# Patient Record
Sex: MALE | Race: WHITE | NOT HISPANIC OR LATINO | Employment: OTHER | URBAN - NONMETROPOLITAN AREA
[De-identification: names, ages, dates, MRNs, and addresses within clinical notes are randomized per-mention and may not be internally consistent; named-entity substitution may affect disease eponyms.]

---

## 2017-02-04 RX ORDER — LOSARTAN POTASSIUM 50 MG/1
50 TABLET ORAL DAILY
Qty: 30 TABLET | Refills: 0 | Status: SHIPPED | OUTPATIENT
Start: 2017-02-04 | End: 2017-03-06

## 2017-02-24 DIAGNOSIS — E55.9 VITAMIN D DEFICIENCY: ICD-10-CM

## 2017-02-24 DIAGNOSIS — I10 ESSENTIAL HYPERTENSION: Primary | ICD-10-CM

## 2017-02-24 DIAGNOSIS — Z85.038 H/O COLON CANCER, STAGE I: ICD-10-CM

## 2017-02-24 DIAGNOSIS — E11.8 TYPE 2 DIABETES MELLITUS WITH COMPLICATION, WITH LONG-TERM CURRENT USE OF INSULIN (HCC): ICD-10-CM

## 2017-02-24 DIAGNOSIS — Z85.47 H/O TESTICULAR CANCER: ICD-10-CM

## 2017-02-24 DIAGNOSIS — L02.91 ABSCESS: ICD-10-CM

## 2017-02-24 DIAGNOSIS — Z23 ENCOUNTER FOR IMMUNIZATION: ICD-10-CM

## 2017-02-24 DIAGNOSIS — Z79.4 TYPE 2 DIABETES MELLITUS WITH COMPLICATION, WITH LONG-TERM CURRENT USE OF INSULIN (HCC): ICD-10-CM

## 2017-02-24 DIAGNOSIS — E78.5 DYSLIPIDEMIA: ICD-10-CM

## 2017-02-24 DIAGNOSIS — M51.36 DDD (DEGENERATIVE DISC DISEASE), LUMBAR: ICD-10-CM

## 2017-02-24 DIAGNOSIS — K21.9 GASTROESOPHAGEAL REFLUX DISEASE WITHOUT ESOPHAGITIS: ICD-10-CM

## 2017-02-25 ENCOUNTER — LAB (OUTPATIENT)
Dept: LAB | Facility: HOSPITAL | Age: 72
End: 2017-02-25

## 2017-02-25 DIAGNOSIS — M51.36 DDD (DEGENERATIVE DISC DISEASE), LUMBAR: ICD-10-CM

## 2017-02-25 DIAGNOSIS — L02.91 ABSCESS: ICD-10-CM

## 2017-02-25 DIAGNOSIS — Z79.4 TYPE 2 DIABETES MELLITUS WITH COMPLICATION, WITH LONG-TERM CURRENT USE OF INSULIN (HCC): ICD-10-CM

## 2017-02-25 DIAGNOSIS — E11.8 TYPE 2 DIABETES MELLITUS WITH COMPLICATION, WITH LONG-TERM CURRENT USE OF INSULIN (HCC): ICD-10-CM

## 2017-02-25 DIAGNOSIS — Z23 ENCOUNTER FOR IMMUNIZATION: ICD-10-CM

## 2017-02-25 DIAGNOSIS — Z85.038 H/O COLON CANCER, STAGE I: ICD-10-CM

## 2017-02-25 DIAGNOSIS — K21.9 GASTROESOPHAGEAL REFLUX DISEASE WITHOUT ESOPHAGITIS: ICD-10-CM

## 2017-02-25 DIAGNOSIS — E78.5 DYSLIPIDEMIA: ICD-10-CM

## 2017-02-25 DIAGNOSIS — Z85.47 H/O TESTICULAR CANCER: ICD-10-CM

## 2017-02-25 DIAGNOSIS — I10 ESSENTIAL HYPERTENSION: ICD-10-CM

## 2017-02-25 DIAGNOSIS — E55.9 VITAMIN D DEFICIENCY: ICD-10-CM

## 2017-03-06 ENCOUNTER — LAB (OUTPATIENT)
Dept: LAB | Facility: HOSPITAL | Age: 72
End: 2017-03-06

## 2017-03-06 DIAGNOSIS — Z79.4 TYPE 2 DIABETES MELLITUS WITH COMPLICATION, WITH LONG-TERM CURRENT USE OF INSULIN (HCC): ICD-10-CM

## 2017-03-06 DIAGNOSIS — M51.36 DDD (DEGENERATIVE DISC DISEASE), LUMBAR: ICD-10-CM

## 2017-03-06 DIAGNOSIS — E78.5 DYSLIPIDEMIA: ICD-10-CM

## 2017-03-06 DIAGNOSIS — Z23 ENCOUNTER FOR IMMUNIZATION: ICD-10-CM

## 2017-03-06 DIAGNOSIS — Z85.47 H/O TESTICULAR CANCER: ICD-10-CM

## 2017-03-06 DIAGNOSIS — I10 ESSENTIAL HYPERTENSION: ICD-10-CM

## 2017-03-06 DIAGNOSIS — Z85.038 H/O COLON CANCER, STAGE I: ICD-10-CM

## 2017-03-06 DIAGNOSIS — L02.91 ABSCESS: ICD-10-CM

## 2017-03-06 DIAGNOSIS — K21.9 GASTROESOPHAGEAL REFLUX DISEASE WITHOUT ESOPHAGITIS: ICD-10-CM

## 2017-03-06 DIAGNOSIS — E55.9 VITAMIN D DEFICIENCY: ICD-10-CM

## 2017-03-06 DIAGNOSIS — E11.8 TYPE 2 DIABETES MELLITUS WITH COMPLICATION, WITH LONG-TERM CURRENT USE OF INSULIN (HCC): ICD-10-CM

## 2017-03-06 DIAGNOSIS — I10 ESSENTIAL HYPERTENSION: Primary | ICD-10-CM

## 2017-03-06 LAB
25(OH)D3 SERPL-MCNC: 29 NG/ML
ALBUMIN SERPL-MCNC: 3.4 G/DL (ref 3.4–4.8)
ALBUMIN/GLOB SERPL: 0.9 G/DL (ref 1.5–2.5)
ALP SERPL-CCNC: 82 U/L (ref 40–129)
ALT SERPL W P-5'-P-CCNC: 99 U/L (ref 10–44)
ANION GAP SERPL CALCULATED.3IONS-SCNC: 3.8 MMOL/L (ref 3.6–11.2)
AST SERPL-CCNC: 135 U/L (ref 10–34)
BASOPHILS # BLD AUTO: 0.06 10*3/MM3 (ref 0–0.3)
BASOPHILS NFR BLD AUTO: 0.9 % (ref 0–2)
BILIRUB SERPL-MCNC: 0.6 MG/DL (ref 0.2–1.8)
BUN BLD-MCNC: 21 MG/DL (ref 7–21)
BUN/CREAT SERPL: 14.7 (ref 7–25)
CALCIUM SPEC-SCNC: 9.6 MG/DL (ref 7.7–10)
CHLORIDE SERPL-SCNC: 109 MMOL/L (ref 99–112)
CHOLEST SERPL-MCNC: 140 MG/DL (ref 0–200)
CO2 SERPL-SCNC: 28.2 MMOL/L (ref 24.3–31.9)
CREAT BLD-MCNC: 1.43 MG/DL (ref 0.43–1.29)
DEPRECATED RDW RBC AUTO: 42.4 FL (ref 37–54)
EOSINOPHIL # BLD AUTO: 0.73 10*3/MM3 (ref 0–0.7)
EOSINOPHIL NFR BLD AUTO: 11.2 % (ref 0–7)
ERYTHROCYTE [DISTWIDTH] IN BLOOD BY AUTOMATED COUNT: 13.7 % (ref 11.5–14.5)
FERRITIN SERPL-MCNC: 57 NG/ML (ref 21.9–321.7)
GFR SERPL CREATININE-BSD FRML MDRD: 49 ML/MIN/1.73
GLOBULIN UR ELPH-MCNC: 3.9 GM/DL
GLUCOSE BLD-MCNC: 123 MG/DL (ref 70–110)
HBA1C MFR BLD: 8.2 % (ref 4.5–5.7)
HCT VFR BLD AUTO: 38 % (ref 42–52)
HDLC SERPL-MCNC: 52 MG/DL (ref 60–100)
HGB BLD-MCNC: 12.6 G/DL (ref 14–18)
IMM GRANULOCYTES # BLD: 0.01 10*3/MM3 (ref 0–0.03)
IMM GRANULOCYTES NFR BLD: 0.2 % (ref 0–0.5)
IRON 24H UR-MRATE: 135 MCG/DL (ref 53–167)
IRON SATN MFR SERPL: 39 % (ref 20–50)
LDLC SERPL CALC-MCNC: 61 MG/DL (ref 0–100)
LDLC/HDLC SERPL: 1.17 {RATIO}
LYMPHOCYTES # BLD AUTO: 1.49 10*3/MM3 (ref 1–3)
LYMPHOCYTES NFR BLD AUTO: 22.8 % (ref 16–46)
MCH RBC QN AUTO: 28.1 PG (ref 27–33)
MCHC RBC AUTO-ENTMCNC: 33.2 G/DL (ref 33–37)
MCV RBC AUTO: 84.6 FL (ref 80–94)
MONOCYTES # BLD AUTO: 0.61 10*3/MM3 (ref 0.1–0.9)
MONOCYTES NFR BLD AUTO: 9.3 % (ref 0–12)
NEUTROPHILS # BLD AUTO: 3.63 10*3/MM3 (ref 1.4–6.5)
NEUTROPHILS NFR BLD AUTO: 55.6 % (ref 40–75)
OSMOLALITY SERPL CALC.SUM OF ELEC: 285.6 MOSM/KG (ref 273–305)
PLATELET # BLD AUTO: 199 10*3/MM3 (ref 130–400)
PMV BLD AUTO: 11 FL (ref 6–10)
POTASSIUM BLD-SCNC: 4.1 MMOL/L (ref 3.5–5.3)
PROT SERPL-MCNC: 7.3 G/DL (ref 6–8)
RBC # BLD AUTO: 4.49 10*6/MM3 (ref 4.7–6.1)
SODIUM BLD-SCNC: 141 MMOL/L (ref 135–153)
TIBC SERPL-MCNC: 346 MCG/DL (ref 241–421)
TRIGL SERPL-MCNC: 136 MG/DL (ref 0–150)
TSH SERPL DL<=0.05 MIU/L-ACNC: 3.75 MIU/ML (ref 0.55–4.78)
VIT B12 BLD-MCNC: 503 PG/ML (ref 211–911)
VLDLC SERPL-MCNC: 27.2 MG/DL
WBC NRBC COR # BLD: 6.53 10*3/MM3 (ref 4.5–12.5)

## 2017-03-06 PROCEDURE — 82728 ASSAY OF FERRITIN: CPT | Performed by: GENERAL PRACTICE

## 2017-03-06 PROCEDURE — 83550 IRON BINDING TEST: CPT | Performed by: GENERAL PRACTICE

## 2017-03-06 PROCEDURE — 83036 HEMOGLOBIN GLYCOSYLATED A1C: CPT | Performed by: GENERAL PRACTICE

## 2017-03-06 PROCEDURE — 80061 LIPID PANEL: CPT | Performed by: GENERAL PRACTICE

## 2017-03-06 PROCEDURE — 82306 VITAMIN D 25 HYDROXY: CPT | Performed by: GENERAL PRACTICE

## 2017-03-06 PROCEDURE — 82607 VITAMIN B-12: CPT | Performed by: GENERAL PRACTICE

## 2017-03-06 PROCEDURE — 80053 COMPREHEN METABOLIC PANEL: CPT | Performed by: GENERAL PRACTICE

## 2017-03-06 PROCEDURE — 84443 ASSAY THYROID STIM HORMONE: CPT | Performed by: GENERAL PRACTICE

## 2017-03-06 PROCEDURE — 85025 COMPLETE CBC W/AUTO DIFF WBC: CPT | Performed by: GENERAL PRACTICE

## 2017-03-06 PROCEDURE — 83540 ASSAY OF IRON: CPT | Performed by: GENERAL PRACTICE

## 2017-03-10 ENCOUNTER — OFFICE VISIT (OUTPATIENT)
Dept: FAMILY MEDICINE CLINIC | Facility: CLINIC | Age: 72
End: 2017-03-10

## 2017-03-10 DIAGNOSIS — Z79.4 TYPE 2 DIABETES MELLITUS WITH COMPLICATION, WITH LONG-TERM CURRENT USE OF INSULIN (HCC): ICD-10-CM

## 2017-03-10 DIAGNOSIS — Z85.47 H/O TESTICULAR CANCER: ICD-10-CM

## 2017-03-10 DIAGNOSIS — I10 ESSENTIAL HYPERTENSION: Primary | ICD-10-CM

## 2017-03-10 DIAGNOSIS — Z00.00 HEALTHCARE MAINTENANCE: ICD-10-CM

## 2017-03-10 DIAGNOSIS — M51.36 DDD (DEGENERATIVE DISC DISEASE), LUMBAR: ICD-10-CM

## 2017-03-10 DIAGNOSIS — Z85.038 H/O COLON CANCER, STAGE I: ICD-10-CM

## 2017-03-10 DIAGNOSIS — E55.9 VITAMIN D DEFICIENCY: ICD-10-CM

## 2017-03-10 DIAGNOSIS — E11.8 TYPE 2 DIABETES MELLITUS WITH COMPLICATION, WITH LONG-TERM CURRENT USE OF INSULIN (HCC): ICD-10-CM

## 2017-03-10 DIAGNOSIS — K21.9 GASTROESOPHAGEAL REFLUX DISEASE WITHOUT ESOPHAGITIS: ICD-10-CM

## 2017-03-10 DIAGNOSIS — M81.0 AGE-RELATED OSTEOPOROSIS WITHOUT CURRENT PATHOLOGICAL FRACTURE: ICD-10-CM

## 2017-03-10 DIAGNOSIS — E78.5 DYSLIPIDEMIA: ICD-10-CM

## 2017-03-10 PROCEDURE — 99214 OFFICE O/P EST MOD 30 MIN: CPT | Performed by: GENERAL PRACTICE

## 2017-03-10 RX ORDER — MAGNESIUM GLUCONATE 27 MG(500)
500 TABLET ORAL DAILY
COMMUNITY
End: 2021-03-18

## 2017-03-10 RX ORDER — ASPIRIN 81 MG/1
81 TABLET ORAL DAILY
COMMUNITY
End: 2019-08-14 | Stop reason: SDUPTHER

## 2017-03-10 RX ORDER — CHOLECALCIFEROL (VITAMIN D3) 125 MCG
100 CAPSULE ORAL DAILY
COMMUNITY
End: 2022-07-27

## 2017-03-10 RX ORDER — ROSUVASTATIN CALCIUM 5 MG/1
5 TABLET, COATED ORAL DAILY
COMMUNITY
End: 2017-03-10 | Stop reason: SDUPTHER

## 2017-03-10 RX ORDER — ROSUVASTATIN CALCIUM 5 MG/1
5 TABLET, COATED ORAL DAILY
Qty: 90 TABLET | Refills: 3 | Status: SHIPPED | OUTPATIENT
Start: 2017-03-10 | End: 2017-11-22 | Stop reason: SDUPTHER

## 2017-03-10 RX ORDER — LOSARTAN POTASSIUM 50 MG/1
50 TABLET ORAL DAILY
COMMUNITY
End: 2017-03-10 | Stop reason: SDUPTHER

## 2017-03-10 RX ORDER — LOSARTAN POTASSIUM 50 MG/1
50 TABLET ORAL DAILY
Qty: 90 TABLET | Refills: 3 | Status: SHIPPED | OUTPATIENT
Start: 2017-03-10 | End: 2018-01-30 | Stop reason: SDUPTHER

## 2017-03-10 NOTE — PROGRESS NOTES
Subjective   Nicholas Yin is a 72 y.o. male.     History of Present Illness     Back Pain  The patient has had chronic back pain. Symptoms have been present for a number of  years and have been worse as of late. The pain is located in the bilateral lumbar area and does not radiate. The pain is described as sharp and stiffness and occurs intermittently. He rates his pain as mild. Symptoms are exacerbated by lifting, pushing/pulling, twisting/turning and standing. Symptoms are improved by heat, acetaminophen and PT treatment.He has no other symptoms associated with the back pain. He denies any weakness in the right leg, weakness in the left leg, tingling in the right leg, tingling in the left leg, change in bladder function and change in bowel function. Plain films of the L spine performed on 7/25/14 revealed multilevel DDD    Diabetes  Current symptoms include hypoglycemia - occasional - no particular pattern. Patient denies paresthesia of the feet, visual disturbances, polydipsia, polyuria and foot ulcerations. Evaluation to date has been: fasting blood sugar and hemoglobin A1C. Home sugars: BGs are running  consistent with Hgb A1C. Current treatments: GLP agonist - bydureon and basal insulin - toujeo. Last dilated eye exam within one year. Most recent hemoglobin A1c   Lab Results   Component Value Date    HGBA1C 8.20 (H) 03/06/2017    HGBA1C 7.9 (H) 09/28/2015    HGBA1C 9.5 (H) 06/06/2015    HGBA1C 7.8 (H) 07/25/2014      Dyslipidemia  Compliance with treatment has been poor. The patient exercises occasionally. He is currently being prescribed the following medication for his dyslipidemia - rosuvastatin. Patient denies side effects associated with his medications. Most recent lipids include  Lab Results   Component Value Date    TRIG 136 03/06/2017    TRIG 112 09/28/2015    TRIG 188 (H) 06/06/2015    HDL 52 (L) 03/06/2017    HDL 51 (L) 09/28/2015    HDL 44 (L) 06/06/2015    LDLCALC 61 03/06/2017    LDL 52  09/28/2015    LDL 38 06/06/2015     Hypertension  Home blood pressure readings: not doing. Associated signs and symptoms: dyspnea with exertion. Patient denies: chest pain, palpitations, orthopnea, paroxysmal nocturnal dyspnea and peripheral edema. Current antihypertensive medications includes losartan. Taking this in divided doses as he has experienced daytime dizziness otherwise. Medication compliance: taking as prescribed. Most recent creatinine   Lab Results   Component Value Date    CREATININE 1.43 (H) 03/06/2017    CREATININE 1.41 (H) 09/28/2015   CT of the abdomen performed on 1/11/16 revealed heavy atherosclerotic plaquing within the aorta and its branches    Labs  Most recent ALT 99 and .    The following portions of the patient's history were reviewed and updated as appropriate: allergies, current medications, past medical history, past social history, past surgical history and problem list.    Review of Systems   Constitutional: Positive for fatigue (chronic - mild - stable). Negative for appetite change, chills, fever and unexpected weight change.   HENT: Negative for congestion, ear pain, rhinorrhea, sneezing, sore throat and voice change.    Eyes: Negative for visual disturbance.   Respiratory: Positive for shortness of breath (chronic - with above average exertion - stable). Negative for cough and wheezing.    Cardiovascular: Negative for chest pain, palpitations and leg swelling.   Gastrointestinal: Negative for abdominal pain, blood in stool, constipation, diarrhea, nausea and vomiting.   Endocrine: Negative for polydipsia and polyuria.   Genitourinary: Negative for difficulty urinating, dysuria, frequency, hematuria and urgency.   Musculoskeletal: Positive for back pain (chronic - worse as of late). Negative for arthralgias, joint swelling, myalgias and neck pain.   Skin: Negative for color change.   Neurological: Negative for tremors, weakness, numbness and headaches.    Psychiatric/Behavioral: Negative for dysphoric mood, sleep disturbance and suicidal ideas. The patient is not nervous/anxious.      Objective   Physical Exam   Constitutional: He is oriented to person, place, and time. He appears well-developed and well-nourished. He is cooperative. He does not have a sickly appearance. No distress.   Walks with a slightly exaggerated thoracic kyphosis. No apparent distress. No pallor, jaundice, diaphoresis, or cyanosis.     HENT:   Head: Atraumatic.   Right Ear: Tympanic membrane, external ear and ear canal normal.   Left Ear: Tympanic membrane, external ear and ear canal normal.   Mouth/Throat: Oropharynx is clear and moist.   Eyes: EOM are normal. Pupils are equal, round, and reactive to light. No scleral icterus.   Neck: No JVD present. Carotid bruit is not present. No tracheal deviation present. No thyromegaly present.   Cardiovascular: Normal rate, regular rhythm, S1 normal, S2 normal, normal heart sounds and intact distal pulses.  Exam reveals no gallop.    No murmur heard.  Pulmonary/Chest: Breath sounds normal. He has no wheezes. He has no rales.   Abdominal: Soft. Normal aorta and bowel sounds are normal. He exhibits no abdominal bruit and no mass. There is no hepatosplenomegaly. There is no tenderness. No hernia.   Musculoskeletal: He exhibits no tenderness or deformity.   Lymphadenopathy:        Head (right side): No submandibular adenopathy present.        Head (left side): No submandibular adenopathy present.     He has no cervical adenopathy.   Neurological: He is alert and oriented to person, place, and time. He has normal strength and normal reflexes. He displays normal reflexes. No cranial nerve deficit. He exhibits normal muscle tone. Coordination normal.   Skin: Skin is warm and dry. No rash noted. He is not diaphoretic. No pallor. Nails show no clubbing.   Psychiatric: He has a normal mood and affect. His behavior is normal.     Assessment/Plan   Problems  Addressed this Visit        Cardiovascular and Mediastinum    Essential hypertension   Hypertension: elevated today. Evidence of target organ damage: atherosclerosis.  Encouraged to continue to work on diet and exercise plan.   Losartan will be changed to once daily dosing in the evening and monitored    Relevant Medications    losartan (COZAAR) 50 MG tablet    Other Relevant Orders    Comprehensive Metabolic Panel       Digestive    Gastroesophageal reflux disease without esophagitis    Relevant Medications    Dexlansoprazole (DEXILANT PO)    Vitamin D deficiency       Endocrine    Type 2 diabetes mellitus with complication, with long-term current use of insulin  Diabetes mellitus Type II, under fair control.   Encouraged to continue to pursue ADA diet  Encouraged aerobic exercise.  Restarted metformin; see  medication orders.  Initiated a trial of DPP inhibitor; see medication orders.  Scheduled for updated labs in 3 months    Relevant Medications    Exenatide ER 2 MG pen-injector    Insulin Glargine (TOUJEO SOLOSTAR SC)    Other Relevant Orders    Hemoglobin A1c       Musculoskeletal and Integument    DDD (degenerative disc disease), lumbar  Reminded regarding symptomatic treatment.   Will continue PT.       Genitourinary    H/O testicular cancer       Other    H/O colon cancer, stage I    Dyslipidemia  As above. Continue current medication.    Relevant Medications    rosuvastatin (CRESTOR) 5 MG tablet    Other Relevant Orders    Lipid Panel    Healthcare maintenance    Relevant Orders    DEXA Bone Density Axial

## 2017-03-11 VITALS
BODY MASS INDEX: 34.96 KG/M2 | HEART RATE: 89 BPM | WEIGHT: 236 LBS | TEMPERATURE: 97.9 F | OXYGEN SATURATION: 98 % | SYSTOLIC BLOOD PRESSURE: 170 MMHG | DIASTOLIC BLOOD PRESSURE: 75 MMHG | RESPIRATION RATE: 12 BRPM | HEIGHT: 69 IN

## 2017-06-27 ENCOUNTER — LAB (OUTPATIENT)
Dept: LAB | Facility: HOSPITAL | Age: 72
End: 2017-06-27

## 2017-06-27 DIAGNOSIS — E78.5 DYSLIPIDEMIA: ICD-10-CM

## 2017-06-27 DIAGNOSIS — I10 ESSENTIAL HYPERTENSION: ICD-10-CM

## 2017-06-27 DIAGNOSIS — Z79.4 TYPE 2 DIABETES MELLITUS WITH COMPLICATION, WITH LONG-TERM CURRENT USE OF INSULIN (HCC): ICD-10-CM

## 2017-06-27 DIAGNOSIS — E11.8 TYPE 2 DIABETES MELLITUS WITH COMPLICATION, WITH LONG-TERM CURRENT USE OF INSULIN (HCC): ICD-10-CM

## 2017-06-27 LAB
ALBUMIN SERPL-MCNC: 3.4 G/DL (ref 3.4–4.8)
ALBUMIN/GLOB SERPL: 0.9 G/DL (ref 1.5–2.5)
ALP SERPL-CCNC: 89 U/L (ref 40–129)
ALT SERPL W P-5'-P-CCNC: 64 U/L (ref 10–44)
ANION GAP SERPL CALCULATED.3IONS-SCNC: 5.4 MMOL/L (ref 3.6–11.2)
AST SERPL-CCNC: 80 U/L (ref 10–34)
BILIRUB SERPL-MCNC: 0.7 MG/DL (ref 0.2–1.8)
BUN BLD-MCNC: 20 MG/DL (ref 7–21)
BUN/CREAT SERPL: 13.8 (ref 7–25)
CALCIUM SPEC-SCNC: 9.2 MG/DL (ref 7.7–10)
CHLORIDE SERPL-SCNC: 109 MMOL/L (ref 99–112)
CHOLEST SERPL-MCNC: 126 MG/DL (ref 0–200)
CO2 SERPL-SCNC: 24.6 MMOL/L (ref 24.3–31.9)
CREAT BLD-MCNC: 1.45 MG/DL (ref 0.43–1.29)
GFR SERPL CREATININE-BSD FRML MDRD: 48 ML/MIN/1.73
GLOBULIN UR ELPH-MCNC: 3.7 GM/DL
GLUCOSE BLD-MCNC: 115 MG/DL (ref 70–110)
HBA1C MFR BLD: 8.9 % (ref 4.5–5.7)
HDLC SERPL-MCNC: 42 MG/DL (ref 60–100)
LDLC SERPL CALC-MCNC: 59 MG/DL (ref 0–100)
LDLC/HDLC SERPL: 1.4 {RATIO}
OSMOLALITY SERPL CALC.SUM OF ELEC: 281.1 MOSM/KG (ref 273–305)
POTASSIUM BLD-SCNC: 3.9 MMOL/L (ref 3.5–5.3)
PROT SERPL-MCNC: 7.1 G/DL (ref 6–8)
SODIUM BLD-SCNC: 139 MMOL/L (ref 135–153)
TRIGL SERPL-MCNC: 127 MG/DL (ref 0–150)
VLDLC SERPL-MCNC: 25.4 MG/DL

## 2017-06-27 PROCEDURE — 80061 LIPID PANEL: CPT | Performed by: GENERAL PRACTICE

## 2017-06-27 PROCEDURE — 83036 HEMOGLOBIN GLYCOSYLATED A1C: CPT | Performed by: GENERAL PRACTICE

## 2017-06-27 PROCEDURE — 80053 COMPREHEN METABOLIC PANEL: CPT | Performed by: GENERAL PRACTICE

## 2017-07-20 ENCOUNTER — HOSPITAL ENCOUNTER (OUTPATIENT)
Dept: BONE DENSITY | Facility: HOSPITAL | Age: 72
Discharge: HOME | End: 2017-07-20
Admitting: GENERAL PRACTICE

## 2017-07-20 DIAGNOSIS — M81.0 AGE-RELATED OSTEOPOROSIS WITHOUT CURRENT PATHOLOGICAL FRACTURE: ICD-10-CM

## 2017-07-20 DIAGNOSIS — Z00.00 HEALTHCARE MAINTENANCE: ICD-10-CM

## 2017-07-20 PROCEDURE — 77080 DXA BONE DENSITY AXIAL: CPT

## 2017-07-20 PROCEDURE — 77080 DXA BONE DENSITY AXIAL: CPT | Performed by: RADIOLOGY

## 2017-09-12 DIAGNOSIS — M51.36 DDD (DEGENERATIVE DISC DISEASE), LUMBAR: Primary | ICD-10-CM

## 2017-10-04 DIAGNOSIS — M25.512 CHRONIC LEFT SHOULDER PAIN: ICD-10-CM

## 2017-10-04 DIAGNOSIS — Z85.038 H/O COLON CANCER, STAGE I: ICD-10-CM

## 2017-10-04 DIAGNOSIS — I10 ESSENTIAL HYPERTENSION: Primary | ICD-10-CM

## 2017-10-04 DIAGNOSIS — E78.5 DYSLIPIDEMIA: ICD-10-CM

## 2017-10-04 DIAGNOSIS — E11.8 TYPE 2 DIABETES MELLITUS WITH COMPLICATION, WITH LONG-TERM CURRENT USE OF INSULIN (HCC): ICD-10-CM

## 2017-10-04 DIAGNOSIS — G89.29 CHRONIC LEFT SHOULDER PAIN: ICD-10-CM

## 2017-10-04 DIAGNOSIS — Z79.4 TYPE 2 DIABETES MELLITUS WITH COMPLICATION, WITH LONG-TERM CURRENT USE OF INSULIN (HCC): ICD-10-CM

## 2017-10-04 DIAGNOSIS — Z00.00 HEALTHCARE MAINTENANCE: ICD-10-CM

## 2017-10-05 ENCOUNTER — HOSPITAL ENCOUNTER (OUTPATIENT)
Dept: GENERAL RADIOLOGY | Facility: HOSPITAL | Age: 72
Discharge: HOME | End: 2017-10-05
Admitting: GENERAL PRACTICE

## 2017-10-05 ENCOUNTER — LAB (OUTPATIENT)
Dept: LAB | Facility: HOSPITAL | Age: 72
End: 2017-10-05

## 2017-10-05 DIAGNOSIS — E78.5 DYSLIPIDEMIA: ICD-10-CM

## 2017-10-05 DIAGNOSIS — Z79.4 TYPE 2 DIABETES MELLITUS WITH COMPLICATION, WITH LONG-TERM CURRENT USE OF INSULIN (HCC): ICD-10-CM

## 2017-10-05 DIAGNOSIS — Z00.00 HEALTHCARE MAINTENANCE: ICD-10-CM

## 2017-10-05 DIAGNOSIS — Z85.038 H/O COLON CANCER, STAGE I: ICD-10-CM

## 2017-10-05 DIAGNOSIS — E11.8 TYPE 2 DIABETES MELLITUS WITH COMPLICATION, WITH LONG-TERM CURRENT USE OF INSULIN (HCC): ICD-10-CM

## 2017-10-05 DIAGNOSIS — M25.512 CHRONIC LEFT SHOULDER PAIN: ICD-10-CM

## 2017-10-05 DIAGNOSIS — G89.29 CHRONIC LEFT SHOULDER PAIN: ICD-10-CM

## 2017-10-05 DIAGNOSIS — I10 ESSENTIAL HYPERTENSION: ICD-10-CM

## 2017-10-05 LAB
ALBUMIN SERPL-MCNC: 3.7 G/DL (ref 3.4–4.8)
ALBUMIN/GLOB SERPL: 1 G/DL (ref 1.5–2.5)
ALP SERPL-CCNC: 91 U/L (ref 40–129)
ALT SERPL W P-5'-P-CCNC: 67 U/L (ref 10–44)
ANION GAP SERPL CALCULATED.3IONS-SCNC: 4.6 MMOL/L (ref 3.6–11.2)
AST SERPL-CCNC: 75 U/L (ref 10–34)
BASOPHILS # BLD AUTO: 0.06 10*3/MM3 (ref 0–0.3)
BASOPHILS NFR BLD AUTO: 0.8 % (ref 0–2)
BILIRUB SERPL-MCNC: 0.5 MG/DL (ref 0.2–1.8)
BUN BLD-MCNC: 26 MG/DL (ref 7–21)
BUN/CREAT SERPL: 16 (ref 7–25)
CALCIUM SPEC-SCNC: 9.5 MG/DL (ref 7.7–10)
CEA SERPL-MCNC: 4.2 NG/ML (ref 0–5)
CHLORIDE SERPL-SCNC: 107 MMOL/L (ref 99–112)
CHOLEST SERPL-MCNC: 135 MG/DL (ref 0–200)
CO2 SERPL-SCNC: 27.4 MMOL/L (ref 24.3–31.9)
CREAT BLD-MCNC: 1.63 MG/DL (ref 0.43–1.29)
DEPRECATED RDW RBC AUTO: 44.5 FL (ref 37–54)
EOSINOPHIL # BLD AUTO: 0.92 10*3/MM3 (ref 0–0.7)
EOSINOPHIL NFR BLD AUTO: 12.2 % (ref 0–7)
ERYTHROCYTE [DISTWIDTH] IN BLOOD BY AUTOMATED COUNT: 14.3 % (ref 11.5–14.5)
GFR SERPL CREATININE-BSD FRML MDRD: 42 ML/MIN/1.73
GLOBULIN UR ELPH-MCNC: 3.7 GM/DL
GLUCOSE BLD-MCNC: 205 MG/DL (ref 70–110)
HBA1C MFR BLD: 8.3 % (ref 4.5–5.7)
HCT VFR BLD AUTO: 38.5 % (ref 42–52)
HCV AB SER DONR QL: REACTIVE
HDLC SERPL-MCNC: 46 MG/DL (ref 60–100)
HGB BLD-MCNC: 12.8 G/DL (ref 14–18)
IMM GRANULOCYTES # BLD: 0.02 10*3/MM3 (ref 0–0.03)
IMM GRANULOCYTES NFR BLD: 0.3 % (ref 0–0.5)
LDLC SERPL CALC-MCNC: 55 MG/DL (ref 0–100)
LDLC/HDLC SERPL: 1.19 {RATIO}
LYMPHOCYTES # BLD AUTO: 1.64 10*3/MM3 (ref 1–3)
LYMPHOCYTES NFR BLD AUTO: 21.8 % (ref 16–46)
MCH RBC QN AUTO: 28.6 PG (ref 27–33)
MCHC RBC AUTO-ENTMCNC: 33.2 G/DL (ref 33–37)
MCV RBC AUTO: 85.9 FL (ref 80–94)
MONOCYTES # BLD AUTO: 0.69 10*3/MM3 (ref 0.1–0.9)
MONOCYTES NFR BLD AUTO: 9.2 % (ref 0–12)
NEUTROPHILS # BLD AUTO: 4.21 10*3/MM3 (ref 1.4–6.5)
NEUTROPHILS NFR BLD AUTO: 55.7 % (ref 40–75)
OSMOLALITY SERPL CALC.SUM OF ELEC: 288.2 MOSM/KG (ref 273–305)
PLATELET # BLD AUTO: 202 10*3/MM3 (ref 130–400)
PMV BLD AUTO: 11.6 FL (ref 6–10)
POTASSIUM BLD-SCNC: 4.4 MMOL/L (ref 3.5–5.3)
PROT SERPL-MCNC: 7.4 G/DL (ref 6–8)
RBC # BLD AUTO: 4.48 10*6/MM3 (ref 4.7–6.1)
SODIUM BLD-SCNC: 139 MMOL/L (ref 135–153)
TRIGL SERPL-MCNC: 171 MG/DL (ref 0–150)
VLDLC SERPL-MCNC: 34.2 MG/DL
WBC NRBC COR # BLD: 7.54 10*3/MM3 (ref 4.5–12.5)

## 2017-10-05 PROCEDURE — 86706 HEP B SURFACE ANTIBODY: CPT | Performed by: GENERAL PRACTICE

## 2017-10-05 PROCEDURE — 86803 HEPATITIS C AB TEST: CPT | Performed by: GENERAL PRACTICE

## 2017-10-05 PROCEDURE — 36415 COLL VENOUS BLD VENIPUNCTURE: CPT

## 2017-10-05 PROCEDURE — 82378 CARCINOEMBRYONIC ANTIGEN: CPT | Performed by: GENERAL PRACTICE

## 2017-10-05 PROCEDURE — 80061 LIPID PANEL: CPT | Performed by: GENERAL PRACTICE

## 2017-10-05 PROCEDURE — 85025 COMPLETE CBC W/AUTO DIFF WBC: CPT | Performed by: GENERAL PRACTICE

## 2017-10-05 PROCEDURE — 83036 HEMOGLOBIN GLYCOSYLATED A1C: CPT | Performed by: GENERAL PRACTICE

## 2017-10-05 PROCEDURE — 80053 COMPREHEN METABOLIC PANEL: CPT | Performed by: GENERAL PRACTICE

## 2017-10-05 PROCEDURE — 73030 X-RAY EXAM OF SHOULDER: CPT | Performed by: RADIOLOGY

## 2017-10-05 PROCEDURE — 73030 X-RAY EXAM OF SHOULDER: CPT

## 2017-10-06 ENCOUNTER — OFFICE VISIT (OUTPATIENT)
Dept: FAMILY MEDICINE CLINIC | Facility: CLINIC | Age: 72
End: 2017-10-06

## 2017-10-06 DIAGNOSIS — Z79.4 TYPE 2 DIABETES MELLITUS WITH COMPLICATION, WITH LONG-TERM CURRENT USE OF INSULIN (HCC): ICD-10-CM

## 2017-10-06 DIAGNOSIS — R35.1 BENIGN PROSTATIC HYPERPLASIA WITH NOCTURIA: ICD-10-CM

## 2017-10-06 DIAGNOSIS — I10 ESSENTIAL HYPERTENSION: Primary | ICD-10-CM

## 2017-10-06 DIAGNOSIS — M25.512 ACUTE PAIN OF LEFT SHOULDER: ICD-10-CM

## 2017-10-06 DIAGNOSIS — E55.9 VITAMIN D DEFICIENCY: ICD-10-CM

## 2017-10-06 DIAGNOSIS — Z85.038 H/O COLON CANCER, STAGE I: ICD-10-CM

## 2017-10-06 DIAGNOSIS — E78.5 DYSLIPIDEMIA: ICD-10-CM

## 2017-10-06 DIAGNOSIS — K21.9 GASTROESOPHAGEAL REFLUX DISEASE WITHOUT ESOPHAGITIS: ICD-10-CM

## 2017-10-06 DIAGNOSIS — M51.36 DDD (DEGENERATIVE DISC DISEASE), LUMBAR: ICD-10-CM

## 2017-10-06 DIAGNOSIS — Z00.00 HEALTHCARE MAINTENANCE: ICD-10-CM

## 2017-10-06 DIAGNOSIS — L82.1 SEBORRHEIC KERATOSES: ICD-10-CM

## 2017-10-06 DIAGNOSIS — R76.8 HEPATITIS C ANTIBODY POSITIVE IN BLOOD: ICD-10-CM

## 2017-10-06 DIAGNOSIS — N52.01 ERECTILE DYSFUNCTION DUE TO ARTERIAL INSUFFICIENCY: ICD-10-CM

## 2017-10-06 DIAGNOSIS — N40.1 BENIGN PROSTATIC HYPERPLASIA WITH NOCTURIA: ICD-10-CM

## 2017-10-06 DIAGNOSIS — Z85.47 H/O TESTICULAR CANCER: ICD-10-CM

## 2017-10-06 DIAGNOSIS — E11.8 TYPE 2 DIABETES MELLITUS WITH COMPLICATION, WITH LONG-TERM CURRENT USE OF INSULIN (HCC): ICD-10-CM

## 2017-10-06 LAB
HBV SURFACE AB SER RIA-ACNC: NORMAL
HIV1+2 AB SER QL: NORMAL

## 2017-10-06 PROCEDURE — 99214 OFFICE O/P EST MOD 30 MIN: CPT | Performed by: GENERAL PRACTICE

## 2017-10-06 PROCEDURE — 17110 DESTRUCTION B9 LES UP TO 14: CPT | Performed by: GENERAL PRACTICE

## 2017-10-06 PROCEDURE — 87902 NFCT AGT GNTYP ALYS HEP C: CPT | Performed by: GENERAL PRACTICE

## 2017-10-06 PROCEDURE — 36415 COLL VENOUS BLD VENIPUNCTURE: CPT | Performed by: GENERAL PRACTICE

## 2017-10-06 PROCEDURE — 86708 HEPATITIS A ANTIBODY: CPT | Performed by: GENERAL PRACTICE

## 2017-10-06 PROCEDURE — G0432 EIA HIV-1/HIV-2 SCREEN: HCPCS | Performed by: GENERAL PRACTICE

## 2017-10-06 PROCEDURE — 87522 HEPATITIS C REVRS TRNSCRPJ: CPT | Performed by: GENERAL PRACTICE

## 2017-10-06 RX ORDER — TADALAFIL 5 MG/1
5 TABLET ORAL DAILY PRN
Qty: 30 TABLET | Refills: 5 | Status: SHIPPED | OUTPATIENT
Start: 2017-10-06 | End: 2018-04-12

## 2017-10-06 NOTE — PROGRESS NOTES
Procedures  Cryotherapy Procedure Note    Pre-operative Diagnosis: Seborrheic keratoses    Post-operative Diagnosis: Same    Locations: Mid anterior neck and left medial upper calf    Indications: Ablation    Anesthesia: None     Procedure Details   Patient informed of risks (permanent scarring, infection, light or dark discoloration, bleeding, infection, weakness, numbness and recurrence of the lesion) and benefits of the procedure and verbal informed consent obtained.    The areas are treated with liquid nitrogen therapy, frozen until ice ball extended 2 mm beyond lesion, allowed to thaw, and treated again. The patient tolerated procedure well.  The patient was instructed on post-op care, warned that there may be blister formation, redness and pain. Recommend OTC analgesia as needed for pain.    Condition:  Stable    Complications:  None.    Plan:  1. Instructed to keep the area dry and covered for 24-48h and clean thereafter.  2. Warning signs of infection were reviewed.    3. Recommended that the patient use OTC acetaminophen as needed for pain.   4. Return in 1 month if lesions haven't resolved.

## 2017-10-06 NOTE — PROGRESS NOTES
Subjective   Nicholas Yin is a 72 y.o. male.     History of Present Illness     Left Shoulder Pain  Returns with a several week history of left shoulder pain. There's no history of any strain or trauma and he denies any change in his activities. He frequently lifts camera equipment and bags. The pain is described as a sharp discomfort over the lateral aspect worse with movement particularly sudden ones. This does not radiate and has been unassociated with any other symptoms. He denies any stiffness, swelling, weakness, numbness or tingling and has had no fever, chills, or night sweats. He is RHD and gives no history of any previous problems with that joint. He applied Australian Dream cream last night and his pain has nearly completely resolved today. Plain films performed yesterday were reported as showing osteoarthritic changes of the joint space, some calcification of the rotator cuff tendon, as well as an old clavicular fracture.     Back Pain  The patient has had chronic back pain. Symptoms have been present for a number of  years and have been relatively stable since last here. The pain is located in the bilateral lumbar area and does not radiate. The pain is described as sharp and stiffness and occurs intermittently. He rates his pain as mild. Symptoms are exacerbated by lifting, pushing/pulling, twisting/turning and standing. Symptoms are improved by heat, acetaminophen and previously by PT treatment.He has no other symptoms associated with the back pain. He denies any weakness in the right leg, weakness in the left leg, tingling in the right leg, tingling in the left leg, change in bladder function and change in bowel function. Plain films of the L spine performed on 7/25/14 revealed multilevel DDD    Diabetes  Current symptoms include none. Patient denies paresthesia of the feet, visual disturbances, polydipsia, polyuria, hypoglycemia and foot ulcerations. Evaluation to date has been: hemoglobin A1C.  Home sugars: BGs are running  consistent with Hgb A1C. Current treatments: metformin, DPP inhibitor - januvia (both as janumet), SGLT2 inhibitor - bydureon and basal insulin - toujeo. Last dilated eye exam within one year. Most recent hemoglobin A1c   Lab Results   Component Value Date    HGBA1C 8.30 (H) 10/05/2017    HGBA1C 8.90 (H) 06/27/2017    HGBA1C 8.20 (H) 03/06/2017      Dyslipidemia  Compliance with treatment has been fair. The patient exercises occasionally. He is currently being prescribed the following medication for his dyslipidemia - rosuvastatin. Patient denies side effects associated with his medications. Most recent lipids include  Lab Results   Component Value Date    TRIG 171 (H) 10/05/2017    TRIG 127 06/27/2017    HDL 46 (L) 10/05/2017    HDL 42 (L) 06/27/2017    LDLCALC 55 10/05/2017    LDLCALC 59 06/27/2017    LDL 52 09/28/2015    LDL 38 06/06/2015     Hypertension  Home blood pressure readings: have been high since last here. Associated signs and symptoms: dyspnea. Patient denies: chest pain, palpitations, orthopnea, paroxysmal nocturnal dyspnea and peripheral edema. Current antihypertensive medications includes losartan. Medication compliance: taking as prescribed. Most recent creatinine   Lab Results   Component Value Date    CREATININE 1.63 (H) 10/05/2017     Skin Lesions  He would like several lesions checked - one over the anterior neck and the other over the left medial proximal calf. These have been present for several years and have increased gradually in size. Apart from getting caught when he shaves and dresses he denies any associated symptoms.     Labs  Most recent ALT 67 and AST 75. Hepatitis C Ab positive. He received blood transfusions in the mid-late 80's    The following portions of the patient's history were reviewed and updated as appropriate: allergies, current medications, past family history, past medical history, past social history, past surgical history and problem  list.    Review of Systems   Constitutional: Positive for fatigue (chronic - mild - stable). Negative for appetite change, chills, fever and unexpected weight change.   HENT: Negative for congestion, ear pain, rhinorrhea, sneezing, sore throat and voice change.    Eyes: Negative for visual disturbance.   Respiratory: Positive for shortness of breath (chronic - with above average exertion - stable). Negative for cough and wheezing.    Cardiovascular: Negative for chest pain, palpitations and leg swelling.   Gastrointestinal: Negative for abdominal pain, blood in stool, constipation, diarrhea, nausea and vomiting.   Endocrine: Negative for polydipsia and polyuria.   Genitourinary: Negative for difficulty urinating, dysuria, frequency, hematuria and urgency.        Erectile dysfunction - chronic progressive. Responsive to viagra with no apparent side effects. Nocturia x 2-3 with mildly decreased stream and intermittent sense of incomplete voiding   Musculoskeletal: Positive for arthralgias (left shoulder) and back pain (chronic - stable). Negative for joint swelling, myalgias and neck pain.   Skin: Negative for color change.        lesions   Neurological: Negative for tremors, weakness, numbness and headaches.   Psychiatric/Behavioral: Negative for dysphoric mood, sleep disturbance and suicidal ideas. The patient is not nervous/anxious.      Objective   Physical Exam   Constitutional: He is oriented to person, place, and time. He appears well-developed and well-nourished. He is cooperative. He does not have a sickly appearance. No distress.   Walks with a slightly exaggerated thoracic kyphosis. No apparent distress. No pallor, jaundice, diaphoresis, or cyanosis.     HENT:   Head: Atraumatic.   Right Ear: Tympanic membrane, external ear and ear canal normal.   Left Ear: Tympanic membrane, external ear and ear canal normal.   Mouth/Throat: Oropharynx is clear and moist.   Eyes: EOM are normal. Pupils are equal, round,  and reactive to light. No scleral icterus.   Neck: No JVD present. Carotid bruit is not present. No tracheal deviation present. No thyromegaly present.   Cardiovascular: Normal rate, regular rhythm, S1 normal, S2 normal, normal heart sounds and intact distal pulses.  Exam reveals no gallop.    No murmur heard.  Pulmonary/Chest: Breath sounds normal. He has no wheezes. He has no rales.   Abdominal: Soft. Normal aorta and bowel sounds are normal. He exhibits no abdominal bruit and no mass. There is no hepatosplenomegaly. There is no tenderness. No hernia.   Musculoskeletal: He exhibits no deformity.        Left shoulder: He exhibits tenderness (mild tenderness about the lateral supraspinatus muscle) and crepitus. He exhibits normal range of motion and no bony tenderness.   Negative straight leg raise   Lymphadenopathy:        Head (right side): No submandibular adenopathy present.        Head (left side): No submandibular adenopathy present.     He has no cervical adenopathy.   Neurological: He is alert and oriented to person, place, and time. He has normal strength and normal reflexes. He displays normal reflexes. No cranial nerve deficit. He exhibits normal muscle tone. Coordination normal.   Skin: Skin is warm and dry. No rash noted. He is not diaphoretic. No pallor. Nails show no clubbing.   7-8 mm well demarcated uniformly pigmented papule with verreceus surface mid anterior neck. Similar 12 mm lesion left medial proximal calf. Many other similar but generally flatter lesions about torso, head and extremities   Psychiatric: He has a normal mood and affect. His behavior is normal.     Assessment/Plan   Problems Addressed this Visit        Cardiovascular and Mediastinum    Essential hypertension  Hypertension: elevated today. Evidence of target organ damage: atherosclreosis.  Encouraged to continue to work on diet and exercise plan.   Patient will titrate losartan to 75 qd and the 100 qd as tolerated        Digestive   Gastroesophageal reflux disease without esophagitis   H/O colon cancer, stage I   Vitamin D deficiency      Endocrine   Type 2 diabetes mellitus with complication, with long-term current use of insulin  Diabetes mellitus Type II, under fair control.   Encouraged to continue to pursue ADA diet  Encouraged aerobic exercise.      Nervous and Auditory   Left shoulder pain  Likely a combination of OA and calcific rotator cuff tendonitis  Continue symptomatic treatment  Encouraged to report if any worse or if any new symptoms or concerns.      Musculoskeletal and Integument   DDD (degenerative disc disease), lumbar   Seborrheic keratoses      Genitourinary   H/O testicular cancer   Benign prostatic hyperplasia with nocturia  Reviewed treatment options  Agreed on a trial of daily cialis - advised of the potential side effects including the danger of combining with nitrates   Relevant Medications   tadalafil (CIALIS) 5 MG tablet      Other   Dyslipidemia   Healthcare maintenance  Patient will receive a flu shot through his pharmacy   Hepatitis C antibody positive in blood  Likely infected nearly 30 years ago  Will do a RNA level and genotype as well as hep A/B and an HIV  Patient has been immunized against hep B but may require a booster and if hep A negative will immunize   Relevant Orders   Hepatitis C Genotype   Hepatitis C RNA, Quantitative, PCR (graph)   HIV-1 & HIV-2 Antibodies (Completed)   Hepatitis B Surface Antibody (Completed)   Hepatitis A Antibody, Total   HIV-1 / O / 2 Ag / Antibody 4th Generation (Completed)   Vasculogenic erectile dysfunction  As above.   Relevant Medications   tadalafil (CIALIS) 5 MG tablet

## 2017-10-07 VITALS
DIASTOLIC BLOOD PRESSURE: 85 MMHG | TEMPERATURE: 97.7 F | WEIGHT: 235 LBS | SYSTOLIC BLOOD PRESSURE: 155 MMHG | BODY MASS INDEX: 34.8 KG/M2 | RESPIRATION RATE: 12 BRPM | OXYGEN SATURATION: 97 % | HEART RATE: 92 BPM | HEIGHT: 69 IN

## 2017-10-07 PROBLEM — N52.9 VASCULOGENIC ERECTILE DYSFUNCTION: Status: ACTIVE | Noted: 2017-10-07

## 2017-10-07 PROBLEM — L82.1 SEBORRHEIC KERATOSES: Status: ACTIVE | Noted: 2017-10-07

## 2017-10-07 PROBLEM — R35.1 BENIGN PROSTATIC HYPERPLASIA WITH NOCTURIA: Status: ACTIVE | Noted: 2017-10-07

## 2017-10-07 PROBLEM — N40.1 BENIGN PROSTATIC HYPERPLASIA WITH NOCTURIA: Status: ACTIVE | Noted: 2017-10-07

## 2017-10-08 LAB — HAV AB SER QL IA: POSITIVE

## 2017-10-10 LAB
HCV RNA SERPL NAA+PROBE-ACNC: NORMAL IU/ML
HCV RNA SERPL NAA+PROBE-ACNC: NORMAL IU/ML
HCV RNA SERPL NAA+PROBE-LOG IU: 7.18 LOG10 IU/ML
TEST INFORMATION: NORMAL

## 2017-10-11 LAB
HCV GENTYP SERPL NAA+PROBE: NORMAL
Lab: NORMAL

## 2017-10-13 ENCOUNTER — TELEPHONE (OUTPATIENT)
Dept: FAMILY MEDICINE CLINIC | Facility: CLINIC | Age: 72
End: 2017-10-13

## 2017-10-13 NOTE — TELEPHONE ENCOUNTER
Sent request today for Cialis      ----- Message from Waldemar Trejo MD sent at 10/7/2017 11:45 AM EDT -----  Need to try to PA cialis - dx - BPH and erectile dysfunction

## 2017-10-27 ENCOUNTER — OFFICE VISIT (OUTPATIENT)
Dept: RETAIL CLINIC | Facility: CLINIC | Age: 72
End: 2017-10-27

## 2017-10-27 DIAGNOSIS — Z23 ENCOUNTER FOR IMMUNIZATION: Primary | ICD-10-CM

## 2017-10-27 NOTE — PROGRESS NOTES
Nicholas (Jairo) presents to clinic for seasonal influenza vaccination.  Denies allergies to eggs, latex, mercury or other flu vaccine components.  Denies previous vaccine reaction, current illness or fever.      Assessment/Plan   Encounter for immunization  Consent discussed and signed. VIS given. Vaccination administered by Nicci Cueva CMA. . Patient tolerated well. See scanned documents.

## 2017-11-22 DIAGNOSIS — E78.5 DYSLIPIDEMIA: ICD-10-CM

## 2017-11-22 RX ORDER — ROSUVASTATIN CALCIUM 5 MG/1
5 TABLET, COATED ORAL DAILY
Qty: 90 TABLET | Refills: 3 | Status: SHIPPED | OUTPATIENT
Start: 2017-11-22 | End: 2018-12-01 | Stop reason: SDUPTHER

## 2017-12-05 ENCOUNTER — OFFICE VISIT (OUTPATIENT)
Dept: FAMILY MEDICINE CLINIC | Facility: CLINIC | Age: 72
End: 2017-12-05

## 2017-12-05 DIAGNOSIS — E55.9 VITAMIN D DEFICIENCY: ICD-10-CM

## 2017-12-05 DIAGNOSIS — Z85.038 H/O COLON CANCER, STAGE I: ICD-10-CM

## 2017-12-05 DIAGNOSIS — Z00.00 HEALTHCARE MAINTENANCE: ICD-10-CM

## 2017-12-05 DIAGNOSIS — G45.3 AMAUROSIS FUGAX OF RIGHT EYE: ICD-10-CM

## 2017-12-05 DIAGNOSIS — R76.8 HEPATITIS C ANTIBODY POSITIVE IN BLOOD: ICD-10-CM

## 2017-12-05 DIAGNOSIS — E11.8 TYPE 2 DIABETES MELLITUS WITH COMPLICATION, WITH LONG-TERM CURRENT USE OF INSULIN (HCC): ICD-10-CM

## 2017-12-05 DIAGNOSIS — R35.1 BENIGN PROSTATIC HYPERPLASIA WITH NOCTURIA: ICD-10-CM

## 2017-12-05 DIAGNOSIS — E78.5 DYSLIPIDEMIA: ICD-10-CM

## 2017-12-05 DIAGNOSIS — Z79.4 TYPE 2 DIABETES MELLITUS WITH COMPLICATION, WITH LONG-TERM CURRENT USE OF INSULIN (HCC): ICD-10-CM

## 2017-12-05 DIAGNOSIS — K21.9 GASTROESOPHAGEAL REFLUX DISEASE WITHOUT ESOPHAGITIS: ICD-10-CM

## 2017-12-05 DIAGNOSIS — I10 ESSENTIAL HYPERTENSION: Primary | ICD-10-CM

## 2017-12-05 DIAGNOSIS — N40.1 BENIGN PROSTATIC HYPERPLASIA WITH NOCTURIA: ICD-10-CM

## 2017-12-05 DIAGNOSIS — Z85.47 H/O TESTICULAR CANCER: ICD-10-CM

## 2017-12-05 PROCEDURE — 99215 OFFICE O/P EST HI 40 MIN: CPT | Performed by: GENERAL PRACTICE

## 2017-12-05 PROCEDURE — 93000 ELECTROCARDIOGRAM COMPLETE: CPT | Performed by: GENERAL PRACTICE

## 2017-12-05 RX ORDER — CLOPIDOGREL BISULFATE 75 MG/1
75 TABLET ORAL DAILY
Qty: 30 TABLET | Refills: 5 | Status: SHIPPED | OUTPATIENT
Start: 2017-12-05 | End: 2018-05-22 | Stop reason: SDUPTHER

## 2017-12-05 NOTE — PROGRESS NOTES
Subjective   Nicholas Yin is a 72 y.o. male.     History of Present Illness     Transient Visual Loss  5 days ago he experienced a sudden progressive decrease in the vision in his right eye while driving. He states that the vision in the eye darkened gradually over several minutes to the point where the only things he could make out were very bright lights and the sun. This lasted for about 30 minutes then resolved over several minutes. He has had no further episodes and denies any other visual disturbances. He has had no new headaches and denies any changes in his strength, sensation, speech or ability to understand what is said to him. He denies any chest pain or palpitations and has had no lightheadedness or diaphoresis. He denies any new joint or muscle pain and has had no rash, fever or chills. He was hospitalized at [facility] over 10 years ago following a similar episodes that was ultimately felt to be vascular in origin. He underwent an opthalmology assessment with Dr Munguia yesterday who felt his most recent episode was also vascular. Duplex doppler U/S of the carotid arteries performed on 7/25/14 was unremarkable. CT of the abdomen performed on 1/11/16 was reported as showing heavy atherosclerotic plaquing of the aorta. He has numerous cardiovascular risk factors as well as chronic renal insufficiency    Diabetes  Current symptoms include none. Patient denies paresthesia of the feet, polydipsia, polyuria, hypoglycemia and foot ulcerations. Evaluation to date has been: hemoglobin A1C. Home sugars: BGs are running  consistent with Hgb A1C. Current treatments: metformin, DPP inhibitor - januvia (both as janumet), SGLT2 inhibitor - bydureon and basal insulin - toujeo. Last dilated eye exam yesterday. Most recent hemoglobin A1c   Lab Results   Component Value Date    HGBA1C 8.30 (H) 10/05/2017    HGBA1C 8.90 (H) 06/27/2017    HGBA1C 8.20 (H) 03/06/2017      Dyslipidemia  Compliance with treatment has been fair.  The patient exercises occasionally. He is currently being prescribed the following medication for his dyslipidemia - rosuvastatin. Patient denies side effects associated with his medications. Most recent lipids include  Lab Results   Component Value Date    TRIG 171 (H) 10/05/2017    TRIG 127 06/27/2017    HDL 46 (L) 10/05/2017    HDL 42 (L) 06/27/2017    LDLCALC 55 10/05/2017    LDLCALC 59 06/27/2017    LDL 52 09/28/2015    LDL 38 06/06/2015     Hypertension  Home blood pressure readings: have been high since last here. Associated signs and symptoms: dyspnea. Patient denies: chest pain, palpitations, orthopnea, paroxysmal nocturnal dyspnea and peripheral edema. Current antihypertensive medications includes losartan. Medication compliance: taking as prescribed. Most recent creatinine   Lab Results   Component Value Date    CREATININE 1.63 (H) 10/05/2017     Hepatitis C Positive  Will be undergoing a hepatology assessment later this week    The following portions of the patient's history were reviewed and updated as appropriate: allergies, current medications, past family history, past medical history, past social history, past surgical history and problem list.    Review of Systems   Constitutional: Positive for fatigue (chronic - mild - stable). Negative for appetite change, chills, fever and unexpected weight change.   HENT: Negative for congestion, ear pain, rhinorrhea, sneezing, sore throat and voice change.    Eyes: Positive for visual disturbance (transient loss of vision right eye).   Respiratory: Positive for shortness of breath (chronic - with above average exertion - stable). Negative for cough and wheezing.    Cardiovascular: Negative for chest pain, palpitations and leg swelling.   Gastrointestinal: Negative for abdominal pain, blood in stool, constipation, diarrhea, nausea and vomiting.   Endocrine: Negative for polydipsia and polyuria.   Genitourinary: Negative for difficulty urinating, dysuria,  frequency, hematuria and urgency.        Erectile dysfunction - chronic progressive. Nocturia x 2-3 with mildly decreased stream and intermittent sense of incomplete voiding   Musculoskeletal: Positive for back pain (chronic - stable). Negative for arthralgias, joint swelling, myalgias and neck pain.   Skin: Negative for color change.        Lesions   Neurological: Negative for tremors, weakness, numbness and headaches.   Psychiatric/Behavioral: Negative for dysphoric mood, sleep disturbance and suicidal ideas. The patient is not nervous/anxious.      Objective   Physical Exam   Constitutional: He is oriented to person, place, and time. He appears well-developed and well-nourished. He is cooperative. He does not have a sickly appearance. No distress.   Walks with a slightly exaggerated thoracic kyphosis. No apparent distress. No pallor, jaundice, diaphoresis, or cyanosis.     HENT:   Head: Atraumatic.   Right Ear: Tympanic membrane, external ear and ear canal normal.   Left Ear: Tympanic membrane, external ear and ear canal normal.   Mouth/Throat: Oropharynx is clear and moist.   Eyes: EOM are normal. Pupils are equal, round, and reactive to light. No scleral icterus.   Neck: No JVD present. Carotid bruit is not present. No tracheal deviation present. No thyromegaly present.   Cardiovascular: Normal rate, regular rhythm, S1 normal, S2 normal, normal heart sounds and intact distal pulses.  Exam reveals no gallop.    No murmur heard.  Pulmonary/Chest: Breath sounds normal. He has no wheezes. He has no rales.   Abdominal: Soft. Normal aorta and bowel sounds are normal. He exhibits no abdominal bruit and no mass. There is no hepatosplenomegaly. There is no tenderness. No hernia.   Musculoskeletal: He exhibits no deformity.   Negative straight leg raise   Lymphadenopathy:        Head (right side): No submandibular adenopathy present.        Head (left side): No submandibular adenopathy present.     He has no cervical  adenopathy.   Neurological: He is alert and oriented to person, place, and time. He has normal strength and normal reflexes. He displays normal reflexes. No cranial nerve deficit. He exhibits normal muscle tone. Coordination normal.   Skin: Skin is warm and dry. No rash noted. He is not diaphoretic. No pallor. Nails show no clubbing.   Psychiatric: He has a normal mood and affect. His behavior is normal.     EKG: normal sinus rhythm, RBBB. No previous study is available for comparison.    Assessment/Plan   Problems Addressed this Visit        Cardiovascular and Mediastinum    Essential hypertension  Hypertension: at goal. Evidence of target organ damage: atherosclerosis of the aorta on imaging.  Encouraged to continue to work on diet and exercise plan.   Continue current medication       Digestive    Gastroesophageal reflux disease without esophagitis    H/O colon cancer, stage I    Vitamin D deficiency       Endocrine    Type 2 diabetes mellitus with complication, with long-term current use of insulin  Diabetes mellitus Type II, under fair control.   Encouraged to continue to pursue ADA diet  Encouraged aerobic exercise.       Genitourinary    H/O testicular cancer    Benign prostatic hyperplasia with nocturia       Other    Dyslipidemia  As above.   Continue current medication.    Healthcare maintenance  Patient has already received a flu shot fall.    Hepatitis C antibody positive in blood  Follow up with GI    Amaurosis fugax of right eye  Multiple cardiovascular risk factors with evidence of heavy atherosclerotic changes of the aorta on previous CT but normal carotid arteries on previous U/S. History of both previous testicular and colorectal cancer  EKG today confirmed NSR  ESR will be drawn in am  Will arrange MRI of the brain and MRA of the carotid arteries  Clopidogrel will be added to low dose ASA  Patient will seek immediate ER assessment if any further neurologic deficit    Relevant Medications     clopidogrel (PLAVIX) 75 MG tablet    Other Relevant Orders    MRI Brain Without Contrast    MRI angiogram neck wo contrast    ECG 12 Lead (Completed)    Sedimentation Rate

## 2017-12-06 ENCOUNTER — APPOINTMENT (OUTPATIENT)
Dept: LAB | Facility: HOSPITAL | Age: 72
End: 2017-12-06

## 2017-12-06 VITALS
HEART RATE: 84 BPM | TEMPERATURE: 98.7 F | WEIGHT: 243 LBS | HEIGHT: 69 IN | BODY MASS INDEX: 35.99 KG/M2 | OXYGEN SATURATION: 97 % | DIASTOLIC BLOOD PRESSURE: 70 MMHG | SYSTOLIC BLOOD PRESSURE: 125 MMHG | RESPIRATION RATE: 12 BRPM

## 2017-12-06 LAB — ERYTHROCYTE [SEDIMENTATION RATE] IN BLOOD: 17 MM/HR (ref 0–20)

## 2017-12-06 PROCEDURE — 85652 RBC SED RATE AUTOMATED: CPT | Performed by: GENERAL PRACTICE

## 2017-12-06 PROCEDURE — 36415 COLL VENOUS BLD VENIPUNCTURE: CPT | Performed by: GENERAL PRACTICE

## 2017-12-07 ENCOUNTER — TRANSCRIBE ORDERS (OUTPATIENT)
Dept: CARDIOLOGY | Facility: HOSPITAL | Age: 72
End: 2017-12-07

## 2017-12-07 DIAGNOSIS — R01.1 MURMUR, CARDIAC: Primary | ICD-10-CM

## 2017-12-07 DIAGNOSIS — B18.2 CHRONIC HEPATITIS C WITHOUT HEPATIC COMA (HCC): Primary | ICD-10-CM

## 2017-12-08 ENCOUNTER — TRANSCRIBE ORDERS (OUTPATIENT)
Dept: ADMINISTRATIVE | Facility: HOSPITAL | Age: 72
End: 2017-12-08

## 2017-12-08 ENCOUNTER — LAB (OUTPATIENT)
Dept: LAB | Facility: HOSPITAL | Age: 72
End: 2017-12-08

## 2017-12-08 ENCOUNTER — HOSPITAL ENCOUNTER (OUTPATIENT)
Dept: ULTRASOUND IMAGING | Facility: HOSPITAL | Age: 72
Discharge: HOME | End: 2017-12-08
Admitting: INTERNAL MEDICINE

## 2017-12-08 ENCOUNTER — HOSPITAL ENCOUNTER (OUTPATIENT)
Dept: MRI IMAGING | Facility: HOSPITAL | Age: 72
Discharge: HOME | End: 2017-12-08

## 2017-12-08 ENCOUNTER — HOSPITAL ENCOUNTER (OUTPATIENT)
Dept: MRI IMAGING | Facility: HOSPITAL | Age: 72
Discharge: HOME | End: 2017-12-08
Admitting: GENERAL PRACTICE

## 2017-12-08 ENCOUNTER — HOSPITAL ENCOUNTER (OUTPATIENT)
Dept: CARDIOLOGY | Facility: HOSPITAL | Age: 72
Discharge: HOME | End: 2017-12-08
Attending: INTERNAL MEDICINE | Admitting: INTERNAL MEDICINE

## 2017-12-08 DIAGNOSIS — B18.2 CHRONIC HEPATITIS C WITHOUT HEPATIC COMA (HCC): ICD-10-CM

## 2017-12-08 DIAGNOSIS — B18.2 HEPATITIS C CARRIER (HCC): Primary | ICD-10-CM

## 2017-12-08 DIAGNOSIS — B18.2 HEPATITIS C CARRIER (HCC): ICD-10-CM

## 2017-12-08 DIAGNOSIS — R01.1 MURMUR, CARDIAC: ICD-10-CM

## 2017-12-08 DIAGNOSIS — G45.3 AMAUROSIS FUGAX OF RIGHT EYE: ICD-10-CM

## 2017-12-08 LAB
BH CV ECHO MEAS - ACS: 0.91 CM
BH CV ECHO MEAS - AI DEC SLOPE: 199 CM/SEC^2
BH CV ECHO MEAS - AI MAX PG: 41.7 MMHG
BH CV ECHO MEAS - AI MAX VEL: 323.1 CM/SEC
BH CV ECHO MEAS - AI P1/2T: 475.5 MSEC
BH CV ECHO MEAS - AO MAX PG (FULL): 32 MMHG
BH CV ECHO MEAS - AO MAX PG: 35.8 MMHG
BH CV ECHO MEAS - AO MEAN PG (FULL): 16.6 MMHG
BH CV ECHO MEAS - AO MEAN PG: 18.6 MMHG
BH CV ECHO MEAS - AO ROOT AREA (BSA CORRECTED): 1.6
BH CV ECHO MEAS - AO ROOT AREA: 9.8 CM^2
BH CV ECHO MEAS - AO ROOT DIAM: 3.5 CM
BH CV ECHO MEAS - AO V2 MAX: 299 CM/SEC
BH CV ECHO MEAS - AO V2 MEAN: 199 CM/SEC
BH CV ECHO MEAS - AO V2 VTI: 78.8 CM
BH CV ECHO MEAS - AVA(I,A): 1.1 CM^2
BH CV ECHO MEAS - AVA(I,D): 1.1 CM^2
BH CV ECHO MEAS - AVA(V,A): 1 CM^2
BH CV ECHO MEAS - AVA(V,D): 1 CM^2
BH CV ECHO MEAS - BSA(HAYCOCK): 2.4 M^2
BH CV ECHO MEAS - BSA: 2.2 M^2
BH CV ECHO MEAS - BZI_BMI: 35.9 KILOGRAMS/M^2
BH CV ECHO MEAS - BZI_METRIC_HEIGHT: 175.3 CM
BH CV ECHO MEAS - BZI_METRIC_WEIGHT: 110.2 KG
BH CV ECHO MEAS - CONTRAST EF 4CH: 54.4 ML/M^2
BH CV ECHO MEAS - EDV(MOD-SP4): 79 ML
BH CV ECHO MEAS - ESV(MOD-SP4): 36 ML
BH CV ECHO MEAS - LA DIMENSION: 5.4 CM
BH CV ECHO MEAS - LA/AO: 1.5
BH CV ECHO MEAS - LV DIASTOLIC VOL/BSA (35-75): 35.2 ML/M^2
BH CV ECHO MEAS - LV MAX PG: 3.8 MMHG
BH CV ECHO MEAS - LV MEAN PG: 2 MMHG
BH CV ECHO MEAS - LV SYSTOLIC VOL/BSA (12-30): 16 ML/M^2
BH CV ECHO MEAS - LV V1 MAX: 97.7 CM/SEC
BH CV ECHO MEAS - LV V1 MEAN: 64.1 CM/SEC
BH CV ECHO MEAS - LV V1 VTI: 26.4 CM
BH CV ECHO MEAS - LVLD AP4: 7.9 CM
BH CV ECHO MEAS - LVLS AP4: 6.8 CM
BH CV ECHO MEAS - LVOT AREA (M): 3.1 CM^2
BH CV ECHO MEAS - LVOT AREA: 3.1 CM^2
BH CV ECHO MEAS - LVOT DIAM: 2 CM
BH CV ECHO MEAS - MV A MAX VEL: 140.5 CM/SEC
BH CV ECHO MEAS - MV E MAX VEL: 114.5 CM/SEC
BH CV ECHO MEAS - MV E/A: 0.81
BH CV ECHO MEAS - SI(AO): 343.4 ML/M^2
BH CV ECHO MEAS - SI(LVOT): 36.9 ML/M^2
BH CV ECHO MEAS - SI(MOD-SP4): 19.2 ML/M^2
BH CV ECHO MEAS - SV(AO): 770.5 ML
BH CV ECHO MEAS - SV(LVOT): 82.8 ML
BH CV ECHO MEAS - SV(MOD-SP4): 43 ML

## 2017-12-08 PROCEDURE — 93306 TTE W/DOPPLER COMPLETE: CPT

## 2017-12-08 PROCEDURE — 70547 MR ANGIOGRAPHY NECK W/O DYE: CPT

## 2017-12-08 PROCEDURE — 83010 ASSAY OF HAPTOGLOBIN QUANT: CPT

## 2017-12-08 PROCEDURE — 93306 TTE W/DOPPLER COMPLETE: CPT | Performed by: INTERNAL MEDICINE

## 2017-12-08 PROCEDURE — 82247 BILIRUBIN TOTAL: CPT

## 2017-12-08 PROCEDURE — 70551 MRI BRAIN STEM W/O DYE: CPT

## 2017-12-08 PROCEDURE — 70547 MR ANGIOGRAPHY NECK W/O DYE: CPT | Performed by: RADIOLOGY

## 2017-12-08 PROCEDURE — 70551 MRI BRAIN STEM W/O DYE: CPT | Performed by: RADIOLOGY

## 2017-12-08 PROCEDURE — 84460 ALANINE AMINO (ALT) (SGPT): CPT

## 2017-12-08 PROCEDURE — 83883 ASSAY NEPHELOMETRY NOT SPEC: CPT

## 2017-12-08 PROCEDURE — 76700 US EXAM ABDOM COMPLETE: CPT

## 2017-12-08 PROCEDURE — 76700 US EXAM ABDOM COMPLETE: CPT | Performed by: RADIOLOGY

## 2017-12-08 PROCEDURE — 36415 COLL VENOUS BLD VENIPUNCTURE: CPT

## 2017-12-08 PROCEDURE — 82977 ASSAY OF GGT: CPT

## 2017-12-12 LAB
A2 MACROGLOB SERPL-MCNC: 269 MG/DL (ref 110–276)
ALT SERPL W P-5'-P-CCNC: 50 IU/L (ref 0–55)
APO A-I SERPL-MCNC: 192 MG/DL (ref 101–178)
BILIRUB SERPL-MCNC: 0.4 MG/DL (ref 0–1.2)
FIBROSIS SCORING:: ABNORMAL
FIBROSIS STAGE SERPL QL: ABNORMAL
GGT SERPL-CCNC: 119 IU/L (ref 0–65)
HAPTOGLOB SERPL-MCNC: 103 MG/DL (ref 34–200)
HCV AB SER QL: ABNORMAL
LABORATORY COMMENT REPORT: ABNORMAL
LIMITATIONS:: ABNORMAL
LIVER FIBR SCORE SERPL CALC.FIBROSURE: 0.49 (ref 0–0.21)
NECROINFLAMM ACTIVITY SCORING:: ABNORMAL
NECROINFLAMMATORY ACT GRADE SERPL QL: ABNORMAL
NECROINFLAMMATORY ACT SCORE SERPL: 0.35 (ref 0–0.17)

## 2017-12-19 ENCOUNTER — LAB (OUTPATIENT)
Dept: LAB | Facility: HOSPITAL | Age: 72
End: 2017-12-19

## 2017-12-19 ENCOUNTER — TRANSCRIBE ORDERS (OUTPATIENT)
Dept: LAB | Facility: HOSPITAL | Age: 72
End: 2017-12-19

## 2017-12-19 DIAGNOSIS — B18.2 CHRONIC HEPATITIS C WITH HEPATIC COMA (HCC): Primary | ICD-10-CM

## 2017-12-19 DIAGNOSIS — B18.2 CHRONIC HEPATITIS C WITH HEPATIC COMA (HCC): ICD-10-CM

## 2017-12-19 PROCEDURE — 87522 HEPATITIS C REVRS TRNSCRPJ: CPT

## 2017-12-19 PROCEDURE — 36415 COLL VENOUS BLD VENIPUNCTURE: CPT

## 2017-12-21 LAB
HCV RNA SERPL NAA+PROBE-ACNC: NORMAL IU/ML
HCV RNA SERPL NAA+PROBE-ACNC: NORMAL IU/ML
HCV RNA SERPL NAA+PROBE-LOG IU: 7.36 LOG10 IU/ML
TEST INFORMATION: NORMAL

## 2018-01-09 ENCOUNTER — OFFICE VISIT (OUTPATIENT)
Dept: FAMILY MEDICINE CLINIC | Facility: CLINIC | Age: 73
End: 2018-01-09

## 2018-01-09 DIAGNOSIS — R76.8 HEPATITIS C ANTIBODY POSITIVE IN BLOOD: ICD-10-CM

## 2018-01-09 DIAGNOSIS — G45.3 AMAUROSIS FUGAX OF RIGHT EYE: ICD-10-CM

## 2018-01-09 DIAGNOSIS — E78.5 DYSLIPIDEMIA: ICD-10-CM

## 2018-01-09 DIAGNOSIS — E11.8 TYPE 2 DIABETES MELLITUS WITH COMPLICATION, WITH LONG-TERM CURRENT USE OF INSULIN (HCC): Primary | ICD-10-CM

## 2018-01-09 DIAGNOSIS — J06.9 VIRAL UPPER RESPIRATORY INFECTION: ICD-10-CM

## 2018-01-09 DIAGNOSIS — I10 ESSENTIAL HYPERTENSION: ICD-10-CM

## 2018-01-09 DIAGNOSIS — Z79.4 TYPE 2 DIABETES MELLITUS WITH COMPLICATION, WITH LONG-TERM CURRENT USE OF INSULIN (HCC): Primary | ICD-10-CM

## 2018-01-09 PROCEDURE — 99214 OFFICE O/P EST MOD 30 MIN: CPT | Performed by: GENERAL PRACTICE

## 2018-01-10 VITALS
WEIGHT: 231 LBS | DIASTOLIC BLOOD PRESSURE: 70 MMHG | BODY MASS INDEX: 34.21 KG/M2 | RESPIRATION RATE: 12 BRPM | HEIGHT: 69 IN | HEART RATE: 100 BPM | TEMPERATURE: 97.1 F | OXYGEN SATURATION: 99 % | SYSTOLIC BLOOD PRESSURE: 125 MMHG

## 2018-01-10 PROBLEM — J06.9 VIRAL UPPER RESPIRATORY INFECTION: Status: ACTIVE | Noted: 2018-01-10

## 2018-01-10 NOTE — PROGRESS NOTES
Subjective   Nicholas Yin is a 72 y.o. male.     History of Present Illness     Upper Respiratory Tract Infection  Presents with the following symptoms : nasal congestion, postnasal drip, hoarse voice, cough and shortness of breath. Onset of symptoms was 6 days ago, and have been unchanged since that time. There is no history of any sore throst, hemoptysis, chest pain, vomiting, abdominal pain, diarrhea, rash, fever and chills.  He is drinking plenty of fluids. Evaluation to date: none. Treatment to date: none. His wife has had similar symptoms this week    Transient Visual Loss  6 weeks ago he experienced a sudden progressive decrease in the vision in his right eye while driving. He states that the vision in the eye darkened gradually over several minutes to the point where the only things he could make out were very bright lights and the sun. This lasted for about 30 minutes then resolved over several minutes. He has had no further episodes and denies any other visual disturbances. He continues to deny any new headaches and has had no changes in his strength, sensation, speech or ability to understand what is said to him. He denies any chest pain or palpitations and has had no lightheadedness or diaphoresis. He denies any new joint or muscle pain and has had no rash, fever or chills. He was hospitalized at [facility] over 10 years ago following a similar episodes that was ultimately felt to be vascular in origin. He has numerous cardiovascular risk factors as well as chronic renal insufficiency. MRI of the brain performed on 12/8/17 was reported as showing mild cerebral atrophy with chronic small vessel ischemic changes. MRA of the carotid arteries performed the same day was unremarkable. Echocardiogram done the same day was reported as showing mild - moderate AS, moderate mitral annular calcification and mild LAE.    Diabetes  Current symptoms include none. Patient denies paresthesia of the feet, polydipsia,  polyuria, hypoglycemia and foot ulcerations. Evaluation to date has been: hemoglobin A1C. Home sugars: BGs are running  consistent with Hgb A1C. Current treatments: metformin, DPP inhibitor - januvia (both as janumet), SGLT2 inhibitor - bydureon and basal insulin - toujeo. Last dilated eye exam yesterday.      Dyslipidemia  Compliance with treatment has been fair. The patient exercises occasionally. He is currently being prescribed the following medication for his dyslipidemia - rosuvastatin. Patient denies side effects associated with his medications.     Hypertension  Home blood pressure readings: have been high since last here. Associated signs and symptoms: dyspnea. Patient denies: chest pain, palpitations, orthopnea, paroxysmal nocturnal dyspnea and peripheral edema. Current antihypertensive medications includes losartan. Medication compliance: taking as prescribed.     Hepatitis C Positive  underwent a hepatology assessment since last here and has started on harvoni. He will be returning for reassessment within the next several weeks    The following portions of the patient's history were reviewed and updated as appropriate: allergies, current medications, past medical history, past social history and problem list.    Review of Systems   Constitutional: Positive for fatigue (acute on chronic ). Negative for appetite change, chills, fever and unexpected weight change.   HENT: Positive for congestion, rhinorrhea and voice change. Negative for ear pain, sneezing and sore throat.    Eyes: Positive for visual disturbance (transient loss of vision right eye).   Respiratory: Positive for cough and shortness of breath (acute on chronic). Negative for wheezing.    Cardiovascular: Negative for chest pain, palpitations and leg swelling.   Gastrointestinal: Negative for abdominal pain, blood in stool, constipation, diarrhea, nausea and vomiting.   Endocrine: Negative for polydipsia and polyuria.   Genitourinary: Negative  for difficulty urinating, dysuria, frequency, hematuria and urgency.        Erectile dysfunction - chronic progressive. Nocturia x 2-3 with mildly decreased stream and intermittent sense of incomplete voiding   Musculoskeletal: Positive for back pain (chronic - stable). Negative for arthralgias, joint swelling, myalgias and neck pain.   Skin: Negative for color change.        Lesions   Neurological: Negative for tremors, weakness, numbness and headaches.   Psychiatric/Behavioral: Negative for dysphoric mood, sleep disturbance and suicidal ideas. The patient is not nervous/anxious.      Objective   Physical Exam   Constitutional: He is oriented to person, place, and time. He appears well-developed and well-nourished. He is cooperative. He does not have a sickly appearance. No distress.   Walks with a slightly exaggerated thoracic kyphosis. No apparent distress. No pallor, jaundice, diaphoresis, or cyanosis.     HENT:   Head: Atraumatic.   Right Ear: Tympanic membrane, external ear and ear canal normal.   Left Ear: Tympanic membrane, external ear and ear canal normal.   Mouth/Throat: Oropharynx is clear and moist.   Eyes: EOM are normal. Pupils are equal, round, and reactive to light. No scleral icterus.   Neck: No JVD present. Carotid bruit is not present. No tracheal deviation present. No thyromegaly present.   Cardiovascular: Normal rate, regular rhythm, S1 normal, S2 normal, normal heart sounds and intact distal pulses.  Exam reveals no gallop.    No murmur heard.  Pulmonary/Chest: Breath sounds normal. He has no wheezes. He has no rales.   Abdominal: Soft. Normal aorta and bowel sounds are normal. He exhibits no abdominal bruit and no mass. There is no hepatosplenomegaly. There is no tenderness. No hernia.   Musculoskeletal: He exhibits no deformity.   Negative straight leg raise   Lymphadenopathy:        Head (right side): No submandibular adenopathy present.        Head (left side): No submandibular adenopathy  present.     He has no cervical adenopathy.   Neurological: He is alert and oriented to person, place, and time. He has normal strength and normal reflexes. He displays normal reflexes. No cranial nerve deficit. He exhibits normal muscle tone. Coordination normal.   Skin: Skin is warm and dry. No rash noted. He is not diaphoretic. No pallor. Nails show no clubbing.   Psychiatric: He has a normal mood and affect. His behavior is normal.     Assessment/Plan   Problems Addressed this Visit        Cardiovascular and Mediastinum    Essential hypertension  Encouraged to continue to work on his diet and exercise plan.  Continue current medication       Respiratory    Viral upper respiratory infection  Viral upper respiratory tract infection  Advised regarding symptomatic treatment.  Discussed the importance of avoiding unnecessary antibiotic therapy.  Suggested OTC remedies.  Encouraged to report if any worse or if any new symptoms.  Call in 3 days if symptoms aren't improving       Endocrine    Type 2 diabetes mellitus with complication, with long-term current use of insulin  Diabetes mellitus Type II, under fair control.   Encouraged to continue to pursue ADA diet  Encouraged aerobic exercise.       Other    Dyslipidemia  As above.   Continue current medication.    Hepatitis C antibody positive   Follow up with GI    Amaurosis fugax of right eye  Will continue dual antiplatelet therapy for now

## 2018-01-30 DIAGNOSIS — I10 ESSENTIAL HYPERTENSION: ICD-10-CM

## 2018-01-30 RX ORDER — LOSARTAN POTASSIUM 50 MG/1
50 TABLET ORAL 2 TIMES DAILY
Qty: 180 TABLET | Refills: 3 | Status: ON HOLD | OUTPATIENT
Start: 2018-01-30 | End: 2019-08-16 | Stop reason: SDUPTHER

## 2018-02-01 ENCOUNTER — LAB (OUTPATIENT)
Dept: LAB | Facility: HOSPITAL | Age: 73
End: 2018-02-01

## 2018-02-01 ENCOUNTER — TRANSCRIBE ORDERS (OUTPATIENT)
Dept: ADMINISTRATIVE | Facility: HOSPITAL | Age: 73
End: 2018-02-01

## 2018-02-01 DIAGNOSIS — B18.2 CHRONIC HEPATITIS C WITHOUT HEPATIC COMA (HCC): Primary | ICD-10-CM

## 2018-02-01 DIAGNOSIS — B18.2 CHRONIC HEPATITIS C WITHOUT HEPATIC COMA (HCC): ICD-10-CM

## 2018-02-01 PROCEDURE — 87522 HEPATITIS C REVRS TRNSCRPJ: CPT

## 2018-02-01 PROCEDURE — 36415 COLL VENOUS BLD VENIPUNCTURE: CPT

## 2018-02-04 LAB
HCV RNA SERPL NAA+PROBE-ACNC: NORMAL IU/ML
TEST INFORMATION: NORMAL

## 2018-04-07 DIAGNOSIS — E78.5 DYSLIPIDEMIA: ICD-10-CM

## 2018-04-07 DIAGNOSIS — Z79.4 TYPE 2 DIABETES MELLITUS WITH COMPLICATION, WITH LONG-TERM CURRENT USE OF INSULIN (HCC): ICD-10-CM

## 2018-04-07 DIAGNOSIS — E11.8 TYPE 2 DIABETES MELLITUS WITH COMPLICATION, WITH LONG-TERM CURRENT USE OF INSULIN (HCC): ICD-10-CM

## 2018-04-07 DIAGNOSIS — E55.9 VITAMIN D DEFICIENCY: ICD-10-CM

## 2018-04-07 DIAGNOSIS — I10 ESSENTIAL HYPERTENSION: Primary | ICD-10-CM

## 2018-04-10 ENCOUNTER — LAB (OUTPATIENT)
Dept: LAB | Facility: HOSPITAL | Age: 73
End: 2018-04-10

## 2018-04-10 DIAGNOSIS — Z79.4 TYPE 2 DIABETES MELLITUS WITH COMPLICATION, WITH LONG-TERM CURRENT USE OF INSULIN (HCC): ICD-10-CM

## 2018-04-10 DIAGNOSIS — I10 ESSENTIAL HYPERTENSION: ICD-10-CM

## 2018-04-10 DIAGNOSIS — E11.8 TYPE 2 DIABETES MELLITUS WITH COMPLICATION, WITH LONG-TERM CURRENT USE OF INSULIN (HCC): ICD-10-CM

## 2018-04-10 DIAGNOSIS — E55.9 VITAMIN D DEFICIENCY: ICD-10-CM

## 2018-04-10 DIAGNOSIS — E78.5 DYSLIPIDEMIA: ICD-10-CM

## 2018-04-10 LAB
25(OH)D3 SERPL-MCNC: 24 NG/ML
ALBUMIN SERPL-MCNC: 3.9 G/DL (ref 3.4–4.8)
ALBUMIN UR-MCNC: 415.5 MG/L
ALBUMIN/GLOB SERPL: 1.1 G/DL (ref 1.5–2.5)
ALP SERPL-CCNC: 65 U/L (ref 40–129)
ALT SERPL W P-5'-P-CCNC: 25 U/L (ref 10–44)
ANION GAP SERPL CALCULATED.3IONS-SCNC: 5.8 MMOL/L (ref 3.6–11.2)
AST SERPL-CCNC: 36 U/L (ref 10–34)
BASOPHILS # BLD AUTO: 0.05 10*3/MM3 (ref 0–0.3)
BASOPHILS NFR BLD AUTO: 0.6 % (ref 0–2)
BILIRUB SERPL-MCNC: 0.5 MG/DL (ref 0.2–1.8)
BUN BLD-MCNC: 26 MG/DL (ref 7–21)
BUN/CREAT SERPL: 16 (ref 7–25)
CALCIUM SPEC-SCNC: 9.5 MG/DL (ref 7.7–10)
CHLORIDE SERPL-SCNC: 105 MMOL/L (ref 99–112)
CHOLEST SERPL-MCNC: 143 MG/DL (ref 0–200)
CO2 SERPL-SCNC: 26.2 MMOL/L (ref 24.3–31.9)
CREAT BLD-MCNC: 1.62 MG/DL (ref 0.43–1.29)
DEPRECATED RDW RBC AUTO: 44.4 FL (ref 37–54)
EOSINOPHIL # BLD AUTO: 0.56 10*3/MM3 (ref 0–0.7)
EOSINOPHIL NFR BLD AUTO: 6.9 % (ref 0–7)
ERYTHROCYTE [DISTWIDTH] IN BLOOD BY AUTOMATED COUNT: 13.8 % (ref 11.5–14.5)
GFR SERPL CREATININE-BSD FRML MDRD: 42 ML/MIN/1.73
GLOBULIN UR ELPH-MCNC: 3.6 GM/DL
GLUCOSE BLD-MCNC: 128 MG/DL (ref 70–110)
HBA1C MFR BLD: 8.1 % (ref 4.5–5.7)
HCT VFR BLD AUTO: 37.3 % (ref 42–52)
HDLC SERPL-MCNC: 58 MG/DL (ref 60–100)
HGB BLD-MCNC: 12.4 G/DL (ref 14–18)
IMM GRANULOCYTES # BLD: 0.03 10*3/MM3 (ref 0–0.03)
IMM GRANULOCYTES NFR BLD: 0.4 % (ref 0–0.5)
LDLC SERPL CALC-MCNC: 56 MG/DL (ref 0–100)
LDLC/HDLC SERPL: 0.96 {RATIO}
LYMPHOCYTES # BLD AUTO: 1.7 10*3/MM3 (ref 1–3)
LYMPHOCYTES NFR BLD AUTO: 20.9 % (ref 16–46)
MCH RBC QN AUTO: 29.5 PG (ref 27–33)
MCHC RBC AUTO-ENTMCNC: 33.2 G/DL (ref 33–37)
MCV RBC AUTO: 88.8 FL (ref 80–94)
MONOCYTES # BLD AUTO: 0.69 10*3/MM3 (ref 0.1–0.9)
MONOCYTES NFR BLD AUTO: 8.5 % (ref 0–12)
NEUTROPHILS # BLD AUTO: 5.11 10*3/MM3 (ref 1.4–6.5)
NEUTROPHILS NFR BLD AUTO: 62.7 % (ref 40–75)
OSMOLALITY SERPL CALC.SUM OF ELEC: 280.2 MOSM/KG (ref 273–305)
PLATELET # BLD AUTO: 203 10*3/MM3 (ref 130–400)
PMV BLD AUTO: 10.2 FL (ref 6–10)
POTASSIUM BLD-SCNC: 4.4 MMOL/L (ref 3.5–5.3)
PROT SERPL-MCNC: 7.5 G/DL (ref 6–8)
RBC # BLD AUTO: 4.2 10*6/MM3 (ref 4.7–6.1)
SODIUM BLD-SCNC: 137 MMOL/L (ref 135–153)
TRIGL SERPL-MCNC: 147 MG/DL (ref 0–150)
TSH SERPL DL<=0.05 MIU/L-ACNC: 5.82 MIU/ML (ref 0.55–4.78)
VLDLC SERPL-MCNC: 29.4 MG/DL
WBC NRBC COR # BLD: 8.14 10*3/MM3 (ref 4.5–12.5)

## 2018-04-10 PROCEDURE — 83036 HEMOGLOBIN GLYCOSYLATED A1C: CPT

## 2018-04-10 PROCEDURE — 82043 UR ALBUMIN QUANTITATIVE: CPT

## 2018-04-10 PROCEDURE — 85025 COMPLETE CBC W/AUTO DIFF WBC: CPT

## 2018-04-10 PROCEDURE — 80053 COMPREHEN METABOLIC PANEL: CPT

## 2018-04-10 PROCEDURE — 84443 ASSAY THYROID STIM HORMONE: CPT

## 2018-04-10 PROCEDURE — 80061 LIPID PANEL: CPT

## 2018-04-10 PROCEDURE — 82306 VITAMIN D 25 HYDROXY: CPT

## 2018-04-12 ENCOUNTER — OFFICE VISIT (OUTPATIENT)
Dept: FAMILY MEDICINE CLINIC | Facility: CLINIC | Age: 73
End: 2018-04-12

## 2018-04-12 VITALS
DIASTOLIC BLOOD PRESSURE: 80 MMHG | HEIGHT: 69 IN | HEART RATE: 85 BPM | SYSTOLIC BLOOD PRESSURE: 155 MMHG | OXYGEN SATURATION: 95 % | BODY MASS INDEX: 34.51 KG/M2 | WEIGHT: 233 LBS | RESPIRATION RATE: 12 BRPM | TEMPERATURE: 98.7 F

## 2018-04-12 DIAGNOSIS — N40.1 BENIGN PROSTATIC HYPERPLASIA WITH NOCTURIA: ICD-10-CM

## 2018-04-12 DIAGNOSIS — N52.01 ERECTILE DYSFUNCTION DUE TO ARTERIAL INSUFFICIENCY: ICD-10-CM

## 2018-04-12 DIAGNOSIS — G45.3 AMAUROSIS FUGAX OF RIGHT EYE: ICD-10-CM

## 2018-04-12 DIAGNOSIS — Z00.00 HEALTHCARE MAINTENANCE: ICD-10-CM

## 2018-04-12 DIAGNOSIS — M51.36 DDD (DEGENERATIVE DISC DISEASE), LUMBAR: ICD-10-CM

## 2018-04-12 DIAGNOSIS — I10 ESSENTIAL HYPERTENSION: Primary | ICD-10-CM

## 2018-04-12 DIAGNOSIS — E78.5 DYSLIPIDEMIA: ICD-10-CM

## 2018-04-12 DIAGNOSIS — K21.9 GASTROESOPHAGEAL REFLUX DISEASE WITHOUT ESOPHAGITIS: ICD-10-CM

## 2018-04-12 DIAGNOSIS — R35.1 BENIGN PROSTATIC HYPERPLASIA WITH NOCTURIA: ICD-10-CM

## 2018-04-12 DIAGNOSIS — E55.9 VITAMIN D DEFICIENCY: ICD-10-CM

## 2018-04-12 DIAGNOSIS — R76.8 HEPATITIS C ANTIBODY POSITIVE IN BLOOD: ICD-10-CM

## 2018-04-12 DIAGNOSIS — E11.8 TYPE 2 DIABETES MELLITUS WITH COMPLICATION, WITH LONG-TERM CURRENT USE OF INSULIN (HCC): ICD-10-CM

## 2018-04-12 DIAGNOSIS — Z79.4 TYPE 2 DIABETES MELLITUS WITH COMPLICATION, WITH LONG-TERM CURRENT USE OF INSULIN (HCC): ICD-10-CM

## 2018-04-12 PROBLEM — J06.9 VIRAL UPPER RESPIRATORY INFECTION: Status: RESOLVED | Noted: 2018-01-10 | Resolved: 2018-04-12

## 2018-04-12 PROCEDURE — 99214 OFFICE O/P EST MOD 30 MIN: CPT | Performed by: GENERAL PRACTICE

## 2018-04-12 RX ORDER — ERGOCALCIFEROL 1.25 MG/1
50000 CAPSULE ORAL WEEKLY
Qty: 4 CAPSULE | Refills: 5 | Status: SHIPPED | OUTPATIENT
Start: 2018-04-12 | End: 2018-09-11 | Stop reason: SDUPTHER

## 2018-04-12 NOTE — PROGRESS NOTES
Subjective   Nicholas Yin is a 73 y.o. male.     History of Present Illness     Chronic Hepatitis C  Completed a course of harvoni for genotype 1A hepatitis C since last here without complication.  RNA levels were nondetectable on 2/1/18.  We will be undergoing a hepatology reassessment next month    Transient Visual Loss  4 months ago he experienced a sudden progressive decrease in the vision in his right eye while driving. He stated that the vision in the eye darkened gradually over several minutes to the point where the only things he could make out were very bright lights and the sun. This lasted for about 30 minutes then resolved over several minutes. He has had no further episodes and denies any other visual disturbances. He continues to deny any new headaches and has had no changes in his strength, sensation, speech or ability to understand what is said to him. He denies any chest pain or palpitations and has had no lightheadedness or diaphoresis. He denies any new joint or muscle pain and has had no rash, fever or chills. He was hospitalized at [facility] over 10 years ago following a similar episodes that was ultimately felt to be vascular in origin. He has numerous cardiovascular risk factors as well as chronic renal insufficiency. MRI of the brain performed on 12/8/17 was reported as showing mild cerebral atrophy with chronic small vessel ischemic changes. MRA of the carotid arteries performed the same day was unremarkable. Echocardiogram done the same day was reported as showing mild - moderate AS, moderate mitral annular calcification and mild LAE.  He remains on dual antiplatelet therapy with clopidrogel and ASA    Diabetes  Current symptoms include none. Patient denies paresthesia of the feet, polydipsia, polyuria, hypoglycemia and foot ulcerations. Evaluation to date has been: hemoglobin A1C. Home sugars: BGs are running  consistent with Hgb A1C. Current treatments: metformin, DPP inhibitor - januvia  (both as janumet), SGLT2 inhibitor - bydureon and basal insulin - toujeo. Last dilated eye exam on 12/4/17  Lab Results   Component Value Date    HGBA1C 8.10 (H) 04/10/2018      Dyslipidemia  Compliance with treatment has been fair. The patient exercises occasionally. He is currently being prescribed the following medication for his dyslipidemia - rosuvastatin. Patient denies side effects associated with his medications.   Lab Results   Component Value Date    CHOL 143 04/10/2018    CHLPL 125 09/28/2015    TRIG 147 04/10/2018    HDL 58 (L) 04/10/2018    LDL 56 04/10/2018     Hypertension  Home blood pressure readings: have been high since last here. Associated signs and symptoms: dyspnea. Patient denies: chest pain, palpitations, orthopnea, paroxysmal nocturnal dyspnea and peripheral edema. Current antihypertensive medications includes losartan. Medication compliance: taking as prescribed.   Lab Results   Component Value Date    CREATININE 1.62 (H) 04/10/2018     Labs  Most recent vitamin D 24    The following portions of the patient's history were reviewed and updated as appropriate: allergies, current medications, past medical history, past social history and problem list.    Review of Systems   Constitutional: Positive for fatigue (chronic - mild - stable). Negative for appetite change, chills, fever and unexpected weight change.   HENT: Negative for congestion, ear pain, rhinorrhea, sneezing, sore throat and voice change.    Eyes: Negative for visual disturbance.   Respiratory: Positive for shortness of breath (chronic - with above average exertion - stable). Negative for cough and wheezing.    Cardiovascular: Negative for chest pain, palpitations and leg swelling.   Gastrointestinal: Negative for abdominal pain, blood in stool, constipation, diarrhea, nausea and vomiting.   Endocrine: Negative for polydipsia and polyuria.   Genitourinary: Negative for difficulty urinating, dysuria, frequency, hematuria and  urgency.        Erectile dysfunction - chronic progressive. Nocturia x 2-3 with mildly decreased stream and intermittent sense of incomplete voiding   Musculoskeletal: Positive for back pain (chronic - stable). Negative for arthralgias, joint swelling, myalgias and neck pain.   Skin: Negative for color change.        Lesions   Neurological: Negative for tremors, weakness, numbness and headaches.   Psychiatric/Behavioral: Negative for dysphoric mood, sleep disturbance and suicidal ideas. The patient is not nervous/anxious.      Objective   Physical Exam   Constitutional: He is oriented to person, place, and time. He appears well-developed and well-nourished. He is cooperative. He does not have a sickly appearance. No distress.   Walks with a slightly exaggerated thoracic kyphosis. No apparent distress. No pallor, jaundice, diaphoresis, or cyanosis.     HENT:   Head: Atraumatic.   Right Ear: Tympanic membrane, external ear and ear canal normal.   Left Ear: Tympanic membrane, external ear and ear canal normal.   Mouth/Throat: Oropharynx is clear and moist.   Eyes: EOM are normal. Pupils are equal, round, and reactive to light. No scleral icterus.   Neck: No JVD present. Carotid bruit is not present. No tracheal deviation present. No thyromegaly present.   Cardiovascular: Normal rate, regular rhythm, S1 normal, S2 normal, normal heart sounds and intact distal pulses.  Exam reveals no gallop.    No murmur heard.  Pulmonary/Chest: Breath sounds normal. He has no wheezes. He has no rales.   Abdominal: Soft. Normal aorta and bowel sounds are normal. He exhibits no abdominal bruit and no mass. There is no hepatosplenomegaly. There is no tenderness. No hernia.   Musculoskeletal: He exhibits no deformity.   Lymphadenopathy:        Head (right side): No submandibular adenopathy present.        Head (left side): No submandibular adenopathy present.     He has no cervical adenopathy.   Neurological: He is alert and oriented to  person, place, and time. He has normal strength and normal reflexes. He displays normal reflexes. No cranial nerve deficit. He exhibits normal muscle tone. Coordination normal.   Skin: Skin is warm and dry. No rash noted. He is not diaphoretic. No pallor. Nails show no clubbing.   Psychiatric: He has a normal mood and affect. His behavior is normal.     Assessment/Plan   Problems Addressed this Visit        Cardiovascular and Mediastinum    Essential hypertension   Hypertension: elevated today. Evidence of target organ damage: atherosclerosis of the aorta on imaging and transient ischemic attack.  Encouraged to continue to work on diet and exercise plan.   Continue current medication for now however if blood pressure elevated at return we'll modify his antihypertensive regimen       Digestive    Gastroesophageal reflux disease without esophagitis    Vitamin D deficiency  We'll start on high-dose supplementation and monitor  Relevant Medications   Vitamin D 93737 IU caps          Endocrine    Type 2 diabetes mellitus with complication, with long-term current use of insulin  Diabetes mellitus Type II, under better control.   Encouraged to continue to pursue ADA diet  Encouraged aerobic exercise.       Musculoskeletal and Integument    DDD (degenerative disc disease), lumbar       Genitourinary    Benign prostatic hyperplasia with nocturia       Other    Dyslipidemia  As above.   Continue current medication.    Healthcare maintenance  Reviewed the potential benefits of shingrix. Prescription written.    Relevant Medications    Zoster Vac Recomb Adjuvanted (SHINGRIX) 50 MCG reconstituted suspension    Hepatitis C antibody positive in blood  Genotype 1A.  Appears to have achieved remission with harvoni  Follow up with hepatology    Vasculogenic erectile dysfunction    Amaurosis fugax of right eye  Reminded regarding risk factor modification with an emphasis on diet and exercise.

## 2018-05-03 ENCOUNTER — LAB (OUTPATIENT)
Dept: LAB | Facility: HOSPITAL | Age: 73
End: 2018-05-03

## 2018-05-03 ENCOUNTER — TRANSCRIBE ORDERS (OUTPATIENT)
Dept: LAB | Facility: HOSPITAL | Age: 73
End: 2018-05-03

## 2018-05-03 DIAGNOSIS — B18.2 CHRONIC HEPATITIS C WITH HEPATIC COMA (HCC): Primary | ICD-10-CM

## 2018-05-03 DIAGNOSIS — B18.2 CHRONIC HEPATITIS C WITH HEPATIC COMA (HCC): ICD-10-CM

## 2018-05-03 PROCEDURE — 36415 COLL VENOUS BLD VENIPUNCTURE: CPT

## 2018-05-03 PROCEDURE — 87522 HEPATITIS C REVRS TRNSCRPJ: CPT

## 2018-05-05 LAB
HCV RNA SERPL NAA+PROBE-ACNC: NORMAL IU/ML
TEST INFORMATION: NORMAL

## 2018-05-11 DIAGNOSIS — Z79.4 TYPE 2 DIABETES MELLITUS WITH COMPLICATION, WITH LONG-TERM CURRENT USE OF INSULIN (HCC): ICD-10-CM

## 2018-05-11 DIAGNOSIS — E11.8 TYPE 2 DIABETES MELLITUS WITH COMPLICATION, WITH LONG-TERM CURRENT USE OF INSULIN (HCC): ICD-10-CM

## 2018-05-14 DIAGNOSIS — E11.8 TYPE 2 DIABETES MELLITUS WITH COMPLICATION, WITH LONG-TERM CURRENT USE OF INSULIN (HCC): ICD-10-CM

## 2018-05-14 DIAGNOSIS — Z79.4 TYPE 2 DIABETES MELLITUS WITH COMPLICATION, WITH LONG-TERM CURRENT USE OF INSULIN (HCC): ICD-10-CM

## 2018-05-14 RX ORDER — DEXLANSOPRAZOLE 60 MG/1
60 CAPSULE, DELAYED RELEASE ORAL DAILY
Qty: 30 CAPSULE | Refills: 5 | Status: SHIPPED | OUTPATIENT
Start: 2018-05-14 | End: 2019-05-29 | Stop reason: SDUPTHER

## 2018-05-14 NOTE — TELEPHONE ENCOUNTER
Could this be resent? Looks like it was classed as a sample and Jeremías says the pharmacy didn't get it.

## 2018-05-22 DIAGNOSIS — G45.3 AMAUROSIS FUGAX OF RIGHT EYE: ICD-10-CM

## 2018-05-22 RX ORDER — CLOPIDOGREL BISULFATE 75 MG/1
75 TABLET ORAL DAILY
Qty: 30 TABLET | Refills: 5 | Status: SHIPPED | OUTPATIENT
Start: 2018-05-22 | End: 2018-12-03 | Stop reason: SDUPTHER

## 2018-07-05 DIAGNOSIS — R79.89 ELEVATED TSH: ICD-10-CM

## 2018-07-05 DIAGNOSIS — Z79.4 TYPE 2 DIABETES MELLITUS WITH COMPLICATION, WITH LONG-TERM CURRENT USE OF INSULIN (HCC): ICD-10-CM

## 2018-07-05 DIAGNOSIS — E78.5 DYSLIPIDEMIA: ICD-10-CM

## 2018-07-05 DIAGNOSIS — I10 ESSENTIAL HYPERTENSION: Primary | ICD-10-CM

## 2018-07-05 DIAGNOSIS — E11.8 TYPE 2 DIABETES MELLITUS WITH COMPLICATION, WITH LONG-TERM CURRENT USE OF INSULIN (HCC): ICD-10-CM

## 2018-07-11 DIAGNOSIS — Z79.4 TYPE 2 DIABETES MELLITUS WITH COMPLICATION, WITH LONG-TERM CURRENT USE OF INSULIN (HCC): ICD-10-CM

## 2018-07-11 DIAGNOSIS — E11.8 TYPE 2 DIABETES MELLITUS WITH COMPLICATION, WITH LONG-TERM CURRENT USE OF INSULIN (HCC): ICD-10-CM

## 2018-07-16 ENCOUNTER — LAB (OUTPATIENT)
Dept: LAB | Facility: HOSPITAL | Age: 73
End: 2018-07-16

## 2018-07-16 DIAGNOSIS — R79.89 ELEVATED TSH: ICD-10-CM

## 2018-07-16 DIAGNOSIS — E78.5 DYSLIPIDEMIA: ICD-10-CM

## 2018-07-16 DIAGNOSIS — I10 ESSENTIAL HYPERTENSION: ICD-10-CM

## 2018-07-16 DIAGNOSIS — Z79.4 TYPE 2 DIABETES MELLITUS WITH COMPLICATION, WITH LONG-TERM CURRENT USE OF INSULIN (HCC): ICD-10-CM

## 2018-07-16 DIAGNOSIS — E11.8 TYPE 2 DIABETES MELLITUS WITH COMPLICATION, WITH LONG-TERM CURRENT USE OF INSULIN (HCC): ICD-10-CM

## 2018-07-16 LAB
ALBUMIN SERPL-MCNC: 3.8 G/DL (ref 3.4–4.8)
ALBUMIN/GLOB SERPL: 1.1 G/DL (ref 1.5–2.5)
ALP SERPL-CCNC: 65 U/L (ref 40–129)
ALT SERPL W P-5'-P-CCNC: 22 U/L (ref 10–44)
ANION GAP SERPL CALCULATED.3IONS-SCNC: 7.3 MMOL/L (ref 3.6–11.2)
AST SERPL-CCNC: 30 U/L (ref 10–34)
BASOPHILS # BLD AUTO: 0.05 10*3/MM3 (ref 0–0.3)
BASOPHILS NFR BLD AUTO: 0.6 % (ref 0–2)
BILIRUB SERPL-MCNC: 0.5 MG/DL (ref 0.2–1.8)
BUN BLD-MCNC: 31 MG/DL (ref 7–21)
BUN/CREAT SERPL: 16.8 (ref 7–25)
CALCIUM SPEC-SCNC: 9.2 MG/DL (ref 7.7–10)
CHLORIDE SERPL-SCNC: 108 MMOL/L (ref 99–112)
CHOLEST SERPL-MCNC: 145 MG/DL (ref 0–200)
CO2 SERPL-SCNC: 24.7 MMOL/L (ref 24.3–31.9)
CREAT BLD-MCNC: 1.84 MG/DL (ref 0.43–1.29)
DEPRECATED RDW RBC AUTO: 45.4 FL (ref 37–54)
EOSINOPHIL # BLD AUTO: 0.49 10*3/MM3 (ref 0–0.7)
EOSINOPHIL NFR BLD AUTO: 6.3 % (ref 0–7)
ERYTHROCYTE [DISTWIDTH] IN BLOOD BY AUTOMATED COUNT: 14.3 % (ref 11.5–14.5)
GFR SERPL CREATININE-BSD FRML MDRD: 36 ML/MIN/1.73
GLOBULIN UR ELPH-MCNC: 3.6 GM/DL
GLUCOSE BLD-MCNC: 100 MG/DL (ref 70–110)
HBA1C MFR BLD: 7.3 % (ref 4.5–5.7)
HCT VFR BLD AUTO: 38.1 % (ref 42–52)
HDLC SERPL-MCNC: 48 MG/DL (ref 60–100)
HGB BLD-MCNC: 12.7 G/DL (ref 14–18)
IMM GRANULOCYTES # BLD: 0.02 10*3/MM3 (ref 0–0.03)
IMM GRANULOCYTES NFR BLD: 0.3 % (ref 0–0.5)
LDLC SERPL CALC-MCNC: 68 MG/DL (ref 0–100)
LDLC/HDLC SERPL: 1.41 {RATIO}
LYMPHOCYTES # BLD AUTO: 1.92 10*3/MM3 (ref 1–3)
LYMPHOCYTES NFR BLD AUTO: 24.7 % (ref 16–46)
MCH RBC QN AUTO: 29.1 PG (ref 27–33)
MCHC RBC AUTO-ENTMCNC: 33.3 G/DL (ref 33–37)
MCV RBC AUTO: 87.4 FL (ref 80–94)
MONOCYTES # BLD AUTO: 0.61 10*3/MM3 (ref 0.1–0.9)
MONOCYTES NFR BLD AUTO: 7.9 % (ref 0–12)
NEUTROPHILS # BLD AUTO: 4.67 10*3/MM3 (ref 1.4–6.5)
NEUTROPHILS NFR BLD AUTO: 60.2 % (ref 40–75)
OSMOLALITY SERPL CALC.SUM OF ELEC: 286 MOSM/KG (ref 273–305)
PLATELET # BLD AUTO: 202 10*3/MM3 (ref 130–400)
PMV BLD AUTO: 10.9 FL (ref 6–10)
POTASSIUM BLD-SCNC: 4.8 MMOL/L (ref 3.5–5.3)
PROT SERPL-MCNC: 7.4 G/DL (ref 6–8)
RBC # BLD AUTO: 4.36 10*6/MM3 (ref 4.7–6.1)
SODIUM BLD-SCNC: 140 MMOL/L (ref 135–153)
TRIGL SERPL-MCNC: 146 MG/DL (ref 0–150)
TSH SERPL DL<=0.05 MIU/L-ACNC: 4.24 MIU/ML (ref 0.55–4.78)
VLDLC SERPL-MCNC: 29.2 MG/DL
WBC NRBC COR # BLD: 7.76 10*3/MM3 (ref 4.5–12.5)

## 2018-07-16 PROCEDURE — 80061 LIPID PANEL: CPT

## 2018-07-16 PROCEDURE — 80053 COMPREHEN METABOLIC PANEL: CPT

## 2018-07-16 PROCEDURE — 83036 HEMOGLOBIN GLYCOSYLATED A1C: CPT

## 2018-07-16 PROCEDURE — 84443 ASSAY THYROID STIM HORMONE: CPT

## 2018-07-16 PROCEDURE — 85025 COMPLETE CBC W/AUTO DIFF WBC: CPT

## 2018-07-17 ENCOUNTER — OFFICE VISIT (OUTPATIENT)
Dept: FAMILY MEDICINE CLINIC | Facility: CLINIC | Age: 73
End: 2018-07-17

## 2018-07-17 VITALS
DIASTOLIC BLOOD PRESSURE: 80 MMHG | BODY MASS INDEX: 34.51 KG/M2 | HEIGHT: 69 IN | OXYGEN SATURATION: 96 % | RESPIRATION RATE: 12 BRPM | SYSTOLIC BLOOD PRESSURE: 155 MMHG | HEART RATE: 82 BPM | TEMPERATURE: 97.5 F | WEIGHT: 233 LBS

## 2018-07-17 DIAGNOSIS — N40.1 BENIGN PROSTATIC HYPERPLASIA WITH NOCTURIA: ICD-10-CM

## 2018-07-17 DIAGNOSIS — E11.8 TYPE 2 DIABETES MELLITUS WITH COMPLICATION, WITH LONG-TERM CURRENT USE OF INSULIN (HCC): ICD-10-CM

## 2018-07-17 DIAGNOSIS — R76.8 HEPATITIS C ANTIBODY POSITIVE IN BLOOD: ICD-10-CM

## 2018-07-17 DIAGNOSIS — K21.9 GASTROESOPHAGEAL REFLUX DISEASE WITHOUT ESOPHAGITIS: ICD-10-CM

## 2018-07-17 DIAGNOSIS — Z85.47 H/O TESTICULAR CANCER: ICD-10-CM

## 2018-07-17 DIAGNOSIS — R35.1 BENIGN PROSTATIC HYPERPLASIA WITH NOCTURIA: ICD-10-CM

## 2018-07-17 DIAGNOSIS — I10 ESSENTIAL HYPERTENSION: Primary | ICD-10-CM

## 2018-07-17 DIAGNOSIS — R79.89 ELEVATED TSH: ICD-10-CM

## 2018-07-17 DIAGNOSIS — Z85.038 H/O COLON CANCER, STAGE I: ICD-10-CM

## 2018-07-17 DIAGNOSIS — M51.36 DDD (DEGENERATIVE DISC DISEASE), LUMBAR: ICD-10-CM

## 2018-07-17 DIAGNOSIS — Z23 ENCOUNTER FOR IMMUNIZATION: ICD-10-CM

## 2018-07-17 DIAGNOSIS — N18.9 CHRONIC RENAL FAILURE, UNSPECIFIED CKD STAGE: ICD-10-CM

## 2018-07-17 DIAGNOSIS — E55.9 VITAMIN D DEFICIENCY: ICD-10-CM

## 2018-07-17 DIAGNOSIS — Z79.4 TYPE 2 DIABETES MELLITUS WITH COMPLICATION, WITH LONG-TERM CURRENT USE OF INSULIN (HCC): ICD-10-CM

## 2018-07-17 DIAGNOSIS — E78.5 DYSLIPIDEMIA: ICD-10-CM

## 2018-07-17 DIAGNOSIS — N52.01 ERECTILE DYSFUNCTION DUE TO ARTERIAL INSUFFICIENCY: ICD-10-CM

## 2018-07-17 DIAGNOSIS — G45.3 AMAUROSIS FUGAX OF RIGHT EYE: ICD-10-CM

## 2018-07-17 DIAGNOSIS — Z00.00 HEALTHCARE MAINTENANCE: ICD-10-CM

## 2018-07-17 PROCEDURE — 99214 OFFICE O/P EST MOD 30 MIN: CPT | Performed by: GENERAL PRACTICE

## 2018-07-17 PROCEDURE — 90636 HEP A/HEP B VACC ADULT IM: CPT | Performed by: GENERAL PRACTICE

## 2018-07-17 PROCEDURE — 90471 IMMUNIZATION ADMIN: CPT | Performed by: GENERAL PRACTICE

## 2018-07-17 RX ORDER — DOXAZOSIN MESYLATE 4 MG/1
TABLET ORAL
Qty: 30 TABLET | Refills: 5 | Status: SHIPPED | OUTPATIENT
Start: 2018-07-17 | End: 2018-10-25 | Stop reason: SDUPTHER

## 2018-07-17 RX ORDER — LANCETS 33 GAUGE
EACH MISCELLANEOUS
Refills: 0 | Status: ON HOLD | COMMUNITY
Start: 2018-07-11 | End: 2019-08-14

## 2018-07-17 NOTE — PROGRESS NOTES
Subjective   Nicholas Yin is a 73 y.o. male.     History of Present Illness     Chronic Hepatitis C  Completed a course of harvoni for genotype 1A hepatitis C without complication.  RNA levels were nondetectable on 2/1/18.  Underwent a hepatology reassessment since last here and states that he was advised to receive a hepatitis B booster. He has never received a hepatitis A immunization    Transient Visual Loss  7 months ago he experienced a sudden progressive decrease in the vision in his right eye while driving. He stated that the vision in the eye darkened gradually over several minutes to the point where the only things he could make out were very bright lights and the sun. This lasted for about 30 minutes then resolved over several minutes. He has had no further episodes and denies any other visual disturbances. He continues to deny any new headaches and has had no changes in his strength, sensation, speech or ability to understand what is said to him. He denies any chest pain or palpitations and has had no lightheadedness or diaphoresis. He denies any new joint or muscle pain and has had no rash, fever or chills. He was hospitalized at [facility] over 10 years ago following a similar episodes that was ultimately felt to be vascular in origin. He has numerous cardiovascular risk factors as well as chronic renal insufficiency. MRI of the brain performed on 12/8/17 was reported as showing mild cerebral atrophy with chronic small vessel ischemic changes. MRA of the carotid arteries performed the same day was unremarkable. Echocardiogram done the same day was reported as showing mild - moderate AS, moderate mitral annular calcification and mild LAE.  He remains on dual antiplatelet therapy with clopidrogel and ASA. He denies any bleeding whatsoever    Diabetes  Current symptoms include none. Patient denies paresthesia of the feet, polydipsia, polyuria, hypoglycemia and foot ulcerations. Evaluation to date has been:  hemoglobin A1C. Home sugars: BGs have been better since last here. Current treatments: metformin, DPP inhibitor - januvia (both as janumet), SGLT2 inhibitor - bydureon and basal insulin - toujeo. He has been following his diet a lot closer since last here (especially avoiding chocolate). Last dilated eye exam on 12/4/17  Lab Results   Component Value Date    HGBA1C 7.30 (H) 07/16/2018      Dyslipidemia  Compliance with treatment has been fair. The patient exercises occasionally. He is currently being prescribed the following medication for his dyslipidemia - rosuvastatin. Patient denies side effects associated with his medications.   Lab Results   Component Value Date    CHOL 145 07/16/2018    CHLPL 125 09/28/2015    TRIG 146 07/16/2018    HDL 48 (L) 07/16/2018    LDL 68 07/16/2018     Hypertension  Home blood pressure readings: have been high since last here. Associated signs and symptoms: dyspnea. Patient denies: chest pain, palpitations, orthopnea, paroxysmal nocturnal dyspnea and peripheral edema. Current antihypertensive medications includes losartan. Medication compliance: taking as prescribed.   Lab Results   Component Value Date    CREATININE 1.84 (H) 07/16/2018     BPH  He admits to a decreased stream associated with hesitancy, nocturia x 3-4, and a sense of incomplete voiding. He denies any dysuria or hematuria. He was on daily cialis for sometime with minimal benefit.      Labs  Most recent vitamin D 24. TSH 4.24    The following portions of the patient's history were reviewed and updated as appropriate: allergies, current medications, past medical history, past social history and problem list.    Review of Systems   Constitutional: Positive for fatigue (chronic - mild - stable). Negative for appetite change, chills, fever and unexpected weight change.   HENT: Negative for congestion, ear pain, rhinorrhea, sneezing, sore throat and voice change.    Eyes: Negative for visual disturbance.   Respiratory:  Positive for shortness of breath (chronic - with above average exertion - stable). Negative for cough and wheezing.    Cardiovascular: Negative for chest pain, palpitations and leg swelling.   Gastrointestinal: Negative for abdominal pain, blood in stool, constipation, diarrhea, nausea and vomiting.   Endocrine: Negative for polydipsia and polyuria.   Genitourinary: Negative for difficulty urinating, dysuria, frequency, hematuria and urgency.        Erectile dysfunction - chronic progressive. Nocturia x 3-4 with decreased stream and sense of incomplete voiding   Musculoskeletal: Positive for back pain (chronic - stable). Negative for arthralgias, joint swelling, myalgias and neck pain.   Skin: Negative for color change.        Lesions   Neurological: Negative for tremors, weakness, numbness and headaches.   Psychiatric/Behavioral: Negative for dysphoric mood, sleep disturbance and suicidal ideas. The patient is not nervous/anxious.      Objective   Physical Exam   Constitutional: He is oriented to person, place, and time. He appears well-developed and well-nourished. He is cooperative. He does not have a sickly appearance. No distress.   Walks with a slightly exaggerated thoracic kyphosis. No apparent distress. No pallor, jaundice, diaphoresis, or cyanosis.     HENT:   Head: Atraumatic.   Right Ear: Tympanic membrane, external ear and ear canal normal.   Left Ear: Tympanic membrane, external ear and ear canal normal.   Mouth/Throat: Oropharynx is clear and moist.   Eyes: EOM are normal. Pupils are equal, round, and reactive to light. No scleral icterus.   Neck: No JVD present. Carotid bruit is not present. No tracheal deviation present. No thyromegaly present.   Cardiovascular: Normal rate, regular rhythm, S1 normal, S2 normal, normal heart sounds and intact distal pulses.  Exam reveals no gallop.    No murmur heard.  Pulmonary/Chest: Breath sounds normal. He has no wheezes. He has no rales.   Abdominal: Soft.  Normal aorta and bowel sounds are normal. He exhibits no abdominal bruit and no mass. There is no hepatosplenomegaly. There is no tenderness. No hernia.   Musculoskeletal: He exhibits no deformity.   Lymphadenopathy:        Head (right side): No submandibular adenopathy present.        Head (left side): No submandibular adenopathy present.     He has no cervical adenopathy.   Neurological: He is alert and oriented to person, place, and time. He has normal strength and normal reflexes. He displays normal reflexes. No cranial nerve deficit. He exhibits normal muscle tone. Coordination normal.   Skin: Skin is warm and dry. No rash noted. He is not diaphoretic. No pallor. Nails show no clubbing.   Psychiatric: He has a normal mood and affect. His behavior is normal.     Assessment/Plan   Problems Addressed this Visit        Cardiovascular and Mediastinum    Essential hypertension    Hypertension: elevated again today. Evidence of target organ damage: atherosclerosis of the aorta on   imaging and  transient ischemic attack.  Encouraged to continue to work on diet and exercise plan.  Given his symptoms pf BPH agreed on a trial of an alpha one blocker    Relevant Medications    doxazosin (CARDURA) 4 MG tablet       Digestive    Gastroesophageal reflux disease without esophagitis    H/O colon cancer, stage I    Vitamin D deficiency  Continue supplementation with monitoring.       Endocrine    Type 2 diabetes mellitus with complication, with long-term current use of insulin (CMS/[facility])  Diabetes mellitus Type II, under good control.   Encouraged to continue to pursue ADA diet  Encouraged aerobic exercise.  Advised of the risk of lactic acidosis with metformin and renal insufficicency.  Serum Cr will be updated in 6 weeks       Musculoskeletal and Integument    DDD (degenerative disc disease), lumbar       Genitourinary    H/O testicular cancer    Benign prostatic hyperplasia with nocturia  Reviewed treatment options  Given  uncontrolled hypertension agreed on a trial of doxazosin. Advised of the potential side effects and encouraged to exercise caution when getting up from sitting and especially lying    Relevant Medications    doxazosin (CARDURA) 4 MG tablet    Chronic renal failure  Reminded to avoid any NSAIDs prescription or OTC  As above.     Relevant Orders    Comprehensive Metabolic Panel       Other    Dyslipidemia  As above.   Continue current medication.    Encounter for immunization    Relevant Orders    Hepatitis A Hepatitis B Combined Vaccine IM (Completed)    Healthcare maintenance  Encouraged to follow up with shingrix.    Hepatitis C antibody positive in blood  Hep A/B administered todau  Will plan on a second hep A in 6 months    Vasculogenic erectile dysfunction    Amaurosis fugax of right eye  Reminded regarding the importance of risk factor modification.    Elevated TSH  Most recent normal  Will continue to monitor

## 2018-09-11 DIAGNOSIS — E55.9 VITAMIN D DEFICIENCY: ICD-10-CM

## 2018-09-11 RX ORDER — ERGOCALCIFEROL 1.25 MG/1
50000 CAPSULE ORAL WEEKLY
Qty: 4 CAPSULE | Refills: 5 | Status: ON HOLD | OUTPATIENT
Start: 2018-09-11 | End: 2019-08-14

## 2018-10-03 DIAGNOSIS — I10 ESSENTIAL HYPERTENSION: ICD-10-CM

## 2018-10-03 DIAGNOSIS — E11.8 TYPE 2 DIABETES MELLITUS WITH COMPLICATION, WITH LONG-TERM CURRENT USE OF INSULIN (HCC): Primary | ICD-10-CM

## 2018-10-03 DIAGNOSIS — N18.9 CHRONIC RENAL FAILURE, UNSPECIFIED CKD STAGE: ICD-10-CM

## 2018-10-03 DIAGNOSIS — Z79.4 TYPE 2 DIABETES MELLITUS WITH COMPLICATION, WITH LONG-TERM CURRENT USE OF INSULIN (HCC): Primary | ICD-10-CM

## 2018-10-04 ENCOUNTER — OFFICE VISIT (OUTPATIENT)
Dept: FAMILY MEDICINE CLINIC | Facility: CLINIC | Age: 73
End: 2018-10-04

## 2018-10-04 ENCOUNTER — APPOINTMENT (OUTPATIENT)
Dept: LAB | Facility: HOSPITAL | Age: 73
End: 2018-10-04

## 2018-10-04 ENCOUNTER — OFFICE VISIT (OUTPATIENT)
Dept: RETAIL CLINIC | Facility: CLINIC | Age: 73
End: 2018-10-04

## 2018-10-04 VITALS
HEIGHT: 69 IN | DIASTOLIC BLOOD PRESSURE: 75 MMHG | BODY MASS INDEX: 34.8 KG/M2 | OXYGEN SATURATION: 97 % | TEMPERATURE: 97.5 F | RESPIRATION RATE: 12 BRPM | HEART RATE: 88 BPM | SYSTOLIC BLOOD PRESSURE: 130 MMHG | WEIGHT: 235 LBS

## 2018-10-04 DIAGNOSIS — I73.9 PAD (PERIPHERAL ARTERY DISEASE) (HCC): ICD-10-CM

## 2018-10-04 DIAGNOSIS — E11.8 TYPE 2 DIABETES MELLITUS WITH COMPLICATION, WITH LONG-TERM CURRENT USE OF INSULIN (HCC): ICD-10-CM

## 2018-10-04 DIAGNOSIS — N18.9 CHRONIC RENAL FAILURE, UNSPECIFIED CKD STAGE: ICD-10-CM

## 2018-10-04 DIAGNOSIS — Z00.00 HEALTHCARE MAINTENANCE: ICD-10-CM

## 2018-10-04 DIAGNOSIS — E55.9 VITAMIN D DEFICIENCY: ICD-10-CM

## 2018-10-04 DIAGNOSIS — N40.1 BENIGN PROSTATIC HYPERPLASIA WITH NOCTURIA: ICD-10-CM

## 2018-10-04 DIAGNOSIS — Z23 ENCOUNTER FOR IMMUNIZATION: Primary | ICD-10-CM

## 2018-10-04 DIAGNOSIS — G45.3 AMAUROSIS FUGAX OF RIGHT EYE: ICD-10-CM

## 2018-10-04 DIAGNOSIS — R35.1 BENIGN PROSTATIC HYPERPLASIA WITH NOCTURIA: ICD-10-CM

## 2018-10-04 DIAGNOSIS — M51.36 DDD (DEGENERATIVE DISC DISEASE), LUMBAR: ICD-10-CM

## 2018-10-04 DIAGNOSIS — E78.5 DYSLIPIDEMIA: ICD-10-CM

## 2018-10-04 DIAGNOSIS — Z79.4 TYPE 2 DIABETES MELLITUS WITH COMPLICATION, WITH LONG-TERM CURRENT USE OF INSULIN (HCC): ICD-10-CM

## 2018-10-04 DIAGNOSIS — R76.8 HEPATITIS C ANTIBODY POSITIVE IN BLOOD: ICD-10-CM

## 2018-10-04 DIAGNOSIS — I10 ESSENTIAL HYPERTENSION: Primary | ICD-10-CM

## 2018-10-04 DIAGNOSIS — K21.9 GASTROESOPHAGEAL REFLUX DISEASE WITHOUT ESOPHAGITIS: ICD-10-CM

## 2018-10-04 DIAGNOSIS — R79.89 ELEVATED TSH: ICD-10-CM

## 2018-10-04 LAB
ALBUMIN SERPL-MCNC: 3.9 G/DL (ref 3.4–4.8)
ALBUMIN/GLOB SERPL: 1.2 G/DL (ref 1.5–2.5)
ALP SERPL-CCNC: 63 U/L (ref 40–129)
ALT SERPL W P-5'-P-CCNC: 20 U/L (ref 10–44)
ANION GAP SERPL CALCULATED.3IONS-SCNC: 9.4 MMOL/L (ref 3.6–11.2)
AST SERPL-CCNC: 26 U/L (ref 10–34)
BILIRUB SERPL-MCNC: 0.4 MG/DL (ref 0.2–1.8)
BUN BLD-MCNC: 34 MG/DL (ref 7–21)
BUN/CREAT SERPL: 18 (ref 7–25)
CALCIUM SPEC-SCNC: 8.8 MG/DL (ref 7.7–10)
CHLORIDE SERPL-SCNC: 108 MMOL/L (ref 99–112)
CO2 SERPL-SCNC: 24.6 MMOL/L (ref 24.3–31.9)
CREAT BLD-MCNC: 1.89 MG/DL (ref 0.43–1.29)
GFR SERPL CREATININE-BSD FRML MDRD: 35 ML/MIN/1.73
GLOBULIN UR ELPH-MCNC: 3.2 GM/DL
GLUCOSE BLD-MCNC: 130 MG/DL (ref 70–110)
HBA1C MFR BLD: 7.9 % (ref 4.5–5.7)
OSMOLALITY SERPL CALC.SUM OF ELEC: 292.5 MOSM/KG (ref 273–305)
POTASSIUM BLD-SCNC: 4.5 MMOL/L (ref 3.5–5.3)
PROT SERPL-MCNC: 7.1 G/DL (ref 6–8)
SODIUM BLD-SCNC: 142 MMOL/L (ref 135–153)

## 2018-10-04 PROCEDURE — 99214 OFFICE O/P EST MOD 30 MIN: CPT | Performed by: GENERAL PRACTICE

## 2018-10-04 PROCEDURE — 83036 HEMOGLOBIN GLYCOSYLATED A1C: CPT | Performed by: GENERAL PRACTICE

## 2018-10-04 PROCEDURE — 80053 COMPREHEN METABOLIC PANEL: CPT | Performed by: GENERAL PRACTICE

## 2018-10-04 NOTE — PROGRESS NOTES
Jairo  presents to the  clinic for seasonal influenza vaccination.  Denies allergies to eggs, latex, mercury or other flu vaccine components.  Denies previous vaccine reaction, current illness or fever.      Assessment/Plan     Encounter for immunization  Consent discussed and signed. VIS given. 65+ Vaccination administered per Nicci Cueva CMA and  tolerated well. See scanned documents.

## 2018-10-04 NOTE — PROGRESS NOTES
Subjective   Nicholas Yin is a 73 y.o. male.     History of Present Illness     Transient Visual Loss  Late last year he experienced a sudden progressive decrease in the vision in his right eye while driving. He stated that the vision in the eye darkened gradually over several minutes to the point where the only things he could make out were very bright lights and the sun. This lasted for about 30 minutes then resolved over several minutes.  Since last here he has had some blurring of the lower medial visual field of the same eye.  While records have yet to be received his ophthalmologist apparently felt this was vascular in origin. He continues to deny any new headaches and has had no changes in his strength, sensation, speech or ability to understand what is said to him. He denies any chest pain or palpitations and has had no lightheadedness or diaphoresis. He denies any new joint or muscle pain and has had no rash, fever or chills. He was hospitalized at [facility] over 10 years ago following a similar episodes that was ultimately felt to be vascular in origin. He has numerous cardiovascular risk factors as well as chronic renal insufficiency. MRI of the brain performed on 12/8/17 was reported as showing mild cerebral atrophy with chronic small vessel ischemic changes. MRA of the carotid arteries performed the same day was unremarkable. Echocardiogram done the same day was reported as showing mild - moderate AS, moderate mitral annular calcification and mild LAE.  He remains on dual antiplatelet therapy with clopidrogel and ASA. He denies any bleeding whatsoever    Diabetes  Current symptoms include none. Patient denies paresthesia of the feet, polydipsia, polyuria, hypoglycemia and foot ulcerations. Evaluation to date has been: hemoglobin A1C. Home sugars: BGs have been better since last here. Current treatments: metformin, DPP inhibitor - januvia (both as janumet), SGLT2 inhibitor - bydureon and basal insulin -  toujeo. He has been following his diet fairly well since last here.   Lab Results   Component Value Date    HGBA1C 7.90 (H) 10/04/2018      Dyslipidemia  Compliance with treatment has been fair. The patient exercises occasionally. He is currently being prescribed the following medication for his dyslipidemia - rosuvastatin. Patient denies side effects associated with his medications.     Hypertension  Home blood pressure readings: have been high since last here. Associated signs and symptoms: dyspnea. Patient denies: chest pain, palpitations, orthopnea, paroxysmal nocturnal dyspnea and peripheral edema. Current antihypertensive medications includes losartan and doxazosin. Medication compliance: taking as prescribed.  CT of the abdomen/pelvis performed on 1/11/16 revealed heavy atherosclerotic plaquing of the aorta and its branches.  Lab Results   Component Value Date    CREATININE 1.89 (H) 10/04/2018     BPH  He has a history of decreased stream associated with hesitancy, nocturia x 3-4, and a sense of incomplete voiding.  He has noted a significant improvement in the symptoms with doxazosin but remains on 2 mg nightly as any higher resulted in lightheadedness when getting up overnight.  He denies any dysuria or hematuria.     Chronic Hepatitis C  Completed a course of harvoni for genotype 1A hepatitis C without complication.  RNA levels were nondetectable on 2/1/18.     The following portions of the patient's history were reviewed and updated as appropriate: allergies, current medications, past medical history, past social history and problem list.    Review of Systems   Constitutional: Positive for fatigue (chronic - mild - stable). Negative for appetite change, chills, fever and unexpected weight change.   HENT: Negative for congestion, ear pain, rhinorrhea, sneezing, sore throat and voice change.    Eyes: Negative for visual disturbance.   Respiratory: Positive for shortness of breath (chronic - with above  average exertion - stable). Negative for cough and wheezing.    Cardiovascular: Negative for chest pain, palpitations and leg swelling.   Gastrointestinal: Negative for abdominal pain, blood in stool, constipation, diarrhea, nausea and vomiting.   Endocrine: Negative for polydipsia and polyuria.   Genitourinary: Negative for difficulty urinating, dysuria, frequency, hematuria and urgency.        Erectile dysfunction - chronic progressive. Nocturia x 1-2 with occasional sense of incomplete voiding   Musculoskeletal: Positive for back pain (chronic - stable). Negative for arthralgias, joint swelling, myalgias and neck pain.   Skin: Negative for color change.        Lesions   Neurological: Negative for tremors, weakness, numbness and headaches.   Psychiatric/Behavioral: Negative for dysphoric mood, sleep disturbance and suicidal ideas. The patient is not nervous/anxious.      Objective   Physical Exam   Constitutional: He is oriented to person, place, and time. He appears well-developed and well-nourished. He is cooperative. He does not have a sickly appearance. No distress.   Walks with a slightly exaggerated thoracic kyphosis. No apparent distress. No pallor, jaundice, diaphoresis, or cyanosis.     HENT:   Head: Atraumatic.   Right Ear: Tympanic membrane, external ear and ear canal normal.   Left Ear: Tympanic membrane, external ear and ear canal normal.   Mouth/Throat: Oropharynx is clear and moist.   Eyes: Pupils are equal, round, and reactive to light. EOM are normal. No scleral icterus.   Neck: No JVD present. Carotid bruit is not present. No tracheal deviation present. No thyromegaly present.   Cardiovascular: Normal rate, regular rhythm, S1 normal, S2 normal, normal heart sounds and intact distal pulses.  Exam reveals no gallop.    No murmur heard.  Pulmonary/Chest: Breath sounds normal. He has no wheezes. He has no rales.   Abdominal: Soft. Normal aorta and bowel sounds are normal. He exhibits no abdominal  bruit and no mass. There is no hepatosplenomegaly. There is no tenderness. No hernia.   Musculoskeletal: He exhibits no deformity.   Lymphadenopathy:        Head (right side): No submandibular adenopathy present.        Head (left side): No submandibular adenopathy present.     He has no cervical adenopathy.   Neurological: He is alert and oriented to person, place, and time. He has normal strength and normal reflexes. He displays normal reflexes. No cranial nerve deficit. He exhibits normal muscle tone. Coordination normal.   Skin: Skin is warm and dry. No rash noted. He is not diaphoretic. No pallor. Nails show no clubbing.   Psychiatric: He has a normal mood and affect. His behavior is normal.     Assessment/Plan   Problems Addressed this Visit        Cardiovascular and Mediastinum    Essential hypertension    Hypertension: at goal. Evidence of target organ damage: peripheral artery disease, chronic kidney disease and transient ischemic attack.  Encouraged to continue to work on diet and exercise plan.   Losartan will be held for several weeks and his creatinine repeated   In the meantime doxazosin will be titrated to 2 twice a day     Relevant Orders    Comprehensive Metabolic Panel    Peripheral arterial disease  Reminded regarding the importance of risk factor modification.  Continue current medication otherwise   Amaurosis fugax of right eye  As above.       Digestive    Gastroesophageal reflux disease without esophagitis    Vitamin D deficiency       Endocrine    Type 2 diabetes mellitus with complication, with long-term current use of insulin (CMS/[facility])  Diabetes mellitus Type II, under fair control.   Encouraged to continue to pursue ADA diet  Encouraged aerobic exercise.  If GFR drops below 30 we will discontinue metformin and consider replacing sitagliptin with linagliptin       Musculoskeletal and Integument    DDD (degenerative disc disease), lumbar       Genitourinary    Benign prostatic hyperplasia  with nocturia  As above.     Chronic renal failure  As above.   Reminded to avoid any NSAIDs prescription or OTC    Relevant Orders    Comprehensive Metabolic Panel       Other    Dyslipidemia  As above.   Continue current medication.    Healthcare maintenance  Patient has already received a flu shot fall.  Will administer hep A #2 at his return     Hepatitis C antibody positive in blood  In remission    Elevated TSH  Will continue to monitor

## 2018-10-11 RX ORDER — CIPROFLOXACIN AND DEXAMETHASONE 3; 1 MG/ML; MG/ML
4 SUSPENSION/ DROPS AURICULAR (OTIC) 2 TIMES DAILY
Qty: 7.5 ML | Refills: 0 | Status: SHIPPED | OUTPATIENT
Start: 2018-10-11 | End: 2019-07-08

## 2018-10-25 ENCOUNTER — LAB (OUTPATIENT)
Dept: LAB | Facility: HOSPITAL | Age: 73
End: 2018-10-25

## 2018-10-25 ENCOUNTER — TRANSCRIBE ORDERS (OUTPATIENT)
Dept: ADMINISTRATIVE | Facility: HOSPITAL | Age: 73
End: 2018-10-25

## 2018-10-25 DIAGNOSIS — R35.1 BENIGN PROSTATIC HYPERPLASIA WITH NOCTURIA: ICD-10-CM

## 2018-10-25 DIAGNOSIS — N40.1 BENIGN PROSTATIC HYPERPLASIA WITH NOCTURIA: ICD-10-CM

## 2018-10-25 DIAGNOSIS — B18.2 CHRONIC HEPATITIS C WITH HEPATIC COMA (HCC): ICD-10-CM

## 2018-10-25 DIAGNOSIS — I10 ESSENTIAL HYPERTENSION: ICD-10-CM

## 2018-10-25 DIAGNOSIS — B18.2 CHRONIC HEPATITIS C WITH HEPATIC COMA (HCC): Primary | ICD-10-CM

## 2018-10-25 DIAGNOSIS — N18.9 CHRONIC RENAL FAILURE, UNSPECIFIED CKD STAGE: ICD-10-CM

## 2018-10-25 LAB
ALBUMIN SERPL-MCNC: 3.8 G/DL (ref 3.4–4.8)
ALBUMIN/GLOB SERPL: 1.3 G/DL (ref 1.5–2.5)
ALP SERPL-CCNC: 61 U/L (ref 40–129)
ALT SERPL W P-5'-P-CCNC: 20 U/L (ref 10–44)
ANION GAP SERPL CALCULATED.3IONS-SCNC: 5.1 MMOL/L (ref 3.6–11.2)
AST SERPL-CCNC: 20 U/L (ref 10–34)
BILIRUB SERPL-MCNC: 0.5 MG/DL (ref 0.2–1.8)
BUN BLD-MCNC: 33 MG/DL (ref 7–21)
BUN/CREAT SERPL: 17.9 (ref 7–25)
CALCIUM SPEC-SCNC: 8.8 MG/DL (ref 7.7–10)
CHLORIDE SERPL-SCNC: 110 MMOL/L (ref 99–112)
CO2 SERPL-SCNC: 25.9 MMOL/L (ref 24.3–31.9)
CREAT BLD-MCNC: 1.84 MG/DL (ref 0.43–1.29)
GFR SERPL CREATININE-BSD FRML MDRD: 36 ML/MIN/1.73
GLOBULIN UR ELPH-MCNC: 2.9 GM/DL
GLUCOSE BLD-MCNC: 153 MG/DL (ref 70–110)
OSMOLALITY SERPL CALC.SUM OF ELEC: 291.5 MOSM/KG (ref 273–305)
POTASSIUM BLD-SCNC: 4.6 MMOL/L (ref 3.5–5.3)
PROT SERPL-MCNC: 6.7 G/DL (ref 6–8)
SODIUM BLD-SCNC: 141 MMOL/L (ref 135–153)

## 2018-10-25 PROCEDURE — 80053 COMPREHEN METABOLIC PANEL: CPT

## 2018-10-25 PROCEDURE — 87522 HEPATITIS C REVRS TRNSCRPJ: CPT

## 2018-10-25 PROCEDURE — 36415 COLL VENOUS BLD VENIPUNCTURE: CPT

## 2018-10-25 RX ORDER — DOXAZOSIN MESYLATE 4 MG/1
4 TABLET ORAL NIGHTLY
Qty: 30 TABLET | Refills: 5 | Status: SHIPPED | OUTPATIENT
Start: 2018-10-25 | End: 2019-07-25 | Stop reason: SDUPTHER

## 2018-10-27 LAB
HCV RNA SERPL NAA+PROBE-ACNC: NORMAL IU/ML
TEST INFORMATION: NORMAL

## 2018-10-29 ENCOUNTER — TELEPHONE (OUTPATIENT)
Dept: FAMILY MEDICINE CLINIC | Facility: CLINIC | Age: 73
End: 2018-10-29

## 2018-10-29 NOTE — TELEPHONE ENCOUNTER
----- Message from Waldemar Trejo MD sent at 10/27/2018  9:30 AM EDT -----  Nope    ----- Message -----  From: Rossy Mcfarland MA  Sent: 10/26/2018   1:04 PM  To: Waldemar Trejo MD    Patient's pharmacy called and wanted to know about a prescription for Flomax I don't see it in the patient's med list. Are you wanting him to be on this?

## 2018-12-01 DIAGNOSIS — E78.5 DYSLIPIDEMIA: ICD-10-CM

## 2018-12-01 RX ORDER — ROSUVASTATIN CALCIUM 5 MG/1
5 TABLET, COATED ORAL DAILY
Qty: 90 TABLET | Refills: 3 | Status: SHIPPED | OUTPATIENT
Start: 2018-12-01 | End: 2019-08-16 | Stop reason: HOSPADM

## 2018-12-03 DIAGNOSIS — G45.3 AMAUROSIS FUGAX OF RIGHT EYE: ICD-10-CM

## 2018-12-03 RX ORDER — CLOPIDOGREL BISULFATE 75 MG/1
75 TABLET ORAL DAILY
Qty: 30 TABLET | Refills: 5 | Status: SHIPPED | OUTPATIENT
Start: 2018-12-03 | End: 2019-07-25 | Stop reason: SDUPTHER

## 2019-01-03 DIAGNOSIS — N18.9 CHRONIC RENAL FAILURE, UNSPECIFIED CKD STAGE: ICD-10-CM

## 2019-01-03 DIAGNOSIS — Z79.4 TYPE 2 DIABETES MELLITUS WITH COMPLICATION, WITH LONG-TERM CURRENT USE OF INSULIN (HCC): ICD-10-CM

## 2019-01-03 DIAGNOSIS — I10 ESSENTIAL HYPERTENSION: Primary | ICD-10-CM

## 2019-01-03 DIAGNOSIS — R79.89 ELEVATED TSH: ICD-10-CM

## 2019-01-03 DIAGNOSIS — E55.9 VITAMIN D DEFICIENCY: ICD-10-CM

## 2019-01-03 DIAGNOSIS — E11.8 TYPE 2 DIABETES MELLITUS WITH COMPLICATION, WITH LONG-TERM CURRENT USE OF INSULIN (HCC): ICD-10-CM

## 2019-01-04 ENCOUNTER — LAB (OUTPATIENT)
Dept: LAB | Facility: HOSPITAL | Age: 74
End: 2019-01-04

## 2019-01-04 ENCOUNTER — OFFICE VISIT (OUTPATIENT)
Dept: FAMILY MEDICINE CLINIC | Facility: CLINIC | Age: 74
End: 2019-01-04

## 2019-01-04 DIAGNOSIS — E11.8 TYPE 2 DIABETES MELLITUS WITH COMPLICATION, WITH LONG-TERM CURRENT USE OF INSULIN (HCC): ICD-10-CM

## 2019-01-04 DIAGNOSIS — I10 ESSENTIAL HYPERTENSION: ICD-10-CM

## 2019-01-04 DIAGNOSIS — Z85.47 H/O TESTICULAR CANCER: ICD-10-CM

## 2019-01-04 DIAGNOSIS — K21.9 GASTROESOPHAGEAL REFLUX DISEASE WITHOUT ESOPHAGITIS: ICD-10-CM

## 2019-01-04 DIAGNOSIS — Z00.00 HEALTHCARE MAINTENANCE: ICD-10-CM

## 2019-01-04 DIAGNOSIS — Z23 ENCOUNTER FOR IMMUNIZATION: ICD-10-CM

## 2019-01-04 DIAGNOSIS — Z79.4 TYPE 2 DIABETES MELLITUS WITH COMPLICATION, WITH LONG-TERM CURRENT USE OF INSULIN (HCC): ICD-10-CM

## 2019-01-04 DIAGNOSIS — Z85.038 H/O COLON CANCER, STAGE I: ICD-10-CM

## 2019-01-04 DIAGNOSIS — G45.3 AMAUROSIS FUGAX OF RIGHT EYE: ICD-10-CM

## 2019-01-04 DIAGNOSIS — R79.89 ELEVATED TSH: ICD-10-CM

## 2019-01-04 DIAGNOSIS — E55.9 VITAMIN D DEFICIENCY: ICD-10-CM

## 2019-01-04 DIAGNOSIS — I73.9 PAD (PERIPHERAL ARTERY DISEASE) (HCC): Primary | ICD-10-CM

## 2019-01-04 DIAGNOSIS — M51.36 DDD (DEGENERATIVE DISC DISEASE), LUMBAR: ICD-10-CM

## 2019-01-04 DIAGNOSIS — E78.5 DYSLIPIDEMIA: ICD-10-CM

## 2019-01-04 DIAGNOSIS — R76.8 HEPATITIS C ANTIBODY POSITIVE IN BLOOD: ICD-10-CM

## 2019-01-04 DIAGNOSIS — N18.9 CHRONIC RENAL FAILURE, UNSPECIFIED CKD STAGE: ICD-10-CM

## 2019-01-04 DIAGNOSIS — R35.1 BENIGN PROSTATIC HYPERPLASIA WITH NOCTURIA: ICD-10-CM

## 2019-01-04 DIAGNOSIS — N40.1 BENIGN PROSTATIC HYPERPLASIA WITH NOCTURIA: ICD-10-CM

## 2019-01-04 LAB
25(OH)D3 SERPL-MCNC: 24 NG/ML
ALBUMIN SERPL-MCNC: 3.9 G/DL (ref 3.4–4.8)
ALBUMIN/GLOB SERPL: 1.3 G/DL (ref 1.5–2.5)
ALP SERPL-CCNC: 67 U/L (ref 40–129)
ALT SERPL W P-5'-P-CCNC: 18 U/L (ref 10–44)
ANION GAP SERPL CALCULATED.3IONS-SCNC: 7 MMOL/L (ref 3.6–11.2)
AST SERPL-CCNC: 24 U/L (ref 10–34)
BASOPHILS # BLD AUTO: 0.06 10*3/MM3 (ref 0–0.3)
BASOPHILS NFR BLD AUTO: 0.9 % (ref 0–2)
BILIRUB SERPL-MCNC: 0.4 MG/DL (ref 0.2–1.8)
BUN BLD-MCNC: 31 MG/DL (ref 7–21)
BUN/CREAT SERPL: 17.3 (ref 7–25)
CALCIUM SPEC-SCNC: 9.1 MG/DL (ref 7.7–10)
CHLORIDE SERPL-SCNC: 108 MMOL/L (ref 99–112)
CHOLEST SERPL-MCNC: 113 MG/DL (ref 0–200)
CO2 SERPL-SCNC: 25 MMOL/L (ref 24.3–31.9)
CREAT BLD-MCNC: 1.79 MG/DL (ref 0.43–1.29)
DEPRECATED RDW RBC AUTO: 43 FL (ref 37–54)
EOSINOPHIL # BLD AUTO: 0.46 10*3/MM3 (ref 0–0.7)
EOSINOPHIL NFR BLD AUTO: 7.3 % (ref 0–7)
ERYTHROCYTE [DISTWIDTH] IN BLOOD BY AUTOMATED COUNT: 13.5 % (ref 11.5–14.5)
GFR SERPL CREATININE-BSD FRML MDRD: 38 ML/MIN/1.73
GLOBULIN UR ELPH-MCNC: 3.1 GM/DL
GLUCOSE BLD-MCNC: 112 MG/DL (ref 70–110)
HBA1C MFR BLD: 7.3 % (ref 4.5–5.7)
HCT VFR BLD AUTO: 36.6 % (ref 42–52)
HDLC SERPL-MCNC: 47 MG/DL (ref 60–100)
HGB BLD-MCNC: 11.8 G/DL (ref 14–18)
IMM GRANULOCYTES # BLD AUTO: 0.02 10*3/MM3 (ref 0–0.03)
IMM GRANULOCYTES NFR BLD AUTO: 0.3 % (ref 0–0.5)
LDLC SERPL CALC-MCNC: 49 MG/DL (ref 0–100)
LDLC/HDLC SERPL: 1.04 {RATIO}
LYMPHOCYTES # BLD AUTO: 1.37 10*3/MM3 (ref 1–3)
LYMPHOCYTES NFR BLD AUTO: 21.6 % (ref 16–46)
MCH RBC QN AUTO: 27.9 PG (ref 27–33)
MCHC RBC AUTO-ENTMCNC: 32.2 G/DL (ref 33–37)
MCV RBC AUTO: 86.5 FL (ref 80–94)
MONOCYTES # BLD AUTO: 0.51 10*3/MM3 (ref 0.1–0.9)
MONOCYTES NFR BLD AUTO: 8 % (ref 0–12)
NEUTROPHILS # BLD AUTO: 3.92 10*3/MM3 (ref 1.4–6.5)
NEUTROPHILS NFR BLD AUTO: 61.9 % (ref 40–75)
OSMOLALITY SERPL CALC.SUM OF ELEC: 286.7 MOSM/KG (ref 273–305)
PLATELET # BLD AUTO: 258 10*3/MM3 (ref 130–400)
PMV BLD AUTO: 11 FL (ref 6–10)
POTASSIUM BLD-SCNC: 4.7 MMOL/L (ref 3.5–5.3)
PROT SERPL-MCNC: 7 G/DL (ref 6–8)
RBC # BLD AUTO: 4.23 10*6/MM3 (ref 4.7–6.1)
SODIUM BLD-SCNC: 140 MMOL/L (ref 135–153)
TRIGL SERPL-MCNC: 85 MG/DL (ref 0–150)
TSH SERPL DL<=0.05 MIU/L-ACNC: 3.39 MIU/ML (ref 0.55–4.78)
VLDLC SERPL-MCNC: 17 MG/DL
WBC NRBC COR # BLD: 6.34 10*3/MM3 (ref 4.5–12.5)

## 2019-01-04 PROCEDURE — 99214 OFFICE O/P EST MOD 30 MIN: CPT | Performed by: GENERAL PRACTICE

## 2019-01-04 PROCEDURE — 90471 IMMUNIZATION ADMIN: CPT | Performed by: GENERAL PRACTICE

## 2019-01-04 PROCEDURE — 83036 HEMOGLOBIN GLYCOSYLATED A1C: CPT

## 2019-01-04 PROCEDURE — 90632 HEPA VACCINE ADULT IM: CPT | Performed by: GENERAL PRACTICE

## 2019-01-04 PROCEDURE — 80053 COMPREHEN METABOLIC PANEL: CPT

## 2019-01-04 PROCEDURE — 84443 ASSAY THYROID STIM HORMONE: CPT

## 2019-01-04 PROCEDURE — 80061 LIPID PANEL: CPT

## 2019-01-04 PROCEDURE — 85025 COMPLETE CBC W/AUTO DIFF WBC: CPT

## 2019-01-04 PROCEDURE — 82306 VITAMIN D 25 HYDROXY: CPT

## 2019-01-04 NOTE — PROGRESS NOTES
Subjective   Nicholas Yin is a 73 y.o. male.     History of Present Illness     Transient Visual Loss  A little over a year ago he experienced a sudden progressive decrease in the vision in his right eye while driving. He stated that the vision in the eye darkened gradually over several minutes to the point where the only things he could make out were very bright lights and the sun. This lasted for about 30 minutes then resolved over several minutes.  Since last here he has had some blurring of the lower medial visual field of the same eye.  While records have yet to be received his ophthalmologist apparently felt this was vascular in origin. He continues to deny any new headaches and has had no changes in his strength, sensation, speech or ability to understand what is said to him. He denies any chest pain or palpitations and has had no lightheadedness or diaphoresis. He denies any new joint or muscle pain and has had no rash, fever or chills. He was hospitalized at [facility] over 10 years ago following a similar episodes that was ultimately felt to be vascular in origin. He has numerous cardiovascular risk factors as well as chronic renal insufficiency. MRI of the brain performed on 12/8/17 was reported as showing mild cerebral atrophy with chronic small vessel ischemic changes. MRA of the carotid arteries performed the same day was unremarkable. Echocardiogram done the same day was reported as showing mild - moderate AS, moderate mitral annular calcification and mild LAE.  He remains on dual antiplatelet therapy with clopidrogel and ASA. He denies any bleeding whatsoever    Diabetes  Current symptoms include none. Patient denies paresthesia of the feet, polydipsia, polyuria, hypoglycemia and foot ulcerations. Evaluation to date has been: hemoglobin A1C. Home sugars: BGs have generally been at or near goal since last here. Current treatments: metformin, DPP inhibitor - januvia (both as janumet), SGLT2 inhibitor -  bydureon and basal insulin - toujeo. He has been following his diet fairly well since last here.   Lab Results   Component Value Date    HGBA1C 7.30 (H) 01/04/2019      Dyslipidemia  Compliance with treatment has been fair. The patient exercises occasionally. He is currently being prescribed the following medication for his dyslipidemia - rosuvastatin. Patient denies side effects associated with his medications.   Lab Results   Component Value Date    CHOL 113 01/04/2019    CHLPL 125 09/28/2015    TRIG 85 01/04/2019    HDL 47 (L) 01/04/2019    LDL 49 01/04/2019     Hypertension  Home blood pressure readings: have been better since last here. Associated signs and symptoms: dyspnea. Patient denies: chest pain, palpitations, orthopnea, paroxysmal nocturnal dyspnea and peripheral edema. Current antihypertensive medications includes losartan and doxazosin. Medication compliance: taking as prescribed. The former was held for several weeks to gauge any impact on his Cr but there was none. CT of the abdomen/pelvis performed on 1/11/16 revealed heavy atherosclerotic plaquing of the aorta and its branches.  Lab Results   Component Value Date    CREATININE 1.79 (H) 01/04/2019     BPH  He has a history of decreased stream associated with hesitancy, nocturia x 3-4, and a sense of incomplete voiding.  He has noted a significant improvement in the symptoms with doxazosin but remains on 2 mg nightly as any higher resulted in lightheadedness when getting up overnight.  He denies any dysuria or hematuria.     Chronic Hepatitis C  Completed a course of harvoni for genotype 1A hepatitis C without complication.  RNA levels were nondetectable on 2/1/18.   Lab Results   Component Value Date    ALT 18 01/04/2019     Labs  Most recent vitamin D 24 and TSH 3.39    The following portions of the patient's history were reviewed and updated as appropriate: allergies, current medications, past medical history, past social history and problem  list.    Review of Systems   Constitutional: Positive for fatigue (chronic - mild - stable). Negative for appetite change, chills, fever and unexpected weight change.   HENT: Negative for congestion, ear pain, rhinorrhea, sneezing, sore throat and voice change.    Eyes: Negative for visual disturbance.   Respiratory: Positive for shortness of breath (chronic - with above average exertion - stable). Negative for cough and wheezing.    Cardiovascular: Negative for chest pain, palpitations and leg swelling.   Gastrointestinal: Negative for abdominal pain, blood in stool, constipation, diarrhea, nausea and vomiting.   Endocrine: Negative for polydipsia and polyuria.   Genitourinary: Negative for difficulty urinating, dysuria, frequency, hematuria and urgency.        Erectile dysfunction - chronic progressive. Nocturia x 1-2 with occasional sense of incomplete voiding   Musculoskeletal: Positive for back pain (chronic - stable). Negative for arthralgias, joint swelling, myalgias and neck pain.   Skin: Negative for color change.        Lesions   Neurological: Negative for tremors, weakness, numbness and headaches.   Psychiatric/Behavioral: Negative for dysphoric mood, sleep disturbance and suicidal ideas. The patient is not nervous/anxious.      Objective   Physical Exam   Constitutional: He is oriented to person, place, and time. He appears well-developed and well-nourished. He is cooperative. He does not have a sickly appearance. No distress.   Walks with a slightly exaggerated thoracic kyphosis. No apparent distress. No pallor, jaundice, diaphoresis, or cyanosis.     HENT:   Head: Atraumatic.   Right Ear: Tympanic membrane, external ear and ear canal normal.   Left Ear: Tympanic membrane, external ear and ear canal normal.   Mouth/Throat: Oropharynx is clear and moist.   Eyes: EOM are normal. Pupils are equal, round, and reactive to light. No scleral icterus.   Neck: No JVD present. Carotid bruit is not present. No  tracheal deviation present. No thyromegaly present.   Cardiovascular: Normal rate, regular rhythm, S1 normal, S2 normal, normal heart sounds and intact distal pulses. Exam reveals no gallop.   No murmur heard.  Pulmonary/Chest: Breath sounds normal. He has no wheezes. He has no rales.   Abdominal: Soft. Normal aorta and bowel sounds are normal. He exhibits no abdominal bruit and no mass. There is no hepatosplenomegaly. There is no tenderness. No hernia.   Musculoskeletal: He exhibits no deformity.   Lymphadenopathy:        Head (right side): No submandibular adenopathy present.        Head (left side): No submandibular adenopathy present.     He has no cervical adenopathy.   Neurological: He is alert and oriented to person, place, and time. He has normal strength and normal reflexes. He displays normal reflexes. No cranial nerve deficit. He exhibits normal muscle tone. Coordination normal.   Skin: Skin is warm and dry. No rash noted. He is not diaphoretic. No pallor. Nails show no clubbing.   Psychiatric: He has a normal mood and affect. His behavior is normal.     Assessment/Plan   Problems Addressed this Visit        Cardiovascular and Mediastinum    Essential hypertension   Hypertension: marginal. Evidence of target organ damage: peripheral artery disease, chronic kidney disease and transient ischemic attack.  Encouraged to continue to work on diet and exercise plan.   Continue current medication    Amaurosis fugax of right eye  Reminded regarding the importance of risk factor modification.  Continue current medication    PAD (peripheral artery disease) (CMS/HCC)  As above.        Digestive    Gastroesophageal reflux disease without esophagitis    H/O colon cancer, stage I  Reminded that he is due for an updated colonoscopy. He will let us know when and where he would like this arranged    Vitamin D deficiency       Endocrine    Type 2 diabetes mellitus with complication, with long-term current use of insulin  (CMS/HCC)  Diabetes mellitus Type II, under excellent control.   Encouraged to continue to pursue ADA diet  Encouraged aerobic exercise.       Musculoskeletal and Integument    DDD (degenerative disc disease), lumbar       Genitourinary    H/O testicular cancer    Benign prostatic hyperplasia with nocturia    Chronic renal failure  Reminded to avoid any NSAIDs prescription or OTC  Will continue to monitor       Other    Dyslipidemia  As above.   Continue current medication.    Encounter for immunization    Relevant Orders    Hepatitis A Vaccine Adult IM (Completed)    Healthcare maintenance  Hepatitis A #2 administered    Relevant Orders    Hepatitis A Vaccine Adult IM (Completed)    Hepatitis C antibody positive in blood  Has achieved remission with harvoni

## 2019-01-05 VITALS
OXYGEN SATURATION: 98 % | DIASTOLIC BLOOD PRESSURE: 75 MMHG | BODY MASS INDEX: 33.92 KG/M2 | HEIGHT: 69 IN | SYSTOLIC BLOOD PRESSURE: 140 MMHG | HEART RATE: 101 BPM | TEMPERATURE: 98.6 F | RESPIRATION RATE: 12 BRPM | WEIGHT: 229 LBS

## 2019-02-11 RX ORDER — SILDENAFIL 100 MG/1
100 TABLET, FILM COATED ORAL DAILY PRN
Qty: 90 TABLET | Refills: 3 | Status: SHIPPED | OUTPATIENT
Start: 2019-02-11 | End: 2019-07-08

## 2019-04-04 DIAGNOSIS — E78.5 DYSLIPIDEMIA: ICD-10-CM

## 2019-04-04 DIAGNOSIS — N18.9 CHRONIC RENAL FAILURE, UNSPECIFIED CKD STAGE: ICD-10-CM

## 2019-04-04 DIAGNOSIS — I10 ESSENTIAL HYPERTENSION: Primary | ICD-10-CM

## 2019-04-04 DIAGNOSIS — E11.8 TYPE 2 DIABETES MELLITUS WITH COMPLICATION, WITH LONG-TERM CURRENT USE OF INSULIN (HCC): ICD-10-CM

## 2019-04-04 DIAGNOSIS — Z79.4 TYPE 2 DIABETES MELLITUS WITH COMPLICATION, WITH LONG-TERM CURRENT USE OF INSULIN (HCC): ICD-10-CM

## 2019-04-05 ENCOUNTER — LAB (OUTPATIENT)
Dept: LAB | Facility: HOSPITAL | Age: 74
End: 2019-04-05

## 2019-04-05 DIAGNOSIS — I10 ESSENTIAL HYPERTENSION: ICD-10-CM

## 2019-04-05 DIAGNOSIS — E11.8 TYPE 2 DIABETES MELLITUS WITH COMPLICATION, WITH LONG-TERM CURRENT USE OF INSULIN (HCC): ICD-10-CM

## 2019-04-05 DIAGNOSIS — E78.5 DYSLIPIDEMIA: ICD-10-CM

## 2019-04-05 DIAGNOSIS — Z79.4 TYPE 2 DIABETES MELLITUS WITH COMPLICATION, WITH LONG-TERM CURRENT USE OF INSULIN (HCC): ICD-10-CM

## 2019-04-05 LAB
ALBUMIN SERPL-MCNC: 3.7 G/DL (ref 3.5–5.2)
ALBUMIN/GLOB SERPL: 1.1 G/DL
ALP SERPL-CCNC: 55 U/L (ref 39–117)
ALT SERPL W P-5'-P-CCNC: 14 U/L (ref 1–41)
ANION GAP SERPL CALCULATED.3IONS-SCNC: 10.1 MMOL/L
AST SERPL-CCNC: 18 U/L (ref 1–40)
BASOPHILS # BLD AUTO: 0.06 10*3/MM3 (ref 0–0.2)
BASOPHILS NFR BLD AUTO: 0.8 % (ref 0–1.5)
BILIRUB SERPL-MCNC: 0.4 MG/DL (ref 0.2–1.2)
BUN BLD-MCNC: 28 MG/DL (ref 8–23)
BUN/CREAT SERPL: 16.4 (ref 7–25)
CALCIUM SPEC-SCNC: 9 MG/DL (ref 8.6–10.5)
CHLORIDE SERPL-SCNC: 109 MMOL/L (ref 98–107)
CHOLEST SERPL-MCNC: 133 MG/DL (ref 0–200)
CO2 SERPL-SCNC: 22.9 MMOL/L (ref 22–29)
CREAT BLD-MCNC: 1.71 MG/DL (ref 0.76–1.27)
DEPRECATED RDW RBC AUTO: 48.1 FL (ref 37–54)
EOSINOPHIL # BLD AUTO: 0.48 10*3/MM3 (ref 0–0.4)
EOSINOPHIL NFR BLD AUTO: 6.5 % (ref 0.3–6.2)
ERYTHROCYTE [DISTWIDTH] IN BLOOD BY AUTOMATED COUNT: 14.5 % (ref 12.3–15.4)
GFR SERPL CREATININE-BSD FRML MDRD: 39 ML/MIN/1.73
GLOBULIN UR ELPH-MCNC: 3.3 GM/DL
GLUCOSE BLD-MCNC: 138 MG/DL (ref 65–99)
HBA1C MFR BLD: 7 % (ref 4.8–5.6)
HCT VFR BLD AUTO: 37.4 % (ref 37.5–51)
HDLC SERPL-MCNC: 47 MG/DL (ref 40–60)
HGB BLD-MCNC: 11.6 G/DL (ref 13–17.7)
IMM GRANULOCYTES # BLD AUTO: 0.03 10*3/MM3 (ref 0–0.05)
IMM GRANULOCYTES NFR BLD AUTO: 0.4 % (ref 0–0.5)
LDLC SERPL CALC-MCNC: 55 MG/DL (ref 0–100)
LDLC/HDLC SERPL: 1.17 {RATIO}
LYMPHOCYTES # BLD AUTO: 1.48 10*3/MM3 (ref 0.7–3.1)
LYMPHOCYTES NFR BLD AUTO: 20 % (ref 19.6–45.3)
MCH RBC QN AUTO: 28.2 PG (ref 26.6–33)
MCHC RBC AUTO-ENTMCNC: 31 G/DL (ref 31.5–35.7)
MCV RBC AUTO: 91 FL (ref 79–97)
MONOCYTES # BLD AUTO: 0.62 10*3/MM3 (ref 0.1–0.9)
MONOCYTES NFR BLD AUTO: 8.4 % (ref 5–12)
NEUTROPHILS # BLD AUTO: 4.74 10*3/MM3 (ref 1.4–7)
NEUTROPHILS NFR BLD AUTO: 63.9 % (ref 42.7–76)
NRBC BLD AUTO-RTO: 0 /100 WBC (ref 0–0)
PLATELET # BLD AUTO: 218 10*3/MM3 (ref 140–450)
PMV BLD AUTO: 11.1 FL (ref 6–12)
POTASSIUM BLD-SCNC: 4.6 MMOL/L (ref 3.5–5.2)
PROT SERPL-MCNC: 7 G/DL (ref 6–8.5)
RBC # BLD AUTO: 4.11 10*6/MM3 (ref 4.14–5.8)
SODIUM BLD-SCNC: 142 MMOL/L (ref 136–145)
TRIGL SERPL-MCNC: 156 MG/DL (ref 0–150)
VLDLC SERPL-MCNC: 31.2 MG/DL (ref 5–40)
WBC NRBC COR # BLD: 7.41 10*3/MM3 (ref 3.4–10.8)

## 2019-04-05 PROCEDURE — 85025 COMPLETE CBC W/AUTO DIFF WBC: CPT

## 2019-04-05 PROCEDURE — 80061 LIPID PANEL: CPT

## 2019-04-05 PROCEDURE — 83036 HEMOGLOBIN GLYCOSYLATED A1C: CPT

## 2019-04-05 PROCEDURE — 80053 COMPREHEN METABOLIC PANEL: CPT

## 2019-04-08 ENCOUNTER — OFFICE VISIT (OUTPATIENT)
Dept: FAMILY MEDICINE CLINIC | Facility: CLINIC | Age: 74
End: 2019-04-08

## 2019-04-08 VITALS
HEART RATE: 93 BPM | RESPIRATION RATE: 12 BRPM | BODY MASS INDEX: 33.92 KG/M2 | WEIGHT: 229 LBS | DIASTOLIC BLOOD PRESSURE: 70 MMHG | OXYGEN SATURATION: 97 % | HEIGHT: 69 IN | SYSTOLIC BLOOD PRESSURE: 130 MMHG | TEMPERATURE: 98.5 F

## 2019-04-08 DIAGNOSIS — I73.9 PAD (PERIPHERAL ARTERY DISEASE) (HCC): Primary | ICD-10-CM

## 2019-04-08 DIAGNOSIS — E11.8 TYPE 2 DIABETES MELLITUS WITH COMPLICATION, WITH LONG-TERM CURRENT USE OF INSULIN (HCC): ICD-10-CM

## 2019-04-08 DIAGNOSIS — Z79.4 TYPE 2 DIABETES MELLITUS WITH COMPLICATION, WITH LONG-TERM CURRENT USE OF INSULIN (HCC): ICD-10-CM

## 2019-04-08 DIAGNOSIS — Z85.038 H/O COLON CANCER, STAGE I: ICD-10-CM

## 2019-04-08 DIAGNOSIS — Z86.19 HISTORY OF HEPATITIS C: ICD-10-CM

## 2019-04-08 DIAGNOSIS — I10 ESSENTIAL HYPERTENSION: ICD-10-CM

## 2019-04-08 DIAGNOSIS — E78.5 DYSLIPIDEMIA: ICD-10-CM

## 2019-04-08 DIAGNOSIS — N18.9 CHRONIC RENAL FAILURE, UNSPECIFIED CKD STAGE: ICD-10-CM

## 2019-04-08 DIAGNOSIS — R79.89 ELEVATED TSH: ICD-10-CM

## 2019-04-08 DIAGNOSIS — Z85.47 H/O TESTICULAR CANCER: ICD-10-CM

## 2019-04-08 DIAGNOSIS — G45.3 AMAUROSIS FUGAX OF RIGHT EYE: ICD-10-CM

## 2019-04-08 DIAGNOSIS — R35.1 BENIGN PROSTATIC HYPERPLASIA WITH NOCTURIA: ICD-10-CM

## 2019-04-08 DIAGNOSIS — Z00.00 HEALTHCARE MAINTENANCE: ICD-10-CM

## 2019-04-08 DIAGNOSIS — N52.01 ERECTILE DYSFUNCTION DUE TO ARTERIAL INSUFFICIENCY: ICD-10-CM

## 2019-04-08 DIAGNOSIS — M51.36 DDD (DEGENERATIVE DISC DISEASE), LUMBAR: ICD-10-CM

## 2019-04-08 DIAGNOSIS — N40.1 BENIGN PROSTATIC HYPERPLASIA WITH NOCTURIA: ICD-10-CM

## 2019-04-08 DIAGNOSIS — E55.9 VITAMIN D DEFICIENCY: ICD-10-CM

## 2019-04-08 DIAGNOSIS — K21.9 GASTROESOPHAGEAL REFLUX DISEASE WITHOUT ESOPHAGITIS: ICD-10-CM

## 2019-04-08 PROCEDURE — 99214 OFFICE O/P EST MOD 30 MIN: CPT | Performed by: GENERAL PRACTICE

## 2019-04-08 NOTE — PROGRESS NOTES
Subjective   Nicholas Yin is a 74 y.o. male.     History of Present Illness     Transient Visual Loss  A little over a year ago he experienced a sudden progressive decrease in the vision in his right eye while driving. He stated that the vision in the eye darkened gradually over several minutes to the point where the only things he could make out were very bright lights and the sun. This lasted for about 30 minutes then resolved over several minutes.  Since last here he has had some blurring of the lower medial visual field of the same eye.  While records have yet to be received his ophthalmologist apparently felt this was vascular in origin. He continues to deny any new headaches and has had no changes in his strength, sensation, speech or ability to understand what is said to him. He denies any chest pain or palpitations and has had no lightheadedness or diaphoresis. He denies any new joint or muscle pain and has had no rash, fever or chills. He was hospitalized at [facility] over 10 years ago following a similar episodes that was ultimately felt to be vascular in origin. He has numerous cardiovascular risk factors as well as chronic renal insufficiency. MRI of the brain performed on 12/8/17 was reported as showing mild cerebral atrophy with chronic small vessel ischemic changes. MRA of the carotid arteries performed the same day was unremarkable. Echocardiogram done the same day was reported as showing mild - moderate AS, moderate mitral annular calcification and mild LAE.  He remains on dual antiplatelet therapy with clopidrogel and ASA. He complains of considerable bruising about his forearms even minor trauma but denies any other bleeding whatsoever    Diabetes  Current symptoms include none. Patient denies paresthesia of the feet, polydipsia, polyuria, hypoglycemia and foot ulcerations. Evaluation to date has been: hemoglobin A1C. Home sugars: BGs have remained at or near goal since last here. Current  treatments: metformin, DPP inhibitor - januvia (both as janumet), SGLT2 inhibitor - bydureon and basal insulin - toujeo. He has continued to follow his diet fairly well since last here and has been more active since getting a najera retriever puppy.   Lab Results   Component Value Date    HGBA1C 7.00 (H) 04/05/2019      Dyslipidemia  Compliance with treatment has been fair. The patient exercises are intermittently. He is currently being prescribed the following medication for his dyslipidemia - rosuvastatin. Patient denies side effects associated with his medications.   Lab Results   Component Value Date    CHOL 133 04/05/2019    CHLPL 125 09/28/2015    TRIG 156 (H) 04/05/2019    HDL 47 04/05/2019    LDL 55 04/05/2019     Hypertension  Home blood pressure readings: have been better since last here. Associated signs and symptoms: dyspnea. Patient denies: chest pain, palpitations, orthopnea, paroxysmal nocturnal dyspnea and peripheral edema. Current antihypertensive medications includes losartan and doxazosin. Medication compliance: taking as prescribed. The former has been held for several weeks to gauge any impact on his Cr but there was none. CT of the abdomen/pelvis performed on 1/11/16 revealed heavy atherosclerotic plaquing of the aorta and its branches.  Lab Results   Component Value Date    CREATININE 1.71 (H) 04/05/2019     BPH  He has a history of decreased stream associated with hesitancy, nocturia x 3-4, and a sense of incomplete voiding.  He has noted a significant improvement in the symptoms with doxazosin but remains on 2 mg nightly as any higher resulted in lightheadedness when getting up overnight.  He denies any dysuria or hematuria.     Chronic Hepatitis C  Completed a course of harvoni for genotype 1A hepatitis C without complication.  RNA levels were nondetectable on 2/1/18.   Lab Results   Component Value Date    ALT 14 04/05/2019     The following portions of the patient's history were reviewed  and updated as appropriate: allergies, current medications, past medical history, past social history and problem list.    Review of Systems   Constitutional: Positive for fatigue (chronic - mild - stable). Negative for appetite change, chills, fever and unexpected weight change.   HENT: Negative for congestion, ear pain, rhinorrhea, sneezing, sore throat and voice change.    Eyes: Negative for visual disturbance.   Respiratory: Positive for shortness of breath (chronic - with above average exertion - stable). Negative for cough and wheezing.    Cardiovascular: Negative for chest pain, palpitations and leg swelling.   Gastrointestinal: Negative for abdominal pain, blood in stool, constipation, diarrhea, nausea and vomiting.   Endocrine: Negative for polydipsia and polyuria.   Genitourinary: Negative for difficulty urinating, dysuria, frequency, hematuria and urgency.        Erectile dysfunction - chronic progressive. Nocturia x 1-2 with occasional sense of incomplete voiding   Musculoskeletal: Positive for back pain (chronic - stable). Negative for arthralgias, joint swelling, myalgias and neck pain.   Skin: Negative for color change.        Lesions   Neurological: Negative for tremors, weakness, numbness and headaches.   Psychiatric/Behavioral: Negative for dysphoric mood, sleep disturbance and suicidal ideas. The patient is not nervous/anxious.      Objective   Physical Exam   Constitutional: He is oriented to person, place, and time. He appears well-developed and well-nourished. He is cooperative. He does not have a sickly appearance. No distress.   Walks with a slightly exaggerated thoracic kyphosis. No apparent distress. No pallor, jaundice, diaphoresis, or cyanosis.     HENT:   Head: Atraumatic.   Right Ear: Tympanic membrane, external ear and ear canal normal.   Left Ear: Tympanic membrane, external ear and ear canal normal.   Mouth/Throat: Oropharynx is clear and moist.   Eyes: EOM are normal. Pupils are  equal, round, and reactive to light. No scleral icterus.   Neck: No JVD present. Carotid bruit is not present. No tracheal deviation present. No thyromegaly present.   Cardiovascular: Normal rate, regular rhythm, S1 normal, S2 normal, normal heart sounds and intact distal pulses. Exam reveals no gallop.   No murmur heard.  Pulmonary/Chest: Breath sounds normal. He has no wheezes. He has no rales.   Abdominal: Soft. Normal aorta and bowel sounds are normal. He exhibits no abdominal bruit and no mass. There is no hepatosplenomegaly. There is no tenderness. No hernia.   Musculoskeletal: He exhibits no deformity.   Lymphadenopathy:        Head (right side): No submandibular adenopathy present.        Head (left side): No submandibular adenopathy present.     He has no cervical adenopathy.   Neurological: He is alert and oriented to person, place, and time. He has normal strength and normal reflexes. He displays normal reflexes. No cranial nerve deficit. He exhibits normal muscle tone. Coordination normal.   Skin: Skin is warm and dry. No rash noted. He is not diaphoretic. No pallor. Nails show no clubbing.   Psychiatric: He has a normal mood and affect. His behavior is normal.     Assessment/Plan   Problems Addressed this Visit        Cardiovascular and Mediastinum    Essential hypertension   Hypertension: at goal. Evidence of target organ damage: peripheral artery disease, chronic kidney disease and transient ischemic attack.  Encouraged to continue to work on diet and exercise plan.   Continue current medication    Amaurosis fugax of right eye  Reminded regarding the importance of risk factor modification.  Continue current medication    PAD (peripheral artery disease) (CMS/HCC)  As above.        Digestive    Gastroesophageal reflux disease without esophagitis    H/O colon cancer, stage I  Reminded that he is due for an updated colonoscopy.  He will let us know when and where he wishes this arranged    Vitamin D  deficiency  Continue supplementation with monitoring.    History of hepatitis C  Achieved remission with harvoni  Will continue to monitor       Endocrine    Type 2 diabetes mellitus with complication, with long-term current use of insulin (CMS/[facility])  Diabetes mellitus Type II, under excellent control.   Encouraged to continue to pursue ADA diet  Encouraged aerobic exercise.  Continue current medication       Musculoskeletal and Integument    DDD (degenerative disc disease), lumbar       Genitourinary    H/O testicular cancer    Benign prostatic hyperplasia with nocturia    Chronic renal failure  Reminded to avoid any NSAIDs prescription or OTC  Will continue to monitor       Other    Dyslipidemia  As above.   Continue current medication.    Vasculogenic erectile dysfunction   Elevated TSH

## 2019-05-29 RX ORDER — DEXLANSOPRAZOLE 60 MG/1
60 CAPSULE, DELAYED RELEASE ORAL DAILY
Qty: 30 CAPSULE | Refills: 5 | Status: SHIPPED | OUTPATIENT
Start: 2019-05-29 | End: 2019-12-20

## 2019-07-02 ENCOUNTER — OFFICE VISIT (OUTPATIENT)
Dept: CARDIOLOGY | Facility: CLINIC | Age: 74
End: 2019-07-02

## 2019-07-02 VITALS
BODY MASS INDEX: 33.33 KG/M2 | HEART RATE: 91 BPM | OXYGEN SATURATION: 96 % | WEIGHT: 225 LBS | DIASTOLIC BLOOD PRESSURE: 71 MMHG | SYSTOLIC BLOOD PRESSURE: 151 MMHG | HEIGHT: 69 IN

## 2019-07-02 DIAGNOSIS — R07.89 CHEST TIGHTNESS: ICD-10-CM

## 2019-07-02 DIAGNOSIS — R06.02 SHORTNESS OF BREATH: Primary | ICD-10-CM

## 2019-07-02 PROCEDURE — 93005 ELECTROCARDIOGRAM TRACING: CPT | Performed by: INTERNAL MEDICINE

## 2019-07-02 PROCEDURE — 99205 OFFICE O/P NEW HI 60 MIN: CPT | Performed by: INTERNAL MEDICINE

## 2019-07-02 RX ORDER — ISOSORBIDE MONONITRATE 30 MG/1
30 TABLET, EXTENDED RELEASE ORAL DAILY
Qty: 30 TABLET | Refills: 11 | Status: SHIPPED | OUTPATIENT
Start: 2019-07-02 | End: 2019-07-22

## 2019-07-02 RX ORDER — CARVEDILOL 3.12 MG/1
3.12 TABLET ORAL 2 TIMES DAILY
Qty: 180 TABLET | Refills: 3 | Status: SHIPPED | OUTPATIENT
Start: 2019-07-02 | End: 2019-07-22

## 2019-07-02 RX ORDER — FUROSEMIDE 20 MG/1
20 TABLET ORAL DAILY
Status: DISCONTINUED | OUTPATIENT
Start: 2019-07-02 | End: 2019-07-03

## 2019-07-03 ENCOUNTER — LAB (OUTPATIENT)
Dept: LAB | Facility: HOSPITAL | Age: 74
End: 2019-07-03

## 2019-07-03 DIAGNOSIS — R07.89 CHEST TIGHTNESS: ICD-10-CM

## 2019-07-03 DIAGNOSIS — R06.02 SHORTNESS OF BREATH: ICD-10-CM

## 2019-07-03 LAB
ANION GAP SERPL CALCULATED.3IONS-SCNC: 13.1 MMOL/L (ref 5–15)
BASOPHILS # BLD AUTO: 0.05 10*3/MM3 (ref 0–0.2)
BASOPHILS NFR BLD AUTO: 0.5 % (ref 0–1.5)
BUN BLD-MCNC: 30 MG/DL (ref 8–23)
BUN/CREAT SERPL: 16.6 (ref 7–25)
CALCIUM SPEC-SCNC: 9 MG/DL (ref 8.6–10.5)
CHLORIDE SERPL-SCNC: 106 MMOL/L (ref 98–107)
CO2 SERPL-SCNC: 20.9 MMOL/L (ref 22–29)
CREAT BLD-MCNC: 1.81 MG/DL (ref 0.76–1.27)
DEPRECATED RDW RBC AUTO: 49.1 FL (ref 37–54)
EOSINOPHIL # BLD AUTO: 0.44 10*3/MM3 (ref 0–0.4)
EOSINOPHIL NFR BLD AUTO: 4.8 % (ref 0.3–6.2)
ERYTHROCYTE [DISTWIDTH] IN BLOOD BY AUTOMATED COUNT: 14.6 % (ref 12.3–15.4)
GFR SERPL CREATININE-BSD FRML MDRD: 37 ML/MIN/1.73
GLUCOSE BLD-MCNC: 254 MG/DL (ref 65–99)
HCT VFR BLD AUTO: 36.1 % (ref 37.5–51)
HGB BLD-MCNC: 11.3 G/DL (ref 13–17.7)
IMM GRANULOCYTES # BLD AUTO: 0.03 10*3/MM3 (ref 0–0.05)
IMM GRANULOCYTES NFR BLD AUTO: 0.3 % (ref 0–0.5)
LYMPHOCYTES # BLD AUTO: 1.36 10*3/MM3 (ref 0.7–3.1)
LYMPHOCYTES NFR BLD AUTO: 14.8 % (ref 19.6–45.3)
MCH RBC QN AUTO: 28.9 PG (ref 26.6–33)
MCHC RBC AUTO-ENTMCNC: 31.3 G/DL (ref 31.5–35.7)
MCV RBC AUTO: 92.3 FL (ref 79–97)
MONOCYTES # BLD AUTO: 0.65 10*3/MM3 (ref 0.1–0.9)
MONOCYTES NFR BLD AUTO: 7.1 % (ref 5–12)
NEUTROPHILS # BLD AUTO: 6.64 10*3/MM3 (ref 1.7–7)
NEUTROPHILS NFR BLD AUTO: 72.5 % (ref 42.7–76)
NRBC BLD AUTO-RTO: 0 /100 WBC (ref 0–0.2)
NT-PROBNP SERPL-MCNC: 1373 PG/ML (ref 5–900)
PLATELET # BLD AUTO: 188 10*3/MM3 (ref 140–450)
PMV BLD AUTO: 11.8 FL (ref 6–12)
POTASSIUM BLD-SCNC: 4.4 MMOL/L (ref 3.5–5.2)
RBC # BLD AUTO: 3.91 10*6/MM3 (ref 4.14–5.8)
SODIUM BLD-SCNC: 140 MMOL/L (ref 136–145)
TSH SERPL DL<=0.05 MIU/L-ACNC: 4.14 MIU/ML (ref 0.27–4.2)
WBC NRBC COR # BLD: 9.17 10*3/MM3 (ref 3.4–10.8)

## 2019-07-03 PROCEDURE — 83880 ASSAY OF NATRIURETIC PEPTIDE: CPT

## 2019-07-03 PROCEDURE — 36415 COLL VENOUS BLD VENIPUNCTURE: CPT

## 2019-07-03 PROCEDURE — 84443 ASSAY THYROID STIM HORMONE: CPT

## 2019-07-03 PROCEDURE — 85025 COMPLETE CBC W/AUTO DIFF WBC: CPT

## 2019-07-03 PROCEDURE — 80048 BASIC METABOLIC PNL TOTAL CA: CPT

## 2019-07-03 RX ORDER — FUROSEMIDE 20 MG/1
20 TABLET ORAL DAILY
COMMUNITY
End: 2019-07-03 | Stop reason: SDUPTHER

## 2019-07-03 RX ORDER — FUROSEMIDE 20 MG/1
20 TABLET ORAL DAILY
Qty: 30 TABLET | Refills: 5 | Status: ON HOLD | OUTPATIENT
Start: 2019-07-03 | End: 2019-08-16 | Stop reason: SDUPTHER

## 2019-07-05 ENCOUNTER — TELEPHONE (OUTPATIENT)
Dept: CARDIOLOGY | Facility: CLINIC | Age: 74
End: 2019-07-05

## 2019-07-05 DIAGNOSIS — N18.30 CKD (CHRONIC KIDNEY DISEASE) STAGE 3, GFR 30-59 ML/MIN (HCC): Primary | ICD-10-CM

## 2019-07-05 NOTE — TELEPHONE ENCOUNTER
Called patient on cell phone and home number listed in chart to follow up about if he had started Lasix. No answer on cell phone; left message on home phone to call me back.

## 2019-07-08 ENCOUNTER — OFFICE VISIT (OUTPATIENT)
Dept: FAMILY MEDICINE CLINIC | Facility: CLINIC | Age: 74
End: 2019-07-08

## 2019-07-08 VITALS
OXYGEN SATURATION: 99 % | DIASTOLIC BLOOD PRESSURE: 80 MMHG | HEIGHT: 69 IN | WEIGHT: 232 LBS | BODY MASS INDEX: 34.36 KG/M2 | RESPIRATION RATE: 12 BRPM | HEART RATE: 83 BPM | SYSTOLIC BLOOD PRESSURE: 160 MMHG | TEMPERATURE: 98.4 F

## 2019-07-08 DIAGNOSIS — E78.5 DYSLIPIDEMIA: ICD-10-CM

## 2019-07-08 DIAGNOSIS — N18.9 CHRONIC RENAL FAILURE, UNSPECIFIED CKD STAGE: ICD-10-CM

## 2019-07-08 DIAGNOSIS — E55.9 VITAMIN D DEFICIENCY: ICD-10-CM

## 2019-07-08 DIAGNOSIS — R35.1 BENIGN PROSTATIC HYPERPLASIA WITH NOCTURIA: ICD-10-CM

## 2019-07-08 DIAGNOSIS — M51.36 DDD (DEGENERATIVE DISC DISEASE), LUMBAR: ICD-10-CM

## 2019-07-08 DIAGNOSIS — Z00.00 HEALTHCARE MAINTENANCE: ICD-10-CM

## 2019-07-08 DIAGNOSIS — E11.8 TYPE 2 DIABETES MELLITUS WITH COMPLICATION, WITH LONG-TERM CURRENT USE OF INSULIN (HCC): ICD-10-CM

## 2019-07-08 DIAGNOSIS — I10 ESSENTIAL HYPERTENSION: ICD-10-CM

## 2019-07-08 DIAGNOSIS — R79.89 ELEVATED TSH: ICD-10-CM

## 2019-07-08 DIAGNOSIS — Z86.19 HISTORY OF HEPATITIS C: ICD-10-CM

## 2019-07-08 DIAGNOSIS — N40.1 BENIGN PROSTATIC HYPERPLASIA WITH NOCTURIA: ICD-10-CM

## 2019-07-08 DIAGNOSIS — K21.9 GASTROESOPHAGEAL REFLUX DISEASE WITHOUT ESOPHAGITIS: ICD-10-CM

## 2019-07-08 DIAGNOSIS — Z79.4 TYPE 2 DIABETES MELLITUS WITH COMPLICATION, WITH LONG-TERM CURRENT USE OF INSULIN (HCC): ICD-10-CM

## 2019-07-08 DIAGNOSIS — R07.89 CHEST TIGHTNESS: ICD-10-CM

## 2019-07-08 DIAGNOSIS — G45.3 AMAUROSIS FUGAX OF RIGHT EYE: ICD-10-CM

## 2019-07-08 DIAGNOSIS — I73.9 PAD (PERIPHERAL ARTERY DISEASE) (HCC): Primary | ICD-10-CM

## 2019-07-08 PROBLEM — R06.02 SHORTNESS OF BREATH: Status: RESOLVED | Noted: 2019-07-02 | Resolved: 2019-07-08

## 2019-07-08 PROCEDURE — 99214 OFFICE O/P EST MOD 30 MIN: CPT | Performed by: GENERAL PRACTICE

## 2019-07-08 RX ORDER — NITROGLYCERIN 0.4 MG/1
0.4 TABLET SUBLINGUAL
Qty: 30 TABLET | Refills: 5 | Status: SHIPPED | OUTPATIENT
Start: 2019-07-08

## 2019-07-08 NOTE — PROGRESS NOTES
Subjective   Nicholas Yin is a 74 y.o. male.     History of Present Illness     Chest Pain  Returns with approximate 2-week history of intermittent chest pain.  This is described as an anterior pressure intermittently radiating to the neck and left shoulder.  The pain has not radiated elsewhere but has been associated with increased shortness of breath.  There is no history of any palpitations or lightheadedness and he denies any orthopnea, PND, calf pain, or swelling of the ankles.  There is no history of any cough or hemoptysis and he denies any fever or chills.  Underwent a cardiology assessment by Dr. Villatoor on 7/2/2019 at which time his EKG was unchanged from previous.  He was started on carvedilol 3.125 twice daily, furosemide 20 every morning, and isosorbide mononitrate 30 every morning and has had little or no chest pain since.  He has been scheduled to undergo a nuclear stress test on 7/16/2019.    Transient Visual Loss  Over a year ago he experienced a sudden progressive decrease in the vision in his right eye while driving. He stated that the vision in the eye darkened gradually over several minutes to the point where the only things he could make out were very bright lights and the sun. This lasted for about 30 minutes then resolved over several minutes.  Since last here he has had some blurring of the lower medial visual field of the same eye.  While records have yet to be received his ophthalmologist apparently felt this was vascular in origin. He continues to deny any new headaches and has had no changes in his strength, sensation, speech or ability to understand what is said to him. He denies any palpitations and has had no lightheadedness or diaphoresis. He denies any new joint or muscle pain and has had no rash, fever or chills. He was hospitalized at [facility] over 10 years ago following a similar episodes that was ultimately felt to be vascular in origin. He has numerous cardiovascular risk  factors as well as chronic renal insufficiency. MRI of the brain performed on 12/8/17 was reported as showing mild cerebral atrophy with chronic small vessel ischemic changes. MRA of the carotid arteries performed the same day was unremarkable. Echocardiogram done the same day was reported as showing mild - moderate AS, moderate mitral annular calcification and mild LAE.  He remains on dual antiplatelet therapy with clopidrogel and ASA. He complains of considerable bruising about his forearms even minor trauma but denies any other bleeding whatsoever    Diabetes  Current symptoms include none. Patient denies paresthesia of the feet, polydipsia, polyuria, hypoglycemia and foot ulcerations. Evaluation to date has been: hemoglobin A1C. Home sugars: BGs have remained at or near goal since last here. Current treatments: metformin, DPP inhibitor - januvia (both as janumet), SGLT2 inhibitor - bydureon and basal insulin - toujeo.      Dyslipidemia  Compliance with treatment has been fair. The patient exercises are intermittently. He is currently being prescribed the following medication for his dyslipidemia - rosuvastatin. Patient denies side effects associated with his medications.     Hypertension  Home blood pressure readings: have generally been at or near goal. Associated signs and symptoms: exertional chest pain and dyspnea. Patient denies: palpitations, orthopnea, paroxysmal nocturnal dyspnea and peripheral edema. Current antihypertensive medications includes losartan, carvedilol, furosemide, and doxazosin. Medication compliance: taking as prescribed. The former has been held for several weeks to gauge any impact on his Cr but there was none. CT of the abdomen/pelvis performed on 1/11/16 revealed heavy atherosclerotic plaquing of the aorta and its branches.  Lab Results   Component Value Date    CREATININE 1.81 (H) 07/03/2019     BPH  He has a history of decreased stream associated with hesitancy, nocturia x 3-4, and  a sense of incomplete voiding.  He has noted a significant improvement in the symptoms with doxazosin but remains on 2 mg nightly as any higher resulted in lightheadedness when getting up overnight.  He denies any dysuria or hematuria.     Chronic Hepatitis C  Completed a course of harvoni for genotype 1A hepatitis C without complication.  RNA levels were nondetectable on 2/1/18.     Labs  Lab Results   Component Value Date    WBC 9.17 07/03/2019    HGB 11.3 (L) 07/03/2019    HCT 36.1 (L) 07/03/2019    MCV 92.3 07/03/2019     07/03/2019     The following portions of the patient's history were reviewed and updated as appropriate: allergies, current medications, past medical history, past social history and problem list.    Review of Systems   Constitutional: Positive for fatigue (chronic - mild - stable). Negative for appetite change, chills, fever and unexpected weight change.   HENT: Negative for congestion, ear pain, rhinorrhea, sneezing, sore throat and voice change.    Eyes: Negative for visual disturbance.   Respiratory: Positive for shortness of breath. Negative for cough and wheezing.    Cardiovascular: Positive for chest pain. Negative for palpitations and leg swelling.   Gastrointestinal: Negative for abdominal pain, blood in stool, constipation, diarrhea, nausea and vomiting.   Endocrine: Negative for polydipsia and polyuria.   Genitourinary: Negative for difficulty urinating, dysuria, frequency, hematuria and urgency.        Erectile dysfunction - chronic progressive. Nocturia x 1-2 with occasional sense of incomplete voiding   Musculoskeletal: Positive for back pain (chronic - stable). Negative for arthralgias, joint swelling, myalgias and neck pain.   Skin: Negative for color change.        Lesions   Neurological: Negative for tremors, weakness, numbness and headaches.   Psychiatric/Behavioral: Negative for dysphoric mood, sleep disturbance and suicidal ideas. The patient is not nervous/anxious.       Objective   Physical Exam   Constitutional: He is oriented to person, place, and time. He appears well-developed and well-nourished. He is cooperative. He does not have a sickly appearance. No distress.   Walks with a slightly exaggerated thoracic kyphosis. No apparent distress. No pallor, jaundice, diaphoresis, or cyanosis.     HENT:   Head: Atraumatic.   Right Ear: Tympanic membrane, external ear and ear canal normal.   Left Ear: Tympanic membrane, external ear and ear canal normal.   Mouth/Throat: Oropharynx is clear and moist.   Eyes: EOM are normal. Pupils are equal, round, and reactive to light. No scleral icterus.   Neck: No JVD present. Carotid bruit is not present. No tracheal deviation present. No thyromegaly present.   Cardiovascular: Normal rate, regular rhythm, S1 normal, S2 normal, normal heart sounds and intact distal pulses. Exam reveals no gallop.   No murmur heard.  Pulmonary/Chest: Breath sounds normal. He has no wheezes. He has no rales.   Abdominal: Soft. Normal aorta and bowel sounds are normal. He exhibits no abdominal bruit and no mass. There is no hepatosplenomegaly. There is no tenderness. No hernia.   Musculoskeletal: He exhibits no deformity.   Lymphadenopathy:        Head (right side): No submandibular adenopathy present.        Head (left side): No submandibular adenopathy present.     He has no cervical adenopathy.   Neurological: He is alert and oriented to person, place, and time. He has normal strength and normal reflexes. He displays normal reflexes. No cranial nerve deficit. He exhibits normal muscle tone. Coordination normal.   Skin: Skin is warm and dry. No rash noted. He is not diaphoretic. No pallor. Nails show no clubbing.   Psychiatric: He has a normal mood and affect. His behavior is normal.     Assessment/Plan   Problems Addressed this Visit        Cardiovascular and Mediastinum    Essential hypertension   Hypertension: elevated today. Evidence of target organ damage:  peripheral artery disease, chronic kidney disease and transient ischemic attack.  Encouraged to continue to work on diet and exercise plan.   Continue current medication    Amaurosis fugax of right eye  Reminded regarding the importance of risk factor modification.  Continue current medication    PAD (peripheral artery disease) (CMS/[facility])  Reviewed the potential benefits of low-dose xarelto  Patient will consider and this will looked at again after his cardiac work-up       Digestive    Gastroesophageal reflux disease without esophagitis    Vitamin D deficiency  Continue supplementation with monitoring.    History of hepatitis C       Endocrine    Type 2 diabetes mellitus with complication, with long-term current use of insulin (CMS/[facility])  Diabetes mellitus Type II, under excellent control.   Encouraged to continue to pursue ADA diet  Encouraged aerobic exercise.  Given his cardiovascular risk profile will replace bydureon with victoza. If tolerated will replace victoza and toujeo with xultophy at a later date    Relevant Medications    Liraglutide (VICTOZA) 18 MG/3ML solution pen-injector injection       Nervous and Auditory    Chest tightness  Typical of exertional angina in a high risk patient  Prescription written for sublingual nitroglycerin and advised regarding appropriate use and when to seek medical attention    Relevant Medications    nitroglycerin (NITROSTAT) 0.4 MG SL tablet       Musculoskeletal and Integument    DDD (degenerative disc disease), lumbar       Genitourinary    Benign prostatic hyperplasia with nocturia    Chronic renal failure  Reminded to avoid any NSAIDs prescription or OTC       Other    Dyslipidemia  As above.   Continue current medication.    Healthcare maintenance  Reminded to get a flu shot when available.    Elevated TSH  Will continue to monitor

## 2019-07-10 ENCOUNTER — LAB (OUTPATIENT)
Dept: LAB | Facility: HOSPITAL | Age: 74
End: 2019-07-10

## 2019-07-10 DIAGNOSIS — N18.30 CKD (CHRONIC KIDNEY DISEASE) STAGE 3, GFR 30-59 ML/MIN (HCC): ICD-10-CM

## 2019-07-10 LAB
ANION GAP SERPL CALCULATED.3IONS-SCNC: 15 MMOL/L (ref 5–15)
BASOPHILS # BLD AUTO: 0.03 10*3/MM3 (ref 0–0.2)
BASOPHILS NFR BLD AUTO: 0.4 % (ref 0–1.5)
BUN BLD-MCNC: 25 MG/DL (ref 8–23)
BUN/CREAT SERPL: 12.7 (ref 7–25)
CALCIUM SPEC-SCNC: 9.2 MG/DL (ref 8.6–10.5)
CHLORIDE SERPL-SCNC: 103 MMOL/L (ref 98–107)
CO2 SERPL-SCNC: 21 MMOL/L (ref 22–29)
CREAT BLD-MCNC: 1.97 MG/DL (ref 0.76–1.27)
DEPRECATED RDW RBC AUTO: 47.8 FL (ref 37–54)
EOSINOPHIL # BLD AUTO: 0.39 10*3/MM3 (ref 0–0.4)
EOSINOPHIL NFR BLD AUTO: 4.9 % (ref 0.3–6.2)
ERYTHROCYTE [DISTWIDTH] IN BLOOD BY AUTOMATED COUNT: 14.7 % (ref 12.3–15.4)
GFR SERPL CREATININE-BSD FRML MDRD: 33 ML/MIN/1.73
GLUCOSE BLD-MCNC: 188 MG/DL (ref 65–99)
HCT VFR BLD AUTO: 36.2 % (ref 37.5–51)
HGB BLD-MCNC: 12 G/DL (ref 13–17.7)
IMM GRANULOCYTES # BLD AUTO: 0.03 10*3/MM3 (ref 0–0.05)
IMM GRANULOCYTES NFR BLD AUTO: 0.4 % (ref 0–0.5)
LYMPHOCYTES # BLD AUTO: 1.54 10*3/MM3 (ref 0.7–3.1)
LYMPHOCYTES NFR BLD AUTO: 19.3 % (ref 19.6–45.3)
MCH RBC QN AUTO: 29.7 PG (ref 26.6–33)
MCHC RBC AUTO-ENTMCNC: 33.1 G/DL (ref 31.5–35.7)
MCV RBC AUTO: 89.6 FL (ref 79–97)
MONOCYTES # BLD AUTO: 0.64 10*3/MM3 (ref 0.1–0.9)
MONOCYTES NFR BLD AUTO: 8 % (ref 5–12)
NEUTROPHILS # BLD AUTO: 5.35 10*3/MM3 (ref 1.7–7)
NEUTROPHILS NFR BLD AUTO: 67 % (ref 42.7–76)
NT-PROBNP SERPL-MCNC: 1495 PG/ML (ref 5–900)
PLATELET # BLD AUTO: 192 10*3/MM3 (ref 140–450)
PMV BLD AUTO: 10.6 FL (ref 6–12)
POTASSIUM BLD-SCNC: 4.6 MMOL/L (ref 3.5–5.2)
RBC # BLD AUTO: 4.04 10*6/MM3 (ref 4.14–5.8)
SODIUM BLD-SCNC: 139 MMOL/L (ref 136–145)
WBC NRBC COR # BLD: 7.98 10*3/MM3 (ref 3.4–10.8)

## 2019-07-10 PROCEDURE — 85025 COMPLETE CBC W/AUTO DIFF WBC: CPT

## 2019-07-10 PROCEDURE — 83880 ASSAY OF NATRIURETIC PEPTIDE: CPT

## 2019-07-10 PROCEDURE — 80048 BASIC METABOLIC PNL TOTAL CA: CPT

## 2019-07-10 PROCEDURE — 36415 COLL VENOUS BLD VENIPUNCTURE: CPT

## 2019-07-11 ENCOUNTER — HOSPITAL ENCOUNTER (OUTPATIENT)
Dept: GENERAL RADIOLOGY | Facility: HOSPITAL | Age: 74
Discharge: HOME | End: 2019-07-11
Admitting: PHYSICIAN ASSISTANT

## 2019-07-11 DIAGNOSIS — M25.571 ACUTE RIGHT ANKLE PAIN: Primary | ICD-10-CM

## 2019-07-11 DIAGNOSIS — M25.571 ACUTE RIGHT ANKLE PAIN: ICD-10-CM

## 2019-07-11 PROCEDURE — 73610 X-RAY EXAM OF ANKLE: CPT

## 2019-07-11 PROCEDURE — 73610 X-RAY EXAM OF ANKLE: CPT | Performed by: RADIOLOGY

## 2019-07-16 ENCOUNTER — HOSPITAL ENCOUNTER (OUTPATIENT)
Dept: NUCLEAR MEDICINE | Facility: HOSPITAL | Age: 74
Discharge: HOME | End: 2019-07-16

## 2019-07-16 ENCOUNTER — HOSPITAL ENCOUNTER (OUTPATIENT)
Dept: CARDIOLOGY | Facility: HOSPITAL | Age: 74
Discharge: HOME | End: 2019-07-16

## 2019-07-16 DIAGNOSIS — R06.02 SHORTNESS OF BREATH: ICD-10-CM

## 2019-07-16 LAB
BH CV ECHO MEAS - AO MAX PG (FULL): 22.5 MMHG
BH CV ECHO MEAS - AO MAX PG: 27.5 MMHG
BH CV ECHO MEAS - AO MEAN PG (FULL): 15.4 MMHG
BH CV ECHO MEAS - AO MEAN PG: 18.6 MMHG
BH CV ECHO MEAS - AO V2 MAX: 262 CM/SEC
BH CV ECHO MEAS - AO V2 MEAN: 206.2 CM/SEC
BH CV ECHO MEAS - AO V2 VTI: 71.3 CM
BH CV ECHO MEAS - AVA(I,A): 1.2 CM^2
BH CV ECHO MEAS - AVA(I,D): 1.2 CM^2
BH CV ECHO MEAS - AVA(V,A): 1.2 CM^2
BH CV ECHO MEAS - AVA(V,D): 1.2 CM^2
BH CV ECHO MEAS - BSA(HAYCOCK): 2.3 M^2
BH CV ECHO MEAS - BSA: 2.2 M^2
BH CV ECHO MEAS - BZI_BMI: 34.3 KILOGRAMS/M^2
BH CV ECHO MEAS - BZI_METRIC_HEIGHT: 175.3 CM
BH CV ECHO MEAS - BZI_METRIC_WEIGHT: 105.2 KG
BH CV ECHO MEAS - EDV(MOD-SP4): 52 ML
BH CV ECHO MEAS - EF(MOD-SP4): 55.8 %
BH CV ECHO MEAS - ESV(MOD-SP4): 23 ML
BH CV ECHO MEAS - LA DIMENSION: 4.6 CM
BH CV ECHO MEAS - LV DIASTOLIC VOL/BSA (35-75): 23.6 ML/M^2
BH CV ECHO MEAS - LV MAX PG: 4.9 MMHG
BH CV ECHO MEAS - LV MEAN PG: 3.2 MMHG
BH CV ECHO MEAS - LV SYSTOLIC VOL/BSA (12-30): 10.5 ML/M^2
BH CV ECHO MEAS - LV V1 MAX: 109.8 CM/SEC
BH CV ECHO MEAS - LV V1 MEAN: 83.7 CM/SEC
BH CV ECHO MEAS - LV V1 VTI: 30 CM
BH CV ECHO MEAS - LVLD AP4: 7.4 CM
BH CV ECHO MEAS - LVLS AP4: 5.9 CM
BH CV ECHO MEAS - LVOT AREA (M): 2.8 CM^2
BH CV ECHO MEAS - LVOT AREA: 2.7 CM^2
BH CV ECHO MEAS - LVOT DIAM: 1.9 CM
BH CV ECHO MEAS - MV A MAX VEL: 156.7 CM/SEC
BH CV ECHO MEAS - MV E MAX VEL: 126.5 CM/SEC
BH CV ECHO MEAS - MV E/A: 0.81
BH CV ECHO MEAS - PA ACC SLOPE: 934.1 CM/SEC^2
BH CV ECHO MEAS - PA ACC TIME: 0.09 SEC
BH CV ECHO MEAS - PA PR(ACCEL): 37.8 MMHG
BH CV ECHO MEAS - RAP SYSTOLE: 10 MMHG
BH CV ECHO MEAS - RVSP: 39.8 MMHG
BH CV ECHO MEAS - SI(LVOT): 37.5 ML/M^2
BH CV ECHO MEAS - SI(MOD-SP4): 13.2 ML/M^2
BH CV ECHO MEAS - SV(LVOT): 82.4 ML
BH CV ECHO MEAS - SV(MOD-SP4): 29 ML
BH CV ECHO MEAS - TR MAX VEL: 272.8 CM/SEC
BH CV NUCLEAR PRIOR STUDY: 3
BH CV STRESS BP STAGE 1: NORMAL
BH CV STRESS BP STAGE 2: NORMAL
BH CV STRESS COMMENTS STAGE 1: NORMAL
BH CV STRESS COMMENTS STAGE 2: NORMAL
BH CV STRESS DOSE REGADENOSON STAGE 1: 0.4
BH CV STRESS DURATION MIN STAGE 1: 0
BH CV STRESS DURATION MIN STAGE 2: 4
BH CV STRESS DURATION SEC STAGE 1: 10
BH CV STRESS DURATION SEC STAGE 2: 0
BH CV STRESS HR STAGE 1: 95
BH CV STRESS HR STAGE 2: 76
BH CV STRESS PROTOCOL 1: NORMAL
BH CV STRESS RECOVERY BP: NORMAL MMHG
BH CV STRESS RECOVERY HR: 76 BPM
BH CV STRESS STAGE 1: 1
BH CV STRESS STAGE 2: 2
LV EF NUC BP: 49 %
MAXIMAL PREDICTED HEART RATE: 147 BPM
MAXIMAL PREDICTED HEART RATE: 147 BPM
PERCENT MAX PREDICTED HR: 64.63 %
STRESS BASELINE BP: NORMAL MMHG
STRESS BASELINE HR: 66 BPM
STRESS PERCENT HR: 76 %
STRESS POST PEAK BP: NORMAL MMHG
STRESS POST PEAK HR: 95 BPM
STRESS TARGET HR: 125 BPM
STRESS TARGET HR: 125 BPM

## 2019-07-16 PROCEDURE — A9500 TC99M SESTAMIBI: HCPCS | Performed by: INTERNAL MEDICINE

## 2019-07-16 PROCEDURE — 93018 CV STRESS TEST I&R ONLY: CPT | Performed by: INTERNAL MEDICINE

## 2019-07-16 PROCEDURE — 93306 TTE W/DOPPLER COMPLETE: CPT | Performed by: INTERNAL MEDICINE

## 2019-07-16 PROCEDURE — 25010000002 REGADENOSON 0.4 MG/5ML SOLUTION: Performed by: INTERNAL MEDICINE

## 2019-07-16 PROCEDURE — 0 TECHNETIUM SESTAMIBI: Performed by: INTERNAL MEDICINE

## 2019-07-16 PROCEDURE — 78452 HT MUSCLE IMAGE SPECT MULT: CPT | Performed by: INTERNAL MEDICINE

## 2019-07-16 PROCEDURE — 93017 CV STRESS TEST TRACING ONLY: CPT

## 2019-07-16 PROCEDURE — 93306 TTE W/DOPPLER COMPLETE: CPT

## 2019-07-16 PROCEDURE — 78452 HT MUSCLE IMAGE SPECT MULT: CPT

## 2019-07-16 RX ADMIN — REGADENOSON 0.4 MG: 0.08 INJECTION, SOLUTION INTRAVENOUS at 10:56

## 2019-07-16 RX ADMIN — TECHNETIUM TC 99M SESTAMIBI 1 DOSE: 1 INJECTION INTRAVENOUS at 08:40

## 2019-07-16 RX ADMIN — TECHNETIUM TC 99M SESTAMIBI 1 DOSE: 1 INJECTION INTRAVENOUS at 10:56

## 2019-07-18 ENCOUNTER — APPOINTMENT (OUTPATIENT)
Dept: NUCLEAR MEDICINE | Facility: HOSPITAL | Age: 74
End: 2019-07-18

## 2019-07-18 ENCOUNTER — APPOINTMENT (OUTPATIENT)
Dept: CARDIOLOGY | Facility: HOSPITAL | Age: 74
End: 2019-07-18

## 2019-07-21 DIAGNOSIS — N18.30 CKD (CHRONIC KIDNEY DISEASE) STAGE 3, GFR 30-59 ML/MIN (HCC): Primary | ICD-10-CM

## 2019-07-22 ENCOUNTER — APPOINTMENT (OUTPATIENT)
Dept: LAB | Facility: HOSPITAL | Age: 74
End: 2019-07-22

## 2019-07-22 ENCOUNTER — OFFICE VISIT (OUTPATIENT)
Dept: CARDIOLOGY | Facility: CLINIC | Age: 74
End: 2019-07-22

## 2019-07-22 VITALS
BODY MASS INDEX: 33.95 KG/M2 | OXYGEN SATURATION: 95 % | HEIGHT: 69 IN | HEART RATE: 79 BPM | SYSTOLIC BLOOD PRESSURE: 116 MMHG | WEIGHT: 229.2 LBS | DIASTOLIC BLOOD PRESSURE: 56 MMHG

## 2019-07-22 DIAGNOSIS — Z79.4 TYPE 2 DIABETES MELLITUS WITH COMPLICATION, WITH LONG-TERM CURRENT USE OF INSULIN (HCC): ICD-10-CM

## 2019-07-22 DIAGNOSIS — E11.8 TYPE 2 DIABETES MELLITUS WITH COMPLICATION, WITH LONG-TERM CURRENT USE OF INSULIN (HCC): ICD-10-CM

## 2019-07-22 DIAGNOSIS — I10 ESSENTIAL HYPERTENSION: ICD-10-CM

## 2019-07-22 DIAGNOSIS — I20.8 CHRONIC STABLE ANGINA (HCC): ICD-10-CM

## 2019-07-22 DIAGNOSIS — R06.09 DYSPNEA ON EXERTION: ICD-10-CM

## 2019-07-22 DIAGNOSIS — R07.89 CHEST TIGHTNESS: Primary | ICD-10-CM

## 2019-07-22 LAB
ANION GAP SERPL CALCULATED.3IONS-SCNC: 11.5 MMOL/L (ref 5–15)
BUN BLD-MCNC: 37 MG/DL (ref 8–23)
BUN/CREAT SERPL: 19.7 (ref 7–25)
CALCIUM SPEC-SCNC: 9.4 MG/DL (ref 8.6–10.5)
CHLORIDE SERPL-SCNC: 106 MMOL/L (ref 98–107)
CO2 SERPL-SCNC: 23.5 MMOL/L (ref 22–29)
CREAT BLD-MCNC: 1.88 MG/DL (ref 0.76–1.27)
GFR SERPL CREATININE-BSD FRML MDRD: 35 ML/MIN/1.73
GLUCOSE BLD-MCNC: 158 MG/DL (ref 65–99)
POTASSIUM BLD-SCNC: 4.7 MMOL/L (ref 3.5–5.2)
SODIUM BLD-SCNC: 141 MMOL/L (ref 136–145)

## 2019-07-22 PROCEDURE — 99214 OFFICE O/P EST MOD 30 MIN: CPT | Performed by: INTERNAL MEDICINE

## 2019-07-22 PROCEDURE — 36415 COLL VENOUS BLD VENIPUNCTURE: CPT | Performed by: INTERNAL MEDICINE

## 2019-07-22 PROCEDURE — 80048 BASIC METABOLIC PNL TOTAL CA: CPT | Performed by: INTERNAL MEDICINE

## 2019-07-22 RX ORDER — CARVEDILOL 6.25 MG/1
6.25 TABLET ORAL 2 TIMES DAILY WITH MEALS
COMMUNITY
End: 2019-07-22 | Stop reason: SDUPTHER

## 2019-07-22 RX ORDER — ISOSORBIDE MONONITRATE 60 MG/1
60 TABLET, EXTENDED RELEASE ORAL DAILY
COMMUNITY
End: 2019-07-22 | Stop reason: SDUPTHER

## 2019-07-22 RX ORDER — CARVEDILOL 3.12 MG/1
3.12 TABLET ORAL 2 TIMES DAILY
Qty: 180 TABLET | Refills: 3
Start: 2019-07-22 | End: 2019-08-16 | Stop reason: HOSPADM

## 2019-07-22 RX ORDER — ISOSORBIDE MONONITRATE 60 MG/1
60 TABLET, EXTENDED RELEASE ORAL DAILY
Qty: 30 TABLET | Refills: 5 | Status: SHIPPED | OUTPATIENT
Start: 2019-07-22 | End: 2020-09-10 | Stop reason: SDUPTHER

## 2019-07-22 RX ORDER — CARVEDILOL 6.25 MG/1
6.25 TABLET ORAL 2 TIMES DAILY WITH MEALS
Qty: 60 TABLET | Refills: 5 | Status: SHIPPED | OUTPATIENT
Start: 2019-07-22 | End: 2019-07-22 | Stop reason: ALTCHOICE

## 2019-07-22 NOTE — PROGRESS NOTES
[facility] CARDIOLOGY  [address]  [place] [address]  Phone: [phone]  Fax: [phone]    07/22/2019    Chief Complaint   Patient presents with   • Shortness of Breath   • Chest Pain        History:   Nicholas Yin is a 74 y.o. male seen in followup, for test results.  He is still complaining of mild chest pain. Shortness of breath has improved with Lasix.  Reviewed stress test results with the patient which showed small-size infarct located in the apex with mild nile-infarct ischemia. EF was 49%. Echo showed to be mildly abnormal no change since last echocardiogram.      He did mention that he had a episode of angina few months ago while he was climbing a hill, he said he has not had severe episodes since then.  I did explain that he might have a heart attack at that time but is just show any significant ischemia now.  Now that he is not having any anginal symptoms he probably has dyspnea from his CHF.    He stopped Lasix last week.  He states that his chest pain has has improved with starting Imdur 30 mg daily. Discussed with him that we do a LHC however it will affect his kidney function. Creatine was 1.81 on 7-3-19 and today 1.88.  Chronic kidney disease stage III.     The chart and medications were reviewed today.      Past Medical History:   Diagnosis Date   • Anemia    • Back pain    • CancerTesticular/[place]    • Diabetes mellitus (CMS/HCC)    • Erectile dysfunction    • GERD (gastroesophageal reflux disease)    • Hypertension        Past Surgical History:   Procedure Laterality Date   • COLON SURGERY     • COLONOSCOPY     • EYE SURGERY     • SHOULDER ARTHROSCOPY     • VASECTOMY          Past Social History:  Social History     Socioeconomic History   • Marital status:      Spouse name: lita   • Number of children: 3   • Years of education: 16   • Highest education level: Not on file   Occupational History   • Occupation: retired   Tobacco Use   •  Smoking status: Never Smoker   • Smokeless tobacco: Never Used   Substance and Sexual Activity   • Alcohol use: Yes     Comment: occ   • Drug use: No   • Sexual activity: Defer       Past Family History:  Family History   Problem Relation Age of Onset   • Stroke Mother    • Hypertension Mother    • Alcohol abuse Mother    • COPD Mother    • COPD Father    • Alcohol abuse Father    • Hypertension Father    • Alzheimer's disease Father    • Heart disease Brother    • Hypertension Brother    • Diabetes Brother    • Stroke Maternal Grandfather    • Cancer Paternal Grandmother        Review of Systems:   Review of Systems   Constitution: Negative for diaphoresis, fever, weakness, weight gain and weight loss.   HENT: Negative for congestion, nosebleeds and sore throat.    Eyes: Negative for blurred vision and visual disturbance.   Cardiovascular: Positive for chest pain (much less than before) and dyspnea on exertion (less than before). Negative for claudication, irregular heartbeat, leg swelling, orthopnea, palpitations, paroxysmal nocturnal dyspnea and syncope.   Respiratory: Positive for shortness of breath (has improved). Negative for cough, hemoptysis, sleep disturbances due to breathing, snoring, sputum production and wheezing.    Endocrine: Negative for cold intolerance, heat intolerance, polydipsia, polyphagia and polyuria.   Hematologic/Lymphatic: Negative for bleeding problem.   Skin: Negative for dry skin, itching and rash.   Musculoskeletal: Positive for muscle cramps (less than before). Negative for muscle weakness and myalgias.   Gastrointestinal: Negative for abdominal pain, constipation, diarrhea, heartburn, hematemesis, hematochezia, hemorrhoids, melena, nausea and vomiting.   Genitourinary: Negative for hematuria and nocturia.   Neurological: Negative for excessive daytime sleepiness, dizziness, focal weakness, headaches, light-headedness, numbness, seizures and vertigo.   Psychiatric/Behavioral:  Negative for depression, substance abuse and suicidal ideas.   Allergic/Immunologic: Negative for environmental allergies.         Current Outpatient Medications   Medication Sig Dispense Refill   • aspirin 81 MG EC tablet Take 81 mg by mouth Daily.     • carvedilol (COREG) 3.125 MG tablet Take 1 tablet by mouth 2 (Two) Times a Day. 180 tablet 3   • clopidogrel (PLAVIX) 75 MG tablet Take 1 tablet by mouth Daily. 30 tablet 5   • Coenzyme Q-10 100 MG capsule Take 100 mg by mouth Daily.     • COMFORT EZ PEN NEEDLES 32G X 4 MM misc USE TO INJECT INSULIN EVERY DAY  0   • dexlansoprazole (DEXILANT) 60 MG capsule Take 1 capsule by mouth Daily. 30 capsule 5   • doxazosin (CARDURA) 4 MG tablet Take 1 tablet by mouth Every Night. 1/2 po nightly for 1 week then 1 po nightly afterward 30 tablet 5   • furosemide (LASIX) 20 MG tablet Take 1 tablet by mouth Daily. 30 tablet 5   • glucose blood (ONETOUCH VERIO) test strip Monitor blood sugars three times daily 100 each 5   • Insulin Glargine (TOUJEO SOLOSTAR SC) Inject  under the skin.     • isosorbide mononitrate (IMDUR) 30 MG 24 hr tablet Take 1 tablet by mouth Daily. 30 tablet 11   • Liraglutide (VICTOZA) 18 MG/3ML solution pen-injector injection Inject 1.8 mg under the skin into the appropriate area as directed Daily. 3 pen 5   • losartan (COZAAR) 50 MG tablet Take 1 tablet by mouth 2 (Two) Times a Day. 180 tablet 3   • magnesium gluconate (MAGONATE) 500 MG tablet Take 500 mg by mouth 2 (Two) Times a Day.     • nitroglycerin (NITROSTAT) 0.4 MG SL tablet Place 1 tablet under the tongue Every 5 (Five) Minutes As Needed for Chest Pain. Take no more than 3 doses in 15 minutes. 30 tablet 5   • rosuvastatin (CRESTOR) 5 MG tablet Take 1 tablet by mouth Daily. 90 tablet 3   • sitaGLIPtin-metFORMIN (JANUMET)  MG per tablet Take 1 tablet by mouth 2 (Two) Times a Day With Meals. 60 tablet 5   • [facility] WORT PO Take  by mouth.     • timolol (BETIMOL) 0.5 % ophthalmic solution  "Administer 1 drop to the right eye 2 (Two) Times a Day. 15 mL 5   • vitamin D (ERGOCALCIFEROL) 34886 units capsule capsule Take 1 capsule by mouth 1 (One) Time Per Week. 4 capsule 5     No current facility-administered medications for this visit.         Allergies   Allergen Reactions   • Iodinated Diagnostic Agents        Objective     /56 (BP Location: Left arm, Patient Position: Sitting)   Pulse 79   Ht 175.3 cm (69\")   Wt 104 kg (229 lb 3.2 oz)   SpO2 95%   BMI 33.85 kg/m²     Physical Exam    DATA:      Results for orders placed in visit on 07/02/19   Adult Transthoracic Echo Complete W/ Cont if Necessary Per Protocol    Narrative · Left ventricular diastolic (grade I a) consistent with impaired   relaxation.  · Mild mitral valve regurgitation is present  · Mild to moderate aortic valve stenosis is present.  · Mild aortic valve regurgitation is present.  · Left atrial cavity size is mildly dilated.  · There is mild calcification of the aortic valve.         Results for orders placed during the hospital encounter of 07/16/19   Stress Test With Myocardial Perfusion (1 Day)    Narrative · Raw images reviewed with the following abnormalities noted: Motion   artifact.  · Myocardial perfusion imaging indicates a small-sized infarct located in   the apex with mild nile-infarct ischemia.  · Left ventricular ejection fraction is mildly reduced (Calculated EF =   49%).         Results for orders placed during the hospital encounter of 07/16/19   Stress Test With Myocardial Perfusion (1 Day)    Narrative · Raw images reviewed with the following abnormalities noted: Motion   artifact.  · Myocardial perfusion imaging indicates a small-sized infarct located in   the apex with mild nile-infarct ischemia.  · Left ventricular ejection fraction is mildly reduced (Calculated EF =   49%).          Procedures       Nicholas was seen today for shortness of breath and chest pain.    Diagnoses and all orders for this " visit:    Chest tightness  -     Ambulatory Referral to Cardiac Rehab    Type 2 diabetes mellitus with complication, with long-term current use of insulin (CMS/[facility])  -     Ambulatory Referral to Cardiac Rehab    Essential hypertension    Dyspnea on exertion  -     Ambulatory Referral to Cardiac Rehab    Other orders  -     Discontinue: carvedilol (COREG) 6.25 MG tablet; Take 1 tablet by mouth 2 (Two) Times a Day With Meals.  -     isosorbide mononitrate (IMDUR) 60 MG 24 hr tablet; Take 1 tablet by mouth Daily.  -     carvedilol (COREG) 3.125 MG tablet; Take 1 tablet by mouth 2 (Two) Times a Day.          Assessment:    1. 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 Mild ischemic cardiomyopathy with EF of 45 to 50% and inferior and apical wall hypokinesia.  3. Stable ischemic heart disease, CCS class I-II angina.  NYHA Class II: Slight limitation of physical activity. Comfortable at rest Ordinary physical activity results in fatigue, palpitation, dyspnea (shortness of breath), improved by one grades.  4. Hypertension, controlled  5. CKD, stage III, his creatinine was slightly worsened on initiation of Lasix, was stopped on its close to baseline which is around 1.7  6. Shortness of breath has improved  7. DM type 2, slightly uncontrolled          Plan:    Due to  Recommend that he weigh himself daily.  If weight increases 3-5 pounds he may take a Lasix.  I will increase Coreg to 6.25 mg BID and Imdur to 60 mg daily for chest pain control. Recommend that he keep a log of his blood pressure for the next 10 days and let the office know the status. We may to adjust medications. He would like to attend cardiac rehab.  We will schedule an appointment for that. I will see him back in 4 weeks unless otherwise needed.                Recommended increase activity to 30 minutes of walking daily, most days of the week.  Discussed diet and weight  loss with patient.        Patient's Body mass index is 33.85 kg/m². BMI is above normal parameters. Recommendations include: exercise counseling and nutrition counseling.       No Follow-up on file.    Thank you for allowing me to participate in the care of Nicholas Yin. Feel free to contact me directly with any further questions or concerns.          Paul Villatoro MD, FACC  Interventional Cardiology

## 2019-07-25 DIAGNOSIS — I10 ESSENTIAL HYPERTENSION: ICD-10-CM

## 2019-07-25 DIAGNOSIS — N40.1 BENIGN PROSTATIC HYPERPLASIA WITH NOCTURIA: ICD-10-CM

## 2019-07-25 DIAGNOSIS — R35.1 BENIGN PROSTATIC HYPERPLASIA WITH NOCTURIA: ICD-10-CM

## 2019-07-25 DIAGNOSIS — G45.3 AMAUROSIS FUGAX OF RIGHT EYE: ICD-10-CM

## 2019-07-25 RX ORDER — CLOPIDOGREL BISULFATE 75 MG/1
TABLET ORAL
Qty: 30 TABLET | Refills: 5 | Status: SHIPPED | OUTPATIENT
Start: 2019-07-25 | End: 2019-08-16 | Stop reason: HOSPADM

## 2019-07-25 RX ORDER — DOXAZOSIN MESYLATE 4 MG/1
4 TABLET ORAL NIGHTLY
Qty: 30 TABLET | Refills: 5 | Status: SHIPPED | OUTPATIENT
Start: 2019-07-25 | End: 2020-09-10

## 2019-08-06 ENCOUNTER — TREATMENT (OUTPATIENT)
Dept: CARDIAC REHAB | Facility: HOSPITAL | Age: 74
End: 2019-08-06

## 2019-08-06 VITALS
HEART RATE: 86 BPM | RESPIRATION RATE: 16 BRPM | DIASTOLIC BLOOD PRESSURE: 64 MMHG | SYSTOLIC BLOOD PRESSURE: 102 MMHG | BODY MASS INDEX: 32.44 KG/M2 | OXYGEN SATURATION: 98 % | WEIGHT: 219 LBS | HEIGHT: 69 IN

## 2019-08-06 DIAGNOSIS — I20.8 STABLE ANGINA (HCC): Primary | ICD-10-CM

## 2019-08-06 NOTE — PROGRESS NOTES
Cardiac Rehab Initial Assessment      Name: Nicholas Yin  :1945 Allergies:Iodinated diagnostic agents   MRN: [id number] 74 y.o. Physician: Waldemar Trejo MD   Primary Diagnosis:    Diagnosis Plan   1. Stable angina (CMS/HCC)      Event Date:19 Specialist: Paul Reid   Secondary Diagnosis: CAD Risk Stratification:Moderate Risk Note Author: Gina Russ, RN     Cardiovascular History: History of Heart Failure     EXERCISE AT HOME  no    N/A    EF: 49%      Source: ECHO 19          Ambulatory Status:Independent  Ambulatory Fall Risk Assessed on Initial Visit: yes 6 Minute Walk Pre- Cardiac Rehab:  Distance:560ft      RPE:12  Max. HR: 108       SPO2:97    MET: 1.8  Resting BP: 98/58  LA, 102/64 RA    Peak BP: 112/60  Recovery BP: 106/60  Comments: Patient tolerated 6 minute walk test well, no distress noted, denies chest pain pressure and SOA      NUTRITION  Lipids:yes If yes, labs as follows;  Total: No components found for: CHOLESTEROL  HDL:   HDL Cholesterol   Date Value Ref Range Status   2019 47 40 - 60 mg/dL Final    Lipids continued:  LDL:  LDL Cholesterol    Date Value Ref Range Status   2019 55 0 - 100 mg/dL Final     Triglyceride: No components found for: TRIGLYCERIDE   Weight Management:                 Weight: 219.0  Height: 69 in                                   BMI: Body mass index is 32.34 kg/m².  Waist Circumference: na  inches   Alcohol Use: none Diabetes:Yes,  Monitors BS at home- yes, Frequency: 3 times daily, Random BS: and PRN    Last HGBA1C with date if applicable:No components found for: A1C         SOCIAL HISTORY  Social History     Socioeconomic History   • Marital status:      Spouse name: lita   • Number of children: 3   • Years of education: 16   • Highest education level: Not on file   Occupational History   • Occupation: retired   Tobacco Use   • Smoking status: Never Smoker   • Smokeless tobacco: Never Used   Substance and Sexual  Activity   • Alcohol use: Yes     Comment: occ   • Drug use: No   • Sexual activity: Defer       Educational Level (choose one that applies) high school diploma/GED Learning Barriers:Ready to Learn    Family Support:yes    Living Arrangement: lives with their spouse    Risk Factors: Stress  Yes, Clinical Depression  No, Hyperlipidemia  Yes and Diabetes  Yes If Yes: Do you check blood glucose daily  Yes Today's glucose level 83     Tobacco Adjunct: N/A        Comorbidities: Diabetes Mellitus     PSYCHOSOCIAL  Clinical Depression: no    Stress: yes     Assess presence or absence of depression using a valid screening tool: yes    PHQ score 2      PHYSICAL ASSESSMENT  Influenza vaccine: yes  Pneumococcal vaccine: yes          Angina: no    Describe angina scale of 0 - 4: 0 = none  Today are you having incisional pain? N/A. If, Yes, Scale: na        Today are you having any other pain? No. If, Yes, Scale: na     Diagnosed with Hypertension:yes    Heart Sounds: S1 S2 No  Rubs or Mur murs    Lung Sounds: normal air entry, lungs clear to auscultation         Assessment: Pt tolerated 6MWT well, NAD noted, no complaints voiced,  skin warm, pink and dry, resp within normal limits and even, denies chest discomfort, chest pressure ,SOA .  Monitor shows NSR-ST with 1 AVB noted , V/S WDL ,see [facility] documentation for exercise data.  Dr. [place] physician immediately available     Pt educated on Cardiac Rehab Program,  warm up, stretching, use of RPE scale, application of heart monitor, home exercise and exercise guidelines, pre exercise meal, Diabetic guidelines for Cardiac Rehab, s/s to report ,Cleaning and use of equipment, see Epic for all other education completed with patient today.  Verbal teach back verified   Orthopedic Problems: none    Are you being hurt, hit, or frightened by anyone at home or in your life? yes    Are you being neglected by a caregiver? N/A Shoulder flexibility/Range of motion: Average     Recommended  arm activity: Any    Chair sit and reach within: 13 inches   Leg flexibility: Average    Leg Strength/Balance/Five times sit to stand: 17 seconds.     Chose one: Fall Risk    Recommended stretching: Standing    Assessment: Patient stated he had not taken any nitro in a while now but this morning when he woke up he had some chest pain and had taken one tablet and it was relieved within 5 minutes. He was educated on while in cardiac rehab if he has any pain he is to let us know so we can evaluate. He stated he understood this.    Family attends [place]: no Time of arrival: 1146  Time of departure: 1325     Patient Goals: feel better get stronger heart wise         8/6/2019  12:29 PM  Gina Russ RN

## 2019-08-06 NOTE — PATIENT INSTRUCTIONS
"Carbohydrate Counting for Diabetes Mellitus, Adult    Carbohydrate counting is a method of keeping track of how many carbohydrates you eat. Eating carbohydrates naturally increases the amount of sugar (glucose) in the blood. Counting how many carbohydrates you eat helps keep your blood glucose within normal limits, which helps you manage your diabetes (diabetes mellitus).  It is important to know how many carbohydrates you can safely have in each meal. This is different for every person. A diet and nutrition specialist (registered dietitian) can help you make a meal plan and calculate how many carbohydrates you should have at each meal and snack.  Carbohydrates are found in the following foods:  · Grains, such as breads and cereals.  · Dried beans and soy products.  · Starchy vegetables, such as potatoes, peas, and corn.  · Fruit and fruit juices.  · Milk and yogurt.  · Sweets and snack foods, such as cake, cookies, candy, chips, and soft drinks.  How do I count carbohydrates?  There are two ways to count carbohydrates in food. You can use either of the methods or a combination of both.  Reading \"Nutrition Facts\" on packaged food  The \"Nutrition Facts\" list is included on the labels of almost all packaged foods and beverages in the [place] It includes:  · The serving size.  · Information about nutrients in each serving, including the grams (g) of carbohydrate per serving.  To use the “Nutrition Facts\":  · Decide how many servings you will have.  · Multiply the number of servings by the number of carbohydrates per serving.  · The resulting number is the total amount of carbohydrates that you will be having.  Learning standard serving sizes of other foods  When you eat carbohydrate foods that are not packaged or do not include \"Nutrition Facts\" on the label, you need to measure the servings in order to count the amount of carbohydrates:  · Measure the foods that you will eat with a food scale or measuring cup, if " needed.  · Decide how many standard-size servings you will eat.  · Multiply the number of servings by 15. Most carbohydrate-rich foods have about 15 g of carbohydrates per serving.  ? For example, if you eat 8 oz (170 g) of strawberries, you will have eaten 2 servings and 30 g of carbohydrates (2 servings x 15 g = 30 g).  · For foods that have more than one food mixed, such as soups and casseroles, you must count the carbohydrates in each food that is included.  The following list contains standard serving sizes of common carbohydrate-rich foods. Each of these servings has about 15 g of carbohydrates:  · ½ hamburger bun or ½ English muffin.  · ½ oz (15 mL) syrup.  · ½ oz (14 g) jelly.  · 1 slice of bread.  · 1 six-inch tortilla.  · 3 oz (85 g) cooked rice or pasta.  · 4 oz (113 g) cooked dried beans.  · 4 oz (113 g) starchy vegetable, such as peas, corn, or potatoes.  · 4 oz (113 g) hot cereal.  · 4 oz (113 g) mashed potatoes or ¼ of a large baked potato.  · 4 oz (113 g) canned or frozen fruit.  · 4 oz (120 mL) fruit juice.  · 4-6 crackers.  · 6 chicken nuggets.  · 6 oz (170 g) unsweetened dry cereal.  · 6 oz (170 g) plain fat-free yogurt or yogurt sweetened with artificial sweeteners.  · 8 oz (240 mL) milk.  · 8 oz (170 g) fresh fruit or one small piece of fruit.  · 24 oz (680 g) popped popcorn.  Example of carbohydrate counting  Sample meal  · 3 oz (85 g) chicken breast.  · 6 oz (170 g) brown rice.  · 4 oz (113 g) corn.  · 8 oz (240 mL) milk.  · 8 oz (170 g) strawberries with sugar-free whipped topping.  Carbohydrate calculation  1. Identify the foods that contain carbohydrates:  ? Rice.  ? Corn.  ? Milk.  ? Strawberries.  2. Calculate how many servings you have of each food:  ? 2 servings rice.  ? 1 serving corn.  ? 1 serving milk.  ? 1 serving strawberries.  3. Multiply each number of servings by 15 g:  ? 2 servings rice x 15 g = 30 g.  ? 1 serving corn x 15 g = 15 g.  ? 1 serving milk x 15 g = 15 g.  ? 1  serving strawberries x 15 g = 15 g.  4. Add together all of the amounts to find the total grams of carbohydrates eaten:  ? 30 g + 15 g + 15 g + 15 g = 75 g of carbohydrates total.  Summary  · Carbohydrate counting is a method of keeping track of how many carbohydrates you eat.  · Eating carbohydrates naturally increases the amount of sugar (glucose) in the blood.  · Counting how many carbohydrates you eat helps keep your blood glucose within normal limits, which helps you manage your diabetes.  · A diet and nutrition specialist (registered dietitian) can help you make a meal plan and calculate how many carbohydrates you should have at each meal and snack.  This information is not intended to replace advice given to you by your health care provider. Make sure you discuss any questions you have with your health care provider.  Document Released: 12/18/2006 Document Revised: 06/27/2018 Document Reviewed: 05/31/2017  [employer] Interactive Patient Education © 2019 [employer]    Hyperglycemia  Hyperglycemia is when the sugar (glucose) level in your blood is too high. It may not cause symptoms. If you do have symptoms, they may include warning signs, such as:  · Feeling more thirsty than normal.  · Hunger.  · Feeling tired.  · Needing to pee (urinate) more than normal.  · Blurry eyesight (vision).  You may get other symptoms as it gets worse, such as:  · Dry mouth.  · Not being hungry (loss of appetite).  · Fruity-smelling breath.  · Weakness.  · Weight gain or loss that is not planned. Weight loss may be fast.  · A tingling or numb feeling in your hands or feet.  · Headache.  · Skin that does not bounce back quickly when it is lightly pinched and released (poor skin turgor).  · Pain in your belly (abdomen).  · Cuts or bruises that heal slowly.  High blood sugar can happen to people who do or do not have diabetes. High blood sugar can happen slowly or quickly, and it can be an emergency.  Follow these instructions at  home:  General instructions  · Take over-the-counter and prescription medicines only as told by your doctor.  · Do not use products that contain nicotine or tobacco, such as cigarettes and e-cigarettes. If you need help quitting, ask your doctor.  · Limit alcohol intake to no more than 1 drink per day for nonpregnant women and 2 drinks per day for men. One drink equals 12 oz of beer, 5 oz of wine, or 1½ oz of hard liquor.  · Manage stress. If you need help with this, ask your doctor.  · Keep all follow-up visits as told by your doctor. This is important.  Eating and drinking    · Stay at a healthy weight.  · Exercise regularly, as told by your doctor.  · Drink enough fluid, especially when you:  ? Exercise.  ? Get sick.  ? Are in hot temperatures.  · Eat healthy foods, such as:  ? Low-fat (lean) proteins.  ? Complex carbs (complex carbohydrates), such as whole wheat bread or brown rice.  ? Fresh fruits and vegetables.  ? Low-fat dairy products.  ? Healthy fats.  · Drink enough fluid to keep your pee (urine) clear or pale yellow.  If you have diabetes:    · Make sure you know the symptoms of hyperglycemia.  · Follow your diabetes management plan, as told by your doctor. Make sure you:  ? Take insulin and medicines as told.  ? Follow your exercise plan.  ? Follow your meal plan. Eat on time. Do not skip meals.  ? Check your blood sugar as often as told. Make sure to check before and after exercise. If you exercise longer or in a different way than you normally do, check your blood sugar more often.  ? Follow your sick day plan whenever you cannot eat or drink normally. Make this plan ahead of time with your doctor.  · Share your diabetes management plan with people in your workplace, school, and household.  · Check your urine for ketones when you are ill and as told by your doctor.  · Carry a card or wear jewelry that says that you have diabetes.  Contact a doctor if:  · Your blood sugar level is higher than 240 mg/dL  (13.3 mmol/L) for 2 days in a row.  · You have problems keeping your blood sugar in your target range.  · High blood sugar happens often for you.  Get help right away if:  · You have trouble breathing.  · You have a change in how you think, feel, or act (mental status).  · You feel sick to your stomach (nauseous), and that feeling does not go away.  · You cannot stop throwing up (vomiting).  These symptoms may be an emergency. Do not wait to see if the symptoms will go away. Get medical help right away. Call your local emergency services (911 in the [place] Do not drive yourself to the hospital.  Summary  · Hyperglycemia is when the sugar (glucose) level in your blood is too high.  · High blood sugar can happen to people who do or do not have diabetes.  · Make sure you drink enough fluids, eat healthy foods, and exercise regularly.  · Contact your doctor if you have problems keeping your blood sugar in your target range.  This information is not intended to replace advice given to you by your health care provider. Make sure you discuss any questions you have with your health care provider.  Document Released: 10/15/2010 Document Revised: 09/04/2017 Document Reviewed: 09/04/2017  [employer] Interactive Patient Education © 2019 [employer]    Hypoglycemia  Hypoglycemia is when the sugar (glucose) level in your blood is too low. Signs of low blood sugar may include:  · Feeling:  ? Hungry.  ? Worried or nervous (anxious).  ? Sweaty and clammy.  ? Confused.  ? Dizzy.  ? Sleepy.  ? Sick to your stomach (nauseous).  · Having:  ? A fast heartbeat.  ? A headache.  ? A change in your vision.  ? Tingling or no feeling (numbness) around your mouth, lips, or tongue.  ? Jerky movements that you cannot control (seizure).  · Having trouble with:  ? Moving (coordination).  ? Sleeping.  ? Passing out (fainting).  ? Getting upset easily (irritability).  Low blood sugar can happen to people who have diabetes and people who do not have  diabetes. Low blood sugar can happen quickly, and it can be an emergency.  Treating low blood sugar  Low blood sugar is often treated by eating or drinking something sugary right away. If you can think clearly and swallow safely, follow the 15:15 rule:  · Take 15 grams of a fast-acting carb (carbohydrate). Some fast-acting carbs are:  ? 1 tube of glucose gel.  ? 3 sugar tablets (glucose pills).  ? 6-8 pieces of hard candy.  ? 4 oz (120 mL) of fruit juice.  ? 4 oz (120 mL) of regular (not diet) soda.  · Check your blood sugar 15 minutes after you take the carb.  · If your blood sugar is still at or below 70 mg/dL (3.9 mmol/L), take 15 grams of a carb again.  · If your blood sugar does not go above 70 mg/dL (3.9 mmol/L) after 3 tries, get help right away.  · After your blood sugar goes back to normal, eat a meal or a snack within 1 hour.    Treating very low blood sugar  If your blood sugar is at or below 54 mg/dL (3 mmol/L), you have very low blood sugar (severe hypoglycemia). This may also cause:  · Passing out.  · Jerky movements you cannot control (seizure).  · Losing consciousness (coma).  This is an emergency. Do not wait to see if the symptoms will go away. Get medical help right away. Call your local emergency services (911 in the [place] Do not drive yourself to the hospital.  If you have very low blood sugar and you cannot eat or drink, you may need a glucagon shot (injection). A family member or friend should learn how to check your blood sugar and how to give you a glucagon shot. Ask your doctor if you need to have a glucagon shot kit at home.  Follow these instructions at home:  General instructions  · Take over-the-counter and prescription medicines only as told by your doctor.  · Stay aware of your blood sugar as told by your doctor.  · Limit alcohol intake to no more than 1 drink a day for nonpregnant women and 2 drinks a day for men. One drink equals 12 oz of beer, 5 oz of wine, or 1½ oz of hard  liquor.  · Keep all follow-up visits as told by your doctor. This is important.  If you have diabetes:    · Always keep a source of fast-acting carb with you, such as:  ? Glucose gel.  ? Sugar tablets.  ? Hard candy.  ? Fruit juice.  ? Regular soda (not diet soda).  · Follow your diabetes care plan as told by your doctor. Make sure you:  ? Know the signs of low blood sugar.  ? Take your medicines as told.  ? Follow your exercise and meal plan.  ? Eat on time. Do not skip meals.  ? Check your blood sugar as often as told by your doctor. Always check it before and after exercise.  ? Follow your sick day plan when you cannot eat or drink normally. Make this plan ahead of time with your doctor.  · Share your diabetes care plan with:  ? Your work or school.  ? People you live with.  · Check your pee (urine) for ketones:  ? When you are sick.  ? As told by your doctor.  · Carry a card or wear jewelry that says you have diabetes.  Contact a doctor if:  · You have trouble keeping your blood sugar in your target range.  · You have low blood sugar often.  Get help right away if:  · You still have symptoms after you eat or drink something sugary.  · Your blood sugar is at or below 54 mg/dL (3 mmol/L).  · You have jerky movements that you cannot control.  · You pass out.  These symptoms may be an emergency. Do not wait to see if the symptoms will go away. Get medical help right away. Call your local emergency services (911 in the [place] Do not drive yourself to the hospital.  Summary  · Hypoglycemia happens when the level of sugar (glucose) in your blood is too low.  · Low blood sugar can happen to people who have diabetes and people who do not have diabetes. Low blood sugar can happen quickly, and it can be an emergency.  · Make sure you know the signs of low blood sugar and know how to treat it.  · Always keep a source of sugar (fast-acting carb) with you to treat low blood sugar.  This information is not intended to replace  advice given to you by your health care provider. Make sure you discuss any questions you have with your health care provider.  Document Released: 03/14/2011 Document Revised: 07/18/2018 Document Reviewed: 01/20/2017  [employer] Interactive Patient Education © 2019 [employer]    Preventing Diabetes Mellitus Complications  You can take action to prevent or slow down problems that are caused by diabetes (diabetes mellitus). Following your diabetes plan and taking care of yourself can reduce your risk of serious or life-threatening complications.  What actions can I take to prevent diabetes complications?  Manage your diabetes    · Follow instructions from your health care providers about managing your diabetes. Your diabetes may be managed by a team of health care providers who can teach you how to care for yourself and can answer questions that you have.  · Educate yourself about your condition so you can make healthy choices about eating and physical activity.  · Check your blood sugar (glucose) levels as often as directed. Your health care provider will help you decide how often to check your blood glucose level depending on your treatment goals and how well you are meeting them.  · Ask your health care provider if you should take low-dose aspirin daily and what dose is recommended for you. Taking low-dose aspirin daily is recommended to help prevent cardiovascular disease.  Do not use nicotine or tobacco  Do not use any products that contain nicotine or tobacco, such as cigarettes and e-cigarettes. If you need help quitting, ask your health care provider. Nicotine raises your risk for diabetes problems. If you quit using nicotine:  · You will lower your risk for heart attack, stroke, nerve disease, and kidney disease.  · Your cholesterol and blood pressure may improve.  · Your blood circulation will improve.  Keep your blood pressure under control  Your personal target blood pressure is determined based  on:  · Your age.  · Your medicines.  · How long you have had diabetes.  · Any other medical conditions you have.  To control your blood pressure:  · Follow instructions from your health care provider about meal planning, exercise, and medicines.  · Make sure your health care provider checks your blood pressure at every medical visit.  · Monitor your blood pressure at home as told by your health care provider.    Keep your cholesterol under control  To control your cholesterol:  · Follow instructions from your health care provider about meal planning, exercise, and medicines.  · Have your cholesterol checked at least once a year.  · You may be prescribed medicine to lower cholesterol (statin). If you are not taking a statin, ask your health care provider if you should be.  Controlling your cholesterol may:  · Help prevent heart disease and stroke. These are the most common health problems for people with diabetes.  · Improve your blood flow.  Schedule and keep yearly physical exams and eye exams  Your health care provider will tell you how often you need medical visits depending on your diabetes management plan. Keep all follow-up visits as directed. This is important so possible problems can be identified early and complications can be avoided or treated.  · Every visit with your health care provider should include measuring your:  ? Weight.  ? Blood pressure.  ? Blood glucose control.  · Your A1c (hemoglobin A1c) level should be checked:  ? At least 2 times a year, if you are meeting your treatment goals.  ? 4 times a year, if you are not meeting treatment goals or if your treatment goals have changed.  · Your blood lipids (lipid profile) should be checked yearly. You should also be checked yearly for protein in your urine (urine microalbumin).  · If you have type 1 diabetes, get an eye exam 3-5 years after you are diagnosed, and then once a year after your first exam.  · If you have type 2 diabetes, get an eye  exam as soon as you are diagnosed, and then once a year after your first exam.  Keep your vaccines current  It is recommended that you receive:  · A flu (influenza) vaccine every year.  · A pneumonia (pneumococcal) vaccine and a hepatitis B vaccine. If you are age 65 or older, you may get the pneumonia vaccine as a series of two separate shots.  Ask your health care provider which other vaccines may be recommended.  Take care of your feet  Diabetes may cause you to have poor blood circulation to your legs and feet. Because of this, taking care of your feet is very important. Diabetes can cause:  · The skin on the feet to get thinner, break more easily, and heal more slowly.  · Nerve damage in your legs and feet, which results in decreased feeling. You may not notice minor injuries that could lead to serious problems.  To avoid foot problems:  · Check your skin and feet every day for cuts, bruises, redness, blisters, or sores.  · Schedule a foot exam with your health care provider once every year. This exam includes:  ? Inspecting of the structure and skin of your feet.  ? Checking the pulses and sensation in your feet.  · Make sure that your health care provider performs a visual foot exam at every medical visit.    Take care of your teeth  People with poorly controlled diabetes are more likely to have gum (periodontal) disease. Diabetes can make periodontal diseases harder to control. If not treated, periodontal diseases can lead to tooth loss. To prevent this:  · Brush your teeth twice a day.  · Floss at least once a day.  · Visit your dentist 2 times a year.  Drink responsibly  Limit alcohol intake to no more than 1 drink a day for nonpregnant women and 2 drinks a day for men. One drink equals 12 oz of beer, 5 oz of wine, or 1½ oz of hard liquor.   It is important to eat food when you drink alcohol to avoid low blood glucose (hypoglycemia). Avoid alcohol if you:  · Have a history of alcohol abuse or  dependence.  · Are pregnant.  · Have liver disease, pancreatitis, advanced neuropathy, or severe hypertriglyceridemia.  Lessen stress  Living with diabetes can be stressful. When you are experiencing stress, your blood glucose may be affected in two ways:  · Stress hormones may cause your blood glucose to rise.  · You may be distracted from taking good care of yourself.  Be aware of your stress level and make changes to help you manage challenging situations. To lower your stress levels:  · Consider joining a support group.  · Do planned relaxation or meditation.  · Do a hobby that you enjoy.  · Maintain healthy relationships.  · Exercise regularly.  · Work with your health care provider or a mental health professional.  Summary  · You can take action to prevent or slow down problems that are caused by diabetes (diabetes mellitus). Following your diabetes plan and taking care of yourself can reduce your risk of serious or life-threatening complications.  · Follow instructions from your health care providers about managing your diabetes. Your diabetes may be managed by a team of health care providers who can teach you how to care for yourself and can answer questions that you have.  · Your health care provider will tell you how often you need medical visits depending on your diabetes management plan. Keep all follow-up visits as directed. This is important so possible problems can be identified early and complications can be avoided or treated.  This information is not intended to replace advice given to you by your health care provider. Make sure you discuss any questions you have with your health care provider.  Document Released: 09/04/2012 Document Revised: 08/07/2018 Document Reviewed: 09/16/2017  [email or web address] Interactive Patient Education © 2019 [email or web address]    Diabetes Mellitus and Exercise  Exercising regularly is important for your overall health, especially when you have diabetes (diabetes mellitus).  Exercising is not only about losing weight. It has many other health benefits, such as increasing muscle strength and bone density and reducing body fat and stress. This leads to improved fitness, flexibility, and endurance, all of which result in better overall health.  Exercise has additional benefits for people with diabetes, including:  · Reducing appetite.  · Helping to lower and control blood glucose.  · Lowering blood pressure.  · Helping to control amounts of fatty substances (lipids) in the blood, such as cholesterol and triglycerides.  · Helping the body to respond better to insulin (improving insulin sensitivity).  · Reducing how much insulin the body needs.  · Decreasing the risk for heart disease by:  ? Lowering cholesterol and triglyceride levels.  ? Increasing the levels of good cholesterol.  ? Lowering blood glucose levels.  What is my activity plan?  Your health care provider or certified diabetes educator can help you make a plan for the type and frequency of exercise (activity plan) that works for you. Make sure that you:  · Do at least 150 minutes of moderate-intensity or vigorous-intensity exercise each week. This could be brisk walking, biking, or water aerobics.  ? Do stretching and strength exercises, such as yoga or weightlifting, at least 2 times a week.  ? Spread out your activity over at least 3 days of the week.  · Get some form of physical activity every day.  ? Do not go more than 2 days in a row without some kind of physical activity.  ? Avoid being inactive for more than 30 minutes at a time. Take frequent breaks to walk or stretch.  · Choose a type of exercise or activity that you enjoy, and set realistic goals.  · Start slowly, and gradually increase the intensity of your exercise over time.  What do I need to know about managing my diabetes?    · Check your blood glucose before and after exercising.  ? If your blood glucose is 240 mg/dL (13.3 mmol/L) or higher before you exercise,  check your urine for ketones. If you have ketones in your urine, do not exercise until your blood glucose returns to normal.  ? If your blood glucose is 100 mg/dL (5.6 mmol/L) or lower, eat a snack containing 15-20 grams of carbohydrate. Check your blood glucose 15 minutes after the snack to make sure that your level is above 100 mg/dL (5.6 mmol/L) before you start your exercise.  · Know the symptoms of low blood glucose (hypoglycemia) and how to treat it. Your risk for hypoglycemia increases during and after exercise. Common symptoms of hypoglycemia can include:  ? Hunger.  ? Anxiety.  ? Sweating and feeling clammy.  ? Confusion.  ? Dizziness or feeling light-headed.  ? Increased heart rate or palpitations.  ? Blurry vision.  ? Tingling or numbness around the mouth, lips, or tongue.  ? Tremors or shakes.  ? Irritability.  · Keep a rapid-acting carbohydrate snack available before, during, and after exercise to help prevent or treat hypoglycemia.  · Avoid injecting insulin into areas of the body that are going to be exercised. For example, avoid injecting insulin into:  ? The arms, when playing tennis.  ? The legs, when jogging.  · Keep records of your exercise habits. Doing this can help you and your health care provider adjust your diabetes management plan as needed. Write down:  ? Food that you eat before and after you exercise.  ? Blood glucose levels before and after you exercise.  ? The type and amount of exercise you have done.  ? When your insulin is expected to peak, if you use insulin. Avoid exercising at times when your insulin is peaking.  · When you start a new exercise or activity, work with your health care provider to make sure the activity is safe for you, and to adjust your insulin, medicines, or food intake as needed.  · Drink plenty of water while you exercise to prevent dehydration or heat stroke. Drink enough fluid to keep your urine clear or pale yellow.  Summary  · Exercising regularly is  important for your overall health, especially when you have diabetes (diabetes mellitus).  · Exercising has many health benefits, such as increasing muscle strength and bone density and reducing body fat and stress.  · Your health care provider or certified diabetes educator can help you make a plan for the type and frequency of exercise (activity plan) that works for you.  · When you start a new exercise or activity, work with your health care provider to make sure the activity is safe for you, and to adjust your insulin, medicines, or food intake as needed.  This information is not intended to replace advice given to you by your health care provider. Make sure you discuss any questions you have with your health care provider.  Document Released: 03/09/2005 Document Revised: 06/28/2018 Document Reviewed: 05/29/2017  [employer] Interactive Patient Education © 2019 [employer]    Angina  Angina is a very bad discomfort or pain in the chest, neck, or arm. Angina may cause the following symptoms in your chest:  · Crushing or squeezing pain. Pain might last for more than a few minutes at a time. Or, it may stop and come back (recur) over a few minutes.  · Tightness.  · Fullness.  · Pressure.  · Heaviness.  Some people also have:  · Pain in the arms, neck, jaw, or back.  · Heartburn or indigestion for no reason.  · Shortness of breath.  · An upset stomach (nausea).  · Sudden cold sweats.  Women and people with diabetes may have other less common symptoms such as:  · Feeling tired (fatigue).  · Feeling nervous or worried for no reason.  · Feeling weak for no reason.  · Dizziness or fainting.  Follow these instructions at home:  Medicines  · Take over-the-counter and prescription medicines only as told by your doctor.  · Do not take these medicines unless your doctor says that you can:  ? NSAIDs. These include:  § Ibuprofen.  § Naproxen.  § Celecoxib.  ? Vitamin supplements that have vitamin A, vitamin E, or  both.  ? Hormone therapy that contains estrogen with or without progestin.  Eating and drinking    · Eat a heart-healthy diet that includes:  ? Plenty of fresh fruits and vegetables.  ? Whole grains.  ? Lowfat (lean) protein.  ? Lowfat dairy products.  · Work with a diet and nutrition specialist (dietitian) as told by your doctor. This person can help you make healthy food choices.  · Follow other instructions from your doctor about eating or drinking restrictions.  Activity  · Follow an exercise program that your doctor tells you.  · Return to your normal activities as told by your doctor. Ask your doctor what activities are safe for you.  · When you feel tired, take a break. Plan breaks if you know you are going to feel tired.  Lifestyle    · Do not use any products that contain nicotine or tobacco. This includes cigarettes and e-cigarettes. If you need help quitting, ask your doctor.  · If your doctor says you can drink alcohol, limit your drinking to:  ? 0-1 drink a day for women.  ? 0-2 drinks a day for men.  § Be aware of how much alcohol is in your drink. In the [place] 1 drink equals to:  § 12 oz of beer.  § 5 oz of wine.  § 1½ oz of hard liquor.  General instructions  · Stay at a healthy weight. If your doctor tells you to do so, work with him or her to lose weight.  · Learn to deal with stress. If you need help, ask your doctor.  · Take a depression screening test to see if you are at risk for depression. If you feel depressed, talk with your doctor.  · Work with your doctor to manage any other health conditions that you have. These may include diabetes or high blood pressure.  · Keep your vaccines up to date. Get a flu shot every year.  · Keep all follow-up visits as told by your doctor. This is important.  Get help right away if:  · You have pain in your chest, neck, arm, jaw, stomach, or back, and the pain:  ? Lasts more than a few minutes.  ? Comes back.  ? Is very painful.  ? Comes more often.  ? Does  not get better after you take medicine under your tongue (sublingual nitroglycerin).  · You have any of these problems for no reason:  ? Heartburn, or indigestion.  ? Sweating a lot.  ? Shortness of breath.  ? Trouble breathing.  ? Feeling sick to your stomach.  ? Throwing up.  ? Feeling more tired than usual.  ? Feeling nervous or worrying more than usual.  ? Weakness.  ? Watery poop (diarrhea).  · You are suddenly dizzy or light-headed.  · You pass out (faint).  These symptoms may be an emergency. Do not wait to see if the symptoms will go away. Get medical help right away. Call your local emergency services (911 in the [place] Do not drive yourself to the hospital.  Summary  · Angina is very bad discomfort or pain in the chest, neck, or arm.  · Angina may feel like a crushing or squeezing pain in the chest. It may feel like tightness, pressure, fullness, or heaviness in the chest.  · Women or people with diabetes may have different symptoms from men, such as feeling nervous, being worried, being weak for no reason, or feeling tired.  · Take medicines only as told by your doctor.  · You should eat a heart-healthy diet and follow an exercise program.  This information is not intended to replace advice given to you by your health care provider. Make sure you discuss any questions you have with your health care provider.  Document Released: 06/05/2009 Document Revised: 02/01/2019 Document Reviewed: 02/01/2019  [employer] Interactive Patient Education © 2019 [employer]    Exercise Guidelines During Cardiac Rehabilitation  When you are recovering from a heart condition, such as from heart surgery, heart attack, or heart failure, it is important to have heart-healthy habits, including exercise routines. Discuss an appropriate exercise program with your heart specialist (cardiologist) and rehabilitation therapist.  It is important to design a program that is safe and effective for you. The program should meet your  specific abilities and needs. Walking, biking, jogging, and swimming are all good aerobic activities. These take light to moderate effort. Adding some light resistance training is also important. Even simple lifestyle changes can help. These lifestyle changes may include parking farther from the store or taking the stairs instead of the elevator.  At first, you may begin exercising under supervision, such as at a hospital or clinic. Over time, you may begin exercising at home, with your health care provider's approval.  Types of exercise  Aerobic exercise  During cardiac rehabilitation, it is important to do aerobic activities. Aerobic exercise keeps joints and muscles moving. It involves large muscle groups. It is also rhythmic and must be done for a longer period of time. Doing these exercises improves circulation and endurance. Examples of aerobic exercise include:  · Swimming.  · Walking.  · Hiking.  · Jogging.  · Cross-country skiing.  · Biking.  · Dancing.    Static exercise  Static exercise (isometric exercise) uses muscles at high intensities without moving the joints. Some examples of static exercise include pushing against a heavy couch that does not move, doing a wall sit, or holding a plank position. Static exercise improves strength but also quickly increases blood pressure. Follow these guidelines:  · If you have circulation problems or high blood pressure, talk with your health care provider before starting any static exercise routines. Do not do static exercises if your health care provider tells you not to.  · Do not hold your breath while doing static exercises. Holding your breath during static exercises can raise your blood pressure to a dangerously high level.    Weight-resistance exercise  Weight-resistance exercises are another important part of rehabilitation. These exercises strengthen your muscles by making them work against resistance. Resistance exercises may help you return to activities  "of daily living sooner and improve your quality of life. They also help reduce cardiac risk factors. Examples of weight-resistance exercise include using:  · Free weights.  · Weight-lifting machines.  · Large, specially designed rubber bands.  You will usually do weight-resistance exercises 2 times a week with a 2-day rest period between workouts.  Stretching  Stretching before you exercise warms up your muscles and prevents injury. Stretching also improves your flexibility, balance, coordination, and range of motion. Follow these guidelines:  · Stretch both before and after exercising.  · Do not force a muscle or joint into a painful angle. Stretching should be a relaxing part of your exercise routine.  · Once you feel resistance in your muscle, hold the stretch for a few seconds. Make sure you keep breathing while you hold the stretch.  · Go slowly when doing all stretches.  Setting a pace  · Choose a pace that is comfortable for you.  ? You should be able to talk while exercising. If you are short of breath or unable to speak while you exercise, slow down.  ? If you are able to sing while exercising, you are not exercising hard enough.  · Keep track of how hard you are working as you exercise (exertion level). Your rehabilitation therapist can teach you to use a mental scale to measure your level of exertion (perceived exertion). Using a mental scale, you will think about your exertion level and rate it in a range from 6 to 20.  ? A rating of 6 to 9. This means that you are doing \"very light\" exercise and are not exerting yourself enough. For a healthy person, this may be walking at a slow pace.  ? A rating of 11 to 15. This is exercise that is \"somewhat hard.\" For a healthy exercise session, you should aim for an exertion rate that is within this range.  ? A rating of 16 to 17. This is considered \"very hard\" or strenuous. For a healthy person, exercise at this rating may start to feel heavy and difficult.  ? A " "rating of 19 to 20. This means that you are working \"extremely hard.\" For most people, these numbers represent the hardest you've ever worked to exercise.  · Your health care provider or cardiac rehabilitation specialist may also recommend that you wear a heart rate monitor while you exercise. This will help you keep track of your heart rate zones and how hard your heart is working.  Frequency  As you are recovering, it is important to start exercising slowly and to gradually work up to your goal. Work with your health care provider to set up an exercise routine that works for you. Generally, cardiac rehabilitation exercise should include:  · 40 minutes of aerobic activity 3 - 4 days a week.  · Stretching and strength exercises 2 - 3 days a week.  Contact a doctor if:  · You have any of the following symptoms while exercising:  ? Pain, pressure, or burning in your chest, jaw, shoulder, or back (angina).  ? Lightheadedness.  ? Dizziness.  ? Irregular or fast heartbeat.  ? Shortness of breath.  · You are extremely tired after exercising.  Get help right away if:  · You have angina that does not get better with medicine and lasts for more than 5 minutes.  · You have nausea or you vomit.  · You have excessive sweating that is not caused by exercise.  Summary  · When you are recovering from a heart condition, it is important to have heart-healthy habits, including exercise routines.  · At first, you may begin exercising under supervision, such as at a hospital or clinic. Over time, you may begin exercising at home, with your health care provider's approval.  · Aim for 40 minutes of aerobic exercises 3 - 4 days a week.  · Aim to do stretching and strength exercises 2 - 3 days a week.  · Choose a pace that is comfortable for you. You should be able to talk while exercising.  This information is not intended to replace advice given to you by your health care provider. Make sure you discuss any questions you have with your " "health care provider.  Document Released: 12/23/2014 Document Revised: 11/17/2017 Document Reviewed: 11/17/2017  [employer] Interactive Patient Education © 2019 [employer]    Managing Your Hypertension  Hypertension is commonly called high blood pressure. This is when the force of your blood pressing against the walls of your arteries is too strong. Arteries are blood vessels that carry blood from your heart throughout your body. Hypertension forces the heart to work harder to pump blood, and may cause the arteries to become narrow or stiff. Having untreated or uncontrolled hypertension can cause heart attack, stroke, kidney disease, and other problems.  What are blood pressure readings?  A blood pressure reading consists of a higher number over a lower number. Ideally, your blood pressure should be below 120/80. The first (\"top\") number is called the systolic pressure. It is a measure of the pressure in your arteries as your heart beats. The second (\"bottom\") number is called the diastolic pressure. It is a measure of the pressure in your arteries as the heart relaxes.  What does my blood pressure reading mean?  Blood pressure is classified into four stages. Based on your blood pressure reading, your health care provider may use the following stages to determine what type of treatment you need, if any. Systolic pressure and diastolic pressure are measured in a unit called mm Hg.  Normal  · Systolic pressure: below 120.  · Diastolic pressure: below 80.  Elevated  · Systolic pressure: 120-129.  · Diastolic pressure: below 80.  Hypertension stage 1  · Systolic pressure: 130-139.  · Diastolic pressure: 80-89.  Hypertension stage 2  · Systolic pressure: 140 or above.  · Diastolic pressure: 90 or above.  What health risks are associated with hypertension?  Managing your hypertension is an important responsibility. Uncontrolled hypertension can lead to:  · A heart attack.  · A stroke.  · A weakened blood vessel " (aneurysm).  · Heart failure.  · Kidney damage.  · Eye damage.  · Metabolic syndrome.  · Memory and concentration problems.  What changes can I make to manage my hypertension?  Hypertension can be managed by making lifestyle changes and possibly by taking medicines. Your health care provider will help you make a plan to bring your blood pressure within a normal range.  Eating and drinking    · Eat a diet that is high in fiber and potassium, and low in salt (sodium), added sugar, and fat. An example eating plan is called the DASH (Dietary Approaches to Stop Hypertension) diet. To eat this way:  ? Eat plenty of fresh fruits and vegetables. Try to fill half of your plate at each meal with fruits and vegetables.  ? Eat whole grains, such as whole wheat pasta, brown rice, or whole grain bread. Fill about one quarter of your plate with whole grains.  ? Eat low-fat diary products.  ? Avoid fatty cuts of meat, processed or cured meats, and poultry with skin. Fill about one quarter of your plate with lean proteins such as fish, chicken without skin, beans, eggs, and tofu.  ? Avoid premade and processed foods. These tend to be higher in sodium, added sugar, and fat.  · Reduce your daily sodium intake. Most people with hypertension should eat less than 1,500 mg of sodium a day.  · Limit alcohol intake to no more than 1 drink a day for nonpregnant women and 2 drinks a day for men. One drink equals 12 oz of beer, 5 oz of wine, or 1½ oz of hard liquor.  Lifestyle  · Work with your health care provider to maintain a healthy body weight, or to lose weight. Ask what an ideal weight is for you.  · Get at least 30 minutes of exercise that causes your heart to beat faster (aerobic exercise) most days of the week. Activities may include walking, swimming, or biking.  · Include exercise to strengthen your muscles (resistance exercise), such as weight lifting, as part of your weekly exercise routine. Try to do these types of exercises for  30 minutes at least 3 days a week.  · Do not use any products that contain nicotine or tobacco, such as cigarettes and e-cigarettes. If you need help quitting, ask your health care provider.  · Control any long-term (chronic) conditions you have, such as high cholesterol or diabetes.  Monitoring  · Monitor your blood pressure at home as told by your health care provider. Your personal target blood pressure may vary depending on your medical conditions, your age, and other factors.  · Have your blood pressure checked regularly, as often as told by your health care provider.  Working with your health care provider  · Review all the medicines you take with your health care provider because there may be side effects or interactions.  · Talk with your health care provider about your diet, exercise habits, and other lifestyle factors that may be contributing to hypertension.  · Visit your health care provider regularly. Your health care provider can help you create and adjust your plan for managing hypertension.  Will I need medicine to control my blood pressure?  Your health care provider may prescribe medicine if lifestyle changes are not enough to get your blood pressure under control, and if:  · Your systolic blood pressure is 130 or higher.  · Your diastolic blood pressure is 80 or higher.  Take medicines only as told by your health care provider. Follow the directions carefully. Blood pressure medicines must be taken as prescribed. The medicine does not work as well when you skip doses. Skipping doses also puts you at risk for problems.  Contact a health care provider if:  · You think you are having a reaction to medicines you have taken.  · You have repeated (recurrent) headaches.  · You feel dizzy.  · You have swelling in your ankles.  · You have trouble with your vision.  Get help right away if:  · You develop a severe headache or confusion.  · You have unusual weakness or numbness, or you feel faint.  · You have  severe pain in your chest or abdomen.  · You vomit repeatedly.  · You have trouble breathing.  Summary  · Hypertension is when the force of blood pumping through your arteries is too strong. If this condition is not controlled, it may put you at risk for serious complications.  · Your personal target blood pressure may vary depending on your medical conditions, your age, and other factors. For most people, a normal blood pressure is less than 120/80.  · Hypertension is managed by lifestyle changes, medicines, or both. Lifestyle changes include weight loss, eating a healthy, low-sodium diet, exercising more, and limiting alcohol.  This information is not intended to replace advice given to you by your health care provider. Make sure you discuss any questions you have with your health care provider.  Document Released: 09/11/2013 Document Revised: 11/15/2017 Document Reviewed: 11/15/2017  [employer] Interactive Patient Education © 2019 [employer]    Cholesterol    Cholesterol is a fat. Your body needs a small amount of cholesterol. Cholesterol (plaque) may build up in your blood vessels (arteries). That makes you more likely to have a heart attack or stroke.  You cannot feel your cholesterol level. Having a blood test is the only way to find out if your level is high. Keep your test results. Work with your doctor to keep your cholesterol at a good level.  What do the results mean?  · Total cholesterol is how much cholesterol is in your blood.  · LDL is bad cholesterol. This is the type that can build up. Try to have low LDL.  · HDL is good cholesterol. It cleans your blood vessels and carries LDL away. Try to have high HDL.  · Triglycerides are fat that the body can store or burn for energy.  What are good levels of cholesterol?  · Total cholesterol below 200.  · LDL below 100 is good for people who have health risks. LDL below 70 is good for people who have very high risks.  · HDL above 40 is good. It is best to  have HDL of 60 or higher.  · Triglycerides below 150.  How can I lower my cholesterol?  Diet  Follow your diet program as told by your doctor.  · Choose fish, white meat chicken, or turkey that is roasted or baked. Try not to eat red meat, fried foods, sausage, or lunch meats.  · Eat lots of fresh fruits and vegetables.  · Choose whole grains, beans, pasta, potatoes, and cereals.  · Choose olive oil, corn oil, or canola oil. Only use small amounts.  · Try not to eat butter, mayonnaise, shortening, or palm kernel oils.  · Try not to eat foods with trans fats.  · Choose low-fat or nonfat dairy foods.  ? Drink skim or nonfat milk.  ? Eat low-fat or nonfat yogurt and cheeses.  ? Try not to drink whole milk or cream.  ? Try not to eat ice cream, egg yolks, or full-fat cheeses.  · Healthy desserts include vijay food cake, nia snaps, animal crackers, hard candy, popsicles, and low-fat or nonfat frozen yogurt. Try not to eat pastries, cakes, pies, and cookies.    Exercise  Follow your exercise program as told by your doctor.  · Be more active. Try gardening, walking, and taking the stairs.  · Ask your doctor about ways that you can be more active.  Medicine  · Take over-the-counter and prescription medicines only as told by your doctor.  This information is not intended to replace advice given to you by your health care provider. Make sure you discuss any questions you have with your health care provider.  Document Released: 03/16/2010 Document Revised: 07/19/2017 Document Reviewed: 06/29/2017  [employer] Interactive Patient Education © 2019 [employer]    Heart-Healthy Eating Plan  Heart-healthy meal planning includes:  · Eating less unhealthy fats.  · Eating more healthy fats.  · Making other changes in your diet.  Talk with your doctor or a diet specialist (dietitian) to create an eating plan that is right for you.  What is my plan?  Your doctor may recommend an eating plan that includes:  · Total fat: ______% or less  of total calories a day.  · Saturated fat: ______% or less of total calories a day.  · Cholesterol: less than _________mg a day.  What are tips for following this plan?  Cooking  Avoid frying your food. Try to bake, boil, grill, or broil it instead. You can also reduce fat by:  · Removing the skin from poultry.  · Removing all visible fats from meats.  · Steaming vegetables in water or broth.  Meal planning    · At meals, divide your plate into four equal parts:  ? Fill one-half of your plate with vegetables and green salads.  ? Fill one-fourth of your plate with whole grains.  ? Fill one-fourth of your plate with lean protein foods.  · Eat 4-5 servings of vegetables per day. A serving of vegetables is:  ? 1 cup of raw or cooked vegetables.  ? 2 cups of raw leafy greens.  · Eat 4-5 servings of fruit per day. A serving of fruit is:  ? 1 medium whole fruit.  ? ¼ cup of dried fruit.  ? ½ cup of fresh, frozen, or canned fruit.  ? ½ cup of 100% fruit juice.  · Eat more foods that have soluble fiber. These are apples, broccoli, carrots, beans, peas, and barley. Try to get 20-30 g of fiber per day.  · Eat 4-5 servings of nuts, legumes, and seeds per week:  ? 1 serving of dried beans or legumes equals ½ cup after being cooked.  ? 1 serving of nuts is ¼ cup.  ? 1 serving of seeds equals 1 tablespoon.  General information  · Eat more home-cooked food. Eat less restaurant, buffet, and fast food.  · Limit or avoid alcohol.  · Limit foods that are high in starch and sugar.  · Avoid fried foods.  · Lose weight if you are overweight.  · Keep track of how much salt (sodium) you eat. This is important if you have high blood pressure. Ask your doctor to tell you more about this.  · Try to add vegetarian meals each week.  Fats  · Choose healthy fats. These include olive oil and canola oil, flaxseeds, walnuts, almonds, and seeds.  · Eat more omega-3 fats. These include salmon, mackerel, sardines, tuna, flaxseed oil, and ground  flaxseeds. Try to eat fish at least 2 times each week.  · Check food labels. Avoid foods with trans fats or high amounts of saturated fat.  · Limit saturated fats.  ? These are often found in animal products, such as meats, butter, and cream.  ? These are also found in plant foods, such as palm oil, palm kernel oil, and coconut oil.  · Avoid foods with partially hydrogenated oils in them. These have trans fats. Examples are stick margarine, some tub margarines, cookies, crackers, and other baked goods.  What foods can I eat?  Fruits  All fresh, canned (in natural juice), or frozen fruits.  Vegetables  Fresh or frozen vegetables (raw, steamed, roasted, or grilled). Green salads.  Grains  Most grains. Choose whole wheat and whole grains most of the time. Rice and pasta, including brown rice and pastas made with whole wheat.  Meats and other proteins  Lean, well-trimmed beef, veal, pork, and lamb. Chicken and turkey without skin. All fish and shellfish. Wild duck, rabbit, pheasant, and venison. Egg whites or low-cholesterol egg substitutes. Dried beans, peas, lentils, and tofu. Seeds and most nuts.  Dairy  Low-fat or nonfat cheeses, including ricotta and mozzarella. Skim or 1% milk that is liquid, powdered, or evaporated. Buttermilk that is made with low-fat milk. Nonfat or low-fat yogurt.  Fats and oils  Non-hydrogenated (trans-free) margarines. Vegetable oils, including soybean, sesame, sunflower, olive, peanut, safflower, corn, canola, and cottonseed. Salad dressings or mayonnaise made with a vegetable oil.  Beverages  Mineral water. Coffee and tea. Diet carbonated beverages.  Sweets and desserts  Sherbet, gelatin, and fruit ice. Small amounts of dark chocolate.  Limit all sweets and desserts.  Seasonings and condiments  All seasonings and condiments.  The items listed above may not be a complete list of foods and drinks you can eat. Contact a dietitian for more options.  What foods should I avoid?  Fruits  Canned  fruit in heavy syrup. Fruit in cream or butter sauce. Fried fruit. Limit coconut.  Vegetables  Vegetables cooked in cheese, cream, or butter sauce. Fried vegetables.  Grains  Breads that are made with saturated or trans fats, oils, or whole milk. Croissants. Sweet rolls. Donuts. High-fat crackers, such as cheese crackers.  Meats and other proteins  Fatty meats, such as hot dogs, ribs, sausage, patel, rib-eye roast or steak. High-fat deli meats, such as salami and bologna. Caviar. Domestic duck and goose. Organ meats, such as liver.  Dairy  Cream, sour cream, cream cheese, and creamed cottage cheese. Whole-milk cheeses. Whole or 2% milk that is liquid, evaporated, or condensed. Whole buttermilk. Cream sauce or high-fat cheese sauce. Yogurt that is made from whole milk.  Fats and oils  Meat fat, or shortening. Cocoa butter, hydrogenated oils, palm oil, coconut oil, palm kernel oil. Solid fats and shortenings, including patel fat, salt pork, lard, and butter. Nondairy cream substitutes. Salad dressings with cheese or sour cream.  Beverages  Regular sodas and juice drinks with added sugar.  Sweets and desserts  Frosting. Pudding. Cookies. Cakes. Pies. Milk chocolate or white chocolate. Buttered syrups. Full-fat ice cream or ice cream drinks.  The items listed above may not be a complete list of foods and drinks to avoid. Contact a dietitian for more information.  Summary  · Heart-healthy meal planning includes eating less unhealthy fats, eating more healthy fats, and making other changes in your diet.  · Eat a balanced diet. This includes fruits and vegetables, low-fat or nonfat dairy, lean protein, nuts and legumes, whole grains, and heart-healthy oils and fats.  This information is not intended to replace advice given to you by your health care provider. Make sure you discuss any questions you have with your health care provider.  Document Released: 06/18/2013 Document Revised: 01/25/2019 Document Reviewed:  01/25/2019  Elsevier Interactive Patient Education © 2019 [employer]

## 2019-08-09 ENCOUNTER — TREATMENT (OUTPATIENT)
Dept: CARDIAC REHAB | Facility: HOSPITAL | Age: 74
End: 2019-08-09

## 2019-08-09 VITALS — HEART RATE: 65 BPM | SYSTOLIC BLOOD PRESSURE: 118 MMHG | OXYGEN SATURATION: 98 % | DIASTOLIC BLOOD PRESSURE: 68 MMHG

## 2019-08-09 DIAGNOSIS — I20.8 STABLE ANGINA (HCC): Primary | ICD-10-CM

## 2019-08-09 NOTE — PROGRESS NOTES
Pt attended phase III session as scheduled.  Dr. Oviedo physician immediately available. NAD note, skin warm, pink and dry, denies chest pain, chest pressure , SOA, tolerated exercise well. V/S WIDL See exercise flow  for exercise data   Patient attended first day of exercise. Educated on warm up, cool down, cleaning and use of equipment, signs and symptoms to report, Cardiac Rehab Protocol, Applying the heart monitor.

## 2019-08-12 ENCOUNTER — TREATMENT (OUTPATIENT)
Dept: CARDIAC REHAB | Facility: HOSPITAL | Age: 74
End: 2019-08-12

## 2019-08-12 VITALS — SYSTOLIC BLOOD PRESSURE: 116 MMHG | HEART RATE: 61 BPM | DIASTOLIC BLOOD PRESSURE: 72 MMHG

## 2019-08-12 DIAGNOSIS — I20.8 STABLE ANGINA (HCC): Primary | ICD-10-CM

## 2019-08-12 NOTE — PROGRESS NOTES
Pt attended phase III session as scheduled.  Dr. Reid  physician immediately available. NAD note, skin warm, pink and dry, denies chest pain, chest pressure , SOA, tolerated exercise well. V/S WIDL See exercise flow  for exercise data.

## 2019-08-14 ENCOUNTER — TREATMENT (OUTPATIENT)
Dept: CARDIAC REHAB | Facility: HOSPITAL | Age: 74
End: 2019-08-14

## 2019-08-14 ENCOUNTER — PREP FOR SURGERY (OUTPATIENT)
Dept: OTHER | Facility: HOSPITAL | Age: 74
End: 2019-08-14

## 2019-08-14 ENCOUNTER — HOSPITAL ENCOUNTER (INPATIENT)
Facility: HOSPITAL | Age: 74
LOS: 2 days | Discharge: HOME | End: 2019-08-16
Attending: INTERNAL MEDICINE | Admitting: INTERNAL MEDICINE

## 2019-08-14 VITALS — HEART RATE: 59 BPM | SYSTOLIC BLOOD PRESSURE: 128 MMHG | OXYGEN SATURATION: 98 % | DIASTOLIC BLOOD PRESSURE: 58 MMHG

## 2019-08-14 DIAGNOSIS — I20.0 UNSTABLE ANGINA (HCC): Primary | ICD-10-CM

## 2019-08-14 DIAGNOSIS — I10 ESSENTIAL HYPERTENSION: ICD-10-CM

## 2019-08-14 DIAGNOSIS — R06.02 SHORTNESS OF BREATH: ICD-10-CM

## 2019-08-14 DIAGNOSIS — I20.0 UNSTABLE ANGINA (HCC): ICD-10-CM

## 2019-08-14 DIAGNOSIS — I20.8 STABLE ANGINA (HCC): Primary | ICD-10-CM

## 2019-08-14 LAB
ALBUMIN SERPL-MCNC: 3.73 G/DL (ref 3.5–5.2)
ALBUMIN/GLOB SERPL: 1.1 G/DL
ALP SERPL-CCNC: 82 U/L (ref 39–117)
ALT SERPL W P-5'-P-CCNC: 16 U/L (ref 1–41)
AMYLASE SERPL-CCNC: 42 U/L (ref 28–100)
ANION GAP SERPL CALCULATED.3IONS-SCNC: 13.8 MMOL/L (ref 5–15)
APTT PPP: 26.1 SECONDS (ref 23.8–36.1)
AST SERPL-CCNC: 21 U/L (ref 1–40)
BASOPHILS # BLD AUTO: 0.05 10*3/MM3 (ref 0–0.2)
BASOPHILS NFR BLD AUTO: 0.6 % (ref 0–1.5)
BILIRUB SERPL-MCNC: 0.2 MG/DL (ref 0.2–1.2)
BUN BLD-MCNC: 33 MG/DL (ref 8–23)
BUN/CREAT SERPL: 18.3 (ref 7–25)
CALCIUM SPEC-SCNC: 9.2 MG/DL (ref 8.6–10.5)
CHLORIDE SERPL-SCNC: 103 MMOL/L (ref 98–107)
CO2 SERPL-SCNC: 22.2 MMOL/L (ref 22–29)
CREAT BLD-MCNC: 1.8 MG/DL (ref 0.76–1.27)
D DIMER PPP FEU-MCNC: 0.96 MCGFEU/ML (ref 0–0.5)
DEPRECATED RDW RBC AUTO: 46.1 FL (ref 37–54)
EOSINOPHIL # BLD AUTO: 0.44 10*3/MM3 (ref 0–0.4)
EOSINOPHIL NFR BLD AUTO: 5.6 % (ref 0.3–6.2)
ERYTHROCYTE [DISTWIDTH] IN BLOOD BY AUTOMATED COUNT: 14.4 % (ref 12.3–15.4)
GFR SERPL CREATININE-BSD FRML MDRD: 37 ML/MIN/1.73
GLOBULIN UR ELPH-MCNC: 3.4 GM/DL
GLUCOSE BLD-MCNC: 265 MG/DL (ref 65–99)
GLUCOSE BLDC GLUCOMTR-MCNC: 137 MG/DL (ref 70–130)
HCT VFR BLD AUTO: 34.8 % (ref 37.5–51)
HGB BLD-MCNC: 11.2 G/DL (ref 13–17.7)
IMM GRANULOCYTES # BLD AUTO: 0.02 10*3/MM3 (ref 0–0.05)
IMM GRANULOCYTES NFR BLD AUTO: 0.3 % (ref 0–0.5)
INR PPP: 0.92 (ref 0.9–1.1)
LIPASE SERPL-CCNC: 32 U/L (ref 13–60)
LYMPHOCYTES # BLD AUTO: 1.44 10*3/MM3 (ref 0.7–3.1)
LYMPHOCYTES NFR BLD AUTO: 18.4 % (ref 19.6–45.3)
MAGNESIUM SERPL-MCNC: 2.1 MG/DL (ref 1.6–2.4)
MCH RBC QN AUTO: 28.9 PG (ref 26.6–33)
MCHC RBC AUTO-ENTMCNC: 32.2 G/DL (ref 31.5–35.7)
MCV RBC AUTO: 89.9 FL (ref 79–97)
MONOCYTES # BLD AUTO: 0.45 10*3/MM3 (ref 0.1–0.9)
MONOCYTES NFR BLD AUTO: 5.7 % (ref 5–12)
NEUTROPHILS # BLD AUTO: 5.44 10*3/MM3 (ref 1.7–7)
NEUTROPHILS NFR BLD AUTO: 69.4 % (ref 42.7–76)
NT-PROBNP SERPL-MCNC: 1532 PG/ML (ref 5–900)
PHOSPHATE SERPL-MCNC: 3.3 MG/DL (ref 2.5–4.5)
PLATELET # BLD AUTO: 210 10*3/MM3 (ref 140–450)
PMV BLD AUTO: 10.6 FL (ref 6–12)
POTASSIUM BLD-SCNC: 4 MMOL/L (ref 3.5–5.2)
PROT SERPL-MCNC: 7.1 G/DL (ref 6–8.5)
PROTHROMBIN TIME: 12.8 SECONDS (ref 11–15.4)
RBC # BLD AUTO: 3.87 10*6/MM3 (ref 4.14–5.8)
SODIUM BLD-SCNC: 139 MMOL/L (ref 136–145)
TROPONIN T SERPL-MCNC: 0.03 NG/ML (ref 0–0.03)
TROPONIN T SERPL-MCNC: 0.04 NG/ML (ref 0–0.03)
TSH SERPL DL<=0.05 MIU/L-ACNC: 3.46 MIU/ML (ref 0.27–4.2)
WBC NRBC COR # BLD: 7.84 10*3/MM3 (ref 3.4–10.8)

## 2019-08-14 PROCEDURE — 25010000002 HEPARIN (PORCINE) PER 1000 UNITS

## 2019-08-14 PROCEDURE — 84484 ASSAY OF TROPONIN QUANT: CPT | Performed by: INTERNAL MEDICINE

## 2019-08-14 PROCEDURE — 85379 FIBRIN DEGRADATION QUANT: CPT | Performed by: INTERNAL MEDICINE

## 2019-08-14 PROCEDURE — 85610 PROTHROMBIN TIME: CPT | Performed by: INTERNAL MEDICINE

## 2019-08-14 PROCEDURE — 99222 1ST HOSP IP/OBS MODERATE 55: CPT | Performed by: INTERNAL MEDICINE

## 2019-08-14 PROCEDURE — 93010 ELECTROCARDIOGRAM REPORT: CPT | Performed by: INTERNAL MEDICINE

## 2019-08-14 PROCEDURE — 85730 THROMBOPLASTIN TIME PARTIAL: CPT | Performed by: INTERNAL MEDICINE

## 2019-08-14 PROCEDURE — 84443 ASSAY THYROID STIM HORMONE: CPT | Performed by: INTERNAL MEDICINE

## 2019-08-14 PROCEDURE — 82962 GLUCOSE BLOOD TEST: CPT

## 2019-08-14 PROCEDURE — 85025 COMPLETE CBC W/AUTO DIFF WBC: CPT | Performed by: INTERNAL MEDICINE

## 2019-08-14 PROCEDURE — 83880 ASSAY OF NATRIURETIC PEPTIDE: CPT | Performed by: INTERNAL MEDICINE

## 2019-08-14 PROCEDURE — 83690 ASSAY OF LIPASE: CPT | Performed by: INTERNAL MEDICINE

## 2019-08-14 PROCEDURE — 80053 COMPREHEN METABOLIC PANEL: CPT | Performed by: INTERNAL MEDICINE

## 2019-08-14 PROCEDURE — 83735 ASSAY OF MAGNESIUM: CPT | Performed by: INTERNAL MEDICINE

## 2019-08-14 PROCEDURE — 63710000001 PREDNISONE PER 1 MG: Performed by: NURSE PRACTITIONER

## 2019-08-14 PROCEDURE — 63710000001 PREDNISONE PER 5 MG: Performed by: NURSE PRACTITIONER

## 2019-08-14 PROCEDURE — 93005 ELECTROCARDIOGRAM TRACING: CPT | Performed by: INTERNAL MEDICINE

## 2019-08-14 PROCEDURE — 84100 ASSAY OF PHOSPHORUS: CPT | Performed by: INTERNAL MEDICINE

## 2019-08-14 PROCEDURE — 82150 ASSAY OF AMYLASE: CPT | Performed by: INTERNAL MEDICINE

## 2019-08-14 RX ORDER — HEPARIN SODIUM 5000 [USP'U]/ML
2500 INJECTION, SOLUTION INTRAVENOUS; SUBCUTANEOUS AS NEEDED
Status: DISCONTINUED | OUTPATIENT
Start: 2019-08-14 | End: 2019-08-15

## 2019-08-14 RX ORDER — ACETAMINOPHEN 650 MG/1
650 SUPPOSITORY RECTAL EVERY 4 HOURS PRN
Status: DISCONTINUED | OUTPATIENT
Start: 2019-08-14 | End: 2019-08-16 | Stop reason: HOSPADM

## 2019-08-14 RX ORDER — HEPARIN SODIUM 10000 [USP'U]/100ML
10 INJECTION, SOLUTION INTRAVENOUS
Status: DISCONTINUED | OUTPATIENT
Start: 2019-08-14 | End: 2019-08-15

## 2019-08-14 RX ORDER — ACETAMINOPHEN 160 MG/5ML
650 SOLUTION ORAL EVERY 4 HOURS PRN
Status: CANCELLED | OUTPATIENT
Start: 2019-08-14

## 2019-08-14 RX ORDER — SODIUM CHLORIDE 0.9 % (FLUSH) 0.9 %
3 SYRINGE (ML) INJECTION EVERY 12 HOURS SCHEDULED
Status: CANCELLED | OUTPATIENT
Start: 2019-08-14

## 2019-08-14 RX ORDER — ASPIRIN 81 MG/1
81 TABLET ORAL DAILY
Status: CANCELLED | OUTPATIENT
Start: 2019-08-15

## 2019-08-14 RX ORDER — PANTOPRAZOLE SODIUM 40 MG/1
40 TABLET, DELAYED RELEASE ORAL EVERY MORNING
Status: DISCONTINUED | OUTPATIENT
Start: 2019-08-15 | End: 2019-08-16 | Stop reason: HOSPADM

## 2019-08-14 RX ORDER — ASPIRIN 81 MG/1
324 TABLET, CHEWABLE ORAL ONCE
Status: CANCELLED | OUTPATIENT
Start: 2019-08-14 | End: 2019-08-14

## 2019-08-14 RX ORDER — HEPARIN SODIUM 10000 [USP'U]/100ML
INJECTION, SOLUTION INTRAVENOUS
Status: COMPLETED
Start: 2019-08-14 | End: 2019-08-14

## 2019-08-14 RX ORDER — DEXTROSE MONOHYDRATE 25 G/50ML
25 INJECTION, SOLUTION INTRAVENOUS
Status: DISCONTINUED | OUTPATIENT
Start: 2019-08-14 | End: 2019-08-16 | Stop reason: HOSPADM

## 2019-08-14 RX ORDER — TERAZOSIN 5 MG/1
5 CAPSULE ORAL NIGHTLY
Status: DISCONTINUED | OUTPATIENT
Start: 2019-08-14 | End: 2019-08-16 | Stop reason: HOSPADM

## 2019-08-14 RX ORDER — HEPARIN SODIUM 5000 [USP'U]/ML
INJECTION, SOLUTION INTRAVENOUS; SUBCUTANEOUS
Status: COMPLETED
Start: 2019-08-14 | End: 2019-08-14

## 2019-08-14 RX ORDER — NICOTINE POLACRILEX 4 MG
15 LOZENGE BUCCAL
Status: DISCONTINUED | OUTPATIENT
Start: 2019-08-14 | End: 2019-08-16 | Stop reason: HOSPADM

## 2019-08-14 RX ORDER — SODIUM CHLORIDE 9 MG/ML
INJECTION, SOLUTION INTRAVENOUS
Status: COMPLETED
Start: 2019-08-14 | End: 2019-08-14

## 2019-08-14 RX ORDER — ISOSORBIDE MONONITRATE 60 MG/1
60 TABLET, EXTENDED RELEASE ORAL DAILY
Status: DISCONTINUED | OUTPATIENT
Start: 2019-08-14 | End: 2019-08-16 | Stop reason: HOSPADM

## 2019-08-14 RX ORDER — ASPIRIN 81 MG/1
TABLET, CHEWABLE ORAL
Status: COMPLETED
Start: 2019-08-14 | End: 2019-08-14

## 2019-08-14 RX ORDER — SODIUM CHLORIDE 9 MG/ML
75 INJECTION, SOLUTION INTRAVENOUS CONTINUOUS
Status: DISCONTINUED | OUTPATIENT
Start: 2019-08-14 | End: 2019-08-15

## 2019-08-14 RX ORDER — SODIUM CHLORIDE 0.9 % (FLUSH) 0.9 %
3 SYRINGE (ML) INJECTION EVERY 12 HOURS SCHEDULED
Status: DISCONTINUED | OUTPATIENT
Start: 2019-08-14 | End: 2019-08-16 | Stop reason: HOSPADM

## 2019-08-14 RX ORDER — HEPARIN SODIUM 5000 [USP'U]/ML
2500 INJECTION, SOLUTION INTRAVENOUS; SUBCUTANEOUS AS NEEDED
Status: CANCELLED | OUTPATIENT
Start: 2019-08-14

## 2019-08-14 RX ORDER — ACETAMINOPHEN 160 MG/5ML
650 SOLUTION ORAL EVERY 4 HOURS PRN
Status: DISCONTINUED | OUTPATIENT
Start: 2019-08-14 | End: 2019-08-16 | Stop reason: HOSPADM

## 2019-08-14 RX ORDER — ACETAMINOPHEN 325 MG/1
650 TABLET ORAL EVERY 4 HOURS PRN
Status: DISCONTINUED | OUTPATIENT
Start: 2019-08-14 | End: 2019-08-16 | Stop reason: HOSPADM

## 2019-08-14 RX ORDER — TIMOLOL MALEATE 5 MG/ML
1 SOLUTION/ DROPS OPHTHALMIC EVERY 12 HOURS SCHEDULED
Status: DISCONTINUED | OUTPATIENT
Start: 2019-08-14 | End: 2019-08-16 | Stop reason: HOSPADM

## 2019-08-14 RX ORDER — SODIUM CHLORIDE 0.9 % (FLUSH) 0.9 %
3-10 SYRINGE (ML) INJECTION AS NEEDED
Status: CANCELLED | OUTPATIENT
Start: 2019-08-14

## 2019-08-14 RX ORDER — HEPARIN SODIUM 10000 [USP'U]/100ML
10 INJECTION, SOLUTION INTRAVENOUS
Status: CANCELLED | OUTPATIENT
Start: 2019-08-14

## 2019-08-14 RX ORDER — ASPIRIN 81 MG/1
324 TABLET, CHEWABLE ORAL ONCE
Status: COMPLETED | OUTPATIENT
Start: 2019-08-14 | End: 2019-08-14

## 2019-08-14 RX ORDER — HEPARIN SODIUM 5000 [USP'U]/ML
5000 INJECTION, SOLUTION INTRAVENOUS; SUBCUTANEOUS AS NEEDED
Status: CANCELLED | OUTPATIENT
Start: 2019-08-14

## 2019-08-14 RX ORDER — DIPHENHYDRAMINE HYDROCHLORIDE 50 MG/ML
50 INJECTION INTRAMUSCULAR; INTRAVENOUS ONCE
Status: COMPLETED | OUTPATIENT
Start: 2019-08-15 | End: 2019-08-15

## 2019-08-14 RX ORDER — ASPIRIN 81 MG/1
81 TABLET ORAL DAILY
Status: DISCONTINUED | OUTPATIENT
Start: 2019-08-15 | End: 2019-08-16 | Stop reason: HOSPADM

## 2019-08-14 RX ORDER — SODIUM CHLORIDE 0.9 % (FLUSH) 0.9 %
3-10 SYRINGE (ML) INJECTION AS NEEDED
Status: DISCONTINUED | OUTPATIENT
Start: 2019-08-14 | End: 2019-08-16 | Stop reason: HOSPADM

## 2019-08-14 RX ORDER — ACETAMINOPHEN 325 MG/1
650 TABLET ORAL EVERY 4 HOURS PRN
Status: CANCELLED | OUTPATIENT
Start: 2019-08-14

## 2019-08-14 RX ORDER — CARVEDILOL 6.25 MG/1
6.25 TABLET ORAL 2 TIMES DAILY
Status: DISCONTINUED | OUTPATIENT
Start: 2019-08-14 | End: 2019-08-15

## 2019-08-14 RX ORDER — ASPIRIN 81 MG/1
81 TABLET ORAL DAILY
Status: CANCELLED | OUTPATIENT
Start: 2019-08-14

## 2019-08-14 RX ORDER — HEPARIN SODIUM 5000 [USP'U]/ML
5000 INJECTION, SOLUTION INTRAVENOUS; SUBCUTANEOUS ONCE
Status: COMPLETED | OUTPATIENT
Start: 2019-08-14 | End: 2019-08-14

## 2019-08-14 RX ORDER — HEPARIN SODIUM 5000 [USP'U]/ML
5000 INJECTION, SOLUTION INTRAVENOUS; SUBCUTANEOUS ONCE
Status: CANCELLED | OUTPATIENT
Start: 2019-08-14 | End: 2019-08-14

## 2019-08-14 RX ORDER — ROSUVASTATIN CALCIUM 10 MG/1
5 TABLET, COATED ORAL DAILY
Status: DISCONTINUED | OUTPATIENT
Start: 2019-08-14 | End: 2019-08-15

## 2019-08-14 RX ORDER — ASPIRIN 81 MG/1
81 TABLET ORAL DAILY
COMMUNITY

## 2019-08-14 RX ORDER — ACETAMINOPHEN 650 MG/1
650 SUPPOSITORY RECTAL EVERY 4 HOURS PRN
Status: CANCELLED | OUTPATIENT
Start: 2019-08-14

## 2019-08-14 RX ORDER — HEPARIN SODIUM 5000 [USP'U]/ML
5000 INJECTION, SOLUTION INTRAVENOUS; SUBCUTANEOUS AS NEEDED
Status: DISCONTINUED | OUTPATIENT
Start: 2019-08-14 | End: 2019-08-15

## 2019-08-14 RX ORDER — NITROGLYCERIN 0.4 MG/1
0.4 TABLET SUBLINGUAL
Status: DISCONTINUED | OUTPATIENT
Start: 2019-08-14 | End: 2019-08-16 | Stop reason: HOSPADM

## 2019-08-14 RX ADMIN — SODIUM CHLORIDE, PRESERVATIVE FREE 3 ML: 5 INJECTION INTRAVENOUS at 21:44

## 2019-08-14 RX ADMIN — TERAZOSIN HYDROCHLORIDE 5 MG: 5 CAPSULE ORAL at 21:47

## 2019-08-14 RX ADMIN — HEPARIN SODIUM 5000 UNITS: 5000 INJECTION, SOLUTION INTRAVENOUS; SUBCUTANEOUS at 17:48

## 2019-08-14 RX ADMIN — MAGNESIUM GLUCONATE 500 MG ORAL TABLET 400 MG: 500 TABLET ORAL at 21:48

## 2019-08-14 RX ADMIN — ASPIRIN 324 MG: 81 TABLET, CHEWABLE ORAL at 17:49

## 2019-08-14 RX ADMIN — PREDNISONE 50 MG: 10 TABLET ORAL at 21:46

## 2019-08-14 RX ADMIN — ISOSORBIDE MONONITRATE 60 MG: 60 TABLET, EXTENDED RELEASE ORAL at 21:45

## 2019-08-14 RX ADMIN — SODIUM CHLORIDE 75 ML/HR: 9 INJECTION, SOLUTION INTRAVENOUS at 18:52

## 2019-08-14 RX ADMIN — ROSUVASTATIN CALCIUM 5 MG: 10 TABLET, FILM COATED ORAL at 21:47

## 2019-08-14 RX ADMIN — HEPARIN SODIUM 5000 UNITS: 5000 INJECTION INTRAVENOUS; SUBCUTANEOUS at 17:48

## 2019-08-14 RX ADMIN — HEPARIN SODIUM 10 UNITS/KG/HR: 10000 INJECTION, SOLUTION INTRAVENOUS at 17:47

## 2019-08-14 RX ADMIN — CARVEDILOL 6.25 MG: 6.25 TABLET, FILM COATED ORAL at 21:47

## 2019-08-14 NOTE — PROGRESS NOTES
Pt attended phase III session as scheduled.  Dr. Reid  physician immediately available. NAD note, skin warm, pink and dry, denies chest pain, chest pressure , SOA, tolerated exercise well. V/S WIDL See exercise flow  for exercise data.     Patient stated last night he had some chest pain and discomfort. Stated it was probably a 7-8 on scale 0-10. Stated he used his nitro and it went away after 2 nitros.  Stated he has no chest pain pressure or SOA today.  Educated on coming to ER if pain does not subside after second nitro.     Inc levels

## 2019-08-14 NOTE — H&P
Patient Identification:  Name:  Nicholas Yin  Age:  74 y.o.  Sex:  male  :  1945  MRN:  [id number]   Visit Number:  [id number]  Primary Care Physician:  Waldemar Trejo MD    Chief complaint:   Chest pain  History of presenting illness: Patient is a 74-year-old gentleman with a history of CKD stage IIIb likely from underlying hypertension diabetes, history of diabetes mellitus type 2, hypertension, presenting initially to my office with worsening chest pain symptoms.  I initially saw him in the office about 2 months ago when he presented with dyspnea, created an echocardiogram and stress test, his stress test showed an apical infarct with mild ischemia and also inferior infarct pattern, at that time since that he did not have much ischemia on the stress perfusion imaging study and given the elevated creatinine at baseline.  He said to medically manage his chest pain, his dyspnea was likely from systolic and diastolic CHF which was optimized with diuretics and is a breathing subsequently was improved.  His creatinine was anywhere between 1.8-1.9 during the outpatient follow-up.  He said he started having more chest discomfort yesterday he had 2 episodes resolved with sublingual nitro, he had more chest pain this afternoon, he took 2 sublingual nitros and came to my office, we did an EKG in our clinic and it showed ischemic changes in the anterior leads, compared with the previous EKG.  Given changes in his EKG and his presenting anginal symptoms I recommended him to get admitted and start him on IV heparin drip for unstable angina and possible coronary angiogram in the morning.  His vitals were stable.    ROS: All systems reviewed and negative except as mentioned above.     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[place]    •  Diabetes mellitus (CMS/HCC)    • Erectile dysfunction    • GERD (gastroesophageal reflux disease)    • Hypertension      Past Surgical History:   Procedure Laterality Date   • COLON SURGERY     • COLONOSCOPY     • EYE SURGERY     • SHOULDER ARTHROSCOPY     • VASECTOMY       Family History   Problem Relation Age of Onset   • Stroke Mother    • Hypertension Mother    • Alcohol abuse Mother    • COPD Mother    • COPD Father    • Alcohol abuse Father    • Hypertension Father    • Alzheimer's disease Father    • Heart disease Brother    • Hypertension Brother    • Diabetes Brother    • Stroke Maternal Grandfather    • Cancer Paternal Grandmother      Social History     Socioeconomic History   • Marital status:      Spouse name: lita   • Number of children: 3   • Years of education: 16   • Highest education level: Not on file   Occupational History   • Occupation: retired   Tobacco Use   • Smoking status: Never Smoker   • Smokeless tobacco: Never Used   Substance and Sexual Activity   • Alcohol use: Yes     Comment: occ   • Drug use: No   • Sexual activity: Defer     ---------------------------------------------------------------------------------------------------------------------   Allergies:  Iodinated diagnostic agents  ---------------------------------------------------------------------------------------------------------------------   Prior to Admission Medications     Prescriptions Last Dose Informant Patient Reported? Taking?    carvedilol (COREG) 3.125 MG tablet   No No    Take 1 tablet by mouth 2 (Two) Times a Day.    clopidogrel (PLAVIX) 75 MG tablet   No No    TAKE 1 TABLET BY MOUTH EVERY DAY TO MAINTAIN BLOOD CIRCULATION AND CLOT PREVENTION    Coenzyme Q-10 100 MG capsule   Yes No    Take 100 mg by mouth Daily.    COMFORT EZ PEN NEEDLES 32G X 4 MM misc   Yes No    USE TO INJECT INSULIN EVERY DAY    dexlansoprazole (DEXILANT) 60 MG capsule   No No    Take 1 capsule by mouth Daily.    doxazosin  (CARDURA) 4 MG tablet   No No    Take 1 tablet by mouth Every Night.    furosemide (LASIX) 20 MG tablet   No No    Take 1 tablet by mouth Daily.    glucose blood (ONETOUCH VERIO) test strip   No No    Monitor blood sugars three times daily    Insulin Glargine (TOUJEO SOLOSTAR SC)   Yes No    Inject  under the skin.    isosorbide mononitrate (IMDUR) 60 MG 24 hr tablet   No No    Take 1 tablet by mouth Daily.    Liraglutide (VICTOZA) 18 MG/3ML solution pen-injector injection   No No    Inject 1.8 mg under the skin into the appropriate area as directed Daily.    losartan (COZAAR) 50 MG tablet   No No    Take 1 tablet by mouth 2 (Two) Times a Day.    magnesium gluconate (MAGONATE) 500 MG tablet   Yes No    Take 500 mg by mouth 2 (Two) Times a Day.    nitroglycerin (NITROSTAT) 0.4 MG SL tablet   No No    Place 1 tablet under the tongue Every 5 (Five) Minutes As Needed for Chest Pain. Take no more than 3 doses in 15 minutes.    rosuvastatin (CRESTOR) 5 MG tablet   No No    Take 1 tablet by mouth Daily.    sitaGLIPtin-metFORMIN (JANUMET)  MG per tablet   No No    Take 1 tablet by mouth 2 (Two) Times a Day With Meals.    [facility] WORT PO   Yes No    Take  by mouth.    timolol (BETIMOL) 0.5 % ophthalmic solution   No No    Administer 1 drop to the right eye 2 (Two) Times a Day.    vitamin D (ERGOCALCIFEROL) 83619 units capsule capsule   No No    Take 1 capsule by mouth 1 (One) Time Per Week.        Hospital Scheduled Meds:    [START ON 8/15/2019] aspirin 81 mg Oral Daily   carvedilol 6.25 mg Oral BID   [START ON 8/15/2019] diphenhydrAMINE 50 mg Intravenous Once   insulin aspart 0-7 Units Subcutaneous 4x Daily AC & at Bedtime   isosorbide mononitrate 60 mg Oral Daily   magnesium oxide 400 mg Oral Daily   [START ON 8/15/2019] pantoprazole 40 mg Oral QAM   predniSONE 50 mg Oral Q6H   rosuvastatin 5 mg Oral Daily   sodium chloride 3 mL Intravenous Q12H   terazosin 5 mg Oral Nightly   timolol 1 drop Right Eye Q12H        heparin (porcine) 10 Units/kg/hr Last Rate: 10 Units/kg/hr (08/14/19 1327)   sodium chloride 75 mL/hr      ---------------------------------------------------------------------------------------------------------------------   Vital Signs:  Temp:  [98.6 °F (37 °C)] 98.6 °F (37 °C)  Heart Rate:  [59-86] 86  Resp:  [18] 18  BP: (128-176)/() 167/103      08/14/19  1729   Weight: 103 kg (228 lb)     Body mass index is 33.65 kg/m².  ---------------------------------------------------------------------------------------------------------------------   Physical Exam:  Constitutional:  Well-developed and well-nourished.  No respiratory distress.      HENT:  Head: Normocephalic and atraumatic.  Mouth:  Moist mucous membranes.    Eyes:  Conjunctivae and EOM are normal.  Pupils are equal, round, and reactive to light.  No scleral icterus.  Neck:  Neck supple.  No JVD present.    Cardiovascular:  Normal rate, regular rhythm and normal heart sounds with no murmur.  Pulmonary/Chest:  No respiratory distress, no wheezes, no crackles, with normal breath sounds and good air movement.  Abdominal:  Soft.  Bowel sounds are normal.  No distension and no tenderness.   Musculoskeletal:  No edema, no tenderness, and no deformity.  No red or swollen joints anywhere.    Neurological:  Alert and oriented to person, place, and time.  No cranial nerve deficit.  No tongue deviation.  No facial droop.  No slurred speech.   Skin:  Skin is warm and dry.  No rash noted.  No pallor.   Psychiatric:  Normal mood and affect.  Behavior is normal.  Judgment and thought content normal.   Peripheral vascular:  No edema and strong pulses on all 4 extremities.  ---------------------------------------------------------------------------------------------------------------------  EKG: Sinus rhythm, right bundle branch block, anterior infarct, age undetermined  Telemetry: Sinus  I have personally looked at both the EKG and the telemetry  strips.  ---------------------------------------------------------------------------------------------------------------------   Results from last 7 days   Lab Units 08/14/19  1730   WBC 10*3/mm3 7.84   HEMOGLOBIN g/dL 11.2*   HEMATOCRIT % 34.8*   MCV fL 89.9   MCHC g/dL 32.2   PLATELETS 10*3/mm3 210         Results from last 7 days   Lab Units 08/14/19  1730   SODIUM mmol/L 139   POTASSIUM mmol/L 4.0   MAGNESIUM mg/dL 2.1   CHLORIDE mmol/L 103   CO2 mmol/L 22.2   BUN mg/dL 33*   CREATININE mg/dL 1.80*   EGFR IF NONAFRICN AM mL/min/1.73 37*   CALCIUM mg/dL 9.2   GLUCOSE mg/dL 265*   ALBUMIN g/dL 3.73   BILIRUBIN mg/dL 0.2   ALK PHOS U/L 82   AST (SGOT) U/L 21   ALT (SGPT) U/L 16   Estimated Creatinine Clearance: 43.2 mL/min (A) (by C-G formula based on SCr of 1.8 mg/dL (H)).  No results found for: AMMONIA  Results from last 7 days   Lab Units 08/14/19  1730   TROPONIN T ng/mL 0.026     Results from last 7 days   Lab Units 08/14/19  1730   PROBNP pg/mL 1,532.0*     Lab Results   Component Value Date    HGBA1C 7.00 (H) 04/05/2019     Lab Results   Component Value Date    TSH 3.460 08/14/2019     No results found for: PREGTESTUR, PREGSERUM, HCG, HCGQUANT  Pain Management Panel     Pain Management Panel Latest Ref Rng & Units 6/6/2015    CREATININE UR mg/dL 103.0                        ---------------------------------------------------------------------------------------------------------------------  Imaging Results (last 7 days)     ** No results found for the last 168 hours. **          I have personally reviewed the radiology images and read the final radiology report.  ---------------------------------------------------------------------------------------------------------------------  Assessment and Plan:   Unstable angina  CKD stage IIIb  Diabetes mitis type II  Hypertension  Dyslipidemia    IV heparin for unstable angina.  Check EKG and troponins every 6 hours  Check labs including a CBC and CMP.  Gentle IV  fluid hydration prep for contrast.  He has anaphylactic reaction to IV contrast use in the past, will premedicate him with the oral prednisone x3 and IV Benadryl prior to procedure.  I explained the risk and benefits of coronary angiogram and PCI including possible worsening of his baseline renal function and need for dialysis given history of diabetes.    Is a DAVE risk score is 7, 14% chance of post PCI contrast-induced nephropathy and 0.12% chance of requiring dialysis if I limit contrast use to 60 cc    I have explained the risks associated with the procedure to the patient including but not limited to an allergic reaction to the contrast material or medications used during the procedure bleeding, infection, and bruising at the catheter insertion site blood clots, which may trigger heart attack, stroke,   damage to the artery where the catheter was inserted, or damage to the arteries as the catheter travels through your body, irregular heart rhythm arrhythmias, kidney damage caused by the contrast material.            Paul MD Shauna, [facility]  Interventional Cardiology      08/14/19  6:29 PM

## 2019-08-15 LAB
ACT BLD: 279 SECONDS (ref 82–152)
ANION GAP SERPL CALCULATED.3IONS-SCNC: 11.7 MMOL/L (ref 5–15)
APTT PPP: 51.8 SECONDS (ref 23.8–36.1)
APTT PPP: 63.5 SECONDS (ref 23.8–36.1)
BUN BLD-MCNC: 27 MG/DL (ref 8–23)
BUN/CREAT SERPL: 16.7 (ref 7–25)
CALCIUM SPEC-SCNC: 9.2 MG/DL (ref 8.6–10.5)
CHLORIDE SERPL-SCNC: 107 MMOL/L (ref 98–107)
CO2 SERPL-SCNC: 22.3 MMOL/L (ref 22–29)
CREAT BLD-MCNC: 1.62 MG/DL (ref 0.76–1.27)
GFR SERPL CREATININE-BSD FRML MDRD: 42 ML/MIN/1.73
GLUCOSE BLD-MCNC: 193 MG/DL (ref 65–99)
GLUCOSE BLDC GLUCOMTR-MCNC: 166 MG/DL (ref 70–130)
GLUCOSE BLDC GLUCOMTR-MCNC: 189 MG/DL (ref 70–130)
GLUCOSE BLDC GLUCOMTR-MCNC: 284 MG/DL (ref 70–130)
GLUCOSE BLDC GLUCOMTR-MCNC: 312 MG/DL (ref 70–130)
POTASSIUM BLD-SCNC: 4.6 MMOL/L (ref 3.5–5.2)
SODIUM BLD-SCNC: 141 MMOL/L (ref 136–145)
TROPONIN T SERPL-MCNC: 0.05 NG/ML (ref 0–0.03)
TROPONIN T SERPL-MCNC: 0.09 NG/ML (ref 0–0.03)

## 2019-08-15 PROCEDURE — 99153 MOD SED SAME PHYS/QHP EA: CPT | Performed by: INTERNAL MEDICINE

## 2019-08-15 PROCEDURE — C1725 CATH, TRANSLUMIN NON-LASER: HCPCS | Performed by: INTERNAL MEDICINE

## 2019-08-15 PROCEDURE — B2111ZZ FLUOROSCOPY OF MULTIPLE CORONARY ARTERIES USING LOW OSMOLAR CONTRAST: ICD-10-PCS | Performed by: INTERNAL MEDICINE

## 2019-08-15 PROCEDURE — 84484 ASSAY OF TROPONIN QUANT: CPT | Performed by: INTERNAL MEDICINE

## 2019-08-15 PROCEDURE — 25010000002 FENTANYL CITRATE (PF) 100 MCG/2ML SOLUTION: Performed by: INTERNAL MEDICINE

## 2019-08-15 PROCEDURE — 93454 CORONARY ARTERY ANGIO S&I: CPT | Performed by: INTERNAL MEDICINE

## 2019-08-15 PROCEDURE — 4A023N7 MEASUREMENT OF CARDIAC SAMPLING AND PRESSURE, LEFT HEART, PERCUTANEOUS APPROACH: ICD-10-PCS | Performed by: INTERNAL MEDICINE

## 2019-08-15 PROCEDURE — 0 IOPAMIDOL PER 1 ML: Performed by: INTERNAL MEDICINE

## 2019-08-15 PROCEDURE — C1769 GUIDE WIRE: HCPCS | Performed by: INTERNAL MEDICINE

## 2019-08-15 PROCEDURE — 92928 PRQ TCAT PLMT NTRAC ST 1 LES: CPT | Performed by: INTERNAL MEDICINE

## 2019-08-15 PROCEDURE — C1894 INTRO/SHEATH, NON-LASER: HCPCS | Performed by: INTERNAL MEDICINE

## 2019-08-15 PROCEDURE — C1874 STENT, COATED/COV W/DEL SYS: HCPCS | Performed by: INTERNAL MEDICINE

## 2019-08-15 PROCEDURE — C9600 PERC DRUG-EL COR STENT SING: HCPCS | Performed by: INTERNAL MEDICINE

## 2019-08-15 PROCEDURE — 82962 GLUCOSE BLOOD TEST: CPT

## 2019-08-15 PROCEDURE — 85730 THROMBOPLASTIN TIME PARTIAL: CPT | Performed by: INTERNAL MEDICINE

## 2019-08-15 PROCEDURE — 63710000001 PREDNISONE PER 5 MG: Performed by: NURSE PRACTITIONER

## 2019-08-15 PROCEDURE — B2151ZZ FLUOROSCOPY OF LEFT HEART USING LOW OSMOLAR CONTRAST: ICD-10-PCS | Performed by: INTERNAL MEDICINE

## 2019-08-15 PROCEDURE — 80048 BASIC METABOLIC PNL TOTAL CA: CPT | Performed by: NURSE PRACTITIONER

## 2019-08-15 PROCEDURE — C1887 CATHETER, GUIDING: HCPCS | Performed by: INTERNAL MEDICINE

## 2019-08-15 PROCEDURE — 85347 COAGULATION TIME ACTIVATED: CPT

## 2019-08-15 PROCEDURE — 63710000001 PREDNISONE PER 1 MG: Performed by: NURSE PRACTITIONER

## 2019-08-15 PROCEDURE — 25010000002 DIPHENHYDRAMINE PER 50 MG: Performed by: NURSE PRACTITIONER

## 2019-08-15 PROCEDURE — 25010000002 HEPARIN (PORCINE) PER 1000 UNITS: Performed by: INTERNAL MEDICINE

## 2019-08-15 PROCEDURE — 25010000002 MIDAZOLAM PER 1 MG: Performed by: INTERNAL MEDICINE

## 2019-08-15 PROCEDURE — 027034Z DILATION OF CORONARY ARTERY, ONE ARTERY WITH DRUG-ELUTING INTRALUMINAL DEVICE, PERCUTANEOUS APPROACH: ICD-10-PCS | Performed by: INTERNAL MEDICINE

## 2019-08-15 PROCEDURE — 99152 MOD SED SAME PHYS/QHP 5/>YRS: CPT | Performed by: INTERNAL MEDICINE

## 2019-08-15 PROCEDURE — 63710000001 INSULIN ASPART PER 5 UNITS: Performed by: INTERNAL MEDICINE

## 2019-08-15 DEVICE — XIENCE SIERRA™ EVEROLIMUS ELUTING CORONARY STENT SYSTEM 3.00 MM X 18 MM / RAPID-EXCHANGE
Type: IMPLANTABLE DEVICE | Status: FUNCTIONAL
Brand: XIENCE SIERRA™

## 2019-08-15 RX ORDER — LIDOCAINE HYDROCHLORIDE 20 MG/ML
INJECTION, SOLUTION INFILTRATION; PERINEURAL AS NEEDED
Status: DISCONTINUED | OUTPATIENT
Start: 2019-08-15 | End: 2019-08-15 | Stop reason: HOSPADM

## 2019-08-15 RX ORDER — FENTANYL CITRATE 50 UG/ML
INJECTION, SOLUTION INTRAMUSCULAR; INTRAVENOUS AS NEEDED
Status: DISCONTINUED | OUTPATIENT
Start: 2019-08-15 | End: 2019-08-15 | Stop reason: HOSPADM

## 2019-08-15 RX ORDER — MIDAZOLAM HYDROCHLORIDE 1 MG/ML
INJECTION INTRAMUSCULAR; INTRAVENOUS AS NEEDED
Status: DISCONTINUED | OUTPATIENT
Start: 2019-08-15 | End: 2019-08-15 | Stop reason: HOSPADM

## 2019-08-15 RX ORDER — CARVEDILOL 6.25 MG/1
12.5 TABLET ORAL 2 TIMES DAILY WITH MEALS
Status: DISCONTINUED | OUTPATIENT
Start: 2019-08-15 | End: 2019-08-16 | Stop reason: HOSPADM

## 2019-08-15 RX ORDER — SODIUM CHLORIDE 9 MG/ML
INJECTION, SOLUTION INTRAVENOUS CONTINUOUS PRN
Status: COMPLETED | OUTPATIENT
Start: 2019-08-15 | End: 2019-08-15

## 2019-08-15 RX ORDER — SODIUM CHLORIDE 9 MG/ML
100 INJECTION, SOLUTION INTRAVENOUS CONTINUOUS
Status: DISCONTINUED | OUTPATIENT
Start: 2019-08-15 | End: 2019-08-16

## 2019-08-15 RX ORDER — HEPARIN SODIUM 1000 [USP'U]/ML
INJECTION, SOLUTION INTRAVENOUS; SUBCUTANEOUS AS NEEDED
Status: DISCONTINUED | OUTPATIENT
Start: 2019-08-15 | End: 2019-08-15 | Stop reason: HOSPADM

## 2019-08-15 RX ORDER — ROSUVASTATIN CALCIUM 20 MG/1
20 TABLET, COATED ORAL DAILY
Status: DISCONTINUED | OUTPATIENT
Start: 2019-08-16 | End: 2019-08-16 | Stop reason: HOSPADM

## 2019-08-15 RX ADMIN — ISOSORBIDE MONONITRATE 60 MG: 60 TABLET, EXTENDED RELEASE ORAL at 08:32

## 2019-08-15 RX ADMIN — INSULIN ASPART 4 UNITS: 100 INJECTION, SOLUTION INTRAVENOUS; SUBCUTANEOUS at 21:20

## 2019-08-15 RX ADMIN — TERAZOSIN HYDROCHLORIDE 5 MG: 5 CAPSULE ORAL at 21:21

## 2019-08-15 RX ADMIN — DIPHENHYDRAMINE HYDROCHLORIDE 50 MG: 50 INJECTION INTRAMUSCULAR; INTRAVENOUS at 08:19

## 2019-08-15 RX ADMIN — TICAGRELOR 90 MG: 90 TABLET ORAL at 21:20

## 2019-08-15 RX ADMIN — PREDNISONE 50 MG: 10 TABLET ORAL at 08:18

## 2019-08-15 RX ADMIN — ASPIRIN 81 MG: 81 TABLET, COATED ORAL at 08:32

## 2019-08-15 RX ADMIN — SODIUM CHLORIDE, PRESERVATIVE FREE 3 ML: 5 INJECTION INTRAVENOUS at 21:23

## 2019-08-15 RX ADMIN — PREDNISONE 50 MG: 10 TABLET ORAL at 02:58

## 2019-08-15 RX ADMIN — INSULIN ASPART 5 UNITS: 100 INJECTION, SOLUTION INTRAVENOUS; SUBCUTANEOUS at 17:02

## 2019-08-15 RX ADMIN — SODIUM CHLORIDE 100 ML/HR: 9 INJECTION, SOLUTION INTRAVENOUS at 10:58

## 2019-08-15 RX ADMIN — CARVEDILOL 6.25 MG: 6.25 TABLET, FILM COATED ORAL at 08:32

## 2019-08-15 RX ADMIN — INSULIN ASPART 2 UNITS: 100 INJECTION, SOLUTION INTRAVENOUS; SUBCUTANEOUS at 12:04

## 2019-08-15 RX ADMIN — SODIUM CHLORIDE, PRESERVATIVE FREE 3 ML: 5 INJECTION INTRAVENOUS at 08:34

## 2019-08-15 RX ADMIN — CARVEDILOL 12.5 MG: 6.25 TABLET, FILM COATED ORAL at 17:36

## 2019-08-15 RX ADMIN — SODIUM CHLORIDE 100 ML/HR: 9 INJECTION, SOLUTION INTRAVENOUS at 21:22

## 2019-08-15 NOTE — NURSING NOTE
Thank you for your referral to Cardiac Rehab.   Referral noted.  Will see patient today when Cardiac Rehab patient load allows.  If patient was to be discharged before being seen we will follow up with them at home.

## 2019-08-15 NOTE — PLAN OF CARE
Problem: Patient Care Overview  Goal: Plan of Care Review  Outcome: Ongoing (interventions implemented as appropriate)    Goal: Individualization and Mutuality  Outcome: Ongoing (interventions implemented as appropriate)    Goal: Discharge Needs Assessment  Outcome: Ongoing (interventions implemented as appropriate)    Goal: Interprofessional Rounds/Family Conf  Outcome: Ongoing (interventions implemented as appropriate)      Problem: Cardiac: ACS (Acute Coronary Syndrome) (Adult)  Goal: Signs and Symptoms of Listed Potential Problems Will be Absent, Minimized or Managed (Cardiac: ACS)  Outcome: Ongoing (interventions implemented as appropriate)   08/15/19 0042   Goal/Outcome Evaluation   Problems Assessed (Acute Coronary Syndrome) all   Problems Present (Acute Coronary Syn) chest pain (angina)

## 2019-08-15 NOTE — NURSING NOTE
Nicholas is already a patient in cardiac rehab . I went over to talk to him and when he is released by Dr. Reid he will continue the program.

## 2019-08-15 NOTE — PROGRESS NOTES
Discharge Planning Assessment   Rachid     Patient Name: Nicholas Yin  MRN: [id number]  Today's Date: 8/15/2019    Admit Date: 8/14/2019    Discharge Needs Assessment     Row Name 08/15/19 1106       Living Environment    Lives With  spouse    Name(s) of Who Lives With Patient  Jeremías    Current Living Arrangements  home/apartment/condo    Primary Care Provided by  self    Provides Primary Care For  no one    Family Caregiver if Needed  none    Quality of Family Relationships  helpful;involved;supportive    Able to Return to Prior Arrangements  yes       Resource/Environmental Concerns    Resource/Environmental Concerns  none    Transportation Concerns  car, none       Transition Planning    Patient/Family Anticipates Transition to  home with family    Patient/Family Anticipated Services at Transition  none    Transportation Anticipated  family or friend will provide       Discharge Needs Assessment    Readmission Within the Last 30 Days  no previous admission in last 30 days    Concerns to be Addressed  no discharge needs identified    Equipment Currently Used at Home  none    Anticipated Changes Related to Illness  none    Equipment Needed After Discharge  none        Discharge Plan     Row Name 08/15/19 8337       Plan    Plan  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  Patient's PCP is Waldemar Faye and he uses [facility] for his prescription needs.  Patient denies any financial or insurance needs at this time.  Patient does not utilize Home Health or DME and denies the need at this time.  Patient's family will provide transportation via private vehicle at discharge.  No issues or concerns are noted at this time.  CM will continue to follow and assist with anydischarge needs.      Patient/Family in Agreement with Plan  yes        Destination      No service coordination in this encounter.      Durable Medical  Equipment      No service coordination in this encounter.      Dialysis/Infusion      No service coordination in this encounter.      Home Medical Care      No service coordination in this encounter.      Therapy      No service coordination in this encounter.      Community Resources      No service coordination in this encounter.        Expected Discharge Date and Time     Expected Discharge Date Expected Discharge Time    Aug 16, 2019         Demographic Summary     Row Name 08/15/19 1102       General Information    Admission Type  inpatient    Arrived From  home;physician office Dr. Villatoro    Referral Source  admission list    Reason for Consult  discharge planning    Preferred Language  English     Used During This Interaction  no        Functional Status     Row Name 08/15/19 1104       Functional Status    Usual Activity Tolerance  good    Current Activity Tolerance  fair       Functional Status, IADL    IADL Comments  Independent       Mental Status    General Appearance WDL  WDL       Mental Status Summary    Recent Changes in Mental Status/Cognitive Functioning  no changes       Employment/    Employment Status  employed part time    Current or Previous Occupation  professional    Employment/ Comments  Phoyographer, Writer        Psychosocial    No documentation.       Abuse/Neglect    No documentation.       Legal    No documentation.       Substance Abuse    No documentation.       Patient Forms    No documentation.           Genevieve Lee RN

## 2019-08-15 NOTE — PAYOR COMM NOTE
"  [facility]  NPI: [id number]    Utilization Review   Contact:Radha Mcgee MSN, APRN, NP-C  Phone: [phone]  Fax: [phone]    atilio/Attn: nurse review  Inpatient precert  REF: [id number]    Nicholas Schuster (74 y.o. Male)     Date of Birth Social Security Number Address Home Phone MRN    1945  [address] [id number] [phone] [id number]    Anglican Marital Status          Restorationist        Admission Date Admission Type Admitting Provider Attending Provider Department, Room/Bed    8/14/19 Urgent Paul Villatoro MD Subramaniyam, Prem Srinivas, MD [facility] CATHETERIZATION LAB, Pool/CATH    Discharge Date Discharge Disposition Discharge Destination                       Attending Provider:  Paul Villatoro MD    Allergies:  Iodinated Diagnostic Agents    Isolation:  None   Infection:  None   Code Status:  CPR    Ht:  175.3 cm (69.02\")   Wt:  103 kg (227 lb 1.6 oz)    Admission Cmt:  None   Principal Problem:  None                Active Insurance as of 8/14/2019     Primary Coverage     Payor Plan Insurance Group Employer/Plan Group    [employer] [facility] [employer] EMPLOYEE [id number]     Payor Plan Address Payor Plan Phone Number Payor Plan Fax Number Effective Dates    [address] [phone]  1/1/2018 - None Entered    [facility] [id number]       Subscriber Name Subscriber Birth Date Member ID       MARAL SCHUSTER 5/6/1948 [id number]           Secondary Coverage     Payor Plan Insurance Group Employer/Plan Group    MEDICARE MEDICARE A ONLY      Payor Plan Address Payor Plan Phone Number Payor Plan Fax Number Effective Dates    [address] [phone]  2/1/2011 - None Entered    [facility] [id number]       Subscriber Name Subscriber Birth Date Member ID       NICHOLAS SCHUSTER 1945 [id number]                 Emergency Contacts      (Rel.) Home Phone Work Phone Mobile Phone    " Jeremías Yin (Spouse) [phone] -- [phone]               History & Physical      Paul Villatoro MD at 2019  6:29 PM          Patient Identification:  Name:  Nicholas Yin  Age:  74 y.o.  Sex:  male  :  1945  MRN:  [id number]   Visit Number:  [id number]  Primary Care Physician:  Waldemar Trejo MD    Chief complaint:   Chest pain  History of presenting illness: Patient is a 74-year-old gentleman with a history of CKD stage IIIb likely from underlying hypertension diabetes, history of diabetes mellitus type 2, hypertension, presenting initially to my office with worsening chest pain symptoms.  I initially saw him in the office about 2 months ago when he presented with dyspnea, created an echocardiogram and stress test, his stress test showed an apical infarct with mild ischemia and also inferior infarct pattern, at that time since that he did not have much ischemia on the stress perfusion imaging study and given the elevated creatinine at baseline.  He said to medically manage his chest pain, his dyspnea was likely from systolic and diastolic CHF which was optimized with diuretics and is a breathing subsequently was improved.  His creatinine was anywhere between 1.8-1.9 during the outpatient follow-up.  He said he started having more chest discomfort yesterday he had 2 episodes resolved with sublingual nitro, he had more chest pain this afternoon, he took 2 sublingual nitros and came to my office, we did an EKG in our clinic and it showed ischemic changes in the anterior leads, compared with the previous EKG.  Given changes in his EKG and his presenting anginal symptoms I recommended him to get admitted and start him on IV heparin drip for unstable angina and possible coronary angiogram in the morning.  His vitals were stable.    ROS: All systems reviewed and negative except as mentioned above.      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[place]    • Diabetes mellitus (CMS/HCC)    • Erectile dysfunction    • GERD (gastroesophageal reflux disease)    • Hypertension      Past Surgical History:   Procedure Laterality Date   • COLON SURGERY     • COLONOSCOPY     • EYE SURGERY     • SHOULDER ARTHROSCOPY     • VASECTOMY       Family History   Problem Relation Age of Onset   • Stroke Mother    • Hypertension Mother    • Alcohol abuse Mother    • COPD Mother    • COPD Father    • Alcohol abuse Father    • Hypertension Father    • Alzheimer's disease Father    • Heart disease Brother    • Hypertension Brother    • Diabetes Brother    • Stroke Maternal Grandfather    • Cancer Paternal Grandmother      Social History     Socioeconomic History   • Marital status:      Spouse name: lita   • Number of children: 3   • Years of education: 16   • Highest education level: Not on file   Occupational History   • Occupation: retired   Tobacco Use   • Smoking status: Never Smoker   • Smokeless tobacco: Never Used   Substance and Sexual Activity   • Alcohol use: Yes     Comment: occ   • Drug use: No   • Sexual activity: Defer     ---------------------------------------------------------------------------------------------------------------------   Allergies:  Iodinated diagnostic agents  ---------------------------------------------------------------------------------------------------------------------   Prior to Admission Medications     Prescriptions Last Dose Informant Patient Reported? Taking?    carvedilol (COREG) 3.125 MG tablet   No No    Take 1 tablet by mouth 2 (Two) Times a Day.    clopidogrel (PLAVIX) 75 MG tablet   No No    TAKE 1 TABLET BY MOUTH EVERY DAY TO MAINTAIN BLOOD CIRCULATION AND CLOT PREVENTION    Coenzyme Q-10 100 MG capsule    Yes No    Take 100 mg by mouth Daily.    COMFORT EZ PEN NEEDLES 32G X 4 MM misc   Yes No    USE TO INJECT INSULIN EVERY DAY    dexlansoprazole (DEXILANT) 60 MG capsule   No No    Take 1 capsule by mouth Daily.    doxazosin (CARDURA) 4 MG tablet   No No    Take 1 tablet by mouth Every Night.    furosemide (LASIX) 20 MG tablet   No No    Take 1 tablet by mouth Daily.    glucose blood (ONETOUCH VERIO) test strip   No No    Monitor blood sugars three times daily    Insulin Glargine (TOUJEO SOLOSTAR SC)   Yes No    Inject  under the skin.    isosorbide mononitrate (IMDUR) 60 MG 24 hr tablet   No No    Take 1 tablet by mouth Daily.    Liraglutide (VICTOZA) 18 MG/3ML solution pen-injector injection   No No    Inject 1.8 mg under the skin into the appropriate area as directed Daily.    losartan (COZAAR) 50 MG tablet   No No    Take 1 tablet by mouth 2 (Two) Times a Day.    magnesium gluconate (MAGONATE) 500 MG tablet   Yes No    Take 500 mg by mouth 2 (Two) Times a Day.    nitroglycerin (NITROSTAT) 0.4 MG SL tablet   No No    Place 1 tablet under the tongue Every 5 (Five) Minutes As Needed for Chest Pain. Take no more than 3 doses in 15 minutes.    rosuvastatin (CRESTOR) 5 MG tablet   No No    Take 1 tablet by mouth Daily.    sitaGLIPtin-metFORMIN (JANUMET)  MG per tablet   No No    Take 1 tablet by mouth 2 (Two) Times a Day With Meals.    [facility] WORT PO   Yes No    Take  by mouth.    timolol (BETIMOL) 0.5 % ophthalmic solution   No No    Administer 1 drop to the right eye 2 (Two) Times a Day.    vitamin D (ERGOCALCIFEROL) 68923 units capsule capsule   No No    Take 1 capsule by mouth 1 (One) Time Per Week.        Hospital Scheduled Meds:    [START ON 8/15/2019] aspirin 81 mg Oral Daily   carvedilol 6.25 mg Oral BID   [START ON 8/15/2019] diphenhydrAMINE 50 mg Intravenous Once   insulin aspart 0-7 Units Subcutaneous 4x Daily AC & at Bedtime   isosorbide mononitrate 60 mg Oral Daily   magnesium oxide 400 mg Oral  Daily   [START ON 8/15/2019] pantoprazole 40 mg Oral QAM   predniSONE 50 mg Oral Q6H   rosuvastatin 5 mg Oral Daily   sodium chloride 3 mL Intravenous Q12H   terazosin 5 mg Oral Nightly   timolol 1 drop Right Eye Q12H       heparin (porcine) 10 Units/kg/hr Last Rate: 10 Units/kg/hr (08/14/19 1747)   sodium chloride 75 mL/hr      ---------------------------------------------------------------------------------------------------------------------   Vital Signs:  Temp:  [98.6 °F (37 °C)] 98.6 °F (37 °C)  Heart Rate:  [59-86] 86  Resp:  [18] 18  BP: (128-176)/() 167/103      08/14/19  1729   Weight: 103 kg (228 lb)     Body mass index is 33.65 kg/m².  ---------------------------------------------------------------------------------------------------------------------   Physical Exam:  Constitutional:  Well-developed and well-nourished.  No respiratory distress.      HENT:  Head: Normocephalic and atraumatic.  Mouth:  Moist mucous membranes.    Eyes:  Conjunctivae and EOM are normal.  Pupils are equal, round, and reactive to light.  No scleral icterus.  Neck:  Neck supple.  No JVD present.    Cardiovascular:  Normal rate, regular rhythm and normal heart sounds with no murmur.  Pulmonary/Chest:  No respiratory distress, no wheezes, no crackles, with normal breath sounds and good air movement.  Abdominal:  Soft.  Bowel sounds are normal.  No distension and no tenderness.   Musculoskeletal:  No edema, no tenderness, and no deformity.  No red or swollen joints anywhere.    Neurological:  Alert and oriented to person, place, and time.  No cranial nerve deficit.  No tongue deviation.  No facial droop.  No slurred speech.   Skin:  Skin is warm and dry.  No rash noted.  No pallor.   Psychiatric:  Normal mood and affect.  Behavior is normal.  Judgment and thought content normal.   Peripheral vascular:  No edema and strong pulses on all 4  extremities.  ---------------------------------------------------------------------------------------------------------------------  EKG: Sinus rhythm, right bundle branch block, anterior infarct, age undetermined  Telemetry: Sinus  I have personally looked at both the EKG and the telemetry strips.  ---------------------------------------------------------------------------------------------------------------------   Results from last 7 days   Lab Units 08/14/19  1730   WBC 10*3/mm3 7.84   HEMOGLOBIN g/dL 11.2*   HEMATOCRIT % 34.8*   MCV fL 89.9   MCHC g/dL 32.2   PLATELETS 10*3/mm3 210         Results from last 7 days   Lab Units 08/14/19  1730   SODIUM mmol/L 139   POTASSIUM mmol/L 4.0   MAGNESIUM mg/dL 2.1   CHLORIDE mmol/L 103   CO2 mmol/L 22.2   BUN mg/dL 33*   CREATININE mg/dL 1.80*   EGFR IF NONAFRICN AM mL/min/1.73 37*   CALCIUM mg/dL 9.2   GLUCOSE mg/dL 265*   ALBUMIN g/dL 3.73   BILIRUBIN mg/dL 0.2   ALK PHOS U/L 82   AST (SGOT) U/L 21   ALT (SGPT) U/L 16   Estimated Creatinine Clearance: 43.2 mL/min (A) (by C-G formula based on SCr of 1.8 mg/dL (H)).  No results found for: AMMONIA  Results from last 7 days   Lab Units 08/14/19  1730   TROPONIN T ng/mL 0.026     Results from last 7 days   Lab Units 08/14/19  1730   PROBNP pg/mL 1,532.0*     Lab Results   Component Value Date    HGBA1C 7.00 (H) 04/05/2019     Lab Results   Component Value Date    TSH 3.460 08/14/2019     No results found for: PREGTESTUR, PREGSERUM, HCG, HCGQUANT  Pain Management Panel     Pain Management Panel Latest Ref Rng & Units 6/6/2015    CREATININE UR mg/dL 103.0                        ---------------------------------------------------------------------------------------------------------------------  Imaging Results (last 7 days)     ** No results found for the last 168 hours. **          I have personally reviewed the radiology images and read the final radiology  report.  ---------------------------------------------------------------------------------------------------------------------  Assessment and Plan:   Unstable angina  CKD stage IIIb  Diabetes mitis type II  Hypertension  Dyslipidemia    IV heparin for unstable angina.  Check EKG and troponins every 6 hours  Check labs including a CBC and CMP.  Gentle IV fluid hydration prep for contrast.  He has anaphylactic reaction to IV contrast use in the past, will premedicate him with the oral prednisone x3 and IV Benadryl prior to procedure.  I explained the risk and benefits of coronary angiogram and PCI including possible worsening of his baseline renal function and need for dialysis given history of diabetes.    Is a DAVE risk score is 7, 14% chance of post PCI contrast-induced nephropathy and 0.12% chance of requiring dialysis if I limit contrast use to 60 cc    I have explained the risks associated with the procedure to the patient including but not limited to an allergic reaction to the contrast material or medications used during the procedure bleeding, infection, and bruising at the catheter insertion site blood clots, which may trigger heart attack, stroke,   damage to the artery where the catheter was inserted, or damage to the arteries as the catheter travels through your body, irregular heart rhythm arrhythmias, kidney damage caused by the contrast material.            Paul Villatoro MD, [facility]  Interventional Cardiology      08/14/19  6:29 PM      Electronically signed by Paul Villatoro MD at 8/14/2019  6:34 PM       Scheduled Meds Sorted by Name   for Nicholas Yin as of 8/13/19 through 8/15/19     1 Day 3 Days 7 Days 10 Days < Today >    Legend:                           Inactive     Active     Other Encounter    Linked               Medications 08/13/19 08/14/19 08/15/19   aspirin chewable tablet 324 mg   Dose: 324 mg  Freq: Once Route: PO  Start: 08/14/19 1800 End: 08/14/19 1749    Admin  Instructions:   Herbal/drug interaction: Avoid use with ginkgo biloba.  Do not exceed 4 grams of aspirin in a 24 hr period.    If given for pain, use the following pain scale:   Mild Pain = Pain Score of 1-3, CPOT 1-2  Moderate Pain = Pain Score of 4-6, CPOT 3-4  Severe Pain = Pain Score of 7-10, CPOT 5-8     1749             And  aspirin EC tablet 81 mg   Dose: 81 mg  Freq: Daily Route: PO  Start: 08/15/19 0900    Admin Instructions:   Herbal/drug interaction: Avoid use with ginkgo biloba. Do not crush or chew.  Do not exceed 4 grams of aspirin in a 24 hr period.    If given for pain, use the following pain scale:   Mild Pain = Pain Score of 1-3, CPOT 1-2  Moderate Pain = Pain Score of 4-6, CPOT 3-4  Severe Pain = Pain Score of 7-10, CPOT 5-8      0832   0853          carvedilol (COREG) tablet 6.25 mg   Dose: 6.25 mg  Freq: 2 Times Daily Route: PO  Start: 08/14/19 1900    Admin Instructions:   Give with food.     2147          0832   2100          diphenhydrAMINE (BENADRYL) injection 50 mg   Dose: 50 mg  Freq: Once Route: IV  Start: 08/15/19 0800 End: 08/15/19 0819    Admin Instructions:   Schedule Dose 1 Hour Prior to Procedure Requiring Contrast   This med may be ordered in other forms and routes. Before giving verify the last time the drug was given by any route/form. 25 mg may be given IV push over less than 1 minute.      0819            heparin (porcine) 5000 UNIT/ML injection 5,000 Units   Dose: 5,000 Units  Freq: Once Route: IV  Indications of Use: ACUTE CORONARY SYNDROME  Start: 08/14/19 1815 End: 08/14/19 1748    Admin Instructions:   **Max Dose of 5,000 units**     1748             insulin aspart (novoLOG) injection 0-7 Units   Dose: 0-7 Units  Freq: 4 Times Daily Before Meals & Nightly Route: SC  Start: 08/14/19 2100    Admin Instructions:   Correction - Low Dose.  Less than 40 units/day total insulin dose or lean, elderly, renal patients    Blood glucose 150-199 mg/dL - 2 units  Blood glucose 200-249  mg/dL - 3 units  Blood glucose 250-299 mg/dL - 4 units  Blood glucose 300-349 mg/dL - 5 units  Blood glucose 350-400 mg/dL - 6 units  Blood glucose greater than 400 mg/dL - 7 units and call provider     (2044)          (7980)   0853   1130     1730   2100          isosorbide mononitrate (IMDUR) 24 hr tablet 60 mg   Dose: 60 mg  Freq: Daily Route: PO  Start: 08/14/19 1900    Admin Instructions:   Do not crush, or chew.     2145 0832   0853          magnesium oxide (MAG-OX) tablet 400 mg   Dose: 400 mg  Freq: Daily Route: PO  Start: 08/14/19 1900 2148          (8842) [C]   0853          pantoprazole (PROTONIX) EC tablet 40 mg   Dose: 40 mg  Freq: Every Morning Route: PO  Start: 08/15/19 0700    Admin Instructions:   Swallow whole; do not crush, split, or chew.      (9996)   0853          predniSONE (DELTASONE) tablet 50 mg   Dose: 50 mg  Freq: Every 6 Hours Route: PO  Start: 08/14/19 2000 End: 08/15/19 0818    Admin Instructions:   Schedule Doses at 13 Hours, 7 Hours and 1 Hour Prior to Procedure Requiring Contrast     2146 0258 0818          rosuvastatin (CRESTOR) tablet 5 mg   Dose: 5 mg  Freq: Daily Route: PO  Start: 08/14/19 1900    Admin Instructions:   Avoid grapefruit juice.     2147          (8343) [C]   0853          sodium chloride 0.9 % flush 3 mL   Dose: 3 mL  Freq: Every 12 Hours Scheduled Route: IV  Start: 08/14/19 2100 2144          0834   0853   2100        terazosin (HYTRIN) capsule 5 mg   Dose: 5 mg  Freq: Nightly Route: PO  Start: 08/14/19 2100 2147 2100            timolol (TIMOPTIC) 0.5 % ophthalmic solution 1 drop   Dose: 1 drop  Freq: Every 12 Hours Scheduled Route: RIGHT EYE  Start: 08/14/19 2100     (2144) [C]          (0843) [C]   0853   2100        Medications 08/13/19 08/14/19 08/15/19       Continuous Meds Sorted by Name   for Nicholas Yin as of 8/13/19 through 8/15/19    Legend:                           Inactive     Active     Other Encounter     Linked               Medications 08/13/19 08/14/19 08/15/19   heparin 60053 units/250 mL (100 units/mL) in 0.45 % NaCl infusion   Rate: 9.93 mL/hr Dose: 10 Units/kg/hr  Weight Dosing Info: 99.3 kg  Freq: Titrated Route: IV  Indications of Use: ACUTE CORONARY SYNDROME  Last Dose: 10 Units/kg/hr (08/14/19 1747)  Start: 08/14/19 1815    Admin Instructions:   Weight Based Heparin Nomogram:    PTT  Less Than 45 sec:  Bolus 60 units/kg (Maximum of 5000 units) and Increase Rate By 4 units/kg/hr.  PTT 45-55 sec: Bolus 30 units/kg (Maximum of 2500 units) and Increase Rate By 2 units/kg/hr.  PTT 56-80 sec: No Bolus, No Rate Change.  PTT  sec: No Bolus, Decrease Rate by 2 units/kg/hr.  PTT Greater Than 100 sec:  No Bolus.  Hold Infusion 1 hour.  Decrease Rate By 3 units/kg/hr.     1747             sodium chloride 0.9 % infusion   Freq: Continuous PRN  Last Dose: 100 mL/hr (08/15/19 0858)  Start: 08/15/19 0858      0858            sodium chloride 0.9 % infusion   Rate: 75 mL/hr Dose: 75 mL/hr  Freq: Continuous Route: IV  Last Dose: 75 mL/hr (08/14/19 1852)  Start: 08/14/19 1915 1852                 PRN Meds Sorted by Name   for Nicholas Yin as of 8/13/19 through 8/15/19    Legend:                           Inactive     Active     Other Encounter    Linked               Medications 08/13/19 08/14/19 08/15/19   acetaminophen (TYLENOL) tablet 650 mg   Dose: 650 mg  Freq: Every 4 Hours PRN Route: PO  PRN Reason: Mild Pain   Start: 08/14/19 1722    Admin Instructions:   Do not exceed 4 grams of acetaminophen in a 24 hr period.    If given for pain, use the following pain scale:   Mild Pain = Pain Score of 1-3, CPOT 1-2  Moderate Pain = Pain Score of 4-6, CPOT 3-4  Severe Pain = Pain Score of 7-10, CPOT 5-8      0853            Or  acetaminophen (TYLENOL) 160 MG/5ML solution 650 mg   Dose: 650 mg  Freq: Every 4 Hours PRN Route: PO  PRN Reason: Mild Pain   Start: 08/14/19 4645    Admin Instructions:   Do not exceed  4 grams of acetaminophen in a 24 hr period.    If given for pain, use the following pain scale:   Mild Pain = Pain Score of 1-3, CPOT 1-2  Moderate Pain = Pain Score of 4-6, CPOT 3-4  Severe Pain = Pain Score of 7-10, CPOT 5-8      0853            Or  acetaminophen (TYLENOL) suppository 650 mg   Dose: 650 mg  Freq: Every 4 Hours PRN Route: RE  PRN Reason: Mild Pain   Start: 08/14/19 1722    Admin Instructions:   Do not exceed 4 grams of acetaminophen in a 24 hr period.    If given for pain, use the following pain scale:   Mild Pain = Pain Score of 1-3, CPOT 1-2  Moderate Pain = Pain Score of 4-6, CPOT 3-4  Severe Pain = Pain Score of 7-10, CPOT 5-8      0853            dextrose (D50W) 25 g/ 50mL Intravenous Solution 25 g   Dose: 25 g  Freq: Every 15 Minutes PRN Route: IV  PRN Reason: Low Blood Sugar  PRN Comment: Blood Sugar Less Than 70  Start: 08/14/19 1815    Admin Instructions:   Blood sugar less than 70; patient has IV access - Unresponsive, NPO or Unable To Safely Swallow      0853            dextrose (GLUTOSE) oral gel 15 g   Dose: 15 g  Freq: Every 15 Minutes PRN Route: PO  PRN Reason: Low Blood Sugar  PRN Comment: Blood sugar less than 70  Start: 08/14/19 1815    Admin Instructions:   BS<70, Patient Alert, Is not NPO, Can safely swallow.      0853            glucagon (human recombinant) (GLUCAGEN DIAGNOSTIC) injection 1 mg   Dose: 1 mg  Freq: As Needed Route: SC  PRN Reason: Low Blood Sugar  PRN Comment: Blood Glucose Less Than 70  Start: 08/14/19 1815    Admin Instructions:   Blood Glucose Less Than 70 - Patient Without IV Access - Unresponsive, NPO or Unable To Safely Swallow      0853            heparin (porcine) 5000 UNIT/ML injection 2,500 Units   Dose: 2,500 Units  Freq: As Needed Route: IV  PRN Comment: Per Heparin Nomogram For PTT 45-55  Indications of Use: ACUTE CORONARY SYNDROME  Start: 08/14/19 1815    Admin Instructions:   **Max Dose of 2,500 units**      0853            heparin (porcine) 5000  UNIT/ML injection 5,000 Units   Dose: 5,000 Units  Freq: As Needed Route: IV  PRN Comment: Per Heparin Nomogram For PTT Less Than 45  Indications of Use: ACUTE CORONARY SYNDROME  Start: 08/14/19 1815    Admin Instructions:   **Max Dose of 5,000 units**      0853            nitroglycerin (NITROSTAT) SL tablet 0.4 mg   Dose: 0.4 mg  Freq: Every 5 Minutes PRN Route: SL  PRN Reason: Chest Pain  Start: 08/14/19 1813    Admin Instructions:   May administer up to 3 doses per episode.      0853            sodium chloride 0.9 % flush 3-10 mL   Dose: 3-10 mL  Freq: As Needed Route: IV  PRN Reason: Line Care  Start: 08/14/19 1722      0853            sodium chloride 0.9 % infusion   Freq: Continuous PRN  Start: 08/15/19 0858      0858            Medications 08/13/19 08/14/19 08/15/19

## 2019-08-16 ENCOUNTER — APPOINTMENT (OUTPATIENT)
Dept: CARDIOLOGY | Facility: HOSPITAL | Age: 74
End: 2019-08-16

## 2019-08-16 ENCOUNTER — APPOINTMENT (OUTPATIENT)
Dept: CARDIAC REHAB | Facility: HOSPITAL | Age: 74
End: 2019-08-16

## 2019-08-16 VITALS
HEIGHT: 69 IN | TEMPERATURE: 98.7 F | BODY MASS INDEX: 35.21 KG/M2 | DIASTOLIC BLOOD PRESSURE: 87 MMHG | RESPIRATION RATE: 16 BRPM | SYSTOLIC BLOOD PRESSURE: 162 MMHG | HEART RATE: 60 BPM | WEIGHT: 237.7 LBS | OXYGEN SATURATION: 98 %

## 2019-08-16 LAB
ANION GAP SERPL CALCULATED.3IONS-SCNC: 12.7 MMOL/L (ref 5–15)
BH CV ECHO MEAS - BSA(HAYCOCK): 2.3 M^2
BH CV ECHO MEAS - BSA: 2.2 M^2
BH CV ECHO MEAS - BZI_BMI: 33.5 KILOGRAMS/M^2
BH CV ECHO MEAS - BZI_METRIC_HEIGHT: 175.3 CM
BH CV ECHO MEAS - BZI_METRIC_WEIGHT: 103 KG
BH CV ECHO MEAS - EDV(MOD-SP4): 58 ML
BH CV ECHO MEAS - EF(MOD-SP4): 62.1 %
BH CV ECHO MEAS - ESV(MOD-SP4): 22 ML
BH CV ECHO MEAS - LV DIASTOLIC VOL/BSA (35-75): 26.6 ML/M^2
BH CV ECHO MEAS - LV SYSTOLIC VOL/BSA (12-30): 10.1 ML/M^2
BH CV ECHO MEAS - LVLD AP4: 7.6 CM
BH CV ECHO MEAS - LVLS AP4: 6.8 CM
BH CV ECHO MEAS - SI(MOD-SP4): 16.5 ML/M^2
BH CV ECHO MEAS - SV(MOD-SP4): 36 ML
BUN BLD-MCNC: 29 MG/DL (ref 8–23)
BUN/CREAT SERPL: 18.2 (ref 7–25)
CALCIUM SPEC-SCNC: 9 MG/DL (ref 8.6–10.5)
CHLORIDE SERPL-SCNC: 106 MMOL/L (ref 98–107)
CHOLEST SERPL-MCNC: 133 MG/DL (ref 0–200)
CO2 SERPL-SCNC: 19.3 MMOL/L (ref 22–29)
CREAT BLD-MCNC: 1.59 MG/DL (ref 0.76–1.27)
DEPRECATED RDW RBC AUTO: 46.1 FL (ref 37–54)
ERYTHROCYTE [DISTWIDTH] IN BLOOD BY AUTOMATED COUNT: 14.5 % (ref 12.3–15.4)
GFR SERPL CREATININE-BSD FRML MDRD: 43 ML/MIN/1.73
GLUCOSE BLD-MCNC: 234 MG/DL (ref 65–99)
GLUCOSE BLDC GLUCOMTR-MCNC: 180 MG/DL (ref 70–130)
GLUCOSE BLDC GLUCOMTR-MCNC: 206 MG/DL (ref 70–130)
HBA1C MFR BLD: 7.9 % (ref 4.8–5.6)
HCT VFR BLD AUTO: 33.1 % (ref 37.5–51)
HDLC SERPL-MCNC: 48 MG/DL (ref 40–60)
HGB BLD-MCNC: 10.5 G/DL (ref 13–17.7)
LDLC SERPL CALC-MCNC: 62 MG/DL (ref 0–100)
LDLC/HDLC SERPL: 1.3 {RATIO}
MAXIMAL PREDICTED HEART RATE: 147 BPM
MCH RBC QN AUTO: 28.5 PG (ref 26.6–33)
MCHC RBC AUTO-ENTMCNC: 31.7 G/DL (ref 31.5–35.7)
MCV RBC AUTO: 89.9 FL (ref 79–97)
PLATELET # BLD AUTO: 189 10*3/MM3 (ref 140–450)
PMV BLD AUTO: 10.2 FL (ref 6–12)
POTASSIUM BLD-SCNC: 4.7 MMOL/L (ref 3.5–5.2)
RBC # BLD AUTO: 3.68 10*6/MM3 (ref 4.14–5.8)
SODIUM BLD-SCNC: 138 MMOL/L (ref 136–145)
STRESS TARGET HR: 125 BPM
TRIGL SERPL-MCNC: 114 MG/DL (ref 0–150)
VLDLC SERPL-MCNC: 22.8 MG/DL
WBC NRBC COR # BLD: 13.44 10*3/MM3 (ref 3.4–10.8)

## 2019-08-16 PROCEDURE — 93308 TTE F-UP OR LMTD: CPT

## 2019-08-16 PROCEDURE — 83036 HEMOGLOBIN GLYCOSYLATED A1C: CPT | Performed by: INTERNAL MEDICINE

## 2019-08-16 PROCEDURE — 63710000001 INSULIN ASPART PER 5 UNITS: Performed by: INTERNAL MEDICINE

## 2019-08-16 PROCEDURE — 93308 TTE F-UP OR LMTD: CPT | Performed by: INTERNAL MEDICINE

## 2019-08-16 PROCEDURE — 93010 ELECTROCARDIOGRAM REPORT: CPT | Performed by: INTERNAL MEDICINE

## 2019-08-16 PROCEDURE — 82962 GLUCOSE BLOOD TEST: CPT

## 2019-08-16 PROCEDURE — 85027 COMPLETE CBC AUTOMATED: CPT | Performed by: INTERNAL MEDICINE

## 2019-08-16 PROCEDURE — 80061 LIPID PANEL: CPT | Performed by: INTERNAL MEDICINE

## 2019-08-16 PROCEDURE — 99239 HOSP IP/OBS DSCHRG MGMT >30: CPT | Performed by: INTERNAL MEDICINE

## 2019-08-16 PROCEDURE — 93005 ELECTROCARDIOGRAM TRACING: CPT | Performed by: INTERNAL MEDICINE

## 2019-08-16 PROCEDURE — 80048 BASIC METABOLIC PNL TOTAL CA: CPT | Performed by: INTERNAL MEDICINE

## 2019-08-16 PROCEDURE — 94799 UNLISTED PULMONARY SVC/PX: CPT

## 2019-08-16 RX ORDER — AMLODIPINE BESYLATE 5 MG/1
5 TABLET ORAL
Status: DISCONTINUED | OUTPATIENT
Start: 2019-08-16 | End: 2019-08-16 | Stop reason: HOSPADM

## 2019-08-16 RX ORDER — AMLODIPINE BESYLATE 5 MG/1
5 TABLET ORAL
Qty: 90 TABLET | Refills: 3 | Status: SHIPPED | OUTPATIENT
Start: 2019-08-16 | End: 2019-12-02

## 2019-08-16 RX ORDER — FUROSEMIDE 20 MG/1
20 TABLET ORAL DAILY PRN
Qty: 30 TABLET | Refills: 5 | Status: SHIPPED | OUTPATIENT
Start: 2019-08-16 | End: 2020-06-09

## 2019-08-16 RX ORDER — ROSUVASTATIN CALCIUM 20 MG/1
20 TABLET, COATED ORAL DAILY
Qty: 90 TABLET | Refills: 3 | Status: SHIPPED | OUTPATIENT
Start: 2019-08-17 | End: 2021-04-09 | Stop reason: SDUPTHER

## 2019-08-16 RX ORDER — CARVEDILOL 12.5 MG/1
12.5 TABLET ORAL 2 TIMES DAILY WITH MEALS
Qty: 180 TABLET | Refills: 3 | Status: SHIPPED | OUTPATIENT
Start: 2019-08-16 | End: 2020-03-20 | Stop reason: SDUPTHER

## 2019-08-16 RX ORDER — LOSARTAN POTASSIUM 50 MG/1
50 TABLET ORAL DAILY
Qty: 180 TABLET | Refills: 3 | Status: SHIPPED | OUTPATIENT
Start: 2019-08-16 | End: 2020-11-04 | Stop reason: SDUPTHER

## 2019-08-16 RX ADMIN — ROSUVASTATIN CALCIUM 20 MG: 20 TABLET, FILM COATED ORAL at 09:16

## 2019-08-16 RX ADMIN — ISOSORBIDE MONONITRATE 60 MG: 60 TABLET, EXTENDED RELEASE ORAL at 09:16

## 2019-08-16 RX ADMIN — TICAGRELOR 90 MG: 90 TABLET ORAL at 09:16

## 2019-08-16 RX ADMIN — CARVEDILOL 12.5 MG: 6.25 TABLET, FILM COATED ORAL at 09:15

## 2019-08-16 RX ADMIN — ASPIRIN 81 MG: 81 TABLET, COATED ORAL at 09:16

## 2019-08-16 RX ADMIN — INSULIN ASPART 3 UNITS: 100 INJECTION, SOLUTION INTRAVENOUS; SUBCUTANEOUS at 09:13

## 2019-08-16 RX ADMIN — SODIUM CHLORIDE, PRESERVATIVE FREE 3 ML: 5 INJECTION INTRAVENOUS at 09:17

## 2019-08-16 RX ADMIN — PANTOPRAZOLE SODIUM 40 MG: 40 TABLET, DELAYED RELEASE ORAL at 06:43

## 2019-08-16 NOTE — NURSING NOTE
Patient is a Cardiac Rehab patient . I spoke with him Yesterday and he is aware he will be returning after he is seen for his follow up with Dr. Reid.

## 2019-08-16 NOTE — NURSING NOTE
Patient discharged, accompanied by sister.  All personal belongings sent with patient.  PCI education was reviewed and given in printed form.  Patient refused w/c transport preferring to walk with sister.

## 2019-08-16 NOTE — PHARMACY PATIENT ASSISTANCE
Patient received new order for Brilinta. His co-pay is $55.39. Patient has hospital insurance and a co-pay card was profiled.  Using the co-pay card should bring it down to $5.  No other issues identified at this time.     Tiffanie Roach, Pharmacy Intern  08/16/19  9:30 AM

## 2019-08-16 NOTE — PROGRESS NOTES
Discharge Planning Assessment   Rachid     Patient Name: Nicholas Yin  MRN: [id number]  Today's Date: 8/16/2019    Admit Date: 8/14/2019    Discharge Needs Assessment    No documentation.       Discharge Plan     Row Name 08/16/19 1429       Plan    Final Discharge Disposition Code  01 - home or self-care    Final Note  Patient discharged home on 8-16-19.  No needs identified.          Destination      No service coordination in this encounter.      Durable Medical Equipment      No service coordination in this encounter.      Dialysis/Infusion      No service coordination in this encounter.      Home Medical Care      No service coordination in this encounter.      Therapy      No service coordination in this encounter.      Community Resources      No service coordination in this encounter.        Expected Discharge Date and Time     Expected Discharge Date Expected Discharge Time    Aug 17, 2019 12:55 PM        Demographic Summary    No documentation.       Functional Status    No documentation.       Psychosocial    No documentation.       Abuse/Neglect    No documentation.       Legal    No documentation.       Substance Abuse    No documentation.       Patient Forms    No documentation.           Genevieve Lee RN

## 2019-08-16 NOTE — PLAN OF CARE
Problem: Patient Care Overview  Goal: Plan of Care Review  Outcome: Ongoing (interventions implemented as appropriate)   08/15/19 0042 08/16/19 0213   Coping/Psychosocial   Plan of Care Reviewed With --  patient   Plan of Care Review   Progress no change --      Goal: Discharge Needs Assessment  Outcome: Ongoing (interventions implemented as appropriate)   08/15/19 1106   Discharge Needs Assessment   Readmission Within the Last 30 Days no previous admission in last 30 days   Concerns to be Addressed no discharge needs identified   Patient/Family Anticipates Transition to home with family   Patient/Family Anticipated Services at Transition none   Transportation Concerns car, none   Transportation Anticipated family or friend will provide   Anticipated Changes Related to Illness none   Equipment Needed After Discharge none   Disability   Equipment Currently Used at Home none       Problem: Cardiac: ACS (Acute Coronary Syndrome) (Adult)  Goal: Signs and Symptoms of Listed Potential Problems Will be Absent, Minimized or Managed (Cardiac: ACS)  Outcome: Ongoing (interventions implemented as appropriate)   08/15/19 0042 08/16/19 0218   Goal/Outcome Evaluation   Problems Assessed (Acute Coronary Syndrome) all --    Problems Present (Acute Coronary Syn) --  none

## 2019-08-17 ENCOUNTER — READMISSION MANAGEMENT (OUTPATIENT)
Dept: CALL CENTER | Facility: HOSPITAL | Age: 74
End: 2019-08-17

## 2019-08-17 NOTE — PAYOR COMM NOTE
"[facility]  JESSICA ASHER  PHONE  [phone]  FAX  [phone]  NPI:  [id number]    PATIENT D/C 8/16/19    Nicholas Schuster (74 y.o. Male)     Date of Birth Social Security Number Address Home Phone MRN    1945  [address] [id number] [phone] [id number]    Worship Marital Status          Mormon        Admission Date Admission Type Admitting Provider Attending Provider Department, Room/Bed    8/14/19 Urgent Paul Villatoro MD  [facility] PROGRESS CARE, P203/S2    Discharge Date Discharge Disposition Discharge Destination        8/16/2019 Home or Self Care              Attending Provider:  (none)   Allergies:  Iodinated Diagnostic Agents    Isolation:  None   Infection:  None   Code Status:  Prior    Ht:  175.3 cm (69.02\")   Wt:  108 kg (237 lb 11.2 oz)    Admission Cmt:  None   Principal Problem:  None                Active Insurance as of 8/14/2019     Primary Coverage     Payor Plan Insurance Group Employer/Plan Group    [employer] [id number]     Payor Plan Address Payor Plan Phone Number Payor Plan Fax Number Effective Dates    [address] [phone]  1/1/2018 - None Entered    [facility] [address]       Subscriber Name Subscriber Birth Date Member ID       MARAL SCHUSTER 5/6/1948 [id number]           Secondary Coverage     Payor Plan Insurance Group Employer/Plan Group    MEDICARE MEDICARE A ONLY      Payor Plan Address Payor Plan Phone Number Payor Plan Fax Number Effective Dates    [address] [phone]  2/1/2011 - None Entered    [facility] [address]       Subscriber Name Subscriber Birth Date Member ID       NICHOLAS SCHUSTER 1945 [id number]                 Emergency Contacts      (Rel.) Home Phone Work Phone Mobile Phone    Maral Schuster (Spouse) [phone] -- [phone]              "

## 2019-08-17 NOTE — OUTREACH NOTE
Prep Survey      Responses   Facility patient discharged from?  [facility]   Is patient eligible?  Yes   Discharge diagnosis  Unstable angina,  heart cath   Does the patient have one of the following disease processes/diagnoses(primary or secondary)?  Other   Does the patient have Home health ordered?  No   Is there a DME ordered?  No   Medication alerts for this patient  norvasc, brilinta, coreg, lasix, cozaar, crestor,  STOPS plavix and Esteban's Wort   Prep survey completed?  Yes          Vero Martinez RN

## 2019-08-19 ENCOUNTER — APPOINTMENT (OUTPATIENT)
Dept: CARDIAC REHAB | Facility: HOSPITAL | Age: 74
End: 2019-08-19

## 2019-08-19 ENCOUNTER — TRANSITIONAL CARE MANAGEMENT TELEPHONE ENCOUNTER (OUTPATIENT)
Dept: FAMILY MEDICINE CLINIC | Facility: CLINIC | Age: 74
End: 2019-08-19

## 2019-08-19 ENCOUNTER — READMISSION MANAGEMENT (OUTPATIENT)
Dept: CALL CENTER | Facility: HOSPITAL | Age: 74
End: 2019-08-19

## 2019-08-19 NOTE — OUTREACH NOTE
Medical Week 1 Survey      Responses   Facility patient discharged from?  [facility]   Does the patient have one of the following disease processes/diagnoses(primary or secondary)?  Other   Is there a successful TCM telephone encounter documented?  No   Week 1 attempt successful?  Yes   Call start time  1430   Call end time  1435   Meds reviewed with patient/caregiver?  Yes   Is the patient having any side effects they believe may be caused by any medication additions or changes?  No   Does the patient have all medications ordered at discharge?  Yes   Is the patient taking all medications as directed (includes completed medication regime)?  Yes   Does the patient have a primary care provider?   Yes   Does the patient have an appointment with their PCP within 7 days of discharge?  Greater than 7 days   What is preventing the patient from scheduling follow up appointments within 7 days of discharge?  Earlier appointment not available   Nursing Interventions  Verified appointment date/time/provider   Has the patient kept scheduled appointments due by today?  N/A   Has home health visited the patient within 72 hours of discharge?  N/A   Psychosocial issues?  No   Did the patient receive a copy of their discharge instructions?  Yes   Nursing interventions  Reviewed instructions with patient, Educated on MyChart   What is the patient's perception of their health status since discharge?  Improving   Is the patient/caregiver able to teach back signs and symptoms related to disease process for when to call PCP?  Yes   Is the patient/caregiver able to teach back signs and symptoms related to disease process for when to call 911?  Yes   Is the patient/caregiver able to teach back the hierarchy of who to call/visit for symptoms/problems? PCP, Specialist, Home health nurse, Urgent Care, ED, 911  Yes   If the patient is a current smoker, are they able to teach back resources for cessation?  -- [nonsmoker]   Additional teach back  comments  Reviewed cardiac s/s.   Week 1 call completed?  Yes   Wrap up additional comments  States is feeling much better-denies any cardiac s/s. States wife is a nurse practitioner, but would like another follow up call.          Ana Lake RN

## 2019-08-20 NOTE — OUTREACH NOTE
The pt states he is doing really well and feeling great. Denies any further chest pain. [facility] RN called him yesterday and completed a follow up. He states he has all DC medications. Wife is APRN and helping in his care. He denies any questions, concerns, or needs at time of call. Offered earlier PCP appt w/ one of Dr. Trejo's partners (PCP out of office within TCM timeframe) and pt declined. He plans to keep appt 9/5/19 as scheduled. Encouraged him to call PCP office in interim should any needs arise and he voiced understanding.

## 2019-08-21 ENCOUNTER — APPOINTMENT (OUTPATIENT)
Dept: CARDIAC REHAB | Facility: HOSPITAL | Age: 74
End: 2019-08-21

## 2019-08-23 ENCOUNTER — APPOINTMENT (OUTPATIENT)
Dept: CARDIAC REHAB | Facility: HOSPITAL | Age: 74
End: 2019-08-23

## 2019-08-26 ENCOUNTER — APPOINTMENT (OUTPATIENT)
Dept: CARDIAC REHAB | Facility: HOSPITAL | Age: 74
End: 2019-08-26

## 2019-08-26 ENCOUNTER — OFFICE VISIT (OUTPATIENT)
Dept: CARDIOLOGY | Facility: CLINIC | Age: 74
End: 2019-08-26

## 2019-08-26 VITALS
HEIGHT: 69 IN | DIASTOLIC BLOOD PRESSURE: 53 MMHG | OXYGEN SATURATION: 93 % | HEART RATE: 67 BPM | WEIGHT: 228.8 LBS | SYSTOLIC BLOOD PRESSURE: 121 MMHG | BODY MASS INDEX: 33.89 KG/M2

## 2019-08-26 DIAGNOSIS — E11.8 TYPE 2 DIABETES MELLITUS WITH COMPLICATION, WITH LONG-TERM CURRENT USE OF INSULIN (HCC): ICD-10-CM

## 2019-08-26 DIAGNOSIS — I25.118 CORONARY ARTERY DISEASE OF NATIVE ARTERY OF NATIVE HEART WITH STABLE ANGINA PECTORIS (HCC): ICD-10-CM

## 2019-08-26 DIAGNOSIS — I21.4 NSTEMI, INITIAL EPISODE OF CARE (HCC): ICD-10-CM

## 2019-08-26 DIAGNOSIS — I73.9 PAD (PERIPHERAL ARTERY DISEASE) (HCC): Primary | ICD-10-CM

## 2019-08-26 DIAGNOSIS — Z79.4 TYPE 2 DIABETES MELLITUS WITH COMPLICATION, WITH LONG-TERM CURRENT USE OF INSULIN (HCC): ICD-10-CM

## 2019-08-26 DIAGNOSIS — E78.5 DYSLIPIDEMIA: ICD-10-CM

## 2019-08-26 DIAGNOSIS — I10 ESSENTIAL HYPERTENSION: ICD-10-CM

## 2019-08-26 PROCEDURE — 99214 OFFICE O/P EST MOD 30 MIN: CPT | Performed by: INTERNAL MEDICINE

## 2019-08-26 NOTE — DISCHARGE SUMMARY
Patient Identification:  Name:  Nicholas Yin  Age:  74 y.o.  Sex:  male  :  1945  MRN:  [id number]  Visit Number:  [id number]    Date of Admission: 2019  Date of Discharge:  2019     PCP: Waldemar Trejo MD    DISCHARGE DIAGNOSIS  Non-STEMI status post LAD PCI  Chronic diastolic CHF  CKD stage III  Hypertension  Diabetes mellitus type 2  Dyslipidemia    CONSULTS   None    PROCEDURES PERFORMED  LAD PCI    HOSPITAL COURSE  Patient is a 74 y.o. male presented to [facility] complaining of worsening angina.  Please see the admitting history and physical for further details.  Cardiac catheterization showed a 95% plaque rupture lesion in the beginning of the mid LAD status post successful PCI with a 3 oh drug-eluting stent, he did fine post procedure with no complications and was sent home on dual antiplatelet medication along with goal-directed medical management for CAD.  He has a baseline CKD and his creatinine appeared to be stable during the procedure.  VITAL SIGNS:      19  1729 08/15/19  0400 19  0452   Weight: 103 kg (228 lb) 103 kg (227 lb 1.6 oz) 108 kg (237 lb 11.2 oz)     Body mass index is 35.09 kg/m².    PHYSICAL EXAM:  Constitutional:  Well-developed and well-nourished.  No respiratory distress.      HENT:  Head: Normocephalic and atraumatic.  Mouth:  Moist mucous membranes.    Eyes:  Conjunctivae and EOM are normal.  Pupils are equal, round, and reactive to light.  No scleral icterus.  Neck:  Neck supple.  No JVD present.    Cardiovascular:  Normal rate, regular rhythm and normal heart sounds with no murmur.  Pulmonary/Chest:  No respiratory distress, no wheezes, no crackles, with normal breath sounds and good air movement.  Abdominal:  Soft.  Bowel sounds are normal.  No distension and no tenderness.   Musculoskeletal:  No edema, no tenderness, and no deformity.  No red or swollen joints anywhere.    Neurological:  Alert and oriented to person, place,  and time.  No cranial nerve deficit.  No tongue deviation.  No facial droop.  No slurred speech.   Skin:  Skin is warm and dry.  No rash noted.  No pallor.   Psychiatric:  Normal mood and affect.  Behavior is normal.  Judgment and thought content normal.   Peripheral vascular:  No edema and strong pulses on all 4 extremities.    DISCHARGE DISPOSITION   Stable    DISCHARGE MEDICATIONS:     Discharge Medications      New Medications      Instructions Start Date   amLODIPine 5 MG tablet  Commonly known as:  NORVASC   5 mg, Oral, Every 24 Hours Scheduled      BRILINTA 90 MG tablet tablet  Generic drug:  ticagrelor   90 mg, Oral, 2 Times Daily         Changes to Medications      Instructions Start Date   carvedilol 12.5 MG tablet  Commonly known as:  COREG  What changed:    · medication strength  · how much to take  · when to take this   12.5 mg, Oral, 2 Times Daily With Meals      furosemide 20 MG tablet  Commonly known as:  LASIX  What changed:    · when to take this  · reasons to take this   20 mg, Oral, Daily PRN      losartan 50 MG tablet  Commonly known as:  COZAAR  What changed:  when to take this   50 mg, Oral, Daily      rosuvastatin 20 MG tablet  Commonly known as:  CRESTOR  What changed:    · medication strength  · how much to take   20 mg, Oral, Daily         Continue These Medications      Instructions Start Date   aspirin 81 MG EC tablet   81 mg, Oral, Daily      Coenzyme Q-10 100 MG capsule   100 mg, Oral, Daily      dexlansoprazole 60 MG capsule  Commonly known as:  DEXILANT   60 mg, Oral, Daily      doxazosin 4 MG tablet  Commonly known as:  CARDURA   4 mg, Oral, Nightly      isosorbide mononitrate 60 MG 24 hr tablet  Commonly known as:  IMDUR   60 mg, Oral, Daily      Liraglutide 18 MG/3ML solution pen-injector injection  Commonly known as:  VICTOZA   1.8 mg, Subcutaneous, Daily      magnesium gluconate 500 MG tablet  Commonly known as:  MAGONATE   500 mg, Oral, 2 Times Daily      nitroglycerin 0.4 MG  SL tablet  Commonly known as:  NITROSTAT   0.4 mg, Sublingual, Every 5 Minutes PRN, Take no more than 3 doses in 15 minutes.      Potassium 99 MG tablet   1 tablet, Oral, Daily      TOUJEO SOLOSTAR SC   48 Units, Subcutaneous, Every Morning         Stop These Medications    clopidogrel 75 MG tablet  Commonly known as:  PLAVIX     [facility] PO          Discharge patient 6 hours after ambulation    Home meds reviewed- continue all other home meds    Follow Up in 7-10 days in the interventional cardiology clinic      Your Scheduled Appointments    Sep 03, 2019  8:00 AM EDT  PHASE II with CHAIR 5 COR CARD REHAB  [facility] CARDIAC  REHABILITATION ([place]) [address]  [facility] [address]  [phone]   Sep 05, 2019  5:00 PM EDT  Hospital Follow Up with Waldemar Trejo MD  [facility] FAMILY MEDICINE (--) [address] [facility] [address]  [phone]   Dec 02, 2019  1:00 PM EST  Follow Up with Paul Villatoro MD  [facility] CARDIOLOGY (--) [address]  [facility] [address]  [phone]   Arrive 15 minutes prior to appointment.          Follow-up Information     Waldemar Trejo MD. Go on 9/5/2019.    Specialty:  Family Medicine  Why:  Appointment time 5 pm  Contact information:  [address] [facility] [address]  [phone]             Paul Villatoro MD. Go on 8/26/2019.    Specialties:  Interventional Cardiology, Cardiology  Why:  Appointment at 1 pm  Contact information:  [address]  [address]  [facility] [address]  [phone]                    Paul Villatoro MD  08/26/19  6:23 PM    Please note that this discharge summary required more than 30 minutes to complete.

## 2019-08-26 NOTE — PROGRESS NOTES
[facility] CARDIOLOGY  [address] [facility] [address]  [place] [address]  Phone: [phone]  Fax: [phone]    08/26/2019    Chief Complaint   Patient presents with   • Coronary Artery Disease   • Diabetes        History:   Nicholas Yin is a 74 y.o. male seen in followup, had a recent mid LAD PCI in the setting of mild non-STEMI and worsening anginal symptoms.  He feels a significantly better overall with this chest pain and dyspnea after the PCI.  He has a history of CKD stage III likely from underlying hypertension diabetes, history of diabetes mellitus type 2,and  Hypertension.  His creatinine did not increase post contrast administration. No edema or dizziness. He has been compliant with his medication regimen.  He uses Lasix as needed and he took it once since discharge from the hospital 2 weeks ago.    The chart and medications were reviewed today.   Past Medical History:   Diagnosis Date   • Anemia    • Back pain    • CancerTesticular/[place]    • Diabetes mellitus (CMS/HCC)    • Erectile dysfunction    • GERD (gastroesophageal reflux disease)    • Hypertension        Past Surgical History:   Procedure Laterality Date   • CARDIAC CATHETERIZATION N/A 8/15/2019    Procedure: Left Heart Cath;  Surgeon: Paul Villatoro MD;  Location:  [facility]ATH [facility]AS[facility] LOCATION;  Service: Cardiology (PCI/ARIA mid LAD)   • COLON SURGERY     • COLONOSCOPY     • EYE SURGERY     • MS RT/LT HEART CATHETERS N/A 8/15/2019    Procedure: Percutaneous Coronary Intervention;  Surgeon: Paul Villatoro MD;  Location:  [facility] CATH [facility] LOCATION;  Service: Cardiology   • SHOULDER ARTHROSCOPY     • VASECTOMY          Past Social History:  Social History     Socioeconomic History   • Marital status:      Spouse name: lita   • Number of children: 3   • Years of education: 16   • Highest education level: Not on file   Occupational History   • Occupation: retired   Tobacco Use   •  Smoking status: Never Smoker   • Smokeless tobacco: Never Used   Substance and Sexual Activity   • Alcohol use: Yes     Comment: occ   • Drug use: No   • Sexual activity: Defer       Past Family History:  Family History   Problem Relation Age of Onset   • Stroke Mother    • Hypertension Mother    • Alcohol abuse Mother    • COPD Mother    • COPD Father    • Alcohol abuse Father    • Hypertension Father    • Alzheimer's disease Father    • Heart disease Brother    • Hypertension Brother    • Diabetes Brother    • Stroke Maternal Grandfather    • Cancer Paternal Grandmother        Review of Systems:   Review of Systems   Constitution: Negative for diaphoresis, fever, weakness, weight gain and weight loss.   HENT: Negative for congestion, nosebleeds and sore throat.    Eyes: Negative for blurred vision and visual disturbance.   Cardiovascular: Negative for chest pain, claudication, dyspnea on exertion, irregular heartbeat, leg swelling, orthopnea, palpitations, paroxysmal nocturnal dyspnea and syncope.   Respiratory: Positive for shortness of breath. Negative for cough, hemoptysis, sleep disturbances due to breathing, snoring, sputum production and wheezing.    Endocrine: Positive for cold intolerance. Negative for heat intolerance, polydipsia, polyphagia and polyuria.   Hematologic/Lymphatic: Negative for bleeding problem.   Skin: Negative for dry skin, itching and rash.   Musculoskeletal: Negative for muscle cramps, muscle weakness and myalgias.   Gastrointestinal: Positive for heartburn. Negative for abdominal pain, constipation, diarrhea, hematemesis, hematochezia, hemorrhoids, melena, nausea and vomiting.   Genitourinary: Negative for hematuria and nocturia.   Neurological: Negative for excessive daytime sleepiness, dizziness, focal weakness, headaches, light-headedness, numbness, seizures and vertigo.   Psychiatric/Behavioral: Negative for depression, substance abuse and suicidal ideas.   Allergic/Immunologic:  "Negative for environmental allergies.         Current Outpatient Medications   Medication Sig Dispense Refill   • amLODIPine (NORVASC) 5 MG tablet Take 1 tablet by mouth Daily. 90 tablet 3   • aspirin 81 MG EC tablet Take 81 mg by mouth Daily.     • carvedilol (COREG) 12.5 MG tablet Take 1 tablet by mouth 2 (Two) Times a Day With Meals. 180 tablet 3   • Coenzyme Q-10 100 MG capsule Take 100 mg by mouth Daily.     • dexlansoprazole (DEXILANT) 60 MG capsule Take 1 capsule by mouth Daily. 30 capsule 5   • doxazosin (CARDURA) 4 MG tablet Take 1 tablet by mouth Every Night. 30 tablet 5   • furosemide (LASIX) 20 MG tablet Take 1 tablet by mouth Daily As Needed (For weight gain > 3 lbs and edema). 30 tablet 5   • Insulin Glargine (TOUJEO SOLOSTAR SC) Inject 48 Units under the skin into the appropriate area as directed Every Morning.     • isosorbide mononitrate (IMDUR) 60 MG 24 hr tablet Take 1 tablet by mouth Daily. 30 tablet 5   • Liraglutide (VICTOZA) 18 MG/3ML solution pen-injector injection Inject 1.8 mg under the skin into the appropriate area as directed Daily. 3 pen 5   • losartan (COZAAR) 50 MG tablet Take 1 tablet by mouth Daily. 180 tablet 3   • magnesium gluconate (MAGONATE) 500 MG tablet Take 500 mg by mouth 2 (Two) Times a Day.     • nitroglycerin (NITROSTAT) 0.4 MG SL tablet Place 1 tablet under the tongue Every 5 (Five) Minutes As Needed for Chest Pain. Take no more than 3 doses in 15 minutes. 30 tablet 5   • Potassium 99 MG tablet Take 1 tablet by mouth Daily.     • rosuvastatin (CRESTOR) 20 MG tablet Take 1 tablet by mouth Daily. 90 tablet 3   • ticagrelor (BRILINTA) 90 MG tablet tablet Take 1 tablet by mouth 2 (Two) Times a Day. 60 tablet 11     No current facility-administered medications for this visit.         Allergies   Allergen Reactions   • Iodinated Diagnostic Agents        Objective     /53 (BP Location: Left arm, Patient Position: Sitting)   Pulse 67   Ht 175.3 cm (69\")   Wt 104 kg " (228 lb 12.8 oz)   SpO2 93%   BMI 33.79 kg/m²     Physical Exam   Constitutional: He is oriented to person, place, and time. He appears well-developed and well-nourished. No distress.   HENT:   Head: Normocephalic.   Eyes: Pupils are equal, round, and reactive to light.   Neck: Neck supple. No JVD present. No thyromegaly present.   Cardiovascular: Normal rate and regular rhythm.   No murmur heard.  Pulmonary/Chest: Effort normal and breath sounds normal. He has no wheezes.   Abdominal: Soft. Bowel sounds are normal.   Musculoskeletal: He exhibits no edema.   Neurological: He is alert and oriented to person, place, and time.   Skin: Skin is warm. No erythema.   Psychiatric: He has a normal mood and affect. Judgment normal.       DATA:      Results for orders placed during the hospital encounter of 08/14/19   Adult Transthoracic Echo Limited W/ Cont if Necessary Per Protocol    Narrative · Limited echo with no contrast for assessment of LV systolic function.   The study is technically difficult for diagnosis. The quality of the study   is limited due to poor acoustic windows  · LV systolic function appears mildly reduced with EF of 45 to 50%, noted   mild inferoapical wall hypokinesia. No changes compared to the previous   study.         Results for orders placed during the hospital encounter of 07/16/19   Stress Test With Myocardial Perfusion (1 Day)    Narrative · Raw images reviewed with the following abnormalities noted: Motion   artifact.  · Myocardial perfusion imaging indicates a small-sized infarct located in   the apex with mild nile-infarct ischemia.  · Left ventricular ejection fraction is mildly reduced (Calculated EF =   49%).         Results for orders placed during the hospital encounter of 07/16/19   Stress Test With Myocardial Perfusion (1 Day)    Narrative · Raw images reviewed with the following abnormalities noted: Motion   artifact.  · Myocardial perfusion imaging indicates a small-sized infarct  located in   the apex with mild nile-infarct ischemia.  · Left ventricular ejection fraction is mildly reduced (Calculated EF =   49%).         Results for orders placed during the hospital encounter of 08/14/19   Cardiac Catheterization/Vascular Study    Narrative                 CARDIAC CATHETERIZATION / INTERVENTION REPORT             DATE OF PROCEDURE: 8/15/2019       INDICATION FOR PROCEDURE: NSTEMI      PRE PROCEDURE DIAGNOSIS:  Non-STEMI  Diabetes mellitus type 2  CKD stage IIIb  Hypertension  Dyslipidemia    POST PROCEDURE DIAGNOSIS:  CAD with mid LAD 99% plaque rupture stenosis status post PCI with a 3.0 x   18 mm Christina drug-eluting stent postdilated with a 3.25 NC balloon      Face to face mdoerate conscious  sedation time : 30 minutes      COMPLICATIONS : None    Specimens collected : None    Total contrast used: 90 cc     PROCEDURE PERFORMED:     1. Selective right and left Coronary Angiogram      Description of the procedure:  Prior to the procedure risk, benefits and possible alternative were   discussed with the patient and informed consent was obtained. Patient was   brought to cardiac cath lab table in post absorbtive state. Patient was   prepped and drape in usual sterile fashion. IV Versed and Fentanyl was   used for moderate sedation. 2% Lidocaine was used for topical anesthesia.   R radial arterial site was prepped and a micropuncture needle was used to   access the artery and a 5 F slender sheath was placed. 2.5 mg of Verapamil   and 200 mcg of NTG was given through the sheath intra arterial and 5000   units of Heparin was given once the catheter crossed the aortic arch.      5 F TIG 4 catheters was used for right and left coronary angiogram . All   the catheters were exchanged over 0.035 wire. The R radial arterial sheath   was removed and TR band was applied and immediate and complete hemostasis   was achieved. The patient tolerate the entire procedure well without any   immediate known  complications.    Coronary anatomy findings:    LM: Is a large calibre vessel , normal take off from left cusp, divides   into LAD and Lcx and a large ramus artery, no significant stenosis noted   in the left main    LAD: Large caliber vessel proximally, mild luminal irregularities,   diagonal 1 artery had 40 to 50% proximal stenosis, the mid LAD just after   the takeoff of the diagonal 2 artery had a 95% plaque rupture stenosis   with GIANFRANCO II flow distally into the mid and distal LAD, distal portion of   the LAD had 50 to 60% stenosis, less than 1.5 mm caliber vessel.  The   diagonal 2 artery had mild to moderate disease in the proximal portion.    Ramus intermedius: Ramus intermedius artery is actually large caliber   vessel with no significant stenosis other than mild luminal irregularities    LCX: Moderate calibre vessel, mild luminal irregularities.  Continues as   OM branch which had no significant stenosis.    RCA: Large calibre, dominant artery, normal take off from right cusp.  No   significant stenosis noted in the proximal mid and distal RCA, elevates   distally into a small PDA and PL arteries, there appear to be some   collaterals going to the left PL branches faint visualization.    Left Ventriculography:    Not performed to limit the contrast use      PERCUTANEOUS CORONARY INTERVENTION PROCEDURE NOTE:    Heparin monotherapy was used for anticoagulation.   Total of 12,000 units was given IV.    XB 3.5, 6 [place] guide was used to engage the left coronary artery   coaxially.    0.014 Runthrough guidewire was used to cross the lesion and parked   distally.     Used a 2.0 x 15 compliant TREK balloon to predilate the lesion at 8 Clint.     Prepped a 3.0 x 18 Christina Drug eluting stent and position at the lesion   and successfully deployed at 14 Clint.  Then used a 3.25 x 50 mm NC balloon   to post dilate the stent at 18 clint    Post stent deployment angio pictures showed good expansion with 0%   residual  stenosis, GIANFRANCO 3 flow in the distal vascular bed and no   dissection or distal wire perforation.     Lesion length: 12 mm  Pre PCI Stenosis: 95 %  Post PCI stenosis: 2 %  Pre PCI GIANFRANCO flow: 0  Post PCI GIANFRANCO flow: 3        Final Impression:  CAD with the mid LAD 95% plaque rupture stenosis, culprit vessel for his   non-STEMI, status post successful PCI with a 3.0 x 18 mm Christina   drug-eluting stent postdilated with 3.25 NC balloon.  Distal LAD had a 60%   lesion, very small caliber vessel, 1.5 mm, recommend medical management.        Recommendations:  Aggressive guideline directed medical management for CAD   Dual Anti platelet medication with Asa 81 mg qd and Brilinta 90 mg bid for   minimum 1 year.         Paul Villatoro MD, [facility]  Interventional Cardiology    08/15/19  10:08 AM       Procedures       Nicholas was seen today for coronary artery disease and diabetes.    Diagnoses and all orders for this visit:    PAD (peripheral artery disease) (CMS/[facility])  -     CBC & Differential; Future  -     Basic Metabolic Panel; Future    Essential hypertension  -     CBC & Differential; Future  -     Basic Metabolic Panel; Future    Coronary artery disease of native artery of native heart with stable angina pectoris (CMS/[facility])  -     CBC & Differential; Future  -     Basic Metabolic Panel; Future    Type 2 diabetes mellitus with complication, with long-term current use of insulin (CMS/[facility])  -     CBC & Differential; Future  -     Basic Metabolic Panel; Future    Dyslipidemia    NSTEMI, initial episode of care (CMS/[facility])  -     Ambulatory Referral to Cardiac Rehab          Assessment:    CAD S/P PCI to the mid LAD in the setting of worsening angina and mild non-STEMI  Mild ischemic cardiomyopathy with EF of 45%.  Moderate aortic stenosis  CKD stage III  Diabetes  type II  Hypertension  Dyslipidemia    Plan:    He appears to be stable from a cardiac standpoint.  Continue current medication regimen.    Continue with the dual  antiplatelet medication with aspirin and Brilinta for a year and likely Brilinta 60 mg twice daily for additional 1 year.  Continue with the Coreg and losartan for cardiomyopathy  Continue with the Crestor 20 mg daily, will recheck his lipid panel and add Zetia if his LDL is not within target.  I will get a CBC and BMP.   Phase II cardiac rehab for non-STEMI  Follow up in     Recommended increase activity to 30 minutes of walking daily, most days of the week.  Discussed diet and weight loss with patient.          Patient's Body mass index is 33.79 kg/m². BMI is above normal parameters. Recommendations include: educational material.       No Follow-up on file.    Thank you for allowing me to participate in the care of Nicholas iYn. Feel free to contact me directly with any further questions or concerns.          Paul Villatoro MD, [facility]  Interventional Cardiology    JEOVANNY Castellanos, acting as scribe for Paul Villatoro MD, [facility]  08/26/19  1:11 PM

## 2019-08-27 ENCOUNTER — READMISSION MANAGEMENT (OUTPATIENT)
Dept: CALL CENTER | Facility: HOSPITAL | Age: 74
End: 2019-08-27

## 2019-08-27 NOTE — OUTREACH NOTE
Medical Week 2 Survey      Responses   Facility patient discharged from?  [place]   Does the patient have one of the following disease processes/diagnoses(primary or secondary)?  Other   Week 2 attempt successful?  Yes   Call start time  0952   Discharge diagnosis  Unstable angina,  heart cath   Call end time  0954   Is patient permission given to speak with other caregiver?  Yes   Person spoke with today (if not patient) and relationship  Binue Spouse    Meds reviewed with patient/caregiver?  Yes   Is the patient having any side effects they believe may be caused by any medication additions or changes?  No   Does the patient have all medications ordered at discharge?  Yes   Is the patient taking all medications as directed (includes completed medication regime)?  Yes   Does the patient have a primary care provider?   Yes   Does the patient have an appointment with their PCP within 7 days of discharge?  Yes   Has the patient kept scheduled appointments due by today?  Yes   Has home health visited the patient within 72 hours of discharge?  N/A   Psychosocial issues?  No   Comments  No issues or complaints. Pt is recovering and they are keeping up with meds and appts.   Did the patient receive a copy of their discharge instructions?  Yes   Nursing interventions  Reviewed instructions with patient   What is the patient's perception of their health status since discharge?  Improving   Is the patient/caregiver able to teach back signs and symptoms related to disease process for when to call PCP?  Yes   Is the patient/caregiver able to teach back signs and symptoms related to disease process for when to call 911?  Yes   Is the patient/caregiver able to teach back the hierarchy of who to call/visit for symptoms/problems? PCP, Specialist, Home health nurse, Urgent Care, ED, 911  Yes   Week 2 Call Completed?  Yes          Jose Fagan RN

## 2019-08-28 ENCOUNTER — APPOINTMENT (OUTPATIENT)
Dept: CARDIAC REHAB | Facility: HOSPITAL | Age: 74
End: 2019-08-28

## 2019-08-28 ENCOUNTER — LAB (OUTPATIENT)
Dept: LAB | Facility: HOSPITAL | Age: 74
End: 2019-08-28

## 2019-08-28 DIAGNOSIS — Z79.4 TYPE 2 DIABETES MELLITUS WITH COMPLICATION, WITH LONG-TERM CURRENT USE OF INSULIN (HCC): ICD-10-CM

## 2019-08-28 DIAGNOSIS — E11.8 TYPE 2 DIABETES MELLITUS WITH COMPLICATION, WITH LONG-TERM CURRENT USE OF INSULIN (HCC): ICD-10-CM

## 2019-08-28 DIAGNOSIS — I73.9 PAD (PERIPHERAL ARTERY DISEASE) (HCC): ICD-10-CM

## 2019-08-28 DIAGNOSIS — I10 ESSENTIAL HYPERTENSION: ICD-10-CM

## 2019-08-28 DIAGNOSIS — I25.118 CORONARY ARTERY DISEASE OF NATIVE ARTERY OF NATIVE HEART WITH STABLE ANGINA PECTORIS (HCC): ICD-10-CM

## 2019-08-28 LAB
ANION GAP SERPL CALCULATED.3IONS-SCNC: 11.2 MMOL/L (ref 5–15)
BASOPHILS # BLD AUTO: 0.03 10*3/MM3 (ref 0–0.2)
BASOPHILS NFR BLD AUTO: 0.4 % (ref 0–1.5)
BUN BLD-MCNC: 23 MG/DL (ref 8–23)
BUN/CREAT SERPL: 11.1 (ref 7–25)
CALCIUM SPEC-SCNC: 8.9 MG/DL (ref 8.6–10.5)
CHLORIDE SERPL-SCNC: 108 MMOL/L (ref 98–107)
CO2 SERPL-SCNC: 22.8 MMOL/L (ref 22–29)
CREAT BLD-MCNC: 2.07 MG/DL (ref 0.76–1.27)
DEPRECATED RDW RBC AUTO: 47.2 FL (ref 37–54)
EOSINOPHIL # BLD AUTO: 0.36 10*3/MM3 (ref 0–0.4)
EOSINOPHIL NFR BLD AUTO: 4.6 % (ref 0.3–6.2)
ERYTHROCYTE [DISTWIDTH] IN BLOOD BY AUTOMATED COUNT: 14.6 % (ref 12.3–15.4)
GFR SERPL CREATININE-BSD FRML MDRD: 32 ML/MIN/1.73
GLUCOSE BLD-MCNC: 130 MG/DL (ref 65–99)
HCT VFR BLD AUTO: 35.1 % (ref 37.5–51)
HGB BLD-MCNC: 11.2 G/DL (ref 13–17.7)
IMM GRANULOCYTES # BLD AUTO: 0.02 10*3/MM3 (ref 0–0.05)
IMM GRANULOCYTES NFR BLD AUTO: 0.3 % (ref 0–0.5)
LYMPHOCYTES # BLD AUTO: 1.22 10*3/MM3 (ref 0.7–3.1)
LYMPHOCYTES NFR BLD AUTO: 15.7 % (ref 19.6–45.3)
MCH RBC QN AUTO: 28.9 PG (ref 26.6–33)
MCHC RBC AUTO-ENTMCNC: 31.9 G/DL (ref 31.5–35.7)
MCV RBC AUTO: 90.5 FL (ref 79–97)
MONOCYTES # BLD AUTO: 0.64 10*3/MM3 (ref 0.1–0.9)
MONOCYTES NFR BLD AUTO: 8.2 % (ref 5–12)
NEUTROPHILS # BLD AUTO: 5.49 10*3/MM3 (ref 1.7–7)
NEUTROPHILS NFR BLD AUTO: 70.8 % (ref 42.7–76)
PLATELET # BLD AUTO: 196 10*3/MM3 (ref 140–450)
PMV BLD AUTO: 11.2 FL (ref 6–12)
POTASSIUM BLD-SCNC: 4.1 MMOL/L (ref 3.5–5.2)
RBC # BLD AUTO: 3.88 10*6/MM3 (ref 4.14–5.8)
SODIUM BLD-SCNC: 142 MMOL/L (ref 136–145)
WBC NRBC COR # BLD: 7.76 10*3/MM3 (ref 3.4–10.8)

## 2019-08-28 PROCEDURE — 80048 BASIC METABOLIC PNL TOTAL CA: CPT

## 2019-08-28 PROCEDURE — 36415 COLL VENOUS BLD VENIPUNCTURE: CPT

## 2019-08-28 PROCEDURE — 85025 COMPLETE CBC W/AUTO DIFF WBC: CPT

## 2019-08-30 ENCOUNTER — APPOINTMENT (OUTPATIENT)
Dept: CARDIAC REHAB | Facility: HOSPITAL | Age: 74
End: 2019-08-30

## 2019-09-03 ENCOUNTER — READMISSION MANAGEMENT (OUTPATIENT)
Dept: CALL CENTER | Facility: HOSPITAL | Age: 74
End: 2019-09-03

## 2019-09-03 ENCOUNTER — APPOINTMENT (OUTPATIENT)
Dept: CARDIAC REHAB | Facility: HOSPITAL | Age: 74
End: 2019-09-03

## 2019-09-03 NOTE — OUTREACH NOTE
Medical Week 3 Survey      Responses   Facility patient discharged from?  [place]   Does the patient have one of the following disease processes/diagnoses(primary or secondary)?  Other   Week 3 attempt successful?  Yes   Call start time  1506   Call end time  1507   Discharge diagnosis  Unstable angina,  heart cath   Meds reviewed with patient/caregiver?  Yes   Is the patient taking all medications as directed (includes completed medication regime)?  Yes   Does the patient have a primary care provider?   Yes   Has the patient kept scheduled appointments due by today?  Yes   What is the patient's perception of their health status since discharge?  Improving   Week 3 Call Completed?  Yes   Graduated  Yes   Did the patient feel the follow up calls were helpful during their recovery period?  Yes   Was the number of calls appropriate?  Yes          Clarice Sampson RN

## 2019-09-04 ENCOUNTER — APPOINTMENT (OUTPATIENT)
Dept: CARDIAC REHAB | Facility: HOSPITAL | Age: 74
End: 2019-09-04

## 2019-09-05 ENCOUNTER — OFFICE VISIT (OUTPATIENT)
Dept: FAMILY MEDICINE CLINIC | Facility: CLINIC | Age: 74
End: 2019-09-05

## 2019-09-05 VITALS
TEMPERATURE: 97.8 F | DIASTOLIC BLOOD PRESSURE: 55 MMHG | RESPIRATION RATE: 12 BRPM | OXYGEN SATURATION: 91 % | HEART RATE: 80 BPM | WEIGHT: 230 LBS | HEIGHT: 69 IN | SYSTOLIC BLOOD PRESSURE: 135 MMHG | BODY MASS INDEX: 34.07 KG/M2

## 2019-09-05 DIAGNOSIS — I25.110 CORONARY ARTERY DISEASE INVOLVING NATIVE CORONARY ARTERY OF NATIVE HEART WITH UNSTABLE ANGINA PECTORIS (HCC): ICD-10-CM

## 2019-09-05 DIAGNOSIS — Z00.00 HEALTHCARE MAINTENANCE: ICD-10-CM

## 2019-09-05 DIAGNOSIS — R79.89 ELEVATED TSH: ICD-10-CM

## 2019-09-05 DIAGNOSIS — E11.8 TYPE 2 DIABETES MELLITUS WITH COMPLICATION, WITH LONG-TERM CURRENT USE OF INSULIN (HCC): ICD-10-CM

## 2019-09-05 DIAGNOSIS — I10 ESSENTIAL HYPERTENSION: ICD-10-CM

## 2019-09-05 DIAGNOSIS — Z79.4 TYPE 2 DIABETES MELLITUS WITH COMPLICATION, WITH LONG-TERM CURRENT USE OF INSULIN (HCC): ICD-10-CM

## 2019-09-05 DIAGNOSIS — Z23 ENCOUNTER FOR IMMUNIZATION: ICD-10-CM

## 2019-09-05 DIAGNOSIS — Z86.19 HISTORY OF HEPATITIS C: ICD-10-CM

## 2019-09-05 DIAGNOSIS — G45.3 AMAUROSIS FUGAX OF RIGHT EYE: ICD-10-CM

## 2019-09-05 DIAGNOSIS — N40.1 BENIGN PROSTATIC HYPERPLASIA WITH NOCTURIA: ICD-10-CM

## 2019-09-05 DIAGNOSIS — N18.9 CHRONIC RENAL FAILURE, UNSPECIFIED CKD STAGE: ICD-10-CM

## 2019-09-05 DIAGNOSIS — R35.1 BENIGN PROSTATIC HYPERPLASIA WITH NOCTURIA: ICD-10-CM

## 2019-09-05 DIAGNOSIS — I73.9 PAD (PERIPHERAL ARTERY DISEASE) (HCC): Primary | ICD-10-CM

## 2019-09-05 DIAGNOSIS — E78.5 DYSLIPIDEMIA: ICD-10-CM

## 2019-09-05 PROBLEM — R07.89 CHEST TIGHTNESS: Status: RESOLVED | Noted: 2019-07-02 | Resolved: 2019-09-05

## 2019-09-05 PROBLEM — I25.10 CAD (CORONARY ARTERY DISEASE): Status: ACTIVE | Noted: 2019-08-14

## 2019-09-05 PROCEDURE — 90674 CCIIV4 VAC NO PRSV 0.5 ML IM: CPT | Performed by: GENERAL PRACTICE

## 2019-09-05 PROCEDURE — 99214 OFFICE O/P EST MOD 30 MIN: CPT | Performed by: GENERAL PRACTICE

## 2019-09-05 PROCEDURE — G0008 ADMIN INFLUENZA VIRUS VAC: HCPCS | Performed by: GENERAL PRACTICE

## 2019-09-05 NOTE — PROGRESS NOTES
Subjective   Nicholas Yin is a 74 y.o. male.     History of Present Illness     Coronary Artery Disease  Underwent a coronary angiogram by Dr. Villatoro on 8/15/2019 with stenting of the mid LAD.  He has been chest pain-free since and has noted an improvement in his exercise tolerance and overall sense of well-being.  He has been maintained on Brilinta 60 twice daily and is aware to remain on ASA 81 daily with this.  He has started cardiac rehab and will undergo a cardiology reassessment on 12/2/2019    Transient Visual Loss  Over a year ago he experienced a sudden progressive decrease in the vision in his right eye while driving. He stated that the vision in the eye darkened gradually over several minutes to the point where the only things he could make out were very bright lights and the sun. This lasted for about 30 minutes then resolved over several minutes.  He has developed some blurring of the lower medial visual field of the same eye.  While records have yet to be received his ophthalmologist has apparently felt this is vascular in origin. He continues to deny any new headaches and has had no changes in his strength, sensation, speech or ability to understand what is said to him. He denies any palpitations and has had no lightheadedness or diaphoresis. He denies any new joint or muscle pain and has had no rash, fever or chills. He was hospitalized at [facility] over 10 years ago following a similar episodes that was ultimately felt to be vascular in origin. He has numerous cardiovascular risk factors as well as chronic renal insufficiency. MRI of the brain performed on 12/8/17 was reported as showing mild cerebral atrophy with chronic small vessel ischemic changes. MRA of the carotid arteries performed the same day was unremarkable.     Diabetes  Current symptoms include none. Patient denies paresthesia of the feet, polydipsia, polyuria, hypoglycemia and foot ulcerations. Evaluation to date has been:  hemoglobin A1C. Home sugars: BGs have remained at or near goal since last here. Current treatments: metformin, DPP inhibitor - januvia (both as janumet), SGLT2 inhibitor - bydureon and basal insulin - toujeo.   Lab Results   Component Value Date    HGBA1C 7.90 (H) 08/16/2019      Dyslipidemia  Compliance with treatment has been fair. The patient exercises are intermittently. He is currently being prescribed the following medication for his dyslipidemia - rosuvastatin. Patient denies side effects associated with his medications.   Lab Results   Component Value Date    CHOL 133 08/16/2019    CHLPL 125 09/28/2015    TRIG 114 08/16/2019    HDL 48 08/16/2019    LDL 62 08/16/2019     Hypertension  Home blood pressure readings: have generally been at or near goal. Associated signs and symptoms: exertional chest pain and dyspnea. Patient denies: palpitations, orthopnea, paroxysmal nocturnal dyspnea and peripheral edema. Current antihypertensive medications includes losartan, carvedilol, furosemide, and doxazosin. Medication compliance: taking as prescribed. The former has been held for several weeks to gauge any impact on his Cr but there was none. CT of the abdomen/pelvis performed on 1/11/16 revealed heavy atherosclerotic plaquing of the aorta and its branches.  Lab Results   Component Value Date    CREATININE 2.07 (H) 08/28/2019     Lab Results   Component Value Date    K 4.1 08/28/2019     Chronic Hepatitis C  Completed a course of harvoni for genotype 1A hepatitis C without complication.  RNA levels were nondetectable on 2/1/18.     Labs  Lab Results   Component Value Date    WBC 7.76 08/28/2019    HGB 11.2 (L) 08/28/2019    HCT 35.1 (L) 08/28/2019    MCV 90.5 08/28/2019     08/28/2019     The following portions of the patient's history were reviewed and updated as appropriate: allergies, current medications, past medical history, past social history and problem list.    Review of Systems   Constitutional:  Positive for fatigue. Negative for appetite change, chills, fever and unexpected weight change.   HENT: Negative for congestion, ear pain, rhinorrhea, sneezing, sore throat and voice change.    Eyes: Negative for visual disturbance.   Respiratory: Negative for cough, shortness of breath and wheezing.    Cardiovascular: Negative for chest pain, palpitations and leg swelling.   Gastrointestinal: Negative for abdominal pain, blood in stool, constipation, diarrhea, nausea and vomiting.   Endocrine: Negative for polydipsia and polyuria.   Genitourinary: Negative for difficulty urinating, dysuria, frequency, hematuria and urgency.        Erectile dysfunction - chronic progressive. Nocturia x 1-2 with occasional sense of incomplete voiding   Musculoskeletal: Positive for back pain (chronic - stable). Negative for arthralgias, joint swelling, myalgias and neck pain.   Skin: Negative for color change.        Lesions   Neurological: Negative for tremors, weakness, numbness and headaches.   Psychiatric/Behavioral: Negative for dysphoric mood, sleep disturbance and suicidal ideas. The patient is not nervous/anxious.      Objective   Physical Exam   Constitutional: He is oriented to person, place, and time. He appears well-developed and well-nourished. He is cooperative. He does not have a sickly appearance. No distress.   Walks with a slightly exaggerated thoracic kyphosis. No apparent distress. No pallor, jaundice, diaphoresis, or cyanosis.     HENT:   Head: Atraumatic.   Right Ear: Tympanic membrane, external ear and ear canal normal.   Left Ear: Tympanic membrane, external ear and ear canal normal.   Mouth/Throat: Oropharynx is clear and moist.   Eyes: EOM are normal. Pupils are equal, round, and reactive to light. No scleral icterus.   Neck: No JVD present. Carotid bruit is not present. No tracheal deviation present. No thyromegaly present.   Cardiovascular: Normal rate, regular rhythm, S1 normal, S2 normal, normal heart  sounds and intact distal pulses. Exam reveals no gallop.   No murmur heard.  Pulmonary/Chest: Breath sounds normal. He has no wheezes. He has no rales.   Abdominal: Soft. Normal aorta and bowel sounds are normal. He exhibits no abdominal bruit and no mass. There is no hepatosplenomegaly. There is no tenderness. No hernia.   Musculoskeletal: He exhibits no deformity.   Lymphadenopathy:        Head (right side): No submandibular adenopathy present.        Head (left side): No submandibular adenopathy present.     He has no cervical adenopathy.   Neurological: He is alert and oriented to person, place, and time. He has normal strength and normal reflexes. He displays normal reflexes. No cranial nerve deficit. He exhibits normal muscle tone. Coordination normal.   Skin: Skin is warm and dry. No rash noted. He is not diaphoretic. No pallor. Nails show no clubbing.   Psychiatric: He has a normal mood and affect. His behavior is normal.     Assessment/Plan   Problems Addressed this Visit        Cardiovascular and Mediastinum    Essential hypertension   Hypertension: marginal. Evidence of target organ damage: coronary artery disease, peripheral artery disease, chronic kidney disease and transient ischemic attack.  Encouraged to continue to work on diet and exercise plan.   Continue current medication    Amaurosis fugax of right eye  Reminded regarding the importance of risk factor modification.  Continue current medication    PAD (peripheral artery disease) (CMS/HCC)  As above.    CAD (coronary artery disease)  As above.  Follow up with cardiology        Digestive    History of hepatitis C       Endocrine    Type 2 diabetes mellitus with complication, with long-term current use of insulin (CMS/HCC)  Diabetes mellitus Type II, under good control.   Encouraged to continue to pursue ADA diet  Encouraged aerobic exercise.  Will consider a trial of jardiance with very close monitoring of his renal function       Genitourinary     Benign prostatic hyperplasia with nocturia    Chronic renal failure  Reminded to avoid any NSAIDs prescription or OTC       Other    Dyslipidemia  As above.   Continue current medication.    Encounter for immunization    Relevant Orders    Flucelvax Quad=>4Years (PFS) (Completed)    Healthcare maintenance  Recommended a flu shot    Relevant Orders    Flucelvax Quad=>4Years (PFS) (Completed)    Elevated TSH  Will continue to monitor

## 2019-09-06 ENCOUNTER — APPOINTMENT (OUTPATIENT)
Dept: CARDIAC REHAB | Facility: HOSPITAL | Age: 74
End: 2019-09-06

## 2019-09-09 ENCOUNTER — APPOINTMENT (OUTPATIENT)
Dept: CARDIAC REHAB | Facility: HOSPITAL | Age: 74
End: 2019-09-09

## 2019-09-09 ENCOUNTER — TREATMENT (OUTPATIENT)
Dept: CARDIAC REHAB | Facility: HOSPITAL | Age: 74
End: 2019-09-09

## 2019-09-09 VITALS — HEART RATE: 63 BPM | SYSTOLIC BLOOD PRESSURE: 128 MMHG | DIASTOLIC BLOOD PRESSURE: 58 MMHG

## 2019-09-09 DIAGNOSIS — Z95.5 S/P DRUG ELUTING CORONARY STENT PLACEMENT: ICD-10-CM

## 2019-09-09 DIAGNOSIS — I21.4 NSTEMI (NON-ST ELEVATED MYOCARDIAL INFARCTION) (HCC): Primary | ICD-10-CM

## 2019-09-09 PROCEDURE — 93798 PHYS/QHP OP CAR RHAB W/ECG: CPT

## 2019-09-09 NOTE — PROGRESS NOTES
Pt attended phase II visit.  Tolerated exercise well, NAD noted, no complaints voiced, skin warm, pink, dry, resp nl and even, denies chest discomfort, denies SOA.   Monitor shows NSR-ST with BBB, V/S WDL ,see Logan documentation for exercise data.  Dr. Reid physician immediately available

## 2019-09-11 ENCOUNTER — TREATMENT (OUTPATIENT)
Dept: CARDIAC REHAB | Facility: HOSPITAL | Age: 74
End: 2019-09-11

## 2019-09-11 ENCOUNTER — APPOINTMENT (OUTPATIENT)
Dept: CARDIAC REHAB | Facility: HOSPITAL | Age: 74
End: 2019-09-11

## 2019-09-11 VITALS — OXYGEN SATURATION: 98 % | DIASTOLIC BLOOD PRESSURE: 74 MMHG | HEART RATE: 76 BPM | SYSTOLIC BLOOD PRESSURE: 136 MMHG

## 2019-09-11 DIAGNOSIS — I20.8 STABLE ANGINA (HCC): ICD-10-CM

## 2019-09-11 DIAGNOSIS — I21.4 NSTEMI (NON-ST ELEVATED MYOCARDIAL INFARCTION) (HCC): Primary | ICD-10-CM

## 2019-09-11 DIAGNOSIS — Z95.5 S/P DRUG ELUTING CORONARY STENT PLACEMENT: ICD-10-CM

## 2019-09-11 PROCEDURE — 93798 PHYS/QHP OP CAR RHAB W/ECG: CPT

## 2019-09-13 ENCOUNTER — APPOINTMENT (OUTPATIENT)
Dept: CARDIAC REHAB | Facility: HOSPITAL | Age: 74
End: 2019-09-13

## 2019-09-13 ENCOUNTER — TREATMENT (OUTPATIENT)
Dept: CARDIAC REHAB | Facility: HOSPITAL | Age: 74
End: 2019-09-13

## 2019-09-13 VITALS — DIASTOLIC BLOOD PRESSURE: 60 MMHG | HEART RATE: 63 BPM | SYSTOLIC BLOOD PRESSURE: 118 MMHG | OXYGEN SATURATION: 98 %

## 2019-09-13 DIAGNOSIS — I21.4 NSTEMI (NON-ST ELEVATED MYOCARDIAL INFARCTION) (HCC): Primary | ICD-10-CM

## 2019-09-13 DIAGNOSIS — Z95.5 S/P DRUG ELUTING CORONARY STENT PLACEMENT: ICD-10-CM

## 2019-09-13 PROCEDURE — 93798 PHYS/QHP OP CAR RHAB W/ECG: CPT

## 2019-09-13 NOTE — PROGRESS NOTES
Pt attended phase II visit.  Tolerated exercise well, NAD noted, no complaints voiced, skin warm, pink, dry, resp nl and even, denies chest discomfort, denies SOA.   Monitor shows NSR-ST with 1 AVB and BBB noted, V/S WDL ,see Logan documentation for exercise data.  Dr. Reid physician immediately available

## 2019-09-16 ENCOUNTER — APPOINTMENT (OUTPATIENT)
Dept: CARDIAC REHAB | Facility: HOSPITAL | Age: 74
End: 2019-09-16

## 2019-09-16 ENCOUNTER — TREATMENT (OUTPATIENT)
Dept: CARDIAC REHAB | Facility: HOSPITAL | Age: 74
End: 2019-09-16

## 2019-09-16 VITALS — SYSTOLIC BLOOD PRESSURE: 124 MMHG | DIASTOLIC BLOOD PRESSURE: 76 MMHG | HEART RATE: 63 BPM

## 2019-09-16 DIAGNOSIS — I20.8 STABLE ANGINA (HCC): ICD-10-CM

## 2019-09-16 DIAGNOSIS — Z95.5 S/P DRUG ELUTING CORONARY STENT PLACEMENT: ICD-10-CM

## 2019-09-16 DIAGNOSIS — I21.4 NSTEMI (NON-ST ELEVATED MYOCARDIAL INFARCTION) (HCC): Primary | ICD-10-CM

## 2019-09-16 PROCEDURE — 93798 PHYS/QHP OP CAR RHAB W/ECG: CPT

## 2019-09-16 NOTE — PROGRESS NOTES
Pt attended phase II visit.  Tolerated exercise well, NAD noted, no complaints voiced, skin warm, pink, dry, resp nl and even, denies chest discomfort, denies SOA.   Monitor shows NSR-ST with 1 AVB and BBB noted, V/S WDL ,see Logan documentation for exercise data.  Dr. Oviedo physician immediately available

## 2019-09-18 ENCOUNTER — APPOINTMENT (OUTPATIENT)
Dept: CARDIAC REHAB | Facility: HOSPITAL | Age: 74
End: 2019-09-18

## 2019-09-18 ENCOUNTER — TREATMENT (OUTPATIENT)
Dept: CARDIAC REHAB | Facility: HOSPITAL | Age: 74
End: 2019-09-18

## 2019-09-18 VITALS — SYSTOLIC BLOOD PRESSURE: 132 MMHG | HEART RATE: 66 BPM | DIASTOLIC BLOOD PRESSURE: 70 MMHG | OXYGEN SATURATION: 98 %

## 2019-09-18 DIAGNOSIS — Z95.5 S/P DRUG ELUTING CORONARY STENT PLACEMENT: ICD-10-CM

## 2019-09-18 DIAGNOSIS — I21.4 NSTEMI (NON-ST ELEVATED MYOCARDIAL INFARCTION) (HCC): Primary | ICD-10-CM

## 2019-09-18 PROCEDURE — 93798 PHYS/QHP OP CAR RHAB W/ECG: CPT

## 2019-09-20 ENCOUNTER — TREATMENT (OUTPATIENT)
Dept: CARDIAC REHAB | Facility: HOSPITAL | Age: 74
End: 2019-09-20

## 2019-09-20 ENCOUNTER — APPOINTMENT (OUTPATIENT)
Dept: CARDIAC REHAB | Facility: HOSPITAL | Age: 74
End: 2019-09-20

## 2019-09-20 VITALS — DIASTOLIC BLOOD PRESSURE: 62 MMHG | HEART RATE: 66 BPM | OXYGEN SATURATION: 98 % | SYSTOLIC BLOOD PRESSURE: 148 MMHG

## 2019-09-20 DIAGNOSIS — I21.4 NSTEMI (NON-ST ELEVATED MYOCARDIAL INFARCTION) (HCC): Primary | ICD-10-CM

## 2019-09-20 DIAGNOSIS — Z95.5 S/P DRUG ELUTING CORONARY STENT PLACEMENT: ICD-10-CM

## 2019-09-20 PROCEDURE — 93798 PHYS/QHP OP CAR RHAB W/ECG: CPT

## 2019-09-23 ENCOUNTER — APPOINTMENT (OUTPATIENT)
Dept: CARDIAC REHAB | Facility: HOSPITAL | Age: 74
End: 2019-09-23

## 2019-09-25 ENCOUNTER — LAB (OUTPATIENT)
Dept: LAB | Facility: HOSPITAL | Age: 74
End: 2019-09-25

## 2019-09-25 ENCOUNTER — APPOINTMENT (OUTPATIENT)
Dept: CARDIAC REHAB | Facility: HOSPITAL | Age: 74
End: 2019-09-25

## 2019-09-25 ENCOUNTER — TREATMENT (OUTPATIENT)
Dept: CARDIAC REHAB | Facility: HOSPITAL | Age: 74
End: 2019-09-25

## 2019-09-25 VITALS — HEART RATE: 64 BPM | DIASTOLIC BLOOD PRESSURE: 58 MMHG | OXYGEN SATURATION: 98 % | SYSTOLIC BLOOD PRESSURE: 130 MMHG

## 2019-09-25 DIAGNOSIS — N18.9 CHRONIC RENAL FAILURE, UNSPECIFIED CKD STAGE: ICD-10-CM

## 2019-09-25 DIAGNOSIS — Z95.5 S/P DRUG ELUTING CORONARY STENT PLACEMENT: ICD-10-CM

## 2019-09-25 DIAGNOSIS — I21.4 NSTEMI (NON-ST ELEVATED MYOCARDIAL INFARCTION) (HCC): Primary | ICD-10-CM

## 2019-09-25 DIAGNOSIS — N18.9 CHRONIC RENAL FAILURE, UNSPECIFIED CKD STAGE: Primary | ICD-10-CM

## 2019-09-25 LAB
ANION GAP SERPL CALCULATED.3IONS-SCNC: 11.7 MMOL/L (ref 5–15)
BUN BLD-MCNC: 23 MG/DL (ref 8–23)
BUN/CREAT SERPL: 14 (ref 7–25)
CALCIUM SPEC-SCNC: 9.2 MG/DL (ref 8.6–10.5)
CHLORIDE SERPL-SCNC: 107 MMOL/L (ref 98–107)
CO2 SERPL-SCNC: 23.3 MMOL/L (ref 22–29)
CREAT BLD-MCNC: 1.64 MG/DL (ref 0.76–1.27)
GFR SERPL CREATININE-BSD FRML MDRD: 41 ML/MIN/1.73
GLUCOSE BLD-MCNC: 88 MG/DL (ref 65–99)
POTASSIUM BLD-SCNC: 4.6 MMOL/L (ref 3.5–5.2)
SODIUM BLD-SCNC: 142 MMOL/L (ref 136–145)

## 2019-09-25 PROCEDURE — 80048 BASIC METABOLIC PNL TOTAL CA: CPT

## 2019-09-25 PROCEDURE — 36415 COLL VENOUS BLD VENIPUNCTURE: CPT

## 2019-09-25 PROCEDURE — 93798 PHYS/QHP OP CAR RHAB W/ECG: CPT

## 2019-09-27 ENCOUNTER — DOCUMENTATION (OUTPATIENT)
Dept: CARDIAC REHAB | Facility: HOSPITAL | Age: 74
End: 2019-09-27

## 2019-09-27 ENCOUNTER — APPOINTMENT (OUTPATIENT)
Dept: CARDIAC REHAB | Facility: HOSPITAL | Age: 74
End: 2019-09-27

## 2019-09-27 ENCOUNTER — TREATMENT (OUTPATIENT)
Dept: CARDIAC REHAB | Facility: HOSPITAL | Age: 74
End: 2019-09-27

## 2019-09-27 VITALS — HEART RATE: 66 BPM | SYSTOLIC BLOOD PRESSURE: 140 MMHG | OXYGEN SATURATION: 98 % | DIASTOLIC BLOOD PRESSURE: 68 MMHG

## 2019-09-27 DIAGNOSIS — I21.4 NSTEMI (NON-ST ELEVATED MYOCARDIAL INFARCTION) (HCC): Primary | ICD-10-CM

## 2019-09-27 DIAGNOSIS — Z95.5 S/P DRUG ELUTING CORONARY STENT PLACEMENT: ICD-10-CM

## 2019-09-27 PROCEDURE — 93798 PHYS/QHP OP CAR RHAB W/ECG: CPT

## 2019-09-27 NOTE — PROGRESS NOTES
Patient stated he will be out of town for the month of October due to work. He stated he will try to use hotel gyms. He was educated on max heart rate . How to get target heart rate. Information in his blue folder. S/S of MI. Verbal teach back verified.

## 2019-09-30 ENCOUNTER — APPOINTMENT (OUTPATIENT)
Dept: CARDIAC REHAB | Facility: HOSPITAL | Age: 74
End: 2019-09-30

## 2019-10-02 ENCOUNTER — APPOINTMENT (OUTPATIENT)
Dept: CARDIAC REHAB | Facility: HOSPITAL | Age: 74
End: 2019-10-02

## 2019-10-04 ENCOUNTER — DOCUMENTATION (OUTPATIENT)
Dept: CARDIAC REHAB | Facility: HOSPITAL | Age: 74
End: 2019-10-04

## 2019-10-04 ENCOUNTER — APPOINTMENT (OUTPATIENT)
Dept: CARDIAC REHAB | Facility: HOSPITAL | Age: 74
End: 2019-10-04

## 2019-10-04 NOTE — PROGRESS NOTES
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

## 2019-10-07 ENCOUNTER — APPOINTMENT (OUTPATIENT)
Dept: CARDIAC REHAB | Facility: HOSPITAL | Age: 74
End: 2019-10-07

## 2019-10-09 ENCOUNTER — APPOINTMENT (OUTPATIENT)
Dept: CARDIAC REHAB | Facility: HOSPITAL | Age: 74
End: 2019-10-09

## 2019-10-11 ENCOUNTER — APPOINTMENT (OUTPATIENT)
Dept: CARDIAC REHAB | Facility: HOSPITAL | Age: 74
End: 2019-10-11

## 2019-10-14 ENCOUNTER — APPOINTMENT (OUTPATIENT)
Dept: CARDIAC REHAB | Facility: HOSPITAL | Age: 74
End: 2019-10-14

## 2019-10-15 DIAGNOSIS — N18.30 CHRONIC RENAL FAILURE, STAGE 3 (MODERATE) (HCC): Primary | ICD-10-CM

## 2019-10-15 DIAGNOSIS — N18.30 CKD (CHRONIC KIDNEY DISEASE) STAGE 3, GFR 30-59 ML/MIN (HCC): ICD-10-CM

## 2019-10-16 ENCOUNTER — APPOINTMENT (OUTPATIENT)
Dept: CARDIAC REHAB | Facility: HOSPITAL | Age: 74
End: 2019-10-16

## 2019-10-18 ENCOUNTER — APPOINTMENT (OUTPATIENT)
Dept: CARDIAC REHAB | Facility: HOSPITAL | Age: 74
End: 2019-10-18

## 2019-10-21 ENCOUNTER — APPOINTMENT (OUTPATIENT)
Dept: CARDIAC REHAB | Facility: HOSPITAL | Age: 74
End: 2019-10-21

## 2019-10-23 ENCOUNTER — APPOINTMENT (OUTPATIENT)
Dept: CARDIAC REHAB | Facility: HOSPITAL | Age: 74
End: 2019-10-23

## 2019-10-25 ENCOUNTER — APPOINTMENT (OUTPATIENT)
Dept: CARDIAC REHAB | Facility: HOSPITAL | Age: 74
End: 2019-10-25

## 2019-10-28 ENCOUNTER — APPOINTMENT (OUTPATIENT)
Dept: CARDIAC REHAB | Facility: HOSPITAL | Age: 74
End: 2019-10-28

## 2019-10-30 ENCOUNTER — APPOINTMENT (OUTPATIENT)
Dept: CARDIAC REHAB | Facility: HOSPITAL | Age: 74
End: 2019-10-30

## 2019-11-01 ENCOUNTER — APPOINTMENT (OUTPATIENT)
Dept: CARDIAC REHAB | Facility: HOSPITAL | Age: 74
End: 2019-11-01

## 2019-11-04 ENCOUNTER — APPOINTMENT (OUTPATIENT)
Dept: CARDIAC REHAB | Facility: HOSPITAL | Age: 74
End: 2019-11-04

## 2019-11-06 ENCOUNTER — APPOINTMENT (OUTPATIENT)
Dept: CARDIAC REHAB | Facility: HOSPITAL | Age: 74
End: 2019-11-06

## 2019-11-08 ENCOUNTER — APPOINTMENT (OUTPATIENT)
Dept: CARDIAC REHAB | Facility: HOSPITAL | Age: 74
End: 2019-11-08

## 2019-11-11 ENCOUNTER — APPOINTMENT (OUTPATIENT)
Dept: CARDIAC REHAB | Facility: HOSPITAL | Age: 74
End: 2019-11-11

## 2019-11-11 DIAGNOSIS — E78.5 DYSLIPIDEMIA: ICD-10-CM

## 2019-11-11 RX ORDER — ROSUVASTATIN CALCIUM 5 MG/1
TABLET, COATED ORAL
Qty: 90 TABLET | Refills: 3 | Status: SHIPPED | OUTPATIENT
Start: 2019-11-11 | End: 2020-05-18 | Stop reason: DRUGHIGH

## 2019-11-13 ENCOUNTER — APPOINTMENT (OUTPATIENT)
Dept: CARDIAC REHAB | Facility: HOSPITAL | Age: 74
End: 2019-11-13

## 2019-11-15 ENCOUNTER — APPOINTMENT (OUTPATIENT)
Dept: CARDIAC REHAB | Facility: HOSPITAL | Age: 74
End: 2019-11-15

## 2019-11-18 ENCOUNTER — APPOINTMENT (OUTPATIENT)
Dept: CARDIAC REHAB | Facility: HOSPITAL | Age: 74
End: 2019-11-18

## 2019-11-20 ENCOUNTER — TREATMENT (OUTPATIENT)
Dept: CARDIAC REHAB | Facility: HOSPITAL | Age: 74
End: 2019-11-20

## 2019-11-20 ENCOUNTER — APPOINTMENT (OUTPATIENT)
Dept: CARDIAC REHAB | Facility: HOSPITAL | Age: 74
End: 2019-11-20

## 2019-11-20 VITALS — DIASTOLIC BLOOD PRESSURE: 58 MMHG | OXYGEN SATURATION: 98 % | HEART RATE: 72 BPM | SYSTOLIC BLOOD PRESSURE: 126 MMHG

## 2019-11-20 DIAGNOSIS — I20.8 STABLE ANGINA (HCC): ICD-10-CM

## 2019-11-20 DIAGNOSIS — I21.4 NSTEMI (NON-ST ELEVATED MYOCARDIAL INFARCTION) (HCC): Primary | ICD-10-CM

## 2019-11-20 DIAGNOSIS — Z95.5 S/P DRUG ELUTING CORONARY STENT PLACEMENT: ICD-10-CM

## 2019-11-20 PROCEDURE — 93798 PHYS/QHP OP CAR RHAB W/ECG: CPT

## 2019-11-22 ENCOUNTER — APPOINTMENT (OUTPATIENT)
Dept: CARDIAC REHAB | Facility: HOSPITAL | Age: 74
End: 2019-11-22

## 2019-11-25 ENCOUNTER — APPOINTMENT (OUTPATIENT)
Dept: CARDIAC REHAB | Facility: HOSPITAL | Age: 74
End: 2019-11-25

## 2019-11-27 ENCOUNTER — APPOINTMENT (OUTPATIENT)
Dept: CARDIAC REHAB | Facility: HOSPITAL | Age: 74
End: 2019-11-27

## 2019-11-29 ENCOUNTER — APPOINTMENT (OUTPATIENT)
Dept: CARDIAC REHAB | Facility: HOSPITAL | Age: 74
End: 2019-11-29

## 2019-12-02 ENCOUNTER — OFFICE VISIT (OUTPATIENT)
Dept: CARDIOLOGY | Facility: CLINIC | Age: 74
End: 2019-12-02

## 2019-12-02 ENCOUNTER — APPOINTMENT (OUTPATIENT)
Dept: CARDIAC REHAB | Facility: HOSPITAL | Age: 74
End: 2019-12-02

## 2019-12-02 VITALS
HEIGHT: 69 IN | SYSTOLIC BLOOD PRESSURE: 159 MMHG | OXYGEN SATURATION: 95 % | DIASTOLIC BLOOD PRESSURE: 86 MMHG | BODY MASS INDEX: 33.86 KG/M2 | HEART RATE: 64 BPM | WEIGHT: 228.6 LBS

## 2019-12-02 DIAGNOSIS — R06.09 DYSPNEA ON EXERTION: ICD-10-CM

## 2019-12-02 DIAGNOSIS — I21.4 NSTEMI, INITIAL EPISODE OF CARE (HCC): ICD-10-CM

## 2019-12-02 DIAGNOSIS — I25.118 CORONARY ARTERY DISEASE OF NATIVE ARTERY OF NATIVE HEART WITH STABLE ANGINA PECTORIS (HCC): Primary | ICD-10-CM

## 2019-12-02 PROCEDURE — 99214 OFFICE O/P EST MOD 30 MIN: CPT | Performed by: INTERNAL MEDICINE

## 2019-12-02 RX ORDER — AMLODIPINE BESYLATE 10 MG/1
10 TABLET ORAL DAILY
Qty: 30 TABLET | Refills: 5 | Status: SHIPPED | OUTPATIENT
Start: 2019-12-02 | End: 2020-03-20

## 2019-12-02 RX ORDER — AMLODIPINE BESYLATE 10 MG/1
10 TABLET ORAL DAILY
COMMUNITY
End: 2019-12-02 | Stop reason: SDUPTHER

## 2019-12-02 NOTE — PROGRESS NOTES
[facility] CARDIOLOGY  [address]  [place] [address]  Phone: [phone]  Fax: [phone]    12/02/2019    Chief Complaint   Patient presents with   • Coronary Artery Disease   • Shortness of Breath   • Hypertension        History:   Nicholas Yin is a 74 y.o. male seen in followup, 3 months. He has a history of DM, CAD S/P stent to the LAD, and hypertension.  He has attend cardiac rehab without any limitations.  He denies any chest pain, shortness of breath or palpitations. No edema today He hasn't needed to take Lasix at this time. He has complaints of leg cramps. Described as painful.    The chart and medications were reviewed today.    Past Medical History:   Diagnosis Date   • Anemia    • Back pain    • CancerTesticular/Coloon    • Diabetes mellitus (CMS/HCC)    • Erectile dysfunction    • GERD (gastroesophageal reflux disease)    • Hypertension        Past Surgical History:   Procedure Laterality Date   • CARDIAC CATHETERIZATION N/A 8/15/2019    Procedure: Left Heart Cath;  Surgeon: Paul Villatoro MD;  Location: [facility] INVASIVE LOCATION;  Service: Cardiology   • COLON SURGERY     • COLONOSCOPY     • EYE SURGERY     • MO RT/LT HEART CATHETERS N/A 8/15/2019    Procedure: Percutaneous Coronary Intervention;  Surgeon: Paul Villatoro MD;  Location: [facility] INVASIVE LOCATION;  Service: Cardiology   • SHOULDER ARTHROSCOPY     • VASECTOMY          Past Social History:  Social History     Socioeconomic History   • Marital status:      Spouse name: lita   • Number of children: 3   • Years of education: 16   • Highest education level: Not on file   Occupational History   • Occupation: retired   Tobacco Use   • Smoking status: Never Smoker   • Smokeless tobacco: Never Used   Substance and Sexual Activity   • Alcohol use: Yes     Comment: occ   • Drug use: No   • Sexual activity: Defer       Past Family History:  Family History    Problem Relation Age of Onset   • Stroke Mother    • Hypertension Mother    • Alcohol abuse Mother    • COPD Mother    • COPD Father    • Alcohol abuse Father    • Hypertension Father    • Alzheimer's disease Father    • Heart disease Brother    • Hypertension Brother    • Diabetes Brother    • Stroke Maternal Grandfather    • Cancer Paternal Grandmother        Review of Systems:   Review of Systems   Constitution: Negative for diaphoresis, fever, weakness, weight gain and weight loss.   HENT: Negative for congestion, nosebleeds and sore throat.    Eyes: Negative for blurred vision and visual disturbance.   Cardiovascular: Negative for chest pain, claudication, dyspnea on exertion, irregular heartbeat, leg swelling, orthopnea, palpitations, paroxysmal nocturnal dyspnea and syncope.   Respiratory: Negative for cough, hemoptysis, shortness of breath, sleep disturbances due to breathing, snoring, sputum production and wheezing.    Endocrine: Negative for cold intolerance, heat intolerance, polydipsia, polyphagia and polyuria.   Hematologic/Lymphatic: Negative for bleeding problem.   Skin: Positive for dry skin. Negative for itching and rash.   Musculoskeletal: Positive for muscle cramps. Negative for muscle weakness and myalgias.   Gastrointestinal: Positive for diarrhea. Negative for abdominal pain, constipation, heartburn, hematemesis, hematochezia, hemorrhoids, melena, nausea and vomiting.   Genitourinary: Negative for hematuria and nocturia.   Neurological: Negative for excessive daytime sleepiness, dizziness, focal weakness, headaches, light-headedness, numbness, seizures and vertigo.   Psychiatric/Behavioral: Negative for depression, substance abuse and suicidal ideas.   Allergic/Immunologic: Negative for environmental allergies.         Current Outpatient Medications   Medication Sig Dispense Refill   • amLODIPine (NORVASC) 5 MG tablet Take 1 tablet by mouth Daily. 90 tablet 3   • aspirin 81 MG EC tablet Take  "81 mg by mouth Daily.     • carvedilol (COREG) 12.5 MG tablet Take 1 tablet by mouth 2 (Two) Times a Day With Meals. 180 tablet 3   • Coenzyme Q-10 100 MG capsule Take 100 mg by mouth Daily.     • dexlansoprazole (DEXILANT) 60 MG capsule Take 1 capsule by mouth Daily. 30 capsule 5   • doxazosin (CARDURA) 4 MG tablet Take 1 tablet by mouth Every Night. 30 tablet 5   • furosemide (LASIX) 20 MG tablet Take 1 tablet by mouth Daily As Needed (For weight gain > 3 lbs and edema). 30 tablet 5   • Insulin Glargine (TOUJEO SOLOSTAR SC) Inject 48 Units under the skin into the appropriate area as directed Every Morning.     • isosorbide mononitrate (IMDUR) 60 MG 24 hr tablet Take 1 tablet by mouth Daily. 30 tablet 5   • Liraglutide (VICTOZA) 18 MG/3ML solution pen-injector injection Inject 1.8 mg under the skin into the appropriate area as directed Daily. 3 pen 5   • losartan (COZAAR) 50 MG tablet Take 1 tablet by mouth Daily. 180 tablet 3   • magnesium gluconate (MAGONATE) 500 MG tablet Take 500 mg by mouth 2 (Two) Times a Day.     • nitroglycerin (NITROSTAT) 0.4 MG SL tablet Place 1 tablet under the tongue Every 5 (Five) Minutes As Needed for Chest Pain. Take no more than 3 doses in 15 minutes. 30 tablet 5   • Potassium 99 MG tablet Take 1 tablet by mouth Daily.     • rosuvastatin (CRESTOR) 20 MG tablet Take 1 tablet by mouth Daily. 90 tablet 3   • rosuvastatin (CRESTOR) 5 MG tablet TAKE ONE TABLET BY MOUTH DAILY FOR CHOLESTEROL 90 tablet 3   • ticagrelor (BRILINTA) 90 MG tablet tablet Take 1 tablet by mouth 2 (Two) Times a Day. 60 tablet 11     No current facility-administered medications for this visit.         Allergies   Allergen Reactions   • Iodinated Diagnostic Agents        Objective     /86 (BP Location: Left arm, Patient Position: Sitting)   Pulse 64   Ht 175.3 cm (69\")   Wt 104 kg (228 lb 9.6 oz)   SpO2 95%   BMI 33.76 kg/m²     Physical Exam   Constitutional: He is oriented to person, place, and time. " He appears well-developed and well-nourished.   HENT:   Head: Normocephalic.   Eyes: Pupils are equal, round, and reactive to light.   Neck: Neck supple. No thyromegaly present.   Cardiovascular: Normal rate and regular rhythm.   Pulmonary/Chest: Effort normal and breath sounds normal.   Abdominal: Soft. Bowel sounds are normal.   Musculoskeletal: He exhibits no edema.   Neurological: He is alert and oriented to person, place, and time.   Skin: Skin is warm. No erythema.   Psychiatric: He has a normal mood and affect. Judgment normal.       DATA:      Results for orders placed during the hospital encounter of 08/14/19   Adult Transthoracic Echo Limited W/ Cont if Necessary Per Protocol    Narrative · Limited echo with no contrast for assessment of LV systolic function.   The study is technically difficult for diagnosis. The quality of the study   is limited due to poor acoustic windows  · LV systolic function appears mildly reduced with EF of 45 to 50%, noted   mild inferoapical wall hypokinesia. No changes compared to the previous   study.         Results for orders placed during the hospital encounter of 07/16/19   Stress Test With Myocardial Perfusion (1 Day)    Narrative · Raw images reviewed with the following abnormalities noted: Motion   artifact.  · Myocardial perfusion imaging indicates a small-sized infarct located in   the apex with mild nile-infarct ischemia.  · Left ventricular ejection fraction is mildly reduced (Calculated EF =   49%).         Results for orders placed during the hospital encounter of 07/16/19   Stress Test With Myocardial Perfusion (1 Day)    Narrative · Raw images reviewed with the following abnormalities noted: Motion   artifact.  · Myocardial perfusion imaging indicates a small-sized infarct located in   the apex with mild nile-infarct ischemia.  · Left ventricular ejection fraction is mildly reduced (Calculated EF =   49%).         Results for orders placed during the hospital  encounter of 08/14/19   Cardiac Catheterization/Vascular Study    Narrative                 CARDIAC CATHETERIZATION / INTERVENTION REPORT             DATE OF PROCEDURE: 8/15/2019       INDICATION FOR PROCEDURE: NSTEMI      PRE PROCEDURE DIAGNOSIS:  Non-STEMI  Diabetes mellitus type 2  CKD stage IIIb  Hypertension  Dyslipidemia    POST PROCEDURE DIAGNOSIS:  CAD with mid LAD 99% plaque rupture stenosis status post PCI with a 3.0 x   18 mm Christina drug-eluting stent postdilated with a 3.25 NC balloon      Face to face mdoerate conscious  sedation time : 30 minutes      COMPLICATIONS : None    Specimens collected : None    Total contrast used: 90 cc     PROCEDURE PERFORMED:     1. Selective right and left Coronary Angiogram      Description of the procedure:  Prior to the procedure risk, benefits and possible alternative were   discussed with the patient and informed consent was obtained. Patient was   brought to cardiac cath lab table in post absorbtive state. Patient was   prepped and drape in usual sterile fashion. IV Versed and Fentanyl was   used for moderate sedation. 2% Lidocaine was used for topical anesthesia.   R radial arterial site was prepped and a micropuncture needle was used to   access the artery and a 5 F slender sheath was placed. 2.5 mg of Verapamil   and 200 mcg of NTG was given through the sheath intra arterial and 5000   units of Heparin was given once the catheter crossed the aortic arch.      5 F TIG 4 catheters was used for right and left coronary angiogram . All   the catheters were exchanged over 0.035 wire. The R radial arterial sheath   was removed and TR band was applied and immediate and complete hemostasis   was achieved. The patient tolerate the entire procedure well without any   immediate known complications.    Coronary anatomy findings:    LM: Is a large calibre vessel , normal take off from left cusp, divides   into LAD and Lcx and a large ramus artery, no significant stenosis noted    in the left main    LAD: Large caliber vessel proximally, mild luminal irregularities,   diagonal 1 artery had 40 to 50% proximal stenosis, the mid LAD just after   the takeoff of the diagonal 2 artery had a 95% plaque rupture stenosis   with GIANFRANCO II flow distally into the mid and distal LAD, distal portion of   the LAD had 50 to 60% stenosis, less than 1.5 mm caliber vessel.  The   diagonal 2 artery had mild to moderate disease in the proximal portion.    Ramus intermedius: Ramus intermedius artery is actually large caliber   vessel with no significant stenosis other than mild luminal irregularities    LCX: Moderate calibre vessel, mild luminal irregularities.  Continues as   OM branch which had no significant stenosis.    RCA: Large calibre, dominant artery, normal take off from right cusp.  No   significant stenosis noted in the proximal mid and distal RCA, elevates   distally into a small PDA and PL arteries, there appear to be some   collaterals going to the left PL branches faint visualization.    Left Ventriculography:    Not performed to limit the contrast use      PERCUTANEOUS CORONARY INTERVENTION PROCEDURE NOTE:    Heparin monotherapy was used for anticoagulation.   Total of 12,000 units was given IV.    XB 3.5, 6 Bahamian guide was used to engage the left coronary artery   coaxially.    0.014 Runthrough guidewire was used to cross the lesion and parked   distally.     Used a 2.0 x 15 compliant TREK balloon to predilate the lesion at 8 Clint.     Prepped a 3.0 x 18 Christina Drug eluting stent and position at the lesion   and successfully deployed at 14 Clint.  Then used a 3.25 x 50 mm NC balloon   to post dilate the stent at 18 clint    Post stent deployment angio pictures showed good expansion with 0%   residual stenosis, GIANFRANCO 3 flow in the distal vascular bed and no   dissection or distal wire perforation.     Lesion length: 12 mm  Pre PCI Stenosis: 95 %  Post PCI stenosis: 2 %  Pre PCI GIANFRANCO flow: 0  Post PCI  GIANFRANCO flow: 3        Final Impression:  CAD with the mid LAD 95% plaque rupture stenosis, culprit vessel for his   non-STEMI, status post successful PCI with a 3.0 x 18 mm Christina   drug-eluting stent postdilated with 3.25 NC balloon.  Distal LAD had a 60%   lesion, very small caliber vessel, 1.5 mm, recommend medical management.        Recommendations:  Aggressive guideline directed medical management for CAD   Dual Anti platelet medication with Asa 81 mg qd and Brilinta 90 mg bid for   minimum 1 year.         Paul Villatoro MD, [facility]  Interventional Cardiology    08/15/19  10:08 AM       Procedures             Assessment:  1. CAD S/P stent to the LAD  2. Essential HTN elevated today  3. Dyslipidemia statin therapy Crestor    Plan:  1. Increase Norvasc 10 mg daily  2. He will get labs with his PCP.  3. Get an echocardiogram to reassess heart function prior to next appointment.  4. Follow up in 3 months or sooner if needed.       Recommended increase activity to 30 minutes of walking daily, most days of the week.    Discussed diet and weight loss with patient.        Patient's Body mass index is 33.76 kg/m². BMI is above normal parameters. Recommendations include: educational material.       No Follow-up on file.    Thank you for allowing me to participate in the care of Nicholas Yin. Feel free to contact me directly with any further questions or concerns.          Paul Villatoro MD, [facility]  Interventional Cardiology    JEOVANNY Castellanos, acting as scribe for Paul Villatoro MD, [facility]  12/02/19  1:23 PM

## 2019-12-03 DIAGNOSIS — Z79.4 TYPE 2 DIABETES MELLITUS WITH COMPLICATION, WITH LONG-TERM CURRENT USE OF INSULIN (HCC): ICD-10-CM

## 2019-12-03 DIAGNOSIS — E11.8 TYPE 2 DIABETES MELLITUS WITH COMPLICATION, WITH LONG-TERM CURRENT USE OF INSULIN (HCC): ICD-10-CM

## 2019-12-03 DIAGNOSIS — I10 ESSENTIAL HYPERTENSION: Primary | ICD-10-CM

## 2019-12-03 DIAGNOSIS — E78.5 DYSLIPIDEMIA: ICD-10-CM

## 2019-12-04 ENCOUNTER — LAB (OUTPATIENT)
Dept: LAB | Facility: HOSPITAL | Age: 74
End: 2019-12-04

## 2019-12-04 ENCOUNTER — APPOINTMENT (OUTPATIENT)
Dept: CARDIAC REHAB | Facility: HOSPITAL | Age: 74
End: 2019-12-04

## 2019-12-04 DIAGNOSIS — I10 ESSENTIAL HYPERTENSION: ICD-10-CM

## 2019-12-04 DIAGNOSIS — E78.5 DYSLIPIDEMIA: ICD-10-CM

## 2019-12-04 DIAGNOSIS — E11.8 TYPE 2 DIABETES MELLITUS WITH COMPLICATION, WITH LONG-TERM CURRENT USE OF INSULIN (HCC): ICD-10-CM

## 2019-12-04 DIAGNOSIS — Z79.4 TYPE 2 DIABETES MELLITUS WITH COMPLICATION, WITH LONG-TERM CURRENT USE OF INSULIN (HCC): ICD-10-CM

## 2019-12-04 LAB
ALBUMIN SERPL-MCNC: 3.8 G/DL (ref 3.5–5.2)
ALBUMIN UR-MCNC: 166.2 MG/DL
ALBUMIN/GLOB SERPL: 1.1 G/DL
ALP SERPL-CCNC: 62 U/L (ref 39–117)
ALT SERPL W P-5'-P-CCNC: 15 U/L (ref 1–41)
ANION GAP SERPL CALCULATED.3IONS-SCNC: 12.2 MMOL/L (ref 5–15)
AST SERPL-CCNC: 22 U/L (ref 1–40)
BASOPHILS # BLD AUTO: 0.07 10*3/MM3 (ref 0–0.2)
BASOPHILS NFR BLD AUTO: 0.9 % (ref 0–1.5)
BILIRUB SERPL-MCNC: 0.5 MG/DL (ref 0.2–1.2)
BUN BLD-MCNC: 34 MG/DL (ref 8–23)
BUN/CREAT SERPL: 16 (ref 7–25)
CALCIUM SPEC-SCNC: 9.1 MG/DL (ref 8.6–10.5)
CHLORIDE SERPL-SCNC: 106 MMOL/L (ref 98–107)
CHOLEST SERPL-MCNC: 172 MG/DL (ref 0–200)
CO2 SERPL-SCNC: 23.8 MMOL/L (ref 22–29)
CREAT BLD-MCNC: 2.13 MG/DL (ref 0.76–1.27)
DEPRECATED RDW RBC AUTO: 42.2 FL (ref 37–54)
EOSINOPHIL # BLD AUTO: 0.65 10*3/MM3 (ref 0–0.4)
EOSINOPHIL NFR BLD AUTO: 8.5 % (ref 0.3–6.2)
ERYTHROCYTE [DISTWIDTH] IN BLOOD BY AUTOMATED COUNT: 13.2 % (ref 12.3–15.4)
GFR SERPL CREATININE-BSD FRML MDRD: 31 ML/MIN/1.73
GLOBULIN UR ELPH-MCNC: 3.4 GM/DL
GLUCOSE BLD-MCNC: 138 MG/DL (ref 65–99)
HBA1C MFR BLD: 7.4 % (ref 4.8–5.6)
HCT VFR BLD AUTO: 36 % (ref 37.5–51)
HDLC SERPL-MCNC: 41 MG/DL (ref 40–60)
HGB BLD-MCNC: 11.7 G/DL (ref 13–17.7)
IMM GRANULOCYTES # BLD AUTO: 0.02 10*3/MM3 (ref 0–0.05)
IMM GRANULOCYTES NFR BLD AUTO: 0.3 % (ref 0–0.5)
LDLC SERPL CALC-MCNC: 86 MG/DL (ref 0–100)
LDLC/HDLC SERPL: 2.1 {RATIO}
LYMPHOCYTES # BLD AUTO: 1.44 10*3/MM3 (ref 0.7–3.1)
LYMPHOCYTES NFR BLD AUTO: 18.9 % (ref 19.6–45.3)
MCH RBC QN AUTO: 28.5 PG (ref 26.6–33)
MCHC RBC AUTO-ENTMCNC: 32.5 G/DL (ref 31.5–35.7)
MCV RBC AUTO: 87.8 FL (ref 79–97)
MONOCYTES # BLD AUTO: 0.84 10*3/MM3 (ref 0.1–0.9)
MONOCYTES NFR BLD AUTO: 11 % (ref 5–12)
NEUTROPHILS # BLD AUTO: 4.61 10*3/MM3 (ref 1.7–7)
NEUTROPHILS NFR BLD AUTO: 60.4 % (ref 42.7–76)
NRBC BLD AUTO-RTO: 0 /100 WBC (ref 0–0.2)
PLATELET # BLD AUTO: 244 10*3/MM3 (ref 140–450)
PMV BLD AUTO: 10.2 FL (ref 6–12)
POTASSIUM BLD-SCNC: 4.2 MMOL/L (ref 3.5–5.2)
PROT SERPL-MCNC: 7.2 G/DL (ref 6–8.5)
RBC # BLD AUTO: 4.1 10*6/MM3 (ref 4.14–5.8)
SODIUM BLD-SCNC: 142 MMOL/L (ref 136–145)
TRIGL SERPL-MCNC: 224 MG/DL (ref 0–150)
VLDLC SERPL-MCNC: 44.8 MG/DL (ref 5–40)
WBC NRBC COR # BLD: 7.63 10*3/MM3 (ref 3.4–10.8)

## 2019-12-04 PROCEDURE — 85025 COMPLETE CBC W/AUTO DIFF WBC: CPT

## 2019-12-04 PROCEDURE — 83036 HEMOGLOBIN GLYCOSYLATED A1C: CPT

## 2019-12-04 PROCEDURE — 80053 COMPREHEN METABOLIC PANEL: CPT

## 2019-12-04 PROCEDURE — 80061 LIPID PANEL: CPT

## 2019-12-04 PROCEDURE — 82043 UR ALBUMIN QUANTITATIVE: CPT

## 2019-12-05 ENCOUNTER — HOSPITAL ENCOUNTER (OUTPATIENT)
Dept: CARDIOLOGY | Facility: HOSPITAL | Age: 74
Discharge: HOME | End: 2019-12-05
Admitting: INTERNAL MEDICINE

## 2019-12-05 ENCOUNTER — OFFICE VISIT (OUTPATIENT)
Dept: FAMILY MEDICINE CLINIC | Facility: CLINIC | Age: 74
End: 2019-12-05

## 2019-12-05 VITALS
WEIGHT: 231 LBS | RESPIRATION RATE: 12 BRPM | BODY MASS INDEX: 34.21 KG/M2 | DIASTOLIC BLOOD PRESSURE: 80 MMHG | HEIGHT: 69 IN | OXYGEN SATURATION: 94 % | HEART RATE: 81 BPM | SYSTOLIC BLOOD PRESSURE: 130 MMHG | TEMPERATURE: 98.5 F

## 2019-12-05 DIAGNOSIS — I21.4 NSTEMI, INITIAL EPISODE OF CARE (HCC): ICD-10-CM

## 2019-12-05 DIAGNOSIS — Z00.00 HEALTHCARE MAINTENANCE: ICD-10-CM

## 2019-12-05 DIAGNOSIS — G45.3 AMAUROSIS FUGAX OF RIGHT EYE: ICD-10-CM

## 2019-12-05 DIAGNOSIS — N40.1 BENIGN PROSTATIC HYPERPLASIA WITH NOCTURIA: ICD-10-CM

## 2019-12-05 DIAGNOSIS — I25.118 CORONARY ARTERY DISEASE OF NATIVE ARTERY OF NATIVE HEART WITH STABLE ANGINA PECTORIS (HCC): ICD-10-CM

## 2019-12-05 DIAGNOSIS — Z79.4 TYPE 2 DIABETES MELLITUS WITH COMPLICATION, WITH LONG-TERM CURRENT USE OF INSULIN (HCC): ICD-10-CM

## 2019-12-05 DIAGNOSIS — I10 ESSENTIAL HYPERTENSION: ICD-10-CM

## 2019-12-05 DIAGNOSIS — E78.2 MIXED HYPERLIPIDEMIA: ICD-10-CM

## 2019-12-05 DIAGNOSIS — Z86.19 HISTORY OF HEPATITIS C: ICD-10-CM

## 2019-12-05 DIAGNOSIS — R06.09 DYSPNEA ON EXERTION: ICD-10-CM

## 2019-12-05 DIAGNOSIS — E55.9 VITAMIN D DEFICIENCY: ICD-10-CM

## 2019-12-05 DIAGNOSIS — N18.30 CHRONIC RENAL FAILURE, STAGE 3 (MODERATE) (HCC): ICD-10-CM

## 2019-12-05 DIAGNOSIS — K21.9 GASTROESOPHAGEAL REFLUX DISEASE WITHOUT ESOPHAGITIS: ICD-10-CM

## 2019-12-05 DIAGNOSIS — E11.8 TYPE 2 DIABETES MELLITUS WITH COMPLICATION, WITH LONG-TERM CURRENT USE OF INSULIN (HCC): ICD-10-CM

## 2019-12-05 DIAGNOSIS — R35.1 BENIGN PROSTATIC HYPERPLASIA WITH NOCTURIA: ICD-10-CM

## 2019-12-05 DIAGNOSIS — I25.110 CORONARY ARTERY DISEASE INVOLVING NATIVE CORONARY ARTERY OF NATIVE HEART WITH UNSTABLE ANGINA PECTORIS (HCC): ICD-10-CM

## 2019-12-05 DIAGNOSIS — I73.9 PAD (PERIPHERAL ARTERY DISEASE) (HCC): Primary | ICD-10-CM

## 2019-12-05 LAB
BH CV ECHO MEAS - AO MAX PG (FULL): 27.5 MMHG
BH CV ECHO MEAS - AO MAX PG: 30.4 MMHG
BH CV ECHO MEAS - AO MEAN PG (FULL): 16.5 MMHG
BH CV ECHO MEAS - AO MEAN PG: 18.2 MMHG
BH CV ECHO MEAS - AO V2 MAX: 275.5 CM/SEC
BH CV ECHO MEAS - AO V2 MEAN: 201.8 CM/SEC
BH CV ECHO MEAS - AO V2 VTI: 70.6 CM
BH CV ECHO MEAS - AVA(I,A): 0.58 CM^2
BH CV ECHO MEAS - AVA(I,D): 0.58 CM^2
BH CV ECHO MEAS - AVA(V,A): 0.61 CM^2
BH CV ECHO MEAS - AVA(V,D): 0.61 CM^2
BH CV ECHO MEAS - BSA(HAYCOCK): 2.3 M^2
BH CV ECHO MEAS - BSA: 2.2 M^2
BH CV ECHO MEAS - BZI_BMI: 33.8 KILOGRAMS/M^2
BH CV ECHO MEAS - BZI_METRIC_HEIGHT: 175.3 CM
BH CV ECHO MEAS - BZI_METRIC_WEIGHT: 103.9 KG
BH CV ECHO MEAS - EDV(MOD-SP4): 65 ML
BH CV ECHO MEAS - EF(MOD-SP4): 73.8 %
BH CV ECHO MEAS - ESV(MOD-SP4): 17 ML
BH CV ECHO MEAS - LV DIASTOLIC VOL/BSA (35-75): 29.7 ML/M^2
BH CV ECHO MEAS - LV MAX PG: 2.9 MMHG
BH CV ECHO MEAS - LV MEAN PG: 1.7 MMHG
BH CV ECHO MEAS - LV SYSTOLIC VOL/BSA (12-30): 7.8 ML/M^2
BH CV ECHO MEAS - LV V1 MAX: 84.5 CM/SEC
BH CV ECHO MEAS - LV V1 MEAN: 61.9 CM/SEC
BH CV ECHO MEAS - LV V1 VTI: 20.5 CM
BH CV ECHO MEAS - LVLD AP4: 7.6 CM
BH CV ECHO MEAS - LVLS AP4: 6.4 CM
BH CV ECHO MEAS - LVOT AREA (M): 2 CM^2
BH CV ECHO MEAS - LVOT AREA: 2 CM^2
BH CV ECHO MEAS - LVOT DIAM: 1.6 CM
BH CV ECHO MEAS - MV A MAX VEL: 150.5 CM/SEC
BH CV ECHO MEAS - MV E MAX VEL: 135.1 CM/SEC
BH CV ECHO MEAS - MV E/A: 0.9
BH CV ECHO MEAS - SI(LVOT): 18.6 ML/M^2
BH CV ECHO MEAS - SI(MOD-SP4): 21.9 ML/M^2
BH CV ECHO MEAS - SV(LVOT): 40.7 ML
BH CV ECHO MEAS - SV(MOD-SP4): 48 ML
MAXIMAL PREDICTED HEART RATE: 147 BPM
STRESS TARGET HR: 125 BPM

## 2019-12-05 PROCEDURE — 93308 TTE F-UP OR LMTD: CPT | Performed by: INTERNAL MEDICINE

## 2019-12-05 PROCEDURE — 99214 OFFICE O/P EST MOD 30 MIN: CPT | Performed by: GENERAL PRACTICE

## 2019-12-05 PROCEDURE — 93306 TTE W/DOPPLER COMPLETE: CPT

## 2019-12-05 NOTE — PROGRESS NOTES
Subjective   Nicholas Yin is a 74 y.o. male.     History of Present Illness     Coronary Artery Disease  Underwent a coronary angiogram by Dr. Villatoro on 8/15/2019 with stenting of the mid LAD.  He has been chest pain-free since and has noted a persistent improvement in his exercise tolerance and overall sense of well-being.  He has been maintained on Brilinta 60 twice daily and is aware to remain on ASA 81 daily with this.  He completed cardiac rehab and underwent a cardiology reassessment on 12/2/2019 with an increase in amlodipine to 10 qd. Echocardiogram performed this morning was reported as showing DD but normal systolic function with an eEF of 65%    Transient Visual Loss  Over a year ago he experienced a sudden progressive decrease in the vision in his right eye while driving. He stated that the vision in the eye darkened gradually over several minutes to the point where the only things he could make out were very bright lights and the sun. This lasted for about 30 minutes then resolved over several minutes.  He has developed some blurring of the lower medial visual field of the same eye.  While records have yet to be received his ophthalmologist has apparently felt this is vascular in origin. He continues to deny any new headaches and has had no changes in his strength, sensation, speech or ability to understand what is said to him. He denies any palpitations and has had no lightheadedness or diaphoresis. He denies any new joint or muscle pain and has had no rash, fever or chills. He was hospitalized at [facility] over 10 years ago following a similar episodes that was ultimately felt to be vascular in origin. He has numerous cardiovascular risk factors as well as chronic renal insufficiency. MRI of the brain performed on 12/8/17 was reported as showing mild cerebral atrophy with chronic small vessel ischemic changes. MRA of the carotid arteries performed the same day was unremarkable.      Diabetes  Current symptoms include none. Patient denies paresthesia of the feet, polydipsia, polyuria, hypoglycemia and foot ulcerations. Evaluation to date has been: hemoglobin A1C. Home sugars: BGs have remained at or near goal since last here. Current treatments: SGLT2 inhibitor and basal insulin (togerther as xultophy)   Lab Results   Component Value Date    HGBA1C 7.40 (H) 12/04/2019      Dyslipidemia  Compliance with treatment has been fair. The patient exercises are intermittently. He is currently being prescribed the following medication for his dyslipidemia - rosuvastatin. Patient denies side effects associated with his medications.   Lab Results   Component Value Date    CHOL 172 12/04/2019    CHLPL 125 09/28/2015    TRIG 224 (H) 12/04/2019    HDL 41 12/04/2019    LDL 86 12/04/2019     Hypertension  Home blood pressure readings: have generally been at or near goal. Associated signs and symptoms: exertional chest pain and dyspnea. Patient denies: palpitations, orthopnea, paroxysmal nocturnal dyspnea and peripheral edema. Current antihypertensive medications includes losartan, carvedilol, furosemide, and amlodipine. Medication compliance: taking as prescribed. CT of the abdomen/pelvis performed on 1/11/16 revealed heavy atherosclerotic plaquing of the aorta and its branches.  Lab Results   Component Value Date    CREATININE 2.13 (H) 12/04/2019     Lab Results   Component Value Date    K 4.2 12/04/2019     Chronic Hepatitis C  Completed a course of harvoni for genotype 1A hepatitis C without complication.  RNA levels were nondetectable on 2/1/18.     Labs  Lab Results   Component Value Date    WBC 7.63 12/04/2019    HGB 11.7 (L) 12/04/2019    HCT 36.0 (L) 12/04/2019    MCV 87.8 12/04/2019     12/04/2019     The following portions of the patient's history were reviewed and updated as appropriate: allergies, current medications, past medical history, past social history and problem list.    Review of  Systems   Constitutional: Positive for fatigue. Negative for appetite change, chills, fever and unexpected weight change.   HENT: Negative for congestion, ear pain, rhinorrhea, sneezing, sore throat and voice change.    Eyes: Negative for visual disturbance.   Respiratory: Negative for cough, shortness of breath and wheezing.    Cardiovascular: Negative for chest pain, palpitations and leg swelling.   Gastrointestinal: Negative for abdominal pain, blood in stool, constipation, diarrhea, nausea and vomiting.   Endocrine: Negative for polydipsia and polyuria.   Genitourinary: Negative for difficulty urinating, dysuria, frequency, hematuria and urgency.        Erectile dysfunction - chronic progressive. Nocturia x 2-3 with occasional sense of incomplete voiding. Somewhat worse with discontinuation of doxazosin   Musculoskeletal: Positive for back pain (chronic - stable). Negative for arthralgias, joint swelling, myalgias and neck pain.   Skin: Negative for color change.        Lesions   Neurological: Negative for tremors, weakness, numbness and headaches.   Psychiatric/Behavioral: Negative for dysphoric mood, sleep disturbance and suicidal ideas. The patient is not nervous/anxious.      Objective   Physical Exam   Constitutional: He is oriented to person, place, and time. He appears well-developed and well-nourished. He is cooperative. He does not have a sickly appearance. No distress.   Walks with a slightly exaggerated thoracic kyphosis. No apparent distress. No pallor, jaundice, diaphoresis, or cyanosis.     HENT:   Head: Atraumatic.   Right Ear: Tympanic membrane, external ear and ear canal normal.   Left Ear: Tympanic membrane, external ear and ear canal normal.   Mouth/Throat: Oropharynx is clear and moist.   Eyes: EOM are normal. Pupils are equal, round, and reactive to light. No scleral icterus.   Neck: No JVD present. Carotid bruit is not present. No tracheal deviation present. No thyromegaly present.    Cardiovascular: Normal rate, regular rhythm, S1 normal, S2 normal, normal heart sounds and intact distal pulses. Exam reveals no gallop.   No murmur heard.  Pulmonary/Chest: Breath sounds normal. He has no wheezes. He has no rales.   Musculoskeletal: He exhibits no deformity.   Lymphadenopathy:        Head (right side): No submandibular adenopathy present.        Head (left side): No submandibular adenopathy present.     He has no cervical adenopathy.   Neurological: He is alert and oriented to person, place, and time. He has normal strength and normal reflexes. He displays normal reflexes. No cranial nerve deficit. He exhibits normal muscle tone. Coordination normal.   Skin: Skin is warm and dry. No rash noted. He is not diaphoretic. No pallor. Nails show no clubbing.   Psychiatric: He has a normal mood and affect. His behavior is normal.     Assessment/Plan   Problems Addressed this Visit        Cardiovascular and Mediastinum    Mixed hyperlipidemia  Encouraged to continue to work on his diet and exercise plan.  Continue current medication    Essential hypertension  As above.   Continue current medication.    Amaurosis fugax of right eye  Reminded regarding the importance of risk factor modification.  Continue current medication    PAD (peripheral artery disease) (CMS/HCC)  As above.    CAD (coronary artery disease)  As above.  Follow up with cardiology        Digestive    Gastroesophageal reflux disease without esophagitis    Vitamin D deficiency    History of hepatitis C  Doing well   Will continue to monitor       Endocrine    Type 2 diabetes mellitus with complication, with long-term current use of insulin (CMS/[facility])  Diabetes mellitus Type II, under good control.   Encouraged to continue to pursue ADA diet  Encouraged aerobic exercise.  Continue current medication for now       Genitourinary    Benign prostatic hyperplasia with nocturia    Chronic renal failure  Reminded to avoid any NSAIDs prescription or  OTC  Will continue to monitor       Other    Healthcare maintenance  Will discuss an updated colonoscopy at his return

## 2019-12-06 ENCOUNTER — APPOINTMENT (OUTPATIENT)
Dept: CARDIAC REHAB | Facility: HOSPITAL | Age: 74
End: 2019-12-06

## 2019-12-09 ENCOUNTER — APPOINTMENT (OUTPATIENT)
Dept: CARDIAC REHAB | Facility: HOSPITAL | Age: 74
End: 2019-12-09

## 2019-12-11 ENCOUNTER — APPOINTMENT (OUTPATIENT)
Dept: CARDIAC REHAB | Facility: HOSPITAL | Age: 74
End: 2019-12-11

## 2019-12-13 ENCOUNTER — APPOINTMENT (OUTPATIENT)
Dept: CARDIAC REHAB | Facility: HOSPITAL | Age: 74
End: 2019-12-13

## 2019-12-16 ENCOUNTER — APPOINTMENT (OUTPATIENT)
Dept: CARDIAC REHAB | Facility: HOSPITAL | Age: 74
End: 2019-12-16

## 2019-12-16 DIAGNOSIS — E11.8 TYPE 2 DIABETES MELLITUS WITH COMPLICATION, WITH LONG-TERM CURRENT USE OF INSULIN (HCC): ICD-10-CM

## 2019-12-16 DIAGNOSIS — Z79.4 TYPE 2 DIABETES MELLITUS WITH COMPLICATION, WITH LONG-TERM CURRENT USE OF INSULIN (HCC): ICD-10-CM

## 2019-12-16 RX ORDER — LIRAGLUTIDE 6 MG/ML
INJECTION SUBCUTANEOUS
Qty: 9 ML | Refills: 3 | Status: SHIPPED | OUTPATIENT
Start: 2019-12-16 | End: 2020-03-16

## 2019-12-18 ENCOUNTER — APPOINTMENT (OUTPATIENT)
Dept: CARDIAC REHAB | Facility: HOSPITAL | Age: 74
End: 2019-12-18

## 2019-12-20 ENCOUNTER — APPOINTMENT (OUTPATIENT)
Dept: CARDIAC REHAB | Facility: HOSPITAL | Age: 74
End: 2019-12-20

## 2019-12-20 RX ORDER — DEXLANSOPRAZOLE 60 MG/1
CAPSULE, DELAYED RELEASE ORAL
Qty: 30 CAPSULE | Refills: 5 | Status: SHIPPED | OUTPATIENT
Start: 2019-12-20 | End: 2021-04-01 | Stop reason: SDUPTHER

## 2019-12-23 ENCOUNTER — APPOINTMENT (OUTPATIENT)
Dept: CARDIAC REHAB | Facility: HOSPITAL | Age: 74
End: 2019-12-23

## 2019-12-27 ENCOUNTER — APPOINTMENT (OUTPATIENT)
Dept: CARDIAC REHAB | Facility: HOSPITAL | Age: 74
End: 2019-12-27

## 2020-03-05 ENCOUNTER — OFFICE VISIT (OUTPATIENT)
Dept: FAMILY MEDICINE CLINIC | Facility: CLINIC | Age: 75
End: 2020-03-05

## 2020-03-05 VITALS
BODY MASS INDEX: 34.66 KG/M2 | SYSTOLIC BLOOD PRESSURE: 138 MMHG | DIASTOLIC BLOOD PRESSURE: 72 MMHG | WEIGHT: 234 LBS | HEIGHT: 69 IN | HEART RATE: 95 BPM | RESPIRATION RATE: 14 BRPM | TEMPERATURE: 98.2 F | OXYGEN SATURATION: 95 %

## 2020-03-05 DIAGNOSIS — I10 ESSENTIAL HYPERTENSION: ICD-10-CM

## 2020-03-05 DIAGNOSIS — Z79.4 TYPE 2 DIABETES MELLITUS WITH COMPLICATION, WITH LONG-TERM CURRENT USE OF INSULIN (HCC): ICD-10-CM

## 2020-03-05 DIAGNOSIS — I25.110 CORONARY ARTERY DISEASE INVOLVING NATIVE CORONARY ARTERY OF NATIVE HEART WITH UNSTABLE ANGINA PECTORIS (HCC): ICD-10-CM

## 2020-03-05 DIAGNOSIS — M51.36 DDD (DEGENERATIVE DISC DISEASE), LUMBAR: ICD-10-CM

## 2020-03-05 DIAGNOSIS — N18.30 CHRONIC RENAL FAILURE, STAGE 3 (MODERATE) (HCC): ICD-10-CM

## 2020-03-05 DIAGNOSIS — E55.9 VITAMIN D DEFICIENCY: ICD-10-CM

## 2020-03-05 DIAGNOSIS — E78.2 MIXED HYPERLIPIDEMIA: ICD-10-CM

## 2020-03-05 DIAGNOSIS — Z00.00 HEALTHCARE MAINTENANCE: ICD-10-CM

## 2020-03-05 DIAGNOSIS — R35.1 BENIGN PROSTATIC HYPERPLASIA WITH NOCTURIA: ICD-10-CM

## 2020-03-05 DIAGNOSIS — E11.8 TYPE 2 DIABETES MELLITUS WITH COMPLICATION, WITH LONG-TERM CURRENT USE OF INSULIN (HCC): ICD-10-CM

## 2020-03-05 DIAGNOSIS — G45.3 AMAUROSIS FUGAX OF RIGHT EYE: ICD-10-CM

## 2020-03-05 DIAGNOSIS — K21.9 GASTROESOPHAGEAL REFLUX DISEASE WITHOUT ESOPHAGITIS: ICD-10-CM

## 2020-03-05 DIAGNOSIS — Z85.038 H/O COLON CANCER, STAGE I: ICD-10-CM

## 2020-03-05 DIAGNOSIS — I73.9 PAD (PERIPHERAL ARTERY DISEASE) (HCC): Primary | ICD-10-CM

## 2020-03-05 DIAGNOSIS — N40.1 BENIGN PROSTATIC HYPERPLASIA WITH NOCTURIA: ICD-10-CM

## 2020-03-05 DIAGNOSIS — N52.01 ERECTILE DYSFUNCTION DUE TO ARTERIAL INSUFFICIENCY: ICD-10-CM

## 2020-03-05 PROCEDURE — 80061 LIPID PANEL: CPT | Performed by: GENERAL PRACTICE

## 2020-03-05 PROCEDURE — 83036 HEMOGLOBIN GLYCOSYLATED A1C: CPT | Performed by: GENERAL PRACTICE

## 2020-03-05 PROCEDURE — 36415 COLL VENOUS BLD VENIPUNCTURE: CPT | Performed by: GENERAL PRACTICE

## 2020-03-05 PROCEDURE — 99214 OFFICE O/P EST MOD 30 MIN: CPT | Performed by: GENERAL PRACTICE

## 2020-03-05 PROCEDURE — 84443 ASSAY THYROID STIM HORMONE: CPT | Performed by: GENERAL PRACTICE

## 2020-03-05 PROCEDURE — 85025 COMPLETE CBC W/AUTO DIFF WBC: CPT | Performed by: GENERAL PRACTICE

## 2020-03-05 PROCEDURE — 80053 COMPREHEN METABOLIC PANEL: CPT | Performed by: GENERAL PRACTICE

## 2020-03-05 PROCEDURE — 82306 VITAMIN D 25 HYDROXY: CPT | Performed by: GENERAL PRACTICE

## 2020-03-05 NOTE — PROGRESS NOTES
Subjective   Nicholas Yin is a 75 y.o. male.     History of Present Illness     Coronary Artery Disease  Underwent a coronary angiogram by Dr. Villatoro on 8/15/2019 with stenting of the mid LAD.  He has been chest pain-free since and has noted a persistent improvement in his exercise tolerance and overall sense of well-being.  He has been maintained on Brilinta 60 twice daily and is aware to remain on ASA 81 daily with this. Echocardiogram performed on 12/5/19 was reported as showing DD but normal systolic function with an eEF of 65%. He will undergo a cardiology reassessment on 3/23/20    Transient Visual Loss  Over a year ago he experienced a sudden progressive decrease in the vision in his right eye while driving. He stated that the vision in the eye darkened gradually over several minutes to the point where the only things he could make out were very bright lights and the sun. This lasted for about 30 minutes then resolved over several minutes.  He has developed some blurring of the lower medial visual field of the same eye.  While records have yet to be received his ophthalmologist has apparently felt this is vascular in origin. He continues to deny any new headaches and has had no changes in his strength, sensation, speech or ability to understand what is said to him. He denies any palpitations and has had no lightheadedness or diaphoresis. He denies any new joint or muscle pain and has had no rash, fever or chills. He was hospitalized at [facility] over 10 years ago following a similar episodes that was ultimately felt to be vascular in origin. He has numerous cardiovascular risk factors as well as chronic renal insufficiency. MRI of the brain performed on 12/8/17 was reported as showing mild cerebral atrophy with chronic small vessel ischemic changes. MRA of the carotid arteries performed the same day was unremarkable.     Diabetes  Current symptoms include none. Patient denies paresthesia of the feet,  polydipsia, polyuria, hypoglycemia and foot ulcerations. Evaluation to date has been: hemoglobin A1C. Home sugars: BGs have remained at or near goal since last here. Current treatments: SGLT2 inhibitor and basal insulin (togerther as xultophy). He has had no recent labs      Dyslipidemia  Compliance with treatment has been fair. The patient exercises are intermittently. He is currently being prescribed the following medication for his dyslipidemia - rosuvastatin. Patient denies side effects associated with his medications.     Hypertension  Home blood pressure readings: have generally been at or near goal. Associated signs and symptoms: exertional chest pain and dyspnea. Patient denies: palpitations, orthopnea, paroxysmal nocturnal dyspnea and peripheral edema. Current antihypertensive medications includes losartan, carvedilol, furosemide, and amlodipine. Medication compliance: taking as prescribed. CT of the abdomen/pelvis performed on 1/11/16 revealed heavy atherosclerotic plaquing of the aorta and its branches.    Chronic Back Pain  Gives a long history of increasing mid and low back pain. This is described as a sharp ache. The pain does not radiate and has been unassociated with any other symptoms. The pain is worse with prolonged weight bearing and limits his activities more then anything else at present. Plain films of the L spine performed on 7/25/14 revealed multilevel disc disease with anterior osteophyte formation and facet arthropathy.    The following portions of the patient's history were reviewed and updated as appropriate: allergies, current medications, past medical history, past social history and problem list.    Review of Systems   Constitutional: Positive for fatigue. Negative for appetite change, chills, fever and unexpected weight change.   HENT: Negative for congestion, ear pain, rhinorrhea, sneezing, sore throat and voice change.    Eyes: Negative for visual disturbance.   Respiratory:  Positive for shortness of breath. Negative for cough and wheezing.    Cardiovascular: Negative for chest pain, palpitations and leg swelling.   Gastrointestinal: Negative for abdominal pain, blood in stool, constipation, diarrhea, nausea and vomiting.   Endocrine: Negative for polydipsia and polyuria.   Genitourinary: Negative for difficulty urinating, dysuria, frequency, hematuria and urgency.        Erectile dysfunction - chronic progressive. Nocturia x 2-3 with occasional sense of incomplete voiding. Somewhat worse with discontinuation of doxazosin   Musculoskeletal: Positive for back pain (chronic - progressive). Negative for arthralgias, joint swelling, myalgias and neck pain.   Skin: Negative for color change.        Lesions   Neurological: Negative for tremors, weakness, numbness and headaches.   Psychiatric/Behavioral: Negative for dysphoric mood, sleep disturbance and suicidal ideas. The patient is not nervous/anxious.      Objective   Physical Exam   Constitutional: He is oriented to person, place, and time. He appears well-developed and well-nourished. He is cooperative. He does not have a sickly appearance. No distress.   Walks with a slightly exaggerated thoracic kyphosis. No apparent distress. No pallor, jaundice, diaphoresis, or cyanosis.     HENT:   Head: Atraumatic.   Right Ear: Tympanic membrane, external ear and ear canal normal.   Left Ear: Tympanic membrane, external ear and ear canal normal.   Mouth/Throat: Oropharynx is clear and moist.   Eyes: Pupils are equal, round, and reactive to light. EOM are normal. No scleral icterus.   Neck: No JVD present. Carotid bruit is not present. No tracheal deviation present. No thyromegaly present.   Cardiovascular: Normal rate, regular rhythm, S1 normal, S2 normal, normal heart sounds and intact distal pulses. Exam reveals no gallop.   No murmur heard.  Pulmonary/Chest: Breath sounds normal. He has no wheezes. He has no rales.   Musculoskeletal: He exhibits  no deformity.   Lymphadenopathy:        Head (right side): No submandibular adenopathy present.        Head (left side): No submandibular adenopathy present.     He has no cervical adenopathy.   Neurological: He is alert and oriented to person, place, and time. No cranial nerve deficit. He exhibits normal muscle tone. Coordination normal.   Skin: Skin is warm and dry. No rash noted. He is not diaphoretic. No pallor. Nails show no clubbing.   Psychiatric: He has a normal mood and affect. His behavior is normal.     Assessment/Plan   Problems Addressed this Visit        Cardiovascular and Mediastinum    Mixed hyperlipidemia  Encouraged to continue to work on his diet and exercise plan.  Continue current medication  Updated labs drawn.    Relevant Orders    Lipid Panel    TSH    Essential hypertension  As above.   Continue current medication.    Relevant Orders    CBC & Differential    Comprehensive Metabolic Panel    CBC Auto Differential    Amaurosis fugax of right eye  Reminded regarding the importance of risk factor modification.  Continue current medication    PAD (peripheral artery disease) (CMS/[facility])   As above.    CAD (coronary artery disease)  As above.  Follow up with cardiology        Digestive    Gastroesophageal reflux disease without esophagitis    Vitamin D deficiency  Continue supplementation with monitoring.    Relevant Orders    Vitamin D 25 Hydroxy       Endocrine    Type 2 diabetes mellitus with complication, with long-term current use of insulin (CMS/[facility])  Diabetes mellitus Type II, under good control.   Encouraged to continue to pursue ADA diet  Encouraged aerobic exercise.  Continue current medication    Relevant Orders    Hemoglobin A1c       Musculoskeletal and Integument    DDD (degenerative disc disease), lumbar  Advised regarding rest, gentle exercise, ice and heat.  Recommended YOGA and perhaps water aerobics  Reviewed further options including a pain management assessment. Patient will  consider       Genitourinary    Benign prostatic hyperplasia with nocturia    Chronic renal failure  Reminded to avoid any NSAIDs prescription or OTC  Will continue to monitor    Relevant Orders    Comprehensive Metabolic Panel       Other    H/O colon cancer, stage I  Reminded that he is due for an updated colonoscopy. Patient will consider    Healthcare maintenance  Encouraged to follow up with shingrix.    Relevant Medications    Zoster Vac Recomb Adjuvanted (SHINGRIX) 50 MCG/0.5ML reconstituted suspension    Vasculogenic erectile dysfunction

## 2020-03-06 LAB
25(OH)D3 SERPL-MCNC: 13.7 NG/ML (ref 30–100)
ALBUMIN SERPL-MCNC: 3.8 G/DL (ref 3.5–5.2)
ALBUMIN/GLOB SERPL: 1.3 G/DL
ALP SERPL-CCNC: 73 U/L (ref 39–117)
ALT SERPL W P-5'-P-CCNC: 15 U/L (ref 1–41)
ANION GAP SERPL CALCULATED.3IONS-SCNC: 11.6 MMOL/L (ref 5–15)
AST SERPL-CCNC: 19 U/L (ref 1–40)
BASOPHILS # BLD AUTO: 0.05 10*3/MM3 (ref 0–0.2)
BASOPHILS NFR BLD AUTO: 0.6 % (ref 0–1.5)
BILIRUB SERPL-MCNC: 0.5 MG/DL (ref 0.2–1.2)
BUN BLD-MCNC: 33 MG/DL (ref 8–23)
BUN/CREAT SERPL: 16.8 (ref 7–25)
CALCIUM SPEC-SCNC: 9.1 MG/DL (ref 8.6–10.5)
CHLORIDE SERPL-SCNC: 105 MMOL/L (ref 98–107)
CHOLEST SERPL-MCNC: 80 MG/DL (ref 0–200)
CO2 SERPL-SCNC: 22.4 MMOL/L (ref 22–29)
CREAT BLD-MCNC: 1.97 MG/DL (ref 0.76–1.27)
DEPRECATED RDW RBC AUTO: 44 FL (ref 37–54)
EOSINOPHIL # BLD AUTO: 0.43 10*3/MM3 (ref 0–0.4)
EOSINOPHIL NFR BLD AUTO: 4.9 % (ref 0.3–6.2)
ERYTHROCYTE [DISTWIDTH] IN BLOOD BY AUTOMATED COUNT: 13.6 % (ref 12.3–15.4)
GFR SERPL CREATININE-BSD FRML MDRD: 33 ML/MIN/1.73
GLOBULIN UR ELPH-MCNC: 2.9 GM/DL
GLUCOSE BLD-MCNC: 205 MG/DL (ref 65–99)
HBA1C MFR BLD: 8.3 % (ref 4.8–5.6)
HCT VFR BLD AUTO: 35.1 % (ref 37.5–51)
HDLC SERPL-MCNC: 46 MG/DL (ref 40–60)
HGB BLD-MCNC: 11.7 G/DL (ref 13–17.7)
IMM GRANULOCYTES # BLD AUTO: 0.03 10*3/MM3 (ref 0–0.05)
IMM GRANULOCYTES NFR BLD AUTO: 0.3 % (ref 0–0.5)
LDLC SERPL CALC-MCNC: 17 MG/DL (ref 0–100)
LDLC/HDLC SERPL: 0.36 {RATIO}
LYMPHOCYTES # BLD AUTO: 1.51 10*3/MM3 (ref 0.7–3.1)
LYMPHOCYTES NFR BLD AUTO: 17.1 % (ref 19.6–45.3)
MCH RBC QN AUTO: 29.5 PG (ref 26.6–33)
MCHC RBC AUTO-ENTMCNC: 33.3 G/DL (ref 31.5–35.7)
MCV RBC AUTO: 88.6 FL (ref 79–97)
MONOCYTES # BLD AUTO: 0.77 10*3/MM3 (ref 0.1–0.9)
MONOCYTES NFR BLD AUTO: 8.7 % (ref 5–12)
NEUTROPHILS # BLD AUTO: 6.03 10*3/MM3 (ref 1.7–7)
NEUTROPHILS NFR BLD AUTO: 68.4 % (ref 42.7–76)
NRBC BLD AUTO-RTO: 0 /100 WBC (ref 0–0.2)
PLATELET # BLD AUTO: 216 10*3/MM3 (ref 140–450)
PMV BLD AUTO: 11.3 FL (ref 6–12)
POTASSIUM BLD-SCNC: 4.3 MMOL/L (ref 3.5–5.2)
PROT SERPL-MCNC: 6.7 G/DL (ref 6–8.5)
RBC # BLD AUTO: 3.96 10*6/MM3 (ref 4.14–5.8)
SODIUM BLD-SCNC: 139 MMOL/L (ref 136–145)
TRIGL SERPL-MCNC: 87 MG/DL (ref 0–150)
TSH SERPL DL<=0.05 MIU/L-ACNC: 3.63 UIU/ML (ref 0.27–4.2)
VLDLC SERPL-MCNC: 17.4 MG/DL (ref 5–40)
WBC NRBC COR # BLD: 8.82 10*3/MM3 (ref 3.4–10.8)

## 2020-03-06 RX ORDER — ERGOCALCIFEROL 1.25 MG/1
50000 CAPSULE ORAL WEEKLY
Qty: 4 CAPSULE | Refills: 5 | Status: SHIPPED | OUTPATIENT
Start: 2020-03-06 | End: 2020-09-08

## 2020-03-16 DIAGNOSIS — E11.8 TYPE 2 DIABETES MELLITUS WITH COMPLICATION, WITH LONG-TERM CURRENT USE OF INSULIN (HCC): ICD-10-CM

## 2020-03-16 DIAGNOSIS — Z79.4 TYPE 2 DIABETES MELLITUS WITH COMPLICATION, WITH LONG-TERM CURRENT USE OF INSULIN (HCC): ICD-10-CM

## 2020-03-16 RX ORDER — LIRAGLUTIDE 6 MG/ML
INJECTION SUBCUTANEOUS
Qty: 9 ML | Refills: 3 | Status: SHIPPED | OUTPATIENT
Start: 2020-03-16 | End: 2020-06-09

## 2020-03-20 RX ORDER — AZITHROMYCIN 250 MG/1
TABLET, FILM COATED ORAL
Qty: 6 TABLET | Refills: 0 | Status: SHIPPED | OUTPATIENT
Start: 2020-03-20 | End: 2020-05-18

## 2020-03-20 RX ORDER — AMLODIPINE BESYLATE 10 MG/1
TABLET ORAL
Qty: 30 TABLET | Refills: 5 | Status: SHIPPED | OUTPATIENT
Start: 2020-03-20 | End: 2020-12-15

## 2020-03-20 RX ORDER — CARVEDILOL 12.5 MG/1
12.5 TABLET ORAL 2 TIMES DAILY WITH MEALS
Qty: 180 TABLET | Refills: 3 | Status: SHIPPED | OUTPATIENT
Start: 2020-03-20 | End: 2020-09-08 | Stop reason: DRUGHIGH

## 2020-03-20 RX ORDER — CARVEDILOL 6.25 MG/1
TABLET ORAL
Qty: 60 TABLET | Refills: 5 | OUTPATIENT
Start: 2020-03-20

## 2020-03-21 RX ORDER — CIPROFLOXACIN AND DEXAMETHASONE 3; 1 MG/ML; MG/ML
SUSPENSION/ DROPS AURICULAR (OTIC)
Qty: 7.5 ML | Refills: 0 | Status: SHIPPED | OUTPATIENT
Start: 2020-03-21 | End: 2020-03-28

## 2020-05-18 ENCOUNTER — OFFICE VISIT (OUTPATIENT)
Dept: CARDIOLOGY | Facility: CLINIC | Age: 75
End: 2020-05-18

## 2020-05-18 VITALS
HEIGHT: 69 IN | BODY MASS INDEX: 33.77 KG/M2 | HEART RATE: 78 BPM | DIASTOLIC BLOOD PRESSURE: 78 MMHG | SYSTOLIC BLOOD PRESSURE: 145 MMHG | WEIGHT: 228 LBS

## 2020-05-18 DIAGNOSIS — E78.5 DYSLIPIDEMIA: ICD-10-CM

## 2020-05-18 DIAGNOSIS — I73.9 PAD (PERIPHERAL ARTERY DISEASE) (HCC): ICD-10-CM

## 2020-05-18 DIAGNOSIS — I25.110 CORONARY ARTERY DISEASE INVOLVING NATIVE CORONARY ARTERY OF NATIVE HEART WITH UNSTABLE ANGINA PECTORIS (HCC): ICD-10-CM

## 2020-05-18 DIAGNOSIS — E11.8 TYPE 2 DIABETES MELLITUS WITH COMPLICATION, WITH LONG-TERM CURRENT USE OF INSULIN (HCC): ICD-10-CM

## 2020-05-18 DIAGNOSIS — Z79.4 TYPE 2 DIABETES MELLITUS WITH COMPLICATION, WITH LONG-TERM CURRENT USE OF INSULIN (HCC): ICD-10-CM

## 2020-05-18 DIAGNOSIS — I10 ESSENTIAL HYPERTENSION: Primary | ICD-10-CM

## 2020-05-18 PROCEDURE — 99442 PR PHYS/QHP TELEPHONE EVALUATION 11-20 MIN: CPT | Performed by: INTERNAL MEDICINE

## 2020-05-18 RX ORDER — CLOPIDOGREL BISULFATE 75 MG/1
TABLET ORAL
COMMUNITY
Start: 2020-03-16 | End: 2020-05-18

## 2020-05-18 NOTE — PROGRESS NOTES
[facility] CARDIOLOGY  [address] [facility] [address]  [place] [address]  Phone: [phone]  Fax: [phone]    05/18/2020      1. You have chosen to receive care through the use of telemedicine. Telemedicine enables health care providers at different locations to provide safe, effective, and convenient care through the use of technology. As with any health care service, there are risks associated with the use of telemedicine, including equipment failure, poor connections, and  issues.       2. Do you understand the risks and benefits of telemedicine as I have explained them to you?  Yes      3. Have your questions regarding telemedicine been answered?  yes      4. Do you consent to the use of telemedicine in your medical care today?  yes      Chief Complaint   Patient presents with   • Coronary Artery Disease        History:   Nicholas Yin is a 75 y.o. male seen in followup, 3 months. He has past medical history significant for CAD S/P PCI to the mid LAD, hypertension, dyslipidemia, insulin dependent DM and GERD. No episodes of chest pain, shortness of breath, edema, or palpitations. States that he is doing well. Blood pressure is fluctuating from 109-145 diastolic. It usually is a little high toward the middle of the day. Discussed and reviewed labs results.   The chart and medications were reviewed today. Problems above addressed this visit.      Past Medical History:   Diagnosis Date   • Anemia    • Back pain    • CancerTesticular/[place]    • Diabetes mellitus (CMS/HCC)    • Erectile dysfunction    • GERD (gastroesophageal reflux disease)    • Hypertension        Past Surgical History:   Procedure Laterality Date   • CARDIAC CATHETERIZATION N/A 8/15/2019    Procedure: Left Heart Cath;  Surgeon: Paul Villatoro MD;  Location: [facility] INVASIVE LOCATION;  Service: Cardiology   • COLON SURGERY     • COLONOSCOPY     • EYE SURGERY     • NC RT/LT HEART  CATHETERS N/A 8/15/2019    Procedure: Percutaneous Coronary Intervention;  Surgeon: Paul Villatoro MD;  Location: [facility] INVASIVE LOCATION;  Service: Cardiology   • SHOULDER ARTHROSCOPY     • VASECTOMY          Past Social History:  Social History     Socioeconomic History   • Marital status:      Spouse name: lita   • Number of children: 3   • Years of education: 16   • Highest education level: Not on file   Occupational History   • Occupation: retired   Tobacco Use   • Smoking status: Never Smoker   • Smokeless tobacco: Never Used   Substance and Sexual Activity   • Alcohol use: Yes     Comment: occ   • Drug use: No   • Sexual activity: Defer       Past Family History:  Family History   Problem Relation Age of Onset   • Stroke Mother    • Hypertension Mother    • Alcohol abuse Mother    • COPD Mother    • COPD Father    • Alcohol abuse Father    • Hypertension Father    • Alzheimer's disease Father    • Heart disease Brother    • Hypertension Brother    • Diabetes Brother    • Stroke Maternal Grandfather    • Cancer Paternal Grandmother        Review of Systems:   Review of Systems   Constitution: Negative for diaphoresis, fever, weight gain and weight loss.   HENT: Negative for congestion, nosebleeds and sore throat.    Eyes: Negative for blurred vision and visual disturbance.   Cardiovascular: Negative for chest pain, claudication, dyspnea on exertion, irregular heartbeat, leg swelling, orthopnea, palpitations, paroxysmal nocturnal dyspnea and syncope.   Respiratory: Positive for shortness of breath. Negative for cough, hemoptysis, sleep disturbances due to breathing, snoring, sputum production and wheezing.    Endocrine: Negative for cold intolerance, heat intolerance, polydipsia, polyphagia and polyuria.   Hematologic/Lymphatic: Negative for bleeding problem.   Skin: Negative for dry skin, itching and rash.   Musculoskeletal: Negative for muscle cramps, muscle weakness and  myalgias.   Gastrointestinal: Negative for abdominal pain, constipation, diarrhea, heartburn, hematemesis, hematochezia, hemorrhoids, melena, nausea and vomiting.   Genitourinary: Negative for hematuria and nocturia.   Neurological: Negative for excessive daytime sleepiness, dizziness, focal weakness, headaches, light-headedness, numbness, seizures, vertigo and weakness.   Psychiatric/Behavioral: Negative for depression, substance abuse and suicidal ideas.   Allergic/Immunologic: Negative for environmental allergies.         Current Outpatient Medications   Medication Sig Dispense Refill   • amLODIPine (NORVASC) 10 MG tablet TAKE ONE TABLET BY MOUTH EVERY DAY FOR BLOOD PRESSURE 30 tablet 5   • aspirin 81 MG EC tablet Take 81 mg by mouth Daily.     • carvedilol (COREG) 12.5 MG tablet Take 1 tablet by mouth 2 (Two) Times a Day With Meals. 180 tablet 3   • Coenzyme Q-10 100 MG capsule Take 100 mg by mouth Daily.     • DEXILANT 60 MG capsule TAKE ONE CAPSULE BY MOUTH EVERY DAY 30 capsule 5   • doxazosin (CARDURA) 4 MG tablet Take 1 tablet by mouth Every Night. 30 tablet 5   • furosemide (LASIX) 20 MG tablet Take 1 tablet by mouth Daily As Needed (For weight gain > 3 lbs and edema). 30 tablet 5   • Insulin Glargine, 1 Unit Dial, (TOUJEO SOLOSTAR) 300 UNIT/ML solution pen-injector injection Inject 48 Units under the skin into the appropriate area as directed Every Morning. 18 pen 3   • isosorbide mononitrate (IMDUR) 60 MG 24 hr tablet Take 1 tablet by mouth Daily. 30 tablet 5   • losartan (COZAAR) 50 MG tablet Take 1 tablet by mouth Daily. 180 tablet 3   • magnesium gluconate (MAGONATE) 500 MG tablet Take 500 mg by mouth 2 (Two) Times a Day.     • nitroglycerin (NITROSTAT) 0.4 MG SL tablet Place 1 tablet under the tongue Every 5 (Five) Minutes As Needed for Chest Pain. Take no more than 3 doses in 15 minutes. 30 tablet 5   • Potassium 99 MG tablet Take 1 tablet by mouth Daily.     • rosuvastatin (CRESTOR) 20 MG tablet  "Take 1 tablet by mouth Daily. 90 tablet 3   • ticagrelor (BRILINTA) 90 MG tablet tablet Take 1 tablet by mouth 2 (Two) Times a Day. 60 tablet 11   • VICTOZA 18 MG/3ML solution pen-injector injection INJECT 1.8 MG SUBCUTANEOUSLY EVERY DAY FOR BLOOD SUGAR 9 mL 3   • vitamin D (ERGOCALCIFEROL) 1.25 MG (78472 UT) capsule capsule Take 1 capsule by mouth 1 (One) Time Per Week. 4 capsule 5     No current facility-administered medications for this visit.         Allergies   Allergen Reactions   • Iodinated Diagnostic Agents        Objective     /78 (BP Location: Right arm, Patient Position: Sitting)   Pulse 78   Ht 175.3 cm (69\")   Wt 103 kg (228 lb)   BMI 33.67 kg/m²     Physical Exam    DATA:      Results for orders placed during the hospital encounter of 12/05/19   Adult Transthoracic Echo Complete W/ Cont if Necessary Per Protocol    Narrative · The study is technically difficult for diagnosis. The quality of the   study is limited due to poor acoustic windows related to limited views   obtained and patient body habitus.  · LV Endocardium not well visualized in all views , systolic function is   normal, EF appears 65%.  · Left ventricular diastolic dysfunction (grade I a) consistent with   impaired relaxation.  · Mild aortic valve stenosis is present with peak systolic velocity of 2.6   m/sec. TUNG erroneoulsy calculated based on LVOT diameter.  · There is no evidence of pericardial effusion  · LV systolic function appears improved from previous study         Results for orders placed during the hospital encounter of 07/16/19   Stress Test With Myocardial Perfusion (1 Day)    Narrative · Raw images reviewed with the following abnormalities noted: Motion   artifact.  · Myocardial perfusion imaging indicates a small-sized infarct located in   the apex with mild nile-infarct ischemia.  · Left ventricular ejection fraction is mildly reduced (Calculated EF =   49%).         Results for orders placed during the " hospital encounter of 07/16/19   Stress Test With Myocardial Perfusion (1 Day)    Narrative · Raw images reviewed with the following abnormalities noted: Motion   artifact.  · Myocardial perfusion imaging indicates a small-sized infarct located in   the apex with mild nile-infarct ischemia.  · Left ventricular ejection fraction is mildly reduced (Calculated EF =   49%).         Results for orders placed during the hospital encounter of 08/14/19   Cardiac Catheterization/Vascular Study    Narrative                 CARDIAC CATHETERIZATION / INTERVENTION REPORT             DATE OF PROCEDURE: 8/15/2019       INDICATION FOR PROCEDURE: NSTEMI      PRE PROCEDURE DIAGNOSIS:  Non-STEMI  Diabetes mellitus type 2  CKD stage IIIb  Hypertension  Dyslipidemia    POST PROCEDURE DIAGNOSIS:  CAD with mid LAD 99% plaque rupture stenosis status post PCI with a 3.0 x   18 mm Christina drug-eluting stent postdilated with a 3.25 NC balloon      Face to face mdoerate conscious  sedation time : 30 minutes      COMPLICATIONS : None    Specimens collected : None    Total contrast used: 90 cc     PROCEDURE PERFORMED:     1. Selective right and left Coronary Angiogram      Description of the procedure:  Prior to the procedure risk, benefits and possible alternative were   discussed with the patient and informed consent was obtained. Patient was   brought to cardiac cath lab table in post absorbtive state. Patient was   prepped and drape in usual sterile fashion. IV Versed and Fentanyl was   used for moderate sedation. 2% Lidocaine was used for topical anesthesia.   R radial arterial site was prepped and a micropuncture needle was used to   access the artery and a 5 F slender sheath was placed. 2.5 mg of Verapamil   and 200 mcg of NTG was given through the sheath intra arterial and 5000   units of Heparin was given once the catheter crossed the aortic arch.      5 F TIG 4 catheters was used for right and left coronary angiogram . All   the  catheters were exchanged over 0.035 wire. The R radial arterial sheath   was removed and TR band was applied and immediate and complete hemostasis   was achieved. The patient tolerate the entire procedure well without any   immediate known complications.    Coronary anatomy findings:    LM: Is a large calibre vessel , normal take off from left cusp, divides   into LAD and Lcx and a large ramus artery, no significant stenosis noted   in the left main    LAD: Large caliber vessel proximally, mild luminal irregularities,   diagonal 1 artery had 40 to 50% proximal stenosis, the mid LAD just after   the takeoff of the diagonal 2 artery had a 95% plaque rupture stenosis   with GIANFRANCO II flow distally into the mid and distal LAD, distal portion of   the LAD had 50 to 60% stenosis, less than 1.5 mm caliber vessel.  The   diagonal 2 artery had mild to moderate disease in the proximal portion.    Ramus intermedius: Ramus intermedius artery is actually large caliber   vessel with no significant stenosis other than mild luminal irregularities    LCX: Moderate calibre vessel, mild luminal irregularities.  Continues as   OM branch which had no significant stenosis.    RCA: Large calibre, dominant artery, normal take off from right cusp.  No   significant stenosis noted in the proximal mid and distal RCA, elevates   distally into a small PDA and PL arteries, there appear to be some   collaterals going to the left PL branches faint visualization.    Left Ventriculography:    Not performed to limit the contrast use      PERCUTANEOUS CORONARY INTERVENTION PROCEDURE NOTE:    Heparin monotherapy was used for anticoagulation.   Total of 12,000 units was given IV.    XB 3.5, 6 Gambian guide was used to engage the left coronary artery   coaxially.    0.014 Runthrough guidewire was used to cross the lesion and parked   distally.     Used a 2.0 x 15 compliant TREK balloon to predilate the lesion at 8 Clint.     Prepped a 3.0 x 18 Christina Drug  eluting stent and position at the lesion   and successfully deployed at 14 Tram.  Then used a 3.25 x 50 mm NC balloon   to post dilate the stent at 18 tram    Post stent deployment angio pictures showed good expansion with 0%   residual stenosis, GIANFRANCO 3 flow in the distal vascular bed and no   dissection or distal wire perforation.     Lesion length: 12 mm  Pre PCI Stenosis: 95 %  Post PCI stenosis: 2 %  Pre PCI GIANFRANCO flow: 0  Post PCI GIANFRANCO flow: 3        Final Impression:  CAD with the mid LAD 95% plaque rupture stenosis, culprit vessel for his   non-STEMI, status post successful PCI with a 3.0 x 18 mm Christina   drug-eluting stent postdilated with 3.25 NC balloon.  Distal LAD had a 60%   lesion, very small caliber vessel, 1.5 mm, recommend medical management.        Recommendations:  Aggressive guideline directed medical management for CAD   Dual Anti platelet medication with Asa 81 mg qd and Brilinta 90 mg bid for   minimum 1 year.         Paul Villatoro MD, [facility]  Interventional Cardiology    08/15/19  10:08 AM       Procedures       Nicholas was seen today for coronary artery disease.    Diagnoses and all orders for this visit:    Essential hypertension    PAD (peripheral artery disease) (CMS/[facility])    Coronary artery disease involving native coronary artery of native heart with unstable angina pectoris (CMS/[facility])    Type 2 diabetes mellitus with complication, with long-term current use of insulin (CMS/[facility])    Dyslipidemia          Assessment:  CAD status post PCI to the mid LAD IN 8/2019  Essential hypertension is well controlled  Dyslipidemia with LDL 17  Insulin dependent type 2 diabetes with A1c 8.3 3/5/2020  CKD Stage 3, Cr stable around 3    Plan:  Continue with Brilinta and ECASA for minimum 1 year and switch to brilinta 60 bid after that  Cont with current GDMT  No change in medication regimen.  Follow up in 3 months or sooner if needed.     Recommended increase activity to 30 minutes of walking daily, most  days of the week.  Discussed diet and weight loss with patient.        Patient's Body mass index is 33.67 kg/m². BMI is above normal parameters. Recommendations include: educational material.       Return in about 3 months (around 8/18/2020).     The visit has been rescheduled as a phone visit to comply with patient safety concerns in accordance with CDC recommendations.  Total time of discussion was 15 minutes.     Thank you for allowing me to participate in the care of Nicholas Yin. Feel free to contact me directly with any further questions or concerns.          Paul Villatoro MD, [facility]  Interventional Cardiology    JEOVANNY Castellanos, acting as scribe for Paul Villatoro MD, [facility]  05/18/20  15:05       Improved.

## 2020-05-26 RX ORDER — CARVEDILOL 6.25 MG/1
TABLET ORAL
Qty: 60 TABLET | Refills: 5 | OUTPATIENT
Start: 2020-05-26

## 2020-06-04 DIAGNOSIS — I10 ESSENTIAL HYPERTENSION: Primary | ICD-10-CM

## 2020-06-04 DIAGNOSIS — E11.8 TYPE 2 DIABETES MELLITUS WITH COMPLICATION, WITH LONG-TERM CURRENT USE OF INSULIN (HCC): ICD-10-CM

## 2020-06-04 DIAGNOSIS — E55.9 VITAMIN D DEFICIENCY: ICD-10-CM

## 2020-06-04 DIAGNOSIS — Z79.4 TYPE 2 DIABETES MELLITUS WITH COMPLICATION, WITH LONG-TERM CURRENT USE OF INSULIN (HCC): ICD-10-CM

## 2020-06-04 DIAGNOSIS — E78.2 MIXED HYPERLIPIDEMIA: ICD-10-CM

## 2020-06-05 ENCOUNTER — LAB (OUTPATIENT)
Dept: LAB | Facility: HOSPITAL | Age: 75
End: 2020-06-05

## 2020-06-05 DIAGNOSIS — E78.2 MIXED HYPERLIPIDEMIA: ICD-10-CM

## 2020-06-05 DIAGNOSIS — Z79.4 TYPE 2 DIABETES MELLITUS WITH COMPLICATION, WITH LONG-TERM CURRENT USE OF INSULIN (HCC): ICD-10-CM

## 2020-06-05 DIAGNOSIS — E11.8 TYPE 2 DIABETES MELLITUS WITH COMPLICATION, WITH LONG-TERM CURRENT USE OF INSULIN (HCC): ICD-10-CM

## 2020-06-05 DIAGNOSIS — I10 ESSENTIAL HYPERTENSION: ICD-10-CM

## 2020-06-05 LAB
ALBUMIN SERPL-MCNC: 3.6 G/DL (ref 3.5–5.2)
ALBUMIN UR-MCNC: 232.1 MG/DL
ALBUMIN/GLOB SERPL: 1.2 G/DL
ALP SERPL-CCNC: 58 U/L (ref 39–117)
ALT SERPL W P-5'-P-CCNC: 7 U/L (ref 1–41)
ANION GAP SERPL CALCULATED.3IONS-SCNC: 9.5 MMOL/L (ref 5–15)
AST SERPL-CCNC: 19 U/L (ref 1–40)
BASOPHILS # BLD AUTO: 0.05 10*3/MM3 (ref 0–0.2)
BASOPHILS NFR BLD AUTO: 0.7 % (ref 0–1.5)
BILIRUB SERPL-MCNC: 0.5 MG/DL (ref 0.2–1.2)
BUN BLD-MCNC: 25 MG/DL (ref 8–23)
BUN/CREAT SERPL: 14.1 (ref 7–25)
CALCIUM SPEC-SCNC: 8.8 MG/DL (ref 8.6–10.5)
CHLORIDE SERPL-SCNC: 106 MMOL/L (ref 98–107)
CHOLEST SERPL-MCNC: 173 MG/DL (ref 0–200)
CO2 SERPL-SCNC: 23.5 MMOL/L (ref 22–29)
CREAT BLD-MCNC: 1.77 MG/DL (ref 0.76–1.27)
DEPRECATED RDW RBC AUTO: 43 FL (ref 37–54)
EOSINOPHIL # BLD AUTO: 0.5 10*3/MM3 (ref 0–0.4)
EOSINOPHIL NFR BLD AUTO: 6.9 % (ref 0.3–6.2)
ERYTHROCYTE [DISTWIDTH] IN BLOOD BY AUTOMATED COUNT: 13.2 % (ref 12.3–15.4)
GFR SERPL CREATININE-BSD FRML MDRD: 38 ML/MIN/1.73
GLOBULIN UR ELPH-MCNC: 2.9 GM/DL
GLUCOSE BLD-MCNC: 90 MG/DL (ref 65–99)
HBA1C MFR BLD: 8 % (ref 4.8–5.6)
HCT VFR BLD AUTO: 35.7 % (ref 37.5–51)
HDLC SERPL-MCNC: 46 MG/DL (ref 40–60)
HGB BLD-MCNC: 11.6 G/DL (ref 13–17.7)
IMM GRANULOCYTES # BLD AUTO: 0.01 10*3/MM3 (ref 0–0.05)
IMM GRANULOCYTES NFR BLD AUTO: 0.1 % (ref 0–0.5)
LDLC SERPL CALC-MCNC: 96 MG/DL (ref 0–100)
LDLC/HDLC SERPL: 2.08 {RATIO}
LYMPHOCYTES # BLD AUTO: 1.4 10*3/MM3 (ref 0.7–3.1)
LYMPHOCYTES NFR BLD AUTO: 19.2 % (ref 19.6–45.3)
MCH RBC QN AUTO: 28.5 PG (ref 26.6–33)
MCHC RBC AUTO-ENTMCNC: 32.5 G/DL (ref 31.5–35.7)
MCV RBC AUTO: 87.7 FL (ref 79–97)
MONOCYTES # BLD AUTO: 0.68 10*3/MM3 (ref 0.1–0.9)
MONOCYTES NFR BLD AUTO: 9.3 % (ref 5–12)
NEUTROPHILS # BLD AUTO: 4.65 10*3/MM3 (ref 1.7–7)
NEUTROPHILS NFR BLD AUTO: 63.8 % (ref 42.7–76)
NRBC BLD AUTO-RTO: 0 /100 WBC (ref 0–0.2)
PLATELET # BLD AUTO: 263 10*3/MM3 (ref 140–450)
PMV BLD AUTO: 9.8 FL (ref 6–12)
POTASSIUM BLD-SCNC: 4.1 MMOL/L (ref 3.5–5.2)
PROT SERPL-MCNC: 6.5 G/DL (ref 6–8.5)
RBC # BLD AUTO: 4.07 10*6/MM3 (ref 4.14–5.8)
SODIUM BLD-SCNC: 139 MMOL/L (ref 136–145)
TRIGL SERPL-MCNC: 156 MG/DL (ref 0–150)
VLDLC SERPL-MCNC: 31.2 MG/DL (ref 5–40)
WBC NRBC COR # BLD: 7.29 10*3/MM3 (ref 3.4–10.8)

## 2020-06-05 PROCEDURE — 83036 HEMOGLOBIN GLYCOSYLATED A1C: CPT

## 2020-06-05 PROCEDURE — 80061 LIPID PANEL: CPT

## 2020-06-05 PROCEDURE — 80053 COMPREHEN METABOLIC PANEL: CPT

## 2020-06-05 PROCEDURE — 82043 UR ALBUMIN QUANTITATIVE: CPT

## 2020-06-05 PROCEDURE — 85025 COMPLETE CBC W/AUTO DIFF WBC: CPT

## 2020-06-09 ENCOUNTER — OFFICE VISIT (OUTPATIENT)
Dept: FAMILY MEDICINE CLINIC | Facility: CLINIC | Age: 75
End: 2020-06-09

## 2020-06-09 ENCOUNTER — PRIOR AUTHORIZATION (OUTPATIENT)
Dept: FAMILY MEDICINE CLINIC | Facility: CLINIC | Age: 75
End: 2020-06-09

## 2020-06-09 DIAGNOSIS — K21.9 GASTROESOPHAGEAL REFLUX DISEASE WITHOUT ESOPHAGITIS: ICD-10-CM

## 2020-06-09 DIAGNOSIS — R79.89 ELEVATED TSH: ICD-10-CM

## 2020-06-09 DIAGNOSIS — E55.9 VITAMIN D DEFICIENCY: ICD-10-CM

## 2020-06-09 DIAGNOSIS — Z79.4 TYPE 2 DIABETES MELLITUS WITH COMPLICATION, WITH LONG-TERM CURRENT USE OF INSULIN (HCC): ICD-10-CM

## 2020-06-09 DIAGNOSIS — I10 ESSENTIAL HYPERTENSION: ICD-10-CM

## 2020-06-09 DIAGNOSIS — Z00.00 HEALTHCARE MAINTENANCE: ICD-10-CM

## 2020-06-09 DIAGNOSIS — R35.1 BENIGN PROSTATIC HYPERPLASIA WITH NOCTURIA: ICD-10-CM

## 2020-06-09 DIAGNOSIS — E11.8 TYPE 2 DIABETES MELLITUS WITH COMPLICATION, WITH LONG-TERM CURRENT USE OF INSULIN (HCC): ICD-10-CM

## 2020-06-09 DIAGNOSIS — N18.30 CHRONIC RENAL FAILURE, STAGE 3 (MODERATE) (HCC): ICD-10-CM

## 2020-06-09 DIAGNOSIS — I25.110 CORONARY ARTERY DISEASE INVOLVING NATIVE CORONARY ARTERY OF NATIVE HEART WITH UNSTABLE ANGINA PECTORIS (HCC): ICD-10-CM

## 2020-06-09 DIAGNOSIS — N40.1 BENIGN PROSTATIC HYPERPLASIA WITH NOCTURIA: ICD-10-CM

## 2020-06-09 DIAGNOSIS — E78.2 MIXED HYPERLIPIDEMIA: ICD-10-CM

## 2020-06-09 DIAGNOSIS — G45.3 AMAUROSIS FUGAX OF RIGHT EYE: ICD-10-CM

## 2020-06-09 DIAGNOSIS — I73.9 PAD (PERIPHERAL ARTERY DISEASE) (HCC): Primary | ICD-10-CM

## 2020-06-09 PROCEDURE — 99442 PR PHYS/QHP TELEPHONE EVALUATION 11-20 MIN: CPT | Performed by: GENERAL PRACTICE

## 2020-06-09 RX ORDER — (INSULIN DEGLUDEC AND LIRAGLUTIDE) 100; 3.6 [IU]/ML; MG/ML
50 INJECTION, SOLUTION SUBCUTANEOUS DAILY
Qty: 15 ML | Refills: 0 | Status: SHIPPED | OUTPATIENT
Start: 2020-06-09 | End: 2021-05-10 | Stop reason: SDUPTHER

## 2020-06-09 NOTE — TELEPHONE ENCOUNTER
"Anesthesia Post Evaluation    Patient: Mariaelena Grubbs    Procedure(s) Performed: Procedure(s) (LRB):  EXCHANGE IMPLANT-BREAST Bilateral (Bilateral)  CAPSULECTOMY-BREAST Bilateral (Bilateral)  CAPSULORRHAPHY (Bilateral)    Final Anesthesia Type: general  Patient location during evaluation: PACU  Patient participation: Yes- Able to Participate  Level of consciousness: awake and alert  Post-procedure vital signs: reviewed and stable  Pain management: adequate  Airway patency: patent  PONV status at discharge: No PONV  Anesthetic complications: no      Cardiovascular status: blood pressure returned to baseline  Respiratory status: unassisted  Hydration status: euvolemic  Follow-up not needed.        Visit Vitals    BP (!) 106/55    Pulse 60    Temp 36.8 °C (98.3 °F) (Oral)    Resp 18    Ht 4' 11" (1.499 m)    Wt 68.9 kg (152 lb)    SpO2 95%    Breastfeeding No    BMI 30.7 kg/m2       Pain/Eleazar Score: Pain Assessment Performed: Yes (4/10/2017  2:15 PM)  Presence of Pain: complains of pain/discomfort (4/10/2017  2:15 PM)  Pain Rating Prior to Med Admin: 10 (4/10/2017  2:10 PM)  Eleazar Score: 7 (4/10/2017  1:15 PM)      " APPROVED       Pa sent in for invokana

## 2020-06-09 NOTE — PROGRESS NOTES
Subjective   Nicholas Yin is a 75 y.o. male.     History of Present Illness     This visit has been rescheduled as a phone visit to comply with patient safety concerns in accordance with CDC recommendations. Total time of discussion was 12 minutes.    You have chosen to receive care through a telephone visit. Do you consent to use a telephone visit for your medical care today? Yes    Coronary Artery Disease  Underwent a coronary angiogram by Dr. Villatoro on 8/15/2019 with stenting of the mid LAD.  He remains chest pain-free and has noted a continued improvement in his exercise tolerance and overall sense of well-being.  He has been maintained on brilinta 60 twice daily and is aware to remain on ASA 81 daily with this. Echocardiogram performed on 12/5/19 was reported as showing DD but normal systolic function with an eEF of 65%. He underwent a cardiology reassessment on 5/18/2020 with no changes made in his management.    Lab Results   Component Value Date    WBC 7.29 06/05/2020    HGB 11.6 (L) 06/05/2020    HCT 35.7 (L) 06/05/2020    MCV 87.7 06/05/2020     06/05/2020     Transient Visual Loss  Several years ago he experienced a sudden progressive decrease in the vision in his right eye while driving. He stated that the vision in the eye darkened gradually over several minutes to the point where the only things he could make out were very bright lights and the sun. This lasted for about 30 minutes then resolved over several minutes. He denies any changes in his strength, sensation, speech or ability to understand what is said to him. He denies any palpitations and has had no lightheadedness or diaphoresis. He was hospitalized at [facility] over 10 years ago following a similar episodes that was ultimately felt to be vascular in origin. He has numerous cardiovascular risk factors as well as chronic renal insufficiency. MRI of the brain performed on 12/8/17 was reported as showing mild cerebral atrophy with  chronic small vessel ischemic changes. MRA of the carotid arteries performed the same day was unremarkable.  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  No evidence of diabetic retinopathy was observed    Diabetes  Current symptoms include none. Patient denies paresthesia of the feet, polydipsia, polyuria, hypoglycemia and foot ulcerations. Evaluation to date has been: hemoglobin A1C. Home sugars: BGs have remained at or near goal since last here. Current treatments: SGLT2 inhibitor - liraglutide and basal insulin - toujeo  Lab Results   Component Value Date    HGBA1C 8.00 (H) 06/05/2020      Dyslipidemia  Compliance with treatment has been fair. The patient exercises are intermittently. He is currently being prescribed the following medication for his dyslipidemia - rosuvastatin. Patient denies side effects associated with his medications.   Lab Results   Component Value Date    CHOL 173 06/05/2020    CHLPL 125 09/28/2015    TRIG 156 (H) 06/05/2020    HDL 46 06/05/2020    LDL 96 06/05/2020     Hypertension  Home blood pressure readings: have generally been at or near goal. Associated signs and symptoms: dyspnea with above average exertion. Patient denies: chest pain, palpitations, orthopnea, paroxysmal nocturnal dyspnea and peripheral edema. Current antihypertensive medications includes losartan, carvedilol, and amlodipine.  He has not taken furosemide since last here.  Medication compliance: taking as prescribed. CT of the abdomen/pelvis performed on 1/11/16 revealed heavy atherosclerotic plaquing of the aorta and its branches.  Lab Results   Component Value Date    GLUCOSE 90 06/05/2020    BUN 25 (H) 06/05/2020    CREATININE 1.77 (H) 06/05/2020    EGFRIFNONA 38 (L) 06/05/2020    BCR 14.1 06/05/2020    K 4.1 06/05/2020    CO2 23.5 06/05/2020    CALCIUM 8.8 06/05/2020    ALBUMIN 3.60 06/05/2020    LABIL2 1.1 (L) 09/28/2015    AST 19  06/05/2020    ALT 7 06/05/2020     Chronic Back Pain  Gives a long history of increasing mid and low back pain. This is described as a sharp ache. The pain does not radiate and has been unassociated with any other symptoms. The pain is worse with prolonged weight bearing and limits his activities more then anything else at present. Plain films of the L spine performed on 7/25/14 revealed multilevel disc disease with anterior osteophyte formation and facet arthropathy.    The following portions of the patient's history were reviewed and updated as appropriate: allergies, current medications, past medical history, past social history and problem list.    Review of Systems   Constitutional: Positive for fatigue. Negative for appetite change, chills, fever and unexpected weight change.   HENT: Negative for congestion, ear pain, rhinorrhea, sneezing, sore throat and voice change.    Eyes: Negative for visual disturbance.   Respiratory: Positive for shortness of breath. Negative for cough and wheezing.    Cardiovascular: Negative for chest pain, palpitations and leg swelling.   Gastrointestinal: Negative for abdominal pain, blood in stool, constipation, diarrhea, nausea and vomiting.   Endocrine: Negative for polydipsia and polyuria.   Genitourinary: Negative for difficulty urinating, dysuria, frequency, hematuria and urgency.        Erectile dysfunction - chronic progressive. Nocturia x 2-3 with occasional sense of incomplete voiding.   Musculoskeletal: Positive for back pain (chronic - progressive). Negative for arthralgias, joint swelling and myalgias.   Skin: Negative for rash.        Lesions   Neurological: Negative for weakness, numbness and headaches.   Psychiatric/Behavioral: Negative for sleep disturbance.     Objective   Physical Exam   Constitutional:   Alert and oriented.  Bright and in good spirits.  No apparent distress     Assessment/Plan   Problems Addressed this Visit        Cardiovascular and Mediastinum     Amaurosis fugax of right eye  With evidence of a previous BRAO on retinal examination  Reminded regarding the importance of risk factor modification.  Continue current medication    CAD (coronary artery disease)  As above.  Follow up with cardiology     Essential hypertension  Encouraged to continue to work on his diet and exercise plan.  Continue current medication    Mixed hyperlipidemia  As above.   Continue current medication.    PAD (peripheral artery disease) (CMS/[facility])       Digestive    Gastroesophageal reflux disease without esophagitis    Vitamin D deficiency  Continue supplementation with monitoring.       Endocrine    Type 2 diabetes mellitus with complication, with long-term current use of insulin (CMS/[facility])  Diabetes mellitus Type II, under fair control.   Encouraged to continue to pursue ADA diet  Encouraged aerobic exercise.  We will combine his once daily GLP agonist and basal insulin as xultophy  Reviewed the potential benefits and risks of canagliflozin.  Agreed on a trial.  Scheduled for an updated CMP in 1 month    Relevant Medications    Insulin Degludec-Liraglutide (Xultophy) 100-3.6 UNIT-MG/ML solution pen-injector    Canagliflozin 100 MG tablet       Genitourinary    Benign prostatic hyperplasia with nocturia    Chronic renal failure  Reminded to avoid any NSAIDs prescription or OTC  As above.    Relevant Orders    Comprehensive Metabolic Panel       Other    Elevated TSH  Clinically and bio-chemically euthyroid.  Will continue to monitor

## 2020-06-18 DIAGNOSIS — Z79.4 TYPE 2 DIABETES MELLITUS WITH COMPLICATION, WITH LONG-TERM CURRENT USE OF INSULIN (HCC): Primary | ICD-10-CM

## 2020-06-18 DIAGNOSIS — E11.8 TYPE 2 DIABETES MELLITUS WITH COMPLICATION, WITH LONG-TERM CURRENT USE OF INSULIN (HCC): Primary | ICD-10-CM

## 2020-06-26 RX ORDER — CIPROFLOXACIN HYDROCHLORIDE 3.5 MG/ML
1 SOLUTION/ DROPS TOPICAL EVERY 4 HOURS
Qty: 5 ML | Refills: 0 | Status: SHIPPED | OUTPATIENT
Start: 2020-06-26 | End: 2020-09-10

## 2020-07-09 ENCOUNTER — LAB (OUTPATIENT)
Dept: LAB | Facility: HOSPITAL | Age: 75
End: 2020-07-09

## 2020-07-09 DIAGNOSIS — N18.30 CHRONIC RENAL FAILURE, STAGE 3 (MODERATE) (HCC): ICD-10-CM

## 2020-07-09 LAB
ALBUMIN SERPL-MCNC: 3.5 G/DL (ref 3.5–5.2)
ALBUMIN/GLOB SERPL: 1 G/DL
ALP SERPL-CCNC: 57 U/L (ref 39–117)
ALT SERPL W P-5'-P-CCNC: 13 U/L (ref 1–41)
ANION GAP SERPL CALCULATED.3IONS-SCNC: 13.2 MMOL/L (ref 5–15)
AST SERPL-CCNC: 18 U/L (ref 1–40)
BILIRUB SERPL-MCNC: 0.4 MG/DL (ref 0–1.2)
BUN SERPL-MCNC: 23 MG/DL (ref 8–23)
BUN/CREAT SERPL: 10.9 (ref 7–25)
CALCIUM SPEC-SCNC: 9.4 MG/DL (ref 8.6–10.5)
CHLORIDE SERPL-SCNC: 106 MMOL/L (ref 98–107)
CO2 SERPL-SCNC: 19.8 MMOL/L (ref 22–29)
CREAT SERPL-MCNC: 2.11 MG/DL (ref 0.76–1.27)
GFR SERPL CREATININE-BSD FRML MDRD: 31 ML/MIN/1.73
GLOBULIN UR ELPH-MCNC: 3.4 GM/DL
GLUCOSE SERPL-MCNC: 132 MG/DL (ref 65–99)
POTASSIUM SERPL-SCNC: 4.4 MMOL/L (ref 3.5–5.2)
PROT SERPL-MCNC: 6.9 G/DL (ref 6–8.5)
SODIUM SERPL-SCNC: 139 MMOL/L (ref 136–145)

## 2020-07-09 PROCEDURE — 36415 COLL VENOUS BLD VENIPUNCTURE: CPT

## 2020-07-09 PROCEDURE — 80053 COMPREHEN METABOLIC PANEL: CPT

## 2020-07-28 RX ORDER — CARVEDILOL 6.25 MG/1
TABLET ORAL
Qty: 60 TABLET | Refills: 5 | OUTPATIENT
Start: 2020-07-28

## 2020-08-10 DIAGNOSIS — G45.3 AMAUROSIS FUGAX OF RIGHT EYE: ICD-10-CM

## 2020-08-10 RX ORDER — CLOPIDOGREL BISULFATE 75 MG/1
TABLET ORAL
Qty: 30 TABLET | Refills: 5 | OUTPATIENT
Start: 2020-08-10

## 2020-09-08 RX ORDER — CARVEDILOL 6.25 MG/1
6.25 TABLET ORAL 2 TIMES DAILY
Qty: 180 TABLET | Refills: 3 | Status: SHIPPED | OUTPATIENT
Start: 2020-09-08 | End: 2021-03-18

## 2020-09-08 RX ORDER — ERGOCALCIFEROL 1.25 MG/1
CAPSULE ORAL
Qty: 4 CAPSULE | Refills: 5 | Status: SHIPPED | OUTPATIENT
Start: 2020-09-08 | End: 2021-05-06 | Stop reason: SDUPTHER

## 2020-09-09 ENCOUNTER — LAB (OUTPATIENT)
Dept: LAB | Facility: HOSPITAL | Age: 75
End: 2020-09-09

## 2020-09-09 DIAGNOSIS — I10 ESSENTIAL HYPERTENSION: Primary | ICD-10-CM

## 2020-09-09 DIAGNOSIS — E11.8 TYPE 2 DIABETES MELLITUS WITH COMPLICATION, WITH LONG-TERM CURRENT USE OF INSULIN (HCC): ICD-10-CM

## 2020-09-09 DIAGNOSIS — Z79.4 TYPE 2 DIABETES MELLITUS WITH COMPLICATION, WITH LONG-TERM CURRENT USE OF INSULIN (HCC): ICD-10-CM

## 2020-09-09 DIAGNOSIS — I10 ESSENTIAL HYPERTENSION: ICD-10-CM

## 2020-09-09 DIAGNOSIS — E55.9 VITAMIN D DEFICIENCY: ICD-10-CM

## 2020-09-09 PROCEDURE — 83036 HEMOGLOBIN GLYCOSYLATED A1C: CPT

## 2020-09-09 PROCEDURE — 82306 VITAMIN D 25 HYDROXY: CPT

## 2020-09-09 PROCEDURE — 80053 COMPREHEN METABOLIC PANEL: CPT

## 2020-09-10 ENCOUNTER — OFFICE VISIT (OUTPATIENT)
Dept: FAMILY MEDICINE CLINIC | Facility: CLINIC | Age: 75
End: 2020-09-10

## 2020-09-10 VITALS
OXYGEN SATURATION: 94 % | TEMPERATURE: 96.8 F | BODY MASS INDEX: 34.8 KG/M2 | HEIGHT: 69 IN | RESPIRATION RATE: 14 BRPM | HEART RATE: 89 BPM | SYSTOLIC BLOOD PRESSURE: 136 MMHG | DIASTOLIC BLOOD PRESSURE: 68 MMHG | WEIGHT: 235 LBS

## 2020-09-10 DIAGNOSIS — E11.8 TYPE 2 DIABETES MELLITUS WITH COMPLICATION, WITH LONG-TERM CURRENT USE OF INSULIN (HCC): ICD-10-CM

## 2020-09-10 DIAGNOSIS — R35.1 BENIGN PROSTATIC HYPERPLASIA WITH NOCTURIA: ICD-10-CM

## 2020-09-10 DIAGNOSIS — I10 ESSENTIAL HYPERTENSION: ICD-10-CM

## 2020-09-10 DIAGNOSIS — N18.30 CHRONIC RENAL FAILURE, STAGE 3 (MODERATE) (HCC): ICD-10-CM

## 2020-09-10 DIAGNOSIS — M51.36 DDD (DEGENERATIVE DISC DISEASE), LUMBAR: ICD-10-CM

## 2020-09-10 DIAGNOSIS — R79.89 ELEVATED TSH: ICD-10-CM

## 2020-09-10 DIAGNOSIS — E55.9 VITAMIN D DEFICIENCY: ICD-10-CM

## 2020-09-10 DIAGNOSIS — G45.3 AMAUROSIS FUGAX OF RIGHT EYE: ICD-10-CM

## 2020-09-10 DIAGNOSIS — I73.9 PAD (PERIPHERAL ARTERY DISEASE) (HCC): Primary | ICD-10-CM

## 2020-09-10 DIAGNOSIS — E78.2 MIXED HYPERLIPIDEMIA: ICD-10-CM

## 2020-09-10 DIAGNOSIS — Z79.4 TYPE 2 DIABETES MELLITUS WITH COMPLICATION, WITH LONG-TERM CURRENT USE OF INSULIN (HCC): ICD-10-CM

## 2020-09-10 DIAGNOSIS — Z00.00 HEALTHCARE MAINTENANCE: ICD-10-CM

## 2020-09-10 DIAGNOSIS — I25.110 CORONARY ARTERY DISEASE INVOLVING NATIVE CORONARY ARTERY OF NATIVE HEART WITH UNSTABLE ANGINA PECTORIS (HCC): ICD-10-CM

## 2020-09-10 DIAGNOSIS — N40.1 BENIGN PROSTATIC HYPERPLASIA WITH NOCTURIA: ICD-10-CM

## 2020-09-10 LAB
25(OH)D3 SERPL-MCNC: 18.4 NG/ML (ref 30–100)
ALBUMIN SERPL-MCNC: 3.9 G/DL (ref 3.5–5.2)
ALBUMIN/GLOB SERPL: 1.4 G/DL
ALP SERPL-CCNC: 67 U/L (ref 39–117)
ALT SERPL W P-5'-P-CCNC: 12 U/L (ref 1–41)
ANION GAP SERPL CALCULATED.3IONS-SCNC: 11.7 MMOL/L (ref 5–15)
AST SERPL-CCNC: 21 U/L (ref 1–40)
BILIRUB SERPL-MCNC: 0.3 MG/DL (ref 0–1.2)
BUN SERPL-MCNC: 30 MG/DL (ref 8–23)
BUN/CREAT SERPL: 15.4 (ref 7–25)
CALCIUM SPEC-SCNC: 8.8 MG/DL (ref 8.6–10.5)
CHLORIDE SERPL-SCNC: 107 MMOL/L (ref 98–107)
CO2 SERPL-SCNC: 21.3 MMOL/L (ref 22–29)
CREAT SERPL-MCNC: 1.95 MG/DL (ref 0.76–1.27)
GFR SERPL CREATININE-BSD FRML MDRD: 34 ML/MIN/1.73
GLOBULIN UR ELPH-MCNC: 2.7 GM/DL
GLUCOSE SERPL-MCNC: 159 MG/DL (ref 65–99)
HBA1C MFR BLD: 7.1 % (ref 4.8–5.6)
POTASSIUM SERPL-SCNC: 4.3 MMOL/L (ref 3.5–5.2)
PROT SERPL-MCNC: 6.6 G/DL (ref 6–8.5)
SODIUM SERPL-SCNC: 140 MMOL/L (ref 136–145)

## 2020-09-10 PROCEDURE — 99214 OFFICE O/P EST MOD 30 MIN: CPT | Performed by: GENERAL PRACTICE

## 2020-09-10 RX ORDER — ISOSORBIDE MONONITRATE 60 MG/1
30 TABLET, EXTENDED RELEASE ORAL DAILY
Qty: 30 TABLET | Refills: 5
Start: 2020-09-10 | End: 2022-01-14

## 2020-09-10 NOTE — PROGRESS NOTES
Subjective   Nicholas Yin is a 75 y.o. male.     History of Present Illness     Coronary Artery Disease  Underwent a coronary angiogram by Dr. Villatoro on 8/15/2019 with stenting of the mid LAD.  He remains chest pain-free and has noted a continued improvement in his exercise tolerance and overall sense of well-being.  He has been maintained on brilinta 60 twice daily and is aware to remain on ASA 81 daily with this. Echocardiogram performed on 12/5/19 was reported as showing DD but normal systolic function with an eEF of 65%. He underwent a cardiology reassessment on 5/18/2020 with no changes made in his management.      Transient Visual Loss  Several years ago he experienced a sudden progressive decrease in the vision in his right eye while driving. He stated that the vision in the eye darkened gradually over several minutes to the point where the only things he could make out were very bright lights and the sun. This lasted for about 30 minutes then resolved over several minutes. He denies any changes in his strength, sensation, speech or ability to understand what is said to him. He denies any palpitations and has had no lightheadedness or diaphoresis. He was hospitalized at [facility] over 10 years ago following a similar episodes that was ultimately felt to be vascular in origin. He has numerous cardiovascular risk factors as well as chronic renal insufficiency. MRI of the brain performed on 12/8/17 was reported as showing mild cerebral atrophy with chronic small vessel ischemic changes. MRA of the carotid arteries performed the same day was unremarkable.  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  No evidence of diabetic retinopathy was observed    Diabetes  Current symptoms include none. Patient denies paresthesia of the feet, polydipsia, polyuria, hypoglycemia and foot ulcerations. Evaluation to date has been: hemoglobin A1C.  Home sugars: BGs have remained at or near goal since last here. Current treatments: SGLT2 inhibitor -canagliflozin, GLP agonist-liraglutide and basal insulin - tresiba (together as xultophy)  Lab Results   Component Value Date    HGBA1C 7.10 (H) 09/09/2020      Dyslipidemia  Compliance with treatment has been fair. The patient exercises are intermittently. He is currently being prescribed the following medication for his dyslipidemia - rosuvastatin. Patient denies side effects associated with his medications.     Hypertension  Home blood pressure readings: have generally been at or near goal. Associated signs and symptoms: dyspnea with above average exertion and occasional lightheadedness when getting up from sitting. Patient denies: chest pain, palpitations, orthopnea, paroxysmal nocturnal dyspnea or peripheral edema. Current antihypertensive medications includes losartan, carvedilol, and amlodipine.  He has not taken furosemide since last here.  Medication compliance: taking as prescribed.   Lab Results   Component Value Date    GLUCOSE 159 (H) 09/09/2020    BUN 30 (H) 09/09/2020    CREATININE 1.95 (H) 09/09/2020    EGFRIFNONA 34 (L) 09/09/2020    BCR 15.4 09/09/2020    K 4.3 09/09/2020    CO2 21.3 (L) 09/09/2020    CALCIUM 8.8 09/09/2020    ALBUMIN 3.90 09/09/2020    LABIL2 1.1 (L) 09/28/2015    AST 21 09/09/2020    ALT 12 09/09/2020     Chronic Back Pain  Gives a long history of increasing mid and low back pain. This is described as a sharp ache. The pain does not radiate and has been unassociated with any other symptoms. The pain is worse with prolonged weight bearing and limits his activities more then anything else at present. Plain films of the L spine performed on 7/25/14 revealed multilevel disc disease with anterior osteophyte formation and facet arthropathy.    Labs  Most recent vitamin D 18.4.  He remains on high-dose supplementation    The following portions of the patient's history were reviewed and  updated as appropriate: allergies, current medications, past medical history, past social history and problem list.    Review of Systems   Constitutional: Positive for fatigue. Negative for appetite change, chills, fever and unexpected weight change.   HENT: Negative for congestion, ear pain, rhinorrhea, sneezing and sore throat.    Eyes: Negative for visual disturbance.   Respiratory: Positive for shortness of breath. Negative for cough and wheezing.    Cardiovascular: Negative for chest pain, palpitations and leg swelling.   Gastrointestinal: Negative for abdominal pain, blood in stool, constipation, diarrhea, nausea and vomiting.   Endocrine: Negative for polydipsia and polyuria.   Genitourinary: Negative for difficulty urinating, dysuria, frequency, hematuria and urgency.        Erectile dysfunction - chronic progressive. Nocturia x 2-3 with occasional sense of incomplete voiding.   Musculoskeletal: Positive for back pain (chronic - progressive). Negative for arthralgias, joint swelling and myalgias.   Skin: Negative for rash.   Neurological: Positive for light-headedness. Negative for weakness, numbness and headaches.   Psychiatric/Behavioral: Negative for sleep disturbance.     Objective   Physical Exam   Constitutional: He is oriented to person, place, and time. He appears well-developed and well-nourished. He is cooperative. He does not have a sickly appearance. No distress.   Walks with a slightly exaggerated thoracic kyphosis. No apparent distress. No pallor, jaundice, diaphoresis, or cyanosis.     HENT:   Head: Atraumatic.   Eyes: Pupils are equal, round, and reactive to light. EOM are normal. No scleral icterus.   Neck: No JVD present. Carotid bruit is not present. No tracheal deviation present. No thyromegaly present.   Cardiovascular: Normal rate, regular rhythm, S1 normal, S2 normal, normal heart sounds and intact distal pulses. Exam reveals no gallop.   No murmur heard.  Pulmonary/Chest: Breath  sounds normal. He has no wheezes. He has no rales.   Musculoskeletal: He exhibits no deformity.   Lymphadenopathy:        Head (right side): No submandibular adenopathy present.        Head (left side): No submandibular adenopathy present.     He has no cervical adenopathy.   Neurological: He is alert and oriented to person, place, and time. No cranial nerve deficit. He exhibits normal muscle tone. Coordination normal.   Skin: Skin is warm and dry. No rash noted. He is not diaphoretic. No pallor. Nails show no clubbing.   Psychiatric: He has a normal mood and affect. His behavior is normal.     Assessment/Plan   Problems Addressed this Visit        Cardiovascular and Mediastinum    Amaurosis fugax of right eye    CAD (coronary artery disease)  Reminded regarding the importance of risk factor modification.  Unclear whether he is still taking isosorbide mononitrate but if he is the dosage will be reduced to 30 every morning  Follow up with cardiology     Relevant Medications    isosorbide mononitrate (IMDUR) 60 MG 24 hr tablet    Essential hypertension   Hypertension: at goal. Evidence of target organ damage: coronary artery disease, peripheral artery disease, chronic kidney disease and transient ischemic attack.  Encouraged to continue to work on diet and exercise plan.   Continue current medication    Mixed hyperlipidemia  As above.   Continue current medication.    PAD (peripheral artery disease) (CMS/[facility])  As above.       Digestive    Vitamin D deficiency  Continue supplementation with monitoring.       Endocrine    Type 2 diabetes mellitus with complication, with long-term current use of insulin (CMS/[facility])  Diabetes mellitus Type II, under excellent control.   Encouraged to continue to pursue ADA diet  Encouraged aerobic exercise.  Continue current medication       Musculoskeletal and Integument    DDD (degenerative disc disease), lumbar  Reminded regarding symptomatic treatment.        Genitourinary    Benign  prostatic hyperplasia with nocturia    Chronic renal failure       Other        Healthcare maintenance  Patient will return for a flu shot

## 2020-10-01 ENCOUNTER — EPISODE CHANGES (OUTPATIENT)
Dept: CASE MANAGEMENT | Facility: OTHER | Age: 75
End: 2020-10-01

## 2020-10-08 ENCOUNTER — FLU SHOT (OUTPATIENT)
Dept: FAMILY MEDICINE CLINIC | Facility: CLINIC | Age: 75
End: 2020-10-08

## 2020-10-08 DIAGNOSIS — Z23 NEED FOR INFLUENZA VACCINATION: ICD-10-CM

## 2020-10-08 PROCEDURE — 90471 IMMUNIZATION ADMIN: CPT | Performed by: GENERAL PRACTICE

## 2020-10-08 PROCEDURE — 90694 VACC AIIV4 NO PRSRV 0.5ML IM: CPT | Performed by: GENERAL PRACTICE

## 2020-11-04 DIAGNOSIS — I10 ESSENTIAL HYPERTENSION: ICD-10-CM

## 2020-11-04 RX ORDER — LOSARTAN POTASSIUM 50 MG/1
50 TABLET ORAL DAILY
Qty: 90 TABLET | Refills: 3 | Status: SHIPPED | OUTPATIENT
Start: 2020-11-04 | End: 2021-03-18

## 2020-12-14 ENCOUNTER — OFFICE VISIT (OUTPATIENT)
Dept: FAMILY MEDICINE CLINIC | Facility: CLINIC | Age: 75
End: 2020-12-14

## 2020-12-14 VITALS
HEART RATE: 60 BPM | OXYGEN SATURATION: 96 % | RESPIRATION RATE: 14 BRPM | HEIGHT: 69 IN | SYSTOLIC BLOOD PRESSURE: 126 MMHG | DIASTOLIC BLOOD PRESSURE: 62 MMHG | TEMPERATURE: 97.1 F | BODY MASS INDEX: 34.36 KG/M2 | WEIGHT: 232 LBS

## 2020-12-14 DIAGNOSIS — I73.9 PAD (PERIPHERAL ARTERY DISEASE) (HCC): ICD-10-CM

## 2020-12-14 DIAGNOSIS — M85.89 OSTEOPENIA OF MULTIPLE SITES: ICD-10-CM

## 2020-12-14 DIAGNOSIS — R35.1 BENIGN PROSTATIC HYPERPLASIA WITH NOCTURIA: ICD-10-CM

## 2020-12-14 DIAGNOSIS — E78.2 MIXED HYPERLIPIDEMIA: ICD-10-CM

## 2020-12-14 DIAGNOSIS — I25.110 CORONARY ARTERY DISEASE INVOLVING NATIVE CORONARY ARTERY OF NATIVE HEART WITH UNSTABLE ANGINA PECTORIS (HCC): Primary | ICD-10-CM

## 2020-12-14 DIAGNOSIS — N40.1 BENIGN PROSTATIC HYPERPLASIA WITH NOCTURIA: ICD-10-CM

## 2020-12-14 DIAGNOSIS — Z86.19 HISTORY OF HEPATITIS C: ICD-10-CM

## 2020-12-14 DIAGNOSIS — N18.30 CHRONIC RENAL FAILURE, STAGE 3 (MODERATE), UNSPECIFIED WHETHER STAGE 3A OR 3B CKD (HCC): ICD-10-CM

## 2020-12-14 DIAGNOSIS — L82.1 SEBORRHEIC KERATOSES: ICD-10-CM

## 2020-12-14 DIAGNOSIS — Z00.00 HEALTHCARE MAINTENANCE: ICD-10-CM

## 2020-12-14 DIAGNOSIS — E55.9 VITAMIN D DEFICIENCY: ICD-10-CM

## 2020-12-14 DIAGNOSIS — K21.9 GASTROESOPHAGEAL REFLUX DISEASE WITHOUT ESOPHAGITIS: ICD-10-CM

## 2020-12-14 DIAGNOSIS — Z85.038 H/O COLON CANCER, STAGE I: ICD-10-CM

## 2020-12-14 DIAGNOSIS — Z85.47 H/O TESTICULAR CANCER: ICD-10-CM

## 2020-12-14 DIAGNOSIS — I10 ESSENTIAL HYPERTENSION: ICD-10-CM

## 2020-12-14 DIAGNOSIS — Z79.4 TYPE 2 DIABETES MELLITUS WITH COMPLICATION, WITH LONG-TERM CURRENT USE OF INSULIN (HCC): ICD-10-CM

## 2020-12-14 DIAGNOSIS — E11.8 TYPE 2 DIABETES MELLITUS WITH COMPLICATION, WITH LONG-TERM CURRENT USE OF INSULIN (HCC): ICD-10-CM

## 2020-12-14 DIAGNOSIS — G45.3 AMAUROSIS FUGAX OF RIGHT EYE: ICD-10-CM

## 2020-12-14 DIAGNOSIS — M51.36 DDD (DEGENERATIVE DISC DISEASE), LUMBAR: ICD-10-CM

## 2020-12-14 PROCEDURE — 99397 PER PM REEVAL EST PAT 65+ YR: CPT | Performed by: GENERAL PRACTICE

## 2020-12-14 NOTE — PATIENT INSTRUCTIONS
Advance Directive    Advance directives are legal documents that let you make choices ahead of time about your health care and medical treatment in case you become unable to communicate for yourself. Advance directives are a way for you to make known your wishes to family, friends, and health care providers. This can let others know about your end-of-life care if you become unable to communicate.  Discussing and writing advance directives should happen over time rather than all at once. Advance directives can be changed depending on your situation and what you want, even after you have signed the advance directives.  There are different types of advance directives, such as:  · Medical power of .  · Living will.  · Do not resuscitate (DNR) or do not attempt resuscitation (DNAR) order.  Health care proxy and medical power of   A health care proxy is also called a health care agent. This is a person who is appointed to make medical decisions for you in cases where you are unable to make the decisions yourself. Generally, people choose someone they know well and trust to represent their preferences. Make sure to ask this person for an agreement to act as your proxy. A proxy may have to exercise judgment in the event of a medical decision for which your wishes are not known.  A medical power of  is a legal document that names your health care proxy. Depending on the laws in your state, after the document is written, it may also need to be:  · Signed.  · Notarized.  · Dated.  · Copied.  · Witnessed.  · Incorporated into your medical record.  You may also want to appoint someone to manage your money in a situation in which you are unable to do so. This is called a durable power of  for finances. It is a separate legal document from the durable power of  for health care. You may choose the same person or someone different from your health care proxy to act as your agent in money  matters.  If you do not appoint a proxy, or if there is a concern that the proxy is not acting in your best interests, a court may appoint a guardian to act on your behalf.  Living will  A living will is a set of instructions that state your wishes about medical care when you cannot express them yourself. Health care providers should keep a copy of your living will in your medical record. You may want to give a copy to family members or friends. To alert caregivers in case of an emergency, you can place a card in your wallet to let them know that you have a living will and where they can find it. A living will is used if you become:  · Terminally ill.  · Disabled.  · Unable to communicate or make decisions.  Items to consider in your living will include:  · To use or not to use life-support equipment, such as dialysis machines and breathing machines (ventilators).  · A DNR or DNAR order. This tells health care providers not to use cardiopulmonary resuscitation (CPR) if breathing or heartbeat stops.  · To use or not to use tube feeding.  · To be given or not to be given food and fluids.  · Comfort (palliative) care when the goal becomes comfort rather than a cure.  · Donation of organs and tissues.  A living will does not give instructions for distributing your money and property if you should pass away.  DNR or DNAR  A DNR or DNAR order is a request not to have CPR in the event that your heart stops beating or you stop breathing. If a DNR or DNAR order has not been made and shared, a health care provider will try to help any patient whose heart has stopped or who has stopped breathing. If you plan to have surgery, talk with your health care provider about how your DNR or DNAR order will be followed if problems occur.  What if I do not have an advance directive?  If you do not have an advance directive, some states assign family decision makers to act on your behalf based on how closely you are related to them. Each  state has its own laws about advance directives. You may want to check with your health care provider, , or state representative about the laws in your state.  Summary  · Advance directives are the legal documents that allow you to make choices ahead of time about your health care and medical treatment in case you become unable to tell others about your care.  · The process of discussing and writing advance directives should happen over time. You can change the advance directives, even after you have signed them.  · Advance directives include DNR or DNAR orders, living flores, and designating an agent as your medical power of .  This information is not intended to replace advice given to you by your health care provider. Make sure you discuss any questions you have with your health care provider.  Document Revised: 07/16/2020 Document Reviewed: 07/16/2020  Elsevier Patient Education © 2020 [email or web address]      Heart Disease Prevention  Heart disease is the leading cause of death in the world. Coronary artery disease is the most common cause of heart disease. This condition results when cholesterol and other substances (plaque) build up inside the walls of the blood vessels that supply your heart muscle (arteries). This buildup in arteries is called atherosclerosis. You can take actions to lower your risk of heart disease.  How can heart disease affect me?  Heart disease can cause many unpleasant symptoms and complications, such as:  · Chest pain (angina).  · Reduced or blocked blood flow to your heart. This can cause:  ? Irregular heartbeats (arrhythmias).  ? Heart attack.  ? Heart failure.  What can increase my risk?  The following factors may make you more likely to develop this condition:  · High blood pressure (hypertension).  · High cholesterol.  · Smoking.  · A diet high in saturated fats or trans fats.  · Lack of physical activity.  · Obesity.  · Drinking too much alcohol.  · Diabetes.  · Having  a family history of heart disease.  What actions can I take to prevent heart disease?  Nutrition    · Eat a heart-healthy eating plan as told by your health care provider. Examples include the DASH (Dietary Approaches to Stop Hypertension) eating plan or the Mediterranean diet.  · Generally, it is recommended that you:  ? Eat less salt (sodium). Ask your health care provider how much sodium is safe for you. Most people should have less than 2,300 mg each day.  ? Limit unhealthy fats, such as saturated and trans fats, in your diet. You can do this by eating low-fat dairy products, eating less red meat, and avoiding processed foods.  ? Eat healthy fats (omega-3 fatty acids). These are found in fish, such as mackerel or salmon.  ? Eat more fruits and vegetables. You should try to fill one-half of your plate with fruits and vegetables at each meal.  ? Eat more whole grains.  ? Avoid foods and drinks that have added sugars.  Lifestyle    · Get regular exercise. This is one of the most important things you can do for your health. Generally, it is recommended that you:  ? Exercise for at least 30 minutes on most days of the week (150 minutes each week). The exercise should increase your heart rate and make you sweat (aerobic exercise).  ? Add strength exercises on at least 2 days each week.  · Do not use any products that contain nicotine or tobacco, such as cigarettes and e-cigarettes. These can damage your heart and blood vessels. If you need help quitting, ask your health care provider.  Alcohol use  · Do not drink alcohol if:  ? Your health care provider tells you not to drink.  ? You are pregnant, may be pregnant, or are planning to become pregnant.  · If you drink alcohol, limit how much you have:  ? 0-1 drink a day for women.  ? 0-2 drinks a day for men.  · Be aware of how much alcohol is in your drink. In the [place] one drink equals one typical bottle of beer (12 oz), one-half glass of wine (5 oz), or one shot of  hard liquor (1½ oz).  Medicines  · Take over-the-counter and prescription medicines only as told by your health care provider.  · Ask your health care provider whether you should take an aspirin every day. Taking aspirin may help reduce your risk of heart disease and stroke.  · Depending on your risk factors, your health care provider may prescribe medicines to lower your risk of heart disease or to control related conditions. You may take medicine to:  ? Lower cholesterol.  ? Control blood pressure.  ? Control diabetes.  General information  · Keep your blood pressure under control, as recommended by your health care provider. For most healthy people, the upper number of your blood pressure (systolic) should be no higher than 120, and the lower number (diastolic) no higher than 80. Treatment may be needed if your blood pressure is higher than 130/80.  · Have your blood pressure checked at least every two years. Your health care provider may check your blood pressure more often if you have high blood pressure.  · After age 20, have your cholesterol checked every 4-6 years. If you have risk factors for heart disease, you may need to have it checked more frequently. Treatment may be needed if your cholesterol is high.  · Have your body mass index (BMI) checked every year. Your health care provider can calculate your BMI from your height and weight.  · Work with your health care provider to lose weight, if needed, or to maintain a healthy weight.  Where to find more information:  · Centers for Disease Control and Prevention: www.cdc.gov/heartdisease  · American Heart Association: www.heart.org  ? Take a free online heart disease risk quiz to better understand your personal risk factors.  Summary  · Heart disease is the leading cause of death in the world.  · Heart disease can cause chest pain, abnormal heart rhythms, heart attack, and heart failure.  · High blood pressure, high cholesterol, and smoking are the main  risk factors for heart disease, although other factors also contribute.  · You can take actions to lower your chances of developing heart disease. Work with your health care provider to reduce your risk by following a heart-healthy diet, being physically active, and controlling your weight, blood pressure, and cholesterol level.  This information is not intended to replace advice given to you by your health care provider. Make sure you discuss any questions you have with your health care provider.  Document Revised: 01/02/2019 Document Reviewed: 01/02/2019  Elsevier Patient Education © 2020 [email or web address]      Colorectal Cancer Screening    Colorectal cancer screening is a group of tests that are used to check for colorectal cancer before symptoms develop. Colorectal refers to the colon and rectum. The colon and rectum are located at the end of the digestive tract and carry bowel movements out of the body.  Who should have screening?  All adults starting at age 50 until age 75 should have screening. Your health care provider may recommend screening at age 45. You will have tests every 1-10 years, depending on your results and the type of screening test.  You may have screening tests starting at an earlier age, or more frequently than other people, if you have any of the following risk factors:  · A personal or family history of colorectal cancer or abnormal growths (polyps).  · Inflammatory bowel disease, such as ulcerative colitis or Crohn's disease.  · A history of having radiation treatment to the abdomen or pelvic area for cancer.  · Colorectal cancer symptoms, such as changes in bowel habits or blood in your stool.  · A type of colon cancer syndrome that is passed from parent to child (hereditary), such as:  ? Miguel syndrome.  ? Familial adenomatous polyposis.  ? Turcot syndrome.  ? Peutz-Jeghers syndrome.  Screening recommendations for adults who are 75-85 years old vary depending on health.  How is screening  done?  There are several types of colorectal screening tests. You may have one or more of the following:  · Guaiac-based fecal occult blood testing. For this test, a stool (feces) sample is checked for hidden (occult) blood, which could be a sign of colorectal cancer.  · Fecal immunochemical test (FIT). For this test, a stool sample is checked for blood, which could be a sign of colorectal cancer.  · Stool DNA test. For this test, a stool sample is checked for blood and changes in DNA that could lead to colorectal cancer.  · Sigmoidoscopy. During this test, a thin, flexible tube with a camera on the end (sigmoidoscope) is used to examine the rectum and the lower colon.  · Colonoscopy. During this test, a long, flexible tube with a camera on the end (colonoscope) is used to examine the entire colon and rectum. With a colonoscopy, it is possible to take a sample of tissue (biopsy) and remove small polyps during the test.  · Virtual colonoscopy. Instead of a colonoscope, this type of colonoscopy uses X-rays (CT scan) and computers to produce images of the colon and rectum.  What are the benefits of screening?  Screening reduces your risk for colorectal cancer and can help identify cancer at an early stage, when the cancer can be removed or treated more easily. It is common for polyps to form in the lining of the colon, especially as you age. These polyps may be cancerous or become cancerous over time. Screening can identify these polyps.  What are the risks of screening?  Each screening test may have different risks.  · Stool sample tests have fewer risks than other types of screening tests. However, you may need more tests to confirm results from a stool sample test.  · Screening tests that involve X-rays expose you to low levels of radiation, which may slightly increase your cancer risk. The benefit of detecting cancer outweighs the slight increase in risk.  · Screening tests such as sigmoidoscopy and colonoscopy may  place you at risk for bleeding, intestinal damage, infection, or a reaction to medicines given during the exam.  Talk with your health care provider to understand your risk for colorectal cancer and to make a screening plan that is right for you.  Questions to ask your health care provider  · When should I start colorectal cancer screening?  · What is my risk for colorectal cancer?  · How often do I need screening?  · Which screening tests do I need?  · How do I get my test results?  · What do my results mean?  Where to find more information  Learn more about colorectal cancer screening from:  · The American Cancer Society: www.cancer.org  · The National Cancer Ovid: www.cancer.gov  Summary  · Colorectal cancer screening is a group of tests used to check for colorectal cancer before symptoms develop.  · Screening reduces your risk for colorectal cancer and can help identify cancer at an early stage, when the cancer can be removed or treated more easily.  · All adults starting at age 50 until age 75 should have screening. Your health care provider may recommend screening at age 45.  · You may have screening tests starting at an earlier age, or more frequently than other people, if you have certain risk factors.  · Talk with your health care provider to understand your risk for colorectal cancer and to make a screening plan that is right for you.  This information is not intended to replace advice given to you by your health care provider. Make sure you discuss any questions you have with your health care provider.  Document Revised: 04/08/2020 Document Reviewed: 09/19/2018  Elsevier Patient Education © 2020 [employer] Inc.      Osteoporosis    Osteoporosis happens when your bones get thin and weak. This can cause your bones to break (fracture) more easily. You can do things at home to make your bones stronger.  Follow these instructions at home:    Activity  · Exercise as told by your doctor. Ask your doctor what  activities are safe for you. You should do:  ? Exercises that make your muscles work to hold your body weight up (weight-bearing exercises). These include edmundo chi, yoga, and walking.  ? Exercises to make your muscles stronger. One example is lifting weights.  Lifestyle  · Limit alcohol intake to no more than 1 drink a day for nonpregnant women and 2 drinks a day for men. One drink equals 12 oz of beer, 5 oz of wine, or 1½ oz of hard liquor.  · Do not use any products that have nicotine or tobacco in them. These include cigarettes and e-cigarettes. If you need help quitting, ask your doctor.  Preventing falls  · Use tools to help you move around (mobility aids) as needed. These include canes, walkers, scooters, and crutches.  · Keep rooms well-lit and free of clutter.  · Put away things that could make you trip. These include cords and rugs.  · Install safety rails on stairs. Install grab bars in bathrooms.  · Use rubber mats in slippery areas, like bathrooms.  · Wear shoes that:  ? Fit you well.  ? Support your feet.  ? Have closed toes.  ? Have rubber soles or low heels.  · Tell your doctor about all of the medicines you are taking. Some medicines can make you more likely to fall.  General instructions  · Eat plenty of calcium and vitamin D. These nutrients are good for your bones. Good sources of calcium and vitamin D include:  ? Some fatty fish, such as salmon and tuna.  ? Foods that have calcium and vitamin D added to them (fortified foods). For example, some breakfast cereals are fortified with calcium and vitamin D.  ? Egg yolks.  ? Cheese.  ? Liver.  · Take over-the-counter and prescription medicines only as told by your doctor.  · Keep all follow-up visits as told by your doctor. This is important.  Contact a doctor if:  · You have not been tested (screened) for osteoporosis and you are:  ? A woman who is age 65 or older.  ? A man who is age 70 or older.  Get help right away if:  · You fall.  · You get  hurt.  Summary  · Osteoporosis happens when your bones get thin and weak.  · Weak bones can break (fracture) more easily.  · Eat plenty of calcium and vitamin D. These nutrients are good for your bones.  · Tell your doctor about all of the medicines that you take.  This information is not intended to replace advice given to you by your health care provider. Make sure you discuss any questions you have with your health care provider.  Document Revised: 11/30/2018 Document Reviewed: 10/12/2018  Elsevier Patient Education © 2020 [employer]

## 2020-12-14 NOTE — PROGRESS NOTES
Annual Wellness Visit    Subjective   History of Present Illness:  Nicholas Yin is a 75 y.o. male who presents for an Annual Wellness Visit.    Coronary Artery Disease  Underwent a coronary angiogram by Dr. Villatoro on 8/15/2019 with stenting of the mid LAD.  He remains chest pain-free and has noted a continued improvement in his exercise tolerance and overall sense of well-being.  He has been maintained on brilinta 60 twice daily and is aware to remain on ASA 81 daily with this. Echocardiogram performed on 12/5/19 was reported as showing DD but normal systolic function with an eEF of 65%. He continues to be followed by cardiology    Transient Visual Loss  Several years ago he experienced a sudden progressive decrease in the vision in his right eye while driving. He stated that the vision in the eye darkened gradually over several minutes to the point where the only things he could make out were very bright lights and the sun. This lasted for about 30 minutes then resolved over several minutes. He denies any changes in his strength, sensation, speech or ability to understand what is said to him. He denies any palpitations and has had no lightheadedness or diaphoresis. He was hospitalized at [facility] over 10 years ago following a similar episodes that was ultimately felt to be vascular in origin. He has numerous cardiovascular risk factors as well as chronic renal insufficiency. MRI of the brain performed on 12/8/17 was reported as showing mild cerebral atrophy with chronic small vessel ischemic changes. MRA of the carotid arteries performed the same day was unremarkable.  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  No evidence of diabetic retinopathy was observed    Diabetes  Current symptoms include none. Patient denies paresthesia of the feet, polydipsia, polyuria, hypoglycemia and foot ulcerations. Evaluation to date has been:  hemoglobin A1C. Home sugars: BGs have remained at or near goal since last here. Current treatments: SGLT2 inhibitor -canagliflozin, GLP agonist-liraglutide and basal insulin - tresiba (together as xultophy)     Dyslipidemia  Compliance with treatment has been fair. The patient exercises are intermittently. He is currently being prescribed the following medication for his dyslipidemia - rosuvastatin. Patient denies side effects associated with his medications.     Hypertension  Home blood pressure readings: have generally been at or near goal. Associated signs and symptoms: dyspnea with above average exertion and occasional lightheadedness when getting up from sitting. Patient denies: chest pain, palpitations, orthopnea, paroxysmal nocturnal dyspnea or peripheral edema. Current antihypertensive medications includes losartan, carvedilol, and amlodipine.  He has not taken furosemide since last here.  Medication compliance: taking as prescribed.     Chronic Back Pain  Gives a long history of increasing mid and low back pain. This is described as a sharp ache. The pain does not radiate and has been unassociated with any other symptoms. The pain is worse with prolonged weight bearing and limits his activities more then anything else at present. Plain films of the L spine performed on 7/25/14 revealed multilevel disc disease with anterior osteophyte formation and facet arthropathy.  He would be interested in surgery if it offered any potential benefit    HEALTH RISK ASSESSMENT    Recent Hospitalizations:  No hospitalization(s) within the last year.    Current Medical Providers:  Patient Care Team:  Waldemar Trejo MD as PCP - General  Waldemar Trejo MD as PCP - Family Medicine  Carlie Gill RN as Ambulatory  (Population Health)    Smoking Status:  Social History     Tobacco Use   Smoking Status Never Smoker   Smokeless Tobacco Never Used       Alcohol Consumption:  Social History      Substance and Sexual Activity   Alcohol Use Yes    Comment: occ     Depression Screen:   PHQ-2/PHQ-9 Depression Screening 12/14/2020   Little interest or pleasure in doing things 0   Feeling down, depressed, or hopeless 0   Trouble falling or staying asleep, or sleeping too much 0   Feeling tired or having little energy 0   Poor appetite or overeating 0   Feeling bad about yourself - or that you are a failure or have let yourself or your family down 0   Trouble concentrating on things, such as reading the newspaper or watching television 0   Moving or speaking so slowly that other people could have noticed. Or the opposite - being so fidgety or restless that you have been moving around a lot more than usual 0   Thoughts that you would be better off dead, or of hurting yourself in some way 0   Total Score 0   If you checked off any problems, how difficult have these problems made it for you to do your work, take care of things at home, or get along with other people? Not difficult at all     Fall Risk Screen:  ARNALDO Fall Risk Assessment was completed, and patient is at LOW risk for falls.Assessment completed on:3/5/2020    Health Habits and Functional and Cognitive Screening:  Functional & Cognitive Status 12/14/2020   Do you have difficulty preparing food and eating? No   Do you have difficulty bathing yourself, getting dressed or grooming yourself? No   Do you have difficulty using the toilet? No   Do you have difficulty moving around from place to place? No   Do you have trouble with steps or getting out of a bed or a chair? No   Current Diet Well Balanced Diet   Dental Exam Up to date   Eye Exam Up to date   Exercise (times per week) 7 times per week   Current Exercises Include Walking   Do you need help using the phone?  No   Are you deaf or do you have serious difficulty hearing?  No   Do you need help with transportation? No   Do you need help shopping? No   Do you need help preparing meals?  No   Do you  need help with housework?  No   Do you need help with laundry? No   Do you need help taking your medications? No   Do you need help managing money? No   Do you ever drive or ride in a car without wearing a seat belt? No   Have you felt unusual stress, anger or loneliness in the last month? No   Who do you live with? Spouse   If you need help, do you have trouble finding someone available to you? No   Have you been bothered in the last four weeks by sexual problems? No   Do you have difficulty concentrating, remembering or making decisions? No         Does the patient have evidence of cognitive impairment? No    Asprin use counseling:Taking ASA appropriately as indicated    Age-appropriate Screening Schedule:  Refer to the list below for future screening recommendations based on patient's age, sex and/or medical conditions. Orders for these recommended tests are listed in the plan section. The patient has been provided with a written plan.    Health Maintenance   Topic Date Due   • DIABETIC FOOT EXAM  09/19/2016   • DIABETIC EYE EXAM  12/04/2018   • DXA SCAN  07/20/2019   • COLONOSCOPY  12/31/2020 (Originally 12/1/2018)   • HEMOGLOBIN A1C  03/09/2021   • LIPID PANEL  06/05/2021   • URINE MICROALBUMIN  06/05/2021   • TDAP/TD VACCINES (2 - Td) 12/09/2021   • INFLUENZA VACCINE  Completed   • ZOSTER VACCINE  Discontinued        The following portions of the patient's history were reviewed and updated as appropriate: allergies, current medications, past family history, past medical history, past social history, past surgical history and problem list.    Outpatient Medications Prior to Visit   Medication Sig Dispense Refill   • amLODIPine (NORVASC) 10 MG tablet TAKE ONE TABLET BY MOUTH EVERY DAY FOR BLOOD PRESSURE 30 tablet 5   • aspirin 81 MG EC tablet Take 81 mg by mouth Daily.     • Canagliflozin 100 MG tablet Take 1 tablet by mouth Every Morning. 30 tablet 5   • carvedilol (COREG) 6.25 MG tablet Take 1 tablet by mouth  2 (Two) Times a Day. 180 tablet 3   • Coenzyme Q-10 100 MG capsule Take 100 mg by mouth Daily.     • DEXILANT 60 MG capsule TAKE ONE CAPSULE BY MOUTH EVERY DAY 30 capsule 5   • Glucagon 3 MG/DOSE powder 1 dose into the nostril(s) as directed by provider As Needed (hypoglycemia). 1 each 5   • Insulin Degludec-Liraglutide (Xultophy) 100-3.6 UNIT-MG/ML solution pen-injector Inject 50 Units under the skin into the appropriate area as directed Daily. 5 pen 5   • isosorbide mononitrate (IMDUR) 60 MG 24 hr tablet Take 0.5 tablets by mouth Daily. 30 tablet 5   • losartan (COZAAR) 50 MG tablet Take 1 tablet by mouth Daily. 90 tablet 3   • magnesium gluconate (MAGONATE) 500 MG tablet Take 500 mg by mouth 2 (Two) Times a Day.     • nitroglycerin (NITROSTAT) 0.4 MG SL tablet Place 1 tablet under the tongue Every 5 (Five) Minutes As Needed for Chest Pain. Take no more than 3 doses in 15 minutes. 30 tablet 5   • Potassium 99 MG tablet Take 1 tablet by mouth Daily.     • rosuvastatin (CRESTOR) 20 MG tablet Take 1 tablet by mouth Daily. 90 tablet 3   • ticagrelor (BRILINTA) 90 MG tablet tablet Take 1 tablet by mouth 2 (Two) Times a Day. 60 tablet 11   • vitamin D (ERGOCALCIFEROL) 1.25 MG (30717 UT) capsule capsule TAKE ONE CAPSULE BY MOUTH EVERY WEEK FOR VITAMIN DEFICIENCY 4 capsule 5   • Zoster Vac Recomb Adjuvanted (Shingrix) 50 MCG/0.5ML reconstituted suspension Inject 0.5 mL into the appropriate muscle as directed by prescriber See Admin Instructions. Repeat in 2-6 months 1 each 1     No facility-administered medications prior to visit.      Patient Active Problem List   Diagnosis   • DDD (degenerative disc disease), lumbar   • Gastroesophageal reflux disease without esophagitis   • H/O colon cancer, stage I   • H/O testicular cancer   • Mixed hyperlipidemia   • Essential hypertension   • Type 2 diabetes mellitus with complication, with long-term current use of insulin (CMS/[facility])   • Vitamin D deficiency   • Encounter for  immunization   • Healthcare maintenance   • Left shoulder pain   • History of hepatitis C   • Seborrheic keratoses   • Vasculogenic erectile dysfunction   • Benign prostatic hyperplasia with nocturia   • Amaurosis fugax of right eye   • Chronic renal failure   • PAD (peripheral artery disease) (CMS/HCC)   • CAD (coronary artery disease)   • Osteopenia of multiple sites     Advanced Care Planning:  ACP discussion was held with the patient during this visit. Patient does not have an advance directive, information provided.    Review of Systems   Constitutional: Positive for fatigue. Negative for appetite change, chills, fever and unexpected weight change.   HENT: Negative for congestion, ear pain, rhinorrhea, sneezing and sore throat.    Eyes: Negative for visual disturbance.   Respiratory: Positive for shortness of breath. Negative for cough and wheezing.    Cardiovascular: Negative for chest pain, palpitations and leg swelling.   Gastrointestinal: Negative for abdominal pain, blood in stool, constipation, diarrhea, nausea and vomiting.   Endocrine: Negative for polydipsia and polyuria.   Genitourinary: Negative for difficulty urinating, dysuria, frequency, hematuria and urgency.        Erectile dysfunction - chronic progressive. Nocturia x 2-3 with occasional sense of incomplete voiding.   Musculoskeletal: Positive for back pain (chronic - progressive). Negative for arthralgias, joint swelling and myalgias.   Skin: Negative for rash.        Lesions left temporal scalp and left lower abdomen   Neurological: Positive for light-headedness. Negative for weakness, numbness and headaches.   Psychiatric/Behavioral: Negative for sleep disturbance.       Compared to one year ago, the patient feels his physical health is the same.  Compared to one year ago, the patient feels his mental health is the same.    Reviewed chart for potential of high risk medication in the elderly: yes  Reviewed chart for potential of harmful drug  "interactions in the elderly:yes    Objective      Vitals:    12/14/20 0926   BP: 126/62   Pulse: 60   Resp: 14   Temp: 97.1 °F (36.2 °C)   TempSrc: Temporal   SpO2: 96%   Weight: 105 kg (232 lb)   Height: 175.3 cm (69\")     Body mass index is 34.26 kg/m².  Discussed the patient's BMI with him. The BMI is above average; BMI management plan is completed.    Physical Exam  Constitutional:       General: He is not in acute distress.     Appearance: Normal appearance. He is well-developed. He is not ill-appearing or diaphoretic.      Comments: Sits and stands with an exaggerated thoracic kyphosis.  Uncomfortable with movement.  No apparent distress at rest   HENT:      Head: Atraumatic.      Right Ear: Tympanic membrane, ear canal and external ear normal.      Left Ear: Tympanic membrane, ear canal and external ear normal.   Eyes:      Conjunctiva/sclera: Conjunctivae normal.   Neck:      Thyroid: No thyroid mass or thyromegaly.      Vascular: No carotid bruit or JVD.      Trachea: Trachea normal. No tracheal deviation.   Cardiovascular:      Rate and Rhythm: Normal rate and regular rhythm.      Heart sounds: Normal heart sounds, S1 normal and S2 normal. No murmur. No gallop. No S3 or S4 sounds.    Pulmonary:      Effort: Pulmonary effort is normal.      Breath sounds: Normal breath sounds.   Abdominal:      General: Bowel sounds are normal. There is no distension.      Palpations: Abdomen is soft. There is no hepatomegaly, splenomegaly or mass.      Tenderness: There is no abdominal tenderness.      Hernia: No hernia is present.   Musculoskeletal:      Lumbar back: He exhibits decreased range of motion. He exhibits no tenderness and no spasm.      Right lower leg: No edema.      Left lower leg: No edema.      Comments: Negative straight leg raise.   Lymphadenopathy:      Head:      Right side of head: No submental, submandibular, tonsillar, preauricular, posterior auricular or occipital adenopathy.      Left side of " head: No submental, submandibular, tonsillar, preauricular, posterior auricular or occipital adenopathy.      Cervical: No cervical adenopathy.      Upper Body:      Right upper body: No supraclavicular adenopathy.      Left upper body: No supraclavicular adenopathy.   Skin:     General: Skin is warm and dry.      Coloration: Skin is not cyanotic, jaundiced or pale.      Findings: Lesion (2 cm oval well demarcated skin colored plaque with verrucous surface left lateral zygomatic region -similar lesion left lower abdomen) present. No rash.      Nails: There is no clubbing.     Neurological:      Mental Status: He is alert and oriented to person, place, and time.      Cranial Nerves: No cranial nerve deficit.      Motor: No tremor.      Coordination: Coordination normal.      Gait: Gait normal.   Psychiatric:         Attention and Perception: Attention normal.         Mood and Affect: Mood normal.         Speech: Speech normal.         Behavior: Behavior normal.       Assessment/Plan   Medicare Risks and Personalized Health Plan  CMS Preventative Services Quick Reference  Advance Directive Discussion  Cardiovascular risk  Colon Cancer Screening  Fall Risk  Immunizations Discussed/Encouraged (specific immunizations; Shingrix )  Inactivity/Sedentary  Obesity/Overweight   Osteoprorosis Risk    The above risks/problems have been discussed with the patient.  Pertinent information has been shared with the patient in the After Visit Summary.  Follow up plans and orders are seen below in the Assessment/Plan Section.    Diagnoses and all orders for this visit:    1. Coronary artery disease involving native coronary artery of native heart with unstable angina pectoris (CMS/[facility])   Reminded regarding the importance of risk factor modification.  Continue current medication  Follow up with cardiology     2. PAD (peripheral artery disease) (CMS/[facility])  As above.    3. Amaurosis fugax of right eye  As above.    4. Type 2 diabetes  mellitus with complication, with long-term current use of insulin (CMS/[facility])  Diabetes mellitus Type II, under excellent control.   Encouraged to continue to pursue ADA diet  Encouraged aerobic exercise.  Continue current medication  Updated labs will be drawn at his return    5. Essential hypertension  As above.   Continue current medication.    6. Mixed hyperlipidemia  As above.   Continue current medication.    7. Chronic renal failure, stage 3 (moderate), unspecified whether stage 3a or 3b CKD  Reminded to avoid any NSAIDs prescription or OTC  Will continue to monitor    8. Benign prostatic hyperplasia with nocturia    9. Gastroesophageal reflux disease without esophagitis    10. History of hepatitis C    11. Vitamin D deficiency  Continue supplementation with monitoring.    12. DDD (degenerative disc disease), lumbar  Reminded regarding symptomatic treatment.   We will arrange an MRI of the lumbar spine with a view toward either a neurosurgical or pain management assessment  -     MRI Lumbar Spine Without Contrast; Future    13. H/O colon cancer, stage I  Reminded that he is due for an updated colonoscopy.  He will let us know when and where he would like this arranged    14. H/O testicular cancer    15. Seborrheic keratoses  Reviewed options and agreed on excisional biopsy.  Referral will be made to general surgery  -     Cancel: Ambulatory Referral to General Surgery  -     Ambulatory Referral to General Surgery    16. Osteopenia of multiple sites  Encouraged to continue to pursue weight bearing activities while exercising joint protection.  We will arrange an updated DEXA scan  -     DEXA Bone Density Axial; Future    17. Healthcare maintenance  Reminded that he is due for Shingrix    Follow Up:  Return in about 3 months (around 3/14/2021) for Labs just prior to return.     An After Visit Summary and PPPS were given to the patient.

## 2020-12-15 RX ORDER — AMLODIPINE BESYLATE 10 MG/1
TABLET ORAL
Qty: 30 TABLET | Refills: 5 | Status: SHIPPED | OUTPATIENT
Start: 2020-12-15 | End: 2021-03-18

## 2021-01-06 ENCOUNTER — OFFICE VISIT (OUTPATIENT)
Dept: SURGERY | Facility: CLINIC | Age: 76
End: 2021-01-06

## 2021-01-06 VITALS
HEIGHT: 69 IN | BODY MASS INDEX: 34.8 KG/M2 | WEIGHT: 235 LBS | HEART RATE: 67 BPM | DIASTOLIC BLOOD PRESSURE: 79 MMHG | SYSTOLIC BLOOD PRESSURE: 155 MMHG

## 2021-01-06 DIAGNOSIS — Z00.00 HEALTHCARE MAINTENANCE: Primary | ICD-10-CM

## 2021-01-06 DIAGNOSIS — Z85.038 H/O COLON CANCER, STAGE I: ICD-10-CM

## 2021-01-06 DIAGNOSIS — L82.1 SEBORRHEIC KERATOSES: ICD-10-CM

## 2021-01-06 PROCEDURE — 99242 OFF/OP CONSLTJ NEW/EST SF 20: CPT | Performed by: SURGERY

## 2021-01-06 NOTE — PROGRESS NOTES
Subjective   Nicholas Yin is a 75 y.o. male is being seen for consultation today at the request of Waldemar Trejo MD    Nicholas Yin is a 75 y.o. male With history of colon cancer status post sigmoid colectomy.  Patient malignancy with stage I and no adjuvant therapy was required.  The patient requires interval colonoscopy and is doing well without complaints of bowel habit change or decrease in stool caliber.  No rectal bleeding noted.  No unintentional weight loss.  He has seborrheic keratosis of the left temple and abdomen that are increasing in size with a tendency to be traumatized that he would have excised if possible.  On examination the left temple seborrheic keratosis is 1.3 cm in the abdominal lesion is approximately 2.5 cm in greatest dimension.      Past Medical History:   Diagnosis Date   • Anemia    • Back pain    • CancerTesticular/[place]    • Diabetes mellitus (CMS/HCC)    • Erectile dysfunction    • GERD (gastroesophageal reflux disease)    • Hypertension        Family History   Problem Relation Age of Onset   • Stroke Mother    • Hypertension Mother    • Alcohol abuse Mother    • COPD Mother    • COPD Father    • Alcohol abuse Father    • Hypertension Father    • Alzheimer's disease Father    • Heart disease Brother    • Hypertension Brother    • Diabetes Brother    • Stroke Maternal Grandfather    • Cancer Paternal Grandmother        Social History     Socioeconomic History   • Marital status:      Spouse name: lita   • Number of children: 3   • Years of education: 16   • Highest education level: Not on file   Occupational History   • Occupation: retired   Tobacco Use   • Smoking status: Never Smoker   • Smokeless tobacco: Never Used   Substance and Sexual Activity   • Alcohol use: Yes     Comment: occ   • Drug use: No   • Sexual activity: Defer       Past Surgical History:   Procedure Laterality Date   • CARDIAC CATHETERIZATION N/A 8/15/2019    Procedure: Left  "Heart Cath;  Surgeon: Paul Villatoro MD;  Location:  [facility];  Service: Cardiology   • COLON SURGERY     • COLONOSCOPY     • EYE SURGERY     • MI RT/LT HEART CATHETERS N/A 8/15/2019    Procedure: Percutaneous Coronary Intervention;  Surgeon: Paul Villatoro MD;  Location:  [facility];  Service: Cardiology   • SHOULDER ARTHROSCOPY     • VASECTOMY         Review of Systems   Constitutional: Negative for activity change, appetite change, chills and fever.   HENT: Negative for sore throat and trouble swallowing.    Eyes: Negative for visual disturbance.   Respiratory: Negative for cough and shortness of breath.    Cardiovascular: Negative for chest pain and palpitations.   Gastrointestinal: Negative for abdominal distention, abdominal pain, blood in stool, constipation, diarrhea, nausea and vomiting.   Endocrine: Negative for cold intolerance and heat intolerance.   Genitourinary: Negative for dysuria.   Musculoskeletal: Negative for joint swelling.   Skin: Negative for color change, rash and wound.   Allergic/Immunologic: Negative for immunocompromised state.   Neurological: Negative for dizziness, seizures, weakness and headaches.   Hematological: Negative for adenopathy. Does not bruise/bleed easily.   Psychiatric/Behavioral: Negative for agitation and confusion.         /79   Pulse 67   Ht 175.3 cm (69.02\")   Wt 107 kg (235 lb)   BMI 34.69 kg/m²   Objective   Physical Exam  Constitutional:       Appearance: He is well-developed.   HENT:      Head: Normocephalic and atraumatic.   Eyes:      Conjunctiva/sclera: Conjunctivae normal.      Pupils: Pupils are equal, round, and reactive to light.   Neck:      Musculoskeletal: Neck supple.      Thyroid: No thyromegaly.      Vascular: No JVD.      Trachea: No tracheal deviation.   Cardiovascular:      Rate and Rhythm: Normal rate and regular rhythm.      Heart sounds: No murmur. No friction rub. No " gallop.    Pulmonary:      Effort: Pulmonary effort is normal.      Breath sounds: Normal breath sounds.   Abdominal:      General: There is no distension.      Palpations: Abdomen is soft. There is no hepatomegaly or splenomegaly.      Tenderness: There is no abdominal tenderness.      Hernia: No hernia is present.   Musculoskeletal: Normal range of motion.         General: No deformity.   Skin:     General: Skin is warm and dry.      Comments: Seborrheic keratosis as described in HPI of the left temple 1 left abdominal wall   Neurological:      Mental Status: He is alert and oriented to person, place, and time.               Assessment   Diagnoses and all orders for this visit:    1. Healthcare maintenance (Primary)    2. H/O colon cancer, stage I    3. Seborrheic keratoses      Nicholas Yin is a 75 y.o. male with history of colon cancer need for colonoscopy.  Cardiac clearance will be obtained and we will discuss cessation of antiplatelet therapy.  The patient will also undergo excision of 2 bothersome enlarging seborrheic keratosis of the left temple and abdomen that have a tendency to be traumatized.  Once clearance is obtained from cardiology will call to schedule the procedure with the patient.    Patient's Body mass index is 34.69 kg/m². BMI is above normal parameters. Recommendations include: educational material.

## 2021-01-07 ENCOUNTER — HOSPITAL ENCOUNTER (OUTPATIENT)
Dept: MRI IMAGING | Facility: HOSPITAL | Age: 76
Discharge: HOME | End: 2021-01-07
Admitting: GENERAL PRACTICE

## 2021-01-07 DIAGNOSIS — M51.36 DDD (DEGENERATIVE DISC DISEASE), LUMBAR: ICD-10-CM

## 2021-01-07 PROCEDURE — 72148 MRI LUMBAR SPINE W/O DYE: CPT

## 2021-01-07 PROCEDURE — 72148 MRI LUMBAR SPINE W/O DYE: CPT | Performed by: RADIOLOGY

## 2021-01-11 ENCOUNTER — IMMUNIZATION (OUTPATIENT)
Dept: VACCINE CLINIC | Facility: HOSPITAL | Age: 76
End: 2021-01-11

## 2021-01-11 PROCEDURE — 0001A: CPT | Performed by: FAMILY MEDICINE

## 2021-01-11 PROCEDURE — 91300 HC SARSCOV02 VAC 30MCG/0.3ML IM: CPT | Performed by: FAMILY MEDICINE

## 2021-01-12 ENCOUNTER — HOSPITAL ENCOUNTER (OUTPATIENT)
Dept: BONE DENSITY | Facility: HOSPITAL | Age: 76
Discharge: HOME | End: 2021-01-12
Admitting: GENERAL PRACTICE

## 2021-01-12 ENCOUNTER — TELEPHONE (OUTPATIENT)
Dept: CARDIOLOGY | Facility: CLINIC | Age: 76
End: 2021-01-12

## 2021-01-12 DIAGNOSIS — I25.110 CORONARY ARTERY DISEASE INVOLVING NATIVE CORONARY ARTERY OF NATIVE HEART WITH UNSTABLE ANGINA PECTORIS (HCC): Primary | ICD-10-CM

## 2021-01-12 DIAGNOSIS — M85.89 OSTEOPENIA OF MULTIPLE SITES: ICD-10-CM

## 2021-01-12 DIAGNOSIS — R06.09 DYSPNEA ON EXERTION: ICD-10-CM

## 2021-01-12 PROCEDURE — 77080 DXA BONE DENSITY AXIAL: CPT | Performed by: RADIOLOGY

## 2021-01-12 PROCEDURE — 77080 DXA BONE DENSITY AXIAL: CPT

## 2021-01-12 NOTE — TELEPHONE ENCOUNTER
Called Jairo to let him know that Dr. Reid is okay with him holding Brilinta 5-7 days before colonoscopy and should resume after. I did tell him Dr. Reid is wanting to order an echocardiogram since it has been over a year. Jairo is aware and understands. He did want to see about having echo done while he was at the hospital today for another test, however, I have talked with scheduling who states that given his insurance an echo will require a pre cert.

## 2021-01-18 DIAGNOSIS — Z01.818 PREOP TESTING: Primary | ICD-10-CM

## 2021-01-19 ENCOUNTER — PREP FOR SURGERY (OUTPATIENT)
Dept: OTHER | Facility: HOSPITAL | Age: 76
End: 2021-01-19

## 2021-01-19 DIAGNOSIS — L72.3 SEBACEOUS CYST: Primary | ICD-10-CM

## 2021-01-19 DIAGNOSIS — Z85.038 HISTORY OF COLON CANCER: ICD-10-CM

## 2021-01-19 DIAGNOSIS — L98.9 SKIN LESION: ICD-10-CM

## 2021-01-19 RX ORDER — SODIUM, POTASSIUM,MAG SULFATES 17.5-3.13G
2 SOLUTION, RECONSTITUTED, ORAL ORAL EVERY 12 HOURS
Qty: 2 BOTTLE | Refills: 0 | Status: SHIPPED | OUTPATIENT
Start: 2021-01-19 | End: 2021-03-18

## 2021-01-22 ENCOUNTER — HOSPITAL ENCOUNTER (OUTPATIENT)
Dept: CARDIOLOGY | Facility: HOSPITAL | Age: 76
Discharge: HOME | End: 2021-01-22
Admitting: INTERNAL MEDICINE

## 2021-01-22 DIAGNOSIS — R06.09 DYSPNEA ON EXERTION: ICD-10-CM

## 2021-01-22 DIAGNOSIS — I25.110 CORONARY ARTERY DISEASE INVOLVING NATIVE CORONARY ARTERY OF NATIVE HEART WITH UNSTABLE ANGINA PECTORIS (HCC): ICD-10-CM

## 2021-01-22 PROCEDURE — 93306 TTE W/DOPPLER COMPLETE: CPT | Performed by: INTERNAL MEDICINE

## 2021-01-22 PROCEDURE — 93306 TTE W/DOPPLER COMPLETE: CPT

## 2021-01-23 LAB
BH CV ECHO MEAS - ACS: 1.2 CM
BH CV ECHO MEAS - AO MAX PG (FULL): 44 MMHG
BH CV ECHO MEAS - AO MAX PG: 48.7 MMHG
BH CV ECHO MEAS - AO MEAN PG (FULL): 18 MMHG
BH CV ECHO MEAS - AO MEAN PG: 20 MMHG
BH CV ECHO MEAS - AO ROOT AREA (BSA CORRECTED): 1.2
BH CV ECHO MEAS - AO ROOT AREA: 5.5 CM^2
BH CV ECHO MEAS - AO ROOT DIAM: 2.7 CM
BH CV ECHO MEAS - AO V2 MAX: 349 CM/SEC
BH CV ECHO MEAS - AO V2 MEAN: 195 CM/SEC
BH CV ECHO MEAS - AO V2 VTI: 74.2 CM
BH CV ECHO MEAS - BSA(HAYCOCK): 2.3 M^2
BH CV ECHO MEAS - BSA: 2.2 M^2
BH CV ECHO MEAS - BZI_BMI: 34.7 KILOGRAMS/M^2
BH CV ECHO MEAS - BZI_METRIC_HEIGHT: 175.3 CM
BH CV ECHO MEAS - BZI_METRIC_WEIGHT: 106.6 KG
BH CV ECHO MEAS - EDV(CUBED): 91.1 ML
BH CV ECHO MEAS - EDV(MOD-SP4): 90.4 ML
BH CV ECHO MEAS - EDV(TEICH): 92.4 ML
BH CV ECHO MEAS - EF(CUBED): 56.9 %
BH CV ECHO MEAS - EF(MOD-SP4): 62.9 %
BH CV ECHO MEAS - EF(TEICH): 48.7 %
BH CV ECHO MEAS - ESV(CUBED): 39.3 ML
BH CV ECHO MEAS - ESV(MOD-SP4): 33.5 ML
BH CV ECHO MEAS - ESV(TEICH): 47.4 ML
BH CV ECHO MEAS - FS: 24.4 %
BH CV ECHO MEAS - IVS/LVPW: 0.93
BH CV ECHO MEAS - IVSD: 1.3 CM
BH CV ECHO MEAS - LA DIMENSION: 3.6 CM
BH CV ECHO MEAS - LA/AO: 1.4
BH CV ECHO MEAS - LV DIASTOLIC VOL/BSA (35-75): 40.9 ML/M^2
BH CV ECHO MEAS - LV MASS(C)D: 235.3 GRAMS
BH CV ECHO MEAS - LV MASS(C)DI: 106.4 GRAMS/M^2
BH CV ECHO MEAS - LV MAX PG: 4.8 MMHG
BH CV ECHO MEAS - LV MEAN PG: 2 MMHG
BH CV ECHO MEAS - LV SYSTOLIC VOL/BSA (12-30): 15.1 ML/M^2
BH CV ECHO MEAS - LV V1 MAX: 109 CM/SEC
BH CV ECHO MEAS - LV V1 MEAN: 68 CM/SEC
BH CV ECHO MEAS - LV V1 VTI: 24.6 CM
BH CV ECHO MEAS - LVIDD: 4.5 CM
BH CV ECHO MEAS - LVIDS: 3.4 CM
BH CV ECHO MEAS - LVLD AP4: 7.3 CM
BH CV ECHO MEAS - LVLS AP4: 6.2 CM
BH CV ECHO MEAS - LVPWD: 1.4 CM
BH CV ECHO MEAS - MV A MAX VEL: 176 CM/SEC
BH CV ECHO MEAS - MV E MAX VEL: 128 CM/SEC
BH CV ECHO MEAS - MV E/A: 0.73
BH CV ECHO MEAS - MV MAX PG: 10.4 MMHG
BH CV ECHO MEAS - MV MEAN PG: 3 MMHG
BH CV ECHO MEAS - MV V2 MAX: 161 CM/SEC
BH CV ECHO MEAS - MV V2 MEAN: 73 CM/SEC
BH CV ECHO MEAS - MV V2 VTI: 49.2 CM
BH CV ECHO MEAS - SI(AO): 185 ML/M^2
BH CV ECHO MEAS - SI(CUBED): 23.4 ML/M^2
BH CV ECHO MEAS - SI(MOD-SP4): 25.7 ML/M^2
BH CV ECHO MEAS - SI(TEICH): 20.3 ML/M^2
BH CV ECHO MEAS - SV(AO): 409.2 ML
BH CV ECHO MEAS - SV(CUBED): 51.8 ML
BH CV ECHO MEAS - SV(MOD-SP4): 56.9 ML
BH CV ECHO MEAS - SV(TEICH): 45 ML
MAXIMAL PREDICTED HEART RATE: 146 BPM
STRESS TARGET HR: 124 BPM

## 2021-01-25 ENCOUNTER — LAB (OUTPATIENT)
Dept: LAB | Facility: HOSPITAL | Age: 76
End: 2021-01-25

## 2021-01-25 ENCOUNTER — APPOINTMENT (OUTPATIENT)
Dept: PREADMISSION TESTING | Facility: HOSPITAL | Age: 76
End: 2021-01-25

## 2021-01-25 DIAGNOSIS — Z01.818 PREOP TESTING: ICD-10-CM

## 2021-01-25 LAB
ALBUMIN SERPL-MCNC: 3.71 G/DL (ref 3.5–5.2)
ALBUMIN/GLOB SERPL: 1.2 G/DL
ALP SERPL-CCNC: 72 U/L (ref 39–117)
ALT SERPL W P-5'-P-CCNC: 14 U/L (ref 1–41)
ANION GAP SERPL CALCULATED.3IONS-SCNC: 9.3 MMOL/L (ref 5–15)
AST SERPL-CCNC: 22 U/L (ref 1–40)
BASOPHILS # BLD AUTO: 0.06 10*3/MM3 (ref 0–0.2)
BASOPHILS NFR BLD AUTO: 0.6 % (ref 0–1.5)
BILIRUB SERPL-MCNC: 0.3 MG/DL (ref 0–1.2)
BUN SERPL-MCNC: 32 MG/DL (ref 8–23)
BUN/CREAT SERPL: 15.4 (ref 7–25)
CALCIUM SPEC-SCNC: 8.9 MG/DL (ref 8.6–10.5)
CHLORIDE SERPL-SCNC: 106 MMOL/L (ref 98–107)
CO2 SERPL-SCNC: 24.7 MMOL/L (ref 22–29)
CREAT SERPL-MCNC: 2.08 MG/DL (ref 0.76–1.27)
DEPRECATED RDW RBC AUTO: 49.5 FL (ref 37–54)
EOSINOPHIL # BLD AUTO: 0.51 10*3/MM3 (ref 0–0.4)
EOSINOPHIL NFR BLD AUTO: 5.5 % (ref 0.3–6.2)
ERYTHROCYTE [DISTWIDTH] IN BLOOD BY AUTOMATED COUNT: 15.9 % (ref 12.3–15.4)
GFR SERPL CREATININE-BSD FRML MDRD: 31 ML/MIN/1.73
GLOBULIN UR ELPH-MCNC: 3.1 GM/DL
GLUCOSE SERPL-MCNC: 102 MG/DL (ref 65–99)
HCT VFR BLD AUTO: 39.7 % (ref 37.5–51)
HGB BLD-MCNC: 12.4 G/DL (ref 13–17.7)
IMM GRANULOCYTES # BLD AUTO: 0.03 10*3/MM3 (ref 0–0.05)
IMM GRANULOCYTES NFR BLD AUTO: 0.3 % (ref 0–0.5)
LYMPHOCYTES # BLD AUTO: 1.67 10*3/MM3 (ref 0.7–3.1)
LYMPHOCYTES NFR BLD AUTO: 18 % (ref 19.6–45.3)
MCH RBC QN AUTO: 26.4 PG (ref 26.6–33)
MCHC RBC AUTO-ENTMCNC: 31.2 G/DL (ref 31.5–35.7)
MCV RBC AUTO: 84.5 FL (ref 79–97)
MONOCYTES # BLD AUTO: 0.8 10*3/MM3 (ref 0.1–0.9)
MONOCYTES NFR BLD AUTO: 8.6 % (ref 5–12)
NEUTROPHILS NFR BLD AUTO: 6.19 10*3/MM3 (ref 1.7–7)
NEUTROPHILS NFR BLD AUTO: 67 % (ref 42.7–76)
NRBC BLD AUTO-RTO: 0 /100 WBC (ref 0–0.2)
PLATELET # BLD AUTO: 245 10*3/MM3 (ref 140–450)
PMV BLD AUTO: 10.9 FL (ref 6–12)
POTASSIUM SERPL-SCNC: 4.5 MMOL/L (ref 3.5–5.2)
PROT SERPL-MCNC: 6.8 G/DL (ref 6–8.5)
RBC # BLD AUTO: 4.7 10*6/MM3 (ref 4.14–5.8)
SODIUM SERPL-SCNC: 140 MMOL/L (ref 136–145)
WBC # BLD AUTO: 9.26 10*3/MM3 (ref 3.4–10.8)

## 2021-01-25 PROCEDURE — 85025 COMPLETE CBC W/AUTO DIFF WBC: CPT

## 2021-01-25 PROCEDURE — 80053 COMPREHEN METABOLIC PANEL: CPT

## 2021-01-25 PROCEDURE — 36415 COLL VENOUS BLD VENIPUNCTURE: CPT

## 2021-01-25 PROCEDURE — U0004 COV-19 TEST NON-CDC HGH THRU: HCPCS | Performed by: SURGERY

## 2021-01-26 LAB — SARS-COV-2 RNA RESP QL NAA+PROBE: NOT DETECTED

## 2021-01-27 ENCOUNTER — ANESTHESIA EVENT (OUTPATIENT)
Dept: PERIOP | Facility: HOSPITAL | Age: 76
End: 2021-01-27

## 2021-01-27 ENCOUNTER — HOSPITAL ENCOUNTER (OUTPATIENT)
Facility: HOSPITAL | Age: 76
Setting detail: HOSPITAL OUTPATIENT SURGERY
Discharge: HOME | End: 2021-01-27
Attending: SURGERY | Admitting: SURGERY

## 2021-01-27 ENCOUNTER — ANESTHESIA (OUTPATIENT)
Dept: PERIOP | Facility: HOSPITAL | Age: 76
End: 2021-01-27

## 2021-01-27 VITALS
HEART RATE: 57 BPM | OXYGEN SATURATION: 98 % | DIASTOLIC BLOOD PRESSURE: 69 MMHG | HEIGHT: 69 IN | TEMPERATURE: 97.8 F | BODY MASS INDEX: 33.47 KG/M2 | WEIGHT: 226 LBS | RESPIRATION RATE: 18 BRPM | SYSTOLIC BLOOD PRESSURE: 135 MMHG

## 2021-01-27 DIAGNOSIS — L72.3 SEBACEOUS CYST: ICD-10-CM

## 2021-01-27 DIAGNOSIS — L98.9 SKIN LESION: ICD-10-CM

## 2021-01-27 DIAGNOSIS — Z85.038 HISTORY OF COLON CANCER: ICD-10-CM

## 2021-01-27 PROCEDURE — 25010000003 LIDOCAINE 1 % SOLUTION: Performed by: SURGERY

## 2021-01-27 PROCEDURE — 45385 COLONOSCOPY W/LESION REMOVAL: CPT | Performed by: SURGERY

## 2021-01-27 PROCEDURE — 11403 EXC TR-EXT B9+MARG 2.1-3CM: CPT | Performed by: SURGERY

## 2021-01-27 PROCEDURE — 11442 EXC FACE-MM B9+MARG 1.1-2 CM: CPT | Performed by: SURGERY

## 2021-01-27 PROCEDURE — 25010000002 PROPOFOL 10 MG/ML EMULSION: Performed by: NURSE ANESTHETIST, CERTIFIED REGISTERED

## 2021-01-27 PROCEDURE — 25010000002 FENTANYL CITRATE (PF) 100 MCG/2ML SOLUTION: Performed by: NURSE ANESTHETIST, CERTIFIED REGISTERED

## 2021-01-27 RX ORDER — BACITRACIN, NEOMYCIN, POLYMYXIN B 400; 3.5; 5 [USP'U]/G; MG/G; [USP'U]/G
OINTMENT TOPICAL AS NEEDED
Status: DISCONTINUED | OUTPATIENT
Start: 2021-01-27 | End: 2021-01-27 | Stop reason: HOSPADM

## 2021-01-27 RX ORDER — IPRATROPIUM BROMIDE AND ALBUTEROL SULFATE 2.5; .5 MG/3ML; MG/3ML
3 SOLUTION RESPIRATORY (INHALATION) ONCE AS NEEDED
Status: DISCONTINUED | OUTPATIENT
Start: 2021-01-27 | End: 2021-01-27 | Stop reason: HOSPADM

## 2021-01-27 RX ORDER — DROPERIDOL 2.5 MG/ML
0.62 INJECTION, SOLUTION INTRAMUSCULAR; INTRAVENOUS ONCE AS NEEDED
Status: DISCONTINUED | OUTPATIENT
Start: 2021-01-27 | End: 2021-01-27 | Stop reason: HOSPADM

## 2021-01-27 RX ORDER — MAGNESIUM HYDROXIDE 1200 MG/15ML
LIQUID ORAL AS NEEDED
Status: DISCONTINUED | OUTPATIENT
Start: 2021-01-27 | End: 2021-01-27 | Stop reason: HOSPADM

## 2021-01-27 RX ORDER — LIDOCAINE HYDROCHLORIDE 10 MG/ML
INJECTION, SOLUTION INFILTRATION; PERINEURAL AS NEEDED
Status: DISCONTINUED | OUTPATIENT
Start: 2021-01-27 | End: 2021-01-27 | Stop reason: HOSPADM

## 2021-01-27 RX ORDER — OXYCODONE HYDROCHLORIDE AND ACETAMINOPHEN 5; 325 MG/1; MG/1
1 TABLET ORAL ONCE AS NEEDED
Status: DISCONTINUED | OUTPATIENT
Start: 2021-01-27 | End: 2021-01-27 | Stop reason: HOSPADM

## 2021-01-27 RX ORDER — MIDAZOLAM HYDROCHLORIDE 1 MG/ML
0.5 INJECTION INTRAMUSCULAR; INTRAVENOUS
Status: DISCONTINUED | OUTPATIENT
Start: 2021-01-27 | End: 2021-01-27 | Stop reason: HOSPADM

## 2021-01-27 RX ORDER — FENTANYL CITRATE 50 UG/ML
50 INJECTION, SOLUTION INTRAMUSCULAR; INTRAVENOUS
Status: DISCONTINUED | OUTPATIENT
Start: 2021-01-27 | End: 2021-01-27 | Stop reason: HOSPADM

## 2021-01-27 RX ORDER — FENTANYL CITRATE 50 UG/ML
INJECTION, SOLUTION INTRAMUSCULAR; INTRAVENOUS AS NEEDED
Status: DISCONTINUED | OUTPATIENT
Start: 2021-01-27 | End: 2021-01-27 | Stop reason: SURG

## 2021-01-27 RX ORDER — SODIUM CHLORIDE, SODIUM LACTATE, POTASSIUM CHLORIDE, CALCIUM CHLORIDE 600; 310; 30; 20 MG/100ML; MG/100ML; MG/100ML; MG/100ML
INJECTION, SOLUTION INTRAVENOUS CONTINUOUS PRN
Status: DISCONTINUED | OUTPATIENT
Start: 2021-01-27 | End: 2021-01-27 | Stop reason: SURG

## 2021-01-27 RX ORDER — SODIUM CHLORIDE, SODIUM LACTATE, POTASSIUM CHLORIDE, CALCIUM CHLORIDE 600; 310; 30; 20 MG/100ML; MG/100ML; MG/100ML; MG/100ML
100 INJECTION, SOLUTION INTRAVENOUS ONCE AS NEEDED
Status: DISCONTINUED | OUTPATIENT
Start: 2021-01-27 | End: 2021-01-27 | Stop reason: HOSPADM

## 2021-01-27 RX ORDER — SODIUM CHLORIDE, SODIUM LACTATE, POTASSIUM CHLORIDE, CALCIUM CHLORIDE 600; 310; 30; 20 MG/100ML; MG/100ML; MG/100ML; MG/100ML
125 INJECTION, SOLUTION INTRAVENOUS ONCE
Status: COMPLETED | OUTPATIENT
Start: 2021-01-27 | End: 2021-01-27

## 2021-01-27 RX ORDER — ONDANSETRON 2 MG/ML
4 INJECTION INTRAMUSCULAR; INTRAVENOUS AS NEEDED
Status: DISCONTINUED | OUTPATIENT
Start: 2021-01-27 | End: 2021-01-27 | Stop reason: HOSPADM

## 2021-01-27 RX ORDER — ACETAMINOPHEN 325 MG/1
650 TABLET ORAL EVERY 4 HOURS PRN
Qty: 30 TABLET | Refills: 0 | Status: SHIPPED | OUTPATIENT
Start: 2021-01-27 | End: 2022-07-27

## 2021-01-27 RX ORDER — SODIUM CHLORIDE 0.9 % (FLUSH) 0.9 %
10 SYRINGE (ML) INJECTION AS NEEDED
Status: DISCONTINUED | OUTPATIENT
Start: 2021-01-27 | End: 2021-01-27 | Stop reason: HOSPADM

## 2021-01-27 RX ORDER — MIDAZOLAM HYDROCHLORIDE 1 MG/ML
1 INJECTION INTRAMUSCULAR; INTRAVENOUS
Status: DISCONTINUED | OUTPATIENT
Start: 2021-01-27 | End: 2021-01-27 | Stop reason: HOSPADM

## 2021-01-27 RX ORDER — PROPOFOL 10 MG/ML
VIAL (ML) INTRAVENOUS AS NEEDED
Status: DISCONTINUED | OUTPATIENT
Start: 2021-01-27 | End: 2021-01-27 | Stop reason: SURG

## 2021-01-27 RX ORDER — IBUPROFEN 600 MG/1
600 TABLET ORAL EVERY 6 HOURS PRN
Qty: 30 TABLET | Refills: 0 | Status: SHIPPED | OUTPATIENT
Start: 2021-01-27 | End: 2021-03-18

## 2021-01-27 RX ORDER — SODIUM CHLORIDE 0.9 % (FLUSH) 0.9 %
10 SYRINGE (ML) INJECTION EVERY 12 HOURS SCHEDULED
Status: DISCONTINUED | OUTPATIENT
Start: 2021-01-27 | End: 2021-01-27 | Stop reason: HOSPADM

## 2021-01-27 RX ADMIN — SODIUM CHLORIDE, POTASSIUM CHLORIDE, SODIUM LACTATE AND CALCIUM CHLORIDE: 600; 310; 30; 20 INJECTION, SOLUTION INTRAVENOUS at 08:19

## 2021-01-27 RX ADMIN — PROPOFOL 40 MG: 10 INJECTION, EMULSION INTRAVENOUS at 09:00

## 2021-01-27 RX ADMIN — PROPOFOL 30 MG: 10 INJECTION, EMULSION INTRAVENOUS at 09:09

## 2021-01-27 RX ADMIN — FENTANYL CITRATE 100 MCG: 50 INJECTION INTRAMUSCULAR; INTRAVENOUS at 08:55

## 2021-01-27 RX ADMIN — PROPOFOL 160 MCG/KG/MIN: 10 INJECTION, EMULSION INTRAVENOUS at 09:11

## 2021-01-27 RX ADMIN — SODIUM CHLORIDE, POTASSIUM CHLORIDE, SODIUM LACTATE AND CALCIUM CHLORIDE 125 ML/HR: 600; 310; 30; 20 INJECTION, SOLUTION INTRAVENOUS at 08:37

## 2021-01-27 RX ADMIN — PROPOFOL 30 MG: 10 INJECTION, EMULSION INTRAVENOUS at 09:05

## 2021-01-27 NOTE — ANESTHESIA PREPROCEDURE EVALUATION
Anesthesia Evaluation     Patient summary reviewed and Nursing notes reviewed   no history of anesthetic complications:  NPO Solid Status: > 8 hours  NPO Liquid Status: > 8 hours           Airway   Mallampati: II  TM distance: >3 FB  Neck ROM: full  No difficulty expected  Dental    (+) edentulous    Pulmonary - negative pulmonary ROS and normal exam    breath sounds clear to auscultation  Cardiovascular - normal exam    ECG reviewed  PT is on anticoagulation therapy  Rhythm: regular  Rate: normal    (+) hypertension 2 medications or greater, CAD, cardiac stents Drug eluting stent more than 12 months ago CHF Diastolic >=55%, WHITTAKER, PVD, hyperlipidemia,       Neuro/Psych  (+) neuromuscular disease, TIA (h/o amaurosis fugas right eye),     GI/Hepatic/Renal/Endo    (+) obesity,  GERD,  hepatitis C, liver disease, renal disease (BPH), diabetes mellitus type 2 using insulin,     Musculoskeletal     (+) back pain, chronic pain,   Abdominal   (+) obese,    Substance History - negative use     OB/GYN negative ob/gyn ROS         Other   arthritis,    history of cancer (h/o colon and testicular ca) remission    ROS/Med Hx Other: Echo 1/22/2021:  ·The study is technically suboptimal for diagnosis with poor acoustic windows in the parasternal window. The quality of the study is limited due to patient body habitus.  ·Left ventricular ejection fraction appears to be 61 - 65%. Left ventricular systolic function is normal.  ·Left ventricular diastolic function is consistent with (grade I) impaired relaxation.  ·Left atrial volume is mildly increased.  ·Moderate aortic valve stenosis is present with peak systolic velocity of 3.4 m/sec, mildly progressed from previous study.  ·There is no evidence of pericardial effusion    PTCA with Stents 8/15/2019:   Final Impression:  CAD with the mid LAD 95% plaque rupture stenosis, culprit vessel for his non-STEMI, status post successful PCI with a 3.0 x 18 mm Christina drug-eluting stent  postdilated with 3.25 NC balloon.  Distal LAD had a 60% lesion, very small caliber vessel, 1.5 mm, recommend medical management.  Recommendations:  Aggressive guideline directed medical management for CAD   Dual Anti platelet medication with Asa 81 mg qd and Brilinta 90 mg bid for minimum 1 year.          Phys Exam Other: + heavy beard.              Anesthesia Plan    ASA 4     general   total IV anesthesia(Mr. Yin denies CP/syncope.  He d/c'd his Brilinta last week. )  intravenous induction     Anesthetic plan, all risks, benefits, and alternatives have been provided, discussed and informed consent has been obtained with: patient.    Plan discussed with CRNA.

## 2021-01-27 NOTE — ANESTHESIA POSTPROCEDURE EVALUATION
Patient: Nicholas Yin    Procedure Summary     Date: 01/27/21 Room / Location: [facility] /  [facility]    Anesthesia Start: 0855 Anesthesia Stop: 0940    Procedures:       COLONOSCOPY (N/A )      SKIN LESIONS EXCISION FROM LEFT TEMPLE AREA AND ABDOMEN. (N/A ) Diagnosis:       Sebaceous cyst      Skin lesion      History of colon cancer      (Sebaceous cyst [L72.3])      (Skin lesion [L98.9])      (History of colon cancer [Z85.038])    Surgeon: Greg Barraza MD Provider: Benjamin Ramesh DO    Anesthesia Type: general ASA Status: 4          Anesthesia Type: general    Vitals  Vitals Value Taken Time   /55 01/27/21 1012   Temp 97.5 °F (36.4 °C) 01/27/21 0942   Pulse 58 01/27/21 1012   Resp 18 01/27/21 1012   SpO2 95 % 01/27/21 1012           Post Anesthesia Care and Evaluation    Patient location during evaluation: PHASE II  Patient participation: complete - patient participated  Level of consciousness: awake and alert  Pain score: 0  Pain management: adequate  Airway patency: patent  Anesthetic complications: No anesthetic complications  PONV Status: controlled  Cardiovascular status: acceptable  Respiratory status: acceptable and room air  Hydration status: acceptable  No anesthesia care post op

## 2021-01-29 LAB
LAB AP CASE REPORT: NORMAL
PATH REPORT.FINAL DX SPEC: NORMAL

## 2021-02-01 ENCOUNTER — IMMUNIZATION (OUTPATIENT)
Dept: VACCINE CLINIC | Facility: HOSPITAL | Age: 76
End: 2021-02-01

## 2021-02-01 PROCEDURE — 0002A: CPT | Performed by: FAMILY MEDICINE

## 2021-02-01 PROCEDURE — 91300 HC SARSCOV02 VAC 30MCG/0.3ML IM: CPT | Performed by: FAMILY MEDICINE

## 2021-02-04 ENCOUNTER — TELEPHONE (OUTPATIENT)
Dept: CARDIOLOGY | Facility: CLINIC | Age: 76
End: 2021-02-04

## 2021-02-04 NOTE — TELEPHONE ENCOUNTER
Called Bill to let him know that per Dr. Reid echo EF was normal however, aortic valve stenosis has progressed mildly but would discuss further at 2/10 appt. He is aware and understands.

## 2021-02-04 NOTE — TELEPHONE ENCOUNTER
----- Message from Paul Villatoro MD sent at 1/25/2021  5:52 PM EST -----  Please call the patient regarding his echo, his EF is normal, his aortic valve stenosis has progressed mildly still < 4.0 m/sec, will discuss in next visit

## 2021-02-10 ENCOUNTER — OFFICE VISIT (OUTPATIENT)
Dept: SURGERY | Facility: CLINIC | Age: 76
End: 2021-02-10

## 2021-02-10 VITALS — HEIGHT: 69 IN | BODY MASS INDEX: 33.47 KG/M2 | WEIGHT: 226 LBS

## 2021-02-10 DIAGNOSIS — L98.9 SKIN LESION: ICD-10-CM

## 2021-02-10 DIAGNOSIS — Z85.038 HISTORY OF COLON CANCER: Primary | ICD-10-CM

## 2021-02-10 PROCEDURE — 99212 OFFICE O/P EST SF 10 MIN: CPT | Performed by: SURGERY

## 2021-02-10 NOTE — PROGRESS NOTES
Subjective   Nicholas Yin is a 76 y.o. male  is here today for follow-up.         Nicholas Yin is a 76 y.o. male here for follow up after surveillance colonoscopy for history of colon cancer.  Patient also underwent excision of 2 skin lesions.  Final skin lesion pathology is benign and consistent with seborrheic keratosis.  The patient also had a normal colonoscopy with no abnormalities apart from benign polyps.  Pathologies are listed below.              Assessment     Diagnoses and all orders for this visit:    1. History of colon cancer (Primary)    2. Skin lesion      Nicholas Yin is a 76 y.o. male status post surveillance colonoscopy for history of colon cancer as well as excision of seborrheic keratosis x2 from the scalp and abdomen.  The patient is doing well and his sutures have been removed in the office today.  Due to his history of colon cancer he will require repeat colonoscopy in 2 to 3 years

## 2021-03-12 DIAGNOSIS — I25.110 CORONARY ARTERY DISEASE INVOLVING NATIVE CORONARY ARTERY OF NATIVE HEART WITH UNSTABLE ANGINA PECTORIS (HCC): Primary | ICD-10-CM

## 2021-03-12 DIAGNOSIS — N18.30 CHRONIC RENAL FAILURE, STAGE 3 (MODERATE), UNSPECIFIED WHETHER STAGE 3A OR 3B CKD (HCC): ICD-10-CM

## 2021-03-12 DIAGNOSIS — I10 ESSENTIAL HYPERTENSION: ICD-10-CM

## 2021-03-12 DIAGNOSIS — E11.8 TYPE 2 DIABETES MELLITUS WITH COMPLICATION, WITH LONG-TERM CURRENT USE OF INSULIN (HCC): ICD-10-CM

## 2021-03-12 DIAGNOSIS — E55.9 VITAMIN D DEFICIENCY: ICD-10-CM

## 2021-03-12 DIAGNOSIS — Z79.4 TYPE 2 DIABETES MELLITUS WITH COMPLICATION, WITH LONG-TERM CURRENT USE OF INSULIN (HCC): ICD-10-CM

## 2021-03-12 DIAGNOSIS — E78.2 MIXED HYPERLIPIDEMIA: ICD-10-CM

## 2021-03-17 ENCOUNTER — LAB (OUTPATIENT)
Dept: LAB | Facility: HOSPITAL | Age: 76
End: 2021-03-17

## 2021-03-17 DIAGNOSIS — Z79.4 TYPE 2 DIABETES MELLITUS WITH COMPLICATION, WITH LONG-TERM CURRENT USE OF INSULIN (HCC): ICD-10-CM

## 2021-03-17 DIAGNOSIS — E11.8 TYPE 2 DIABETES MELLITUS WITH COMPLICATION, WITH LONG-TERM CURRENT USE OF INSULIN (HCC): ICD-10-CM

## 2021-03-17 DIAGNOSIS — E55.9 VITAMIN D DEFICIENCY: ICD-10-CM

## 2021-03-17 DIAGNOSIS — I25.110 CORONARY ARTERY DISEASE INVOLVING NATIVE CORONARY ARTERY OF NATIVE HEART WITH UNSTABLE ANGINA PECTORIS (HCC): ICD-10-CM

## 2021-03-17 DIAGNOSIS — N18.30 CHRONIC RENAL FAILURE, STAGE 3 (MODERATE), UNSPECIFIED WHETHER STAGE 3A OR 3B CKD (HCC): ICD-10-CM

## 2021-03-17 DIAGNOSIS — I10 ESSENTIAL HYPERTENSION: ICD-10-CM

## 2021-03-17 DIAGNOSIS — E78.2 MIXED HYPERLIPIDEMIA: ICD-10-CM

## 2021-03-17 LAB
25(OH)D3 SERPL-MCNC: 35.4 NG/ML (ref 30–100)
ALBUMIN SERPL-MCNC: 3.7 G/DL (ref 3.5–5.2)
ALBUMIN/GLOB SERPL: 1.1 G/DL
ALP SERPL-CCNC: 64 U/L (ref 39–117)
ALT SERPL W P-5'-P-CCNC: 14 U/L (ref 1–41)
ANION GAP SERPL CALCULATED.3IONS-SCNC: 8.3 MMOL/L (ref 5–15)
AST SERPL-CCNC: 23 U/L (ref 1–40)
BASOPHILS # BLD AUTO: 0.05 10*3/MM3 (ref 0–0.2)
BASOPHILS NFR BLD AUTO: 0.6 % (ref 0–1.5)
BILIRUB SERPL-MCNC: 0.6 MG/DL (ref 0–1.2)
BUN SERPL-MCNC: 29 MG/DL (ref 8–23)
BUN/CREAT SERPL: 13.3 (ref 7–25)
CALCIUM SPEC-SCNC: 8.8 MG/DL (ref 8.6–10.5)
CHLORIDE SERPL-SCNC: 107 MMOL/L (ref 98–107)
CHOLEST SERPL-MCNC: 93 MG/DL (ref 0–200)
CO2 SERPL-SCNC: 23.7 MMOL/L (ref 22–29)
CREAT SERPL-MCNC: 2.18 MG/DL (ref 0.76–1.27)
DEPRECATED RDW RBC AUTO: 46.8 FL (ref 37–54)
EOSINOPHIL # BLD AUTO: 0.38 10*3/MM3 (ref 0–0.4)
EOSINOPHIL NFR BLD AUTO: 4.4 % (ref 0.3–6.2)
ERYTHROCYTE [DISTWIDTH] IN BLOOD BY AUTOMATED COUNT: 15.2 % (ref 12.3–15.4)
GFR SERPL CREATININE-BSD FRML MDRD: 30 ML/MIN/1.73
GLOBULIN UR ELPH-MCNC: 3.3 GM/DL
GLUCOSE SERPL-MCNC: 99 MG/DL (ref 65–99)
HBA1C MFR BLD: 7.33 % (ref 4.8–5.6)
HCT VFR BLD AUTO: 38.2 % (ref 37.5–51)
HDLC SERPL-MCNC: 41 MG/DL (ref 40–60)
HGB BLD-MCNC: 12.7 G/DL (ref 13–17.7)
IMM GRANULOCYTES # BLD AUTO: 0.02 10*3/MM3 (ref 0–0.05)
IMM GRANULOCYTES NFR BLD AUTO: 0.2 % (ref 0–0.5)
LDLC SERPL CALC-MCNC: 27 MG/DL (ref 0–100)
LDLC/HDLC SERPL: 0.55 {RATIO}
LYMPHOCYTES # BLD AUTO: 1.37 10*3/MM3 (ref 0.7–3.1)
LYMPHOCYTES NFR BLD AUTO: 16 % (ref 19.6–45.3)
MCH RBC QN AUTO: 28.3 PG (ref 26.6–33)
MCHC RBC AUTO-ENTMCNC: 33.2 G/DL (ref 31.5–35.7)
MCV RBC AUTO: 85.3 FL (ref 79–97)
MONOCYTES # BLD AUTO: 0.76 10*3/MM3 (ref 0.1–0.9)
MONOCYTES NFR BLD AUTO: 8.9 % (ref 5–12)
NEUTROPHILS NFR BLD AUTO: 5.98 10*3/MM3 (ref 1.7–7)
NEUTROPHILS NFR BLD AUTO: 69.9 % (ref 42.7–76)
NRBC BLD AUTO-RTO: 0 /100 WBC (ref 0–0.2)
PLATELET # BLD AUTO: 231 10*3/MM3 (ref 140–450)
PMV BLD AUTO: 10.9 FL (ref 6–12)
POTASSIUM SERPL-SCNC: 4.7 MMOL/L (ref 3.5–5.2)
PROT SERPL-MCNC: 7 G/DL (ref 6–8.5)
RBC # BLD AUTO: 4.48 10*6/MM3 (ref 4.14–5.8)
SODIUM SERPL-SCNC: 139 MMOL/L (ref 136–145)
TRIGL SERPL-MCNC: 148 MG/DL (ref 0–150)
TSH SERPL DL<=0.05 MIU/L-ACNC: 4.87 UIU/ML (ref 0.27–4.2)
VIT B12 BLD-MCNC: 541 PG/ML (ref 211–946)
VLDLC SERPL-MCNC: 25 MG/DL (ref 5–40)
WBC # BLD AUTO: 8.56 10*3/MM3 (ref 3.4–10.8)

## 2021-03-17 PROCEDURE — 80061 LIPID PANEL: CPT

## 2021-03-17 PROCEDURE — 80053 COMPREHEN METABOLIC PANEL: CPT

## 2021-03-17 PROCEDURE — 83036 HEMOGLOBIN GLYCOSYLATED A1C: CPT

## 2021-03-17 PROCEDURE — 82306 VITAMIN D 25 HYDROXY: CPT

## 2021-03-17 PROCEDURE — 85025 COMPLETE CBC W/AUTO DIFF WBC: CPT

## 2021-03-17 PROCEDURE — 82607 VITAMIN B-12: CPT

## 2021-03-17 PROCEDURE — 84443 ASSAY THYROID STIM HORMONE: CPT

## 2021-03-18 ENCOUNTER — OFFICE VISIT (OUTPATIENT)
Dept: FAMILY MEDICINE CLINIC | Facility: CLINIC | Age: 76
End: 2021-03-18

## 2021-03-18 VITALS
OXYGEN SATURATION: 96 % | SYSTOLIC BLOOD PRESSURE: 122 MMHG | BODY MASS INDEX: 34.07 KG/M2 | TEMPERATURE: 96 F | DIASTOLIC BLOOD PRESSURE: 64 MMHG | WEIGHT: 230 LBS | RESPIRATION RATE: 14 BRPM | HEART RATE: 86 BPM | HEIGHT: 69 IN

## 2021-03-18 DIAGNOSIS — N18.32 CHRONIC RENAL FAILURE, STAGE 3B (HCC): ICD-10-CM

## 2021-03-18 DIAGNOSIS — Z86.19 HISTORY OF HEPATITIS C: ICD-10-CM

## 2021-03-18 DIAGNOSIS — Z85.47 H/O TESTICULAR CANCER: ICD-10-CM

## 2021-03-18 DIAGNOSIS — I10 ESSENTIAL HYPERTENSION: ICD-10-CM

## 2021-03-18 DIAGNOSIS — N40.1 BENIGN PROSTATIC HYPERPLASIA WITH NOCTURIA: ICD-10-CM

## 2021-03-18 DIAGNOSIS — Z85.038 HISTORY OF COLON CANCER: ICD-10-CM

## 2021-03-18 DIAGNOSIS — E55.9 VITAMIN D DEFICIENCY: ICD-10-CM

## 2021-03-18 DIAGNOSIS — Z79.4 TYPE 2 DIABETES MELLITUS WITH COMPLICATION, WITH LONG-TERM CURRENT USE OF INSULIN (HCC): ICD-10-CM

## 2021-03-18 DIAGNOSIS — Z00.00 HEALTHCARE MAINTENANCE: ICD-10-CM

## 2021-03-18 DIAGNOSIS — G45.3 AMAUROSIS FUGAX OF RIGHT EYE: ICD-10-CM

## 2021-03-18 DIAGNOSIS — K21.9 GASTROESOPHAGEAL REFLUX DISEASE WITHOUT ESOPHAGITIS: ICD-10-CM

## 2021-03-18 DIAGNOSIS — M85.89 OSTEOPENIA OF MULTIPLE SITES: ICD-10-CM

## 2021-03-18 DIAGNOSIS — E78.2 MIXED HYPERLIPIDEMIA: ICD-10-CM

## 2021-03-18 DIAGNOSIS — E11.8 TYPE 2 DIABETES MELLITUS WITH COMPLICATION, WITH LONG-TERM CURRENT USE OF INSULIN (HCC): ICD-10-CM

## 2021-03-18 DIAGNOSIS — I73.9 PAD (PERIPHERAL ARTERY DISEASE) (HCC): Primary | ICD-10-CM

## 2021-03-18 DIAGNOSIS — I25.110 CORONARY ARTERY DISEASE INVOLVING NATIVE CORONARY ARTERY OF NATIVE HEART WITH UNSTABLE ANGINA PECTORIS (HCC): ICD-10-CM

## 2021-03-18 DIAGNOSIS — I35.0 NONRHEUMATIC AORTIC VALVE STENOSIS: ICD-10-CM

## 2021-03-18 DIAGNOSIS — R35.1 BENIGN PROSTATIC HYPERPLASIA WITH NOCTURIA: ICD-10-CM

## 2021-03-18 DIAGNOSIS — M51.36 DDD (DEGENERATIVE DISC DISEASE), LUMBAR: ICD-10-CM

## 2021-03-18 PROCEDURE — 99214 OFFICE O/P EST MOD 30 MIN: CPT | Performed by: GENERAL PRACTICE

## 2021-03-18 NOTE — PROGRESS NOTES
Subjective   Nicholas Yin is a 76 y.o. male.     History of Present Illness     Coronary Artery Disease  Underwent a coronary angiogram by Dr. Villatoro on 8/15/2019 with stenting of the mid LAD.  He remains chest pain-free and has noted a continued improvement in his exercise tolerance and overall sense of well-being.  He has been maintained on brilinta 60 twice daily and is aware to remain on ASA 81 daily with this. Echocardiogram performed on 1/22/2021 was reported as showing moderate aortic valve stenosis, left ventricular diastolic dysfunction, but normal systolic function with an estimated EF of 61 to 65%  Lab Results   Component Value Date    WBC 8.56 03/17/2021    HGB 12.7 (L) 03/17/2021    HCT 38.2 03/17/2021    MCV 85.3 03/17/2021     03/17/2021     Transient Visual Loss  Several years ago he experienced a sudden progressive decrease in the vision in his right eye while driving. He stated that the vision in the eye darkened gradually over several minutes to the point where the only things he could make out were very bright lights and the sun. This lasted for about 30 minutes then resolved over several minutes. He denies any changes in his strength, sensation, speech or ability to understand what is said to him. He denies any palpitations and has had no lightheadedness or diaphoresis. He was hospitalized at [facility] over 10 years ago following a similar episodes that was ultimately felt to be vascular in origin. He has numerous cardiovascular risk factors as well as chronic renal insufficiency. MRI of the brain performed on 12/8/17 was reported as showing mild cerebral atrophy with chronic small vessel ischemic changes. MRA of the carotid arteries performed the same day was unremarkable.  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  No evidence of diabetic retinopathy was observed    Diabetes  Current symptoms include none.   He continues to deny any paresthesias of the feet, polydipsia, polyuria, hypoglycemia or foot ulcerations. Evaluation to date has been: hemoglobin A1C. Home sugars: BGs have remained at or near goal since last here. Current treatments: SGLT2 inhibitor -canagliflozin, GLP agonist-liraglutide and basal insulin - tresiba (together as xultophy)  Lab Results   Component Value Date    HGBA1C 7.33 (H) 03/17/2021      Dyslipidemia  Compliance with treatment has been fair.  He has been walking some. He remains on rosuvastatin with no apparent side effects  Lab Results   Component Value Date    CHOL 93 03/17/2021    CHLPL 125 09/28/2015    TRIG 148 03/17/2021    HDL 41 03/17/2021    LDL 27 03/17/2021     Hypertension  Home blood pressure readings: have generally been at or near goal. Associated signs and symptoms: dyspnea with above average exertion and occasional lightheadedness when getting up from sitting.  He continues to deny any chest pain, palpitations, orthopnea, paroxysmal nocturnal dyspnea or peripheral edema. Current antihypertensive medications includes losartan, carvedilol, and amlodipine.  He has not taken furosemide since last here.  Medication compliance: taking as prescribed.   Lab Results   Component Value Date    GLUCOSE 99 03/17/2021    BUN 29 (H) 03/17/2021    CREATININE 2.18 (H) 03/17/2021    EGFRIFNONA 30 (L) 03/17/2021    BCR 13.3 03/17/2021    K 4.7 03/17/2021    CO2 23.7 03/17/2021    CALCIUM 8.8 03/17/2021    ALBUMIN 3.70 03/17/2021    LABIL2 1.1 (L) 09/28/2015    AST 23 03/17/2021    ALT 14 03/17/2021     Chronic Back Pain  Gives a long history of increasing mid and low back pain. This is described as a sharp ache. The pain does not radiate and has been unassociated with any other symptoms. The pain is worse with prolonged weight bearing and limits his activities more then anything else at present.  MRI performed on 1/7/2021 was reported as showing degenerative disc disease and osteoarthritis with  mild to moderate central canal and bilateral foraminal narrowing at L2-3 and L3-4.    Labs  Most recent vitamin D 35.4 with a B12 of 541  Lab Results   Component Value Date    TSH 4.870 (H) 03/17/2021     Imaging  DEXA scan performed on 1/12/2021 returned with a T score as low as -2.1    The following portions of the patient's history were reviewed and updated as appropriate: allergies, current medications, past medical history, past social history and problem list.    Review of Systems   Constitutional: Positive for fatigue. Negative for appetite change, chills, fever and unexpected weight change.   HENT: Negative for congestion, ear pain, rhinorrhea, sneezing and sore throat.    Eyes: Negative for visual disturbance.   Respiratory: Positive for shortness of breath. Negative for cough and wheezing.    Cardiovascular: Negative for chest pain, palpitations and leg swelling.   Gastrointestinal: Negative for abdominal pain, blood in stool, constipation, diarrhea, nausea and vomiting.   Endocrine: Negative for polydipsia and polyuria.   Genitourinary: Negative for difficulty urinating, dysuria, frequency, hematuria and urgency.        Erectile dysfunction - chronic progressive. Nocturia x 2-3 with occasional sense of incomplete voiding.   Musculoskeletal: Positive for back pain (chronic - progressive). Negative for arthralgias, joint swelling and myalgias.   Skin: Negative for rash.   Neurological: Positive for light-headedness. Negative for weakness, numbness and headaches.   Psychiatric/Behavioral: Negative for sleep disturbance.     Objective   Physical Exam  Constitutional:       General: He is not in acute distress.     Appearance: Normal appearance. He is well-developed. He is not ill-appearing or diaphoretic.      Comments: Bright and in good spirits.  Sits and stands with an exaggerated thoracic kyphosis.  Uncomfortable with movement.  No apparent distress at rest.  No pallor, cyanosis, diaphoresis, or jaundice    HENT:      Head: Atraumatic.      Right Ear: Tympanic membrane, ear canal and external ear normal.      Left Ear: Tympanic membrane, ear canal and external ear normal.   Eyes:      Conjunctiva/sclera: Conjunctivae normal.   Neck:      Thyroid: No thyroid mass or thyromegaly.      Vascular: No carotid bruit or JVD.      Trachea: Trachea normal. No tracheal deviation.   Cardiovascular:      Rate and Rhythm: Normal rate and regular rhythm.      Heart sounds: Normal heart sounds, S1 normal and S2 normal. No murmur heard.   No gallop. No S3 or S4 sounds.    Pulmonary:      Effort: Pulmonary effort is normal.      Breath sounds: Normal breath sounds.   Abdominal:      General: Bowel sounds are normal. There is no distension.   Musculoskeletal:      Lumbar back: No spasms or tenderness. Decreased range of motion.      Right lower leg: No edema.      Left lower leg: No edema.      Comments: Negative straight leg raise.   Lymphadenopathy:      Head:      Right side of head: No submental, submandibular, tonsillar, preauricular, posterior auricular or occipital adenopathy.      Left side of head: No submental, submandibular, tonsillar, preauricular, posterior auricular or occipital adenopathy.      Cervical: No cervical adenopathy.      Upper Body:      Right upper body: No supraclavicular adenopathy.      Left upper body: No supraclavicular adenopathy.   Skin:     General: Skin is warm and dry.      Coloration: Skin is not cyanotic, jaundiced or pale.      Findings: No rash.      Nails: There is no clubbing.   Neurological:      Mental Status: He is alert and oriented to person, place, and time.      Cranial Nerves: No cranial nerve deficit.      Motor: No tremor.      Coordination: Coordination normal.      Gait: Gait normal.   Psychiatric:         Attention and Perception: Attention normal.         Mood and Affect: Mood normal.         Speech: Speech normal.         Behavior: Behavior normal.       Assessment/Plan    Problems Addressed this Visit        Cardiac and Vasculature    CAD (coronary artery disease)  Reminded regarding the importance of risk factor modification.  Continue current medication  Follow up with cardiology     Relevant Orders    CBC & Differential    Essential hypertension   Hypertension: at goal. Evidence of target organ damage: coronary artery disease, peripheral artery disease, chronic kidney disease and transient ischemic attack.  Encouraged to continue to work on diet and exercise plan.   Continue current medication    Relevant Orders    CBC & Differential    Comprehensive Metabolic Panel    Mixed hyperlipidemia  As above.   Continue current medication.    Relevant Orders    Comprehensive Metabolic Panel    Lipid Panel    Nonrheumatic aortic valve stenosis  Follow up with cardiology     PAD (peripheral artery disease) (CMS/[facility])   As above.  If Brilinta should be discontinued at some point we will consider adding low-dose rivaroxaban       Endocrine and Metabolic    Type 2 diabetes mellitus with complication, with long-term current use of insulin (CMS/[facility])  Diabetes mellitus Type II, under good control.   Encouraged to continue to pursue ADA diet  Encouraged aerobic exercise.  Continue current medication  Scheduled for updated labs just prior to his return in 3 months    Relevant Orders    Hemoglobin A1c    MicroAlbumin, Urine, Random - Urine, Clean Catch    Vitamin D deficiency    Relevant Orders    Vitamin D 25 Hydroxy       Gastrointestinal Abdominal     Gastroesophageal reflux disease without esophagitis       Genitourinary and Reproductive     Benign prostatic hyperplasia with nocturia       Health Encounters    Healthcare maintenance  Patient has received both doses of the COVID-19 vaccine.       Hematology and Neoplasia    H/O testicular cancer    History of colon cancer       Musculoskeletal and Injuries    Osteopenia of multiple sites  Encouraged to continue to pursue weight bearing activities  while exercising joint protection.  We will consider an updated DEXA scan in 1 year    Relevant Orders    Vitamin D 25 Hydroxy       Neuro    Amaurosis fugax of right eye  As above.    DDD (degenerative disc disease), lumbar       Other    Chronic renal failure, stage 3b (CMS/HCC)  Reminded to avoid any NSAIDs prescription or OTC  Will continue to monitor    Relevant Orders    Comprehensive Metabolic Panel    History of hepatitis C      Diagnoses       Codes Comments    PAD (peripheral artery disease) (CMS/[facility])    -  Primary ICD-10-CM: I73.9  ICD-9-CM: 443.9     Mixed hyperlipidemia     ICD-10-CM: E78.2  ICD-9-CM: 272.2     Essential hypertension     ICD-10-CM: I10  ICD-9-CM: 401.9     Coronary artery disease involving native coronary artery of native heart with unstable angina pectoris (CMS/[facility])     ICD-10-CM: I25.110  ICD-9-CM: 414.01, 411.1     Vitamin D deficiency     ICD-10-CM: E55.9  ICD-9-CM: 268.9     Type 2 diabetes mellitus with complication, with long-term current use of insulin (CMS/[facility])     ICD-10-CM: E11.8, Z79.4  ICD-9-CM: 250.90, V58.67     Gastroesophageal reflux disease without esophagitis     ICD-10-CM: K21.9  ICD-9-CM: 530.81     Chronic renal failure, stage 3b (CMS/HCC)     ICD-10-CM: N18.32  ICD-9-CM: 585.3     Benign prostatic hyperplasia with nocturia     ICD-10-CM: N40.1, R35.1  ICD-9-CM: 600.01, 788.43     Healthcare maintenance     ICD-10-CM: Z00.00  ICD-9-CM: V70.0     Osteopenia of multiple sites     ICD-10-CM: M85.89  ICD-9-CM: 733.90     DDD (degenerative disc disease), lumbar     ICD-10-CM: M51.36  ICD-9-CM: 722.52     History of hepatitis C     ICD-10-CM: Z86.19  ICD-9-CM: V12.09     History of colon cancer     ICD-10-CM: Z85.038  ICD-9-CM: V10.05     H/O testicular cancer     ICD-10-CM: Z85.47  ICD-9-CM: V10.47     Nonrheumatic aortic valve stenosis     ICD-10-CM: I35.0  ICD-9-CM: 424.1     Amaurosis fugax of right eye     ICD-10-CM: G45.3  ICD-9-CM: 362.34

## 2021-03-25 ENCOUNTER — TELEPHONE (OUTPATIENT)
Dept: FAMILY MEDICINE CLINIC | Facility: CLINIC | Age: 76
End: 2021-03-25

## 2021-03-25 NOTE — TELEPHONE ENCOUNTER
Enrolled pt in patient assistance. Pt will let me know if he has trouble picking up prescription.       ----- Message from Waldemar Trejo MD sent at 3/18/2021  9:24 PM EDT -----  Please find out if his insurance will agree to a tier exemption on Invokana  Diagnosis: Type 2 diabetes mellitus with diabetic nephropathy (at present it is the only SGLT2 inhibitor that has an FDA approval for diabetic nephropathy)

## 2021-04-01 RX ORDER — DEXLANSOPRAZOLE 60 MG/1
1 CAPSULE, DELAYED RELEASE ORAL DAILY
Qty: 100 CAPSULE | Refills: 0 | COMMUNITY
Start: 2021-04-01 | End: 2021-09-21 | Stop reason: SDUPTHER

## 2021-04-09 DIAGNOSIS — Z79.4 TYPE 2 DIABETES MELLITUS WITH COMPLICATION, WITH LONG-TERM CURRENT USE OF INSULIN (HCC): ICD-10-CM

## 2021-04-09 DIAGNOSIS — E11.8 TYPE 2 DIABETES MELLITUS WITH COMPLICATION, WITH LONG-TERM CURRENT USE OF INSULIN (HCC): ICD-10-CM

## 2021-04-09 RX ORDER — ROSUVASTATIN CALCIUM 20 MG/1
20 TABLET, COATED ORAL DAILY
Qty: 90 TABLET | Refills: 3 | Status: SHIPPED | OUTPATIENT
Start: 2021-04-09 | End: 2022-04-12 | Stop reason: SDUPTHER

## 2021-04-09 RX ORDER — ROSUVASTATIN CALCIUM 20 MG/1
20 TABLET, COATED ORAL DAILY
Qty: 90 TABLET | Refills: 3 | Status: CANCELLED | OUTPATIENT
Start: 2021-04-09

## 2021-04-09 RX ORDER — CANAGLIFLOZIN 100 MG/1
1 TABLET, FILM COATED ORAL EVERY MORNING
Qty: 90 TABLET | Refills: 3 | Status: SHIPPED | OUTPATIENT
Start: 2021-04-09 | End: 2021-07-08 | Stop reason: SDUPTHER

## 2021-05-06 RX ORDER — ERGOCALCIFEROL 1.25 MG/1
CAPSULE ORAL
Qty: 4 CAPSULE | Refills: 5 | Status: SHIPPED | OUTPATIENT
Start: 2021-05-06 | End: 2021-11-09 | Stop reason: SDUPTHER

## 2021-05-10 DIAGNOSIS — E11.8 TYPE 2 DIABETES MELLITUS WITH COMPLICATION, WITH LONG-TERM CURRENT USE OF INSULIN (HCC): ICD-10-CM

## 2021-05-10 DIAGNOSIS — Z79.4 TYPE 2 DIABETES MELLITUS WITH COMPLICATION, WITH LONG-TERM CURRENT USE OF INSULIN (HCC): ICD-10-CM

## 2021-05-10 RX ORDER — (INSULIN DEGLUDEC AND LIRAGLUTIDE) 100; 3.6 [IU]/ML; MG/ML
50 INJECTION, SOLUTION SUBCUTANEOUS DAILY
Qty: 15 ML | Refills: 0 | Status: SHIPPED | OUTPATIENT
Start: 2021-05-10 | End: 2021-06-11 | Stop reason: SDUPTHER

## 2021-06-03 ENCOUNTER — LAB (OUTPATIENT)
Dept: LAB | Facility: HOSPITAL | Age: 76
End: 2021-06-03

## 2021-06-03 DIAGNOSIS — Z79.4 TYPE 2 DIABETES MELLITUS WITH COMPLICATION, WITH LONG-TERM CURRENT USE OF INSULIN (HCC): ICD-10-CM

## 2021-06-03 DIAGNOSIS — M85.89 OSTEOPENIA OF MULTIPLE SITES: ICD-10-CM

## 2021-06-03 DIAGNOSIS — I10 ESSENTIAL HYPERTENSION: ICD-10-CM

## 2021-06-03 DIAGNOSIS — E11.8 TYPE 2 DIABETES MELLITUS WITH COMPLICATION, WITH LONG-TERM CURRENT USE OF INSULIN (HCC): ICD-10-CM

## 2021-06-03 DIAGNOSIS — E78.2 MIXED HYPERLIPIDEMIA: ICD-10-CM

## 2021-06-03 DIAGNOSIS — E55.9 VITAMIN D DEFICIENCY: ICD-10-CM

## 2021-06-03 DIAGNOSIS — I25.110 CORONARY ARTERY DISEASE INVOLVING NATIVE CORONARY ARTERY OF NATIVE HEART WITH UNSTABLE ANGINA PECTORIS (HCC): Primary | ICD-10-CM

## 2021-06-03 DIAGNOSIS — I25.110 CORONARY ARTERY DISEASE INVOLVING NATIVE CORONARY ARTERY OF NATIVE HEART WITH UNSTABLE ANGINA PECTORIS (HCC): ICD-10-CM

## 2021-06-03 PROCEDURE — 83036 HEMOGLOBIN GLYCOSYLATED A1C: CPT

## 2021-06-03 PROCEDURE — 82043 UR ALBUMIN QUANTITATIVE: CPT

## 2021-06-03 PROCEDURE — 80061 LIPID PANEL: CPT

## 2021-06-03 PROCEDURE — 83880 ASSAY OF NATRIURETIC PEPTIDE: CPT | Performed by: NURSE PRACTITIONER

## 2021-06-03 PROCEDURE — 80053 COMPREHEN METABOLIC PANEL: CPT | Performed by: NURSE PRACTITIONER

## 2021-06-03 PROCEDURE — 85025 COMPLETE CBC W/AUTO DIFF WBC: CPT

## 2021-06-03 PROCEDURE — 36415 COLL VENOUS BLD VENIPUNCTURE: CPT | Performed by: NURSE PRACTITIONER

## 2021-06-03 PROCEDURE — 82306 VITAMIN D 25 HYDROXY: CPT

## 2021-06-04 ENCOUNTER — OFFICE VISIT (OUTPATIENT)
Dept: CARDIOLOGY | Facility: CLINIC | Age: 76
End: 2021-06-04

## 2021-06-04 VITALS
DIASTOLIC BLOOD PRESSURE: 68 MMHG | BODY MASS INDEX: 33.97 KG/M2 | OXYGEN SATURATION: 97 % | HEIGHT: 69 IN | SYSTOLIC BLOOD PRESSURE: 116 MMHG | HEART RATE: 72 BPM

## 2021-06-04 DIAGNOSIS — Z53.21 PATIENT LEFT WITHOUT BEING SEEN: Primary | ICD-10-CM

## 2021-06-04 LAB
25(OH)D3 SERPL-MCNC: 18.5 NG/ML (ref 30–100)
ALBUMIN SERPL-MCNC: 3.4 G/DL (ref 3.5–5.2)
ALBUMIN UR-MCNC: 31.2 MG/DL
ALBUMIN/GLOB SERPL: 1 G/DL
ALP SERPL-CCNC: 68 U/L (ref 39–117)
ALT SERPL W P-5'-P-CCNC: 17 U/L (ref 1–41)
ANION GAP SERPL CALCULATED.3IONS-SCNC: 10.3 MMOL/L (ref 5–15)
AST SERPL-CCNC: 24 U/L (ref 1–40)
BASOPHILS # BLD AUTO: 0.05 10*3/MM3 (ref 0–0.2)
BASOPHILS NFR BLD AUTO: 0.6 % (ref 0–1.5)
BILIRUB SERPL-MCNC: 0.2 MG/DL (ref 0–1.2)
BUN SERPL-MCNC: 33 MG/DL (ref 8–23)
BUN/CREAT SERPL: 18.2 (ref 7–25)
CALCIUM SPEC-SCNC: 8.7 MG/DL (ref 8.6–10.5)
CHLORIDE SERPL-SCNC: 105 MMOL/L (ref 98–107)
CHOLEST SERPL-MCNC: 108 MG/DL (ref 0–200)
CO2 SERPL-SCNC: 21.7 MMOL/L (ref 22–29)
CREAT SERPL-MCNC: 1.81 MG/DL (ref 0.76–1.27)
DEPRECATED RDW RBC AUTO: 42.2 FL (ref 37–54)
EOSINOPHIL # BLD AUTO: 0.38 10*3/MM3 (ref 0–0.4)
EOSINOPHIL NFR BLD AUTO: 4.7 % (ref 0.3–6.2)
ERYTHROCYTE [DISTWIDTH] IN BLOOD BY AUTOMATED COUNT: 13.4 % (ref 12.3–15.4)
GFR SERPL CREATININE-BSD FRML MDRD: 37 ML/MIN/1.73
GLOBULIN UR ELPH-MCNC: 3.3 GM/DL
GLUCOSE SERPL-MCNC: 245 MG/DL (ref 65–99)
HBA1C MFR BLD: 7.1 % (ref 4.8–5.6)
HCT VFR BLD AUTO: 37.7 % (ref 37.5–51)
HDLC SERPL-MCNC: 45 MG/DL (ref 40–60)
HGB BLD-MCNC: 12.3 G/DL (ref 13–17.7)
IMM GRANULOCYTES # BLD AUTO: 0.02 10*3/MM3 (ref 0–0.05)
IMM GRANULOCYTES NFR BLD AUTO: 0.2 % (ref 0–0.5)
LDLC SERPL CALC-MCNC: 29 MG/DL (ref 0–100)
LDLC/HDLC SERPL: 0.42 {RATIO}
LYMPHOCYTES # BLD AUTO: 1.32 10*3/MM3 (ref 0.7–3.1)
LYMPHOCYTES NFR BLD AUTO: 16.4 % (ref 19.6–45.3)
MCH RBC QN AUTO: 28.2 PG (ref 26.6–33)
MCHC RBC AUTO-ENTMCNC: 32.6 G/DL (ref 31.5–35.7)
MCV RBC AUTO: 86.5 FL (ref 79–97)
MONOCYTES # BLD AUTO: 0.56 10*3/MM3 (ref 0.1–0.9)
MONOCYTES NFR BLD AUTO: 7 % (ref 5–12)
NEUTROPHILS NFR BLD AUTO: 5.72 10*3/MM3 (ref 1.7–7)
NEUTROPHILS NFR BLD AUTO: 71.1 % (ref 42.7–76)
NRBC BLD AUTO-RTO: 0 /100 WBC (ref 0–0.2)
NT-PROBNP SERPL-MCNC: 168.2 PG/ML (ref 0–1800)
PLATELET # BLD AUTO: 231 10*3/MM3 (ref 140–450)
PMV BLD AUTO: 10.9 FL (ref 6–12)
POTASSIUM SERPL-SCNC: 4.6 MMOL/L (ref 3.5–5.2)
PROT SERPL-MCNC: 6.7 G/DL (ref 6–8.5)
RBC # BLD AUTO: 4.36 10*6/MM3 (ref 4.14–5.8)
SODIUM SERPL-SCNC: 137 MMOL/L (ref 136–145)
TRIGL SERPL-MCNC: 221 MG/DL (ref 0–150)
VLDLC SERPL-MCNC: 34 MG/DL (ref 5–40)
WBC # BLD AUTO: 8.05 10*3/MM3 (ref 3.4–10.8)

## 2021-06-04 NOTE — PROGRESS NOTES
Patient left without being seen by the provider.      Chief Complaint  No chief complaint on file.    Subjective        Nicholas Yin presents to [facility] for regular follow up  History of Present Illness   Palpitations:  Intermittent. Described as *** . Onset ***.  Lasting ***.  Exertional  Non- exertional Associated symptoms ***.    Chest pain:  Intermittent.  Described as ***. Onset ***.  Lasting ***  Exertional  Non-exertional. Associated symptoms ***.    Shortness of breath:  Intermittent episodes described as *** Exertional Non exertional. Associated symptoms    Edema:      Hypertension:  The patient presents with a follow up of essential hypertension.  The patient states he has been stable with his blood pressure control since last visit. No significant interval events.    CAD:  The patient states that he has been stable with his coronary artery disease since last visit. Patient is currently asymptomatic. Status post mid LAD 99% plaque rupture stenosis status post PCI with a 3.0 x 18 mm Christina drug-eluting stent postdilated with a 3.25 NC balloon on 8/15/2019.    By report, there is good compliance with treatement, good tolerance of treatment and good symptom control.  Dyslipidemia:  The patient states that it has been stable.   Denies any complications with statins.   Medications:  The patient is adherent with his medications without any complications.  The chart, PMH, FMH, social history, PSH, and medications were reviewed today. Problems above addressed this visit.    Objective     Vital Signs:   There were no vitals taken for this visit.      Physical Exam  Constitutional:       General: He is not in acute distress.     Appearance: Normal appearance.   HENT:      Head: Normocephalic.      Mouth/Throat:      Mouth: Mucous membranes are moist.   Eyes:      Pupils: Pupils are equal, round, and reactive to light.   Neck:      Vascular: No carotid bruit.   Cardiovascular:       Rate and Rhythm: Normal rate and regular rhythm.      Pulses: Normal pulses.      Heart sounds: Normal heart sounds. No murmur heard.   No friction rub. No gallop.    Pulmonary:      Effort: Pulmonary effort is normal. No respiratory distress.      Breath sounds: Normal breath sounds. No wheezing.   Abdominal:      General: Bowel sounds are normal.      Palpations: Abdomen is soft.   Musculoskeletal:      Cervical back: Neck supple.      Right lower leg: No edema.      Left lower leg: No edema.   Skin:     General: Skin is warm and dry.   Neurological:      General: No focal deficit present.      Mental Status: He is alert and oriented to person, place, and time.   Psychiatric:         Mood and Affect: Mood normal.         Behavior: Behavior normal.         Thought Content: Thought content normal.         Judgment: Judgment normal.          Result Review :  Data reviewed: Cardiology studies 06/04/2021       Assessment  ASCVD 8/15/2019 mid LAD 99% plaque rupture stenosis status post PCI with a 3.0 x 18 mm Christina drug-eluting stent postdilated with a 3.25 NC balloon   Dyslipidemia  Essential hypertnesion      Plan        Problem List Items Addressed This Visit     None            Follow Up     No follow-ups on file.  Patient was given instructions and counseling regarding his condition or for health maintenance advice. Please see specific information pulled into the AVS if appropriate.               Paul Villatoro MD, [facility]  Interventional Cardiology    JEOVANNY Castellanos, acting as scribe for Paul Villatoro MD, [facility]    06/04/21  09:10 EDT

## 2021-06-08 ENCOUNTER — PRIOR AUTHORIZATION (OUTPATIENT)
Dept: FAMILY MEDICINE CLINIC | Facility: CLINIC | Age: 76
End: 2021-06-08

## 2021-06-10 ENCOUNTER — TELEPHONE (OUTPATIENT)
Dept: FAMILY MEDICINE CLINIC | Facility: CLINIC | Age: 76
End: 2021-06-10

## 2021-06-10 NOTE — TELEPHONE ENCOUNTER
Faxed request through covermymeds      ----- Message from Waldemar Trejo MD sent at 6/8/2021  1:51 PM EDT -----  Please try to obtain a tier exemption for Justine marin  Dx - type 2 DM in the context of CAD - soliqua does not have a CV risk reduction approval

## 2021-06-11 ENCOUNTER — OFFICE VISIT (OUTPATIENT)
Dept: FAMILY MEDICINE CLINIC | Facility: CLINIC | Age: 76
End: 2021-06-11

## 2021-06-11 DIAGNOSIS — M51.36 DDD (DEGENERATIVE DISC DISEASE), LUMBAR: ICD-10-CM

## 2021-06-11 DIAGNOSIS — N18.32 CHRONIC RENAL FAILURE, STAGE 3B (HCC): ICD-10-CM

## 2021-06-11 DIAGNOSIS — I35.0 NONRHEUMATIC AORTIC VALVE STENOSIS: ICD-10-CM

## 2021-06-11 DIAGNOSIS — I10 ESSENTIAL HYPERTENSION: ICD-10-CM

## 2021-06-11 DIAGNOSIS — R35.1 BENIGN PROSTATIC HYPERPLASIA WITH NOCTURIA: ICD-10-CM

## 2021-06-11 DIAGNOSIS — K21.9 GASTROESOPHAGEAL REFLUX DISEASE WITHOUT ESOPHAGITIS: ICD-10-CM

## 2021-06-11 DIAGNOSIS — E55.9 VITAMIN D DEFICIENCY: ICD-10-CM

## 2021-06-11 DIAGNOSIS — N40.1 BENIGN PROSTATIC HYPERPLASIA WITH NOCTURIA: ICD-10-CM

## 2021-06-11 DIAGNOSIS — Z00.00 HEALTHCARE MAINTENANCE: ICD-10-CM

## 2021-06-11 DIAGNOSIS — I73.9 PAD (PERIPHERAL ARTERY DISEASE) (HCC): Primary | ICD-10-CM

## 2021-06-11 DIAGNOSIS — E78.2 MIXED HYPERLIPIDEMIA: ICD-10-CM

## 2021-06-11 DIAGNOSIS — E11.8 TYPE 2 DIABETES MELLITUS WITH COMPLICATION, WITH LONG-TERM CURRENT USE OF INSULIN (HCC): ICD-10-CM

## 2021-06-11 DIAGNOSIS — G45.3 AMAUROSIS FUGAX OF RIGHT EYE: ICD-10-CM

## 2021-06-11 DIAGNOSIS — Z79.4 TYPE 2 DIABETES MELLITUS WITH COMPLICATION, WITH LONG-TERM CURRENT USE OF INSULIN (HCC): ICD-10-CM

## 2021-06-11 DIAGNOSIS — I25.110 CORONARY ARTERY DISEASE INVOLVING NATIVE CORONARY ARTERY OF NATIVE HEART WITH UNSTABLE ANGINA PECTORIS (HCC): ICD-10-CM

## 2021-06-11 DIAGNOSIS — M85.89 OSTEOPENIA OF MULTIPLE SITES: ICD-10-CM

## 2021-06-11 PROCEDURE — 99214 OFFICE O/P EST MOD 30 MIN: CPT | Performed by: GENERAL PRACTICE

## 2021-06-11 RX ORDER — (INSULIN DEGLUDEC AND LIRAGLUTIDE) 100; 3.6 [IU]/ML; MG/ML
50 INJECTION, SOLUTION SUBCUTANEOUS DAILY
Qty: 15 ML | Refills: 5 | Status: SHIPPED | OUTPATIENT
Start: 2021-06-11 | End: 2021-06-11 | Stop reason: SDUPTHER

## 2021-06-11 RX ORDER — (INSULIN DEGLUDEC AND LIRAGLUTIDE) 100; 3.6 [IU]/ML; MG/ML
50 INJECTION, SOLUTION SUBCUTANEOUS DAILY
Qty: 15 ML | Refills: 5 | Status: SHIPPED | OUTPATIENT
Start: 2021-06-11 | End: 2022-04-19 | Stop reason: SDUPTHER

## 2021-06-11 NOTE — PROGRESS NOTES
Subjective   Nicholas Yin is a 76 y.o. male.     Chief Complaint  Returns for reassessment for CAD, previous TIA, CRF and type 2 DM.    History of Present Illness     Coronary Artery Disease  Underwent a coronary angiogram by Dr. Villatoro on 8/15/2019 with stenting of the mid LAD.  He remains chest pain-free and has noted a continued improvement in his exercise tolerance and overall sense of well-being.  He remains on brilinta 60 twice daily with ASA 81 daily. Echocardiogram performed on 1/22/2021 was reported as showing moderate aortic valve stenosis, left ventricular diastolic dysfunction, but normal systolic function with an estimated EF of 61 to 65%.  He is scheduled to undergo a cardiology reassessment on 6/28/2021  Lab Results   Component Value Date    WBC 8.05 06/03/2021    HGB 12.3 (L) 06/03/2021    HCT 37.7 06/03/2021    MCV 86.5 06/03/2021     06/03/2021     Transient Visual Loss  Several years ago he experienced a sudden progressive decrease in the vision in his right eye while driving. He stated that the vision in the eye darkened gradually over several minutes to the point where the only things he could make out were very bright lights and the sun. This lasted for about 30 minutes then resolved over several minutes. He continues to deny any changes in his strength, sensation, speech or ability to understand what is said to him. He continues to deny any palpitations and has had no lightheadedness or diaphoresis. He was hospitalized at [facility] over 10 years ago following a similar episodes that was ultimately felt to be vascular in origin. He has numerous cardiovascular risk factors as well as chronic renal insufficiency. MRI of the brain performed on 12/8/17 was reported as showing mild cerebral atrophy with chronic small vessel ischemic changes. MRA of the carotid arteries performed the same day was unremarkable.  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  No evidence of diabetic retinopathy was observed    Diabetes  Current symptoms include none.  He continues to deny any paresthesias of the feet, polydipsia, polyuria, hypoglycemia or foot ulcerations. Evaluation to date has been: hemoglobin A1C. Home sugars: BGs have remained at or near goal since last here. Current treatments: SGLT2 inhibitor -canagliflozin, GLP agonist-liraglutide and basal insulin - tresiba (together as xultophy)  Lab Results   Component Value Date    HGBA1C 7.10 (H) 06/03/2021     Lab Results   Component Value Date    MICROALBUR 31.2 06/03/2021      Dyslipidemia  Compliance with treatment has been fair.  He has been walking some. He remains on rosuvastatin with no apparent side effects  Lab Results   Component Value Date    CHOL 108 06/03/2021    CHLPL 125 09/28/2015    TRIG 221 (H) 06/03/2021    HDL 45 06/03/2021    LDL 29 06/03/2021     Hypertension  Home blood pressure readings: have generally been at or near goal. Associated signs and symptoms: dyspnea with above average exertion and occasional lightheadedness when getting up from sitting.  He continues to deny any chest pain, palpitations, orthopnea, paroxysmal nocturnal dyspnea or peripheral edema. Current antihypertensive medications includes losartan, carvedilol, and amlodipine.  He has not taken furosemide since last here.  Medication compliance: taking as prescribed.   Lab Results   Component Value Date    GLUCOSE 245 (H) 06/03/2021    BUN 33 (H) 06/03/2021    CREATININE 1.81 (H) 06/03/2021    EGFRIFNONA 37 (L) 06/03/2021    BCR 18.2 06/03/2021    K 4.6 06/03/2021    CO2 21.7 (L) 06/03/2021    CALCIUM 8.7 06/03/2021    ALBUMIN 3.40 (L) 06/03/2021    LABIL2 1.1 (L) 09/28/2015    AST 24 06/03/2021    ALT 17 06/03/2021     Chronic Back Pain  Gives a long history of increasing mid and low back pain. This is described as a sharp ache. The pain does not radiate and has been unassociated with any  other symptoms. The pain is worse with prolonged weight bearing and limits his activities more then anything else at present.  MRI performed on 1/7/2021 was reported as showing degenerative disc disease and osteoarthritis with mild to moderate central canal and bilateral foraminal narrowing at L2-3 and L3-4.    Labs  Most recent vitamin D 18.6.  He remains on high-dose supplementation    The following portions of the patient's history were reviewed and updated as appropriate: allergies, current medications, past medical history, past social history and problem list.    Review of Systems   Constitutional: Positive for fatigue. Negative for appetite change, chills, fever and unexpected weight change.   HENT: Negative for congestion, ear pain, rhinorrhea, sneezing and sore throat.    Eyes: Negative for visual disturbance.   Respiratory: Positive for shortness of breath. Negative for cough and wheezing.    Cardiovascular: Negative for chest pain, palpitations and leg swelling.   Gastrointestinal: Negative for abdominal pain, blood in stool, constipation, diarrhea, nausea and vomiting.   Endocrine: Negative for polydipsia and polyuria.   Genitourinary: Negative for difficulty urinating, dysuria, frequency, hematuria and urgency.        Erectile dysfunction - chronic progressive. Nocturia x 2-3 with occasional sense of incomplete voiding.   Musculoskeletal: Positive for back pain (chronic - progressive). Negative for arthralgias, joint swelling and myalgias.   Skin: Negative for rash.   Neurological: Positive for light-headedness. Negative for weakness, numbness and headaches.   Psychiatric/Behavioral: Negative for sleep disturbance.     Objective   Physical Exam  Constitutional:       General: He is not in acute distress.     Appearance: Normal appearance. He is well-developed. He is not ill-appearing or diaphoretic.      Comments: Bright and in good spirits.  Sits and stands with an exaggerated thoracic kyphosis.   Uncomfortable with movement.  No apparent distress at rest.  No pallor, cyanosis, diaphoresis, or jaundice   HENT:      Head: Atraumatic.      Right Ear: Tympanic membrane, ear canal and external ear normal.      Left Ear: Tympanic membrane, ear canal and external ear normal.   Eyes:      Conjunctiva/sclera: Conjunctivae normal.   Neck:      Thyroid: No thyroid mass or thyromegaly.      Vascular: No carotid bruit or JVD.      Trachea: Trachea normal. No tracheal deviation.   Cardiovascular:      Rate and Rhythm: Normal rate and regular rhythm.      Heart sounds: Normal heart sounds, S1 normal and S2 normal. No murmur heard.   No gallop. No S3 or S4 sounds.    Pulmonary:      Effort: Pulmonary effort is normal.      Breath sounds: Normal breath sounds.   Abdominal:      General: Bowel sounds are normal. There is no distension.   Musculoskeletal:      Lumbar back: No spasms or tenderness. Decreased range of motion.      Right lower leg: No edema.      Left lower leg: No edema.      Comments: Negative straight leg raise.   Lymphadenopathy:      Head:      Right side of head: No submental, submandibular, tonsillar, preauricular, posterior auricular or occipital adenopathy.      Left side of head: No submental, submandibular, tonsillar, preauricular, posterior auricular or occipital adenopathy.      Cervical: No cervical adenopathy.      Upper Body:      Right upper body: No supraclavicular adenopathy.      Left upper body: No supraclavicular adenopathy.   Skin:     General: Skin is warm and dry.      Coloration: Skin is not cyanotic, jaundiced or pale.      Findings: No rash.      Nails: There is no clubbing.   Neurological:      Mental Status: He is alert and oriented to person, place, and time.      Cranial Nerves: No cranial nerve deficit.      Motor: No tremor.      Coordination: Coordination normal.      Gait: Gait normal.   Psychiatric:         Attention and Perception: Attention normal.         Mood and Affect:  Mood normal.         Speech: Speech normal.         Behavior: Behavior normal.         Thought Content: Thought content normal.       Assessment/Plan   Problems Addressed this Visit        Cardiac and Vasculature    CAD (coronary artery disease)  Reminded regarding the importance of risk factor modification.  Continue current medication  Follow up with cardiology     Essential hypertension   Hypertension: at goal. Evidence of target organ damage: coronary artery disease, peripheral artery disease, chronic kidney disease and transient ischemic attack.  Encouraged to continue to work on diet and exercise plan.   Continue current medication    Mixed hyperlipidemia  As above.   Continue current medication.    Nonrheumatic aortic valve stenosis    PAD (peripheral artery disease) (CMS/[facility])   As above.  If Brilinta is discontinued will consider adding low-dose Xarelto       Endocrine and Metabolic    Type 2 diabetes mellitus with complication, with long-term current use of insulin (CMS/[facility])  Diabetes mellitus Type II, under excellent control.   Encouraged to continue to pursue ADA diet  Encouraged aerobic exercise.  Continue current medication    Relevant Medications    Xultophy 100-3.6 UNIT-MG/ML solution pen-injector subcutaneous pen    Vitamin D deficiency  Continue supplementation with monitoring.       Gastrointestinal Abdominal     Gastroesophageal reflux disease without esophagitis       Genitourinary and Reproductive     Benign prostatic hyperplasia with nocturia       Health Encounters    Healthcare maintenance  Patient has received both doses of the COVID-19 vaccine.       Musculoskeletal and Injuries    Osteopenia of multiple sites       Neuro    Amaurosis fugax of right eye  As above.   Continue current medication.    DDD (degenerative disc disease), lumbar       Other    Chronic renal failure, stage 3b (CMS/[facility])  Reminded to avoid any NSAIDs prescription or OTC  Will continue to monitor      Diagnoses        Codes Comments    PAD (peripheral artery disease) (CMS/[facility])    -  Primary ICD-10-CM: I73.9  ICD-9-CM: 443.9     Type 2 diabetes mellitus with complication, with long-term current use of insulin (CMS/[facility])     ICD-10-CM: E11.8, Z79.4  ICD-9-CM: 250.90, V58.67     Nonrheumatic aortic valve stenosis     ICD-10-CM: I35.0  ICD-9-CM: 424.1     Mixed hyperlipidemia     ICD-10-CM: E78.2  ICD-9-CM: 272.2     Essential hypertension     ICD-10-CM: I10  ICD-9-CM: 401.9     Coronary artery disease involving native coronary artery of native heart with unstable angina pectoris (CMS/[facility])     ICD-10-CM: I25.110  ICD-9-CM: 414.01, 411.1     Vitamin D deficiency     ICD-10-CM: E55.9  ICD-9-CM: 268.9     Gastroesophageal reflux disease without esophagitis     ICD-10-CM: K21.9  ICD-9-CM: 530.81     Benign prostatic hyperplasia with nocturia     ICD-10-CM: N40.1, R35.1  ICD-9-CM: 600.01, 788.43     Healthcare maintenance     ICD-10-CM: Z00.00  ICD-9-CM: V70.0     Osteopenia of multiple sites     ICD-10-CM: M85.89  ICD-9-CM: 733.90     DDD (degenerative disc disease), lumbar     ICD-10-CM: M51.36  ICD-9-CM: 722.52     Chronic renal failure, stage 3b (CMS/[facility])     ICD-10-CM: N18.32  ICD-9-CM: 585.3     Amaurosis fugax of right eye     ICD-10-CM: G45.3  ICD-9-CM: 362.34

## 2021-06-12 VITALS
HEIGHT: 69 IN | DIASTOLIC BLOOD PRESSURE: 61 MMHG | BODY MASS INDEX: 34.07 KG/M2 | SYSTOLIC BLOOD PRESSURE: 126 MMHG | RESPIRATION RATE: 14 BRPM | WEIGHT: 230 LBS | TEMPERATURE: 96.8 F | HEART RATE: 75 BPM | OXYGEN SATURATION: 94 %

## 2021-06-28 ENCOUNTER — OFFICE VISIT (OUTPATIENT)
Dept: CARDIOLOGY | Facility: CLINIC | Age: 76
End: 2021-06-28

## 2021-06-28 VITALS
SYSTOLIC BLOOD PRESSURE: 144 MMHG | DIASTOLIC BLOOD PRESSURE: 59 MMHG | HEIGHT: 69 IN | BODY MASS INDEX: 32.58 KG/M2 | HEART RATE: 59 BPM | WEIGHT: 220 LBS | OXYGEN SATURATION: 95 %

## 2021-06-28 DIAGNOSIS — E78.2 MIXED HYPERLIPIDEMIA: ICD-10-CM

## 2021-06-28 DIAGNOSIS — I35.0 NONRHEUMATIC AORTIC VALVE STENOSIS: ICD-10-CM

## 2021-06-28 DIAGNOSIS — I25.110 CORONARY ARTERY DISEASE INVOLVING NATIVE CORONARY ARTERY OF NATIVE HEART WITH UNSTABLE ANGINA PECTORIS (HCC): ICD-10-CM

## 2021-06-28 DIAGNOSIS — R06.09 DYSPNEA ON EXERTION: Primary | ICD-10-CM

## 2021-06-28 DIAGNOSIS — R06.02 SHORTNESS OF BREATH: ICD-10-CM

## 2021-06-28 DIAGNOSIS — I10 ESSENTIAL HYPERTENSION: ICD-10-CM

## 2021-06-28 PROCEDURE — 99214 OFFICE O/P EST MOD 30 MIN: CPT | Performed by: INTERNAL MEDICINE

## 2021-06-28 RX ORDER — CARVEDILOL 6.25 MG/1
3.12 TABLET ORAL 2 TIMES DAILY
Qty: 180 TABLET | Refills: 5 | Status: SHIPPED | OUTPATIENT
Start: 2021-06-28 | End: 2022-03-29 | Stop reason: DRUGHIGH

## 2021-06-28 NOTE — PROGRESS NOTES
"Chief Complaint  Hypertension, Coronary Artery Disease, and Shortness of Breath    Subjective         Nicholas Yin presents to [facility] C[facility]OLOG[facility] for   History of Present Illness   Patient was seen and examined.  He is doing well from a CAD standpoint with no recurrent chest pain.  He has been compliant with his aspirin Brilinta.  He did had an echo in January 2021 which showed moderate aortic stenosis with a peak systolic velocity of 3.7 m/s.  I did had a long discussion with the patient in terms of his aortic stenosis progression and likely that he will need AVR in the recent future.  Patient symptom wise he has a chronic dyspnea which he feels has mildly progressed to probably NYHA functional class II symptoms now.  No lower extremity edema noted.    Objective     Vital Signs:   /59 (BP Location: Right arm, Patient Position: Sitting)   Pulse 59   Ht 175.3 cm (69\")   Wt 99.8 kg (220 lb)   SpO2 95%   BMI 32.49 kg/m²       Physical Exam  Constitutional:       Appearance: He is well-developed.   HENT:      Head: Normocephalic and atraumatic.   Eyes:      Pupils: Pupils are equal, round, and reactive to light.   Cardiovascular:      Rate and Rhythm: Normal rate and regular rhythm.      Heart sounds: Murmur heard.     Pulmonary:      Effort: Pulmonary effort is normal.      Breath sounds: Normal breath sounds.   Abdominal:      General: Bowel sounds are normal.      Palpations: Abdomen is soft.   Musculoskeletal:         General: Normal range of motion.      Cervical back: Normal range of motion and neck supple.   Skin:     General: Skin is warm and dry.      Capillary Refill: Capillary refill takes less than 2 seconds.   Neurological:      Mental Status: He is alert and oriented to person, place, and time.          Result Review :                         Problem List Items Addressed This Visit        Cardiac and Vasculature    Mixed hyperlipidemia    Essential hypertension    " Relevant Medications    carvedilol (COREG) 6.25 MG tablet    Other Relevant Orders    Adult Transthoracic Echo Complete W/ Cont if Necessary Per Protocol    CAD (coronary artery disease)    Relevant Medications    ticagrelor (BRILINTA) 60 MG tablet tablet    carvedilol (COREG) 6.25 MG tablet    Other Relevant Orders    Adult Transthoracic Echo Complete W/ Cont if Necessary Per Protocol    Nonrheumatic aortic valve stenosis    Relevant Medications    ticagrelor (BRILINTA) 60 MG tablet tablet    carvedilol (COREG) 6.25 MG tablet      Other Visit Diagnoses     Dyspnea on exertion    -  Primary    Relevant Orders    Adult Transthoracic Echo Complete W/ Cont if Necessary Per Protocol    Shortness of breath            Continue with the current medication, I will decrease the dose on Coreg from 6.25-3.125 because he has been having hypotensive episodes at home, his heart rate is hovering around 55.  We will decrease the dose of Brilinta from 90 to 60 mg twice daily since it has been more than 12 months since PCI.  We will repeat an echocardiogram in 3 months or before he goes on fall told her to assess for progression of aortic stenosis, I did explain to him that he will probably need the valve replaced in the near future.  If his transthoracic echo shows peak systolic velocity still less than 4 we will plan for LAUREI for further evaluation and also will plan for right and left heart cath for some invasive measurements.      Follow Up     No follow-ups on file.  Patient was given instructions and counseling regarding his condition or for health maintenance advice. Please see specific information pulled into the AVS if appropriate.               Paul Villatoro MD, [facility]  Interventional Cardiology    JEOVANNY Castellanos, acting as scribe for Paul Villatoro MD, [facility]    06/28/21  16:54 EDT

## 2021-07-02 ENCOUNTER — PRIOR AUTHORIZATION (OUTPATIENT)
Dept: FAMILY MEDICINE CLINIC | Facility: CLINIC | Age: 76
End: 2021-07-02

## 2021-07-08 DIAGNOSIS — Z79.4 TYPE 2 DIABETES MELLITUS WITH COMPLICATION, WITH LONG-TERM CURRENT USE OF INSULIN (HCC): ICD-10-CM

## 2021-07-08 DIAGNOSIS — E11.8 TYPE 2 DIABETES MELLITUS WITH COMPLICATION, WITH LONG-TERM CURRENT USE OF INSULIN (HCC): ICD-10-CM

## 2021-07-08 RX ORDER — CANAGLIFLOZIN 100 MG/1
1 TABLET, FILM COATED ORAL EVERY MORNING
Qty: 90 TABLET | Refills: 3 | Status: SHIPPED | OUTPATIENT
Start: 2021-07-08 | End: 2022-01-14

## 2021-07-09 ENCOUNTER — APPOINTMENT (OUTPATIENT)
Dept: CARDIOLOGY | Facility: HOSPITAL | Age: 76
End: 2021-07-09

## 2021-07-16 ENCOUNTER — HOSPITAL ENCOUNTER (OUTPATIENT)
Dept: CARDIOLOGY | Facility: HOSPITAL | Age: 76
Discharge: HOME | End: 2021-07-16
Admitting: INTERNAL MEDICINE

## 2021-07-16 DIAGNOSIS — R06.09 DYSPNEA ON EXERTION: ICD-10-CM

## 2021-07-16 DIAGNOSIS — I25.110 CORONARY ARTERY DISEASE INVOLVING NATIVE CORONARY ARTERY OF NATIVE HEART WITH UNSTABLE ANGINA PECTORIS (HCC): ICD-10-CM

## 2021-07-16 DIAGNOSIS — I10 ESSENTIAL HYPERTENSION: ICD-10-CM

## 2021-07-16 LAB
BH CV ECHO MEAS - ACS: 1.4 CM
BH CV ECHO MEAS - AO MAX PG (FULL): 29.9 MMHG
BH CV ECHO MEAS - AO MAX PG: 33.6 MMHG
BH CV ECHO MEAS - AO MEAN PG (FULL): 17.5 MMHG
BH CV ECHO MEAS - AO MEAN PG: 19.5 MMHG
BH CV ECHO MEAS - AO ROOT AREA (BSA CORRECTED): 1.4
BH CV ECHO MEAS - AO ROOT AREA: 6.8 CM^2
BH CV ECHO MEAS - AO ROOT DIAM: 3 CM
BH CV ECHO MEAS - AO V2 MAX: 300 CM/SEC
BH CV ECHO MEAS - AO V2 MEAN: 208 CM/SEC
BH CV ECHO MEAS - AO V2 VTI: 71 CM
BH CV ECHO MEAS - AVA(I,A): 0.88 CM^2
BH CV ECHO MEAS - AVA(I,D): 0.88 CM^2
BH CV ECHO MEAS - AVA(V,A): 0.84 CM^2
BH CV ECHO MEAS - AVA(V,D): 0.84 CM^2
BH CV ECHO MEAS - BSA(HAYCOCK): 2.2 M^2
BH CV ECHO MEAS - BSA: 2.2 M^2
BH CV ECHO MEAS - BZI_BMI: 32.5 KILOGRAMS/M^2
BH CV ECHO MEAS - BZI_METRIC_HEIGHT: 175.3 CM
BH CV ECHO MEAS - BZI_METRIC_WEIGHT: 99.8 KG
BH CV ECHO MEAS - EDV(MOD-SP4): 44.7 ML
BH CV ECHO MEAS - EF(MOD-SP4): 55 %
BH CV ECHO MEAS - ESV(MOD-SP4): 20.1 ML
BH CV ECHO MEAS - LA DIMENSION: 4.7 CM
BH CV ECHO MEAS - LA/AO: 1.6
BH CV ECHO MEAS - LV DIASTOLIC VOL/BSA (35-75): 20.8 ML/M^2
BH CV ECHO MEAS - LV MAX PG: 3.7 MMHG
BH CV ECHO MEAS - LV MEAN PG: 2 MMHG
BH CV ECHO MEAS - LV SYSTOLIC VOL/BSA (12-30): 9.3 ML/M^2
BH CV ECHO MEAS - LV V1 MAX: 96.1 CM/SEC
BH CV ECHO MEAS - LV V1 MEAN: 64.2 CM/SEC
BH CV ECHO MEAS - LV V1 VTI: 24.5 CM
BH CV ECHO MEAS - LVLD AP4: 6.5 CM
BH CV ECHO MEAS - LVLS AP4: 5.9 CM
BH CV ECHO MEAS - LVOT AREA (M): 2.5 CM^2
BH CV ECHO MEAS - LVOT AREA: 2.5 CM^2
BH CV ECHO MEAS - LVOT DIAM: 1.8 CM
BH CV ECHO MEAS - MV A MAX VEL: 157 CM/SEC
BH CV ECHO MEAS - MV DEC SLOPE: 223 CM/SEC^2
BH CV ECHO MEAS - MV E MAX VEL: 110 CM/SEC
BH CV ECHO MEAS - MV E/A: 0.7
BH CV ECHO MEAS - MV P1/2T MAX VEL: 116 CM/SEC
BH CV ECHO MEAS - MV P1/2T: 152.4 MSEC
BH CV ECHO MEAS - MVA P1/2T LCG: 1.9 CM^2
BH CV ECHO MEAS - MVA(P1/2T): 1.4 CM^2
BH CV ECHO MEAS - PA ACC TIME: 0.11 SEC
BH CV ECHO MEAS - PA PR(ACCEL): 31.3 MMHG
BH CV ECHO MEAS - SI(AO): 225.6 ML/M^2
BH CV ECHO MEAS - SI(LVOT): 29 ML/M^2
BH CV ECHO MEAS - SI(MOD-SP4): 11.4 ML/M^2
BH CV ECHO MEAS - SV(AO): 485.3 ML
BH CV ECHO MEAS - SV(LVOT): 62.3 ML
BH CV ECHO MEAS - SV(MOD-SP4): 24.6 ML
MAXIMAL PREDICTED HEART RATE: 145 BPM
STRESS TARGET HR: 123 BPM

## 2021-07-16 PROCEDURE — 93306 TTE W/DOPPLER COMPLETE: CPT

## 2021-07-16 PROCEDURE — 93306 TTE W/DOPPLER COMPLETE: CPT | Performed by: INTERNAL MEDICINE

## 2021-07-19 ENCOUNTER — TELEPHONE (OUTPATIENT)
Dept: CARDIOLOGY | Facility: CLINIC | Age: 76
End: 2021-07-19

## 2021-07-19 NOTE — TELEPHONE ENCOUNTER
CONFIRMED WITH THE PATIENT THAT HIS TEST RESULTS ARE UNCHANGED FROM PREVIOUS RESULTS,  ALSO, NO FURTHER TESTING REQUIRED AT THIS TIME PER DR. OLSON. PT IS AWARE AND EXPRESSED UNDERSTANDING.

## 2021-07-19 NOTE — TELEPHONE ENCOUNTER
----- Message from Paul Villatoro MD sent at 7/16/2021  5:36 PM EDT -----  Let Bill know his echo is unchanged from before, no further urgent test needed for the valve for now

## 2021-08-09 RX ORDER — AMLODIPINE BESYLATE 10 MG/1
10 TABLET ORAL DAILY
Qty: 90 TABLET | Refills: 0 | Status: SHIPPED | OUTPATIENT
Start: 2021-08-09 | End: 2021-11-09 | Stop reason: SDUPTHER

## 2021-08-09 NOTE — TELEPHONE ENCOUNTER
Incoming Refill Request      Medication requested (name and dose): Amlodipine 10 mg    Pharmacy where request should be sent: [facility]    Additional details provided by patient: requested a 90 day supply    Best call back number: [phone]  Does the patient have less than a 3 day supply:  [x] Yes  [] No    Dimple Marlow  08/09/21, 10:30 EDT

## 2021-09-21 ENCOUNTER — TELEPHONE (OUTPATIENT)
Dept: FAMILY MEDICINE CLINIC | Facility: CLINIC | Age: 76
End: 2021-09-21

## 2021-09-21 RX ORDER — DEXLANSOPRAZOLE 60 MG/1
1 CAPSULE, DELAYED RELEASE ORAL DAILY
Qty: 50 CAPSULE | Refills: 0 | COMMUNITY
Start: 2021-09-21 | End: 2021-11-30 | Stop reason: SDUPTHER

## 2021-09-24 ENCOUNTER — IMMUNIZATION (OUTPATIENT)
Dept: VACCINE CLINIC | Facility: HOSPITAL | Age: 76
End: 2021-09-24

## 2021-09-24 PROCEDURE — 91300 HC SARSCOV02 VAC 30MCG/0.3ML IM: CPT | Performed by: INTERNAL MEDICINE

## 2021-09-24 PROCEDURE — 0003A: CPT | Performed by: INTERNAL MEDICINE

## 2021-10-12 DIAGNOSIS — E11.8 TYPE 2 DIABETES MELLITUS WITH COMPLICATION, WITH LONG-TERM CURRENT USE OF INSULIN (HCC): ICD-10-CM

## 2021-10-12 DIAGNOSIS — E78.2 MIXED HYPERLIPIDEMIA: ICD-10-CM

## 2021-10-12 DIAGNOSIS — E55.9 VITAMIN D DEFICIENCY: ICD-10-CM

## 2021-10-12 DIAGNOSIS — I10 ESSENTIAL HYPERTENSION: Primary | ICD-10-CM

## 2021-10-12 DIAGNOSIS — N18.32 CHRONIC RENAL FAILURE, STAGE 3B (HCC): ICD-10-CM

## 2021-10-12 DIAGNOSIS — Z79.4 TYPE 2 DIABETES MELLITUS WITH COMPLICATION, WITH LONG-TERM CURRENT USE OF INSULIN (HCC): ICD-10-CM

## 2021-10-13 ENCOUNTER — LAB (OUTPATIENT)
Dept: FAMILY MEDICINE CLINIC | Facility: CLINIC | Age: 76
End: 2021-10-13

## 2021-10-13 ENCOUNTER — FLU SHOT (OUTPATIENT)
Dept: FAMILY MEDICINE CLINIC | Facility: CLINIC | Age: 76
End: 2021-10-13

## 2021-10-13 DIAGNOSIS — N18.32 CHRONIC RENAL FAILURE, STAGE 3B (HCC): ICD-10-CM

## 2021-10-13 DIAGNOSIS — E11.8 TYPE 2 DIABETES MELLITUS WITH COMPLICATION, WITH LONG-TERM CURRENT USE OF INSULIN (HCC): ICD-10-CM

## 2021-10-13 DIAGNOSIS — E55.9 VITAMIN D DEFICIENCY: ICD-10-CM

## 2021-10-13 DIAGNOSIS — E78.2 MIXED HYPERLIPIDEMIA: ICD-10-CM

## 2021-10-13 DIAGNOSIS — I10 ESSENTIAL HYPERTENSION: ICD-10-CM

## 2021-10-13 DIAGNOSIS — Z79.4 TYPE 2 DIABETES MELLITUS WITH COMPLICATION, WITH LONG-TERM CURRENT USE OF INSULIN (HCC): ICD-10-CM

## 2021-10-13 LAB
25(OH)D3 SERPL-MCNC: 24.9 NG/ML
ALBUMIN SERPL-MCNC: 3.7 G/DL (ref 3.5–5.2)
ALBUMIN/GLOB SERPL: 1.3 G/DL
ALP SERPL-CCNC: 67 U/L (ref 39–117)
ALT SERPL W P-5'-P-CCNC: 15 U/L (ref 1–41)
ANION GAP SERPL CALCULATED.3IONS-SCNC: 9.1 MMOL/L (ref 5–15)
AST SERPL-CCNC: 21 U/L (ref 1–40)
BASOPHILS # BLD AUTO: 0.08 10*3/MM3 (ref 0–0.2)
BASOPHILS NFR BLD AUTO: 1.1 % (ref 0–1.5)
BILIRUB SERPL-MCNC: 0.4 MG/DL (ref 0–1.2)
BUN SERPL-MCNC: 34 MG/DL (ref 8–23)
BUN/CREAT SERPL: 16.8 (ref 7–25)
CALCIUM SPEC-SCNC: 8.9 MG/DL (ref 8.6–10.5)
CHLORIDE SERPL-SCNC: 108 MMOL/L (ref 98–107)
CHOLEST SERPL-MCNC: 104 MG/DL (ref 0–200)
CO2 SERPL-SCNC: 21.9 MMOL/L (ref 22–29)
CREAT SERPL-MCNC: 2.02 MG/DL (ref 0.76–1.27)
DEPRECATED RDW RBC AUTO: 46.6 FL (ref 37–54)
EOSINOPHIL # BLD AUTO: 0.4 10*3/MM3 (ref 0–0.4)
EOSINOPHIL NFR BLD AUTO: 5.5 % (ref 0.3–6.2)
ERYTHROCYTE [DISTWIDTH] IN BLOOD BY AUTOMATED COUNT: 14.4 % (ref 12.3–15.4)
GFR SERPL CREATININE-BSD FRML MDRD: 32 ML/MIN/1.73
GLOBULIN UR ELPH-MCNC: 2.8 GM/DL
GLUCOSE SERPL-MCNC: 139 MG/DL (ref 65–99)
HBA1C MFR BLD: 7.79 % (ref 4.8–5.6)
HCT VFR BLD AUTO: 39.1 % (ref 37.5–51)
HDLC SERPL-MCNC: 42 MG/DL (ref 40–60)
HGB BLD-MCNC: 12.4 G/DL (ref 13–17.7)
IMM GRANULOCYTES # BLD AUTO: 0.01 10*3/MM3 (ref 0–0.05)
IMM GRANULOCYTES NFR BLD AUTO: 0.1 % (ref 0–0.5)
LDLC SERPL CALC-MCNC: 34 MG/DL (ref 0–100)
LDLC/HDLC SERPL: 0.67 {RATIO}
LYMPHOCYTES # BLD AUTO: 1.19 10*3/MM3 (ref 0.7–3.1)
LYMPHOCYTES NFR BLD AUTO: 16.4 % (ref 19.6–45.3)
MCH RBC QN AUTO: 28.2 PG (ref 26.6–33)
MCHC RBC AUTO-ENTMCNC: 31.7 G/DL (ref 31.5–35.7)
MCV RBC AUTO: 88.9 FL (ref 79–97)
MONOCYTES # BLD AUTO: 0.69 10*3/MM3 (ref 0.1–0.9)
MONOCYTES NFR BLD AUTO: 9.5 % (ref 5–12)
NEUTROPHILS NFR BLD AUTO: 4.9 10*3/MM3 (ref 1.7–7)
NEUTROPHILS NFR BLD AUTO: 67.4 % (ref 42.7–76)
NRBC BLD AUTO-RTO: 0 /100 WBC (ref 0–0.2)
PLATELET # BLD AUTO: 228 10*3/MM3 (ref 140–450)
PMV BLD AUTO: 11.3 FL (ref 6–12)
POTASSIUM SERPL-SCNC: 4.2 MMOL/L (ref 3.5–5.2)
PROT SERPL-MCNC: 6.5 G/DL (ref 6–8.5)
RBC # BLD AUTO: 4.4 10*6/MM3 (ref 4.14–5.8)
SODIUM SERPL-SCNC: 139 MMOL/L (ref 136–145)
TRIGL SERPL-MCNC: 170 MG/DL (ref 0–150)
VLDLC SERPL-MCNC: 28 MG/DL (ref 5–40)
WBC # BLD AUTO: 7.27 10*3/MM3 (ref 3.4–10.8)

## 2021-10-13 PROCEDURE — 83036 HEMOGLOBIN GLYCOSYLATED A1C: CPT | Performed by: GENERAL PRACTICE

## 2021-10-13 PROCEDURE — 85025 COMPLETE CBC W/AUTO DIFF WBC: CPT | Performed by: GENERAL PRACTICE

## 2021-10-13 PROCEDURE — 80061 LIPID PANEL: CPT | Performed by: GENERAL PRACTICE

## 2021-10-13 PROCEDURE — 36415 COLL VENOUS BLD VENIPUNCTURE: CPT | Performed by: GENERAL PRACTICE

## 2021-10-13 PROCEDURE — 90471 IMMUNIZATION ADMIN: CPT | Performed by: GENERAL PRACTICE

## 2021-10-13 PROCEDURE — 82306 VITAMIN D 25 HYDROXY: CPT | Performed by: GENERAL PRACTICE

## 2021-10-13 PROCEDURE — 80053 COMPREHEN METABOLIC PANEL: CPT | Performed by: GENERAL PRACTICE

## 2021-10-13 PROCEDURE — 90662 IIV NO PRSV INCREASED AG IM: CPT | Performed by: GENERAL PRACTICE

## 2021-10-15 ENCOUNTER — OFFICE VISIT (OUTPATIENT)
Dept: FAMILY MEDICINE CLINIC | Facility: CLINIC | Age: 76
End: 2021-10-15

## 2021-10-15 VITALS — HEIGHT: 69 IN | WEIGHT: 216 LBS | BODY MASS INDEX: 31.99 KG/M2

## 2021-10-15 DIAGNOSIS — I73.9 PAD (PERIPHERAL ARTERY DISEASE) (HCC): ICD-10-CM

## 2021-10-15 DIAGNOSIS — I35.0 NONRHEUMATIC AORTIC VALVE STENOSIS: ICD-10-CM

## 2021-10-15 DIAGNOSIS — R35.1 BENIGN PROSTATIC HYPERPLASIA WITH NOCTURIA: ICD-10-CM

## 2021-10-15 DIAGNOSIS — I25.110 CORONARY ARTERY DISEASE INVOLVING NATIVE CORONARY ARTERY OF NATIVE HEART WITH UNSTABLE ANGINA PECTORIS (HCC): Primary | ICD-10-CM

## 2021-10-15 DIAGNOSIS — N40.1 BENIGN PROSTATIC HYPERPLASIA WITH NOCTURIA: ICD-10-CM

## 2021-10-15 DIAGNOSIS — N52.01 ERECTILE DYSFUNCTION DUE TO ARTERIAL INSUFFICIENCY: ICD-10-CM

## 2021-10-15 DIAGNOSIS — E55.9 VITAMIN D DEFICIENCY: ICD-10-CM

## 2021-10-15 DIAGNOSIS — Z00.00 HEALTHCARE MAINTENANCE: ICD-10-CM

## 2021-10-15 DIAGNOSIS — G45.3 AMAUROSIS FUGAX OF RIGHT EYE: ICD-10-CM

## 2021-10-15 DIAGNOSIS — M85.89 OSTEOPENIA OF MULTIPLE SITES: ICD-10-CM

## 2021-10-15 DIAGNOSIS — Z85.47 H/O TESTICULAR CANCER: ICD-10-CM

## 2021-10-15 DIAGNOSIS — N18.32 CHRONIC RENAL FAILURE, STAGE 3B (HCC): ICD-10-CM

## 2021-10-15 DIAGNOSIS — E11.8 TYPE 2 DIABETES MELLITUS WITH COMPLICATION, WITH LONG-TERM CURRENT USE OF INSULIN (HCC): ICD-10-CM

## 2021-10-15 DIAGNOSIS — Z79.4 TYPE 2 DIABETES MELLITUS WITH COMPLICATION, WITH LONG-TERM CURRENT USE OF INSULIN (HCC): ICD-10-CM

## 2021-10-15 DIAGNOSIS — M51.36 DDD (DEGENERATIVE DISC DISEASE), LUMBAR: ICD-10-CM

## 2021-10-15 DIAGNOSIS — I10 ESSENTIAL HYPERTENSION: ICD-10-CM

## 2021-10-15 DIAGNOSIS — Z85.038 HISTORY OF COLON CANCER: ICD-10-CM

## 2021-10-15 DIAGNOSIS — E78.2 MIXED HYPERLIPIDEMIA: ICD-10-CM

## 2021-10-15 DIAGNOSIS — K21.9 GASTROESOPHAGEAL REFLUX DISEASE WITHOUT ESOPHAGITIS: ICD-10-CM

## 2021-10-15 PROCEDURE — 99214 OFFICE O/P EST MOD 30 MIN: CPT | Performed by: GENERAL PRACTICE

## 2021-10-15 NOTE — PROGRESS NOTES
Subjective   Nicholas Yin is a 76 y.o. male.     You have chosen to receive care through a telehealth visit.  Do you consent to use a video/audio connection for your medical care today? Yes    I did not complete this video visit with the patient using [employer] but rather FaceTime.    Chief Complaint  He returns for a scheduled reassessment of multiple medical problems including CAD, previous TIA, type II DM, and CRF    History of Present Illness     Coronary Artery Disease  He underwent a coronary angiogram by Dr. Villatoro on 8/15/2019 with stenting of the mid LAD.  He remains chest pain-free and has noted some improvement in his exercise tolerance and overall sense of well-being.  He remains on brilinta 60 twice daily with ASA 81 daily. Echocardiogram performed on 1/22/2021 was reported as showing moderate aortic valve stenosis, left ventricular diastolic dysfunction, but normal systolic function with an estimated EF of 61 to 65%.  He underwent an updated study on 7/16/2021 with no significant changes.  He is scheduled to undergo a cardiology reassessment on 12/14/2021  Lab Results   Component Value Date    WBC 7.27 10/13/2021    HGB 12.4 (L) 10/13/2021    HCT 39.1 10/13/2021    MCV 88.9 10/13/2021     10/13/2021     Transient Visual Loss  Several years ago he experienced a sudden progressive decrease in the vision in his right eye while driving. He stated that the vision in the eye darkened gradually over several minutes to the point where the only things he could make out were very bright lights and the sun. This lasted for about 30 minutes then resolved over several minutes. He continues to deny any changes in his strength, sensation, speech or ability to understand what is said to him. He continues to deny any palpitations and has had no lightheadedness or diaphoresis. He was hospitalized at [facility] over 10 years ago following a similar episodes that was ultimately felt to be vascular in origin. He  has numerous cardiovascular risk factors as well as chronic renal insufficiency. MRI of the brain performed on 12/8/17 was reported as showing mild cerebral atrophy with chronic small vessel ischemic changes. MRA of the carotid arteries performed the same day was unremarkable.  He undergoes regular ophthalmology assessment    Diabetes  He continues to deny any paresthesias of the feet, polydipsia, polyuria, hypoglycemia or foot ulcerations. Evaluation to date has been: hemoglobin A1C. Home sugars: BGs have remained at or near goal since last here. Current treatments: SGLT2 inhibitor -canagliflozin, GLP agonist-liraglutide and basal insulin - tresiba (together as xultophy)  Lab Results   Component Value Date    HGBA1C 7.79 (H) 10/13/2021      Dyslipidemia  Compliance with treatment has been fair.  He has been walking some. He remains on rosuvastatin with no apparent side effects  Lab Results   Component Value Date    CHOL 104 10/13/2021    CHLPL 125 09/28/2015    TRIG 170 (H) 10/13/2021    HDL 42 10/13/2021    LDL 34 10/13/2021     Hypertension  Home blood pressure readings: have generally been at or near goal. Associated signs and symptoms: dyspnea with above average exertion and occasional lightheadedness when getting up from sitting.  He continues to deny any chest pain, palpitations, orthopnea, paroxysmal nocturnal dyspnea or peripheral edema. Current antihypertensive medications includes losartan, carvedilol, and amlodipine.  He has not taken furosemide since last here.  Medication compliance: taking as prescribed.     Chronic Renal Failure  This has been contributed to longstanding type 2 diabetes mellitus and hypertension.  He is aware to avoid any NSAIDs oral or prescription.  He is on both an ARB and an SGLT2 inhibitor  Lab Results   Component Value Date    GLUCOSE 139 (H) 10/13/2021    BUN 34 (H) 10/13/2021    CREATININE 2.02 (H) 10/13/2021    EGFRIFNONA 32 (L) 10/13/2021    BCR 16.8 10/13/2021    K 4.2  10/13/2021    CO2 21.9 (L) 10/13/2021    CALCIUM 8.9 10/13/2021    ALBUMIN 3.70 10/13/2021    LABIL2 1.1 (L) 09/28/2015    AST 21 10/13/2021    ALT 15 10/13/2021     Lab Results   Component Value Date    ALKPHOS 67 10/13/2021     Chronic Back Pain  Gives a long history of increasing mid and low back pain. This is described as a sharp ache. The pain does not radiate and has been unassociated with any other symptoms. The pain is worse with prolonged weight bearing and limits his activities more then anything else at present.  MRI performed on 1/7/2021 was reported as showing degenerative disc disease and osteoarthritis with mild to moderate central canal and bilateral foraminal narrowing at L2-3 and L3-4.    Labs  Most recent vitamin D 24.9.  He remains on high-dose supplementation    The following portions of the patient's history were reviewed and updated as appropriate: allergies, current medications, past medical history, past social history and problem list.    Review of Systems   Constitutional: Positive for fatigue. Negative for appetite change, chills, fever and unexpected weight change.   HENT: Negative for congestion, ear pain, rhinorrhea, sneezing and sore throat.    Eyes: Negative for visual disturbance.   Respiratory: Positive for shortness of breath. Negative for cough and wheezing.    Cardiovascular: Negative for chest pain, palpitations and leg swelling.   Gastrointestinal: Negative for abdominal pain, blood in stool, constipation, diarrhea, nausea and vomiting.   Endocrine: Negative for polydipsia and polyuria.   Genitourinary: Negative for difficulty urinating, dysuria, frequency, hematuria and urgency.        Erectile dysfunction - chronic progressive. Nocturia x 2-3 with occasional sense of incomplete voiding.   Musculoskeletal: Positive for back pain (chronic - progressive). Negative for arthralgias, joint swelling and myalgias.   Skin: Negative for rash.   Neurological: Positive for  light-headedness. Negative for weakness, numbness and headaches.   Psychiatric/Behavioral: Negative for sleep disturbance.     Objective   Physical Exam  Constitutional:       Comments: Bright and in good spirits. No apparent distress. No pallor, jaundice, diaphoresis, or cyanosis.   Pulmonary:      Comments: Normal respiratory effort.  No cough or audible wheezing  Skin:     Findings: No rash.   Psychiatric:         Attention and Perception: Attention normal.         Mood and Affect: Mood normal.         Speech: Speech normal.         Behavior: Behavior normal.         Thought Content: Thought content normal.       Assessment/Plan   Problems Addressed this Visit        Cardiac and Vasculature    CAD (coronary artery disease)   Reminded regarding the importance of risk factor modification.  Continue current medication  Follow up with cardiology     Relevant Orders    CBC & Differential    Essential hypertension  Encouraged to continue to work on his diet and exercise plan.  Continue current medication    Relevant Orders    CBC & Differential    Comprehensive Metabolic Panel    Mixed hyperlipidemia  As above.   Continue current medication.    Relevant Orders    Comprehensive Metabolic Panel    Lipid Panel    Nonrheumatic aortic valve stenosis  Follow up with cardiology     PAD (peripheral artery disease) ([facility])  As above.   Continue current medication.       Endocrine and Metabolic    Type 2 diabetes mellitus with complication, with long-term current use of insulin ([facility])  Diabetes mellitus Type II, under good control.   Encouraged to continue to pursue ADA diet  Encouraged aerobic exercise.  Continue current medication  Scheduled for updated labs just prior to his return in 3 months    Relevant Orders    Hemoglobin A1c    Vitamin B12    MicroAlbumin, Urine, Random - Urine, Clean Catch    Vitamin D deficiency  Continue supplementation with monitoring.    Relevant Orders    Vitamin D 25 Hydroxy       Gastrointestinal  Abdominal     Gastroesophageal reflux disease without esophagitis       Genitourinary and Reproductive     Benign prostatic hyperplasia with nocturia    Vasculogenic erectile dysfunction       Health Encounters    Healthcare maintenance  Patient has already received a flu shot along with a third dose of the Pfizer COVID-19 vaccine this fall       Hematology and Neoplasia    H/O testicular cancer    History of colon cancer       Musculoskeletal and Injuries    Osteopenia of multiple sites       Neuro    Amaurosis fugax of right eye  As above.   Continue current medication.    DDD (degenerative disc disease), lumbar       Other    Chronic renal failure, stage 3b (CMS/HCC)  Will continue to monitor    Relevant Orders    Comprehensive Metabolic Panel    Vitamin D 25 Hydroxy      Diagnoses       Codes Comments    Coronary artery disease involving native coronary artery of native heart with unstable angina pectoris ([facility])    -  Primary ICD-10-CM: I25.110  ICD-9-CM: 414.01, 411.1     Essential hypertension     ICD-10-CM: I10  ICD-9-CM: 401.9     Mixed hyperlipidemia     ICD-10-CM: E78.2  ICD-9-CM: 272.2     Nonrheumatic aortic valve stenosis     ICD-10-CM: I35.0  ICD-9-CM: 424.1     PAD (peripheral artery disease) ([facility])     ICD-10-CM: I73.9  ICD-9-CM: 443.9     Type 2 diabetes mellitus with complication, with long-term current use of insulin ([facility])     ICD-10-CM: E11.8, Z79.4  ICD-9-CM: 250.90, V58.67     Vitamin D deficiency     ICD-10-CM: E55.9  ICD-9-CM: 268.9     Gastroesophageal reflux disease without esophagitis     ICD-10-CM: K21.9  ICD-9-CM: 530.81     Benign prostatic hyperplasia with nocturia     ICD-10-CM: N40.1, R35.1  ICD-9-CM: 600.01, 788.43     Erectile dysfunction due to arterial insufficiency     ICD-10-CM: N52.01  ICD-9-CM: 607.84     Healthcare maintenance     ICD-10-CM: Z00.00  ICD-9-CM: V70.0     H/O testicular cancer     ICD-10-CM: Z85.47  ICD-9-CM: V10.47     History of colon cancer     ICD-10-CM:  Z85.038  ICD-9-CM: V10.05     Osteopenia of multiple sites     ICD-10-CM: M85.89  ICD-9-CM: 733.90     DDD (degenerative disc disease), lumbar     ICD-10-CM: M51.36  ICD-9-CM: 722.52     Chronic renal failure, stage 3b (CMS/HCC)     ICD-10-CM: N18.32  ICD-9-CM: 585.3     Amaurosis fugax of right eye     ICD-10-CM: G45.3  ICD-9-CM: 362.34

## 2021-10-20 NOTE — PROGRESS NOTES
[facility] CARDIOLOGY  [address]  [place] [address]  Phone: [phone]  Fax: [phone]    07/02/2019    Chief Complaint   Patient presents with   • Shortness of Breath   • Chest Pain        History:   Nicholas Yin is a 74 y.o. male seen in consultation, self referred, for hortness of breath with exertion along with chest tightness.  Onset was a couple weeks ago.His chest tightness wakes him up in the middle of the night.  It resolves after a few minutes.  He states that he get shortness of breath with walking up an incline. Denied any chest tightness with exertion. He has complaint of cramping BLE.  It starts at the knee and radiates down. Sometimes he has the cramping in his thighs.     He is a type 2 diabetic insulin controlled. Discussed cholesterol goals in which is well controlled with statins. His back pain does limit him from exercising daily.     Mr. Yin did not have any past cardiac evaluation.  He did admit to having intermittent anginal symptoms few months ago, he was climbing up a hill when he had severe anginal pain which was resolved after few minutes of rest.  He did not seek any medical attention at that time either.    He also has known history of CKD, he has not had any recent labs done or had any nephrology follow-up in the past.    Past Medical History:   Diagnosis Date   • Anemia    • Back pain    • CancerTesticular/[place]    • Diabetes mellitus (CMS/HCC)    • Erectile dysfunction    • GERD (gastroesophageal reflux disease)    • Hypertension        Past Surgical History:   Procedure Laterality Date   • COLON SURGERY     • COLONOSCOPY     • EYE SURGERY     • SHOULDER ARTHROSCOPY     • VASECTOMY          Review of Systems   Constitution: Positive for weakness and malaise/fatigue. Negative for diaphoresis, fever, weight gain and weight loss.   HENT: Negative for congestion, nosebleeds and sore throat.    Eyes: Negative for blurred vision and  History and Physical Preoperative Update Note    I reviewed the history and physical note (H&P) from Dr. Mckeon on 10/20/21 with the patient, and there are no changes in the patient's condition. Confirmed consent, including understanding of the risks, benefits and alternatives for ommaya placement. Operative site was marked. I discussed the plan with the Patient and answered all of their questions.       visual disturbance.   Cardiovascular: Positive for chest pain (tightness) and dyspnea on exertion. Negative for claudication, irregular heartbeat, leg swelling, orthopnea, palpitations, paroxysmal nocturnal dyspnea and syncope.   Respiratory: Positive for shortness of breath. Negative for cough, hemoptysis, sleep disturbances due to breathing, snoring, sputum production and wheezing.    Endocrine: Negative for cold intolerance, heat intolerance, polydipsia, polyphagia and polyuria.   Hematologic/Lymphatic: Negative for bleeding problem.   Skin: Negative for dry skin, itching and rash.   Musculoskeletal: Positive for muscle cramps. Negative for muscle weakness and myalgias.   Gastrointestinal: Negative for abdominal pain, constipation, diarrhea, heartburn, hematemesis, hematochezia, hemorrhoids, melena, nausea and vomiting.   Genitourinary: Negative for hematuria and nocturia.   Neurological: Positive for excessive daytime sleepiness. Negative for dizziness, focal weakness, headaches, light-headedness, numbness, seizures and vertigo.   Psychiatric/Behavioral: Negative for depression, substance abuse and suicidal ideas.   Allergic/Immunologic: Negative for environmental allergies.         Past Social History:  Social History     Socioeconomic History   • Marital status:      Spouse name: lita   • Number of children: 3   • Years of education: 16   • Highest education level: Not on file   Occupational History   • Occupation: retired   Tobacco Use   • Smoking status: Never Smoker   • Smokeless tobacco: Never Used   Substance and Sexual Activity   • Alcohol use: Yes     Comment: occ   • Drug use: No   • Sexual activity: Defer       Past Family History:  Family History   Problem Relation Age of Onset   • Stroke Mother    • Hypertension Mother    • Alcohol abuse Mother    • COPD Mother    • COPD Father    • Alcohol abuse Father    • Hypertension Father    • Alzheimer's disease Father    • Heart disease Brother     • Hypertension Brother    • Diabetes Brother    • Stroke Maternal Grandfather    • Cancer Paternal Grandmother        Current Outpatient Medications   Medication Sig Dispense Refill   • aspirin 81 MG EC tablet Take 81 mg by mouth Daily.     • ciprofloxacin-dexamethasone (CIPRODEX) 0.3-0.1 % otic suspension Administer 4 drops into both ears 2 (Two) Times a Day. 7.5 mL 0   • clopidogrel (PLAVIX) 75 MG tablet Take 1 tablet by mouth Daily. 30 tablet 5   • Coenzyme Q-10 100 MG capsule Take 100 mg by mouth Daily.     • COMFORT EZ PEN NEEDLES 32G X 4 MM misc USE TO INJECT INSULIN EVERY DAY  0   • dexlansoprazole (DEXILANT) 60 MG capsule Take 1 capsule by mouth Daily. 30 capsule 5   • doxazosin (CARDURA) 4 MG tablet Take 1 tablet by mouth Every Night. 1/2 po nightly for 1 week then 1 po nightly afterward 30 tablet 5   • exenatide er (BYDUREON BCISE) 2 MG/0.85ML auto-injector injection Inject 0.85 mL under the skin into the appropriate area as directed 1 (One) Time Per Week. 4 pen 5   • glucose blood (ONETOUCH VERIO) test strip Monitor blood sugars three times daily 100 each 5   • Insulin Glargine (TOUJEO SOLOSTAR SC) Inject  under the skin.     • losartan (COZAAR) 50 MG tablet Take 1 tablet by mouth 2 (Two) Times a Day. 180 tablet 3   • magnesium gluconate (MAGONATE) 500 MG tablet Take 500 mg by mouth 2 (Two) Times a Day.     • rosuvastatin (CRESTOR) 5 MG tablet Take 1 tablet by mouth Daily. 90 tablet 3   • sildenafil (VIAGRA) 100 MG tablet Take 1 tablet by mouth Daily As Needed for erectile dysfunction. 90 tablet 3   • sitaGLIPtin-metFORMIN (JANUMET)  MG per tablet Take 1 tablet by mouth 2 (Two) Times a Day With Meals. 60 tablet 5   • [facility] JOHNS WORT PO Take  by mouth.     • timolol (BETIMOL) 0.5 % ophthalmic solution Administer 1 drop to the right eye 2 (Two) Times a Day. 15 mL 5   • vitamin D (ERGOCALCIFEROL) 24490 units capsule capsule Take 1 capsule by mouth 1 (One) Time Per Week. 4 capsule 5     No current  facility-administered medications for this visit.         Allergies   Allergen Reactions   • Iodinated Diagnostic Agents          Objective:  Vitals:    07/02/19 1441   BP: 151/71   Pulse: 91   SpO2: 96%       Physical Exam   Constitutional: He is oriented to person, place, and time. He appears well-developed and well-nourished. No distress.   HENT:   Negative   Eyes:   Negative   Neck: Neck supple. No JVD present.   Cardiovascular: Normal rate and regular rhythm.   Pulmonary/Chest: Effort normal and breath sounds normal.   Abdominal: Soft. Bowel sounds are normal.   Musculoskeletal: He exhibits edema.   2 +   Neurological: He is alert and oriented to person, place, and time.   Skin: Skin is warm.   Psychiatric: He has a normal mood and affect. Judgment normal.       DATA:      Results for orders placed during the hospital encounter of 12/08/17   Adult Transthoracic Echo Complete W/ Cont if Necessary Per Protocol    Narrative · Left ventricular systolic function is normal. Estimated EF appears to be   in the range of 56 - 60%. Normal left ventricular cavity size noted.  · Left atrial cavity size is mildly dilated.  · Mild to moderate aortic valve stenosis is present. Mean gradient 19mmHg  · Trace aortic valve regurgitation is present  · Moderate mitral annular calcification is present  · Trace mitral valve regurgitation is present.               Results for orders placed during the hospital encounter of 12/08/17   US Abdomen Complete    Narrative US ABDOMEN COMPLETE-     REASON FOR EXAM:  HEP C; B18.2-Chronic viral hepatitis C     FINDINGS: Multiple real-time images were obtained. The area of the  pancreas was not adequately seen sonographically due to overlying bowel  gas. The visualized portions of the abdominal aorta were normal in  caliber. The liver shows some increased echogenicity but no focal liver  lesions were identified. Color Doppler flow is seen in the hepatic veins  and inferior vena cava. The  gallbladder showed no evidence of thickening  in the wall or cholelithiasis. The bile ducts in the liver were not  dilated. The common hepatic portion of the bile duct measured 3.1 mm.  There was no ascites in the abdomen. The kidneys showed no evidence of  obstruction. The right kidney measures 12 x 7 cm, the left kidney  measures 11 x 6 cm. The spleen was homogeneous in its echotexture and  normal in size measuring 10.4 cm in greatest dimension.       Impression The pancreas was not adequately imaged sonographically. The  liver showed no focal abnormalities. The bile ducts in the liver were  not dilated and there was no ascites.     This report was finalized on 12/8/2017 3:19 PM by Dr. Nicholas Chamberlain II, MD.            ECG 12 Lead  Date/Time: 7/2/2019 2:37 PM  Performed by: Paul Villatoro MD  Authorized by: Paul Villatoro MD   Rhythm: sinus rhythm  Conduction: right bundle branch block  QRS axis: left    Clinical impression: abnormal EKG             Lab Results   Component Value Date    CHOL 133 04/05/2019    CHOL 113 01/04/2019    CHOL 145 07/16/2018    CHLPL 125 09/28/2015    CHLPL 119 06/06/2015    CHLPL 122 07/25/2014     Lab Results   Component Value Date    TRIG 156 (H) 04/05/2019    TRIG 85 01/04/2019    TRIG 146 07/16/2018     Lab Results   Component Value Date    HDL 47 04/05/2019    HDL 47 (L) 01/04/2019    HDL 48 (L) 07/16/2018     Lab Results   Component Value Date    LDL 55 04/05/2019    LDL 49 01/04/2019    LDL 68 07/16/2018       Lab Results   Component Value Date    TSH 3.394 01/04/2019                 Assessment     1.  Exertional dyspnea, NYHA III, likely from CHF and CKD needs evaluation  2.  Angina class II  3.  Diabetes mellitus Type 2   4.  Obesity  5  Dyslipidemia Statin controlled.   6.  EKG today sinus rhythm with RBBB    Plan:    He is having intermittent episodes of chest pain with shortness of breath.  His shortness of breath is exertional.  The chest  tightness can wake him up in the middle of the night.  After a few minutes it resolves and able to fall back a sleep.   Will proceed to start him on Lasix 20 mg daily, Coreg 3.125 mg BID, and Imdur 30 mg daily.    I will proceed with a Lexiscan stress test and echocardiogram.  Labs to be ordered BNP, BMP and CBC to assess his kidney function after started diuretics.   Follow up in 1 month unless otherwise needed.           Patient's Body mass index is 33.23 kg/m². BMI is above normal parameters. Recommendations include: educational material.         ICD-10-CM ICD-9-CM   1. Shortness of breath R06.02 786.05   2. Chest tightness R07.89 786.59        Thank you for allowing me to participate in the care of Nicholas Yin. Feel free to contact me directly with any further questions or concerns.        Paul Villatoro MD, [facility]  Interventional Cardiology    JEOVANNY Castellanos, acting as scribe for Paul Villatoro MD, [facility]   07/02/19  2:42 PM

## 2021-11-09 RX ORDER — ROSUVASTATIN CALCIUM 20 MG/1
20 TABLET, COATED ORAL DAILY
Qty: 90 TABLET | Refills: 2 | Status: SHIPPED | OUTPATIENT
Start: 2021-11-09 | End: 2022-01-14

## 2021-11-09 RX ORDER — AMLODIPINE BESYLATE 10 MG/1
10 TABLET ORAL DAILY
Qty: 90 TABLET | Refills: 3 | Status: SHIPPED | OUTPATIENT
Start: 2021-11-09 | End: 2022-11-07 | Stop reason: SDUPTHER

## 2021-11-09 RX ORDER — ERGOCALCIFEROL 1.25 MG/1
CAPSULE ORAL
Qty: 4 CAPSULE | Refills: 5 | Status: SHIPPED | OUTPATIENT
Start: 2021-11-09 | End: 2022-07-27 | Stop reason: SDUPTHER

## 2021-11-24 ENCOUNTER — LAB (OUTPATIENT)
Dept: FAMILY MEDICINE CLINIC | Facility: CLINIC | Age: 76
End: 2021-11-24

## 2021-11-24 DIAGNOSIS — U07.1 COVID-19: Primary | ICD-10-CM

## 2021-11-24 LAB
FLUAV RNA RESP QL NAA+PROBE: NOT DETECTED
FLUBV RNA RESP QL NAA+PROBE: NOT DETECTED
SARS-COV-2 RNA RESP QL NAA+PROBE: NOT DETECTED

## 2021-11-24 PROCEDURE — 87636 SARSCOV2 & INF A&B AMP PRB: CPT | Performed by: GENERAL PRACTICE

## 2021-12-01 RX ORDER — DEXLANSOPRAZOLE 60 MG/1
1 CAPSULE, DELAYED RELEASE ORAL DAILY
Qty: 90 CAPSULE | Refills: 3 | Status: SHIPPED | OUTPATIENT
Start: 2021-12-01 | End: 2022-08-01 | Stop reason: SDUPTHER

## 2021-12-02 RX ORDER — LOSARTAN POTASSIUM 50 MG/1
50 TABLET ORAL DAILY
Qty: 90 TABLET | Refills: 2 | Status: SHIPPED | OUTPATIENT
Start: 2021-12-02 | End: 2023-03-14 | Stop reason: SDUPTHER

## 2021-12-03 ENCOUNTER — PRIOR AUTHORIZATION (OUTPATIENT)
Dept: FAMILY MEDICINE CLINIC | Facility: CLINIC | Age: 76
End: 2021-12-03

## 2021-12-14 ENCOUNTER — OFFICE VISIT (OUTPATIENT)
Dept: CARDIOLOGY | Facility: CLINIC | Age: 76
End: 2021-12-14

## 2021-12-14 VITALS
SYSTOLIC BLOOD PRESSURE: 130 MMHG | WEIGHT: 226 LBS | BODY MASS INDEX: 33.47 KG/M2 | HEIGHT: 69 IN | DIASTOLIC BLOOD PRESSURE: 70 MMHG

## 2021-12-14 DIAGNOSIS — I10 ESSENTIAL HYPERTENSION: ICD-10-CM

## 2021-12-14 DIAGNOSIS — E78.2 MIXED HYPERLIPIDEMIA: ICD-10-CM

## 2021-12-14 DIAGNOSIS — I35.0 NONRHEUMATIC AORTIC VALVE STENOSIS: ICD-10-CM

## 2021-12-14 DIAGNOSIS — I25.110 CORONARY ARTERY DISEASE INVOLVING NATIVE CORONARY ARTERY OF NATIVE HEART WITH UNSTABLE ANGINA PECTORIS (HCC): Primary | ICD-10-CM

## 2021-12-14 PROCEDURE — 99442 PR PHYS/QHP TELEPHONE EVALUATION 11-20 MIN: CPT | Performed by: INTERNAL MEDICINE

## 2021-12-14 NOTE — PROGRESS NOTES
1. You have chosen to receive care through the use of telemedicine. Telemedicine enables health care providers at different locations to provide safe, effective, and convenient care through the use of technology. As with any health care service, there are risks associated with the use of telemedicine, including equipment failure, poor connections, and  issues.       2. Do you understand the risks and benefits of telemedicine as I have explained them to you? yes      3. Have your questions regarding telemedicine been answered? yes      4. Do you consent to the use of telemedicine in your medical care today? yes        [facility] CARDIOLOGY  [address] [facility] [address]  [facility] [address]  Phone: [phone]  Fax: [phone]    12/14/2021    Chief Complaint   Patient presents with   • Hypertension     3MO f/u, echo results         History:   Nicholas Yin is a 76 y.o. male seen in followup, this is a telephone visit, Mr. Yin denies any chest pain, worsening dyspnea, palpitations, syncope etc, he is compliant with medications, his BP is better controlled, reviewed recent labs and his renal parameters were stable.       Past Medical History:   Diagnosis Date   • Anemia    • Arthritis    • Back pain    • CancerTesticular/[place]    • CHF (congestive heart failure) (HCC)    • Coronary artery disease    • DDD (degenerative disc disease), lumbosacral    • Diabetes mellitus (HCC)    • Elevated cholesterol    • Erectile dysfunction    • GERD (gastroesophageal reflux disease)    • History of transfusion    • Hyperlipidemia    • Hypertension        Past Surgical History:   Procedure Laterality Date   • CARDIAC CATHETERIZATION N/A 8/15/2019    Procedure: Left Heart Cath;  Surgeon: Paul Villatoro MD;  Location: [facility] INVASIVE LOCATION;  Service: Cardiology   • COLON SURGERY     • COLONOSCOPY     • COLONOSCOPY N/A 1/27/2021    Procedure: COLONOSCOPY;  Surgeon: Madi  Greg Cash MD;  Location:  [facility] OR;  Service: General;  Laterality: N/A;   • ENDOSCOPY     • EYE SURGERY     • NY RT/LT HEART CATHETERS N/A 8/15/2019    Procedure: Percutaneous Coronary Intervention;  Surgeon: Paul Villatoro MD;  Location: [facility] CATH INVASIVE LOCATION;  Service: Cardiology   • SHOULDER ARTHROSCOPY     • SKIN LESION EXCISION N/A 1/27/2021    Procedure: SKIN LESIONS EXCISION FROM LEFT TEMPLE AREA AND ABDOMEN.;  Surgeon: Greg Barraza MD;  Location:  [facility] OR;  Service: General;  Laterality: N/A;   • VASECTOMY          Past Social History:  Social History     Socioeconomic History   • Marital status:      Spouse name: lita   • Number of children: 3   • Years of education: 16   Tobacco Use   • Smoking status: Never Smoker   • Smokeless tobacco: Never Used   Vaping Use   • Vaping Use: Never used   Substance and Sexual Activity   • Alcohol use: Yes     Comment: occ   • Drug use: No   • Sexual activity: Defer       Past Family History:  Family History   Problem Relation Age of Onset   • Stroke Mother    • Hypertension Mother    • Alcohol abuse Mother    • COPD Mother    • COPD Father    • Alcohol abuse Father    • Hypertension Father    • Alzheimer's disease Father    • Heart disease Brother    • Hypertension Brother    • Diabetes Brother    • Stroke Maternal Grandfather    • Cancer Paternal Grandmother        Review of Systems:   ROS       Current Outpatient Medications   Medication Sig Dispense Refill   • acetaminophen (TYLENOL) 325 MG tablet Take 2 tablets by mouth Every 4 (Four) Hours As Needed for Mild Pain . 30 tablet 0   • amLODIPine (NORVASC) 10 MG tablet Take 1 tablet by mouth Daily for blood pressure. 90 tablet 3   • aspirin 81 MG EC tablet Take 81 mg by mouth Daily.     • Canagliflozin (Invokana) 100 MG tablet tablet Take 1 tablet by mouth Every Morning. 90 tablet 3   • carvedilol (COREG) 6.25 MG tablet Take 1/2 tablet by mouth 2 (two) times a day. 180  "tablet 5   • Coenzyme Q-10 100 MG capsule Take 100 mg by mouth Daily.     • dexlansoprazole (Dexilant) 60 MG capsule Take 1 capsule by mouth Daily. 90 capsule 3   • Diclofenac Sodium (VOLTAREN) 1 % gel gel Apply 4 g topically to the appropriate area as directed 2 (Two) Times a Day. 1 g 0   • isosorbide mononitrate (IMDUR) 60 MG 24 hr tablet Take 0.5 tablets by mouth Daily. 30 tablet 5   • losartan (COZAAR) 50 MG tablet Take 1 tablet by mouth Daily. 90 tablet 2   • nitroglycerin (NITROSTAT) 0.4 MG SL tablet Place 1 tablet under the tongue Every 5 (Five) Minutes As Needed for Chest Pain. Take no more than 3 doses in 15 minutes. 30 tablet 5   • rosuvastatin (CRESTOR) 20 MG tablet Take 1 tablet by mouth Daily. 90 tablet 3   • rosuvastatin (CRESTOR) 20 MG tablet Take 1 tablet by mouth Daily. 90 tablet 2   • ticagrelor (BRILINTA) 60 MG tablet tablet Take 1 tablet by mouth 2 (Two) Times a Day. 180 tablet 3   • vitamin D (ERGOCALCIFEROL) 1.25 MG (75753 UT) capsule capsule TAKE ONE CAPSULE BY MOUTH EVERY WEEK FOR VITAMIN DEFICIENCY 4 capsule 5   • Xultophy 100-3.6 UNIT-MG/ML solution pen-injector subcutaneous pen Inject 50 Units under the skin into the appropriate area as directed Daily. (Patient taking differently: Inject 32 Units under the skin into the appropriate area as directed Daily.) 15 mL 5   • Zoster Vac Recomb Adjuvanted (Shingrix) 50 MCG/0.5ML reconstituted suspension Inject 0.5 mL into the appropriate muscle as directed by prescriber See Admin Instructions. Repeat in 2-6 months 1 each 1     No current facility-administered medications for this visit.        Allergies   Allergen Reactions   • Iodinated Diagnostic Agents Shortness Of Breath       Objective     /70 (BP Location: Left arm)   Ht 175.3 cm (69\")   Wt 103 kg (226 lb)   BMI 33.37 kg/m²     Physical Exam  Constitutional:       Appearance: He is well-developed.   HENT:      Head: Normocephalic and atraumatic.   Eyes:      Pupils: Pupils are equal, " round, and reactive to light.   Cardiovascular:      Rate and Rhythm: Normal rate and regular rhythm.   Pulmonary:      Effort: Pulmonary effort is normal.      Breath sounds: Normal breath sounds.   Abdominal:      General: Bowel sounds are normal.      Palpations: Abdomen is soft.   Musculoskeletal:         General: Normal range of motion.      Cervical back: Normal range of motion and neck supple.   Skin:     General: Skin is warm and dry.      Capillary Refill: Capillary refill takes less than 2 seconds.   Neurological:      Mental Status: He is alert and oriented to person, place, and time.            DATA:   Results for orders placed during the hospital encounter of 07/16/21    Adult Transthoracic Echo Complete W/ Cont if Necessary Per Protocol    Interpretation Summary  · Left ventricular ejection fraction appears to be 56 - 60%. Left ventricular systolic function is normal.  · Left ventricular diastolic function is consistent with (grade I) impaired relaxation.  · The aortic valve is abnormal in structure. The aortic valve exhibits sclerosis. There is severe calcification of the aortic valve. Trace aortic valve regurgitation is present. Moderate aortic valve stenosis is present. Peak velocity of the flow distal to the aortic valve is 300 cm/s.  · There is no evidence of pericardial effusion  · Aortic valve PSV is likely underestimated, previous study was 3.5m/sec   Results for orders placed during the hospital encounter of 07/16/19    Stress Test With Myocardial Perfusion (1 Day)    Interpretation Summary  · Raw images reviewed with the following abnormalities noted: Motion artifact.  · Myocardial perfusion imaging indicates a small-sized infarct located in the apex with mild nile-infarct ischemia.  · Left ventricular ejection fraction is mildly reduced (Calculated EF = 49%).   Results for orders placed during the hospital encounter of 07/16/19    Stress Test With Myocardial Perfusion (1  Day)    Interpretation Summary  · Raw images reviewed with the following abnormalities noted: Motion artifact.  · Myocardial perfusion imaging indicates a small-sized infarct located in the apex with mild nile-infarct ischemia.  · Left ventricular ejection fraction is mildly reduced (Calculated EF = 49%).   Results for orders placed during the hospital encounter of 08/14/19    Cardiac Catheterization/Vascular Study    Narrative  CARDIAC CATHETERIZATION / INTERVENTION REPORT            DATE OF PROCEDURE: 8/15/2019      INDICATION FOR PROCEDURE: NSTEMI      PRE PROCEDURE DIAGNOSIS:  Non-STEMI  Diabetes mellitus type 2  CKD stage IIIb  Hypertension  Dyslipidemia    POST PROCEDURE DIAGNOSIS:  CAD with mid LAD 99% plaque rupture stenosis status post PCI with a 3.0 x 18 mm Christina drug-eluting stent postdilated with a 3.25 NC balloon      Face to face mdoerate conscious  sedation time : 30 minutes      COMPLICATIONS : None    Specimens collected : None    Total contrast used: 90 cc    PROCEDURE PERFORMED:    1. Selective right and left Coronary Angiogram      Description of the procedure:  Prior to the procedure risk, benefits and possible alternative were discussed with the patient and informed consent was obtained. Patient was brought to cardiac cath lab table in post absorbtive state. Patient was prepped and drape in usual sterile fashion. IV Versed and Fentanyl was used for moderate sedation. 2% Lidocaine was used for topical anesthesia. R radial arterial site was prepped and a micropuncture needle was used to access the artery and a 5 F slender sheath was placed. 2.5 mg of Verapamil and 200 mcg of NTG was given through the sheath intra arterial and 5000 units of Heparin was given once the catheter crossed the aortic arch.    5 F TIG 4 catheters was used for right and left coronary angiogram . All the catheters were exchanged over 0.035 wire. The R radial arterial sheath was removed and TR band was applied and  immediate and complete hemostasis was achieved. The patient tolerate the entire procedure well without any immediate known complications.    Coronary anatomy findings:    LM: Is a large calibre vessel , normal take off from left cusp, divides into LAD and Lcx and a large ramus artery, no significant stenosis noted in the left main    LAD: Large caliber vessel proximally, mild luminal irregularities, diagonal 1 artery had 40 to 50% proximal stenosis, the mid LAD just after the takeoff of the diagonal 2 artery had a 95% plaque rupture stenosis with GIANFRANCO II flow distally into the mid and distal LAD, distal portion of the LAD had 50 to 60% stenosis, less than 1.5 mm caliber vessel.  The diagonal 2 artery had mild to moderate disease in the proximal portion.    Ramus intermedius: Ramus intermedius artery is actually large caliber vessel with no significant stenosis other than mild luminal irregularities    LCX: Moderate calibre vessel, mild luminal irregularities.  Continues as OM branch which had no significant stenosis.    RCA: Large calibre, dominant artery, normal take off from right cusp.  No significant stenosis noted in the proximal mid and distal RCA, elevates distally into a small PDA and PL arteries, there appear to be some collaterals going to the left PL branches faint visualization.    Left Ventriculography:    Not performed to limit the contrast use      PERCUTANEOUS CORONARY INTERVENTION PROCEDURE NOTE:    Heparin monotherapy was used for anticoagulation.  Total of 12,000 units was given IV.    XB 3.5, 6 Somali guide was used to engage the left coronary artery coaxially.    0.014 Runthrough guidewire was used to cross the lesion and parked distally.    Used a 2.0 x 15 compliant TREK balloon to predilate the lesion at 8 Clint.    Prepped a 3.0 x 18 Christina Drug eluting stent and position at the lesion and successfully deployed at 14 Clint.  Then used a 3.25 x 50 mm NC balloon to post dilate the stent at 18  tram    Post stent deployment angio pictures showed good expansion with 0% residual stenosis, GIANFRANCO 3 flow in the distal vascular bed and no dissection or distal wire perforation.    Lesion length: 12 mm  Pre PCI Stenosis: 95 %  Post PCI stenosis: 2 %  Pre PCI GIANFRANCO flow: 0  Post PCI GIANFRANCO flow: 3        Final Impression:  CAD with the mid LAD 95% plaque rupture stenosis, culprit vessel for his non-STEMI, status post successful PCI with a 3.0 x 18 mm Christina drug-eluting stent postdilated with 3.25 NC balloon.  Distal LAD had a 60% lesion, very small caliber vessel, 1.5 mm, recommend medical management.        Recommendations:  Aggressive guideline directed medical management for CAD  Dual Anti platelet medication with Asa 81 mg qd and Brilinta 90 mg bid for minimum 1 year.        Paul Villatoro MD, [facility]  Interventional Cardiology    08/15/19  10:08 AM    Procedures     Lab Results   Component Value Date    CHOL 104 10/13/2021    CHOL 108 06/03/2021    CHOL 93 03/17/2021    CHLPL 125 09/28/2015    CHLPL 119 06/06/2015    CHLPL 122 07/25/2014     Lab Results   Component Value Date    TRIG 170 (H) 10/13/2021    TRIG 221 (H) 06/03/2021    TRIG 148 03/17/2021     Lab Results   Component Value Date    HDL 42 10/13/2021    HDL 45 06/03/2021    HDL 41 03/17/2021     Lab Results   Component Value Date    LDL 34 10/13/2021    LDL 29 06/03/2021    LDL 27 03/17/2021       Lab Results   Component Value Date    TSH 4.870 (H) 03/17/2021               Invalid input(s): LABALBU, PROT        Assessment and Plan       Diagnosis Plan   1. Coronary artery disease involving native coronary artery of native heart with unstable angina pectoris (HCC)   Cont with Asa and low dose Brilinta, no bleeding issues, no anginal symptoms   2. Essential hypertension   Controlled, cont with current doses   3. Mixed hyperlipidemia   LDL within target   4. Nonrheumatic aortic valve stenosis   Moderate, we will re check echo during next visit, he denies  any CHF symptoms.          Recommended increase activity to 30 minutes of walking daily, most days of the week.        No follow-ups on file.    Thank you for allowing me to participate in the care of Nicholas Yin. Feel free to contact me directly with any further questions or concerns.          Paul Villatoro MD, FACC  Interventional Cardiology

## 2022-01-11 DIAGNOSIS — I10 ESSENTIAL HYPERTENSION: Primary | ICD-10-CM

## 2022-01-11 DIAGNOSIS — E11.8 TYPE 2 DIABETES MELLITUS WITH COMPLICATION, WITH LONG-TERM CURRENT USE OF INSULIN: ICD-10-CM

## 2022-01-11 DIAGNOSIS — Z79.4 TYPE 2 DIABETES MELLITUS WITH COMPLICATION, WITH LONG-TERM CURRENT USE OF INSULIN: ICD-10-CM

## 2022-01-11 DIAGNOSIS — N18.32 CHRONIC RENAL FAILURE, STAGE 3B: ICD-10-CM

## 2022-01-13 ENCOUNTER — LAB (OUTPATIENT)
Dept: LAB | Facility: HOSPITAL | Age: 77
End: 2022-01-13

## 2022-01-13 DIAGNOSIS — E11.8 TYPE 2 DIABETES MELLITUS WITH COMPLICATION, WITH LONG-TERM CURRENT USE OF INSULIN: ICD-10-CM

## 2022-01-13 DIAGNOSIS — I10 ESSENTIAL HYPERTENSION: ICD-10-CM

## 2022-01-13 DIAGNOSIS — Z79.4 TYPE 2 DIABETES MELLITUS WITH COMPLICATION, WITH LONG-TERM CURRENT USE OF INSULIN: ICD-10-CM

## 2022-01-13 DIAGNOSIS — N18.32 CHRONIC RENAL FAILURE, STAGE 3B: ICD-10-CM

## 2022-01-13 DIAGNOSIS — E78.2 MIXED HYPERLIPIDEMIA: ICD-10-CM

## 2022-01-13 LAB
ALBUMIN SERPL-MCNC: 3.7 G/DL (ref 3.5–5.2)
ALBUMIN/GLOB SERPL: 1.3 G/DL
ALP SERPL-CCNC: 61 U/L (ref 39–117)
ALT SERPL W P-5'-P-CCNC: 15 U/L (ref 1–41)
ANION GAP SERPL CALCULATED.3IONS-SCNC: 11.3 MMOL/L (ref 5–15)
AST SERPL-CCNC: 23 U/L (ref 1–40)
BILIRUB SERPL-MCNC: 0.4 MG/DL (ref 0–1.2)
BUN SERPL-MCNC: 32 MG/DL (ref 8–23)
BUN/CREAT SERPL: 14.5 (ref 7–25)
CALCIUM SPEC-SCNC: 8.7 MG/DL (ref 8.6–10.5)
CHLORIDE SERPL-SCNC: 108 MMOL/L (ref 98–107)
CO2 SERPL-SCNC: 20.7 MMOL/L (ref 22–29)
CREAT SERPL-MCNC: 2.21 MG/DL (ref 0.76–1.27)
GFR SERPL CREATININE-BSD FRML MDRD: 29 ML/MIN/1.73
GLOBULIN UR ELPH-MCNC: 2.9 GM/DL
GLUCOSE SERPL-MCNC: 169 MG/DL (ref 65–99)
HBA1C MFR BLD: 8.44 % (ref 4.8–5.6)
POTASSIUM SERPL-SCNC: 4.5 MMOL/L (ref 3.5–5.2)
PROT SERPL-MCNC: 6.6 G/DL (ref 6–8.5)
SODIUM SERPL-SCNC: 140 MMOL/L (ref 136–145)

## 2022-01-13 PROCEDURE — 83036 HEMOGLOBIN GLYCOSYLATED A1C: CPT

## 2022-01-13 PROCEDURE — 80053 COMPREHEN METABOLIC PANEL: CPT

## 2022-01-14 ENCOUNTER — OFFICE VISIT (OUTPATIENT)
Dept: FAMILY MEDICINE CLINIC | Facility: CLINIC | Age: 77
End: 2022-01-14

## 2022-01-14 DIAGNOSIS — K21.9 GASTROESOPHAGEAL REFLUX DISEASE WITHOUT ESOPHAGITIS: ICD-10-CM

## 2022-01-14 DIAGNOSIS — I25.110 CORONARY ARTERY DISEASE INVOLVING NATIVE CORONARY ARTERY OF NATIVE HEART WITH UNSTABLE ANGINA PECTORIS: Primary | ICD-10-CM

## 2022-01-14 DIAGNOSIS — M51.36 DDD (DEGENERATIVE DISC DISEASE), LUMBAR: ICD-10-CM

## 2022-01-14 DIAGNOSIS — I10 ESSENTIAL HYPERTENSION: ICD-10-CM

## 2022-01-14 DIAGNOSIS — N18.32 CHRONIC RENAL FAILURE, STAGE 3B: ICD-10-CM

## 2022-01-14 DIAGNOSIS — N40.1 BENIGN PROSTATIC HYPERPLASIA WITH NOCTURIA: ICD-10-CM

## 2022-01-14 DIAGNOSIS — R35.1 BENIGN PROSTATIC HYPERPLASIA WITH NOCTURIA: ICD-10-CM

## 2022-01-14 DIAGNOSIS — E11.8 TYPE 2 DIABETES MELLITUS WITH COMPLICATION, WITH LONG-TERM CURRENT USE OF INSULIN: ICD-10-CM

## 2022-01-14 DIAGNOSIS — E78.2 MIXED HYPERLIPIDEMIA: ICD-10-CM

## 2022-01-14 DIAGNOSIS — M85.89 OSTEOPENIA OF MULTIPLE SITES: ICD-10-CM

## 2022-01-14 DIAGNOSIS — E55.9 VITAMIN D DEFICIENCY: ICD-10-CM

## 2022-01-14 DIAGNOSIS — N52.01 ERECTILE DYSFUNCTION DUE TO ARTERIAL INSUFFICIENCY: ICD-10-CM

## 2022-01-14 DIAGNOSIS — Z79.4 TYPE 2 DIABETES MELLITUS WITH COMPLICATION, WITH LONG-TERM CURRENT USE OF INSULIN: ICD-10-CM

## 2022-01-14 DIAGNOSIS — Z00.00 HEALTHCARE MAINTENANCE: ICD-10-CM

## 2022-01-14 DIAGNOSIS — I35.0 NONRHEUMATIC AORTIC VALVE STENOSIS: ICD-10-CM

## 2022-01-14 DIAGNOSIS — I73.9 PAD (PERIPHERAL ARTERY DISEASE): ICD-10-CM

## 2022-01-14 PROCEDURE — 99214 OFFICE O/P EST MOD 30 MIN: CPT | Performed by: GENERAL PRACTICE

## 2022-01-14 NOTE — PROGRESS NOTES
Subjective   Nicholas Yin is a 76 y.o. male.     Chief Complaint  He returns for a scheduled reassessment of multiple medical problems including CAD, previous TIA, type II DM, CRF, chronic back pain, and GERD    History of Present Illness     Coronary Artery Disease  He underwent a coronary angiogram by Dr. Villatoro on 8/15/2019 with stenting of the mid LAD.  He remains chest pain-free and has noted some improvement in his exercise tolerance and overall sense of well-being.  He remains on brilinta 60 twice daily with ASA 81 daily. Echocardiogram performed on 1/22/2021 was reported as showing moderate aortic valve stenosis, left ventricular diastolic dysfunction, but normal systolic function with an estimated EF of 61 to 65%.  He underwent an updated study on 7/16/2021 with no significant changes.  He underwent a cardiology reassessment on 12/14/2021 with no changes made in his management.  Lab Results   Component Value Date    WBC 7.27 10/13/2021    HGB 12.4 (L) 10/13/2021    HCT 39.1 10/13/2021    MCV 88.9 10/13/2021     10/13/2021     Transient Visual Loss  Several years ago he experienced a sudden progressive decrease in the vision in his right eye while driving. He stated that the vision in the eye darkened gradually over several minutes to the point where the only things he could make out were very bright lights and the sun. This lasted for about 30 minutes then resolved over several minutes. He continues to deny any changes in his strength, sensation, speech or ability to understand what is said to him. He continues to deny any palpitations and has had no lightheadedness or diaphoresis. He was hospitalized at [facility] over 10 years ago following a similar episodes that was ultimately felt to be vascular in origin. He has numerous cardiovascular risk factors as well as chronic renal insufficiency. MRI of the brain performed on 12/8/17 was reported as showing mild cerebral atrophy with chronic small  vessel ischemic changes. MRA of the carotid arteries performed the same day was unremarkable.  He undergoes regular ophthalmology assessment    Diabetes  He continues to deny any paresthesias of the feet, polydipsia, polyuria, hypoglycemia or foot ulcerations. Evaluation to date has been: hemoglobin A1C. Home sugars: BGs have remained at or near goal since last here. Current treatments: GLP agonist-liraglutide and basal insulin - tresiba (together as xultophy).  He has apparently been off canagliflozin for some time due to a change in his pharmacy.  He admits that there is a considerable amount of room for improvement in his diet and exercise  Lab Results   Component Value Date    HGBA1C 8.44 (H) 01/13/2022      Dyslipidemia  Compliance with treatment has been fair.  He has been walking some. He remains on rosuvastatin with no apparent side effects  Lab Results   Component Value Date    CHOL 104 10/13/2021    CHLPL 125 09/28/2015    TRIG 170 (H) 10/13/2021    HDL 42 10/13/2021    LDL 34 10/13/2021     Hypertension  Home blood pressure readings: have generally been at or near goal. Associated signs and symptoms: dyspnea with above average exertion and occasional lightheadedness when getting up from sitting.  He continues to deny any chest pain, palpitations, orthopnea, paroxysmal nocturnal dyspnea or peripheral edema. Current antihypertensive medications includes losartan, carvedilol, and amlodipine.  He has not taken furosemide since last here.    Lab Results   Component Value Date    GLUCOSE 169 (H) 01/13/2022    BUN 32 (H) 01/13/2022    CREATININE 2.21 (H) 01/13/2022    EGFRIFNONA 29 (L) 01/13/2022    BCR 14.5 01/13/2022    K 4.5 01/13/2022    CO2 20.7 (L) 01/13/2022    CALCIUM 8.7 01/13/2022    ALBUMIN 3.70 01/13/2022    LABIL2 1.1 (L) 09/28/2015    AST 23 01/13/2022    ALT 15 01/13/2022     Lab Results   Component Value Date    ALKPHOS 61 01/13/2022     Chronic Renal Failure  This has been contributed to  longstanding type 2 diabetes mellitus and hypertension.  He is aware to avoid any NSAIDs oral or prescription.      Chronic Back Pain  He gives a long history of increasing mid and low back pain. This is described as a sharp ache. The pain does not radiate and has been unassociated with any other symptoms. The pain is worse with prolonged weight bearing and limits his activities more then anything else at present.  MRI performed on 1/7/2021 was reported as showing degenerative disc disease and osteoarthritis with mild to moderate central canal and bilateral foraminal narrowing at L2-3 and L3-4.    Gastroesophageal Reflux Disease  He remains heartburn free on dexlansoprazole.  He denies any difficulty swallowing, nausea, vomiting, change in his bowel habits, hematochezia, or melena    The following portions of the patient's history were reviewed and updated as appropriate: allergies, current medications, past medical history, past social history and problem list.    Review of Systems   Constitutional: Positive for fatigue. Negative for appetite change, chills, fever and unexpected weight change.   HENT: Negative for congestion, ear pain, rhinorrhea, sneezing and sore throat.    Eyes: Negative for visual disturbance.   Respiratory: Positive for shortness of breath. Negative for cough and wheezing.    Cardiovascular: Negative for chest pain, palpitations and leg swelling.   Gastrointestinal: Negative for abdominal pain, blood in stool, constipation, diarrhea, nausea and vomiting.   Endocrine: Negative for polydipsia and polyuria.   Genitourinary: Negative for difficulty urinating, dysuria, frequency, hematuria and urgency.        Erectile dysfunction - chronic progressive. Nocturia x 2-3 with occasional sense of incomplete voiding.   Musculoskeletal: Positive for back pain (chronic - progressive). Negative for arthralgias, joint swelling and myalgias.   Skin: Negative for rash.   Neurological: Positive for  light-headedness. Negative for weakness, numbness and headaches.   Psychiatric/Behavioral: Negative for sleep disturbance.     Objective   Physical Exam  Constitutional:       General: He is not in acute distress.     Appearance: Normal appearance. He is well-developed. He is not ill-appearing or diaphoretic.      Comments: Bright and in good spirits.  Sits and stands with an exaggerated thoracic kyphosis.  Uncomfortable with movement.  No apparent distress at rest.  No pallor, cyanosis, diaphoresis, or jaundice   HENT:      Head: Atraumatic.      Right Ear: Tympanic membrane, ear canal and external ear normal.      Left Ear: Tympanic membrane, ear canal and external ear normal.   Eyes:      Conjunctiva/sclera: Conjunctivae normal.   Neck:      Thyroid: No thyroid mass or thyromegaly.      Vascular: No carotid bruit or JVD.      Trachea: Trachea normal. No tracheal deviation.   Cardiovascular:      Rate and Rhythm: Normal rate and regular rhythm.      Heart sounds: Normal heart sounds, S1 normal and S2 normal. No murmur heard.  No gallop. No S3 or S4 sounds.    Pulmonary:      Effort: Pulmonary effort is normal.      Breath sounds: Normal breath sounds.   Chest:   Breasts:      Right: No supraclavicular adenopathy.      Left: No supraclavicular adenopathy.       Abdominal:      General: Bowel sounds are normal. There is no distension.   Musculoskeletal:      Lumbar back: No spasms or tenderness. Decreased range of motion.      Right lower leg: No edema.      Left lower leg: No edema.      Comments: Negative straight leg raise.   Lymphadenopathy:      Head:      Right side of head: No submental, submandibular, tonsillar, preauricular, posterior auricular or occipital adenopathy.      Left side of head: No submental, submandibular, tonsillar, preauricular, posterior auricular or occipital adenopathy.      Cervical: No cervical adenopathy.      Upper Body:      Right upper body: No supraclavicular adenopathy.      Left  upper body: No supraclavicular adenopathy.   Skin:     General: Skin is warm and dry.      Coloration: Skin is not cyanotic, jaundiced or pale.      Findings: No rash.      Nails: There is no clubbing.   Neurological:      Mental Status: He is alert and oriented to person, place, and time.      Cranial Nerves: No cranial nerve deficit.      Motor: No tremor.      Coordination: Coordination normal.      Gait: Gait normal.   Psychiatric:         Attention and Perception: Attention normal.         Mood and Affect: Mood normal.         Speech: Speech normal.         Behavior: Behavior normal.         Thought Content: Thought content normal.       Assessment/Plan   Problems Addressed this Visit        Cardiac and Vasculature    CAD (coronary artery disease)   Reminded regarding the importance of risk factor modification.  Continue dual antiplatelet therapy  Follow up with cardiology     Relevant Orders    CBC & Differential    Essential hypertension   Hypertension: at goal. Evidence of target organ damage: coronary artery disease, peripheral artery disease, chronic kidney disease and transient ischemic attack.  Encouraged to continue to work on diet and exercise plan.   Continue current medication    Relevant Orders    CBC & Differential    Comprehensive Metabolic Panel    Mixed hyperlipidemia  As above.   Continue current medication.    Relevant Orders    Lipid Panel    Nonrheumatic aortic valve stenosis    PAD (peripheral artery disease) (HCC)  As above.   If Brilinta is discontinued at some point we will consider adding low-dose rivaroxaban    Relevant Orders    CBC & Differential       Endocrine and Metabolic    Type 2 diabetes mellitus with complication, with long-term current use of insulin ([facility])  Diabetes mellitus Type II, under fair control.   Encouraged to continue to pursue ADA diet  Encouraged aerobic exercise.  Will add dapagliflozin and 5 daily for 1 week then 10 daily afterward  Will consider adding  finerenone  Scheduled for updated labs just prior to his return in 3 months    Relevant Medications    Dapagliflozin Propanediol (Farxiga) 10 MG tablet    Other Relevant Orders    Hemoglobin A1c    Vitamin B12    MicroAlbumin, Urine, Random - Urine, Clean Catch    Vitamin D deficiency    Relevant Orders    Vitamin D 25 Hydroxy       Gastrointestinal Abdominal     Gastroesophageal reflux disease without esophagitis   Symptoms are currently well controlled.  Continue current medication.      Relevant Orders    CBC & Differential       Genitourinary and Reproductive     Benign prostatic hyperplasia with nocturia    Vasculogenic erectile dysfunction       Health Encounters    Healthcare maintenance  Patient has already received a flu shot along with a third dose of the Pfizer COVID-19 vaccine  Reminded that he is due for an updated Tdap       Musculoskeletal and Injuries    Osteopenia of multiple sites       Neuro    DDD (degenerative disc disease), lumbar  Reminded regarding symptomatic treatment.   Continue current medication       Other    Chronic renal failure, stage 3b (CMS/[facility])  Reminded to avoid any NSAIDs prescription or OTC  Will continue to monitor    Relevant Orders    Comprehensive Metabolic Panel      Diagnoses       Codes Comments    Coronary artery disease involving native coronary artery of native heart with unstable angina pectoris ([facility])    -  Primary ICD-10-CM: I25.110  ICD-9-CM: 414.01, 411.1     Essential hypertension     ICD-10-CM: I10  ICD-9-CM: 401.9     Mixed hyperlipidemia     ICD-10-CM: E78.2  ICD-9-CM: 272.2     Nonrheumatic aortic valve stenosis     ICD-10-CM: I35.0  ICD-9-CM: 424.1     PAD (peripheral artery disease) ([facility])     ICD-10-CM: I73.9  ICD-9-CM: 443.9     Type 2 diabetes mellitus with complication, with long-term current use of insulin ([facility])     ICD-10-CM: E11.8, Z79.4  ICD-9-CM: 250.90, V58.67     Vitamin D deficiency     ICD-10-CM: E55.9  ICD-9-CM: 268.9     Gastroesophageal  reflux disease without esophagitis     ICD-10-CM: K21.9  ICD-9-CM: 530.81     Benign prostatic hyperplasia with nocturia     ICD-10-CM: N40.1, R35.1  ICD-9-CM: 600.01, 788.43     Erectile dysfunction due to arterial insufficiency     ICD-10-CM: N52.01  ICD-9-CM: 607.84     Healthcare maintenance     ICD-10-CM: Z00.00  ICD-9-CM: V70.0     Osteopenia of multiple sites     ICD-10-CM: M85.89  ICD-9-CM: 733.90     DDD (degenerative disc disease), lumbar     ICD-10-CM: M51.36  ICD-9-CM: 722.52     Chronic renal failure, stage 3b (CMS/HCC)     ICD-10-CM: N18.32  ICD-9-CM: 585.3

## 2022-01-15 VITALS
SYSTOLIC BLOOD PRESSURE: 122 MMHG | RESPIRATION RATE: 14 BRPM | HEART RATE: 90 BPM | WEIGHT: 235 LBS | HEIGHT: 69 IN | DIASTOLIC BLOOD PRESSURE: 60 MMHG | BODY MASS INDEX: 34.8 KG/M2 | OXYGEN SATURATION: 93 % | TEMPERATURE: 97.6 F

## 2022-01-15 RX ORDER — DAPAGLIFLOZIN 10 MG/1
1 TABLET, FILM COATED ORAL EVERY MORNING
Qty: 28 TABLET | Refills: 0 | COMMUNITY
Start: 2022-01-15 | End: 2022-07-27

## 2022-01-18 RX ORDER — DAPAGLIFLOZIN 10 MG/1
10 TABLET, FILM COATED ORAL ONCE
Qty: 5 TABLET | Refills: 0 | Status: SHIPPED | OUTPATIENT
Start: 2022-01-18 | End: 2022-01-18

## 2022-01-18 RX ORDER — DAPAGLIFLOZIN 5 MG/1
5 TABLET, FILM COATED ORAL DAILY
Qty: 7 TABLET | Refills: 0 | COMMUNITY
Start: 2022-01-18 | End: 2022-07-27

## 2022-02-02 RX ORDER — DOCUSATE SODIUM 100 MG/1
100 CAPSULE, LIQUID FILLED ORAL 2 TIMES DAILY
Qty: 72 CAPSULE | Refills: 0 | COMMUNITY
Start: 2022-02-02

## 2022-02-21 ENCOUNTER — TELEPHONE (OUTPATIENT)
Dept: FAMILY MEDICINE CLINIC | Facility: CLINIC | Age: 77
End: 2022-02-21

## 2022-02-21 NOTE — TELEPHONE ENCOUNTER
SHONNA WITH [employer] IS CALLING TO GIVE THE OFFICE A STATUS OF AN AUTHORIZATION SHE STATES THAT THE AUTHORIZATION WAS FOR  XULTOPHY THE CALLER STATES THAT THE MEDICATION HAS BEEN APPROVED FROM 02/21/2022 UNTIL 02/21/2023      CALL [phone]

## 2022-02-25 DIAGNOSIS — U07.1 COVID-19 VIRUS INFECTION: Primary | ICD-10-CM

## 2022-03-29 DIAGNOSIS — I10 ESSENTIAL HYPERTENSION: Primary | ICD-10-CM

## 2022-03-29 RX ORDER — CARVEDILOL 3.12 MG/1
3.12 TABLET ORAL 2 TIMES DAILY
Qty: 180 TABLET | Refills: 3 | Status: SHIPPED | OUTPATIENT
Start: 2022-03-29

## 2022-04-12 RX ORDER — ROSUVASTATIN CALCIUM 20 MG/1
20 TABLET, COATED ORAL DAILY
Qty: 90 TABLET | Refills: 3 | Status: SHIPPED | OUTPATIENT
Start: 2022-04-12

## 2022-04-19 DIAGNOSIS — E11.8 TYPE 2 DIABETES MELLITUS WITH COMPLICATION, WITH LONG-TERM CURRENT USE OF INSULIN: ICD-10-CM

## 2022-04-19 DIAGNOSIS — Z79.4 TYPE 2 DIABETES MELLITUS WITH COMPLICATION, WITH LONG-TERM CURRENT USE OF INSULIN: ICD-10-CM

## 2022-04-19 RX ORDER — (INSULIN DEGLUDEC AND LIRAGLUTIDE) 100; 3.6 [IU]/ML; MG/ML
50 INJECTION, SOLUTION SUBCUTANEOUS DAILY
Qty: 15 ML | Refills: 5 | Status: SHIPPED | OUTPATIENT
Start: 2022-04-19 | End: 2023-02-07 | Stop reason: SDUPTHER

## 2022-04-19 RX ORDER — (INSULIN DEGLUDEC AND LIRAGLUTIDE) 100; 3.6 [IU]/ML; MG/ML
50 INJECTION, SOLUTION SUBCUTANEOUS DAILY
Qty: 15 ML | Refills: 5 | Status: SHIPPED | OUTPATIENT
Start: 2022-04-19 | End: 2022-04-19 | Stop reason: SDUPTHER

## 2022-05-09 DIAGNOSIS — R07.9 CHEST PAIN, UNSPECIFIED TYPE: Primary | ICD-10-CM

## 2022-05-12 ENCOUNTER — HOSPITAL ENCOUNTER (OUTPATIENT)
Dept: CARDIOLOGY | Facility: HOSPITAL | Age: 77
Discharge: HOME | End: 2022-05-12
Admitting: INTERNAL MEDICINE

## 2022-05-12 DIAGNOSIS — R07.9 CHEST PAIN, UNSPECIFIED TYPE: ICD-10-CM

## 2022-05-12 PROCEDURE — 93306 TTE W/DOPPLER COMPLETE: CPT | Performed by: INTERNAL MEDICINE

## 2022-05-12 PROCEDURE — 93306 TTE W/DOPPLER COMPLETE: CPT

## 2022-05-16 ENCOUNTER — TELEPHONE (OUTPATIENT)
Dept: CARDIOLOGY | Facility: CLINIC | Age: 77
End: 2022-05-16

## 2022-05-16 DIAGNOSIS — R06.09 DOE (DYSPNEA ON EXERTION): Primary | ICD-10-CM

## 2022-05-16 LAB
BH CV ECHO MEAS - ACS: 0.95 CM
BH CV ECHO MEAS - AO MAX PG: 34.6 MMHG
BH CV ECHO MEAS - AO MEAN PG: 20 MMHG
BH CV ECHO MEAS - AO ROOT DIAM: 3.2 CM
BH CV ECHO MEAS - AO V2 MAX: 294 CM/SEC
BH CV ECHO MEAS - AO V2 VTI: 77.6 CM
BH CV ECHO MEAS - AVA(I,D): 0.74 CM2
BH CV ECHO MEAS - EDV(MOD-SP4): 52.3 ML
BH CV ECHO MEAS - EF(MOD-SP4): 57.7 %
BH CV ECHO MEAS - ESV(MOD-SP4): 22.1 ML
BH CV ECHO MEAS - LA DIMENSION: 4.2 CM
BH CV ECHO MEAS - LAT PEAK E' VEL: 7.3 CM/SEC
BH CV ECHO MEAS - LV DIASTOLIC VOL/BSA (35-75): 23.6 CM2
BH CV ECHO MEAS - LV MAX PG: 3.5 MMHG
BH CV ECHO MEAS - LV MEAN PG: 2 MMHG
BH CV ECHO MEAS - LV SYSTOLIC VOL/BSA (12-30): 10 CM2
BH CV ECHO MEAS - LV V1 MAX: 93.3 CM/SEC
BH CV ECHO MEAS - LV V1 VTI: 22.7 CM
BH CV ECHO MEAS - LVOT AREA: 2.5 CM2
BH CV ECHO MEAS - LVOT DIAM: 1.8 CM
BH CV ECHO MEAS - MED PEAK E' VEL: 5.8 CM/SEC
BH CV ECHO MEAS - MV A MAX VEL: 151 CM/SEC
BH CV ECHO MEAS - MV DEC SLOPE: 227 CM/SEC2
BH CV ECHO MEAS - MV E MAX VEL: 133 CM/SEC
BH CV ECHO MEAS - MV E/A: 0.88
BH CV ECHO MEAS - MV MAX PG: 8.8 MMHG
BH CV ECHO MEAS - MV MEAN PG: 3 MMHG
BH CV ECHO MEAS - MV P1/2T: 152.3 MSEC
BH CV ECHO MEAS - MV V2 VTI: 44.2 CM
BH CV ECHO MEAS - MVA(P1/2T): 1.44 CM2
BH CV ECHO MEAS - MVA(VTI): 1.31 CM2
BH CV ECHO MEAS - PA ACC TIME: 0.1 SEC
BH CV ECHO MEAS - PA PR(ACCEL): 34.4 MMHG
BH CV ECHO MEAS - SI(MOD-SP4): 13.7 ML/M2
BH CV ECHO MEAS - SV(LVOT): 57.8 ML
BH CV ECHO MEAS - SV(MOD-SP4): 30.2 ML
BH CV ECHO MEAS - TAPSE (>1.6): 2.8 CM
BH CV ECHO MEASUREMENTS AVERAGE E/E' RATIO: 20.31
LV EF 2D ECHO EST: 65 %
MAXIMAL PREDICTED HEART RATE: 144 BPM
STRESS TARGET HR: 122 BPM

## 2022-05-16 NOTE — TELEPHONE ENCOUNTER
Called and spoke with Christopher Yin regarding the results of Jairo's echocardiogram.  Echocardiogram shows stable appearance of aortic valve stenosis.    Of note, when Dr. Oviedo read the echocardiogram he did comment that shalonda heart rate was low and recommended that, with symptoms of shortness of breath, he may need an event monitor.  I discussed this with Jeremías.    Jairo will come in next Monday for monitor placement.

## 2022-06-08 ENCOUNTER — TELEPHONE (OUTPATIENT)
Dept: FAMILY MEDICINE CLINIC | Facility: CLINIC | Age: 77
End: 2022-06-08

## 2022-06-08 NOTE — TELEPHONE ENCOUNTER
Caller: KARO    Relationship: [facility]     Best call back number: [phone]  REF [id number]    Who are you requesting to speak with (clinical staff, provider,  specific staff member):   CLINICAL       What was the call regarding:  PRIOR AUTHORIZATION FOR A RENEWAL FOR    Xultophy 100-3.6 UNIT-MG/ML     Do you require a callback: YES

## 2022-06-30 DIAGNOSIS — I10 ESSENTIAL HYPERTENSION: Primary | ICD-10-CM

## 2022-06-30 DIAGNOSIS — Z79.4 TYPE 2 DIABETES MELLITUS WITH COMPLICATION, WITH LONG-TERM CURRENT USE OF INSULIN: ICD-10-CM

## 2022-06-30 DIAGNOSIS — I25.110 CORONARY ARTERY DISEASE INVOLVING NATIVE CORONARY ARTERY OF NATIVE HEART WITH UNSTABLE ANGINA PECTORIS: ICD-10-CM

## 2022-06-30 DIAGNOSIS — E55.9 VITAMIN D DEFICIENCY: ICD-10-CM

## 2022-06-30 DIAGNOSIS — N18.32 CHRONIC RENAL FAILURE, STAGE 3B: ICD-10-CM

## 2022-06-30 DIAGNOSIS — E11.8 TYPE 2 DIABETES MELLITUS WITH COMPLICATION, WITH LONG-TERM CURRENT USE OF INSULIN: ICD-10-CM

## 2022-06-30 DIAGNOSIS — E78.2 MIXED HYPERLIPIDEMIA: ICD-10-CM

## 2022-07-05 RX ORDER — NYSTATIN 100000 [USP'U]/G
POWDER TOPICAL 4 TIMES DAILY
Qty: 60 G | Refills: 3 | Status: SHIPPED | OUTPATIENT
Start: 2022-07-05 | End: 2022-07-27

## 2022-07-05 RX ORDER — FLUCONAZOLE 150 MG/1
150 TABLET ORAL DAILY
Qty: 10 TABLET | Refills: 0 | Status: SHIPPED | OUTPATIENT
Start: 2022-07-05 | End: 2022-07-27

## 2022-07-21 ENCOUNTER — PRIOR AUTHORIZATION (OUTPATIENT)
Dept: FAMILY MEDICINE CLINIC | Facility: CLINIC | Age: 77
End: 2022-07-21

## 2022-07-22 ENCOUNTER — TELEPHONE (OUTPATIENT)
Dept: FAMILY MEDICINE CLINIC | Facility: CLINIC | Age: 77
End: 2022-07-22

## 2022-07-22 NOTE — TELEPHONE ENCOUNTER
Caller: [facility]    Relationship:     Best call back number: [phone]    What medications are you currently taking:   Current Outpatient Medications on File Prior to Visit   Medication Sig Dispense Refill   • acetaminophen (TYLENOL) 325 MG tablet Take 2 tablets by mouth Every 4 (Four) Hours As Needed for Mild Pain . 30 tablet 0   • amLODIPine (NORVASC) 10 MG tablet Take 1 tablet by mouth Daily for blood pressure. 90 tablet 3   • aspirin 81 MG EC tablet Take 81 mg by mouth Daily.     • carvedilol (COREG) 3.125 MG tablet Take 1 tablet by mouth 2 (Two) Times a Day. 180 tablet 3   • Coenzyme Q-10 100 MG capsule Take 100 mg by mouth Daily.     • dapagliflozin (Farxiga) 5 MG tablet tablet Take 1 tablet by mouth Daily. 7 tablet 0   • Dapagliflozin Propanediol (Farxiga) 10 MG tablet Take 10 mg by mouth Every Morning. 28 tablet 0   • dexlansoprazole (Dexilant) 60 MG capsule Take 1 capsule by mouth Daily. 90 capsule 3   • Diclofenac Sodium (VOLTAREN) 1 % gel gel Apply 4 g topically to the appropriate area as directed 2 (Two) Times a Day. 1 g 0   • docusate sodium (Colace) 100 MG capsule Take 1 capsule by mouth 2 (Two) Times a Day. 72 capsule 0   • empagliflozin (JARDIANCE) 25 MG tablet tablet Take 1 tablet by mouth Daily. 98 tablet 0   • fluconazole (Diflucan) 150 MG tablet Take 1 tablet by mouth Daily. 10 tablet 0   • losartan (COZAAR) 50 MG tablet Take 1 tablet by mouth Daily. 90 tablet 2   • nitroglycerin (NITROSTAT) 0.4 MG SL tablet Place 1 tablet under the tongue Every 5 (Five) Minutes As Needed for Chest Pain. Take no more than 3 doses in 15 minutes. 30 tablet 5   • nystatin (MYCOSTATIN) 412959 UNIT/GM powder Apply  topically to the appropriate area as directed 4 (Four) Times a Day. 60 g 3   • rosuvastatin (CRESTOR) 20 MG tablet Take 1 tablet by mouth Daily. 90 tablet 3   • ticagrelor (BRILINTA) 60 MG tablet tablet Take 1 tablet by mouth 2 (Two) Times a Day. 180 tablet 3   • vitamin D (ERGOCALCIFEROL) 1.25 MG  (60419 UT) capsule capsule TAKE ONE CAPSULE BY MOUTH EVERY WEEK FOR VITAMIN DEFICIENCY 4 capsule 5   • Xultophy 100-3.6 UNIT-MG/ML solution pen-injector subcutaneous pen Inject 50 Units under the skin into the appropriate area as directed Daily. 15 mL 5   • Zoster Vac Recomb Adjuvanted (Shingrix) 50 MCG/0.5ML reconstituted suspension Inject 0.5 mL into the appropriate muscle as directed by prescriber See Admin Instructions. Repeat in 2-6 months 1 each 1     No current facility-administered medications on file prior to visit.        Which medication are you concerned about: dexlansoprazole (Dexilant) 60 MG capsule    Who prescribed you this medication: PCP    What are your concerns: PATIENT'S INSURANCE COMPANY WOULD LIKE TO FOLLOW UP ON A FAX THAT WAS SENT 7/21/22 FOR A PRIOR AUTHORIZATION FOR THE PATIENT'S PRESCRIPTION.

## 2022-07-25 ENCOUNTER — LAB (OUTPATIENT)
Dept: LAB | Facility: HOSPITAL | Age: 77
End: 2022-07-25

## 2022-07-25 DIAGNOSIS — N18.32 CHRONIC RENAL FAILURE, STAGE 3B: ICD-10-CM

## 2022-07-25 DIAGNOSIS — E78.2 MIXED HYPERLIPIDEMIA: ICD-10-CM

## 2022-07-25 DIAGNOSIS — I10 ESSENTIAL HYPERTENSION: ICD-10-CM

## 2022-07-25 DIAGNOSIS — E55.9 VITAMIN D DEFICIENCY: ICD-10-CM

## 2022-07-25 DIAGNOSIS — Z79.4 TYPE 2 DIABETES MELLITUS WITH COMPLICATION, WITH LONG-TERM CURRENT USE OF INSULIN: ICD-10-CM

## 2022-07-25 DIAGNOSIS — E11.8 TYPE 2 DIABETES MELLITUS WITH COMPLICATION, WITH LONG-TERM CURRENT USE OF INSULIN: ICD-10-CM

## 2022-07-25 DIAGNOSIS — I25.110 CORONARY ARTERY DISEASE INVOLVING NATIVE CORONARY ARTERY OF NATIVE HEART WITH UNSTABLE ANGINA PECTORIS: ICD-10-CM

## 2022-07-25 LAB
25(OH)D3 SERPL-MCNC: 19.9 NG/ML (ref 30–100)
ALBUMIN SERPL-MCNC: 3.5 G/DL (ref 3.5–5.2)
ALBUMIN UR-MCNC: 70.9 MG/DL
ALBUMIN/GLOB SERPL: 1.3 G/DL
ALP SERPL-CCNC: 63 U/L (ref 39–117)
ALT SERPL W P-5'-P-CCNC: 14 U/L (ref 1–41)
ANION GAP SERPL CALCULATED.3IONS-SCNC: 10.1 MMOL/L (ref 5–15)
AST SERPL-CCNC: 21 U/L (ref 1–40)
BASOPHILS # BLD AUTO: 0.08 10*3/MM3 (ref 0–0.2)
BASOPHILS NFR BLD AUTO: 0.9 % (ref 0–1.5)
BILIRUB SERPL-MCNC: 0.3 MG/DL (ref 0–1.2)
BUN SERPL-MCNC: 30 MG/DL (ref 8–23)
BUN/CREAT SERPL: 15.2 (ref 7–25)
CALCIUM SPEC-SCNC: 9 MG/DL (ref 8.6–10.5)
CHLORIDE SERPL-SCNC: 110 MMOL/L (ref 98–107)
CHOLEST SERPL-MCNC: 97 MG/DL (ref 0–200)
CO2 SERPL-SCNC: 19.9 MMOL/L (ref 22–29)
CREAT SERPL-MCNC: 1.98 MG/DL (ref 0.76–1.27)
DEPRECATED RDW RBC AUTO: 44.4 FL (ref 37–54)
EGFRCR SERPLBLD CKD-EPI 2021: 34.4 ML/MIN/1.73
EOSINOPHIL # BLD AUTO: 0.52 10*3/MM3 (ref 0–0.4)
EOSINOPHIL NFR BLD AUTO: 5.8 % (ref 0.3–6.2)
ERYTHROCYTE [DISTWIDTH] IN BLOOD BY AUTOMATED COUNT: 15.2 % (ref 12.3–15.4)
GLOBULIN UR ELPH-MCNC: 2.8 GM/DL
GLUCOSE SERPL-MCNC: 109 MG/DL (ref 65–99)
HBA1C MFR BLD: 7.3 % (ref 4.8–5.6)
HCT VFR BLD AUTO: 39.5 % (ref 37.5–51)
HDLC SERPL-MCNC: 46 MG/DL (ref 40–60)
HGB BLD-MCNC: 12.9 G/DL (ref 13–17.7)
IMM GRANULOCYTES # BLD AUTO: 0.02 10*3/MM3 (ref 0–0.05)
IMM GRANULOCYTES NFR BLD AUTO: 0.2 % (ref 0–0.5)
LDLC SERPL CALC-MCNC: 28 MG/DL (ref 0–100)
LDLC/HDLC SERPL: 0.52 {RATIO}
LYMPHOCYTES # BLD AUTO: 1.71 10*3/MM3 (ref 0.7–3.1)
LYMPHOCYTES NFR BLD AUTO: 19.2 % (ref 19.6–45.3)
MCH RBC QN AUTO: 27 PG (ref 26.6–33)
MCHC RBC AUTO-ENTMCNC: 32.7 G/DL (ref 31.5–35.7)
MCV RBC AUTO: 82.8 FL (ref 79–97)
MONOCYTES # BLD AUTO: 0.82 10*3/MM3 (ref 0.1–0.9)
MONOCYTES NFR BLD AUTO: 9.2 % (ref 5–12)
NEUTROPHILS NFR BLD AUTO: 5.76 10*3/MM3 (ref 1.7–7)
NEUTROPHILS NFR BLD AUTO: 64.7 % (ref 42.7–76)
NRBC BLD AUTO-RTO: 0 /100 WBC (ref 0–0.2)
PLATELET # BLD AUTO: 237 10*3/MM3 (ref 140–450)
PMV BLD AUTO: 11.1 FL (ref 6–12)
POTASSIUM SERPL-SCNC: 4.2 MMOL/L (ref 3.5–5.2)
PROT SERPL-MCNC: 6.3 G/DL (ref 6–8.5)
RBC # BLD AUTO: 4.77 10*6/MM3 (ref 4.14–5.8)
SODIUM SERPL-SCNC: 140 MMOL/L (ref 136–145)
TRIGL SERPL-MCNC: 135 MG/DL (ref 0–150)
TSH SERPL DL<=0.05 MIU/L-ACNC: 6.33 UIU/ML (ref 0.27–4.2)
VIT B12 BLD-MCNC: 337 PG/ML (ref 211–946)
VLDLC SERPL-MCNC: 23 MG/DL (ref 5–40)
WBC NRBC COR # BLD: 8.91 10*3/MM3 (ref 3.4–10.8)

## 2022-07-25 PROCEDURE — 80061 LIPID PANEL: CPT

## 2022-07-25 PROCEDURE — 82607 VITAMIN B-12: CPT

## 2022-07-25 PROCEDURE — 83036 HEMOGLOBIN GLYCOSYLATED A1C: CPT

## 2022-07-25 PROCEDURE — 82043 UR ALBUMIN QUANTITATIVE: CPT

## 2022-07-25 PROCEDURE — 80050 GENERAL HEALTH PANEL: CPT

## 2022-07-25 PROCEDURE — 82306 VITAMIN D 25 HYDROXY: CPT

## 2022-07-26 ENCOUNTER — OFFICE VISIT (OUTPATIENT)
Dept: FAMILY MEDICINE CLINIC | Facility: CLINIC | Age: 77
End: 2022-07-26

## 2022-07-26 DIAGNOSIS — K21.9 GASTROESOPHAGEAL REFLUX DISEASE WITHOUT ESOPHAGITIS: ICD-10-CM

## 2022-07-26 DIAGNOSIS — I35.0 NONRHEUMATIC AORTIC VALVE STENOSIS: ICD-10-CM

## 2022-07-26 DIAGNOSIS — G45.3 AMAUROSIS FUGAX OF RIGHT EYE: ICD-10-CM

## 2022-07-26 DIAGNOSIS — I25.110 CORONARY ARTERY DISEASE INVOLVING NATIVE CORONARY ARTERY OF NATIVE HEART WITH UNSTABLE ANGINA PECTORIS: Primary | ICD-10-CM

## 2022-07-26 DIAGNOSIS — M85.89 OSTEOPENIA OF MULTIPLE SITES: ICD-10-CM

## 2022-07-26 DIAGNOSIS — N18.32 CHRONIC RENAL FAILURE, STAGE 3B: ICD-10-CM

## 2022-07-26 DIAGNOSIS — I73.9 PAD (PERIPHERAL ARTERY DISEASE): ICD-10-CM

## 2022-07-26 DIAGNOSIS — E55.9 VITAMIN D DEFICIENCY: ICD-10-CM

## 2022-07-26 DIAGNOSIS — M51.36 DDD (DEGENERATIVE DISC DISEASE), LUMBAR: ICD-10-CM

## 2022-07-26 DIAGNOSIS — Z79.4 TYPE 2 DIABETES MELLITUS WITH COMPLICATION, WITH LONG-TERM CURRENT USE OF INSULIN: ICD-10-CM

## 2022-07-26 DIAGNOSIS — E78.2 MIXED HYPERLIPIDEMIA: ICD-10-CM

## 2022-07-26 DIAGNOSIS — E11.8 TYPE 2 DIABETES MELLITUS WITH COMPLICATION, WITH LONG-TERM CURRENT USE OF INSULIN: ICD-10-CM

## 2022-07-26 DIAGNOSIS — R35.1 BENIGN PROSTATIC HYPERPLASIA WITH NOCTURIA: ICD-10-CM

## 2022-07-26 DIAGNOSIS — N40.1 BENIGN PROSTATIC HYPERPLASIA WITH NOCTURIA: ICD-10-CM

## 2022-07-26 DIAGNOSIS — I10 ESSENTIAL HYPERTENSION: ICD-10-CM

## 2022-07-26 DIAGNOSIS — Z00.00 HEALTHCARE MAINTENANCE: ICD-10-CM

## 2022-07-26 PROCEDURE — 99214 OFFICE O/P EST MOD 30 MIN: CPT | Performed by: GENERAL PRACTICE

## 2022-07-26 NOTE — PROGRESS NOTES
Subjective   Nicholas Yin is a 77 y.o. male.     Chief Complaint  He returns for a scheduled reassessment of multiple medical problems including coronary artery disease, previous TIA, type 2 diabetes mellitus, and chronic renal failure    History of Present Illness     Coronary Artery Disease  He underwent a coronary angiogram by Dr. Villatoro on 8/15/2019 with stenting of the mid LAD.  He admits to shortness of breath with exertion but remains chest pain-free with no orthopnea, PND, palpitations, lightheadedness, or swelling the ankles.  He remains on brilinta 60 twice daily with ASA 81 daily. Echocardiogram performed on 5/12/2022 was reported as showing moderate aortic valve stenosis, mild to moderate mitral valve stenosis, and normal systolic function with an estimated EF of 61 to 65%.  He was to undergo a stress test as well but decided not to pursue this at present.    Lab Results   Component Value Date    WBC 8.91 07/25/2022    HGB 12.9 (L) 07/25/2022    HCT 39.5 07/25/2022    MCV 82.8 07/25/2022     07/25/2022     Transient Visual Loss  Several years ago he experienced a sudden progressive decrease in the vision in his right eye while driving. He stated that the vision in the eye darkened gradually over several minutes to the point where the only things he could make out were very bright lights and the sun. This lasted for about 30 minutes then resolved over several minutes. He continues to deny any changes in his strength, sensation, speech or ability to understand what is said to him. He continues to deny any palpitations and has had no lightheadedness or diaphoresis. He was hospitalized at [facility] over 10 years ago following a similar episodes that was ultimately felt to be vascular in origin. He has numerous cardiovascular risk factors as well as chronic renal insufficiency. MRI of the brain performed on 12/8/17 was reported as showing mild cerebral atrophy with chronic small vessel ischemic  changes. MRA of the carotid arteries performed the same day was unremarkable.  He undergoes regular ophthalmology assessments    Type 2 Diabetes Mellitus  He has had occasional morning hypoglycemia since last here.  He continues to deny any paresthesias of the feet, polydipsia, polyuria, or foot ulcerations. Evaluation to date has been: hemoglobin A1C. Home sugars: BGs have remained at or near goal since last here. Current treatments: SGLT2 -empagliflozin, GLP agonist-liraglutide and basal insulin - tresiba (together as xultophy).    Lab Results   Component Value Date    HGBA1C 7.30 (H) 07/25/2022     Lab Results   Component Value Date    MICROALBUR 70.9 07/25/2022      Hyperlipidemia  His compliance with treatment has been fair.  He has been walking some. He remains on rosuvastatin with no apparent side effects  Lab Results   Component Value Date    CHOL 97 07/25/2022    CHLPL 125 09/28/2015    TRIG 135 07/25/2022    HDL 46 07/25/2022    LDL 28 07/25/2022     Essential Hypertension  Home blood pressure readings: have generally been at or near goal.  He has had occasional low blood pressures in the morning as well.  Associated signs and symptoms: dyspnea with above average exertion and occasional lightheadedness when getting up from sitting.  He continues to deny any chest pain, palpitations, orthopnea, paroxysmal nocturnal dyspnea or peripheral edema. Current antihypertensive medications includes losartan, carvedilol, and amlodipine.   Lab Results   Component Value Date    GLUCOSE 109 (H) 07/25/2022    BUN 30 (H) 07/25/2022    CREATININE 1.98 (H) 07/25/2022    EGFRIFNONA 29 (L) 01/13/2022    BCR 15.2 07/25/2022    K 4.2 07/25/2022    CO2 19.9 (L) 07/25/2022    CALCIUM 9.0 07/25/2022    ALBUMIN 3.50 07/25/2022    LABIL2 1.1 (L) 09/28/2015    AST 21 07/25/2022    ALT 14 07/25/2022     Lab Results   Component Value Date    ALKPHOS 63 07/25/2022     Chronic Renal Failure  This has been contributed to longstanding type  2 diabetes mellitus and hypertension.  He is aware to avoid any NSAIDs oral or prescription.      Chronic Back Pain  He gives a long history of increasing mid and low back pain. This is described as a sharp ache. The pain does not radiate and has been unassociated with any other symptoms. The pain is worse with prolonged weight bearing and limits his activities more then anything else at present.  MRI performed on 1/7/2021 was reported as showing degenerative disc disease and osteoarthritis with mild to moderate central canal and bilateral foraminal narrowing at L2-3 and L3-4.    Gastroesophageal Reflux Disease  He ran out of dexlansoprazole this month due to a change in his insurance formulary.  He experienced a prompt recurrence of heartburn described as an epigastric and burning discomfort following meals or when he lies down.  He has started on OTC omeprazole 20 twice daily with an incomplete improvement in his symptoms.  He has previously been prescribed esomeprazole and pantoprazole but neither controlled his symptoms as well as dexlansoprazole..  He continues to deny any difficulty swallowing, nausea, vomiting, change in his bowel habits, hematochezia, or melena    Labs  Most recent vitamin D 19.9    The following portions of the patient's history were reviewed and updated as appropriate: allergies, current medications, past medical history, past social history and problem list.    Review of Systems   Constitutional: Positive for fatigue. Negative for appetite change, chills, fever and unexpected weight change.   HENT: Negative for congestion, ear pain, rhinorrhea, sneezing and sore throat.    Eyes: Negative for visual disturbance.   Respiratory: Positive for shortness of breath. Negative for cough and wheezing.    Cardiovascular: Negative for chest pain, palpitations and leg swelling.   Gastrointestinal: Negative for abdominal pain, blood in stool, constipation, diarrhea, nausea and vomiting.         Intermittent heartburn   Endocrine: Negative for polydipsia and polyuria.   Genitourinary: Negative for difficulty urinating, dysuria, frequency, hematuria and urgency.        Erectile dysfunction - chronic progressive. Nocturia x 2-3 with occasional sense of incomplete voiding.   Musculoskeletal: Positive for back pain (chronic - progressive). Negative for arthralgias, joint swelling and myalgias.   Skin: Negative for rash.   Neurological: Positive for light-headedness. Negative for weakness, numbness and headaches.   Psychiatric/Behavioral: Negative for sleep disturbance.     Objective   Physical Exam  Constitutional:       General: He is not in acute distress.     Appearance: Normal appearance. He is well-developed. He is not ill-appearing or diaphoretic.      Comments: Bright and in good spirits.  Sits and stands with an exaggerated thoracic kyphosis.  Uncomfortable with movement.  No apparent distress at rest.  No pallor, cyanosis, diaphoresis, or jaundice   HENT:      Head: Atraumatic.      Right Ear: Tympanic membrane, ear canal and external ear normal.      Left Ear: Tympanic membrane, ear canal and external ear normal.   Eyes:      Conjunctiva/sclera: Conjunctivae normal.   Neck:      Thyroid: No thyroid mass or thyromegaly.      Vascular: No carotid bruit or JVD.      Trachea: Trachea normal. No tracheal deviation.   Cardiovascular:      Rate and Rhythm: Normal rate and regular rhythm.      Heart sounds: Normal heart sounds, S1 normal and S2 normal. No murmur heard.    No gallop. No S3 or S4 sounds.   Pulmonary:      Effort: Pulmonary effort is normal.      Breath sounds: Normal breath sounds.   Chest:   Breasts:      Right: No supraclavicular adenopathy.      Left: No supraclavicular adenopathy.       Abdominal:      General: Bowel sounds are normal. There is no distension.   Musculoskeletal:      Lumbar back: No spasms or tenderness. Decreased range of motion.      Right lower leg: No edema.      Left  lower leg: No edema.      Comments: Negative straight leg raise.   Lymphadenopathy:      Head:      Right side of head: No submental, submandibular, tonsillar, preauricular, posterior auricular or occipital adenopathy.      Left side of head: No submental, submandibular, tonsillar, preauricular, posterior auricular or occipital adenopathy.      Cervical: No cervical adenopathy.      Upper Body:      Right upper body: No supraclavicular adenopathy.      Left upper body: No supraclavicular adenopathy.   Skin:     General: Skin is warm and dry.      Coloration: Skin is not cyanotic, jaundiced or pale.      Findings: No rash.      Nails: There is no clubbing.   Neurological:      Mental Status: He is alert and oriented to person, place, and time.      Cranial Nerves: No cranial nerve deficit.      Motor: No tremor.      Coordination: Coordination normal.      Gait: Gait normal.   Psychiatric:         Attention and Perception: Attention normal.         Mood and Affect: Mood normal.         Speech: Speech normal.         Behavior: Behavior normal.         Thought Content: Thought content normal.       Assessment & Plan   Problems Addressed this Visit        Cardiac and Vasculature    CAD (coronary artery disease)   Reminded regarding the importance of risk factor modification.  Continue dual antiplatelet therapy.  Follow up with cardiology     Essential hypertension   Hypertension: at goal. Evidence of target organ damage: coronary artery disease, peripheral artery disease, chronic kidney disease and transient ischemic attack.  Encouraged to continue to work on diet and exercise plan.   Continue current medication    Mixed hyperlipidemia  As above.   Continue current medication.    Nonrheumatic aortic valve stenosis  Stable  Will continue to monitor    PAD (peripheral artery disease) ([facility])  As above.   Continue current medication.       Endocrine and Metabolic    Type 2 diabetes mellitus with complication, with long-term  current use of insulin ([facility])  Diabetes mellitus Type II, under excellent control.   Encouraged to continue to pursue ADA diet  Encouraged aerobic exercise.  Continue current medication    Vitamin D deficiency  We will resume high-dose vitamin D supplementation       Gastrointestinal Abdominal     Gastroesophageal reflux disease without esophagitis   Symptoms have recurred with change from dexlansoprazole to omeprazole.  He has previously been tried on pantoprazole and esoomeprazole with incomplete relief  Reminded regarding lifestyle modification.  Will try to obtain dexlansoprazole through preauthorization       Genitourinary and Reproductive     Benign prostatic hyperplasia with nocturia       Health Encounters    Healthcare maintenance  Recommended a second Pfizer COVID-19 booster along with an updated Tdap  Reminded to get a flu shot when available       Musculoskeletal and Injuries    Osteopenia of multiple sites       Neuro    Amaurosis fugax of right eye  As above.   Continue current medication.    DDD (degenerative disc disease), lumbar  Reminded regarding symptomatic treatment.   Will continue current treatment.       Other    Chronic renal failure, stage 3b (CMS/[facility])  Reminded to avoid any NSAIDs prescription or OTC  Will continue to monitor      Diagnoses       Codes Comments    Coronary artery disease involving native coronary artery of native heart with unstable angina pectoris ([facility])    -  Primary ICD-10-CM: I25.110  ICD-9-CM: 414.01, 411.1     Essential hypertension     ICD-10-CM: I10  ICD-9-CM: 401.9     Mixed hyperlipidemia     ICD-10-CM: E78.2  ICD-9-CM: 272.2     Nonrheumatic aortic valve stenosis     ICD-10-CM: I35.0  ICD-9-CM: 424.1     PAD (peripheral artery disease) ([facility])     ICD-10-CM: I73.9  ICD-9-CM: 443.9     Type 2 diabetes mellitus with complication, with long-term current use of insulin ([facility])     ICD-10-CM: E11.8, Z79.4  ICD-9-CM: 250.90, V58.67     Vitamin D deficiency     ICD-10-CM:  E55.9  ICD-9-CM: 268.9     Gastroesophageal reflux disease without esophagitis     ICD-10-CM: K21.9  ICD-9-CM: 530.81     Benign prostatic hyperplasia with nocturia     ICD-10-CM: N40.1, R35.1  ICD-9-CM: 600.01, 788.43     Healthcare maintenance     ICD-10-CM: Z00.00  ICD-9-CM: V70.0     Osteopenia of multiple sites     ICD-10-CM: M85.89  ICD-9-CM: 733.90     DDD (degenerative disc disease), lumbar     ICD-10-CM: M51.36  ICD-9-CM: 722.52     Amaurosis fugax of right eye     ICD-10-CM: G45.3  ICD-9-CM: 362.34     Chronic renal failure, stage 3b (CMS/HCC)     ICD-10-CM: N18.32  ICD-9-CM: 585.3

## 2022-07-27 ENCOUNTER — TELEPHONE (OUTPATIENT)
Dept: FAMILY MEDICINE CLINIC | Facility: CLINIC | Age: 77
End: 2022-07-27

## 2022-07-27 VITALS
HEIGHT: 69 IN | SYSTOLIC BLOOD PRESSURE: 126 MMHG | BODY MASS INDEX: 33.77 KG/M2 | WEIGHT: 228 LBS | RESPIRATION RATE: 14 BRPM | HEART RATE: 89 BPM | DIASTOLIC BLOOD PRESSURE: 65 MMHG | OXYGEN SATURATION: 98 % | TEMPERATURE: 98.6 F

## 2022-07-27 RX ORDER — ERGOCALCIFEROL 1.25 MG/1
50000 CAPSULE ORAL
Qty: 12 CAPSULE | Refills: 1 | Status: SHIPPED | OUTPATIENT
Start: 2022-07-27 | End: 2023-02-07 | Stop reason: SDUPTHER

## 2022-08-01 RX ORDER — DEXLANSOPRAZOLE 60 MG/1
1 CAPSULE, DELAYED RELEASE ORAL DAILY
Qty: 90 CAPSULE | Refills: 3 | Status: SHIPPED | OUTPATIENT
Start: 2022-08-01

## 2022-08-05 ENCOUNTER — TELEPHONE (OUTPATIENT)
Dept: FAMILY MEDICINE CLINIC | Facility: CLINIC | Age: 77
End: 2022-08-05

## 2022-08-05 RX ORDER — DAPAGLIFLOZIN 10 MG/1
10 TABLET, FILM COATED ORAL DAILY
Qty: 35 TABLET | Refills: 0 | COMMUNITY
Start: 2022-08-05 | End: 2022-12-15 | Stop reason: SDUPTHER

## 2022-10-13 ENCOUNTER — FLU SHOT (OUTPATIENT)
Dept: FAMILY MEDICINE CLINIC | Facility: CLINIC | Age: 77
End: 2022-10-13

## 2022-10-13 DIAGNOSIS — Z23 NEED FOR INFLUENZA VACCINATION: Primary | ICD-10-CM

## 2022-10-13 PROCEDURE — 90471 IMMUNIZATION ADMIN: CPT | Performed by: GENERAL PRACTICE

## 2022-10-13 PROCEDURE — 90662 IIV NO PRSV INCREASED AG IM: CPT | Performed by: GENERAL PRACTICE

## 2022-10-13 NOTE — PROGRESS NOTES
Injection  Injection performed in left deltoid by Kanika Wood MA. Patient tolerated the procedure well without complications.  10/13/22   Kanika Wood MA

## 2022-10-19 DIAGNOSIS — E11.8 TYPE 2 DIABETES MELLITUS WITH COMPLICATION, WITH LONG-TERM CURRENT USE OF INSULIN: ICD-10-CM

## 2022-10-19 DIAGNOSIS — N18.32 CHRONIC RENAL FAILURE, STAGE 3B: ICD-10-CM

## 2022-10-19 DIAGNOSIS — Z79.4 TYPE 2 DIABETES MELLITUS WITH COMPLICATION, WITH LONG-TERM CURRENT USE OF INSULIN: ICD-10-CM

## 2022-10-19 DIAGNOSIS — I10 ESSENTIAL HYPERTENSION: Primary | ICD-10-CM

## 2022-10-27 ENCOUNTER — LAB (OUTPATIENT)
Dept: LAB | Facility: HOSPITAL | Age: 77
End: 2022-10-27

## 2022-10-27 DIAGNOSIS — I10 ESSENTIAL HYPERTENSION: ICD-10-CM

## 2022-10-27 DIAGNOSIS — Z79.4 TYPE 2 DIABETES MELLITUS WITH COMPLICATION, WITH LONG-TERM CURRENT USE OF INSULIN: ICD-10-CM

## 2022-10-27 DIAGNOSIS — E11.8 TYPE 2 DIABETES MELLITUS WITH COMPLICATION, WITH LONG-TERM CURRENT USE OF INSULIN: ICD-10-CM

## 2022-10-27 DIAGNOSIS — I25.110 CORONARY ARTERY DISEASE INVOLVING NATIVE CORONARY ARTERY OF NATIVE HEART WITH UNSTABLE ANGINA PECTORIS: ICD-10-CM

## 2022-10-27 DIAGNOSIS — K21.9 GASTROESOPHAGEAL REFLUX DISEASE WITHOUT ESOPHAGITIS: ICD-10-CM

## 2022-10-27 DIAGNOSIS — E78.2 MIXED HYPERLIPIDEMIA: ICD-10-CM

## 2022-10-27 DIAGNOSIS — N18.32 CHRONIC RENAL FAILURE, STAGE 3B: ICD-10-CM

## 2022-10-27 DIAGNOSIS — E55.9 VITAMIN D DEFICIENCY: ICD-10-CM

## 2022-10-27 DIAGNOSIS — I73.9 PAD (PERIPHERAL ARTERY DISEASE): ICD-10-CM

## 2022-10-27 LAB
25(OH)D3 SERPL-MCNC: 24 NG/ML (ref 30–100)
ALBUMIN SERPL-MCNC: 3.5 G/DL (ref 3.5–5.2)
ALBUMIN UR-MCNC: 72.3 MG/DL
ALBUMIN/GLOB SERPL: 1.1 G/DL
ALP SERPL-CCNC: 62 U/L (ref 39–117)
ALT SERPL W P-5'-P-CCNC: 12 U/L (ref 1–41)
ANION GAP SERPL CALCULATED.3IONS-SCNC: 9 MMOL/L (ref 5–15)
AST SERPL-CCNC: 26 U/L (ref 1–40)
BASOPHILS # BLD AUTO: 0.08 10*3/MM3 (ref 0–0.2)
BASOPHILS NFR BLD AUTO: 1 % (ref 0–1.5)
BILIRUB SERPL-MCNC: 0.5 MG/DL (ref 0–1.2)
BUN SERPL-MCNC: 28 MG/DL (ref 8–23)
BUN/CREAT SERPL: 13.3 (ref 7–25)
CALCIUM SPEC-SCNC: 8.9 MG/DL (ref 8.6–10.5)
CHLORIDE SERPL-SCNC: 109 MMOL/L (ref 98–107)
CHOLEST SERPL-MCNC: 147 MG/DL (ref 0–200)
CO2 SERPL-SCNC: 21 MMOL/L (ref 22–29)
CREAT SERPL-MCNC: 2.1 MG/DL (ref 0.76–1.27)
DEPRECATED RDW RBC AUTO: 43.7 FL (ref 37–54)
EGFRCR SERPLBLD CKD-EPI 2021: 32 ML/MIN/1.73
EOSINOPHIL # BLD AUTO: 0.35 10*3/MM3 (ref 0–0.4)
EOSINOPHIL NFR BLD AUTO: 4.4 % (ref 0.3–6.2)
ERYTHROCYTE [DISTWIDTH] IN BLOOD BY AUTOMATED COUNT: 14.2 % (ref 12.3–15.4)
GLOBULIN UR ELPH-MCNC: 3.2 GM/DL
GLUCOSE SERPL-MCNC: 68 MG/DL (ref 65–99)
HBA1C MFR BLD: 7 % (ref 4.8–5.6)
HCT VFR BLD AUTO: 41.4 % (ref 37.5–51)
HDLC SERPL-MCNC: 49 MG/DL (ref 40–60)
HGB BLD-MCNC: 12.8 G/DL (ref 13–17.7)
IMM GRANULOCYTES # BLD AUTO: 0.03 10*3/MM3 (ref 0–0.05)
IMM GRANULOCYTES NFR BLD AUTO: 0.4 % (ref 0–0.5)
LDLC SERPL CALC-MCNC: 74 MG/DL (ref 0–100)
LDLC/HDLC SERPL: 1.45 {RATIO}
LYMPHOCYTES # BLD AUTO: 1.32 10*3/MM3 (ref 0.7–3.1)
LYMPHOCYTES NFR BLD AUTO: 16.5 % (ref 19.6–45.3)
MCH RBC QN AUTO: 26.6 PG (ref 26.6–33)
MCHC RBC AUTO-ENTMCNC: 30.9 G/DL (ref 31.5–35.7)
MCV RBC AUTO: 85.9 FL (ref 79–97)
MONOCYTES # BLD AUTO: 0.75 10*3/MM3 (ref 0.1–0.9)
MONOCYTES NFR BLD AUTO: 9.4 % (ref 5–12)
NEUTROPHILS NFR BLD AUTO: 5.49 10*3/MM3 (ref 1.7–7)
NEUTROPHILS NFR BLD AUTO: 68.3 % (ref 42.7–76)
NRBC BLD AUTO-RTO: 0 /100 WBC (ref 0–0.2)
PLATELET # BLD AUTO: 242 10*3/MM3 (ref 140–450)
PMV BLD AUTO: 10.6 FL (ref 6–12)
POTASSIUM SERPL-SCNC: 4.9 MMOL/L (ref 3.5–5.2)
PROT SERPL-MCNC: 6.7 G/DL (ref 6–8.5)
RBC # BLD AUTO: 4.82 10*6/MM3 (ref 4.14–5.8)
SODIUM SERPL-SCNC: 139 MMOL/L (ref 136–145)
TRIGL SERPL-MCNC: 134 MG/DL (ref 0–150)
VIT B12 BLD-MCNC: 324 PG/ML (ref 211–946)
VLDLC SERPL-MCNC: 24 MG/DL (ref 5–40)
WBC NRBC COR # BLD: 8.02 10*3/MM3 (ref 3.4–10.8)

## 2022-10-27 PROCEDURE — 82306 VITAMIN D 25 HYDROXY: CPT

## 2022-10-27 PROCEDURE — 83036 HEMOGLOBIN GLYCOSYLATED A1C: CPT

## 2022-10-27 PROCEDURE — 82043 UR ALBUMIN QUANTITATIVE: CPT

## 2022-10-27 PROCEDURE — 82607 VITAMIN B-12: CPT

## 2022-10-27 PROCEDURE — 80053 COMPREHEN METABOLIC PANEL: CPT

## 2022-10-27 PROCEDURE — 80061 LIPID PANEL: CPT

## 2022-10-27 PROCEDURE — 85025 COMPLETE CBC W/AUTO DIFF WBC: CPT

## 2022-11-04 ENCOUNTER — OFFICE VISIT (OUTPATIENT)
Dept: FAMILY MEDICINE CLINIC | Facility: CLINIC | Age: 77
End: 2022-11-04

## 2022-11-04 DIAGNOSIS — Z79.4 TYPE 2 DIABETES MELLITUS WITH COMPLICATION, WITH LONG-TERM CURRENT USE OF INSULIN: ICD-10-CM

## 2022-11-04 DIAGNOSIS — I73.9 PAD (PERIPHERAL ARTERY DISEASE): Primary | ICD-10-CM

## 2022-11-04 DIAGNOSIS — I10 ESSENTIAL HYPERTENSION: ICD-10-CM

## 2022-11-04 DIAGNOSIS — E78.2 MIXED HYPERLIPIDEMIA: ICD-10-CM

## 2022-11-04 DIAGNOSIS — L98.9 SKIN LESIONS: ICD-10-CM

## 2022-11-04 DIAGNOSIS — M51.36 DDD (DEGENERATIVE DISC DISEASE), LUMBAR: ICD-10-CM

## 2022-11-04 DIAGNOSIS — G45.3 AMAUROSIS FUGAX OF RIGHT EYE: ICD-10-CM

## 2022-11-04 DIAGNOSIS — Z85.038 HISTORY OF COLON CANCER: ICD-10-CM

## 2022-11-04 DIAGNOSIS — I35.0 NONRHEUMATIC AORTIC VALVE STENOSIS: ICD-10-CM

## 2022-11-04 DIAGNOSIS — M85.89 OSTEOPENIA OF MULTIPLE SITES: ICD-10-CM

## 2022-11-04 DIAGNOSIS — N52.01 ERECTILE DYSFUNCTION DUE TO ARTERIAL INSUFFICIENCY: ICD-10-CM

## 2022-11-04 DIAGNOSIS — K21.9 GASTROESOPHAGEAL REFLUX DISEASE WITHOUT ESOPHAGITIS: ICD-10-CM

## 2022-11-04 DIAGNOSIS — E11.8 TYPE 2 DIABETES MELLITUS WITH COMPLICATION, WITH LONG-TERM CURRENT USE OF INSULIN: ICD-10-CM

## 2022-11-04 DIAGNOSIS — Z00.00 HEALTHCARE MAINTENANCE: ICD-10-CM

## 2022-11-04 DIAGNOSIS — I25.110 CORONARY ARTERY DISEASE INVOLVING NATIVE CORONARY ARTERY OF NATIVE HEART WITH UNSTABLE ANGINA PECTORIS: ICD-10-CM

## 2022-11-04 DIAGNOSIS — R35.1 BENIGN PROSTATIC HYPERPLASIA WITH NOCTURIA: ICD-10-CM

## 2022-11-04 DIAGNOSIS — N40.1 BENIGN PROSTATIC HYPERPLASIA WITH NOCTURIA: ICD-10-CM

## 2022-11-04 DIAGNOSIS — E55.9 VITAMIN D DEFICIENCY: ICD-10-CM

## 2022-11-04 DIAGNOSIS — Z85.47 H/O TESTICULAR CANCER: ICD-10-CM

## 2022-11-04 DIAGNOSIS — N18.32 CHRONIC RENAL FAILURE, STAGE 3B: ICD-10-CM

## 2022-11-04 PROCEDURE — 99214 OFFICE O/P EST MOD 30 MIN: CPT | Performed by: GENERAL PRACTICE

## 2022-11-04 NOTE — PROGRESS NOTES
Subjective   Nicholas Yin is a 77 y.o. male.     Chief Complaint  He returns for a scheduled reassessment of multiple medical problems including coronary artery disease, previous TIA, type 2 diabetes mellitus, and chronic renal failure    History of Present Illness     Coronary Artery Disease  He underwent a coronary angiogram by Dr. Villatoro on 8/15/2019 with stenting of the mid LAD.  He admits to shortness of breath with exertion but remains chest pain-free with no orthopnea, PND, palpitations, lightheadedness, or swelling the ankles.  He remains on brilinta 60 twice daily with ASA 81 daily. Echocardiogram performed on 5/12/2022 was reported as showing moderate aortic valve stenosis, mild to moderate mitral valve stenosis, and normal systolic function with an estimated EF of 61 to 65%.  He was to undergo a stress test as well but decided not to pursue this at present.    Lab Results   Component Value Date    WBC 8.02 10/27/2022    HGB 12.8 (L) 10/27/2022    HCT 41.4 10/27/2022    MCV 85.9 10/27/2022     10/27/2022     Transient Visual Loss  Several years ago he experienced a sudden progressive decrease in the vision in his right eye while driving. He stated that the vision in the eye darkened gradually over several minutes to the point where the only things he could make out were very bright lights and the sun. This lasted for about 30 minutes then resolved over several minutes. He continues to deny any changes in his strength, sensation, speech or ability to understand what is said to him. He continues to deny any palpitations and has had no lightheadedness or diaphoresis. He was hospitalized at [facility] over 10 years ago following a similar episodes that was ultimately felt to be vascular in origin. He has numerous cardiovascular risk factors as well as chronic renal insufficiency. MRI of the brain performed on 12/8/17 was reported as showing mild cerebral atrophy with chronic small vessel ischemic  changes. MRA of the carotid arteries performed the same day was unremarkable.  He undergoes regular ophthalmology assessments    Type 2 Diabetes Mellitus  He denies any recent hypoglycemia.  There is no history of any paresthesias of the feet, polydipsia, polyuria, or foot ulcerations. Evaluation to date has been: hemoglobin A1C. Home sugars: BGs have remained at or near goal since last here. Current treatments: SGLT2 -empagliflozin, GLP agonist-liraglutide and basal insulin - tresiba (together as xultophy).    Lab Results   Component Value Date    HGBA1C 7.00 (H) 10/27/2022     Lab Results   Component Value Date    MICROALBUR 72.3 10/27/2022      Lab Results   Component Value Date    AVSZFRYZ57 324 10/27/2022     Hyperlipidemia  His compliance with treatment has been fair.  He has been walking some. He remains on rosuvastatin with no apparent side effects  Lab Results   Component Value Date    CHOL 147 10/27/2022    CHLPL 125 09/28/2015    TRIG 134 10/27/2022    HDL 49 10/27/2022    LDL 74 10/27/2022     Essential Hypertension  Home blood pressure readings: have generally been at or near goal.  He admits to dyspnea with above average exertion and occasional lightheadedness when getting up from sitting.  He continues to deny any chest pain, palpitations, orthopnea, paroxysmal nocturnal dyspnea or peripheral edema. Current antihypertensive medications includes losartan, carvedilol, and amlodipine.   Lab Results   Component Value Date    GLUCOSE 68 10/27/2022    BUN 28 (H) 10/27/2022    CREATININE 2.10 (H) 10/27/2022    EGFRIFNONA 29 (L) 01/13/2022    BCR 13.3 10/27/2022    K 4.9 10/27/2022    CO2 21.0 (L) 10/27/2022    CALCIUM 8.9 10/27/2022    ALBUMIN 3.50 10/27/2022    LABIL2 1.1 (L) 09/28/2015    AST 26 10/27/2022    ALT 12 10/27/2022     Lab Results   Component Value Date    ALKPHOS 62 10/27/2022     Chronic Renal Failure  This has been contributed to longstanding type 2 diabetes mellitus and hypertension.  He is  aware to avoid any NSAIDs oral or prescription.      Chronic Back Pain  He gives a long history of increasing mid and low back pain. This is described as a sharp ache. The pain does not radiate and has been unassociated with any other symptoms. The pain is worse with prolonged weight bearing and limits his activities more then anything else at present.  MRI performed on 1/7/2021 was reported as showing degenerative disc disease and osteoarthritis with mild to moderate central canal and bilateral foraminal narrowing at L2-3 and L3-4.    Gastroesophageal Reflux Disease  He ran out of dexlansoprazole this month due to a change in his insurance formulary.  He experienced a prompt recurrence of heartburn described as an epigastric and burning discomfort following meals or when he lies down.  He has started on OTC omeprazole 20 twice daily with an incomplete improvement in his symptoms.  He has previously been prescribed esomeprazole and pantoprazole but neither controlled his symptoms as well as dexlansoprazole..  He continues to deny any difficulty swallowing, nausea, vomiting, change in his bowel habits, hematochezia, or melena    Skin Lesion  He developed a spot over his left lower back since last here.  He believes his suspenders rubbed that area.  The spot has been itchy at times but otherwise asymptomatic.    Labs  Most recent vitamin D 24    The following portions of the patient's history were reviewed and updated as appropriate: allergies, current medications, past medical history, past social history and problem list.    Review of Systems   Constitutional: Positive for fatigue. Negative for appetite change, chills, fever and unexpected weight change.   HENT: Negative for congestion, ear pain, rhinorrhea, sneezing and sore throat.    Eyes: Negative for visual disturbance.   Respiratory: Positive for shortness of breath. Negative for cough and wheezing.    Cardiovascular: Negative for chest pain, palpitations and  leg swelling.   Gastrointestinal: Negative for abdominal pain, blood in stool, constipation, diarrhea, nausea and vomiting.        Intermittent heartburn   Endocrine: Negative for polydipsia and polyuria.   Genitourinary: Negative for difficulty urinating, dysuria, frequency, hematuria and urgency.        Erectile dysfunction - chronic progressive. Nocturia x 2-3 with occasional sense of incomplete voiding.   Musculoskeletal: Positive for back pain (chronic - progressive). Negative for arthralgias, joint swelling and myalgias.   Skin: Negative for rash.        Skin lesion   Neurological: Positive for light-headedness. Negative for weakness, numbness and headaches.   Psychiatric/Behavioral: Negative for sleep disturbance.     Objective   Physical Exam  Constitutional:       General: He is not in acute distress.     Appearance: Normal appearance. He is well-developed. He is not ill-appearing or diaphoretic.      Comments: Bright and in good spirits.  Sits and stands with an exaggerated thoracic kyphosis.  Uncomfortable with movement.  No apparent distress at rest.  No pallor, cyanosis, diaphoresis, or jaundice   HENT:      Head: Atraumatic.      Right Ear: Tympanic membrane, ear canal and external ear normal.      Left Ear: Tympanic membrane, ear canal and external ear normal.   Eyes:      Conjunctiva/sclera: Conjunctivae normal.   Neck:      Thyroid: No thyroid mass or thyromegaly.      Vascular: No carotid bruit or JVD.      Trachea: Trachea normal. No tracheal deviation.   Cardiovascular:      Rate and Rhythm: Normal rate and regular rhythm.      Heart sounds: Normal heart sounds, S1 normal and S2 normal. No murmur heard.    No gallop. No S3 or S4 sounds.   Pulmonary:      Effort: Pulmonary effort is normal.      Breath sounds: Normal breath sounds.   Abdominal:      General: Bowel sounds are normal. There is no distension.   Musculoskeletal:      Right lower leg: No edema.      Left lower leg: No edema.    Lymphadenopathy:      Head:      Right side of head: No submental, submandibular, tonsillar, preauricular, posterior auricular or occipital adenopathy.      Left side of head: No submental, submandibular, tonsillar, preauricular, posterior auricular or occipital adenopathy.      Cervical: No cervical adenopathy.      Upper Body:      Right upper body: No supraclavicular adenopathy.      Left upper body: No supraclavicular adenopathy.   Skin:     General: Skin is warm and dry.      Coloration: Skin is not cyanotic, jaundiced or pale.      Findings: Lesion (1.5 cm round fairly well demarcated slightly pearly erythematous plaque right lower back - three similar but slightly larger and slightly irregular lesions mid and upper back) present. No rash.      Nails: There is no clubbing.   Neurological:      Mental Status: He is alert and oriented to person, place, and time.      Cranial Nerves: No cranial nerve deficit.      Motor: No tremor.      Coordination: Coordination normal.      Gait: Gait normal.   Psychiatric:         Attention and Perception: Attention normal.         Mood and Affect: Mood normal.         Speech: Speech normal.         Behavior: Behavior normal.         Thought Content: Thought content normal.       Assessment & Plan   Problems Addressed this Visit        Cardiac and Vasculature    CAD (coronary artery disease)  Reminded regarding risk factor modification with an emphasis on tobacco cessation.  Continue dual antiplatelet therapy    Essential hypertension   Hypertension: at goal. Evidence of target organ damage: coronary artery disease, peripheral artery disease, chronic kidney disease and transient ischemic attack.  Encouraged to continue to work on diet and exercise plan.   Continue current medication    Mixed hyperlipidemia  As above.   Continue current medication.    Nonrheumatic aortic valve stenosis    PAD (peripheral artery disease) (HCC)  As above.   Continue current medication.        Endocrine and Metabolic    Type 2 diabetes mellitus with complication, with long-term current use of insulin ([facility])  Diabetes mellitus Type II, under excellent control.   Encouraged to continue to pursue ADA diet  Encouraged aerobic exercise.  Reviewed the potential benefits of finerenone.  Will try to obtain a PA while patient considers    Relevant Medications    Finerenone 10 MG tablet    Vitamin D deficiency  Continue supplementation with monitoring.       Gastrointestinal Abdominal     Gastroesophageal reflux disease without esophagitis       Genitourinary and Reproductive     Benign prostatic hyperplasia with nocturia    Vasculogenic erectile dysfunction       Health Encounters    Healthcare maintenance  Patient has already received a flu shot fall.  Recommended an updated bivalent COVID-19 vaccination.  Reminded that he is due for Shingrix       Hematology and Neoplasia    H/O testicular cancer    History of colon cancer       Musculoskeletal and Injuries    Osteopenia of multiple sites       Neuro    Amaurosis fugax of right eye  As above.   Continue current medication.    DDD (degenerative disc disease), lumbar       Skin    Skin lesions  Probable nonmelanoma skin cancers  Reviewed options and agreed on an excisional biopsy of the lowest two.  Patient would like to proceed and arrangements will be made with Fatoumata Wyatt PA-C       Other    Chronic renal failure, stage 3b (CMS/[facility])  As above.    Relevant Medications    Finerenone 10 MG tablet   Diagnoses       Codes Comments    PAD (peripheral artery disease) ([facility])    -  Primary ICD-10-CM: I73.9  ICD-9-CM: 443.9     Nonrheumatic aortic valve stenosis     ICD-10-CM: I35.0  ICD-9-CM: 424.1     Mixed hyperlipidemia     ICD-10-CM: E78.2  ICD-9-CM: 272.2     Essential hypertension     ICD-10-CM: I10  ICD-9-CM: 401.9     Coronary artery disease involving native coronary artery of native heart with unstable angina pectoris (HCC)     ICD-10-CM: I25.110  ICD-9-CM:  414.01, 411.1     Vitamin D deficiency     ICD-10-CM: E55.9  ICD-9-CM: 268.9     Type 2 diabetes mellitus with complication, with long-term current use of insulin ([facility])     ICD-10-CM: E11.8, Z79.4  ICD-9-CM: 250.90, V58.67     Gastroesophageal reflux disease without esophagitis     ICD-10-CM: K21.9  ICD-9-CM: 530.81     Erectile dysfunction due to arterial insufficiency     ICD-10-CM: N52.01  ICD-9-CM: 607.84     Benign prostatic hyperplasia with nocturia     ICD-10-CM: N40.1, R35.1  ICD-9-CM: 600.01, 788.43     Healthcare maintenance     ICD-10-CM: Z00.00  ICD-9-CM: V70.0     History of colon cancer     ICD-10-CM: Z85.038  ICD-9-CM: V10.05     H/O testicular cancer     ICD-10-CM: Z85.47  ICD-9-CM: V10.47     Osteopenia of multiple sites     ICD-10-CM: M85.89  ICD-9-CM: 733.90     DDD (degenerative disc disease), lumbar     ICD-10-CM: M51.36  ICD-9-CM: 722.52     Amaurosis fugax of right eye     ICD-10-CM: G45.3  ICD-9-CM: 362.34     Chronic renal failure, stage 3b (CMS/[facility])     ICD-10-CM: N18.32  ICD-9-CM: 585.3     Skin lesions     ICD-10-CM: L98.9  ICD-9-CM: 709.9

## 2022-11-05 VITALS
WEIGHT: 224 LBS | HEART RATE: 86 BPM | HEIGHT: 69 IN | OXYGEN SATURATION: 98 % | BODY MASS INDEX: 33.18 KG/M2 | RESPIRATION RATE: 14 BRPM | DIASTOLIC BLOOD PRESSURE: 64 MMHG | SYSTOLIC BLOOD PRESSURE: 126 MMHG | TEMPERATURE: 98.6 F

## 2022-11-07 ENCOUNTER — TELEPHONE (OUTPATIENT)
Dept: FAMILY MEDICINE CLINIC | Facility: CLINIC | Age: 77
End: 2022-11-07

## 2022-11-07 RX ORDER — AMLODIPINE BESYLATE 10 MG/1
10 TABLET ORAL DAILY
Qty: 90 TABLET | Refills: 3 | Status: SHIPPED | OUTPATIENT
Start: 2022-11-07 | End: 2023-06-12

## 2022-11-07 NOTE — TELEPHONE ENCOUNTER
Caller: [facility]    Relationship:     Best call back number: [phone]    Requested Prescriptions:   KERENDIA 10MG    Pharmacy where request should be sent:  [facility]        Additional details provided by patient: PATIENT NEEDS PRIOR AUTHORIZATION ON THIS MEDICATION    Does the patient have less than a 3 day supply:  [x] Yes  [] No    Lalita Monsalve Rep   11/07/22 12:33 EST

## 2022-11-08 ENCOUNTER — PRIOR AUTHORIZATION (OUTPATIENT)
Dept: FAMILY MEDICINE CLINIC | Facility: CLINIC | Age: 77
End: 2022-11-08

## 2022-11-08 NOTE — TELEPHONE ENCOUNTER
Caller: JIM BROWN    Relationship: PROVIDER WITH [facility]    Best call back number: [phone]    What is the best time to reach you: ANY    Who are you requesting to speak with (clinical staff, provider,  specific staff member): CLINICIAL    Do you know the name of the person who called: JIM    What was the call regarding: [facility] [employer]IOR [employer] NEES FURTHER INFORMATION. PLEASE CALL AS SOON AS POSSIBLE TO GET THIS COMPLETED. WE ARE ALSO GOING TO GET A FAX FROM THEM.     Do you require a callback: YES

## 2022-11-10 ENCOUNTER — TELEPHONE (OUTPATIENT)
Dept: FAMILY MEDICINE CLINIC | Facility: CLINIC | Age: 77
End: 2022-11-10

## 2022-11-10 NOTE — TELEPHONE ENCOUNTER
Caller: Nicholas Yin    Relationship: Self    Best call back number: [phone]    What form or medical record are you requesting: PRIOR AUTHORIZATION    Who is requesting this form or medical record from you: MANDO    How would you like to receive the form or medical records (pick-up, mail, fax): FAX, ON THE PA FORM FOR Ferenone(KERENDIA)    Timeframe paperwork needed: ASAP    Additional notes: NEEDING ANOTHER, FIRST ONE WAS NOT ACCEPTED

## 2022-11-16 ENCOUNTER — OFFICE VISIT (OUTPATIENT)
Dept: SURGERY | Facility: CLINIC | Age: 77
End: 2022-11-16

## 2022-11-16 VITALS
WEIGHT: 224 LBS | DIASTOLIC BLOOD PRESSURE: 64 MMHG | HEIGHT: 69 IN | SYSTOLIC BLOOD PRESSURE: 126 MMHG | BODY MASS INDEX: 33.18 KG/M2

## 2022-11-16 DIAGNOSIS — L98.9 SKIN LESIONS: Primary | ICD-10-CM

## 2022-11-16 PROCEDURE — 99213 OFFICE O/P EST LOW 20 MIN: CPT | Performed by: SURGERY

## 2022-11-16 RX ORDER — SODIUM CHLORIDE 0.9 % (FLUSH) 0.9 %
10 SYRINGE (ML) INJECTION EVERY 12 HOURS SCHEDULED
Status: CANCELLED | OUTPATIENT
Start: 2022-12-16

## 2022-11-16 RX ORDER — SODIUM CHLORIDE 0.9 % (FLUSH) 0.9 %
10 SYRINGE (ML) INJECTION AS NEEDED
Status: CANCELLED | OUTPATIENT
Start: 2022-12-16

## 2022-11-16 NOTE — PROGRESS NOTES
Subjective   Nicholas Yin is a 77 y.o. male is being seen for consultation today at the request of Waldemar Trejo MD    Nicholas Yin is a 77 y.o. male History of Present Illness  With multiple skin lesions of the back concerning for malignancy.  On the left upper, mid, and right lower back he has 5 red irregularly bordered slightly raised lesions concerning for malignancy.  These have been increasing in size and he has a history of skin cancer.  The size variation is from 8mm to 2.2 cm.      Past Medical History:   Diagnosis Date   • Anemia    • Arthritis    • Back pain    • CancerTesticular/[place]    • CHF (congestive heart failure) (HCC)    • Coronary artery disease    • DDD (degenerative disc disease), lumbosacral    • Diabetes mellitus (HCC)    • Elevated cholesterol    • Erectile dysfunction    • GERD (gastroesophageal reflux disease)    • History of transfusion    • Hyperlipidemia    • Hypertension        Family History   Problem Relation Age of Onset   • Stroke Mother    • Hypertension Mother    • Alcohol abuse Mother    • COPD Mother    • COPD Father    • Alcohol abuse Father    • Hypertension Father    • Alzheimer's disease Father    • Heart disease Brother    • Hypertension Brother    • Diabetes Brother    • Stroke Maternal Grandfather    • Cancer Paternal Grandmother        Social History     Socioeconomic History   • Marital status:      Spouse name: lita   • Number of children: 3   • Years of education: 16   Tobacco Use   • Smoking status: Never   • Smokeless tobacco: Never   Vaping Use   • Vaping Use: Never used   Substance and Sexual Activity   • Alcohol use: Yes     Comment: occ   • Drug use: No   • Sexual activity: Defer       Past Surgical History:   Procedure Laterality Date   • CARDIAC CATHETERIZATION N/A 8/15/2019    Procedure: Left Heart Cath;  Surgeon: Paul Villatoro MD;  Location: [facility] CATH INVASIVE LOCATION;  Service: Cardiology   • COLON  "SURGERY     • COLONOSCOPY     • COLONOSCOPY N/A 1/27/2021    Procedure: COLONOSCOPY;  Surgeon: Greg Barraza MD;  Location: [facility] OR;  Service: General;  Laterality: N/A;   • ENDOSCOPY     • EYE SURGERY     • VA RT/LT HEART CATHETERS N/A 8/15/2019    Procedure: Percutaneous Coronary Intervention;  Surgeon: Paul Villatoro MD;  Location: [facility] CATH INVASIVE LOCATION;  Service: Cardiology   • SHOULDER ARTHROSCOPY     • SKIN LESION EXCISION N/A 1/27/2021    Procedure: SKIN LESIONS EXCISION FROM LEFT TEMPLE AREA AND ABDOMEN.;  Surgeon: Greg Barraza MD;  Location: [facility] OR;  Service: General;  Laterality: N/A;   • VASECTOMY         Review of Systems   Constitutional: Negative for activity change, appetite change, chills and fever.   HENT: Negative for sore throat and trouble swallowing.    Eyes: Negative for visual disturbance.   Respiratory: Negative for cough and shortness of breath.    Cardiovascular: Negative for chest pain and palpitations.   Gastrointestinal: Negative for abdominal distention, abdominal pain, blood in stool, constipation, diarrhea, nausea and vomiting.   Endocrine: Negative for cold intolerance and heat intolerance.   Genitourinary: Negative for dysuria.   Musculoskeletal: Negative for joint swelling.   Skin: Positive for color change. Negative for rash and wound.   Allergic/Immunologic: Negative for immunocompromised state.   Neurological: Negative for dizziness, seizures, weakness and headaches.   Hematological: Negative for adenopathy. Does not bruise/bleed easily.   Psychiatric/Behavioral: Negative for agitation and confusion.         /64   Ht 175.3 cm (69.02\")   Wt 102 kg (224 lb)   BMI 33.06 kg/m²   Objective   Physical Exam  Constitutional:       Appearance: He is well-developed.   HENT:      Head: Normocephalic and atraumatic.   Eyes:      Conjunctiva/sclera: Conjunctivae normal.      Pupils: Pupils are equal, round, and reactive to light.   Neck: "      Thyroid: No thyromegaly.      Vascular: No JVD.      Trachea: No tracheal deviation.   Cardiovascular:      Rate and Rhythm: Normal rate and regular rhythm.      Heart sounds: No murmur heard.    No friction rub. No gallop.   Pulmonary:      Effort: Pulmonary effort is normal.      Breath sounds: Normal breath sounds.   Abdominal:      General: There is no distension.      Palpations: Abdomen is soft. There is no hepatomegaly or splenomegaly.      Tenderness: There is no abdominal tenderness.      Hernia: No hernia is present.   Musculoskeletal:         General: No deformity. Normal range of motion.      Cervical back: Neck supple.   Skin:     General: Skin is warm and dry.             Comments: Red raised irregular skin lesions of the back x5 with size range from 8 mm to 22 mm.  Concerning for malignant   Neurological:      Mental Status: He is alert and oriented to person, place, and time.               Assessment   Diagnoses and all orders for this visit:    1. Skin lesions (Primary)  -     Case Request; Standing  -     CBC and Differential; Future  -     Comprehensive metabolic panel; Future  -     sodium chloride 0.9 % flush 10 mL  -     sodium chloride 0.9 % flush 10 mL  -     ceFAZolin 2 gm IVPB in 100 mL NS (VTB)  -     Case Request    Other orders  -     Follow Anesthesia Guidelines / Protocol; Future  -     Obtain Informed Consent; Future  -     Provide NPO Instructions to Patient; Future  -     Chlorhexidine Skin Prep; Future  -     Follow Anesthesia Guidelines / Protocol; Standing  -     Verify / Perform Chlorhexidine Skin Prep; Standing  -     Verify / Perform Chlorhexidine Skin Prep if Indicated (If Not Already Completed); Standing  -     Instructions on coughing, deep breathing, and incentive spirometry.; Standing  -     Insert Peripheral IV; Standing  -     Saline Lock & Maintain IV Access; Standing      Nicholas Yin is a 77 y.o. male with multiple skin lesions of the back concerning for  malignancy.  He will undergo excisional biopsy in the operating room and will obtain cardiac clearance to hold his Brilinta 5 days prior to the procedure    BMI is >= 30 and <35. (Class 1 Obesity). The following options were offered after discussion;: weight loss educational material (shared in after visit summary)

## 2022-12-01 ENCOUNTER — TELEPHONE (OUTPATIENT)
Dept: SURGERY | Facility: CLINIC | Age: 77
End: 2022-12-01

## 2022-12-07 ENCOUNTER — APPOINTMENT (OUTPATIENT)
Dept: PREADMISSION TESTING | Facility: HOSPITAL | Age: 77
End: 2022-12-07

## 2022-12-14 ENCOUNTER — APPOINTMENT (OUTPATIENT)
Dept: PREADMISSION TESTING | Facility: HOSPITAL | Age: 77
End: 2022-12-14

## 2022-12-14 ENCOUNTER — PROCEDURE VISIT (OUTPATIENT)
Dept: FAMILY MEDICINE CLINIC | Facility: CLINIC | Age: 77
End: 2022-12-14

## 2022-12-14 VITALS
HEIGHT: 69 IN | TEMPERATURE: 97.3 F | RESPIRATION RATE: 14 BRPM | HEART RATE: 52 BPM | WEIGHT: 226 LBS | DIASTOLIC BLOOD PRESSURE: 65 MMHG | SYSTOLIC BLOOD PRESSURE: 126 MMHG | BODY MASS INDEX: 33.47 KG/M2 | OXYGEN SATURATION: 100 %

## 2022-12-14 DIAGNOSIS — L98.9 SKIN LESIONS: Primary | ICD-10-CM

## 2022-12-14 PROCEDURE — 99213 OFFICE O/P EST LOW 20 MIN: CPT | Performed by: GENERAL PRACTICE

## 2022-12-14 PROCEDURE — 11622 EXC S/N/H/F/G MAL+MRG 1.1-2: CPT | Performed by: GENERAL PRACTICE

## 2022-12-14 NOTE — PROGRESS NOTES
Subjective   Nicholas Yin is a 77 y.o. male.     Chief Complaint  Skin lesions    History of Present Illness     Skin Lesions  He has a number of lesions over his back suspicious for nonmelanoma skin cancers.  The lowest has been itchy at times but he denies any other associated symptoms.  He was scheduled to undergo excisional biopsy of all of them by Dr. Barraza in the operating room but has decided to have 1 or 2 removed at a time using local anesthesia in the office    The following portions of the patient's history were reviewed and updated as appropriate: allergies, current medications, past medical history, past social history, past surgical history and problem list.    Review of Systems   Constitutional: Positive for fatigue. Negative for appetite change, chills, fever and unexpected weight change.   HENT: Negative for congestion, ear pain, rhinorrhea, sneezing and sore throat.    Eyes: Negative for visual disturbance.   Respiratory: Positive for shortness of breath. Negative for cough and wheezing.    Cardiovascular: Negative for chest pain, palpitations and leg swelling.   Gastrointestinal: Negative for abdominal pain, blood in stool, constipation, diarrhea, nausea and vomiting.        Intermittent heartburn   Endocrine: Negative for polydipsia and polyuria.   Genitourinary: Negative for difficulty urinating, dysuria, frequency, hematuria and urgency.        Erectile dysfunction - chronic progressive. Nocturia x 2-3 with occasional sense of incomplete voiding.   Musculoskeletal: Positive for back pain (chronic - progressive). Negative for arthralgias, joint swelling and myalgias.   Skin: Negative for rash.        Skin lesions   Neurological: Positive for light-headedness. Negative for weakness, numbness and headaches.   Psychiatric/Behavioral: Negative for sleep disturbance.     Objective   Physical Exam  Constitutional:       General: He is not in acute distress.     Appearance: Normal appearance. He  is well-developed. He is not ill-appearing or diaphoretic.      Comments: Bright and in good spirits.  Sits and stands with an exaggerated thoracic kyphosis.  Uncomfortable with movement.  No apparent distress at rest.  No pallor, cyanosis, diaphoresis, or jaundice   HENT:      Head: Atraumatic.   Eyes:      Conjunctiva/sclera: Conjunctivae normal.   Neck:      Thyroid: No thyroid mass or thyromegaly.      Vascular: No carotid bruit or JVD.      Trachea: Trachea normal. No tracheal deviation.   Cardiovascular:      Rate and Rhythm: Normal rate and regular rhythm.   Pulmonary:      Effort: Pulmonary effort is normal.      Breath sounds: Normal breath sounds.   Abdominal:      General: Bowel sounds are normal. There is no distension.   Musculoskeletal:      Right lower leg: No edema.      Left lower leg: No edema.   Lymphadenopathy:      Head:      Right side of head: No submental, submandibular, tonsillar, preauricular, posterior auricular or occipital adenopathy.      Left side of head: No submental, submandibular, tonsillar, preauricular, posterior auricular or occipital adenopathy.      Cervical: No cervical adenopathy.      Upper Body:      Right upper body: No supraclavicular adenopathy.      Left upper body: No supraclavicular adenopathy.   Skin:     General: Skin is warm and dry.      Coloration: Skin is not cyanotic, jaundiced or pale.      Findings: Lesion (1.5 cm round fairly well demarcated slightly pearly erythematous plaque right lower back - four similar but slightly larger and slightly irregular lesions mid and upper back) present. No rash.      Nails: There is no clubbing.   Neurological:      Mental Status: He is alert and oriented to person, place, and time.      Cranial Nerves: No cranial nerve deficit.      Motor: No tremor.      Coordination: Coordination normal.      Gait: Gait normal.   Psychiatric:         Attention and Perception: Attention normal.         Mood and Affect: Mood normal.          Speech: Speech normal.         Behavior: Behavior normal.         Thought Content: Thought content normal.       Assessment & Plan   Problems Addressed this Visit        Skin    Skin lesions   High index of suspicion for nonmelanoma skin cancer  Reminded of the risks of excisional biopsy under local anesthesia including damage to underlying structures, bleeding, infection, and keloid formation  The nodular lesion over the lower back as well as another around the mid back were both removed.  See procedure note  Will likely remove the remaining lesions at a later date   Diagnoses       Codes Comments    Skin lesions    -  Primary ICD-10-CM: L98.9  ICD-9-CM: 709.9

## 2022-12-14 NOTE — PROGRESS NOTES
Procedure   Procedures    PROCEDURE NOTE    PRE-OP DIAGNOSIS:  Nonmelanoma skin cancer x 2    PROCEDURE:  Excision    INDICATIONS:  Nicholas Yin is a 77 y.o. male who presents for minor skin surgery.  The patient understands all risks, benefits, indications, potential complications, and alternatives, and freely consents for the procedure.  The patient also understands the option of performing no surgery, the risk for scarring, and the technique of the procedure.    ANESTHESIA:  Local.    TECHNIQUE:  After informed consent was obtained, and after the skin was prepped and draped, 1% lidocaine with epinephrine for anesthetic was injected around and underneath the site.  2 cm elliptical excisions were  performed and the specimen sent for pathology.  Subcuticular stitches were placed using 4-0 monocryl and the skin edges were approximated with 3-0 nylon.  Steri-Strips were applied to the lower incision.  Dressings were applied and wound care instructions provided.  Nicholas tolerated the procedure well and without complications.  The patient will be alert for any signs of cutaneous infection and will follow up as instructed.

## 2022-12-15 RX ORDER — DAPAGLIFLOZIN 10 MG/1
10 TABLET, FILM COATED ORAL DAILY
Qty: 49 TABLET | Refills: 0 | COMMUNITY
Start: 2022-12-15

## 2022-12-21 RX ORDER — SULFAMETHOXAZOLE AND TRIMETHOPRIM 800; 160 MG/1; MG/1
1 TABLET ORAL 2 TIMES DAILY
Qty: 20 TABLET | Refills: 0 | Status: SHIPPED | OUTPATIENT
Start: 2022-12-21 | End: 2023-01-02

## 2023-01-16 ENCOUNTER — OFFICE VISIT (OUTPATIENT)
Dept: CARDIOLOGY | Facility: CLINIC | Age: 78
End: 2023-01-16
Payer: COMMERCIAL

## 2023-01-16 VITALS
HEIGHT: 69 IN | OXYGEN SATURATION: 96 % | WEIGHT: 231 LBS | DIASTOLIC BLOOD PRESSURE: 73 MMHG | SYSTOLIC BLOOD PRESSURE: 161 MMHG | BODY MASS INDEX: 34.21 KG/M2 | HEART RATE: 75 BPM

## 2023-01-16 DIAGNOSIS — I10 ESSENTIAL HYPERTENSION: Chronic | ICD-10-CM

## 2023-01-16 DIAGNOSIS — E78.2 MIXED HYPERLIPIDEMIA: Chronic | ICD-10-CM

## 2023-01-16 DIAGNOSIS — I25.110 CORONARY ARTERY DISEASE INVOLVING NATIVE CORONARY ARTERY OF NATIVE HEART WITH UNSTABLE ANGINA PECTORIS: Primary | Chronic | ICD-10-CM

## 2023-01-16 DIAGNOSIS — I73.9 PAD (PERIPHERAL ARTERY DISEASE): Chronic | ICD-10-CM

## 2023-01-16 DIAGNOSIS — I35.0 NONRHEUMATIC AORTIC VALVE STENOSIS: Chronic | ICD-10-CM

## 2023-01-16 PROBLEM — I25.10 CAD (CORONARY ARTERY DISEASE): Chronic | Status: ACTIVE | Noted: 2019-08-14

## 2023-01-16 PROCEDURE — 99214 OFFICE O/P EST MOD 30 MIN: CPT | Performed by: NURSE PRACTITIONER

## 2023-01-16 NOTE — PROGRESS NOTES
"Chief Complaint  Coronary Artery Disease (Patient states fatigue, shortness of breath , bruising, dyspnea )    Subjective          Nicholas Yin presents to [facility] for follow up.    History of Present Illness    Marroquin was last clinic visit was 12/14/2021.  This was a telephone encounter.  No medications were changed.  Patient was stable from a cardiac standpoint.    Echocardiogram of May 2022 showed stable appearance of moderate aortic valve stenosis and mild to moderate mitral valve stenosis.    At today's visit Bill states that he has noted over the past several weeks that he has become short of breath with minimal exertion.  He denies any chest pain.  He reports feeling tired.    Objective     Vital Signs:   /73 (BP Location: Left arm, Patient Position: Sitting, Cuff Size: Adult)   Pulse 75   Ht 175.3 cm (69\")   Wt 105 kg (231 lb)   SpO2 96%   BMI 34.11 kg/m²       Physical Exam  Vitals reviewed.   Constitutional:       Appearance: Normal appearance. He is well-developed.   Neck:      Vascular: Carotid bruit present.   Cardiovascular:      Rate and Rhythm: Normal rate and regular rhythm.      Heart sounds: Murmur heard.    Systolic murmur is present with a grade of 3/6.    No friction rub. No gallop.   Pulmonary:      Effort: Pulmonary effort is normal. No respiratory distress.      Breath sounds: Normal breath sounds. No wheezing or rales.   Skin:     General: Skin is warm and dry.   Neurological:      Mental Status: He is alert and oriented to person, place, and time.   Psychiatric:         Mood and Affect: Mood normal.         Behavior: Behavior normal.          Result Review :                Current Outpatient Medications   Medication Sig Dispense Refill   • amLODIPine (NORVASC) 10 MG tablet Take 1 tablet by mouth Daily for blood pressure. 90 tablet 3   • aspirin 81 MG EC tablet Take 81 mg by mouth Daily.     • carvedilol (COREG) 3.125 MG tablet Take 1 tablet by " mouth 2 (Two) Times a Day. 180 tablet 3   • dapagliflozin Propanediol (Farxiga) 10 MG tablet Take 10 mg by mouth Daily. 49 tablet 0   • docusate sodium (Colace) 100 MG capsule Take 1 capsule by mouth 2 (Two) Times a Day. 72 capsule 0   • losartan (COZAAR) 50 MG tablet Take 1 tablet by mouth Daily. 90 tablet 2   • rosuvastatin (CRESTOR) 20 MG tablet Take 1 tablet by mouth Daily. 90 tablet 3   • ticagrelor (BRILINTA) 60 MG tablet tablet Take 1 tablet by mouth 2 (Two) Times a Day. 180 tablet 3   • vitamin D (ERGOCALCIFEROL) 1.25 MG (35757 UT) capsule capsule Take 1 capsule by mouth Every 7 (Seven) Days. 12 capsule 1   • Xultophy 100-3.6 UNIT-MG/ML solution pen-injector subcutaneous pen Inject 50 Units under the skin into the appropriate area as directed Daily. 15 mL 5   • dexlansoprazole (Dexilant) 60 MG capsule Take 1 capsule by mouth Daily. 90 capsule 3   • empagliflozin (JARDIANCE) 25 MG tablet tablet Take 1 tablet by mouth Daily. 98 tablet 0   • Finerenone 10 MG tablet Take 1 tablet by mouth Daily. 90 tablet 5   • nitroglycerin (NITROSTAT) 0.4 MG SL tablet Place 1 tablet under the tongue Every 5 (Five) Minutes As Needed for Chest Pain. Take no more than 3 doses in 15 minutes. 30 tablet 5   • Zoster Vac Recomb Adjuvanted (Shingrix) 50 MCG/0.5ML reconstituted suspension Inject 0.5 mL into the appropriate muscle as directed by prescriber See Admin Instructions. Repeat in 2-6 months 1 each 1     No current facility-administered medications for this visit.            Assessment and Plan    Problem List Items Addressed This Visit        Cardiac and Vasculature    Mixed hyperlipidemia (Chronic)    Essential hypertension (Chronic)    PAD (peripheral artery disease) (HCC) (Chronic)    CAD (coronary artery disease) - Primary (Chronic)    Relevant Orders    Adult Transthoracic Echo Limited W/ Cont if Necessary Per Protocol    US Carotid Bilateral    Nonrheumatic aortic valve stenosis (Chronic)           Follow Up      Medications were reviewed with the patient.  Continue current medications    We will proceed with echocardiogram to reevaluate valve in the presence of worsening shortness of breath.    Also ordered carotid ultrasound to evaluate bruit.    Patient did have cholesterol drawn 10/27/2022.  LDL was not at goal.  It was 74.  Previously, on 7/25/2022, LDL was at goal at 28.  We will continue current medications and monitor.    Risk factor modification: Patient counseled regarding exercise.  Patient counseled to participate in physical activity 30 minutes a day most days of the week.    Risk factor modification:  Patient counseled regarding diet.  Recommended increase fruits, vegetables.  Decrease consumption of starchy foods: Sweets, breads and pasta.  Decrease intake of beef.    Return in about 6 weeks (around 2/27/2023).    Patient was given instructions and counseling regarding his condition or for health maintenance advice. Please see specific information pulled into the AVS if appropriate.     Addendum: I did offer Mr. Yin a stress test and he refused at this time.

## 2023-02-06 ENCOUNTER — HOSPITAL ENCOUNTER (OUTPATIENT)
Dept: CARDIOLOGY | Facility: HOSPITAL | Age: 78
Discharge: HOME | End: 2023-02-06
Payer: COMMERCIAL

## 2023-02-06 DIAGNOSIS — I25.110 CORONARY ARTERY DISEASE INVOLVING NATIVE CORONARY ARTERY OF NATIVE HEART WITH UNSTABLE ANGINA PECTORIS: Chronic | ICD-10-CM

## 2023-02-06 LAB
BH CV ECHO MEAS - AO MAX PG: 32.2 MMHG
BH CV ECHO MEAS - AO MEAN PG: 18.3 MMHG
BH CV ECHO MEAS - AO V2 MAX: 283.7 CM/SEC
BH CV ECHO MEAS - AO V2 VTI: 66.4 CM
BH CV ECHO MEAS - AVA(I,D): 0.79 CM2
BH CV ECHO MEAS - EDV(MOD-SP4): 23.1 ML
BH CV ECHO MEAS - EF(MOD-SP4): 49.4 %
BH CV ECHO MEAS - ESV(MOD-SP4): 11.7 ML
BH CV ECHO MEAS - LV DIASTOLIC VOL/BSA (35-75): 10.5 CM2
BH CV ECHO MEAS - LV MAX PG: 1.95 MMHG
BH CV ECHO MEAS - LV MEAN PG: 1 MMHG
BH CV ECHO MEAS - LV SYSTOLIC VOL/BSA (12-30): 5.3 CM2
BH CV ECHO MEAS - LV V1 MAX: 69.9 CM/SEC
BH CV ECHO MEAS - LV V1 VTI: 18.5 CM
BH CV ECHO MEAS - LVOT AREA: 2.8 CM2
BH CV ECHO MEAS - LVOT DIAM: 1.9 CM
BH CV ECHO MEAS - MV DEC SLOPE: 459 CM/SEC2
BH CV ECHO MEAS - MV MAX PG: 9.5 MMHG
BH CV ECHO MEAS - MV MEAN PG: 5 MMHG
BH CV ECHO MEAS - MV P1/2T: 92.5 MSEC
BH CV ECHO MEAS - MV V2 VTI: 44 CM
BH CV ECHO MEAS - MVA(P1/2T): 2.38 CM2
BH CV ECHO MEAS - MVA(VTI): 1.19 CM2
BH CV ECHO MEAS - SI(MOD-SP4): 5.2 ML/M2
BH CV ECHO MEAS - SV(LVOT): 52.5 ML
BH CV ECHO MEAS - SV(MOD-SP4): 11.4 ML
MAXIMAL PREDICTED HEART RATE: 144 BPM
STRESS TARGET HR: 122 BPM

## 2023-02-06 PROCEDURE — 93880 EXTRACRANIAL BILAT STUDY: CPT | Performed by: RADIOLOGY

## 2023-02-06 PROCEDURE — 93308 TTE F-UP OR LMTD: CPT | Performed by: INTERNAL MEDICINE

## 2023-02-06 PROCEDURE — 93321 DOPPLER ECHO F-UP/LMTD STD: CPT

## 2023-02-06 PROCEDURE — 93321 DOPPLER ECHO F-UP/LMTD STD: CPT | Performed by: INTERNAL MEDICINE

## 2023-02-06 PROCEDURE — 93308 TTE F-UP OR LMTD: CPT

## 2023-02-06 PROCEDURE — 93325 DOPPLER ECHO COLOR FLOW MAPG: CPT | Performed by: INTERNAL MEDICINE

## 2023-02-06 PROCEDURE — 93880 EXTRACRANIAL BILAT STUDY: CPT

## 2023-02-06 PROCEDURE — 93325 DOPPLER ECHO COLOR FLOW MAPG: CPT

## 2023-02-07 DIAGNOSIS — E55.9 VITAMIN D DEFICIENCY: ICD-10-CM

## 2023-02-07 DIAGNOSIS — E11.8 TYPE 2 DIABETES MELLITUS WITH COMPLICATION, WITH LONG-TERM CURRENT USE OF INSULIN: ICD-10-CM

## 2023-02-07 DIAGNOSIS — Z79.4 TYPE 2 DIABETES MELLITUS WITH COMPLICATION, WITH LONG-TERM CURRENT USE OF INSULIN: ICD-10-CM

## 2023-02-07 RX ORDER — (INSULIN DEGLUDEC AND LIRAGLUTIDE) 100; 3.6 [IU]/ML; MG/ML
50 INJECTION, SOLUTION SUBCUTANEOUS DAILY
Qty: 15 ML | Refills: 5 | Status: SHIPPED | OUTPATIENT
Start: 2023-02-07

## 2023-02-07 RX ORDER — ERGOCALCIFEROL 1.25 MG/1
50000 CAPSULE ORAL
Qty: 12 CAPSULE | Refills: 1 | Status: SHIPPED | OUTPATIENT
Start: 2023-02-07

## 2023-02-08 ENCOUNTER — TELEPHONE (OUTPATIENT)
Dept: FAMILY MEDICINE CLINIC | Facility: CLINIC | Age: 78
End: 2023-02-08
Payer: COMMERCIAL

## 2023-02-08 NOTE — TELEPHONE ENCOUNTER
----- Message from Waldemar Trejo MD sent at 2/8/2023 11:46 AM EST -----  Lai thanks Rachele!  Please let lita know    ----- Message -----  From: Kimberly Contreras MA  Sent: 2/8/2023  11:45 AM EST  To: Waldemar Trejo MD    APROOOVED!!!      ----- Message -----  From: Waldemar Trejo MD  Sent: 2/6/2023   3:10 PM EST  To: OMID Rowell thanks Rachele!    ----- Message -----  From: Kimberly Contreras MA  Sent: 2/6/2023   3:08 PM EST  To: Waldemar Trejo MD    I sent the appeal       ----- Message -----  From: Waldemar Trejo MD  Sent: 2/6/2023   2:43 PM EST  To: Kimberly Contreras MA    He is on a maximum tolerated ARB (losartan) and SGLT2 (empagliflozin)  His most recent microalbumin was 72  If they need the ratio we will need to have done    ----- Message -----  From: Kimberly Contreras MA  Sent: 2/6/2023   2:15 PM EST  To: Waldemar Trejo MD    I tried again but said there was a current denial from November on there... It seems like it is letting me appeal that one.     Here are some questions it is asking on the appeal    Is the patient currently receiving a maximally tolerated dosage of an angiotensin converting enzyme (ACE) inhibitor or angiotensin II receptor blocker (ARB)?      What is the patient's urine albumin-to-creatinine ratio at baseline?        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

## 2023-02-21 ENCOUNTER — OFFICE VISIT (OUTPATIENT)
Dept: FAMILY MEDICINE CLINIC | Facility: CLINIC | Age: 78
End: 2023-02-21
Payer: COMMERCIAL

## 2023-02-21 DIAGNOSIS — N52.01 ERECTILE DYSFUNCTION DUE TO ARTERIAL INSUFFICIENCY: ICD-10-CM

## 2023-02-21 DIAGNOSIS — I73.9 PAD (PERIPHERAL ARTERY DISEASE): Primary | Chronic | ICD-10-CM

## 2023-02-21 DIAGNOSIS — M51.36 DDD (DEGENERATIVE DISC DISEASE), LUMBAR: ICD-10-CM

## 2023-02-21 DIAGNOSIS — Z00.00 HEALTHCARE MAINTENANCE: ICD-10-CM

## 2023-02-21 DIAGNOSIS — R35.1 BENIGN PROSTATIC HYPERPLASIA WITH NOCTURIA: ICD-10-CM

## 2023-02-21 DIAGNOSIS — I10 ESSENTIAL HYPERTENSION: Chronic | ICD-10-CM

## 2023-02-21 DIAGNOSIS — I25.110 CORONARY ARTERY DISEASE INVOLVING NATIVE CORONARY ARTERY OF NATIVE HEART WITH UNSTABLE ANGINA PECTORIS: Chronic | ICD-10-CM

## 2023-02-21 DIAGNOSIS — N40.1 BENIGN PROSTATIC HYPERPLASIA WITH NOCTURIA: ICD-10-CM

## 2023-02-21 DIAGNOSIS — M85.89 OSTEOPENIA OF MULTIPLE SITES: ICD-10-CM

## 2023-02-21 DIAGNOSIS — Z85.47 H/O TESTICULAR CANCER: ICD-10-CM

## 2023-02-21 DIAGNOSIS — E55.9 VITAMIN D DEFICIENCY: ICD-10-CM

## 2023-02-21 DIAGNOSIS — Z85.038 HISTORY OF COLON CANCER: ICD-10-CM

## 2023-02-21 DIAGNOSIS — Z79.4 TYPE 2 DIABETES MELLITUS WITH COMPLICATION, WITH LONG-TERM CURRENT USE OF INSULIN: ICD-10-CM

## 2023-02-21 DIAGNOSIS — E11.8 TYPE 2 DIABETES MELLITUS WITH COMPLICATION, WITH LONG-TERM CURRENT USE OF INSULIN: ICD-10-CM

## 2023-02-21 DIAGNOSIS — I35.0 NONRHEUMATIC AORTIC VALVE STENOSIS: Chronic | ICD-10-CM

## 2023-02-21 DIAGNOSIS — G45.3 AMAUROSIS FUGAX OF RIGHT EYE: ICD-10-CM

## 2023-02-21 DIAGNOSIS — N18.32 CHRONIC RENAL FAILURE, STAGE 3B: ICD-10-CM

## 2023-02-21 DIAGNOSIS — Z85.828 HISTORY OF NONMELANOMA SKIN CANCER: ICD-10-CM

## 2023-02-21 DIAGNOSIS — K21.9 GASTROESOPHAGEAL REFLUX DISEASE WITHOUT ESOPHAGITIS: ICD-10-CM

## 2023-02-21 DIAGNOSIS — E78.2 MIXED HYPERLIPIDEMIA: Chronic | ICD-10-CM

## 2023-02-21 PROBLEM — L98.9 SKIN LESIONS: Status: RESOLVED | Noted: 2022-11-16 | Resolved: 2023-02-21

## 2023-02-21 PROBLEM — L72.3 SEBACEOUS CYST: Status: RESOLVED | Noted: 2021-01-19 | Resolved: 2023-02-21

## 2023-02-21 PROBLEM — L82.1 SEBORRHEIC KERATOSES: Status: RESOLVED | Noted: 2017-10-07 | Resolved: 2023-02-21

## 2023-02-21 PROCEDURE — 99214 OFFICE O/P EST MOD 30 MIN: CPT | Performed by: GENERAL PRACTICE

## 2023-02-21 NOTE — PROGRESS NOTES
Subjective   Nicholas Yin is a 78 y.o. male.     Chief Complaint  He returns for a scheduled reassessment of multiple medical problems including coronary artery disease, previous TIA, type 2 diabetes mellitus, chronic renal failure, chronic low back pain, and nonmelanoma skin cancers    History of Present Illness     Coronary Artery Disease  He underwent a coronary angiogram by Dr. Villatoro on 8/15/2019 with stenting of the mid LAD.  He admits to shortness of breath with exertion but feels this has been stable and continues to deny any chest pain, orthopnea, PND, palpitations, lightheadedness, or swelling the ankles.  He remains on brilinta 60 twice daily with ASA 81 daily. Echocardiogram performed on 2/6/2023 was limited but reported as showing moderate to severe aortic valve stenosis, mild mitral valve stenosis, and normal systolic function with an estimated EF of 56 to 60%.      Transient Visual Loss  Several years ago he experienced a sudden progressive decrease in the vision in his right eye while driving. He stated that the vision in the eye darkened gradually over several minutes to the point where the only things he could make out were very bright lights and the sun. This lasted for about 30 minutes then resolved over several minutes. He continues to deny any changes in his strength, sensation, speech or ability to understand what is said to him. He continues to deny any palpitations and has had no lightheadedness or diaphoresis. He was hospitalized at [facility] over 10 years ago following a similar episodes that was ultimately felt to be vascular in origin. He has numerous cardiovascular risk factors as well as chronic renal insufficiency. MRI of the brain performed on 12/8/17 was reported as showing mild cerebral atrophy with chronic small vessel ischemic changes. MRA of the carotid arteries performed the same day was unremarkable.  Bilateral carotid ultrasound performed on 2/6/2023 was unremarkable.   He undergoes regular ophthalmology assessments    Type 2 Diabetes Mellitus  He continues to deny any hypoglycemia, paresthesias of the feet, polydipsia, polyuria, or foot ulcerations. Evaluation to date has been: hemoglobin A1C. Home sugars: BGs have remained at or near goal since last here. Current treatments: SGLT2 -empagliflozin, GLP agonist-liraglutide and basal insulin - tresiba (together as xultophy).      Hyperlipidemia  His compliance with treatment has been fair.  He has been walking some. He remains on rosuvastatin with no apparent side effects    Essential Hypertension  Home blood pressure readings: have generally been at or near goal.  He admits to dyspnea with above average exertion and occasional lightheadedness when getting up from sitting.  He continues to deny any chest pain, palpitations, orthopnea, paroxysmal nocturnal dyspnea or peripheral edema. Current antihypertensive medications includes losartan, carvedilol, and amlodipine.     Chronic Renal Failure  This has been contributed to longstanding type 2 diabetes mellitus and hypertension.  He is aware to avoid any NSAIDs oral or prescription.  He started finerenone several days ago and has not experienced any side effects    Chronic Back Pain  He gives a long history of increasing mid and low back pain. This is described as a sharp ache. The pain does not radiate and has been unassociated with any other symptoms. The pain is worse with prolonged weight bearing and limits his activities more then anything else at present.  MRI performed on 1/7/2021 was reported as showing degenerative disc disease and osteoarthritis with mild to moderate central canal and bilateral foraminal narrowing at L2-3 and L3-4.    Gastroesophageal Reflux Disease  He has been heartburn free since resuming dexlansoprazole..  He has previously been prescribed omeprazole, esomeprazole and pantoprazole but none controlled his symptoms as well.  He continues to deny any  difficulty swallowing, nausea, vomiting, change in his bowel habits, hematochezia, or melena    Nonmelanoma Skin Cancers  Pathology on the 2 lesions removed from his back at his last visit was consistent with BCC with uninvolved margins    The following portions of the patient's history were reviewed and updated as appropriate: allergies, current medications, past medical history, past social history and problem list.    Review of Systems   Constitutional: Positive for fatigue. Negative for appetite change, chills, fever and unexpected weight change.   HENT: Negative for congestion, ear pain, rhinorrhea, sneezing and sore throat.    Eyes: Negative for visual disturbance.   Respiratory: Positive for shortness of breath. Negative for cough and wheezing.    Cardiovascular: Negative for chest pain, palpitations and leg swelling.   Gastrointestinal: Negative for abdominal pain, blood in stool, constipation, diarrhea, nausea and vomiting.   Endocrine: Negative for polydipsia and polyuria.   Genitourinary: Negative for difficulty urinating, dysuria, frequency, hematuria and urgency.        Erectile dysfunction - chronic progressive. Nocturia x 2-3 with occasional sense of incomplete voiding.   Musculoskeletal: Positive for back pain (chronic - progressive). Negative for arthralgias, joint swelling and myalgias.   Skin: Negative for rash.        Skin lesions   Neurological: Negative for weakness, light-headedness, numbness and headaches.   Psychiatric/Behavioral: Negative for sleep disturbance.     Objective   Physical Exam  Constitutional:       General: He is not in acute distress.     Appearance: Normal appearance. He is well-developed. He is not ill-appearing or diaphoretic.      Comments: Bright and in good spirits.  Sits and stands with an exaggerated thoracic kyphosis.  Uncomfortable with movement.  No apparent distress at rest.  No pallor, cyanosis, diaphoresis, or jaundice   HENT:      Head: Atraumatic.      Right  Ear: Tympanic membrane, ear canal and external ear normal.      Left Ear: Tympanic membrane, ear canal and external ear normal.      Mouth/Throat:      Lips: No lesions.      Mouth: Mucous membranes are moist. No oral lesions.      Pharynx: No oropharyngeal exudate or posterior oropharyngeal erythema.   Eyes:      General: Lids are normal. Gaze aligned appropriately.      Extraocular Movements: Extraocular movements intact.      Conjunctiva/sclera: Conjunctivae normal.      Pupils: Pupils are equal.   Neck:      Thyroid: No thyroid mass or thyromegaly.      Vascular: No carotid bruit or JVD.      Trachea: Trachea normal. No tracheal deviation.   Cardiovascular:      Rate and Rhythm: Normal rate and regular rhythm.      Heart sounds: S1 normal and S2 normal. Murmur heard.    Crescendo decrescendo systolic murmur is present with a grade of 3/6.    No gallop. No S3 or S4 sounds.   Pulmonary:      Effort: Pulmonary effort is normal.      Breath sounds: Normal breath sounds.   Abdominal:      General: Bowel sounds are normal. There is no distension or abdominal bruit.      Palpations: Abdomen is soft. There is no hepatomegaly, splenomegaly or mass.      Tenderness: There is no abdominal tenderness.      Hernia: No hernia is present.   Musculoskeletal:      Right lower leg: No edema.      Left lower leg: No edema.   Lymphadenopathy:      Head:      Right side of head: No submental, submandibular, tonsillar, preauricular, posterior auricular or occipital adenopathy.      Left side of head: No submental, submandibular, tonsillar, preauricular, posterior auricular or occipital adenopathy.      Cervical: No cervical adenopathy.      Upper Body:      Right upper body: No supraclavicular adenopathy.      Left upper body: No supraclavicular adenopathy.   Skin:     General: Skin is warm and dry.      Coloration: Skin is not cyanotic, jaundiced or pale.      Findings: Lesion (three erythematous and slightly scaly lesions varying  from 1 to 2.5 cm about the mid and left upper back) present. No rash.      Nails: There is no clubbing.   Neurological:      Mental Status: He is alert and oriented to person, place, and time.      Cranial Nerves: No cranial nerve deficit, dysarthria or facial asymmetry.      Sensory: No sensory deficit.      Motor: No tremor.      Coordination: Coordination normal.      Gait: Gait normal.   Psychiatric:         Attention and Perception: Attention normal.         Mood and Affect: Mood normal.         Speech: Speech normal.         Behavior: Behavior normal.         Thought Content: Thought content normal.       Assessment & Plan   Problems Addressed this Visit        Cardiac and Vasculature    CAD (coronary artery disease)   Reminded regarding the importance of risk factor modification.  Continue dual antiplatelet therapy  Follow up with cardiology     Relevant Orders    CBC & Differential    Essential hypertension (Chronic)   Hypertension: at goal. Evidence of target organ damage: coronary artery disease, peripheral artery disease, chronic kidney disease and transient ischemic attack.  Encouraged to continue to work on diet and exercise plan.   Continue current medication    Relevant Orders    Comprehensive Metabolic Panel    Mixed hyperlipidemia (Chronic)  As above.   Continue current medication.    Relevant Orders    Comprehensive Metabolic Panel    Lipid Panel    Nonrheumatic aortic valve stenosis (Chronic)  Reviewed options going forward.  Patient will consider    Relevant Orders    CBC & Differential    PAD (peripheral artery disease) (HCC)   As above.   Continue current medication.    Relevant Orders    CBC & Differential       Endocrine and Metabolic    Type 2 diabetes mellitus with complication, with long-term current use of insulin (HCC)  Diabetes mellitus Type II, under good control.   Encouraged to continue to pursue ADA diet  Encouraged aerobic exercise.  Continue current medication    Relevant Orders     Comprehensive Metabolic Panel    Hemoglobin A1c    MicroAlbumin, Urine, Random - Urine, Clean Catch    Vitamin D deficiency       Gastrointestinal Abdominal     Gastroesophageal reflux disease without esophagitis   Symptoms are currently well controlled.  Encouraged to report if this should change.  Continue current medication       Genitourinary and Reproductive     Benign prostatic hyperplasia with nocturia    Vasculogenic erectile dysfunction       Health Encounters    Healthcare maintenance  Recommended Shingrix       Hematology and Neoplasia    H/O testicular cancer    History of colon cancer    History of nonmelanoma skin cancer  Recommended excisional biopsy of the remaining lesions on his back.  He will let us know when he is ready       Musculoskeletal and Injuries    Osteopenia of multiple sites       Neuro    Amaurosis fugax of right eye  As above.   Continue current medication.    DDD (degenerative disc disease), lumbar       Other    Chronic renal failure, stage 3b (CMS/[facility])  Reminded to avoid any NSAIDs prescription or OTC  Continue current medication  Scheduled for updated labs in 7 to 10 days    Relevant Orders    CBC & Differential    Comprehensive Metabolic Panel   Diagnoses       Codes Comments    PAD (peripheral artery disease) ([facility])    -  Primary ICD-10-CM: I73.9  ICD-9-CM: 443.9     Nonrheumatic aortic valve stenosis     ICD-10-CM: I35.0  ICD-9-CM: 424.1     Mixed hyperlipidemia     ICD-10-CM: E78.2  ICD-9-CM: 272.2     Essential hypertension     ICD-10-CM: I10  ICD-9-CM: 401.9     Coronary artery disease involving native coronary artery of native heart with unstable angina pectoris ([facility])     ICD-10-CM: I25.110  ICD-9-CM: 414.01, 411.1     Vitamin D deficiency     ICD-10-CM: E55.9  ICD-9-CM: 268.9     Type 2 diabetes mellitus with complication, with long-term current use of insulin ([facility])     ICD-10-CM: E11.8, Z79.4  ICD-9-CM: 250.90, V58.67     Gastroesophageal reflux disease without  esophagitis     ICD-10-CM: K21.9  ICD-9-CM: 530.81     Erectile dysfunction due to arterial insufficiency     ICD-10-CM: N52.01  ICD-9-CM: 607.84     Benign prostatic hyperplasia with nocturia     ICD-10-CM: N40.1, R35.1  ICD-9-CM: 600.01, 788.43     Healthcare maintenance     ICD-10-CM: Z00.00  ICD-9-CM: V70.0     History of colon cancer     ICD-10-CM: Z85.038  ICD-9-CM: V10.05     H/O testicular cancer     ICD-10-CM: Z85.47  ICD-9-CM: V10.47     Osteopenia of multiple sites     ICD-10-CM: M85.89  ICD-9-CM: 733.90     DDD (degenerative disc disease), lumbar     ICD-10-CM: M51.36  ICD-9-CM: 722.52     Amaurosis fugax of right eye     ICD-10-CM: G45.3  ICD-9-CM: 362.34     Chronic renal failure, stage 3b (CMS/HCC)     ICD-10-CM: N18.32  ICD-9-CM: 585.3     History of nonmelanoma skin cancer     ICD-10-CM: Z85.828  ICD-9-CM: V10.83

## 2023-02-22 VITALS
SYSTOLIC BLOOD PRESSURE: 136 MMHG | DIASTOLIC BLOOD PRESSURE: 59 MMHG | BODY MASS INDEX: 33.92 KG/M2 | RESPIRATION RATE: 14 BRPM | HEART RATE: 62 BPM | TEMPERATURE: 98.6 F | WEIGHT: 229 LBS | OXYGEN SATURATION: 95 % | HEIGHT: 69 IN

## 2023-02-27 ENCOUNTER — OFFICE VISIT (OUTPATIENT)
Dept: CARDIOLOGY | Facility: CLINIC | Age: 78
End: 2023-02-27
Payer: COMMERCIAL

## 2023-02-27 VITALS
WEIGHT: 228.2 LBS | HEIGHT: 69 IN | DIASTOLIC BLOOD PRESSURE: 68 MMHG | OXYGEN SATURATION: 96 % | HEART RATE: 63 BPM | BODY MASS INDEX: 33.8 KG/M2 | SYSTOLIC BLOOD PRESSURE: 152 MMHG

## 2023-02-27 DIAGNOSIS — I35.0 NONRHEUMATIC AORTIC VALVE STENOSIS: Chronic | ICD-10-CM

## 2023-02-27 DIAGNOSIS — E78.2 MIXED HYPERLIPIDEMIA: Chronic | ICD-10-CM

## 2023-02-27 DIAGNOSIS — I25.110 CORONARY ARTERY DISEASE INVOLVING NATIVE CORONARY ARTERY OF NATIVE HEART WITH UNSTABLE ANGINA PECTORIS: Primary | Chronic | ICD-10-CM

## 2023-02-27 DIAGNOSIS — I10 ESSENTIAL HYPERTENSION: Chronic | ICD-10-CM

## 2023-02-27 PROCEDURE — 99214 OFFICE O/P EST MOD 30 MIN: CPT | Performed by: NURSE PRACTITIONER

## 2023-02-27 RX ORDER — DAPAGLIFLOZIN 10 MG/1
10 TABLET, FILM COATED ORAL DAILY
Qty: 49 TABLET | Refills: 0 | Status: CANCELLED | COMMUNITY
Start: 2023-02-27

## 2023-02-27 RX ORDER — CARVEDILOL 3.12 MG/1
3.12 TABLET ORAL 2 TIMES DAILY
Qty: 180 TABLET | Refills: 3 | Status: CANCELLED | OUTPATIENT
Start: 2023-02-27

## 2023-02-27 RX ORDER — ASPIRIN 81 MG/1
81 TABLET ORAL DAILY
Status: CANCELLED | OUTPATIENT
Start: 2023-02-27

## 2023-02-27 RX ORDER — OMEPRAZOLE 40 MG/1
40 CAPSULE, DELAYED RELEASE ORAL DAILY
COMMUNITY

## 2023-02-27 RX ORDER — LOSARTAN POTASSIUM 50 MG/1
50 TABLET ORAL DAILY
Qty: 90 TABLET | Refills: 2 | Status: CANCELLED | OUTPATIENT
Start: 2023-02-27

## 2023-02-27 RX ORDER — ROSUVASTATIN CALCIUM 20 MG/1
20 TABLET, COATED ORAL DAILY
Qty: 90 TABLET | Refills: 3 | Status: CANCELLED | OUTPATIENT
Start: 2023-02-27

## 2023-02-27 NOTE — PROGRESS NOTES
"Chief Complaint  Coronary Artery Disease (F/U), Shortness of Breath (F/U), Follow-up (Pt denies CP, mild SOA), and MED REVIEW (Verbally reviewed, pt tolerating all meds well./Brilinta only once daily.)    Subjective          Nicholas Yin presents to [facility] CARDIOLOGY for follow up.    History of Present Illness    Jairo was last seen in clinic on 1/16/2023.  He reported that over the past several weeks that he had become more short of breath with minimal exertion.  He reported feeling tired.  He denied any chest pain.  Echocardiogram was ordered.    Limited echocardiogram on 2/6/2023 showed normal LV function 56 to 60%.  Patient had significant calcified AV leaflets and it was felt that gradients could be underestimated in aVL calculated based on LVOT diam was less than 1 cm².  A LAURIE was recommended for better assessment.    At today's visit Jairo is accompanied by his wife.  Echocardiogram results were reviewed and patient was encouraged to have a transesophageal echocardiogram.    He does continue to report shortness of breath and dyspnea with minimal exertion.  However, he does believe that it has gotten better since he was last seen.  His wife reports that she thinks he has been more compliant with his medication regimen.  He denies any chest pain, lower extremity swelling, PND and orthopnea.      Objective     Vital Signs:   /68   Pulse 63   Ht 175.3 cm (69\")   Wt 104 kg (228 lb 3.2 oz)   SpO2 96%   BMI 33.70 kg/m²       Physical Exam  Vitals reviewed.   Constitutional:       Appearance: Normal appearance. He is well-developed.   Cardiovascular:      Rate and Rhythm: Normal rate and regular rhythm.      Heart sounds: Murmur heard.     No friction rub. No gallop.   Pulmonary:      Effort: Pulmonary effort is normal. No respiratory distress.      Breath sounds: Normal breath sounds. No wheezing or rales.   Skin:     General: Skin is warm and dry.   Neurological:      Mental " Status: He is alert and oriented to person, place, and time.   Psychiatric:         Mood and Affect: Mood normal.         Behavior: Behavior normal.          Result Review :     CMP    CMP 7/25/22 10/27/22   Glucose 109 (A) 68   BUN 30 (A) 28 (A)   Creatinine 1.98 (A) 2.10 (A)   eGFR 34.4 (A) 32.0 (A)   Sodium 140 139   Potassium 4.2 4.9   Chloride 110 (A) 109 (A)   Calcium 9.0 8.9   Total Protein 6.3 6.7   Albumin 3.50 3.50   Globulin 2.8 3.2   Total Bilirubin 0.3 0.5   Alkaline Phosphatase 63 62   AST (SGOT) 21 26   ALT (SGPT) 14 12   Albumin/Globulin Ratio 1.3 1.1   BUN/Creatinine Ratio 15.2 13.3   Anion Gap 10.1 9.0   (A) Abnormal value       Comments are available for some flowsheets but are not being displayed.           CBC    CBC 7/25/22 10/27/22   WBC 8.91 8.02   RBC 4.77 4.82   Hemoglobin 12.9 (A) 12.8 (A)   Hematocrit 39.5 41.4   MCV 82.8 85.9   MCH 27.0 26.6   MCHC 32.7 30.9 (A)   RDW 15.2 14.2   Platelets 237 242   (A) Abnormal value            Lipid Panel    Lipid Panel 7/25/22 10/27/22   Total Cholesterol 97 147   Triglycerides 135 134   HDL Cholesterol 46 49   VLDL Cholesterol 23 24   LDL Cholesterol  28 74   LDL/HDL Ratio 0.52 1.45           Data reviewed: Cardiology studies Cardiac catheterization, transthoracic echocardiogram       8/15/2019 cardiac catheterization   CARDIAC CATHETERIZATION / INTERVENTION REPORT                 DATE OF PROCEDURE: 8/15/2019        INDICATION FOR PROCEDURE: NSTEMI        PRE PROCEDURE DIAGNOSIS:  Non-STEMI  Diabetes mellitus type 2  CKD stage IIIb  Hypertension  Dyslipidemia     POST PROCEDURE DIAGNOSIS:  CAD with mid LAD 99% plaque rupture stenosis status post PCI with a 3.0 x 18 mm Christina drug-eluting stent postdilated with a 3.25 NC balloon        Face to face mdoerate conscious  sedation time : 30 minutes        COMPLICATIONS : None     Specimens collected : None     Total contrast used: 90 cc     PROCEDURE PERFORMED:      1. Selective right and left Coronary  Angiogram        Description of the procedure:  Prior to the procedure risk, benefits and possible alternative were discussed with the patient and informed consent was obtained. Patient was brought to cardiac cath lab table in post absorbtive state. Patient was prepped and drape in usual sterile fashion. IV Versed and Fentanyl was used for moderate sedation. 2% Lidocaine was used for topical anesthesia. R radial arterial site was prepped and a micropuncture needle was used to access the artery and a 5 F slender sheath was placed. 2.5 mg of Verapamil and 200 mcg of NTG was given through the sheath intra arterial and 5000 units of Heparin was given once the catheter crossed the aortic arch.       5 F TIG 4 catheters was used for right and left coronary angiogram . All the catheters were exchanged over 0.035 wire. The R radial arterial sheath was removed and TR band was applied and immediate and complete hemostasis was achieved. The patient tolerate the entire procedure well without any immediate known complications.     Coronary anatomy findings:     LM: Is a large calibre vessel , normal take off from left cusp, divides into LAD and Lcx and a large ramus artery, no significant stenosis noted in the left main     LAD: Large caliber vessel proximally, mild luminal irregularities, diagonal 1 artery had 40 to 50% proximal stenosis, the mid LAD just after the takeoff of the diagonal 2 artery had a 95% plaque rupture stenosis with GIANFRANCO II flow distally into the mid and distal LAD, distal portion of the LAD had 50 to 60% stenosis, less than 1.5 mm caliber vessel.  The diagonal 2 artery had mild to moderate disease in the proximal portion.     Ramus intermedius: Ramus intermedius artery is actually large caliber vessel with no significant stenosis other than mild luminal irregularities     LCX: Moderate calibre vessel, mild luminal irregularities.  Continues as OM branch which had no significant stenosis.     RCA: Large  calibre, dominant artery, normal take off from right cusp.  No significant stenosis noted in the proximal mid and distal RCA, elevates distally into a small PDA and PL arteries, there appear to be some collaterals going to the left PL branches faint visualization.     Left Ventriculography:     Not performed to limit the contrast use        PERCUTANEOUS CORONARY INTERVENTION PROCEDURE NOTE:     Heparin monotherapy was used for anticoagulation.   Total of 12,000 units was given IV.     XB 3.5, 6 Ghanaian guide was used to engage the left coronary artery coaxially.     0.014 Runthrough guidewire was used to cross the lesion and parked distally.      Used a 2.0 x 15 compliant TREK balloon to predilate the lesion at 8 Tram.      Prepped a 3.0 x 18 Christina Drug eluting stent and position at the lesion and successfully deployed at 14 Tram.  Then used a 3.25 x 50 mm NC balloon to post dilate the stent at 18 tram     Post stent deployment angio pictures showed good expansion with 0% residual stenosis, GIANFRANCO 3 flow in the distal vascular bed and no dissection or distal wire perforation.      Lesion length: 12 mm  Pre PCI Stenosis: 95 %  Post PCI stenosis: 2 %  Pre PCI GIANFRANCO flow: 0  Post PCI GIANFRANCO flow: 3           Final Impression:  CAD with the mid LAD 95% plaque rupture stenosis, culprit vessel for his non-STEMI, status post successful PCI with a 3.0 x 18 mm Christina drug-eluting stent postdilated with 3.25 NC balloon.  Distal LAD had a 60% lesion, very small caliber vessel, 1.5 mm, recommend medical management.           Recommendations:  Aggressive guideline directed medical management for CAD   Dual Anti platelet medication with Asa 81 mg qd and Brilinta 90 mg bid for minimum 1 year.            Paul Villatoro MD, [facility]  Interventional Cardiology      Current Outpatient Medications   Medication Sig Dispense Refill   • aspirin 81 MG EC tablet Take 81 mg by mouth Daily.     • carvedilol (COREG) 3.125 MG tablet Take 1 tablet by  mouth 2 (Two) Times a Day. 180 tablet 3   • dapagliflozin Propanediol (Farxiga) 10 MG tablet Take 10 mg by mouth Daily. 49 tablet 0   • docusate sodium (Colace) 100 MG capsule Take 1 capsule by mouth 2 (Two) Times a Day. 72 capsule 0   • empagliflozin (JARDIANCE) 25 MG tablet tablet Take 1 tablet by mouth Daily. 98 tablet 0   • Finerenone 10 MG tablet Take 1 tablet by mouth Daily. 90 tablet 5   • losartan (COZAAR) 50 MG tablet Take 1 tablet by mouth Daily. 90 tablet 2   • nitroglycerin (NITROSTAT) 0.4 MG SL tablet Place 1 tablet under the tongue Every 5 (Five) Minutes As Needed for Chest Pain. Take no more than 3 doses in 15 minutes. 30 tablet 5   • omeprazole (priLOSEC) 40 MG capsule Take 40 mg by mouth Daily.     • rosuvastatin (CRESTOR) 20 MG tablet Take 1 tablet by mouth Daily. 90 tablet 3   • ticagrelor (BRILINTA) 60 MG tablet tablet Take 1 tablet by mouth 2 (Two) Times a Day. 180 tablet 3   • vitamin D (ERGOCALCIFEROL) 1.25 MG (93993 UT) capsule capsule Take 1 capsule by mouth Every 7 (Seven) Days. 12 capsule 1   • Xultophy 100-3.6 UNIT-MG/ML solution pen-injector subcutaneous pen Inject 50 Units under the skin into the appropriate area as directed Daily. 15 mL 5   • dexlansoprazole (Dexilant) 60 MG capsule Take 1 capsule by mouth Daily. 90 capsule 3   • Zoster Vac Recomb Adjuvanted (Shingrix) 50 MCG/0.5ML reconstituted suspension Inject 0.5 mL into the appropriate muscle as directed by prescriber See Admin Instructions. Repeat in 2-6 months 1 each 1     No current facility-administered medications for this visit.            Assessment and Plan    Problem List Items Addressed This Visit        Cardiac and Vasculature    Mixed hyperlipidemia (Chronic)    Overview     · 7/25/2022 total cholesterol 97, triglycerides 135, HDL 46, and LDL of 28  · 10/27/2022 total cholesterol 147, triglycerides 134, HDL 49, and LDL 74         Essential hypertension (Chronic)    CAD (coronary artery disease) - Primary (Chronic)     Overview     · 8/15/2019 [facility] for non-STEMI: PCI ARIA to the mid LAD         Nonrheumatic aortic valve stenosis (Chronic)    Overview     • 12/8/2017 TTE: Left ventricular systolic function is normal. Estimated EF appears to be in the range of 56 - 60%. Normal left ventricular cavity size noted. Left atrial cavity size is mildly dilated. Mild to moderate aortic valve stenosis is present. Mean gradient 19mmHg. Trace aortic valve regurgitation is present.  Moderate mitral annular calcification is present.  Trace mitral valve regurgitation is present.  • 7/16/2019 TTE: Left ventricular diastolic (grade I a) consistent with impaired relaxation.  Mild mitral valve regurgitation is present.  Mild to moderate aortic valve stenosis is present.  Mild aortic valve regurgitation is present.  Left atrial cavity size is mildly dilated.  There is mild calcification of the aortic valve.  • 12/5/2019 TTE: The study is technically difficult for diagnosis. The quality of the study is limited due to poor acoustic windows related to limited views obtained and patient body habitus.  LV Endocardium not well visualized in all views , systolic function is normal, EF appears 65%.  Left ventricular diastolic dysfunction (grade I a) consistent with impaired relaxation.  Mild aortic valve stenosis is present with peak systolic velocity of 2.6 m/sec. TUNG erroneoulsy calculated based on LVOT diameter.  There is no evidence of pericardial effusion.  LV systolic function appears improved from previous study.  · 1/23/2021 TTE: The study is technically suboptimal for diagnosis with poor acoustic windows in the parasternal window. The quality of the study is limited due to patient body habitus.  Left ventricular ejection fraction appears to be 61 - 65%. Left ventricular systolic function is normal.  Left ventricular diastolic function is consistent with (grade I) impaired relaxation.  Left atrial volume is mildly increased.  Moderate aortic valve  stenosis is present with peak systolic velocity of 3.4 m/sec, mildly progressed from previous study.  · 7/16/2021 TTE: Left ventricular ejection fraction appears to be 56 - 60%. Left ventricular systolic function is normal.  Left ventricular diastolic function is consistent with (grade I) impaired relaxation.  The aortic valve is abnormal in structure. The aortic valve exhibits sclerosis. There is severe calcification of the aortic valve. Trace aortic valve regurgitation is present. Moderate aortic valve stenosis is present. Peak velocity of the flow distal to the aortic valve is 300 cm/s.  There is no evidence of pericardial effusion.  Aortic valve PSV is likely underestimated, previous study was 3.5m/sec.    · 5/16/2022 TTE: Estimated left ventricular EF = 65% Left ventricular ejection fraction appears to be 61 - 65%. Left ventricular systolic function is normal.  Mild to moderate mitral valve stenosis is present. Peak gradient of 9 and mean gradient of 3 mmHg.  There is moderate calcification of the aortic valve mainly affecting the non-coronary, left coronary and right coronary cusp(s).  Moderate aortic stenosis with peak gradient of 35 and mean gradient of 20 mmHg.  No change since prev study of 7/16/21.    · 2/6/2023 TTE: Left ventricular systolic function is normal. Left ventricular ejection fraction appears to be 56 - 60%.  Left ventricular diastolic function was indeterminate.  Moderate to severe aortic valve stenosis is present.  Mild mitral valve stenosis is present with functional MAC.   Limted echo. Pt does have significant calcified AV leaflets, gradients could be underestimated and TUNG calculcated based on LVOT diameter was < 1 cm^2. Recommend LAURIE for better assement of AV stenosis.                  Follow Up     Medications were reviewed with the patient.     ASCVD is stable.  Patient is to continue aspirin, Brilinta, carvedilol, losartan, and rosuvastatin.    With regard to aortic valve stenosis,  discussed results of recent TTE and recommendation of a transesophageal echocardiogram.  I described this procedure to the patient.  He requested referral to [facility] for further work-up of aortic valve stenosis.    Hypertension is controlled.  By patient report blood pressures at home have shown good control.  Last BP at home was 130/70.    With regard to dyslipidemia, labs are pending.  Continue rosuvastatin.      No follow-ups on file.    Patient was given instructions and counseling regarding his condition or for health maintenance advice. Please see specific information pulled into the AVS if appropriate.

## 2023-03-03 DIAGNOSIS — I35.0 NONRHEUMATIC AORTIC VALVE STENOSIS: Primary | ICD-10-CM

## 2023-03-03 NOTE — PROGRESS NOTES
Linn Mauricio reviewed echo- recommends LAURIE first- does not meet TAVR criteria on TTE.     PT agreeable to proceed- he is aware that process and what to expect.

## 2023-03-10 ENCOUNTER — PREP FOR SURGERY (OUTPATIENT)
Dept: OTHER | Facility: HOSPITAL | Age: 78
End: 2023-03-10
Payer: COMMERCIAL

## 2023-03-10 ENCOUNTER — LAB (OUTPATIENT)
Dept: LAB | Facility: HOSPITAL | Age: 78
End: 2023-03-10
Payer: COMMERCIAL

## 2023-03-10 DIAGNOSIS — Z79.4 TYPE 2 DIABETES MELLITUS WITH COMPLICATION, WITH LONG-TERM CURRENT USE OF INSULIN: ICD-10-CM

## 2023-03-10 DIAGNOSIS — I25.110 CORONARY ARTERY DISEASE INVOLVING NATIVE CORONARY ARTERY OF NATIVE HEART WITH UNSTABLE ANGINA PECTORIS: ICD-10-CM

## 2023-03-10 DIAGNOSIS — I35.0 NONRHEUMATIC AORTIC VALVE STENOSIS: ICD-10-CM

## 2023-03-10 DIAGNOSIS — N18.32 CHRONIC RENAL FAILURE, STAGE 3B: ICD-10-CM

## 2023-03-10 DIAGNOSIS — I73.9 PAD (PERIPHERAL ARTERY DISEASE): ICD-10-CM

## 2023-03-10 DIAGNOSIS — E78.2 MIXED HYPERLIPIDEMIA: Chronic | ICD-10-CM

## 2023-03-10 DIAGNOSIS — I10 ESSENTIAL HYPERTENSION: Chronic | ICD-10-CM

## 2023-03-10 DIAGNOSIS — E11.8 TYPE 2 DIABETES MELLITUS WITH COMPLICATION, WITH LONG-TERM CURRENT USE OF INSULIN: ICD-10-CM

## 2023-03-10 LAB
ALBUMIN SERPL-MCNC: 3.7 G/DL (ref 3.5–5.2)
ALBUMIN/GLOB SERPL: 1.2 G/DL
ALP SERPL-CCNC: 68 U/L (ref 39–117)
ALT SERPL W P-5'-P-CCNC: 17 U/L (ref 1–41)
ANION GAP SERPL CALCULATED.3IONS-SCNC: 8.6 MMOL/L (ref 5–15)
AST SERPL-CCNC: 22 U/L (ref 1–40)
BASOPHILS # BLD AUTO: 0.06 10*3/MM3 (ref 0–0.2)
BASOPHILS NFR BLD AUTO: 0.7 % (ref 0–1.5)
BILIRUB SERPL-MCNC: 0.5 MG/DL (ref 0–1.2)
BUN SERPL-MCNC: 26 MG/DL (ref 8–23)
BUN/CREAT SERPL: 12.9 (ref 7–25)
CALCIUM SPEC-SCNC: 9.3 MG/DL (ref 8.6–10.5)
CHLORIDE SERPL-SCNC: 108 MMOL/L (ref 98–107)
CHOLEST SERPL-MCNC: 118 MG/DL (ref 0–200)
CO2 SERPL-SCNC: 23.4 MMOL/L (ref 22–29)
CREAT SERPL-MCNC: 2.02 MG/DL (ref 0.76–1.27)
DEPRECATED RDW RBC AUTO: 47.3 FL (ref 37–54)
EGFRCR SERPLBLD CKD-EPI 2021: 33.3 ML/MIN/1.73
EOSINOPHIL # BLD AUTO: 0.4 10*3/MM3 (ref 0–0.4)
EOSINOPHIL NFR BLD AUTO: 4.9 % (ref 0.3–6.2)
ERYTHROCYTE [DISTWIDTH] IN BLOOD BY AUTOMATED COUNT: 15.7 % (ref 12.3–15.4)
GLOBULIN UR ELPH-MCNC: 3.2 GM/DL
GLUCOSE SERPL-MCNC: 89 MG/DL (ref 65–99)
HBA1C MFR BLD: 7.2 % (ref 4.8–5.6)
HCT VFR BLD AUTO: 41.5 % (ref 37.5–51)
HDLC SERPL-MCNC: 50 MG/DL (ref 40–60)
HGB BLD-MCNC: 13.1 G/DL (ref 13–17.7)
IMM GRANULOCYTES # BLD AUTO: 0.02 10*3/MM3 (ref 0–0.05)
IMM GRANULOCYTES NFR BLD AUTO: 0.2 % (ref 0–0.5)
LDLC SERPL CALC-MCNC: 43 MG/DL (ref 0–100)
LDLC/HDLC SERPL: 0.76 {RATIO}
LYMPHOCYTES # BLD AUTO: 1.5 10*3/MM3 (ref 0.7–3.1)
LYMPHOCYTES NFR BLD AUTO: 18.4 % (ref 19.6–45.3)
MCH RBC QN AUTO: 26.3 PG (ref 26.6–33)
MCHC RBC AUTO-ENTMCNC: 31.6 G/DL (ref 31.5–35.7)
MCV RBC AUTO: 83.3 FL (ref 79–97)
MONOCYTES # BLD AUTO: 0.74 10*3/MM3 (ref 0.1–0.9)
MONOCYTES NFR BLD AUTO: 9.1 % (ref 5–12)
NEUTROPHILS NFR BLD AUTO: 5.43 10*3/MM3 (ref 1.7–7)
NEUTROPHILS NFR BLD AUTO: 66.7 % (ref 42.7–76)
NRBC BLD AUTO-RTO: 0 /100 WBC (ref 0–0.2)
PLATELET # BLD AUTO: 229 10*3/MM3 (ref 140–450)
PMV BLD AUTO: 10.3 FL (ref 6–12)
POTASSIUM SERPL-SCNC: 4.5 MMOL/L (ref 3.5–5.2)
PROT SERPL-MCNC: 6.9 G/DL (ref 6–8.5)
RBC # BLD AUTO: 4.98 10*6/MM3 (ref 4.14–5.8)
SODIUM SERPL-SCNC: 140 MMOL/L (ref 136–145)
TRIGL SERPL-MCNC: 151 MG/DL (ref 0–150)
VLDLC SERPL-MCNC: 25 MG/DL (ref 5–40)
WBC NRBC COR # BLD: 8.15 10*3/MM3 (ref 3.4–10.8)

## 2023-03-10 PROCEDURE — 80061 LIPID PANEL: CPT

## 2023-03-10 PROCEDURE — 85025 COMPLETE CBC W/AUTO DIFF WBC: CPT

## 2023-03-10 PROCEDURE — 83036 HEMOGLOBIN GLYCOSYLATED A1C: CPT

## 2023-03-10 PROCEDURE — 80053 COMPREHEN METABOLIC PANEL: CPT

## 2023-03-13 ENCOUNTER — HOSPITAL ENCOUNTER (OUTPATIENT)
Dept: CARDIOLOGY | Facility: HOSPITAL | Age: 78
Discharge: HOME | End: 2023-03-13
Admitting: INTERNAL MEDICINE
Payer: COMMERCIAL

## 2023-03-13 VITALS
WEIGHT: 220 LBS | SYSTOLIC BLOOD PRESSURE: 109 MMHG | RESPIRATION RATE: 20 BRPM | BODY MASS INDEX: 30.8 KG/M2 | DIASTOLIC BLOOD PRESSURE: 50 MMHG | HEART RATE: 59 BPM | OXYGEN SATURATION: 96 % | HEIGHT: 71 IN

## 2023-03-13 DIAGNOSIS — I35.0 NONRHEUMATIC AORTIC VALVE STENOSIS: ICD-10-CM

## 2023-03-13 DIAGNOSIS — I35.0 NONRHEUMATIC AORTIC VALVE STENOSIS: Primary | ICD-10-CM

## 2023-03-13 PROCEDURE — 25010000002 FENTANYL CITRATE (PF) 50 MCG/ML SOLUTION: Performed by: INTERNAL MEDICINE

## 2023-03-13 PROCEDURE — 93321 DOPPLER ECHO F-UP/LMTD STD: CPT

## 2023-03-13 PROCEDURE — 99152 MOD SED SAME PHYS/QHP 5/>YRS: CPT

## 2023-03-13 PROCEDURE — 93321 DOPPLER ECHO F-UP/LMTD STD: CPT | Performed by: INTERNAL MEDICINE

## 2023-03-13 PROCEDURE — 25010000002 MIDAZOLAM PER 1 MG: Performed by: INTERNAL MEDICINE

## 2023-03-13 PROCEDURE — 93312 ECHO TRANSESOPHAGEAL: CPT

## 2023-03-13 PROCEDURE — 99153 MOD SED SAME PHYS/QHP EA: CPT

## 2023-03-13 PROCEDURE — 93325 DOPPLER ECHO COLOR FLOW MAPG: CPT | Performed by: INTERNAL MEDICINE

## 2023-03-13 PROCEDURE — 93312 ECHO TRANSESOPHAGEAL: CPT | Performed by: INTERNAL MEDICINE

## 2023-03-13 PROCEDURE — 93325 DOPPLER ECHO COLOR FLOW MAPG: CPT

## 2023-03-13 RX ORDER — FENTANYL CITRATE 50 UG/ML
INJECTION, SOLUTION INTRAMUSCULAR; INTRAVENOUS
Status: COMPLETED | OUTPATIENT
Start: 2023-03-13 | End: 2023-03-13

## 2023-03-13 RX ORDER — MIDAZOLAM HYDROCHLORIDE 1 MG/ML
INJECTION INTRAMUSCULAR; INTRAVENOUS
Status: COMPLETED | OUTPATIENT
Start: 2023-03-13 | End: 2023-03-13

## 2023-03-13 RX ADMIN — FENTANYL CITRATE 50 MCG: 50 INJECTION, SOLUTION INTRAMUSCULAR; INTRAVENOUS at 12:49

## 2023-03-13 RX ADMIN — FENTANYL CITRATE 50 MCG: 50 INJECTION, SOLUTION INTRAMUSCULAR; INTRAVENOUS at 12:47

## 2023-03-13 RX ADMIN — MIDAZOLAM HYDROCHLORIDE 2 MG: 1 INJECTION, SOLUTION INTRAMUSCULAR; INTRAVENOUS at 12:49

## 2023-03-13 RX ADMIN — MIDAZOLAM HYDROCHLORIDE 2 MG: 1 INJECTION, SOLUTION INTRAMUSCULAR; INTRAVENOUS at 12:47
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
## 2023-03-14 DIAGNOSIS — I25.10 CAD IN NATIVE ARTERY: Primary | ICD-10-CM

## 2023-03-14 DIAGNOSIS — Z91.041 CONTRAST MEDIA ALLERGY: ICD-10-CM

## 2023-03-14 DIAGNOSIS — I35.0 NONRHEUMATIC AORTIC VALVE STENOSIS: Primary | Chronic | ICD-10-CM

## 2023-03-14 DIAGNOSIS — I35.0 AORTIC STENOSIS, SEVERE: ICD-10-CM

## 2023-03-14 LAB
BH CV ECHO MEAS - AO MAX PG: 37.1 MMHG
BH CV ECHO MEAS - AO MEAN PG: 21 MMHG
BH CV ECHO MEAS - AO V2 MAX: 302.9 CM/SEC
BH CV ECHO MEAS - AO V2 VTI: 68.8 CM
BH CV ECHO MEAS - AVA(I,D): 0.93 CM2
BH CV ECHO MEAS - LV MAX PG: 2.8 MMHG
BH CV ECHO MEAS - LV MEAN PG: 1.5 MMHG
BH CV ECHO MEAS - LV V1 MAX: 83.6 CM/SEC
BH CV ECHO MEAS - LV V1 VTI: 20.4 CM
BH CV ECHO MEAS - LVOT AREA: 3.1 CM2
BH CV ECHO MEAS - LVOT DIAM: 2 CM
BH CV ECHO MEAS - MV MAX PG: 6.3 MMHG
BH CV ECHO MEAS - MV MEAN PG: 3 MMHG
BH CV ECHO MEAS - MV V2 VTI: 39.8 CM
BH CV ECHO MEAS - MVA(VTI): 1.61 CM2
BH CV ECHO MEAS - SV(LVOT): 63.9 ML
BH CV VAS BP LEFT ARM: NORMAL MMHG
LV EF 2D ECHO EST: 60 %
MAXIMAL PREDICTED HEART RATE: 143 BPM
STRESS TARGET HR: 122 BPM

## 2023-03-14 RX ORDER — LOSARTAN POTASSIUM 50 MG/1
50 TABLET ORAL DAILY
Qty: 90 TABLET | Refills: 2 | Status: SHIPPED | OUTPATIENT
Start: 2023-03-14

## 2023-03-14 NOTE — PROGRESS NOTES
Per Dr. Mendoza - needs [facility] for further workup.  primary cardiologist not able to do cath at this time. Pt aware and ok with coming back to [place]

## 2023-03-14 NOTE — PROGRESS NOTES
Referral received from Dr. Mendoza after LAURIE yesterday for severe aortic stenosis. He has low flow, low gradient stenosis with a preserved EF. Dr. Mendoza will be doing his LHC as Dr. Reid will be out of the office for a month. Carotid US completed on , see below.     No answer, LVM for call back. I mailed a packet of information to him for review, as I will be out of the office for the rest of the week. Will order TAVR CTA and CTS consult.     TAVR Pre-Op Checklist    Patient Name: Nicholas Yin   78 y.o. [id number]  Residence: [place], [place]    Referral Date: 3/14/2023 : 1945   Ht:70.1% Wt: 220lb     Referring Cardiologist: Dr. Villatoro BMI: 30    Initial TTE date:Adult Transthoracic Echo Limited W/ Cont if Necessary Per Protocol (2023 08:42)    TUNG: 0.79 AV mean: 18.3 AV Vmax: 283.7cm LVEF: 60%    CT Surgery Consult: P     STS Score: P     Allergy (Contrast):Iodinated contrast media   Pre-op meds (contrast): Y     Anticoagulated: No Last dose:NA Heparin or Lovenox Bridge: NA    CTA Date: P     CTA 3D: P    Left Coronary Ht: P  Right Coronary Ht: P   Annulus Area: P    CTA Important findings: P    Cardiac cath:  P       Physician: P   Important findings:P    LAURIE: Adult Transesophageal Echo (LAURIE) W/ Cont if Necessary Per Protocol (2023 14:10)    Physician: Dr. Mendoza   LAURIE annulus: 2.5cm  Projected TAVR Prosthesis Size:  P    Carotid duplex: US Carotid Bilateral (2023 08:39)    Dobutamine GXT:P      EKG rhythm: P   PPM/ Last download :NA    PFT (FEV1):P    Important meds: None    PAT lab review:  BNP:P BUN/Creatinine: P H&H:P  PLTS:P    P2Y12:P HGA1C: P CXR:P      TAVR Procedure Date: P

## 2023-03-15 DIAGNOSIS — N18.32 CHRONIC RENAL FAILURE, STAGE 3B: ICD-10-CM

## 2023-03-15 DIAGNOSIS — E11.8 TYPE 2 DIABETES MELLITUS WITH COMPLICATION, WITH LONG-TERM CURRENT USE OF INSULIN: ICD-10-CM

## 2023-03-15 DIAGNOSIS — Z79.4 TYPE 2 DIABETES MELLITUS WITH COMPLICATION, WITH LONG-TERM CURRENT USE OF INSULIN: ICD-10-CM

## 2023-03-23 ENCOUNTER — TELEPHONE (OUTPATIENT)
Dept: CARDIOLOGY | Facility: CLINIC | Age: 78
End: 2023-03-23
Payer: COMMERCIAL

## 2023-03-23 NOTE — TELEPHONE ENCOUNTER
Contacted patient to discuss severe AS/valve replacement packet and upcoming tests to ensure TAVR candidacy. Contrast allergy is listed. He requested that I discuss with his spouse, who is an APRN. Called Madison at . Discussed upcoming C and need for CT. We will pre-medicate with Benadryl and prednisone and order hydration protocol with CT due to contrast allergy and GFR 32. We will contact them with specific testing dates and details. She requested Monday testing apts if possible, she does not work on Mondays.

## 2023-03-24 RX ORDER — DIPHENHYDRAMINE HCL 25 MG
50 CAPSULE ORAL ONCE
Qty: 2 CAPSULE | Refills: 0 | Status: SHIPPED | OUTPATIENT
Start: 2023-03-24 | End: 2023-03-24

## 2023-03-24 RX ORDER — PREDNISONE 50 MG/1
50 TABLET ORAL TAKE AS DIRECTED
Qty: 3 TABLET | Refills: 0 | Status: SHIPPED | OUTPATIENT
Start: 2023-03-24 | End: 2023-03-30 | Stop reason: SDUPTHER

## 2023-03-30 RX ORDER — DIPHENHYDRAMINE HCL 25 MG
TABLET ORAL
Qty: 2 TABLET | Refills: 0 | Status: SHIPPED | OUTPATIENT
Start: 2023-03-30

## 2023-03-30 RX ORDER — PREDNISONE 50 MG/1
TABLET ORAL
Qty: 3 TABLET | Refills: 0 | Status: SHIPPED | OUTPATIENT
Start: 2023-03-30

## 2023-04-04 ENCOUNTER — PREP FOR SURGERY (OUTPATIENT)
Dept: OTHER | Facility: HOSPITAL | Age: 78
End: 2023-04-04
Payer: COMMERCIAL

## 2023-04-04 RX ORDER — SODIUM CHLORIDE 9 MG/ML
40 INJECTION, SOLUTION INTRAVENOUS AS NEEDED
Status: CANCELLED | OUTPATIENT
Start: 2023-04-04

## 2023-04-04 RX ORDER — SODIUM CHLORIDE 0.9 % (FLUSH) 0.9 %
10 SYRINGE (ML) INJECTION EVERY 12 HOURS SCHEDULED
Status: CANCELLED | OUTPATIENT
Start: 2023-04-04

## 2023-04-04 RX ORDER — ASPIRIN 81 MG/1
324 TABLET, CHEWABLE ORAL ONCE
Status: CANCELLED | OUTPATIENT
Start: 2023-04-04 | End: 2023-04-04

## 2023-04-04 RX ORDER — ACETAMINOPHEN 325 MG/1
650 TABLET ORAL EVERY 4 HOURS PRN
Status: CANCELLED | OUTPATIENT
Start: 2023-04-04

## 2023-04-04 RX ORDER — METHYLPREDNISOLONE SODIUM SUCCINATE 125 MG/2ML
125 INJECTION, POWDER, LYOPHILIZED, FOR SOLUTION INTRAMUSCULAR; INTRAVENOUS ONCE
Status: CANCELLED | OUTPATIENT
Start: 2023-04-04

## 2023-04-04 RX ORDER — SODIUM CHLORIDE 0.9 % (FLUSH) 0.9 %
10 SYRINGE (ML) INJECTION AS NEEDED
Status: CANCELLED | OUTPATIENT
Start: 2023-04-04

## 2023-04-04 RX ORDER — DIPHENHYDRAMINE HYDROCHLORIDE 50 MG/ML
25 INJECTION INTRAMUSCULAR; INTRAVENOUS ONCE
Status: CANCELLED | OUTPATIENT
Start: 2023-04-04

## 2023-04-04 RX ORDER — DOXYCYCLINE HYCLATE 100 MG/1
100 CAPSULE ORAL 2 TIMES DAILY
Qty: 20 CAPSULE | Refills: 0 | Status: SHIPPED | OUTPATIENT
Start: 2023-04-04 | End: 2023-04-14

## 2023-04-04 RX ORDER — ASPIRIN 81 MG/1
81 TABLET ORAL DAILY
Status: CANCELLED | OUTPATIENT
Start: 2023-04-05

## 2023-04-04 RX ORDER — NITROGLYCERIN 0.4 MG/1
0.4 TABLET SUBLINGUAL
Status: CANCELLED | OUTPATIENT
Start: 2023-04-04

## 2023-04-10 ENCOUNTER — APPOINTMENT (OUTPATIENT)
Dept: CARDIOLOGY | Facility: HOSPITAL | Age: 78
End: 2023-04-10
Payer: COMMERCIAL

## 2023-04-10 ENCOUNTER — HOSPITAL ENCOUNTER (OUTPATIENT)
Facility: HOSPITAL | Age: 78
Setting detail: HOSPITAL OUTPATIENT SURGERY
Discharge: HOME | End: 2023-04-10
Attending: INTERNAL MEDICINE | Admitting: INTERNAL MEDICINE
Payer: COMMERCIAL

## 2023-04-10 VITALS
SYSTOLIC BLOOD PRESSURE: 151 MMHG | WEIGHT: 224.87 LBS | BODY MASS INDEX: 33.31 KG/M2 | RESPIRATION RATE: 18 BRPM | HEIGHT: 69 IN | OXYGEN SATURATION: 94 % | DIASTOLIC BLOOD PRESSURE: 66 MMHG | HEART RATE: 70 BPM | TEMPERATURE: 97 F

## 2023-04-10 DIAGNOSIS — I25.10 CAD IN NATIVE ARTERY: ICD-10-CM

## 2023-04-10 DIAGNOSIS — R07.89 CHEST TIGHTNESS: ICD-10-CM

## 2023-04-10 DIAGNOSIS — I35.0 AORTIC STENOSIS, SEVERE: ICD-10-CM

## 2023-04-10 LAB
ALBUMIN SERPL-MCNC: 3.8 G/DL (ref 3.5–5.2)
ALBUMIN/GLOB SERPL: 1.1 G/DL
ALP SERPL-CCNC: 67 U/L (ref 39–117)
ALT SERPL W P-5'-P-CCNC: 14 U/L (ref 1–41)
ANION GAP SERPL CALCULATED.3IONS-SCNC: 12 MMOL/L (ref 5–15)
AST SERPL-CCNC: 21 U/L (ref 1–40)
BASOPHILS # BLD AUTO: 0.01 10*3/MM3 (ref 0–0.2)
BASOPHILS NFR BLD AUTO: 0.1 % (ref 0–1.5)
BH CV REST NUCLEAR ISOTOPE DOSE: 29.9 MCI
BH CV STRESS BP STAGE 2: NORMAL
BH CV STRESS BP STAGE 4: NORMAL
BH CV STRESS COMMENTS STAGE 1: NORMAL
BH CV STRESS DOSE REGADENOSON STAGE 1: 0.4
BH CV STRESS DURATION MIN STAGE 1: 1
BH CV STRESS DURATION MIN STAGE 2: 1
BH CV STRESS DURATION MIN STAGE 3: 1
BH CV STRESS DURATION MIN STAGE 4: 1
BH CV STRESS DURATION SEC STAGE 1: 0
BH CV STRESS DURATION SEC STAGE 2: 0
BH CV STRESS DURATION SEC STAGE 3: 0
BH CV STRESS DURATION SEC STAGE 4: 0
BH CV STRESS HR STAGE 1: 86
BH CV STRESS HR STAGE 2: 99
BH CV STRESS HR STAGE 3: 88
BH CV STRESS HR STAGE 4: 86
BH CV STRESS NUCLEAR ISOTOPE DOSE: 29.9 MCI
BH CV STRESS O2 STAGE 1: 98
BH CV STRESS O2 STAGE 2: 100
BH CV STRESS O2 STAGE 3: 96
BH CV STRESS O2 STAGE 4: 99
BH CV STRESS PROTOCOL 1: NORMAL
BH CV STRESS RECOVERY BP: NORMAL MMHG
BH CV STRESS RECOVERY HR: 86 BPM
BH CV STRESS RECOVERY O2: 99 %
BH CV STRESS STAGE 1: 1
BH CV STRESS STAGE 2: 2
BH CV STRESS STAGE 3: 3
BH CV STRESS STAGE 4: 4
BILIRUB SERPL-MCNC: 0.5 MG/DL (ref 0–1.2)
BUN SERPL-MCNC: 48 MG/DL (ref 8–23)
BUN/CREAT SERPL: 19.9 (ref 7–25)
CALCIUM SPEC-SCNC: 9.5 MG/DL (ref 8.6–10.5)
CHLORIDE SERPL-SCNC: 107 MMOL/L (ref 98–107)
CO2 SERPL-SCNC: 19 MMOL/L (ref 22–29)
CREAT BLDA-MCNC: 3 MG/DL (ref 0.6–1.3)
CREAT SERPL-MCNC: 2.41 MG/DL (ref 0.76–1.27)
DEPRECATED RDW RBC AUTO: 49.2 FL (ref 37–54)
EGFRCR SERPLBLD CKD-EPI 2021: 27 ML/MIN/1.73
EOSINOPHIL # BLD AUTO: 0 10*3/MM3 (ref 0–0.4)
EOSINOPHIL NFR BLD AUTO: 0 % (ref 0.3–6.2)
ERYTHROCYTE [DISTWIDTH] IN BLOOD BY AUTOMATED COUNT: 16.1 % (ref 12.3–15.4)
GLOBULIN UR ELPH-MCNC: 3.6 GM/DL
GLUCOSE SERPL-MCNC: 203 MG/DL (ref 65–99)
HCT VFR BLD AUTO: 43.5 % (ref 37.5–51)
HGB BLD-MCNC: 14 G/DL (ref 13–17.7)
IMM GRANULOCYTES # BLD AUTO: 0.04 10*3/MM3 (ref 0–0.05)
IMM GRANULOCYTES NFR BLD AUTO: 0.4 % (ref 0–0.5)
LV EF NUC BP: 72 %
LYMPHOCYTES # BLD AUTO: 0.94 10*3/MM3 (ref 0.7–3.1)
LYMPHOCYTES NFR BLD AUTO: 9.3 % (ref 19.6–45.3)
MAXIMAL PREDICTED HEART RATE: 143 BPM
MCH RBC QN AUTO: 27 PG (ref 26.6–33)
MCHC RBC AUTO-ENTMCNC: 32.2 G/DL (ref 31.5–35.7)
MCV RBC AUTO: 84 FL (ref 79–97)
MONOCYTES # BLD AUTO: 0.06 10*3/MM3 (ref 0.1–0.9)
MONOCYTES NFR BLD AUTO: 0.6 % (ref 5–12)
NEUTROPHILS NFR BLD AUTO: 89.6 % (ref 42.7–76)
NEUTROPHILS NFR BLD AUTO: 9.05 10*3/MM3 (ref 1.7–7)
NRBC BLD AUTO-RTO: 0 /100 WBC (ref 0–0.2)
PERCENT MAX PREDICTED HR: 68.53 %
PLATELET # BLD AUTO: 255 10*3/MM3 (ref 140–450)
PMV BLD AUTO: 10.3 FL (ref 6–12)
POTASSIUM SERPL-SCNC: 5.2 MMOL/L (ref 3.5–5.2)
PROT SERPL-MCNC: 7.4 G/DL (ref 6–8.5)
RBC # BLD AUTO: 5.18 10*6/MM3 (ref 4.14–5.8)
SODIUM SERPL-SCNC: 138 MMOL/L (ref 136–145)
STRESS BASELINE BP: NORMAL MMHG
STRESS BASELINE HR: 63 BPM
STRESS O2 SAT REST: 98 %
STRESS PERCENT HR: 81 %
STRESS POST ESTIMATED WORKLOAD: 1 METS
STRESS POST EXERCISE DUR MIN: 4 MIN
STRESS POST EXERCISE DUR SEC: 0 SEC
STRESS POST O2 SAT PEAK: 100 %
STRESS POST PEAK BP: NORMAL MMHG
STRESS POST PEAK HR: 98 BPM
STRESS TARGET HR: 122 BPM
WBC NRBC COR # BLD: 10.1 10*3/MM3 (ref 3.4–10.8)

## 2023-04-10 PROCEDURE — 82565 ASSAY OF CREATININE: CPT

## 2023-04-10 PROCEDURE — 78431 MYOCRD IMG PET RST&STRS CT: CPT

## 2023-04-10 PROCEDURE — 85025 COMPLETE CBC W/AUTO DIFF WBC: CPT

## 2023-04-10 PROCEDURE — 0 RUBIDIUM CHLORIDE

## 2023-04-10 PROCEDURE — 25010000002 REGADENOSON 0.4 MG/5ML SOLUTION

## 2023-04-10 PROCEDURE — 80053 COMPREHEN METABOLIC PANEL: CPT

## 2023-04-10 PROCEDURE — 78431 MYOCRD IMG PET RST&STRS CT: CPT | Performed by: INTERNAL MEDICINE

## 2023-04-10 PROCEDURE — 93017 CV STRESS TEST TRACING ONLY: CPT

## 2023-04-10 PROCEDURE — A9555 RB82 RUBIDIUM: HCPCS

## 2023-04-10 PROCEDURE — 93018 CV STRESS TEST I&R ONLY: CPT | Performed by: INTERNAL MEDICINE

## 2023-04-10 RX ORDER — ASPIRIN 81 MG/1
324 TABLET, CHEWABLE ORAL ONCE
Status: COMPLETED | OUTPATIENT
Start: 2023-04-10 | End: 2023-04-10

## 2023-04-10 RX ORDER — NITROGLYCERIN 0.4 MG/1
0.4 TABLET SUBLINGUAL
Qty: 25 TABLET | Refills: 11 | Status: SHIPPED | OUTPATIENT
Start: 2023-04-10

## 2023-04-10 RX ORDER — DIPHENHYDRAMINE HYDROCHLORIDE 50 MG/ML
25 INJECTION INTRAMUSCULAR; INTRAVENOUS ONCE
Status: DISCONTINUED | OUTPATIENT
Start: 2023-04-10 | End: 2023-04-10 | Stop reason: HOSPADM

## 2023-04-10 RX ORDER — METHYLPREDNISOLONE SODIUM SUCCINATE 125 MG/2ML
125 INJECTION, POWDER, LYOPHILIZED, FOR SOLUTION INTRAMUSCULAR; INTRAVENOUS ONCE
Status: DISCONTINUED | OUTPATIENT
Start: 2023-04-10 | End: 2023-04-10 | Stop reason: HOSPADM

## 2023-04-10 RX ORDER — LOSARTAN POTASSIUM 50 MG/1
25 TABLET ORAL 2 TIMES DAILY
Qty: 60 TABLET | Refills: 11 | Status: SHIPPED | OUTPATIENT
Start: 2023-04-10

## 2023-04-10 RX ORDER — ACETAMINOPHEN 325 MG/1
650 TABLET ORAL EVERY 4 HOURS PRN
Status: DISCONTINUED | OUTPATIENT
Start: 2023-04-10 | End: 2023-04-10 | Stop reason: HOSPADM

## 2023-04-10 RX ORDER — ASPIRIN 81 MG/1
81 TABLET ORAL DAILY
Status: DISCONTINUED | OUTPATIENT
Start: 2023-04-11 | End: 2023-04-10 | Stop reason: HOSPADM

## 2023-04-10 RX ORDER — SODIUM CHLORIDE 0.9 % (FLUSH) 0.9 %
10 SYRINGE (ML) INJECTION EVERY 12 HOURS SCHEDULED
Status: DISCONTINUED | OUTPATIENT
Start: 2023-04-10 | End: 2023-04-10 | Stop reason: HOSPADM

## 2023-04-10 RX ORDER — SODIUM CHLORIDE 0.9 % (FLUSH) 0.9 %
10 SYRINGE (ML) INJECTION AS NEEDED
Status: DISCONTINUED | OUTPATIENT
Start: 2023-04-10 | End: 2023-04-10 | Stop reason: HOSPADM

## 2023-04-10 RX ORDER — SODIUM CHLORIDE 9 MG/ML
40 INJECTION, SOLUTION INTRAVENOUS AS NEEDED
Status: DISCONTINUED | OUTPATIENT
Start: 2023-04-10 | End: 2023-04-10 | Stop reason: HOSPADM

## 2023-04-10 RX ORDER — FAMOTIDINE 10 MG/ML
20 INJECTION, SOLUTION INTRAVENOUS ONCE
Status: COMPLETED | OUTPATIENT
Start: 2023-04-10 | End: 2023-04-10

## 2023-04-10 RX ORDER — SODIUM CHLORIDE 9 MG/ML
3 INJECTION, SOLUTION INTRAVENOUS CONTINUOUS
Status: ACTIVE | OUTPATIENT
Start: 2023-04-10 | End: 2023-04-10

## 2023-04-10 RX ORDER — NITROGLYCERIN 0.4 MG/1
0.4 TABLET SUBLINGUAL
Status: DISCONTINUED | OUTPATIENT
Start: 2023-04-10 | End: 2023-04-10 | Stop reason: HOSPADM

## 2023-04-10 RX ORDER — UBIDECARENONE 100 MG
200 CAPSULE ORAL DAILY
COMMUNITY

## 2023-04-10 RX ORDER — MULTIPLE VITAMINS W/ MINERALS TAB 9MG-400MCG
1 TAB ORAL DAILY
COMMUNITY

## 2023-04-10 RX ADMIN — ASPIRIN 81 MG 324 MG: 81 TABLET ORAL at 12:18

## 2023-04-10 RX ADMIN — REGADENOSON 0.4 MG: 0.08 INJECTION, SOLUTION INTRAVENOUS at 13:29

## 2023-04-10 RX ADMIN — RUBIDIUM CHLORIDE RB-82 1 DOSE: 150 INJECTION, SOLUTION INTRAVENOUS at 13:30

## 2023-04-10 RX ADMIN — FAMOTIDINE 20 MG: 10 INJECTION INTRAVENOUS at 12:32

## 2023-04-10 RX ADMIN — RUBIDIUM CHLORIDE RB-82 1 DOSE: 150 INJECTION, SOLUTION INTRAVENOUS at 13:18

## 2023-04-10 RX ADMIN — SODIUM CHLORIDE 3 ML/KG/HR: 9 INJECTION, SOLUTION INTRAVENOUS at 12:15

## 2023-04-10 NOTE — INTERVAL H&P NOTE
H&P reviewed. The patient was examined and there are no changes to the H&P.      Impression:  Patient presents today for a [facility] +/- CBI as protocol for TAVR workup. He has an iodine contrast dye allergy and he preloaded this morning with benadryl and pepcid. He was also given a shot of solumedrol when he arrived. He had a severe reaction to iodinated contrast dye back in the 1990's when he was having a surgery on his prostate for cancer. Today, he states that he has continued to have shortness of breath with exertion and occasional associated dizziness but denies ever passing out.  He denies any chest pain, palpitations, presyncope, syncope, nausea, vomiting, edema, tingling, or numbness.  He has been taking all of his medications as directed.     Plan:   Defer heart catheterization due to impaired renal function and his Cr being more elevated than his baseline, which will increase his risk of contrast dye-induced nephropathy and do a cardiac PET instead today for ischemic evaluation.      Nessa Martinez PA-C functioning as a scribe for Rachael Mendoza MD, FACC.    Addendum: Point-of-care creatinine is 3.0 today.  Lab creatinine is 2.4.  The patient's films from 2019 were reviewed.  The patient had a near normal ramus, circumflex and right coronary in 2019.  Based on his iodine allergy and his creatinine which may be as high as 3, I discussed the possibility of a PET scan with the patient.  He is agreeable to proceed.    Rachael Mendoza MD, FACC

## 2023-04-10 NOTE — PLAN OF CARE
Goal Outcome Evaluation:               Patient had a stress test. Heart cath canceled. Patient is being discharged home with family. Denies needs/concerns at this time. Will be coming back for CT scan and to see Dr. Lopez.

## 2023-04-10 NOTE — PROGRESS NOTES
PET scan performed and is completely normal.  His ejection fraction is also normal.  From a coronary artery standpoint he is cleared to proceed with TAVR as his LAD stent is patent.  He will hydrate over the next week or so and have a CMP done in 1 week to recheck creatinine.  I have asked him to hydrate and hold his losartan for 2 days prior to his CT scan.  Rachael Mendoza MD, FACC

## 2023-04-19 DIAGNOSIS — I10 ESSENTIAL HYPERTENSION: Primary | Chronic | ICD-10-CM

## 2023-04-19 DIAGNOSIS — N18.32 CHRONIC RENAL FAILURE, STAGE 3B: ICD-10-CM

## 2023-04-21 ENCOUNTER — LAB (OUTPATIENT)
Dept: FAMILY MEDICINE CLINIC | Facility: CLINIC | Age: 78
End: 2023-04-21
Payer: COMMERCIAL

## 2023-04-21 DIAGNOSIS — I35.0 NONRHEUMATIC AORTIC VALVE STENOSIS: ICD-10-CM

## 2023-04-21 DIAGNOSIS — Z79.4 TYPE 2 DIABETES MELLITUS WITH COMPLICATION, WITH LONG-TERM CURRENT USE OF INSULIN: ICD-10-CM

## 2023-04-21 DIAGNOSIS — N18.32 CHRONIC RENAL FAILURE, STAGE 3B: ICD-10-CM

## 2023-04-21 DIAGNOSIS — E11.8 TYPE 2 DIABETES MELLITUS WITH COMPLICATION, WITH LONG-TERM CURRENT USE OF INSULIN: ICD-10-CM

## 2023-04-21 DIAGNOSIS — I10 ESSENTIAL HYPERTENSION: Chronic | ICD-10-CM

## 2023-04-21 LAB
ANION GAP SERPL CALCULATED.3IONS-SCNC: 9.3 MMOL/L (ref 5–15)
BUN SERPL-MCNC: 40 MG/DL (ref 8–23)
BUN/CREAT SERPL: 16.9 (ref 7–25)
CALCIUM SPEC-SCNC: 9.1 MG/DL (ref 8.6–10.5)
CHLORIDE SERPL-SCNC: 108 MMOL/L (ref 98–107)
CO2 SERPL-SCNC: 21.7 MMOL/L (ref 22–29)
CREAT SERPL-MCNC: 2.37 MG/DL (ref 0.76–1.27)
DEPRECATED RDW RBC AUTO: 54.3 FL (ref 37–54)
EGFRCR SERPLBLD CKD-EPI 2021: 27.5 ML/MIN/1.73
ERYTHROCYTE [DISTWIDTH] IN BLOOD BY AUTOMATED COUNT: 16.8 % (ref 12.3–15.4)
GLUCOSE SERPL-MCNC: 209 MG/DL (ref 65–99)
HCT VFR BLD AUTO: 41.7 % (ref 37.5–51)
HGB BLD-MCNC: 13.2 G/DL (ref 13–17.7)
MCH RBC QN AUTO: 27.8 PG (ref 26.6–33)
MCHC RBC AUTO-ENTMCNC: 31.7 G/DL (ref 31.5–35.7)
MCV RBC AUTO: 88 FL (ref 79–97)
PLATELET # BLD AUTO: 210 10*3/MM3 (ref 140–450)
PMV BLD AUTO: 11.1 FL (ref 6–12)
POTASSIUM SERPL-SCNC: 4.8 MMOL/L (ref 3.5–5.2)
RBC # BLD AUTO: 4.74 10*6/MM3 (ref 4.14–5.8)
SODIUM SERPL-SCNC: 139 MMOL/L (ref 136–145)
WBC NRBC COR # BLD: 7.51 10*3/MM3 (ref 3.4–10.8)

## 2023-04-21 PROCEDURE — 80048 BASIC METABOLIC PNL TOTAL CA: CPT | Performed by: INTERNAL MEDICINE

## 2023-04-21 PROCEDURE — 85027 COMPLETE CBC AUTOMATED: CPT | Performed by: INTERNAL MEDICINE

## 2023-04-21 PROCEDURE — 82043 UR ALBUMIN QUANTITATIVE: CPT | Performed by: GENERAL PRACTICE

## 2023-04-21 RX ORDER — DIPHENHYDRAMINE HCL 25 MG
TABLET ORAL
Qty: 2 TABLET | Refills: 0 | Status: SHIPPED | OUTPATIENT
Start: 2023-04-21

## 2023-04-21 RX ORDER — PREDNISONE 50 MG/1
TABLET ORAL
Qty: 3 TABLET | Refills: 0 | Status: SHIPPED | OUTPATIENT
Start: 2023-04-21

## 2023-04-22 LAB — ALBUMIN UR-MCNC: 30.8 MG/DL

## 2023-04-25 DIAGNOSIS — I10 ESSENTIAL HYPERTENSION: ICD-10-CM

## 2023-04-25 DIAGNOSIS — I35.0 NONRHEUMATIC AORTIC VALVE STENOSIS: Primary | Chronic | ICD-10-CM

## 2023-04-25 RX ORDER — CARVEDILOL 3.12 MG/1
3.12 TABLET ORAL 2 TIMES DAILY
Qty: 180 TABLET | Refills: 3 | Status: SHIPPED | OUTPATIENT
Start: 2023-04-25

## 2023-04-26 ENCOUNTER — HOSPITAL ENCOUNTER (OUTPATIENT)
Dept: CT IMAGING | Facility: HOSPITAL | Age: 78
Discharge: HOME | End: 2023-04-26
Payer: COMMERCIAL

## 2023-04-26 VITALS
HEART RATE: 65 BPM | WEIGHT: 224.2 LBS | DIASTOLIC BLOOD PRESSURE: 86 MMHG | BODY MASS INDEX: 33.98 KG/M2 | RESPIRATION RATE: 18 BRPM | OXYGEN SATURATION: 100 % | SYSTOLIC BLOOD PRESSURE: 173 MMHG | TEMPERATURE: 96.8 F | HEIGHT: 68 IN

## 2023-04-26 DIAGNOSIS — I35.0 NONRHEUMATIC AORTIC VALVE STENOSIS: Chronic | ICD-10-CM

## 2023-04-26 LAB — GLUCOSE BLDC GLUCOMTR-MCNC: 199 MG/DL (ref 70–130)

## 2023-04-26 PROCEDURE — 82962 GLUCOSE BLOOD TEST: CPT

## 2023-04-26 PROCEDURE — 71275 CT ANGIOGRAPHY CHEST: CPT

## 2023-04-26 PROCEDURE — 74174 CTA ABD&PLVS W/CONTRAST: CPT

## 2023-04-26 PROCEDURE — 25510000001 IOPAMIDOL PER 1 ML

## 2023-04-26 RX ORDER — METOPROLOL TARTRATE 50 MG/1
100 TABLET, FILM COATED ORAL ONCE AS NEEDED
Status: COMPLETED | OUTPATIENT
Start: 2023-04-26 | End: 2023-04-26

## 2023-04-26 RX ORDER — SODIUM CHLORIDE 0.9 % (FLUSH) 0.9 %
3 SYRINGE (ML) INJECTION EVERY 12 HOURS SCHEDULED
Status: DISCONTINUED | OUTPATIENT
Start: 2023-04-26 | End: 2023-04-27 | Stop reason: HOSPADM

## 2023-04-26 RX ORDER — NITROGLYCERIN 0.4 MG/1
0.4 TABLET SUBLINGUAL
Status: DISCONTINUED | OUTPATIENT
Start: 2023-04-26 | End: 2023-04-27 | Stop reason: HOSPADM

## 2023-04-26 RX ORDER — METOPROLOL TARTRATE 50 MG/1
50 TABLET, FILM COATED ORAL ONCE AS NEEDED
Status: COMPLETED | OUTPATIENT
Start: 2023-04-26 | End: 2023-04-26

## 2023-04-26 RX ORDER — LIDOCAINE HYDROCHLORIDE 10 MG/ML
5 INJECTION, SOLUTION EPIDURAL; INFILTRATION; INTRACAUDAL; PERINEURAL AS NEEDED
Status: DISCONTINUED | OUTPATIENT
Start: 2023-04-26 | End: 2023-04-27 | Stop reason: HOSPADM

## 2023-04-26 RX ORDER — SODIUM CHLORIDE 0.9 % (FLUSH) 0.9 %
10 SYRINGE (ML) INJECTION AS NEEDED
Status: DISCONTINUED | OUTPATIENT
Start: 2023-04-26 | End: 2023-04-27 | Stop reason: HOSPADM

## 2023-04-26 RX ADMIN — METOPROLOL TARTRATE 100 MG: 50 TABLET ORAL at 08:25

## 2023-04-26 RX ADMIN — SODIUM CHLORIDE 1000 ML: 9 INJECTION, SOLUTION INTRAVENOUS at 08:25

## 2023-04-26 RX ADMIN — IOPAMIDOL 85 ML: 755 INJECTION, SOLUTION INTRAVENOUS at 11:08

## 2023-04-26 NOTE — NURSING NOTE
Spoke with Dr Clemente. Reported Cr. 2.5, GFR 25, contrast allergy and premeds for contrast allergy  taken, and IV fluids via renal protocol. History of CHF. OK to proceed with Angiogram.

## 2023-04-28 ENCOUNTER — DOCUMENTATION (OUTPATIENT)
Dept: CARDIOLOGY | Facility: HOSPITAL | Age: 78
End: 2023-04-28
Payer: COMMERCIAL

## 2023-05-01 ENCOUNTER — PREP FOR SURGERY (OUTPATIENT)
Dept: OTHER | Facility: HOSPITAL | Age: 78
End: 2023-05-01
Payer: COMMERCIAL

## 2023-05-01 ENCOUNTER — OFFICE VISIT (OUTPATIENT)
Dept: CARDIAC SURGERY | Facility: CLINIC | Age: 78
End: 2023-05-01
Payer: COMMERCIAL

## 2023-05-01 ENCOUNTER — HOSPITAL ENCOUNTER (OUTPATIENT)
Dept: CARDIOLOGY | Facility: HOSPITAL | Age: 78
Discharge: HOME | End: 2023-05-01
Payer: COMMERCIAL

## 2023-05-01 ENCOUNTER — OFFICE VISIT (OUTPATIENT)
Dept: CARDIOLOGY | Facility: HOSPITAL | Age: 78
End: 2023-05-01
Payer: COMMERCIAL

## 2023-05-01 VITALS
BODY MASS INDEX: 34.74 KG/M2 | SYSTOLIC BLOOD PRESSURE: 142 MMHG | RESPIRATION RATE: 18 BRPM | WEIGHT: 229.2 LBS | TEMPERATURE: 97.6 F | HEART RATE: 77 BPM | DIASTOLIC BLOOD PRESSURE: 63 MMHG | HEIGHT: 68 IN | OXYGEN SATURATION: 97 %

## 2023-05-01 VITALS
WEIGHT: 229.2 LBS | HEART RATE: 155 BPM | DIASTOLIC BLOOD PRESSURE: 80 MMHG | TEMPERATURE: 97.1 F | SYSTOLIC BLOOD PRESSURE: 132 MMHG | BODY MASS INDEX: 34.74 KG/M2 | HEIGHT: 68 IN | OXYGEN SATURATION: 98 %

## 2023-05-01 DIAGNOSIS — I35.9 AORTIC VALVE DISORDER: Primary | ICD-10-CM

## 2023-05-01 DIAGNOSIS — I10 ESSENTIAL HYPERTENSION: ICD-10-CM

## 2023-05-01 DIAGNOSIS — I35.0 NONRHEUMATIC AORTIC VALVE STENOSIS: Primary | Chronic | ICD-10-CM

## 2023-05-01 DIAGNOSIS — E78.2 MIXED HYPERLIPIDEMIA: ICD-10-CM

## 2023-05-01 DIAGNOSIS — I35.0 AORTIC STENOSIS, SEVERE: ICD-10-CM

## 2023-05-01 DIAGNOSIS — R00.0 TACHYCARDIA: Primary | ICD-10-CM

## 2023-05-01 DIAGNOSIS — R00.0 TACHYCARDIA: ICD-10-CM

## 2023-05-01 LAB
QT INTERVAL: 426 MS
QTC INTERVAL: 485 MS

## 2023-05-01 PROCEDURE — 93005 ELECTROCARDIOGRAM TRACING: CPT | Performed by: NURSE PRACTITIONER

## 2023-05-01 RX ORDER — NITROGLYCERIN 0.4 MG/1
0.4 TABLET SUBLINGUAL
Status: CANCELLED | OUTPATIENT
Start: 2023-05-01

## 2023-05-01 RX ORDER — CEFAZOLIN SODIUM 2 G/100ML
2 INJECTION, SOLUTION INTRAVENOUS ONCE
Status: CANCELLED | OUTPATIENT
Start: 2023-05-01 | End: 2023-05-01

## 2023-05-01 RX ORDER — CHLORHEXIDINE GLUCONATE 500 MG/1
1 CLOTH TOPICAL EVERY 12 HOURS PRN
Status: CANCELLED | OUTPATIENT
Start: 2023-05-01

## 2023-05-01 RX ORDER — ASPIRIN 325 MG
325 TABLET ORAL NIGHTLY
Status: CANCELLED | OUTPATIENT
Start: 2023-05-01 | End: 2023-05-02

## 2023-05-01 RX ORDER — CHLORHEXIDINE GLUCONATE 0.12 MG/ML
15 RINSE ORAL ONCE
Status: CANCELLED | OUTPATIENT
Start: 2023-05-01 | End: 2023-05-01

## 2023-05-01 NOTE — H&P (VIEW-ONLY)
05/01/2023  Patient Information  Nicholas Yin                                                                                          [address] [address]   1945  'PCP/Referring Physician'  Waldemar Trejo MD  [phone]  Miri Mauricio APRN  [phone]  Chief Complaint   Patient presents with   • Consult     Per Miri MURILLO (cardiology) for TAVR consult.  He complains of breathlessness and episodes of tachycardia.        History of Present Illness:  The patient is a 78-year-old male non-smoker who has been getting increasingly short of breath over the last several months.  He has had a recent transesophageal echo by Dr. Mendoza and he meets the criteria for a TAVR placement.  He denies rheumatic fever that he is aware of.  He does not have ankle swelling.  He does have a history of fast heart rate perhaps consistent with atrial fibrillation.      Patient Active Problem List   Diagnosis   • DDD (degenerative disc disease), lumbar   • Gastroesophageal reflux disease without esophagitis   • H/O testicular cancer   • Mixed hyperlipidemia   • Essential hypertension   • Type 2 diabetes mellitus with complication, with long-term current use of insulin   • Vitamin D deficiency   • Encounter for immunization   • Healthcare maintenance   • Left shoulder pain   • History of hepatitis C   • Vasculogenic erectile dysfunction   • Benign prostatic hyperplasia with nocturia   • Amaurosis fugax of right eye   • Chronic renal failure, stage 3b (CMS/HCC)   • PAD (peripheral artery disease) ([facility])   • CAD (coronary artery disease)   • Osteopenia of multiple sites   • History of nonmelanoma skin cancer   • History of colon cancer   • Nonrheumatic aortic valve stenosis   • Aortic valve disorder     Past Medical History:   Diagnosis Date   • Anemia    • Arthritis    • Back pain    • CancerTesticular/Colon     test -1982  Colon -2005   • CHF (congestive heart failure)    • Coronary  artery disease    • DDD (degenerative disc disease), lumbosacral    • Diabetes mellitus    • Elevated cholesterol    • Erectile dysfunction    • GERD (gastroesophageal reflux disease)    • History of transfusion    • Hyperlipidemia    • Hypertension      Past Surgical History:   Procedure Laterality Date   • CARDIAC CATHETERIZATION N/A 08/15/2019    Procedure: Left Heart Cath;  Surgeon: Paul Villatoro MD;  Location:  [facility] CATH INVASIVE LOCATION;  Service: Cardiology   • CARDIAC CATHETERIZATION      04/10/2023 PER DR. MCCORD   • CATARACT EXTRACTION, BILATERAL     • COLON SURGERY      colon resection for cancer - sigmoid removed - no chemo no radiation   • COLONOSCOPY     • COLONOSCOPY N/A 01/27/2021    Procedure: COLONOSCOPY;  Surgeon: Greg Barraza MD;  Location: [facility] OR;  Service: General;  Laterality: N/A;   • ENDOSCOPY  1989   • EYE SURGERY Right     retinal detachment   • CT RT/LT HEART CATHETERS N/A 08/15/2019    Procedure: Percutaneous Coronary Intervention;  Surgeon: Paul Villatoro MD;  Location:  [facility] CATH INVASIVE LOCATION;  Service: Cardiology   • SCALP LESION REMOVAL W/ FLAP AND SKIN GRAFT  12/16/2022    basal cell removal   • SHOULDER ARTHROSCOPY     • SKIN LESION EXCISION N/A 01/27/2021    Procedure: SKIN LESIONS EXCISION FROM LEFT TEMPLE AREA AND ABDOMEN.;  Surgeon: Greg Barraza MD;  Location: [facility] OR;  Service: General;  Laterality: N/A;   • VASECTOMY Right     testicle removed d/t cancer - with radiation       Current Outpatient Medications:   •  amLODIPine (NORVASC) 10 MG tablet, Take 1 tablet by mouth Daily for blood pressure., Disp: 90 tablet, Rfl: 3  •  aspirin 81 MG EC tablet, Take 1 tablet by mouth Daily., Disp: , Rfl:   •  carvedilol (COREG) 3.125 MG tablet, Take 1 tablet by mouth 2 (Two) Times a Day., Disp: 180 tablet, Rfl: 3  •  coenzyme Q10 100 MG capsule, Take 2 capsules by mouth Daily., Disp: , Rfl:   •  dapagliflozin Propanediol  (Farxiga) 10 MG tablet, Take 10 mg by mouth Daily., Disp: 49 tablet, Rfl: 0  •  docusate sodium (Colace) 100 MG capsule, Take 1 capsule by mouth 2 (Two) Times a Day. (Patient taking differently: Take 1 capsule by mouth 2 (Two) Times a Day As Needed.), Disp: 72 capsule, Rfl: 0  •  losartan (COZAAR) 50 MG tablet, Take 1/2 tablet by mouth 2 (Two) Times a Day., Disp: 60 tablet, Rfl: 11  •  MAGNESIUM CITRATE PO, Take 250 mg by mouth Daily., Disp: , Rfl:   •  multivitamin with minerals tablet tablet, Take 1 tablet by mouth Daily., Disp: , Rfl:   •  omeprazole (priLOSEC) 40 MG capsule, Take 1 capsule by mouth Daily., Disp: , Rfl:   •  rosuvastatin (CRESTOR) 20 MG tablet, Take 1 tablet by mouth Daily., Disp: 90 tablet, Rfl: 3  •  ticagrelor (BRILINTA) 60 MG tablet tablet, Take 1 tablet by mouth 2 (Two) Times a Day., Disp: 180 tablet, Rfl: 3  •  vitamin D (ERGOCALCIFEROL) 1.25 MG (06613 UT) capsule capsule, Take 1 capsule by mouth Every 7 (Seven) Days., Disp: 12 capsule, Rfl: 1  •  Xultophy 100-3.6 UNIT-MG/ML solution pen-injector subcutaneous pen, Inject 50 Units under the skin into the appropriate area as directed Daily. (Patient taking differently: Inject 30 Units under the skin into the appropriate area as directed Daily.), Disp: 15 mL, Rfl: 5  Allergies   Allergen Reactions   • Iodinated Contrast Media Shortness Of Breath     Social History     Socioeconomic History   • Marital status:      Spouse name: lita   • Number of children: 3   • Years of education: 16   Tobacco Use   • Smoking status: Never     Passive exposure: Never   • Smokeless tobacco: Never   Vaping Use   • Vaping Use: Never used   Substance and Sexual Activity   • Alcohol use: Yes     Comment: occ   • Drug use: No   • Sexual activity: Defer     Family History   Problem Relation Age of Onset   • Stroke Mother    • Hypertension Mother    • Alcohol abuse Mother    • COPD Mother    • COPD Father    • Alcohol abuse Father    • Hypertension Father   "  • Alzheimer's disease Father    • Heart disease Brother    • Hypertension Brother    • Diabetes Brother    • Stroke Maternal Grandfather    • Cancer Paternal Grandmother      Review of Systems   Constitutional: Positive for night sweats (weekly ). Negative for chills, decreased appetite, diaphoresis, fever, malaise/fatigue, weight gain and weight loss.   HENT: Negative.  Negative for hoarse voice.    Eyes: Negative.  Negative for blurred vision, double vision and visual disturbance.   Cardiovascular: Positive for dyspnea on exertion, irregular heartbeat and leg swelling (BLE - minimal ). Negative for chest pain, claudication, near-syncope, orthopnea, palpitations, paroxysmal nocturnal dyspnea and syncope.   Respiratory: Positive for shortness of breath. Negative for cough, hemoptysis, sputum production and wheezing.    Hematologic/Lymphatic: Negative for adenopathy and bleeding problem. Bruises/bleeds easily.   Skin: Negative.  Negative for color change, nail changes, poor wound healing and rash.   Musculoskeletal: Positive for back pain (lumbar). Negative for falls and muscle cramps.   Gastrointestinal: Negative.  Negative for abdominal pain, dysphagia and heartburn.   Genitourinary: Positive for nocturia (X 2). Negative for flank pain.   Neurological: Negative.  Negative for brief paralysis, disturbances in coordination, dizziness, focal weakness, headaches, light-headedness, loss of balance, numbness, paresthesias, sensory change, vertigo and weakness.   Psychiatric/Behavioral: Negative for depression and suicidal ideas.   Allergic/Immunologic: Negative for persistent infections.     Vitals:    05/01/23 1254 05/01/23 1255   BP: 130/80 132/80   BP Location: Left arm Right arm   Patient Position: Sitting Sitting   Pulse: (!) 155    Temp: 97.1 °F (36.2 °C)    SpO2: 98%    Weight: 104 kg (229 lb 3.2 oz)    Height: 172.7 cm (67.99\")       Physical Exam  Vitals and nursing note reviewed.   Constitutional:       " General: He is not in acute distress.     Appearance: He is well-developed.   HENT:      Head: Normocephalic.   Eyes:      Conjunctiva/sclera: Conjunctivae normal.      Pupils: Pupils are equal, round, and reactive to light.   Neck:      Thyroid: No thyroid mass or thyromegaly.   Cardiovascular:      Rate and Rhythm: Normal rate. Rhythm irregular.      Heart sounds: Murmur heard.     No friction rub. No gallop.   Pulmonary:      Breath sounds: No wheezing, rhonchi or rales.   Abdominal:      General: Bowel sounds are normal. There is no distension.      Palpations: Abdomen is soft. There is no mass.      Tenderness: There is no abdominal tenderness.   Musculoskeletal:         General: No deformity. Normal range of motion.      Cervical back: Normal range of motion.   Skin:     Findings: No petechiae.      Nails: There is no clubbing.   Neurological:      Mental Status: He is oriented to person, place, and time.      Cranial Nerves: No cranial nerve deficit.      Sensory: No sensory deficit.   Psychiatric:         Behavior: Behavior normal.         The ROS, past medical history, surgical history, family history, social history and vitals were reviewed by myself and corrected as needed.      Labs/Imaging:  I have obtained and reviewed the medical records from Ms. Mauricio, including the echocardiogram demonstrating severe aortic stenosis.     Assessment/Plan:   The patient is a 78-year-old male non-smoker who has a history of severe aortic stenosis as documented in the LAURIE by Dr. Rachael Mendoza.  I have personally examined this patient and reviewed the echo and I concur with this.  I have explained the TAVR procedure and have explained the procedure, risks, and alternatives.  I discussed the risks, which includes, risk of pacemaker, CVA, renal dysfunction, death, and other risk factors as well as alternatives.  He appears to be fully informed and desires to proceed. The patient does not have an advance directive  on file at this time. I would like to thank you for this consultation.     Patient Active Problem List   Diagnosis   • DDD (degenerative disc disease), lumbar   • Gastroesophageal reflux disease without esophagitis   • H/O testicular cancer   • Mixed hyperlipidemia   • Essential hypertension   • Type 2 diabetes mellitus with complication, with long-term current use of insulin   • Vitamin D deficiency   • Encounter for immunization   • Healthcare maintenance   • Left shoulder pain   • History of hepatitis C   • Vasculogenic erectile dysfunction   • Benign prostatic hyperplasia with nocturia   • Amaurosis fugax of right eye   • Chronic renal failure, stage 3b (CMS/HCC)   • PAD (peripheral artery disease) (HCC)   • CAD (coronary artery disease)   • Osteopenia of multiple sites   • History of nonmelanoma skin cancer   • History of colon cancer   • Nonrheumatic aortic valve stenosis   • Aortic valve disorder       CC: MD Miri Brooks APRN Regina Fugate editing for Arben Lopez MD    I, Arben Lopez MD, have read and agree with the editing done by Linda Ramos, .

## 2023-05-01 NOTE — PROGRESS NOTES
05/01/2023  Patient Information  Nicholas Yin                                                                                          [address] [facility] [address]   1945  'PCP/Referring Physician'  Waldemar Trejo MD  [phone]  Miri Mauricio APRN  [phone]  Chief Complaint   Patient presents with   • Consult     Per Mrii MURILLO (cardiology) for TAVR consult.  He complains of breathlessness and episodes of tachycardia.        History of Present Illness:  The patient is a 78-year-old male non-smoker who has been getting increasingly short of breath over the last several months.  He has had a recent transesophageal echo by Dr. Mendoza and he meets the criteria for a TAVR placement.  He denies rheumatic fever that he is aware of.  He does not have ankle swelling.  He does have a history of fast heart rate perhaps consistent with atrial fibrillation.      Patient Active Problem List   Diagnosis   • DDD (degenerative disc disease), lumbar   • Gastroesophageal reflux disease without esophagitis   • H/O testicular cancer   • Mixed hyperlipidemia   • Essential hypertension   • Type 2 diabetes mellitus with complication, with long-term current use of insulin   • Vitamin D deficiency   • Encounter for immunization   • Healthcare maintenance   • Left shoulder pain   • History of hepatitis C   • Vasculogenic erectile dysfunction   • Benign prostatic hyperplasia with nocturia   • Amaurosis fugax of right eye   • Chronic renal failure, stage 3b (CMS/HCC)   • PAD (peripheral artery disease) ([facility])   • CAD (coronary artery disease)   • Osteopenia of multiple sites   • History of nonmelanoma skin cancer   • History of colon cancer   • Nonrheumatic aortic valve stenosis   • Aortic valve disorder     Past Medical History:   Diagnosis Date   • Anemia    • Arthritis    • Back pain    • CancerTesticular/Colon     test -1982  Colon -2005   • CHF (congestive heart failure)    • Coronary  artery disease    • DDD (degenerative disc disease), lumbosacral    • Diabetes mellitus    • Elevated cholesterol    • Erectile dysfunction    • GERD (gastroesophageal reflux disease)    • History of transfusion    • Hyperlipidemia    • Hypertension      Past Surgical History:   Procedure Laterality Date   • CARDIAC CATHETERIZATION N/A 08/15/2019    Procedure: Left Heart Cath;  Surgeon: Paul Villatoro MD;  Location:  [facility];  Service: Cardiology   • CARDIAC CATHETERIZATION      04/10/2023 PER DR. MCCORD   • CATARACT EXTRACTION, BILATERAL     • COLON SURGERY      colon resection for cancer - sigmoid removed - no chemo no radiation   • COLONOSCOPY     • COLONOSCOPY N/A 01/27/2021    Procedure: COLONOSCOPY;  Surgeon: Greg Barraza MD;  Location: [facility] OR;  Service: General;  Laterality: N/A;   • ENDOSCOPY  1989   • EYE SURGERY Right     retinal detachment   • TX RT/LT HEART CATHETERS N/A 08/15/2019    Procedure: Percutaneous Coronary Intervention;  Surgeon: Paul Villatoro MD;  Location:  [facility]ATION;  Service: Cardiology   • SCALP LESION REMOVAL W/ FLAP AND SKIN GRAFT  12/16/2022    basal cell removal   • SHOULDER ARTHROSCOPY     • SKIN LESION EXCISION N/A 01/27/2021    Procedure: SKIN LESIONS EXCISION FROM LEFT TEMPLE AREA AND ABDOMEN.;  Surgeon: Greg Barraza MD;  Location: [facility] OR;  Service: General;  Laterality: N/A;   • VASECTOMY Right     testicle removed d/t cancer - with radiation       Current Outpatient Medications:   •  amLODIPine (NORVASC) 10 MG tablet, Take 1 tablet by mouth Daily for blood pressure., Disp: 90 tablet, Rfl: 3  •  aspirin 81 MG EC tablet, Take 1 tablet by mouth Daily., Disp: , Rfl:   •  carvedilol (COREG) 3.125 MG tablet, Take 1 tablet by mouth 2 (Two) Times a Day., Disp: 180 tablet, Rfl: 3  •  coenzyme Q10 100 MG capsule, Take 2 capsules by mouth Daily., Disp: , Rfl:   •  dapagliflozin Propanediol  (Farxiga) 10 MG tablet, Take 10 mg by mouth Daily., Disp: 49 tablet, Rfl: 0  •  docusate sodium (Colace) 100 MG capsule, Take 1 capsule by mouth 2 (Two) Times a Day. (Patient taking differently: Take 1 capsule by mouth 2 (Two) Times a Day As Needed.), Disp: 72 capsule, Rfl: 0  •  losartan (COZAAR) 50 MG tablet, Take 1/2 tablet by mouth 2 (Two) Times a Day., Disp: 60 tablet, Rfl: 11  •  MAGNESIUM CITRATE PO, Take 250 mg by mouth Daily., Disp: , Rfl:   •  multivitamin with minerals tablet tablet, Take 1 tablet by mouth Daily., Disp: , Rfl:   •  omeprazole (priLOSEC) 40 MG capsule, Take 1 capsule by mouth Daily., Disp: , Rfl:   •  rosuvastatin (CRESTOR) 20 MG tablet, Take 1 tablet by mouth Daily., Disp: 90 tablet, Rfl: 3  •  ticagrelor (BRILINTA) 60 MG tablet tablet, Take 1 tablet by mouth 2 (Two) Times a Day., Disp: 180 tablet, Rfl: 3  •  vitamin D (ERGOCALCIFEROL) 1.25 MG (49505 UT) capsule capsule, Take 1 capsule by mouth Every 7 (Seven) Days., Disp: 12 capsule, Rfl: 1  •  Xultophy 100-3.6 UNIT-MG/ML solution pen-injector subcutaneous pen, Inject 50 Units under the skin into the appropriate area as directed Daily. (Patient taking differently: Inject 30 Units under the skin into the appropriate area as directed Daily.), Disp: 15 mL, Rfl: 5  Allergies   Allergen Reactions   • Iodinated Contrast Media Shortness Of Breath     Social History     Socioeconomic History   • Marital status:      Spouse name: lita   • Number of children: 3   • Years of education: 16   Tobacco Use   • Smoking status: Never     Passive exposure: Never   • Smokeless tobacco: Never   Vaping Use   • Vaping Use: Never used   Substance and Sexual Activity   • Alcohol use: Yes     Comment: occ   • Drug use: No   • Sexual activity: Defer     Family History   Problem Relation Age of Onset   • Stroke Mother    • Hypertension Mother    • Alcohol abuse Mother    • COPD Mother    • COPD Father    • Alcohol abuse Father    • Hypertension Father   "  • Alzheimer's disease Father    • Heart disease Brother    • Hypertension Brother    • Diabetes Brother    • Stroke Maternal Grandfather    • Cancer Paternal Grandmother      Review of Systems   Constitutional: Positive for night sweats (weekly ). Negative for chills, decreased appetite, diaphoresis, fever, malaise/fatigue, weight gain and weight loss.   HENT: Negative.  Negative for hoarse voice.    Eyes: Negative.  Negative for blurred vision, double vision and visual disturbance.   Cardiovascular: Positive for dyspnea on exertion, irregular heartbeat and leg swelling (BLE - minimal ). Negative for chest pain, claudication, near-syncope, orthopnea, palpitations, paroxysmal nocturnal dyspnea and syncope.   Respiratory: Positive for shortness of breath. Negative for cough, hemoptysis, sputum production and wheezing.    Hematologic/Lymphatic: Negative for adenopathy and bleeding problem. Bruises/bleeds easily.   Skin: Negative.  Negative for color change, nail changes, poor wound healing and rash.   Musculoskeletal: Positive for back pain (lumbar). Negative for falls and muscle cramps.   Gastrointestinal: Negative.  Negative for abdominal pain, dysphagia and heartburn.   Genitourinary: Positive for nocturia (X 2). Negative for flank pain.   Neurological: Negative.  Negative for brief paralysis, disturbances in coordination, dizziness, focal weakness, headaches, light-headedness, loss of balance, numbness, paresthesias, sensory change, vertigo and weakness.   Psychiatric/Behavioral: Negative for depression and suicidal ideas.   Allergic/Immunologic: Negative for persistent infections.     Vitals:    05/01/23 1254 05/01/23 1255   BP: 130/80 132/80   BP Location: Left arm Right arm   Patient Position: Sitting Sitting   Pulse: (!) 155    Temp: 97.1 °F (36.2 °C)    SpO2: 98%    Weight: 104 kg (229 lb 3.2 oz)    Height: 172.7 cm (67.99\")       Physical Exam  Vitals and nursing note reviewed.   Constitutional:       " General: He is not in acute distress.     Appearance: He is well-developed.   HENT:      Head: Normocephalic.   Eyes:      Conjunctiva/sclera: Conjunctivae normal.      Pupils: Pupils are equal, round, and reactive to light.   Neck:      Thyroid: No thyroid mass or thyromegaly.   Cardiovascular:      Rate and Rhythm: Normal rate. Rhythm irregular.      Heart sounds: Murmur heard.     No friction rub. No gallop.   Pulmonary:      Breath sounds: No wheezing, rhonchi or rales.   Abdominal:      General: Bowel sounds are normal. There is no distension.      Palpations: Abdomen is soft. There is no mass.      Tenderness: There is no abdominal tenderness.   Musculoskeletal:         General: No deformity. Normal range of motion.      Cervical back: Normal range of motion.   Skin:     Findings: No petechiae.      Nails: There is no clubbing.   Neurological:      Mental Status: He is oriented to person, place, and time.      Cranial Nerves: No cranial nerve deficit.      Sensory: No sensory deficit.   Psychiatric:         Behavior: Behavior normal.         The ROS, past medical history, surgical history, family history, social history and vitals were reviewed by myself and corrected as needed.      Labs/Imaging:  I have obtained and reviewed the medical records from Ms. Mauricio, including the echocardiogram demonstrating severe aortic stenosis.     Assessment/Plan:   The patient is a 78-year-old male non-smoker who has a history of severe aortic stenosis as documented in the [facility] by Dr. Rachael Mendoza.  I have personally examined this patient and reviewed the echo and I concur with this.  I have explained the TAVR procedure and have explained the procedure, risks, and alternatives.  I discussed the risks, which includes, risk of pacemaker, CVA, renal dysfunction, death, and other risk factors as well as alternatives.  He appears to be fully informed and desires to proceed. The patient does not have an advance directive  on file at this time. I would like to thank you for this consultation.     Patient Active Problem List   Diagnosis   • DDD (degenerative disc disease), lumbar   • Gastroesophageal reflux disease without esophagitis   • H/O testicular cancer   • Mixed hyperlipidemia   • Essential hypertension   • Type 2 diabetes mellitus with complication, with long-term current use of insulin   • Vitamin D deficiency   • Encounter for immunization   • Healthcare maintenance   • Left shoulder pain   • History of hepatitis C   • Vasculogenic erectile dysfunction   • Benign prostatic hyperplasia with nocturia   • Amaurosis fugax of right eye   • Chronic renal failure, stage 3b (CMS/HCC)   • PAD (peripheral artery disease) (HCC)   • CAD (coronary artery disease)   • Osteopenia of multiple sites   • History of nonmelanoma skin cancer   • History of colon cancer   • Nonrheumatic aortic valve stenosis   • Aortic valve disorder       CC: MD Miri Brooks APRN Regina Fugate editing for Arben Lopez MD    I, Arben Lopez MD, have read and agree with the editing done by Linda Ramos, .

## 2023-05-02 NOTE — PROGRESS NOTES
"Chief Complaint  Rapid Heart Rate    Subjective    History of Present Illness {CC  Problem List  Visit  Diagnosis   Encounters  Notes  Medications  Labs  Result Review Imaging  Media :23}       History of Present Illness   78-year-old male presents the office today at the request of Dr. Lopez for ongoing evaluation of his rapid heart rate.  Patient notes that he occasionally experiences a rapid heart rate and it usually only lasts 15-20 minutes. Patient notes that he is scheduled for tavr surgery next Monday. Does report WHITTAKER and fatigue that has worsened over the last few months. Currently denies dizziness, presyncope,syncope  Vital Signs:   Vitals:    05/01/23 1405 05/01/23 1407 05/01/23 1408   BP: 156/67 127/61 142/63   BP Location: Right arm Left arm Left arm   Patient Position: Sitting Standing Sitting   Cuff Size: Adult Adult Adult   Pulse: 75 85 77   Resp:   18   Temp: 97.6 °F (36.4 °C) 97.6 °F (36.4 °C) 97.6 °F (36.4 °C)   TempSrc: Temporal Temporal Temporal   SpO2: 97% 95% 97%   Weight:   104 kg (229 lb 3.2 oz)   Height:   172.7 cm (68\")     Body mass index is 34.85 kg/m².  Physical Exam  Vitals and nursing note reviewed.   Constitutional:       Appearance: Normal appearance.   HENT:      Head: Normocephalic.   Eyes:      Pupils: Pupils are equal, round, and reactive to light.   Cardiovascular:      Rate and Rhythm: Normal rate and regular rhythm.      Pulses: Normal pulses.      Heart sounds: Normal heart sounds. No murmur heard.  Pulmonary:      Effort: Pulmonary effort is normal.      Breath sounds: Normal breath sounds.   Abdominal:      General: Bowel sounds are normal.      Palpations: Abdomen is soft.   Musculoskeletal:         General: Normal range of motion.      Cervical back: Normal range of motion.      Right lower leg: No edema.      Left lower leg: No edema.   Skin:     General: Skin is warm and dry.      Capillary Refill: Capillary refill takes less than 2 seconds.   Neurological: "      Mental Status: He is alert and oriented to person, place, and time.   Psychiatric:         Mood and Affect: Mood normal.         Thought Content: Thought content normal.              Result Review  Data Reviewed:{ Labs  Result Review  Imaging  Med Tab  Media :23}   US Carotid Bilateral (02/06/2023 08:39)  Adult Transthoracic Echo Limited W/ Cont if Necessary Per Protocol (02/06/2023 08:42)  Adult Transesophageal Echo ([facility]) W/ Cont if Necessary Per Protocol (03/13/2023 14:10)    Stress Test With Pet Myocardial Perfusion (04/10/2023 13:30)    EKG today normal sinus rhythm with right bundle branch block at 78 bpm         Assessment and Plan {CC Problem List  Visit Diagnosis  ROS  Review (Popup)  Health Maintenance  Quality  BestPractice  Medications  SmartSets  SnapShot Encounters  Media :23}   1. Tachycardia  Patient is now in nsr  Will place ekg to be completed in [facility] if tachycardia returns  Possible extended holter placement prior to discharge after tavr   - ECG 12 Lead; Future  - ECG 12 Lead; Future    2. Aortic stenosis, severe  tavr scheduled for 5/8/23    3. Essential hypertension  Stable on coreg, norvascl, losartan    4. Mixed hyperlipidemia  Stable on crestor       Follow Up {Instructions Charge Capture  Follow-up Communications :23}   Return if symptoms worsen or fail to improve.    Patient was given instructions and counseling regarding his condition or for health maintenance advice. Please see specific information pulled into the AVS if appropriate.  Patient was instructed to call the [facility] with any questions, concerns, or worsening symptoms.

## 2023-05-05 ENCOUNTER — DOCUMENTATION (OUTPATIENT)
Dept: CARDIOLOGY | Facility: HOSPITAL | Age: 78
End: 2023-05-05
Payer: COMMERCIAL

## 2023-05-05 ENCOUNTER — HOSPITAL ENCOUNTER (OUTPATIENT)
Dept: GENERAL RADIOLOGY | Facility: HOSPITAL | Age: 78
Discharge: HOME | DRG: 267 | End: 2023-05-05
Payer: COMMERCIAL

## 2023-05-05 ENCOUNTER — PRE-ADMISSION TESTING (OUTPATIENT)
Dept: PREADMISSION TESTING | Facility: HOSPITAL | Age: 78
DRG: 267 | End: 2023-05-05
Payer: COMMERCIAL

## 2023-05-05 ENCOUNTER — HOSPITAL ENCOUNTER (OUTPATIENT)
Dept: PULMONOLOGY | Facility: HOSPITAL | Age: 78
Discharge: HOME | DRG: 267 | End: 2023-05-05
Payer: COMMERCIAL

## 2023-05-05 VITALS — WEIGHT: 233.25 LBS | HEIGHT: 69 IN | BODY MASS INDEX: 34.55 KG/M2 | OXYGEN SATURATION: 96 %

## 2023-05-05 DIAGNOSIS — I35.0 NONRHEUMATIC AORTIC VALVE STENOSIS: ICD-10-CM

## 2023-05-05 LAB
ABO GROUP BLD: NORMAL
ALBUMIN SERPL-MCNC: 3.8 G/DL (ref 3.5–5.2)
ALBUMIN/GLOB SERPL: 1.2 G/DL
ALP SERPL-CCNC: 69 U/L (ref 39–117)
ALT SERPL W P-5'-P-CCNC: 14 U/L (ref 1–41)
AMPHET+METHAMPHET UR QL: NEGATIVE
AMPHETAMINES UR QL: NEGATIVE
ANION GAP SERPL CALCULATED.3IONS-SCNC: 11 MMOL/L (ref 5–15)
APTT PPP: 27 SECONDS (ref 22–39)
AST SERPL-CCNC: 20 U/L (ref 1–40)
BARBITURATES UR QL SCN: NEGATIVE
BASOPHILS # BLD AUTO: 0.04 10*3/MM3 (ref 0–0.2)
BASOPHILS NFR BLD AUTO: 0.5 % (ref 0–1.5)
BENZODIAZ UR QL SCN: NEGATIVE
BILIRUB SERPL-MCNC: 0.4 MG/DL (ref 0–1.2)
BLD GP AB SCN SERPL QL: NEGATIVE
BUN SERPL-MCNC: 33 MG/DL (ref 8–23)
BUN/CREAT SERPL: 15.2 (ref 7–25)
BUPRENORPHINE SERPL-MCNC: NEGATIVE NG/ML
CALCIUM SPEC-SCNC: 9.2 MG/DL (ref 8.6–10.5)
CANNABINOIDS SERPL QL: NEGATIVE
CHLORIDE SERPL-SCNC: 106 MMOL/L (ref 98–107)
CO2 SERPL-SCNC: 22 MMOL/L (ref 22–29)
COCAINE UR QL: NEGATIVE
CREAT SERPL-MCNC: 2.17 MG/DL (ref 0.76–1.27)
DEPRECATED RDW RBC AUTO: 50.5 FL (ref 37–54)
EGFRCR SERPLBLD CKD-EPI 2021: 30.6 ML/MIN/1.73
EOSINOPHIL # BLD AUTO: 0.34 10*3/MM3 (ref 0–0.4)
EOSINOPHIL NFR BLD AUTO: 4.2 % (ref 0.3–6.2)
ERYTHROCYTE [DISTWIDTH] IN BLOOD BY AUTOMATED COUNT: 16.2 % (ref 12.3–15.4)
GLOBULIN UR ELPH-MCNC: 3.2 GM/DL
GLUCOSE SERPL-MCNC: 65 MG/DL (ref 65–99)
HBA1C MFR BLD: 7.5 % (ref 4.8–5.6)
HCT VFR BLD AUTO: 41.1 % (ref 37.5–51)
HGB BLD-MCNC: 13.2 G/DL (ref 13–17.7)
IMM GRANULOCYTES # BLD AUTO: 0.04 10*3/MM3 (ref 0–0.05)
IMM GRANULOCYTES NFR BLD AUTO: 0.5 % (ref 0–0.5)
INR PPP: 0.98 (ref 0.89–1.12)
LYMPHOCYTES # BLD AUTO: 1.56 10*3/MM3 (ref 0.7–3.1)
LYMPHOCYTES NFR BLD AUTO: 19.1 % (ref 19.6–45.3)
MAGNESIUM SERPL-MCNC: 2.1 MG/DL (ref 1.6–2.4)
MCH RBC QN AUTO: 27.6 PG (ref 26.6–33)
MCHC RBC AUTO-ENTMCNC: 32.1 G/DL (ref 31.5–35.7)
MCV RBC AUTO: 85.8 FL (ref 79–97)
METHADONE UR QL SCN: NEGATIVE
MONOCYTES # BLD AUTO: 0.8 10*3/MM3 (ref 0.1–0.9)
MONOCYTES NFR BLD AUTO: 9.8 % (ref 5–12)
NEUTROPHILS NFR BLD AUTO: 5.38 10*3/MM3 (ref 1.7–7)
NEUTROPHILS NFR BLD AUTO: 65.9 % (ref 42.7–76)
NRBC BLD AUTO-RTO: 0 /100 WBC (ref 0–0.2)
OPIATES UR QL: NEGATIVE
OXYCODONE UR QL SCN: NEGATIVE
PA ADP PRP-ACNC: 88 PRU
PCP UR QL SCN: NEGATIVE
PLATELET # BLD AUTO: 242 10*3/MM3 (ref 140–450)
PMV BLD AUTO: 10.5 FL (ref 6–12)
POTASSIUM SERPL-SCNC: 4.1 MMOL/L (ref 3.5–5.2)
PROPOXYPH UR QL: NEGATIVE
PROT SERPL-MCNC: 7 G/DL (ref 6–8.5)
PROTHROMBIN TIME: 13.1 SECONDS (ref 12.2–14.5)
RBC # BLD AUTO: 4.79 10*6/MM3 (ref 4.14–5.8)
RH BLD: POSITIVE
SODIUM SERPL-SCNC: 139 MMOL/L (ref 136–145)
T&S EXPIRATION DATE: NORMAL
TRICYCLICS UR QL SCN: NEGATIVE
WBC NRBC COR # BLD: 8.16 10*3/MM3 (ref 3.4–10.8)

## 2023-05-05 PROCEDURE — 36415 COLL VENOUS BLD VENIPUNCTURE: CPT

## 2023-05-05 PROCEDURE — 85610 PROTHROMBIN TIME: CPT

## 2023-05-05 PROCEDURE — 80053 COMPREHEN METABOLIC PANEL: CPT

## 2023-05-05 PROCEDURE — 86901 BLOOD TYPING SEROLOGIC RH(D): CPT

## 2023-05-05 PROCEDURE — 85025 COMPLETE CBC W/AUTO DIFF WBC: CPT

## 2023-05-05 PROCEDURE — 85730 THROMBOPLASTIN TIME PARTIAL: CPT

## 2023-05-05 PROCEDURE — 86900 BLOOD TYPING SEROLOGIC ABO: CPT

## 2023-05-05 PROCEDURE — 86850 RBC ANTIBODY SCREEN: CPT

## 2023-05-05 PROCEDURE — 86923 COMPATIBILITY TEST ELECTRIC: CPT

## 2023-05-05 PROCEDURE — 94010 BREATHING CAPACITY TEST: CPT

## 2023-05-05 PROCEDURE — 85576 BLOOD PLATELET AGGREGATION: CPT

## 2023-05-05 PROCEDURE — 80306 DRUG TEST PRSMV INSTRMNT: CPT

## 2023-05-05 PROCEDURE — 83036 HEMOGLOBIN GLYCOSYLATED A1C: CPT

## 2023-05-05 PROCEDURE — 71046 X-RAY EXAM CHEST 2 VIEWS: CPT

## 2023-05-05 PROCEDURE — 83735 ASSAY OF MAGNESIUM: CPT

## 2023-05-05 RX ORDER — ASPIRIN 325 MG
325 TABLET ORAL NIGHTLY
Status: SHIPPED | OUTPATIENT
Start: 2023-05-05 | End: 2023-05-06

## 2023-05-05 RX ORDER — CHLORHEXIDINE GLUCONATE 500 MG/1
1 CLOTH TOPICAL EVERY 12 HOURS PRN
Status: ACTIVE | OUTPATIENT
Start: 2023-05-05

## 2023-05-05 NOTE — PAT
An arrival time for procedure was not provided during PAT visit. If patient had any questions or concerns about their arrival time, they were instructed to contact their surgeon/physician.  Additionally, if the patient referred to an arrival time that was acquired from their my chart account, patient was encouraged to verify that time with their surgeon/physician. Arrival times are NOT provided in Pre Admission Testing Department.    Patient denies any current skin issues.     Patient instructed to drink 20 ounces of Gatorade and it needs to be completed 1 hour (for Main OR patients) or 2 hours (scheduled  section & [facility] patients) before given arrival time for procedure (NO RED Gatorade)    Patient verbalized understanding.    Patient to apply Chlorhexadine wipes  to surgical area (as instructed) the night before procedure and the AM of procedure. Wipes provided.    Patient viewed general PAT education video as instructed in their preoperative information received from their surgeon.  Patient stated the general PAT education video was viewed in its entirety and survey completed.  Copies of PAT general education handouts (Incentive Spirometry, Meds to Beds Program, Patient Belongings, Pre-op skin preparation instructions, Blood Glucose testing, Visitor policy, Surgery FAQ, Code H) distributed to patient if not printed. Education related to the PAT pass and skin preparation for surgery (if applicable) completed in PAT as a reinforcement to PAT education video. Patient instructed to return PAT pass provided today as well as completed skin preparation sheet (if applicable) on the day of procedure.     Additionally if patient had not viewed video yet but intended to view it at home or in our waiting area, then referred them to the handout with QR code/link provided during PAT visit.  Instructed patient to complete survey after viewing the video in its entirety.  Encouraged patient/family to read [facility] general  education handouts thoroughly and notify PAT staff with any questions or concerns. Patient verbalized understanding of all information and priority content.    Blood bank bracelet applied to patient during Pre Admission Testing visit.  Patient instructed not to remove from arm until after procedure and they are discharged from the hospital.  Explained to patient that they may shower and get the bracelet wet, but not to immerse under water for longer periods (bathing, swimming, hand dishwashing, etc).  Patient verbalized understanding.    Patient directed to Radiology Department for CXR after Pre Admission Testing Appointment.     Patient directed to Respiratory Department after Pre Admission Testing visit for Pulmonary Function Test.    ANNEMARIE CALLED TO MEET WITH PT AFTER PAT.

## 2023-05-05 NOTE — PROGRESS NOTES
Met with patient in PAT. Discussed TAVR on Monday with Dr. Lopez and Dr. Mendoza, hospitalization and recovery expectations. He reports worsening WHITTAKER and fatigue and is anticipating TAVR and cardiac rehab to be more active again. Reviewed medications- hold losartan dose AM of procedure, has been holding Brilinta since yesterday, otherwise he can take all of his medications. All questions answered. He has my contact information for future questions or concerns.     NYHA III  KCCQ 44/70  5MWT 7.33s/5 meters

## 2023-05-07 ENCOUNTER — ANESTHESIA EVENT (OUTPATIENT)
Dept: PERIOP | Facility: HOSPITAL | Age: 78
DRG: 267 | End: 2023-05-07
Payer: COMMERCIAL

## 2023-05-07 RX ORDER — FAMOTIDINE 10 MG/ML
20 INJECTION, SOLUTION INTRAVENOUS ONCE
Status: CANCELLED | OUTPATIENT
Start: 2023-05-07 | End: 2023-05-07

## 2023-05-08 ENCOUNTER — ANCILLARY PROCEDURE (OUTPATIENT)
Dept: PERIOP | Facility: HOSPITAL | Age: 78
DRG: 267 | End: 2023-05-08
Payer: COMMERCIAL

## 2023-05-08 ENCOUNTER — ANESTHESIA EVENT CONVERTED (OUTPATIENT)
Dept: ANESTHESIOLOGY | Facility: HOSPITAL | Age: 78
DRG: 267 | End: 2023-05-08
Payer: COMMERCIAL

## 2023-05-08 ENCOUNTER — HOSPITAL ENCOUNTER (INPATIENT)
Facility: HOSPITAL | Age: 78
LOS: 1 days | Discharge: HOME | DRG: 267 | End: 2023-05-09
Attending: THORACIC SURGERY (CARDIOTHORACIC VASCULAR SURGERY) | Admitting: THORACIC SURGERY (CARDIOTHORACIC VASCULAR SURGERY)
Payer: COMMERCIAL

## 2023-05-08 ENCOUNTER — ANESTHESIA (OUTPATIENT)
Dept: PERIOP | Facility: HOSPITAL | Age: 78
DRG: 267 | End: 2023-05-08
Payer: COMMERCIAL

## 2023-05-08 ENCOUNTER — APPOINTMENT (OUTPATIENT)
Dept: GENERAL RADIOLOGY | Facility: HOSPITAL | Age: 78
DRG: 267 | End: 2023-05-08
Payer: COMMERCIAL

## 2023-05-08 DIAGNOSIS — N18.32 CHRONIC RENAL FAILURE, STAGE 3B: Primary | Chronic | ICD-10-CM

## 2023-05-08 DIAGNOSIS — I35.0 NONRHEUMATIC AORTIC VALVE STENOSIS: Primary | Chronic | ICD-10-CM

## 2023-05-08 DIAGNOSIS — I35.0 MODERATE TO SEVERE AORTIC STENOSIS: ICD-10-CM

## 2023-05-08 DIAGNOSIS — I35.0 NONRHEUMATIC AORTIC VALVE STENOSIS: Chronic | ICD-10-CM

## 2023-05-08 DIAGNOSIS — I35.0 NONRHEUMATIC AORTIC VALVE STENOSIS: ICD-10-CM

## 2023-05-08 PROBLEM — E66.9 CLASS 1 OBESITY IN ADULT: Status: ACTIVE | Noted: 2023-05-08

## 2023-05-08 PROBLEM — E66.9 CLASS 1 OBESITY IN ADULT: Chronic | Status: ACTIVE | Noted: 2023-05-08

## 2023-05-08 PROBLEM — E66.811 CLASS 1 OBESITY IN ADULT: Chronic | Status: ACTIVE | Noted: 2023-05-08

## 2023-05-08 PROBLEM — E66.811 CLASS 1 OBESITY IN ADULT: Status: ACTIVE | Noted: 2023-05-08

## 2023-05-08 LAB
ABO GROUP BLD: NORMAL
ACT BLD: 113 SECONDS (ref 82–152)
ACT BLD: 305 SECONDS (ref 82–152)
ANION GAP SERPL CALCULATED.3IONS-SCNC: 11 MMOL/L (ref 5–15)
APTT PPP: 27.2 SECONDS (ref 22–39)
BASE EXCESS BLDA CALC-SCNC: -5 MMOL/L (ref -5–5)
BUN SERPL-MCNC: 30 MG/DL (ref 8–23)
BUN/CREAT SERPL: 15.1 (ref 7–25)
CA-I BLDA-SCNC: 1.24 MMOL/L (ref 1.2–1.32)
CALCIUM SPEC-SCNC: 8.2 MG/DL (ref 8.6–10.5)
CHLORIDE SERPL-SCNC: 112 MMOL/L (ref 98–107)
CO2 BLDA-SCNC: 21 MMOL/L (ref 24–29)
CO2 SERPL-SCNC: 20 MMOL/L (ref 22–29)
CREAT SERPL-MCNC: 1.99 MG/DL (ref 0.76–1.27)
DEPRECATED RDW RBC AUTO: 52.7 FL (ref 37–54)
EGFRCR SERPLBLD CKD-EPI 2021: 33.9 ML/MIN/1.73
ERYTHROCYTE [DISTWIDTH] IN BLOOD BY AUTOMATED COUNT: 16.8 % (ref 12.3–15.4)
GLUCOSE BLDC GLUCOMTR-MCNC: 109 MG/DL (ref 70–130)
GLUCOSE BLDC GLUCOMTR-MCNC: 291 MG/DL (ref 70–130)
GLUCOSE BLDC GLUCOMTR-MCNC: 98 MG/DL (ref 70–130)
GLUCOSE SERPL-MCNC: 142 MG/DL (ref 65–99)
HCO3 BLDA-SCNC: 19.8 MMOL/L (ref 22–26)
HCT VFR BLD AUTO: 33.6 % (ref 37.5–51)
HCT VFR BLDA CALC: 35 % (ref 38–51)
HGB BLD-MCNC: 10.6 G/DL (ref 13–17.7)
HGB BLDA-MCNC: 11.9 G/DL (ref 12–17)
MCH RBC QN AUTO: 27.5 PG (ref 26.6–33)
MCHC RBC AUTO-ENTMCNC: 31.5 G/DL (ref 31.5–35.7)
MCV RBC AUTO: 87 FL (ref 79–97)
PCO2 BLDA: 31.1 MM HG (ref 35–45)
PH BLDA: 7.41 PH UNITS (ref 7.35–7.6)
PLATELET # BLD AUTO: 206 10*3/MM3 (ref 140–450)
PMV BLD AUTO: 10.6 FL (ref 6–12)
PO2 BLDA: 75 MMHG (ref 80–105)
POTASSIUM BLDA-SCNC: 4.3 MMOL/L (ref 3.5–4.9)
POTASSIUM SERPL-SCNC: 4.7 MMOL/L (ref 3.5–5.2)
QT INTERVAL: 490 MS
QTC INTERVAL: 505 MS
RBC # BLD AUTO: 3.86 10*6/MM3 (ref 4.14–5.8)
RH BLD: POSITIVE
SAO2 % BLDA: 95 % (ref 95–98)
SODIUM BLD-SCNC: 142 MMOL/L (ref 138–146)
SODIUM SERPL-SCNC: 143 MMOL/L (ref 136–145)
WBC NRBC COR # BLD: 14.59 10*3/MM3 (ref 3.4–10.8)

## 2023-05-08 PROCEDURE — 93355 ECHO TRANSESOPHAGEAL (TEE): CPT

## 2023-05-08 PROCEDURE — 25010000002 CEFAZOLIN IN DEXTROSE 2-4 GM/100ML-% SOLUTION: Performed by: PHYSICIAN ASSISTANT

## 2023-05-08 PROCEDURE — 63710000001 INSULIN LISPRO (HUMAN) PER 5 UNITS: Performed by: NURSE PRACTITIONER

## 2023-05-08 PROCEDURE — C1769 GUIDE WIRE: HCPCS | Performed by: THORACIC SURGERY (CARDIOTHORACIC VASCULAR SURGERY)

## 2023-05-08 PROCEDURE — 84295 ASSAY OF SERUM SODIUM: CPT

## 2023-05-08 PROCEDURE — 85027 COMPLETE CBC AUTOMATED: CPT | Performed by: PHYSICIAN ASSISTANT

## 2023-05-08 PROCEDURE — 25010000002 PROTAMINE SULFATE PER 10 MG: Performed by: NURSE ANESTHETIST, CERTIFIED REGISTERED

## 2023-05-08 PROCEDURE — C1760 CLOSURE DEV, VASC: HCPCS | Performed by: THORACIC SURGERY (CARDIOTHORACIC VASCULAR SURGERY)

## 2023-05-08 PROCEDURE — 82803 BLOOD GASES ANY COMBINATION: CPT

## 2023-05-08 PROCEDURE — 33361 REPLACE AORTIC VALVE PERQ: CPT | Performed by: THORACIC SURGERY (CARDIOTHORACIC VASCULAR SURGERY)

## 2023-05-08 PROCEDURE — 33361 REPLACE AORTIC VALVE PERQ: CPT | Performed by: INTERNAL MEDICINE

## 2023-05-08 PROCEDURE — 86900 BLOOD TYPING SEROLOGIC ABO: CPT

## 2023-05-08 PROCEDURE — 93005 ELECTROCARDIOGRAM TRACING: CPT | Performed by: PHYSICIAN ASSISTANT

## 2023-05-08 PROCEDURE — C1768 GRAFT, VASCULAR: HCPCS | Performed by: THORACIC SURGERY (CARDIOTHORACIC VASCULAR SURGERY)

## 2023-05-08 PROCEDURE — 84132 ASSAY OF SERUM POTASSIUM: CPT

## 2023-05-08 PROCEDURE — 25010000002 ONDANSETRON PER 1 MG: Performed by: NURSE ANESTHETIST, CERTIFIED REGISTERED

## 2023-05-08 PROCEDURE — 25010000002 MORPHINE PER 10 MG: Performed by: THORACIC SURGERY (CARDIOTHORACIC VASCULAR SURGERY)

## 2023-05-08 PROCEDURE — 85347 COAGULATION TIME ACTIVATED: CPT

## 2023-05-08 PROCEDURE — 80048 BASIC METABOLIC PNL TOTAL CA: CPT | Performed by: PHYSICIAN ASSISTANT

## 2023-05-08 PROCEDURE — 25010000002 HEPARIN (PORCINE) PER 1000 UNITS: Performed by: NURSE ANESTHETIST, CERTIFIED REGISTERED

## 2023-05-08 PROCEDURE — 25010000002 FENTANYL CITRATE (PF) 100 MCG/2ML SOLUTION: Performed by: NURSE ANESTHETIST, CERTIFIED REGISTERED

## 2023-05-08 PROCEDURE — 93355 ECHO TRANSESOPHAGEAL (TEE): CPT | Performed by: INTERNAL MEDICINE

## 2023-05-08 PROCEDURE — 35371 RECHANNELING OF ARTERY: CPT | Performed by: STUDENT IN AN ORGANIZED HEALTH CARE EDUCATION/TRAINING PROGRAM

## 2023-05-08 PROCEDURE — 63710000001 INSULIN DETEMIR PER 5 UNITS: Performed by: NURSE PRACTITIONER

## 2023-05-08 PROCEDURE — 25010000002 PHENYLEPHRINE 10 MG/ML SOLUTION 1 ML VIAL: Performed by: NURSE ANESTHETIST, CERTIFIED REGISTERED

## 2023-05-08 PROCEDURE — 86901 BLOOD TYPING SEROLOGIC RH(D): CPT

## 2023-05-08 PROCEDURE — C1725 CATH, TRANSLUMIN NON-LASER: HCPCS | Performed by: THORACIC SURGERY (CARDIOTHORACIC VASCULAR SURGERY)

## 2023-05-08 PROCEDURE — 99232 SBSQ HOSP IP/OBS MODERATE 35: CPT | Performed by: INTERNAL MEDICINE

## 2023-05-08 PROCEDURE — 02RF38Z REPLACEMENT OF AORTIC VALVE WITH ZOOPLASTIC TISSUE, PERCUTANEOUS APPROACH: ICD-10-PCS | Performed by: THORACIC SURGERY (CARDIOTHORACIC VASCULAR SURGERY)

## 2023-05-08 PROCEDURE — C1887 CATHETER, GUIDING: HCPCS | Performed by: THORACIC SURGERY (CARDIOTHORACIC VASCULAR SURGERY)

## 2023-05-08 PROCEDURE — 25010000002 DEXAMETHASONE PER 1 MG: Performed by: NURSE ANESTHETIST, CERTIFIED REGISTERED

## 2023-05-08 PROCEDURE — 88304 TISSUE EXAM BY PATHOLOGIST: CPT | Performed by: THORACIC SURGERY (CARDIOTHORACIC VASCULAR SURGERY)

## 2023-05-08 PROCEDURE — 25010000002 ONDANSETRON PER 1 MG: Performed by: PHYSICIAN ASSISTANT

## 2023-05-08 PROCEDURE — 88311 DECALCIFY TISSUE: CPT | Performed by: THORACIC SURGERY (CARDIOTHORACIC VASCULAR SURGERY)

## 2023-05-08 PROCEDURE — 04CL3ZZ EXTIRPATION OF MATTER FROM LEFT FEMORAL ARTERY, PERCUTANEOUS APPROACH: ICD-10-PCS | Performed by: THORACIC SURGERY (CARDIOTHORACIC VASCULAR SURGERY)

## 2023-05-08 PROCEDURE — C1894 INTRO/SHEATH, NON-LASER: HCPCS | Performed by: THORACIC SURGERY (CARDIOTHORACIC VASCULAR SURGERY)

## 2023-05-08 PROCEDURE — 25510000001 IOPAMIDOL PER 1 ML: Performed by: THORACIC SURGERY (CARDIOTHORACIC VASCULAR SURGERY)

## 2023-05-08 PROCEDURE — 25010000002 METHYLPREDNISOLONE PER 125 MG: Performed by: NURSE ANESTHETIST, CERTIFIED REGISTERED

## 2023-05-08 PROCEDURE — C1889 IMPLANT/INSERT DEVICE, NOC: HCPCS | Performed by: THORACIC SURGERY (CARDIOTHORACIC VASCULAR SURGERY)

## 2023-05-08 PROCEDURE — 25010000002 CEFAZOLIN IN DEXTROSE 2-4 GM/100ML-% SOLUTION

## 2023-05-08 PROCEDURE — 82330 ASSAY OF CALCIUM: CPT

## 2023-05-08 PROCEDURE — 93010 ELECTROCARDIOGRAM REPORT: CPT | Performed by: INTERNAL MEDICINE

## 2023-05-08 PROCEDURE — 25010000002 HEPARIN (PORCINE) PER 1000 UNITS: Performed by: THORACIC SURGERY (CARDIOTHORACIC VASCULAR SURGERY)

## 2023-05-08 PROCEDURE — 82947 ASSAY GLUCOSE BLOOD QUANT: CPT

## 2023-05-08 PROCEDURE — 25010000002 SUGAMMADEX 200 MG/2ML SOLUTION: Performed by: NURSE ANESTHETIST, CERTIFIED REGISTERED

## 2023-05-08 PROCEDURE — 93355 ECHO TRANSESOPHAGEAL (TEE): CPT | Performed by: ANESTHESIOLOGY

## 2023-05-08 PROCEDURE — 85014 HEMATOCRIT: CPT

## 2023-05-08 PROCEDURE — 35371 RECHANNELING OF ARTERY: CPT | Performed by: THORACIC SURGERY (CARDIOTHORACIC VASCULAR SURGERY)

## 2023-05-08 PROCEDURE — 85730 THROMBOPLASTIN TIME PARTIAL: CPT | Performed by: PHYSICIAN ASSISTANT

## 2023-05-08 PROCEDURE — 25010000002 PHENYLEPHRINE 10 MG/ML SOLUTION: Performed by: NURSE ANESTHETIST, CERTIFIED REGISTERED

## 2023-05-08 PROCEDURE — B24BZZ4 ULTRASONOGRAPHY OF HEART WITH AORTA, TRANSESOPHAGEAL: ICD-10-PCS | Performed by: NURSE ANESTHETIST, CERTIFIED REGISTERED

## 2023-05-08 DEVICE — VLV HEART TRNSCATH SAPIEN/COMMANDER 26MM: Type: IMPLANTABLE DEVICE | Site: AORTA | Status: FUNCTIONAL

## 2023-05-08 DEVICE — PTCH VASC VASCUGUARD 1X6CM STRL: Type: IMPLANTABLE DEVICE | Site: ARTERY FEMORAL | Status: FUNCTIONAL

## 2023-05-08 RX ORDER — PROTAMINE SULFATE 10 MG/ML
INJECTION, SOLUTION INTRAVENOUS AS NEEDED
Status: DISCONTINUED | OUTPATIENT
Start: 2023-05-08 | End: 2023-05-08 | Stop reason: SURG

## 2023-05-08 RX ORDER — HYDROCODONE BITARTRATE AND ACETAMINOPHEN 5; 325 MG/1; MG/1
1 TABLET ORAL EVERY 6 HOURS PRN
Status: DISCONTINUED | OUTPATIENT
Start: 2023-05-08 | End: 2023-05-09 | Stop reason: HOSPADM

## 2023-05-08 RX ORDER — CHLORHEXIDINE GLUCONATE 500 MG/1
1 CLOTH TOPICAL EVERY 12 HOURS PRN
Status: DISCONTINUED | OUTPATIENT
Start: 2023-05-08 | End: 2023-05-08 | Stop reason: HOSPADM

## 2023-05-08 RX ORDER — SODIUM CHLORIDE 9 MG/ML
40 INJECTION, SOLUTION INTRAVENOUS AS NEEDED
Status: DISCONTINUED | OUTPATIENT
Start: 2023-05-08 | End: 2023-05-08 | Stop reason: HOSPADM

## 2023-05-08 RX ORDER — HEPARIN SODIUM 1000 [USP'U]/ML
INJECTION, SOLUTION INTRAVENOUS; SUBCUTANEOUS AS NEEDED
Status: DISCONTINUED | OUTPATIENT
Start: 2023-05-08 | End: 2023-05-08 | Stop reason: SURG

## 2023-05-08 RX ORDER — LOSARTAN POTASSIUM 25 MG/1
25 TABLET ORAL 2 TIMES DAILY
Status: CANCELLED | OUTPATIENT
Start: 2023-05-08

## 2023-05-08 RX ORDER — MORPHINE SULFATE 2 MG/ML
2 INJECTION, SOLUTION INTRAMUSCULAR; INTRAVENOUS
Status: DISPENSED | OUTPATIENT
Start: 2023-05-08 | End: 2023-05-09

## 2023-05-08 RX ORDER — ONDANSETRON 2 MG/ML
4 INJECTION INTRAMUSCULAR; INTRAVENOUS EVERY 6 HOURS PRN
Status: DISCONTINUED | OUTPATIENT
Start: 2023-05-08 | End: 2023-05-09 | Stop reason: HOSPADM

## 2023-05-08 RX ORDER — NICOTINE POLACRILEX 4 MG
15 LOZENGE BUCCAL
Status: DISCONTINUED | OUTPATIENT
Start: 2023-05-08 | End: 2023-05-09 | Stop reason: HOSPADM

## 2023-05-08 RX ORDER — ROCURONIUM BROMIDE 10 MG/ML
INJECTION, SOLUTION INTRAVENOUS AS NEEDED
Status: DISCONTINUED | OUTPATIENT
Start: 2023-05-08 | End: 2023-05-08 | Stop reason: SURG

## 2023-05-08 RX ORDER — NITROGLYCERIN 0.4 MG/1
0.4 TABLET SUBLINGUAL
Status: DISCONTINUED | OUTPATIENT
Start: 2023-05-08 | End: 2023-05-08 | Stop reason: HOSPADM

## 2023-05-08 RX ORDER — CHLORHEXIDINE GLUCONATE 0.12 MG/ML
15 RINSE ORAL ONCE
Status: DISCONTINUED | OUTPATIENT
Start: 2023-05-08 | End: 2023-05-08 | Stop reason: HOSPADM

## 2023-05-08 RX ORDER — SODIUM CHLORIDE, SODIUM LACTATE, POTASSIUM CHLORIDE, CALCIUM CHLORIDE 600; 310; 30; 20 MG/100ML; MG/100ML; MG/100ML; MG/100ML
INJECTION, SOLUTION INTRAVENOUS CONTINUOUS PRN
Status: DISCONTINUED | OUTPATIENT
Start: 2023-05-08 | End: 2023-05-08 | Stop reason: SURG

## 2023-05-08 RX ORDER — NICARDIPINE HYDROCHLORIDE 2.5 MG/ML
INJECTION INTRAVENOUS AS NEEDED
Status: DISCONTINUED | OUTPATIENT
Start: 2023-05-08 | End: 2023-05-08 | Stop reason: SURG

## 2023-05-08 RX ORDER — DEXAMETHASONE SODIUM PHOSPHATE 4 MG/ML
INJECTION, SOLUTION INTRA-ARTICULAR; INTRALESIONAL; INTRAMUSCULAR; INTRAVENOUS; SOFT TISSUE AS NEEDED
Status: DISCONTINUED | OUTPATIENT
Start: 2023-05-08 | End: 2023-05-08 | Stop reason: SURG

## 2023-05-08 RX ORDER — METHYLPREDNISOLONE SODIUM SUCCINATE 125 MG/2ML
INJECTION, POWDER, LYOPHILIZED, FOR SOLUTION INTRAMUSCULAR; INTRAVENOUS AS NEEDED
Status: DISCONTINUED | OUTPATIENT
Start: 2023-05-08 | End: 2023-05-08 | Stop reason: SURG

## 2023-05-08 RX ORDER — LIDOCAINE HYDROCHLORIDE 10 MG/ML
0.5 INJECTION, SOLUTION EPIDURAL; INFILTRATION; INTRACAUDAL; PERINEURAL ONCE AS NEEDED
Status: DISCONTINUED | OUTPATIENT
Start: 2023-05-08 | End: 2023-05-08 | Stop reason: HOSPADM

## 2023-05-08 RX ORDER — SODIUM CHLORIDE 9 MG/ML
250 INJECTION, SOLUTION INTRAVENOUS ONCE AS NEEDED
Status: DISCONTINUED | OUTPATIENT
Start: 2023-05-08 | End: 2023-05-09 | Stop reason: HOSPADM

## 2023-05-08 RX ORDER — ETOMIDATE 2 MG/ML
INJECTION INTRAVENOUS AS NEEDED
Status: DISCONTINUED | OUTPATIENT
Start: 2023-05-08 | End: 2023-05-08 | Stop reason: SURG

## 2023-05-08 RX ORDER — ONDANSETRON 4 MG/1
4 TABLET, FILM COATED ORAL EVERY 6 HOURS PRN
Status: DISCONTINUED | OUTPATIENT
Start: 2023-05-08 | End: 2023-05-09 | Stop reason: HOSPADM

## 2023-05-08 RX ORDER — CEFAZOLIN SODIUM 2 G/100ML
2 INJECTION, SOLUTION INTRAVENOUS EVERY 8 HOURS
Status: DISCONTINUED | OUTPATIENT
Start: 2023-05-08 | End: 2023-05-09 | Stop reason: HOSPADM

## 2023-05-08 RX ORDER — HYDRALAZINE HYDROCHLORIDE 20 MG/ML
10 INJECTION INTRAMUSCULAR; INTRAVENOUS EVERY 6 HOURS PRN
Status: DISCONTINUED | OUTPATIENT
Start: 2023-05-08 | End: 2023-05-09 | Stop reason: HOSPADM

## 2023-05-08 RX ORDER — SODIUM CHLORIDE 9 MG/ML
INJECTION, SOLUTION INTRAVENOUS CONTINUOUS PRN
Status: DISCONTINUED | OUTPATIENT
Start: 2023-05-08 | End: 2023-05-08 | Stop reason: SURG

## 2023-05-08 RX ORDER — LABETALOL HYDROCHLORIDE 5 MG/ML
10 INJECTION, SOLUTION INTRAVENOUS
Status: DISCONTINUED | OUTPATIENT
Start: 2023-05-08 | End: 2023-05-09 | Stop reason: HOSPADM

## 2023-05-08 RX ORDER — INSULIN LISPRO 100 [IU]/ML
0-9 INJECTION, SOLUTION INTRAVENOUS; SUBCUTANEOUS
Status: DISCONTINUED | OUTPATIENT
Start: 2023-05-08 | End: 2023-05-09 | Stop reason: HOSPADM

## 2023-05-08 RX ORDER — SODIUM CHLORIDE 0.9 % (FLUSH) 0.9 %
10 SYRINGE (ML) INJECTION AS NEEDED
Status: DISCONTINUED | OUTPATIENT
Start: 2023-05-08 | End: 2023-05-08 | Stop reason: HOSPADM

## 2023-05-08 RX ORDER — DEXTROSE MONOHYDRATE 25 G/50ML
25 INJECTION, SOLUTION INTRAVENOUS
Status: DISCONTINUED | OUTPATIENT
Start: 2023-05-08 | End: 2023-05-09 | Stop reason: HOSPADM

## 2023-05-08 RX ORDER — SODIUM CHLORIDE 0.9 % (FLUSH) 0.9 %
10 SYRINGE (ML) INJECTION EVERY 12 HOURS SCHEDULED
Status: DISCONTINUED | OUTPATIENT
Start: 2023-05-08 | End: 2023-05-08 | Stop reason: HOSPADM

## 2023-05-08 RX ORDER — HYDROMORPHONE HYDROCHLORIDE 1 MG/ML
0.5 INJECTION, SOLUTION INTRAMUSCULAR; INTRAVENOUS; SUBCUTANEOUS
Status: DISCONTINUED | OUTPATIENT
Start: 2023-05-08 | End: 2023-05-09 | Stop reason: HOSPADM

## 2023-05-08 RX ORDER — NICARDIPINE HYDROCHLORIDE 2.5 MG/ML
INJECTION INTRAVENOUS CONTINUOUS PRN
Status: DISCONTINUED | OUTPATIENT
Start: 2023-05-08 | End: 2023-05-08 | Stop reason: SURG

## 2023-05-08 RX ORDER — FENTANYL CITRATE 50 UG/ML
50 INJECTION, SOLUTION INTRAMUSCULAR; INTRAVENOUS
Status: DISCONTINUED | OUTPATIENT
Start: 2023-05-08 | End: 2023-05-09 | Stop reason: HOSPADM

## 2023-05-08 RX ORDER — ONDANSETRON 2 MG/ML
INJECTION INTRAMUSCULAR; INTRAVENOUS AS NEEDED
Status: DISCONTINUED | OUTPATIENT
Start: 2023-05-08 | End: 2023-05-08 | Stop reason: SURG

## 2023-05-08 RX ORDER — ACETAMINOPHEN 325 MG/1
650 TABLET ORAL EVERY 4 HOURS PRN
Status: DISCONTINUED | OUTPATIENT
Start: 2023-05-08 | End: 2023-05-09 | Stop reason: HOSPADM

## 2023-05-08 RX ORDER — LOSARTAN POTASSIUM 25 MG/1
25 TABLET ORAL 2 TIMES DAILY
Status: DISCONTINUED | OUTPATIENT
Start: 2023-05-08 | End: 2023-05-09 | Stop reason: HOSPADM

## 2023-05-08 RX ORDER — FAMOTIDINE 20 MG/1
20 TABLET, FILM COATED ORAL ONCE
Status: DISCONTINUED | OUTPATIENT
Start: 2023-05-08 | End: 2023-05-08 | Stop reason: HOSPADM

## 2023-05-08 RX ORDER — SODIUM CHLORIDE 9 MG/ML
INJECTION, SOLUTION INTRAVENOUS AS NEEDED
Status: DISCONTINUED | OUTPATIENT
Start: 2023-05-08 | End: 2023-05-08 | Stop reason: HOSPADM

## 2023-05-08 RX ORDER — AMLODIPINE BESYLATE 10 MG/1
10 TABLET ORAL
Status: DISCONTINUED | OUTPATIENT
Start: 2023-05-08 | End: 2023-05-09 | Stop reason: HOSPADM

## 2023-05-08 RX ORDER — SODIUM CHLORIDE 9 MG/ML
100 INJECTION, SOLUTION INTRAVENOUS CONTINUOUS
Status: ACTIVE | OUTPATIENT
Start: 2023-05-08 | End: 2023-05-08

## 2023-05-08 RX ORDER — IBUPROFEN 600 MG/1
1 TABLET ORAL
Status: DISCONTINUED | OUTPATIENT
Start: 2023-05-08 | End: 2023-05-09 | Stop reason: HOSPADM

## 2023-05-08 RX ORDER — SODIUM CHLORIDE, SODIUM LACTATE, POTASSIUM CHLORIDE, CALCIUM CHLORIDE 600; 310; 30; 20 MG/100ML; MG/100ML; MG/100ML; MG/100ML
9 INJECTION, SOLUTION INTRAVENOUS CONTINUOUS
Status: DISCONTINUED | OUTPATIENT
Start: 2023-05-08 | End: 2023-05-09 | Stop reason: HOSPADM

## 2023-05-08 RX ORDER — CEFAZOLIN SODIUM 2 G/100ML
2 INJECTION, SOLUTION INTRAVENOUS ONCE
Status: COMPLETED | OUTPATIENT
Start: 2023-05-08 | End: 2023-05-08

## 2023-05-08 RX ORDER — ESMOLOL HYDROCHLORIDE 10 MG/ML
INJECTION INTRAVENOUS AS NEEDED
Status: DISCONTINUED | OUTPATIENT
Start: 2023-05-08 | End: 2023-05-08 | Stop reason: SURG

## 2023-05-08 RX ORDER — FENTANYL CITRATE 50 UG/ML
INJECTION, SOLUTION INTRAMUSCULAR; INTRAVENOUS AS NEEDED
Status: DISCONTINUED | OUTPATIENT
Start: 2023-05-08 | End: 2023-05-08 | Stop reason: SURG

## 2023-05-08 RX ORDER — PHENYLEPHRINE HYDROCHLORIDE 10 MG/ML
INJECTION INTRAVENOUS AS NEEDED
Status: DISCONTINUED | OUTPATIENT
Start: 2023-05-08 | End: 2023-05-08 | Stop reason: SURG

## 2023-05-08 RX ORDER — EPHEDRINE SULFATE 50 MG/ML
INJECTION INTRAVENOUS AS NEEDED
Status: DISCONTINUED | OUTPATIENT
Start: 2023-05-08 | End: 2023-05-08 | Stop reason: SURG

## 2023-05-08 RX ORDER — INSULIN LISPRO 100 [IU]/ML
3 INJECTION, SOLUTION INTRAVENOUS; SUBCUTANEOUS
Status: DISCONTINUED | OUTPATIENT
Start: 2023-05-09 | End: 2023-05-09 | Stop reason: HOSPADM

## 2023-05-08 RX ORDER — CEFAZOLIN SODIUM 2 G/100ML
2 INJECTION, SOLUTION INTRAVENOUS EVERY 24 HOURS
Status: DISCONTINUED | OUTPATIENT
Start: 2023-05-08 | End: 2023-05-08

## 2023-05-08 RX ORDER — MIDAZOLAM HYDROCHLORIDE 1 MG/ML
0.5 INJECTION INTRAMUSCULAR; INTRAVENOUS
Status: DISCONTINUED | OUTPATIENT
Start: 2023-05-08 | End: 2023-05-08 | Stop reason: HOSPADM

## 2023-05-08 RX ADMIN — EPHEDRINE SULFATE 5 MG: 50 INJECTION INTRAVENOUS at 08:04

## 2023-05-08 RX ADMIN — HYDROCODONE BITARTRATE AND ACETAMINOPHEN 1 TABLET: 5; 325 TABLET ORAL at 10:37

## 2023-05-08 RX ADMIN — NICARDIPINE HYDROCHLORIDE 15 MG/HR: 25 INJECTION, SOLUTION INTRAVENOUS at 18:26

## 2023-05-08 RX ADMIN — INSULIN DETEMIR 20 UNITS: 100 INJECTION, SOLUTION SUBCUTANEOUS at 20:40

## 2023-05-08 RX ADMIN — NICARDIPINE HYDROCHLORIDE 5 MG/HR: 25 INJECTION INTRAVENOUS at 07:49

## 2023-05-08 RX ADMIN — CEFAZOLIN SODIUM 2 G: 2 INJECTION, SOLUTION INTRAVENOUS at 18:59

## 2023-05-08 RX ADMIN — HEPARIN SODIUM 16000 UNITS: 1000 INJECTION, SOLUTION INTRAVENOUS; SUBCUTANEOUS at 07:33

## 2023-05-08 RX ADMIN — PROTAMINE SULFATE 125 MG: 10 INJECTION, SOLUTION INTRAVENOUS at 08:44

## 2023-05-08 RX ADMIN — MORPHINE SULFATE 2 MG: 2 INJECTION, SOLUTION INTRAMUSCULAR; INTRAVENOUS at 14:17

## 2023-05-08 RX ADMIN — CHLORHEXIDINE GLUCONATE 0.12% ORAL RINSE 15 ML: 1.2 LIQUID ORAL at 06:31

## 2023-05-08 RX ADMIN — METHYLPREDNISOLONE SODIUM SUCCINATE 125 MG: 125 INJECTION, POWDER, FOR SOLUTION INTRAMUSCULAR; INTRAVENOUS at 07:12

## 2023-05-08 RX ADMIN — SODIUM CHLORIDE: 9 INJECTION, SOLUTION INTRAVENOUS at 07:28

## 2023-05-08 RX ADMIN — NICARDIPINE HYDROCHLORIDE 0.4 MG: 2.5 INJECTION INTRAVENOUS at 07:49

## 2023-05-08 RX ADMIN — LOSARTAN POTASSIUM 25 MG: 25 TABLET, FILM COATED ORAL at 20:39

## 2023-05-08 RX ADMIN — NICARDIPINE HYDROCHLORIDE 5 MG/HR: 25 INJECTION, SOLUTION INTRAVENOUS at 20:11

## 2023-05-08 RX ADMIN — FAMOTIDINE 20 MG: 20 TABLET ORAL at 06:31

## 2023-05-08 RX ADMIN — EPHEDRINE SULFATE 5 MG: 50 INJECTION INTRAVENOUS at 07:36

## 2023-05-08 RX ADMIN — NICARDIPINE HYDROCHLORIDE 15 MG/HR: 25 INJECTION, SOLUTION INTRAVENOUS at 16:16

## 2023-05-08 RX ADMIN — ETOMIDATE 14 MG: 20 INJECTION, SOLUTION INTRAVENOUS at 07:06

## 2023-05-08 RX ADMIN — Medication 10 ML: at 09:57

## 2023-05-08 RX ADMIN — ESMOLOL HYDROCHLORIDE 100 MG: 10 INJECTION, SOLUTION INTRAVENOUS at 07:06

## 2023-05-08 RX ADMIN — ROCURONIUM BROMIDE 50 MG: 10 SOLUTION INTRAVENOUS at 07:06

## 2023-05-08 RX ADMIN — DEXAMETHASONE SODIUM PHOSPHATE 4 MG: 4 INJECTION, SOLUTION INTRAMUSCULAR; INTRAVENOUS at 07:17

## 2023-05-08 RX ADMIN — INSULIN LISPRO 6 UNITS: 100 INJECTION, SOLUTION INTRAVENOUS; SUBCUTANEOUS at 20:40

## 2023-05-08 RX ADMIN — NICARDIPINE HYDROCHLORIDE 15 MG/HR: 25 INJECTION, SOLUTION INTRAVENOUS at 14:30

## 2023-05-08 RX ADMIN — AMLODIPINE BESYLATE 10 MG: 10 TABLET ORAL at 14:17

## 2023-05-08 RX ADMIN — ONDANSETRON 4 MG: 2 INJECTION INTRAMUSCULAR; INTRAVENOUS at 19:55

## 2023-05-08 RX ADMIN — PHENYLEPHRINE HYDROCHLORIDE 50 MCG: 10 INJECTION INTRAVENOUS at 07:59

## 2023-05-08 RX ADMIN — ONDANSETRON 4 MG: 2 INJECTION INTRAMUSCULAR; INTRAVENOUS at 07:17

## 2023-05-08 RX ADMIN — FENTANYL CITRATE 100 MCG: 50 INJECTION, SOLUTION INTRAMUSCULAR; INTRAVENOUS at 07:57

## 2023-05-08 RX ADMIN — NICARDIPINE HYDROCHLORIDE 10 MG/HR: 25 INJECTION, SOLUTION INTRAVENOUS at 12:01

## 2023-05-08 RX ADMIN — LIDOCAINE HYDROCHLORIDE 0.5 ML: 10 INJECTION, SOLUTION EPIDURAL; INFILTRATION; INTRACAUDAL; PERINEURAL at 06:05

## 2023-05-08 RX ADMIN — MORPHINE SULFATE 2 MG: 2 INJECTION, SOLUTION INTRAMUSCULAR; INTRAVENOUS at 11:28

## 2023-05-08 RX ADMIN — EPHEDRINE SULFATE 5 MG: 50 INJECTION INTRAVENOUS at 08:44

## 2023-05-08 RX ADMIN — CEFAZOLIN SODIUM 2 G: 2 INJECTION, SOLUTION INTRAVENOUS at 07:08

## 2023-05-08 RX ADMIN — SODIUM CHLORIDE, POTASSIUM CHLORIDE, SODIUM LACTATE AND CALCIUM CHLORIDE: 600; 310; 30; 20 INJECTION, SOLUTION INTRAVENOUS at 07:00

## 2023-05-08 RX ADMIN — PHENYLEPHRINE HYDROCHLORIDE 200 MCG: 10 INJECTION INTRAVENOUS at 07:29

## 2023-05-08 RX ADMIN — SODIUM CHLORIDE 100 ML/HR: 9 INJECTION, SOLUTION INTRAVENOUS at 09:30

## 2023-05-08 RX ADMIN — SUGAMMADEX 200 MG: 100 INJECTION, SOLUTION INTRAVENOUS at 09:01

## 2023-05-08 RX ADMIN — MUPIROCIN 1 APPLICATION: 20 OINTMENT TOPICAL at 06:31

## 2023-05-08 RX ADMIN — PHENYLEPHRINE HYDROCHLORIDE 0.5 MCG/KG/MIN: 10 INJECTION INTRAVENOUS at 07:35

## 2023-05-08 NOTE — ANESTHESIA POSTPROCEDURE EVALUATION
Patient: Nicholas Yin    Procedure Summary     Date: 05/08/23 Room / Location:  [facility] /  [facility]    Anesthesia Start: 0700 Anesthesia Stop:     Procedures:       TRANSCATHETER AORTIC VALVE REPLACEMENT + [facility], LEFT FEMORAL ENDARTERECTOMY WITH PATCH (Chest)      Transfemoral Transcatheter Aortic Valve Replacement Diagnosis:       Nonrheumatic aortic valve stenosis      (Nonrheumatic aortic valve stenosis [I35.0])    Surgeons: Arben Lopez MD; Rachael Mendoza MD Provider: Kanika Barker DO    Anesthesia Type: general, [place] ASA Status: 4          Anesthesia Type: general, Cynthia    Vitals  Vitals Value Taken Time   /56 05/08/23 0925   Temp     Pulse 66 05/08/23 0926   Resp     SpO2 95 % 05/08/23 0926   Vitals shown include unvalidated device data.        Post Anesthesia Care and Evaluation    Patient location during evaluation: ICU  Patient participation: complete - patient participated  Level of consciousness: responsive to verbal stimuli  Pain score: 0  Pain management: adequate    Airway patency: patent  Anesthetic complications: No anesthetic complications  PONV Status: none  Cardiovascular status: hemodynamically stable and acceptable  Respiratory status: nonlabored ventilation, acceptable, nasal cannula and spontaneous ventilation  Hydration status: acceptable    Comments: VS monitored en route to ICU. Pt spont ventilating. VSS upon arrival  No anesthesia care post op

## 2023-05-08 NOTE — Clinical Note
----- Message from Olga Weinstein sent at 11/25/2020 11:00 AM CST -----  Contact: ARIEL BENITES [[id number]] @ [phone]  Requesting an RX refill or new RX.  Is this a refill or new RX: Renew  RX name and strength:hydroCHLOROthiazide (HYDRODIURIL) 25 MG tablet  Is this a 30 day or 90 day RX: 90  Pharmacy name and phone # (copy/paste from chart): [facility] [phone] (Phone)  [phone] (Fax)      Comments: Please add refills               Valvuloplasty balloon advanced across the aortic valve

## 2023-05-08 NOTE — ANESTHESIA PROCEDURE NOTES
Airway  Urgency: elective    Date/Time: 5/8/2023 7:07 AM  Airway not difficult    General Information and Staff    Patient location during procedure: OR  CRNA/CAA: Rohit Stanton CRNA    Indications and Patient Condition  Indications for airway management: airway protection    Preoxygenated: yes  MILS not maintained throughout  Mask difficulty assessment: 2 - vent by mask + OA or adjuvant +/- NMBA    Final Airway Details  Final airway type: endotracheal airway      Successful airway: ETT  Cuffed: yes   Successful intubation technique: direct laryngoscopy  Endotracheal tube insertion site: oral  Blade: Michele  Blade size: 3  ETT size (mm): 7.5  Cormack-Lehane Classification: grade I - full view of glottis  Placement verified by: chest auscultation and capnometry   Measured from: lips  ETT/EBT  to lips (cm): 20  Number of attempts at approach: 1  Assessment: lips, teeth, and gum same as pre-op and atraumatic intubation    Additional Comments  Negative epigastric sounds, Breath sound equal bilaterally with symmetric chest rise and fall

## 2023-05-08 NOTE — OP NOTE
PREPROCEDURE DIAGNOSIS: Severe aortic stenosis    POSTPROCEDURE DIAGNOSIS: Severe aortic stenosis    PROCEDURE(S):   1. 6F femoral arterial sheath placement.  2. 8 F venous sheath placement  3. Temporary transvenous pacemaker insertion  4. Catheter placement in the aortic root  5. Multiple intraprocedural aortograms  6. Balloon valvuloplasty using a 20 x 40 mm balloon  7. Transcatheter aortic valve replacement using a 26 mm TONY 3 pericardial prosthesis    INDICATIONS:   1. Critical aortic stenosis.    INTERVENTIONAL CARDIOLOGISTS:  Rachael Mendoza MD.  Christal Domingo MD.    CARDIAC SURGEONS:   Arben Lopez MD.  Magno Caal MD    DESCRIPTION OF PROCEDURE:   After informed consent, the patient was brought to the OR in a fasting condition. The patient was prepped and draped from level of chin to  knees by standard surgical technique.  Right femoral arterial access was obtained by percutaneous anterior wall puncture technique and an 6-Surinamese arterial sheath was placed.  Venous access was obtained in similar fashion and a 8-Surinamese venous sheath was placed.     A temporary transvenous pacing electrode was inserted via the left femoral venous access and advanced to the right ventricular apex and excellent pacing/sensing was noted. A 5F pigtail catheter was advanced from the femoral arterial access and this was advanced to the aortic root, and intraprocedural aortography was performed to confirm the implantation angle.    At this time, left femoral arterial access was obtained by Dr. Lopez and Afua and an Lujan sheath was placed (see separate note). Using this access, AL1 catheter, and straight wire, the aortic valve was crossed. The catheter was advanced into the left ventricle and the wire was exchanged for a Safari wire.  Balloon valvuloplasty was performed using a 20 x 40 mm balloon.  Subsequently, a 26 mm tissue Lujan TONY 3 valve was advanced using standard delivery system. This was positioned under  fluoroscopy. After the satisfactory position was confirmed, the valve was expanded under rapid pacing protocol. Satisfactory position was noted. Post implant images revealed no aortic insufficiency which was paravalvular. No significant central regurgitation was noted.     At this time, the delivery system was removed. The arterial sheath was removed and the arteriotomy was closed with Perclose.  Unfortunately no significant distal flow was seen at that point an open repair was performed by Dr. Lopez and Afua (see separate note). The patient tolerated the procedure well and without complications.  Estimated blood loss < 500 cc    FINAL IMPRESSION:   · Successful transcatheter aortic valve replacement with 26 mm  Lujan TONY 3 pericardial prosthesis.       Rachael Mendoza MD, FACC

## 2023-05-08 NOTE — PROGRESS NOTES
Structural Heart Team Meeting Summary         Patient Name: Nicholas Yin  YOB: 1945     Referring Physician: CHIDI Adams  Admission Status: Inpatient     Attendees: Arben Lopez MD, Jim Verma MD, Albert Caal MD, Rachael Mendoza MD, Christal Domingo MD, Lujan Clinical Specialist, Erika Aguirre, Kanika Barker DO and CHIDI Sanchze  Primary presentation of AS: Heart Failure   Heart Failure:   HFpEF   NYHA Functional Class: III   LVEF:  60%     Major Organ Compromise: Renal (CKD stage 3 or worse, dialysis)      Procedure Specific Impediment: N/A      Other Factors:   Major Nutritional Deficit: No  Cognitive Impairment: No  Oxygen dependent: No    STS Risk Score:   Mortality Risk: 5.15%  Mortality and Morbidity Risk: 29.8%    TAVR/TMVR Rationale: Frail, elderly male with severe symptomatic AS and intermediate STS risk score for TAVR. Structural heart team in agreement to proceed with TAVR           Diagnostic Studies Discussed/ Reviewed: TTE, LAURIE-moderate MR & moderate AI, low flow low gradient AS with preserved EF, SVI 29.7, carotid clear, PET stress test without ischemia, CXR with lung scarring or atelectasis, CT with celiac artery & SMA stenosis, iliac artery calcification without significant stenosis, mild lung scarring, EKG with RBBB, A1C 7.5%, creatinine 2.17, GFR 30.6    Procedure planning details:   Valve Size: 26mm  Cardiology sheath access: Right femoral  CT Surgery sheath access: Left femoral    Post Procedure Considerations: Bleeding at groin sites, monitor for arrhythmias and s/s of CVA, puncture site and overall healing with DMII, A1C 7.5 and BMI 35, monitor renal function

## 2023-05-08 NOTE — PROGRESS NOTES
Intensive Care Follow-up     Hospital:  LOS: 0 days   Mr. Nicholas Yin, 78 y.o. male is followed for:   Nonrheumatic aortic valve stenosis   Glycemic, Electrolyte, Respiratory, and Medical management        Subjective   Interval History:  Nicholas Yin is a 78 y.o. male with PMH of HTN, dyslipidemia, CAD s/p stent, CKD, T2DM, GERD, and aortic stenosis who presents to [facility] on 5/8/23 for scheduled TAVR per Dr. Lopez.     The patient was noted to have progressive aortic indices on serial echocardiograms now with moderate to severe stenosis. EF 56-60%. He was referred to CTS who recommended surgical intervention and underwent pre-operative work-up.     Carotid duplex negative for significant stenosis.     Stress PET myocardial perfusion study negative for inducible ischemia. Baseline EKG did show a RBBB and mild first degree AVB.     TAVR CT shows extensive aortic valve calcification, mild ascending aortic ectasia, extensive aortoiliac calcification with evidence of high-grade aortoiliac stenosis/aneurysm, and a densely calcified celiac axis and SMA with suspected moderate stenosis.     Pre-op PFTs as follows: FVC 2.67, 67%; FEV1 2.16, 76%; and ratio 0.80.     Time spent: 5 minutes  Electronically signed by CHIDI Boone, 05/08/23, 7:51 AM EDT.    The chart and data has been reviewed and and I agree with the above.  The patient has been brought to the intensive care unit postoperatively from a TAVR in an extubated state.  He is drowsy but easily arousable and answers all questions.  He states that his pain at his groin site is under good control.  He denies chest pain or shortness of breath currently.    The patient's past medical, surgical and social history were reviewed and updated in Epic as appropriate.        Objective     Infusions:  lactated ringers, 9 mL/hr  niCARdipine, 5-15 mg/hr, Last Rate: 7.5 mg/hr (05/08/23 0935)  niCARdipine, 5-15 mg/hr  phenylephrine, 0.5-3  "mcg/kg/min      Medications:  [START ON 5/9/2023] aspirin, 81 mg, Oral, Daily  cefuroxime, 1.5 g, Intravenous, Q8H        Vital Sign Min/Max for last 24 hours  Temp  Min: 97.6 °F (36.4 °C)  Max: 97.7 °F (36.5 °C)   BP  Min: 90/77  Max: 132/54   Pulse  Min: 62  Max: 134   Resp  Min: 11  Max: 18   SpO2  Min: 95 %  Max: 99 %   Flow (L/min)  Min: 3  Max: 3       Input/Output for last 24 hour shift  No intake/output data recorded.   S RR:  [6-12] 6  Objective:  General Appearance:  Uncomfortable, in no acute distress and not in pain.    Vital signs: (most recent): Blood pressure 113/56, pulse 63, temperature 97.7 °F (36.5 °C), temperature source Oral, resp. rate 11, height 174.6 cm (68.75\"), weight 106 kg (233 lb), SpO2 99 %.    HEENT: Normal HEENT exam.    Lungs:  Normal effort and normal respiratory rate.  Breath sounds clear to auscultation.    Heart: Normal rate.  Regular rhythm.  S1 normal and S2 normal.  No murmur.   Chest: Symmetric chest wall expansion.   Abdomen: Abdomen is soft and non-distended.  Bowel sounds are normal.   There is no abdominal tenderness.     Extremities: (Left groin dressed and dry.  No hematoma.  Tender to touch.)  Neurological: Patient is alert and oriented to person, place and time.    Pupils:  Pupils are equal, round, and reactive to light.  Pupils are equal.   Skin:  Warm.              Results from last 7 days   Lab Units 05/08/23  0939 05/05/23  1232   WBC 10*3/mm3 14.59* 8.16   HEMOGLOBIN g/dL 10.6* 13.2   PLATELETS 10*3/mm3 206 242     Results from last 7 days   Lab Units 05/08/23  0939 05/05/23  1232   SODIUM mmol/L 143 139   POTASSIUM mmol/L 4.7 4.1   CO2 mmol/L 20.0* 22.0   BUN mg/dL 30* 33*   CREATININE mg/dL 1.99* 2.17*   MAGNESIUM mg/dL  --  2.1   GLUCOSE mg/dL 142* 65     Estimated Creatinine Clearance: 37.2 mL/min (A) (by SHERRIE-G formula based on SCr of 1.99 mg/dL (H)).            I reviewed the patient's results and images.     Assessment & Plan   Impression        " Moderate-severe aortic stenosis     GERD    Mixed hyperlipidemia    Essential hypertension    T2DM     CKD (baseline Cr 2-2.3)      CAD s/p stent     Class 1 obesity in adult    Moderate to severe aortic stenosis       Plan        Mobilize the patient when surgically appropriate.  Pulmonary hygiene has been encouraged.  Glucose control will be implemented.  Avoid all nephrotoxins if possible.       I discussed the patient's findings and my recommendations with patient and nursing staff       Albert Riggs MD, [facility]  Pulmonology and Critical Care Medicine

## 2023-05-08 NOTE — ANESTHESIA PREPROCEDURE EVALUATION
Anesthesia Evaluation     Patient summary reviewed and Nursing notes reviewed   no history of anesthetic complications:  NPO Solid Status: > 8 hours  NPO Liquid Status: > 2 hours           Airway   Mallampati: II  TM distance: >3 FB  Neck ROM: full  No difficulty expected  Dental    (+) edentulous    Pulmonary - normal exam    breath sounds clear to auscultation  (+) shortness of breath,   (-) COPD, asthma  Cardiovascular   Exercise tolerance: poor (<4 METS)    ECG reviewed  PT is on anticoagulation therapy  Patient on routine beta blocker and Beta blocker given within 24 hours of surgery  Rhythm: irregular  Rate: normal    (+) hypertension 2 medications or greater, valvular problems/murmurs AS, CAD, cardiac stents Drug eluting stent more than 12 months ago CHF Diastolic >=55%, WHITTAKER, murmur, PVD, hyperlipidemia,     ROS comment: Echo 1/22/2021:61 - 65%. Mod as -peak systolic velocity of 3.4 m/sec, mildly progressed from previous study.  ·There is no evidence of pericardial effusion    PTCA with Stents 8/15/2019:  CAD with the mid LAD 95% plaque rupture stenosis, culprit vessel for his non-STEMI, Christina drug-eluting stent postdilated with 3.25 NC balloon.  Distal LAD had a 60% lesion, very small caliber vessel, 1.5 mm,Aggressive guideline directed medical management for CAD   Dual Anti platelet medication with Asa 81 mg qd and Brilinta 90 mg bid for minimum 1 year.     3/23 joanie- lvef 60 , mild clvh, severe as tamara 0.9, mean pg 21; mild to mod mr, mild to tr;     [id number], mod to severe as, mild ms, tamara 0.79    Neuro/Psych  (+) neuromuscular disease, TIA (h/o amaurosis fugax R eye; 25+ y ago also retinal detachment),    GI/Hepatic/Renal/Endo    (+) obesity,  GERD well controlled,  hepatitis C, liver disease, renal disease (BPH, CR 2.17) CRI, diabetes mellitus type 2 using insulin,     Musculoskeletal     (+) back pain, chronic pain,   Abdominal   (+) obese,    Substance History - negative use     OB/GYN negative  ob/gyn ROS         Other   arthritis,    history of cancer (h/o colon and testicular ca) remission    ROS/Med Hx Other: hgb 13.2    k 4.1   brilinta   5/3  PET scan performed and is completely normal.  His ejection fraction is also normal.  From a coronary artery standpoint he is cleared to proceed with TAVR as his LAD stent is patent      Phys Exam Other: +beard                Anesthesia Plan    ASA 4     general and Cynthia     (Risks and benefits of general anesthesia discussed with patient (including MI, CVA, death, recall, aspiration, oropharyngeal/dental damage), questions answered, agreeable to proceed.  Risks of LAURIE discussed with patient (Op/dental damage, dysphagia, perforation,etc); questions answered, agreeable to proceed.  )  intravenous induction     Anesthetic plan, risks, benefits, and alternatives have been provided, discussed and informed consent has been obtained with: patient.    Use of blood products discussed with patient  Consented to blood products.   Plan discussed with CRNA.

## 2023-05-08 NOTE — INTERVAL H&P NOTE
"[facility] Pre-op    Full history and physical note from office is attached.    BP 90/77 (BP Location: Right arm, Patient Position: Lying) Comment: 100/76 left arm  Pulse (!) 134   Temp 97.6 °F (36.4 °C) (Temporal)   Resp 18   Ht 174.6 cm (68.75\")   Wt 106 kg (233 lb)   SpO2 97%   BMI 34.66 kg/m²     Immunizations:  Influenza:  2022  Pneumococcal:  UTD  Tetanus:  UTD  Covid : x4      LAB Results:  Lab Results   Component Value Date    WBC 8.16 05/05/2023    HGB 13.2 05/05/2023    HCT 41.1 05/05/2023    MCV 85.8 05/05/2023     05/05/2023    NEUTROABS 5.38 05/05/2023    GLUCOSE 65 05/05/2023    BUN 33 (H) 05/05/2023    CREATININE 2.17 (H) 05/05/2023    EGFRIFNONA 29 (L) 01/13/2022     05/05/2023    K 4.1 05/05/2023     05/05/2023    CO2 22.0 05/05/2023    MG 2.1 05/05/2023    PHOS 3.3 08/14/2019    CALCIUM 9.2 05/05/2023    ALBUMIN 3.8 05/05/2023    AST 20 05/05/2023    ALT 14 05/05/2023    BILITOT 0.4 05/05/2023    PTT 27.0 05/05/2023    INR 0.98 05/05/2023 4/26/23 CTA:  Findings:  There is dense and extensive aortic valve calcification, extensive mitral valvular calcification, and extensive calcification of the proximal and mid LAD. There is moderate right coronary artery calcium.     Thoracic aorta measures approximately 3.5 cm in maximal transverse luminal diameter. There is no evidence of dissection. There is heavy calcification of the celiac axis and SMA origins which are probably at least moderately stenotic. Sagittal images   suggest 50% or greater celiac axis stenosis. No aortic aneurysm or significant luminal stenosis is seen. There is also extensive calcification of the common iliac arteries, but no high-grade stenosis is appreciated. There is extensive distal right   external iliac and common and proximal superficial femoral calcification but no evidence of high-grade stenosis.     Elsewhere at the level of this scan, no significant mediastinal or hilar " adenopathy, pericardial or pleural effusion is seen. No pulmonary embolic disease is apparent although pulmonary artery contrast is relatively weak. There is bilateral gynecomastia.   Lungs appear clear of active disease. There appears to be mild subpleural scarring in both medial lung bases, with associated bronchiectasis, mild linear scarring of the lower lobes elsewhere, and mild left upper lung scarring. No mass or pneumonia is   appreciated. The airways appear to be normally patent.     Below the diaphragm, there is diffuse fatty liver change. No significant abnormalities are appreciated of the gallbladder, spleen, pancreas, or adrenal glands. There are bilateral nonenhancing renal cysts. Bowel loops are normal in caliber. Rectosigmoid   anastomosis appears clear. Bladder is moderately distended but normal in appearance. There is a fat-containing right periumbilical hernia, with no evidence of strangulation.     IMPRESSION:  Impression:     1. CTA chest abdomen pelvis, with image data saved per TAVR protocol.     2. Extensive aortic valve calcification. Mild ascending aortic ectasia to 3.5 cm. No evidence of dissection.     3. Extensive aortoiliac calcification, but no evidence of high-grade aortoiliac stenosis or evidence of aortoiliac aneurysm. Densely calcified celiac axis and SMA, with suspected moderate stenosis.     4. Incidentally noted fat-containing right periumbilical hernia, mild bilateral gynecomastia, and mild lung scarring.     3/13/23 ECHO:  Interpretation Summary       Left ventricular systolic function is normal. Estimated left ventricular EF = 60%    Left ventricular wall thickness is consistent with mild concentric hypertrophy.    Severe aortic valve stenosis is present. Aortic valve area is 0.9 cm2.  Aortic diameter 2.5 cm.    Aortic valve mean pressure gradient is 21 mmHg.  Stroke-volume index is 29.7 cm/m².    The patient meets the definition for preserved ejection fraction, low-flow,  aortic stenosis.    Mild to moderate mitral regurgitation.    Trace to mild tricuspid regurgitation.    Cancer Staging (if applicable)  Cancer Patient: __ yes __no __unknown__N/A; If yes, clinical stage T:__ N:__M:__, stage group or __N/A      Impression: Non rheumatic aortic valve stenosis       Plan: TRANSCATHETER AORTIC VALVE REPLACEMENT + LAURIE      Nessa Hampton, APRN   5/8/2023   06:42 EDT

## 2023-05-08 NOTE — PLAN OF CARE
Goal Outcome Evaluation:           Progress: improving  Outcome Evaluation: TAVR done, out to 2H ICU at 09:24, on cardene gtt, sheaths pulled, bedrest up at 17:30, VSS, will continue to monitor.

## 2023-05-08 NOTE — OP NOTE
Operative Report  Nicholas Yin  [id number]  1945    Preop Diagnosis: Severe aortic stenosis, atherosclerotic peripheral vascular disease    Results of STS risk score discussed with patient and family    Postop Diagnosis same        Procedure: #1 BAV with a #20 balloon #2 TAVR with a Lujan Leatha #26 valve #3 exploration of the left groin with left femoral endarterectomy with pericardial patch        Surgeons: Arben Lopez        Assistant: Magno Eaton performed retraction, suction, irrigation, suturing Marie Bello  Assistant: Marie Bello PA-C was responsible for performing the following activities: Retraction, Suction, Irrigation, Suturing, Closing and Placing Dressing and their skilled assistance was necessary for the success of this case.       Operative Findings:         Description: Patient was brought to the operating room placed under general anesthesia.  The patient's chest abdomen groins were prepped and draped in sterile fashion.  Dr. Mendoza placed a right femoral arterial venous sheath.  A temporary pacemaker wire was placed.  A pigtail catheter was placed in the coronary sinus to delineate proper angulation.  At this time the left groin was stuck using a modified Seldinger technique.  The patient was heparinized.  2 Perclose's were placed per protocol.  At this time a 14 Japanese sheath was inserted.  We then proceeded to place an AL-1 catheter with a straight wire which we crossed the valve.  This was exchanged for a safari wire.  At this time a #20 balloon was placed in insufflated with rapid ventricular pacing 180 bpm.  At this time the device was placed in descending aorta and the sheath.  Is advanced across the arch and across the valve.  This time with rapid ventricular pacing 180 bpm the valve was deployed.  Angiography revealed excellent result.  LAURIE revealed an excellent result.  The device was removed.  At this time we then remove the sheath.  There was on angiography  obstruction to the flow.  So a vertical incision was made in the left groin.  Proceeded to isolate the left common femoral artery.  It was opened and extended proximally and distally.  The artery was very disease.  Endarterectomy is carried out.  At this time a pericardial patch was fashioned and a running 6-0 Prolene suture was sewn around the opening.  This was done with the patch.  At this time there was excellent pulses noted.  The patient was reversed 125 mg of protamine.  There is excellent pulses in the feet.  The wound was closed in 4 layers running 2-0 Vicryl, 2-0 Vicryl, 3-0 Vicryl, 3-0 Monocryl subcuticular stitch.  Patient tolerated procedure without difficulty.  The fluoroscopy time was 6 minutes 42 seconds, radiation dose 4 and 65 mGy, contrast 370 cc of Isovue-370.        EBL: 500 cc      Please note that portions of this note were completed with a voice recognition program. Efforts were made to edit the dictations, but words may be mistranscribed      Arben Lopez MD  05/08/23 09:11 EDT

## 2023-05-08 NOTE — ANESTHESIA PROCEDURE NOTES
Structural Heart LAURIE    Procedure Performed: Structural Heart LAURIE       Start Time:        End Time:      Preanesthesia Checklist:  Patient identified, IV assessed, risks and benefits discussed, monitors and equipment assessed, procedure being performed at surgeon's request and anesthesia consent obtained.    General Procedure Information  Diagnostic Indications for Echo:  assessment of surgical repair and hemodynamic monitoring  Physician Requesting Echo: Arben Lopez MD  Location performed:  OR  Intubated  Bite block placed  Heart visualized  Probe Insertion:  Easy  Probe Type:  Multiplane  Modalities:  2D only, color flow mapping, continuous wave Doppler and pulse wave Doppler        Anesthesia Information  Performed Personally      Echocardiogram Comments:       LAURIE by Harpreet GAO for TAVR  Team: Dr. Lopez, Reji, Afua Domingo    Preoperative informed consent obtained.  LAURIE probe passed without difficulty.      Baseline exam: LVEF 45-50 without significant WMA.  RV mildly dilated with preserved function .  No PFO nor DIO clot.  Mild TR, trace PI.  Trileafleft AV, calcified, with moderate AI (VC 0.4) and severe AS by TUNG from CE (0.97); annulus 23; mean gradient 21 max 37mmhg, vmax 3.06m/s. Atleast moderate MR with posteriorly directed and central jet, VC 0.46; mean MV gradient 3mmhg.  No dissection noted in imaged aorta regions; nonmobile plaquing of descending aorta present.   No significant effusions. Findings reviewed with surgeon in real time.     S/p TAVR  26 bioprosthesis;  no signif PVL, mean gradient 4mmhg.  Valve opens well without rocking motion, appears well seated.  Biventricular function stable on no inotropic support, no new significant valvular abnormalities.  Aorta intact in visualized portions without evidence of dissection.  LAURIE probe gently removed without blood present on probe.

## 2023-05-08 NOTE — ANESTHESIA PROCEDURE NOTES
Arterial Line      Patient reassessed immediately prior to procedure    Patient location during procedure: pre-op  Start time: 5/8/2023 6:20 AM  Stop Time:5/8/2023 6:25 AM       Line placed for hemodynamic monitoring.  Performed By   CHARLEEN/CAA: Rohit Stanton CRNA   Preanesthetic Checklist  Completed: patient identified, IV checked, site marked, risks and benefits discussed, surgical consent, monitors and equipment checked, pre-op evaluation and timeout performed  Arterial Line Prep    Sterile Tech: cap, gloves and sterile barriers  Prep: ChloraPrep  Patient monitoring: blood pressure monitoring, continuous pulse oximetry and EKG  Arterial Line Procedure   Laterality:right  Location:  radial artery  Catheter size: 20 G   Guidance: ultrasound guided  PROCEDURE NOTE/ULTRASOUND INTERPRETATION.  Using ultrasound guidance the potential vascular sites for insertion of the catheter were visualized to determine the patency of the vessel to be used for vascular access.  After selecting the appropriate site for insertion, the needle was visualized under ultrasound being inserted into the radial artery, followed by ultrasound confirmation of wire and catheter placement. There were no abnormalities seen on ultrasound; an image was taken; and the patient tolerated the procedure with no complications.   Number of attempts: 1  Successful placement: yes Images: still images not obtained  Post Assessment   Dressing Type: line sutured, occlusive dressing applied, secured with tape and wrist guard applied.   Complications no  Circ/Move/Sens Assessment: normal and unchanged.   Patient Tolerance: patient tolerated the procedure well with no apparent complications

## 2023-05-09 ENCOUNTER — READMISSION MANAGEMENT (OUTPATIENT)
Dept: CALL CENTER | Facility: HOSPITAL | Age: 78
End: 2023-05-09
Payer: COMMERCIAL

## 2023-05-09 ENCOUNTER — APPOINTMENT (OUTPATIENT)
Dept: CARDIOLOGY | Facility: HOSPITAL | Age: 78
DRG: 267 | End: 2023-05-09
Payer: COMMERCIAL

## 2023-05-09 VITALS
SYSTOLIC BLOOD PRESSURE: 141 MMHG | HEART RATE: 84 BPM | RESPIRATION RATE: 15 BRPM | OXYGEN SATURATION: 93 % | DIASTOLIC BLOOD PRESSURE: 55 MMHG | HEIGHT: 69 IN | TEMPERATURE: 97.8 F | WEIGHT: 233.69 LBS | BODY MASS INDEX: 34.61 KG/M2

## 2023-05-09 LAB
ACT BLD: 119 SECONDS (ref 82–152)
ANION GAP SERPL CALCULATED.3IONS-SCNC: 12 MMOL/L (ref 5–15)
BH BB BLOOD EXPIRATION DATE: NORMAL
BH BB BLOOD EXPIRATION DATE: NORMAL
BH BB BLOOD TYPE BARCODE: 5100
BH BB BLOOD TYPE BARCODE: 5100
BH BB DISPENSE STATUS: NORMAL
BH BB DISPENSE STATUS: NORMAL
BH BB PRODUCT CODE: NORMAL
BH BB PRODUCT CODE: NORMAL
BH BB UNIT NUMBER: NORMAL
BH BB UNIT NUMBER: NORMAL
BH CV ECHO MEAS - AO MAX PG: 20.2 MMHG
BH CV ECHO MEAS - AO MEAN PG: 9.9 MMHG
BH CV ECHO MEAS - AO V2 MAX: 224.3 CM/SEC
BH CV ECHO MEAS - AO V2 VTI: 44.6 CM
BH CV ECHO MEAS - AVA(I,D): 1.6 CM2
BH CV ECHO MEAS - EDV(CUBED): 166.4 ML
BH CV ECHO MEAS - EDV(MOD-SP2): 51.2 ML
BH CV ECHO MEAS - EDV(MOD-SP4): 73.5 ML
BH CV ECHO MEAS - EF(MOD-BP): 68.3 %
BH CV ECHO MEAS - EF(MOD-SP2): 67.2 %
BH CV ECHO MEAS - EF(MOD-SP4): 68 %
BH CV ECHO MEAS - ESV(CUBED): 42.9 ML
BH CV ECHO MEAS - ESV(MOD-SP2): 16.8 ML
BH CV ECHO MEAS - ESV(MOD-SP4): 23.5 ML
BH CV ECHO MEAS - FS: 36.4 %
BH CV ECHO MEAS - LV MAX PG: 3.6 MMHG
BH CV ECHO MEAS - LV MEAN PG: 2 MMHG
BH CV ECHO MEAS - LV V1 MAX: 95.1 CM/SEC
BH CV ECHO MEAS - LV V1 VTI: 21.6 CM
BH CV ECHO MEAS - LVIDD: 5.5 CM
BH CV ECHO MEAS - LVIDS: 3.5 CM
BH CV ECHO MEAS - LVOT AREA: 3.1 CM2
BH CV ECHO MEAS - LVOT DIAM: 2.1 CM
BH CV ECHO MEAS - MV A MAX VEL: 162 CM/SEC
BH CV ECHO MEAS - MV DEC SLOPE: 535 CM/SEC2
BH CV ECHO MEAS - MV DEC TIME: 0.28 MSEC
BH CV ECHO MEAS - MV E MAX VEL: 130 CM/SEC
BH CV ECHO MEAS - MV E/A: 0.8
BH CV ECHO MEAS - MV MAX PG: 11.6 MMHG
BH CV ECHO MEAS - MV MEAN PG: 6 MMHG
BH CV ECHO MEAS - MV P1/2T: 81.6 MSEC
BH CV ECHO MEAS - MV V2 VTI: 46.3 CM
BH CV ECHO MEAS - MVA(P1/2T): 2.7 CM2
BH CV ECHO MEAS - MVA(VTI): 1.47 CM2
BH CV ECHO MEAS - SV(LVOT): 68 ML
BH CV ECHO MEAS - SV(MOD-SP2): 34.4 ML
BH CV ECHO MEAS - SV(MOD-SP4): 50 ML
BH CV VAS BP LEFT ARM: NORMAL MMHG
BUN SERPL-MCNC: 41 MG/DL (ref 8–23)
BUN/CREAT SERPL: 16.6 (ref 7–25)
CALCIUM SPEC-SCNC: 8.1 MG/DL (ref 8.6–10.5)
CHLORIDE SERPL-SCNC: 108 MMOL/L (ref 98–107)
CO2 SERPL-SCNC: 17 MMOL/L (ref 22–29)
CREAT SERPL-MCNC: 2.47 MG/DL (ref 0.76–1.27)
CROSSMATCH INTERPRETATION: NORMAL
CROSSMATCH INTERPRETATION: NORMAL
CYTO UR: NORMAL
DEPRECATED RDW RBC AUTO: 52.7 FL (ref 37–54)
EGFRCR SERPLBLD CKD-EPI 2021: 26.2 ML/MIN/1.73
ERYTHROCYTE [DISTWIDTH] IN BLOOD BY AUTOMATED COUNT: 16.6 % (ref 12.3–15.4)
GLUCOSE BLDC GLUCOMTR-MCNC: 252 MG/DL (ref 70–130)
GLUCOSE SERPL-MCNC: 258 MG/DL (ref 65–99)
HCT VFR BLD AUTO: 31 % (ref 37.5–51)
HGB BLD-MCNC: 10.1 G/DL (ref 13–17.7)
IVRT: 63 MSEC
LAB AP CASE REPORT: NORMAL
LAB AP CLINICAL INFORMATION: NORMAL
LV EF 2D ECHO EST: 55 %
MAXIMAL PREDICTED HEART RATE: 143 BPM
MCH RBC QN AUTO: 28.5 PG (ref 26.6–33)
MCHC RBC AUTO-ENTMCNC: 32.6 G/DL (ref 31.5–35.7)
MCV RBC AUTO: 87.3 FL (ref 79–97)
PATH REPORT.FINAL DX SPEC: NORMAL
PATH REPORT.GROSS SPEC: NORMAL
PLATELET # BLD AUTO: 183 10*3/MM3 (ref 140–450)
PMV BLD AUTO: 10.6 FL (ref 6–12)
POTASSIUM SERPL-SCNC: 5.2 MMOL/L (ref 3.5–5.2)
QT INTERVAL: 414 MS
QTC INTERVAL: 483 MS
RBC # BLD AUTO: 3.55 10*6/MM3 (ref 4.14–5.8)
SODIUM SERPL-SCNC: 137 MMOL/L (ref 136–145)
STRESS TARGET HR: 122 BPM
UNIT  ABO: NORMAL
UNIT  ABO: NORMAL
UNIT  RH: NORMAL
UNIT  RH: NORMAL
WBC NRBC COR # BLD: 22.21 10*3/MM3 (ref 3.4–10.8)

## 2023-05-09 PROCEDURE — 93005 ELECTROCARDIOGRAM TRACING: CPT | Performed by: PHYSICIAN ASSISTANT

## 2023-05-09 PROCEDURE — 93325 DOPPLER ECHO COLOR FLOW MAPG: CPT | Performed by: INTERNAL MEDICINE

## 2023-05-09 PROCEDURE — 93308 TTE F-UP OR LMTD: CPT

## 2023-05-09 PROCEDURE — 93010 ELECTROCARDIOGRAM REPORT: CPT | Performed by: INTERNAL MEDICINE

## 2023-05-09 PROCEDURE — 63710000001 INSULIN LISPRO (HUMAN) PER 5 UNITS: Performed by: NURSE PRACTITIONER

## 2023-05-09 PROCEDURE — 93325 DOPPLER ECHO COLOR FLOW MAPG: CPT

## 2023-05-09 PROCEDURE — 93321 DOPPLER ECHO F-UP/LMTD STD: CPT

## 2023-05-09 PROCEDURE — 82948 REAGENT STRIP/BLOOD GLUCOSE: CPT

## 2023-05-09 PROCEDURE — 80048 BASIC METABOLIC PNL TOTAL CA: CPT | Performed by: PHYSICIAN ASSISTANT

## 2023-05-09 PROCEDURE — 93308 TTE F-UP OR LMTD: CPT | Performed by: INTERNAL MEDICINE

## 2023-05-09 PROCEDURE — 93321 DOPPLER ECHO F-UP/LMTD STD: CPT | Performed by: INTERNAL MEDICINE

## 2023-05-09 PROCEDURE — 25010000002 CEFAZOLIN IN DEXTROSE 2-4 GM/100ML-% SOLUTION: Performed by: PHYSICIAN ASSISTANT

## 2023-05-09 PROCEDURE — 85027 COMPLETE CBC AUTOMATED: CPT | Performed by: PHYSICIAN ASSISTANT

## 2023-05-09 PROCEDURE — 99232 SBSQ HOSP IP/OBS MODERATE 35: CPT | Performed by: INTERNAL MEDICINE

## 2023-05-09 RX ORDER — HYDROCODONE BITARTRATE AND ACETAMINOPHEN 5; 325 MG/1; MG/1
1 TABLET ORAL EVERY 8 HOURS PRN
Qty: 15 TABLET | Refills: 0 | Status: SHIPPED | OUTPATIENT
Start: 2023-05-09 | End: 2023-07-24

## 2023-05-09 RX ADMIN — NICARDIPINE HYDROCHLORIDE 10 MG/HR: 25 INJECTION, SOLUTION INTRAVENOUS at 02:41

## 2023-05-09 RX ADMIN — NICARDIPINE HYDROCHLORIDE 5 MG/HR: 25 INJECTION, SOLUTION INTRAVENOUS at 00:05

## 2023-05-09 RX ADMIN — CEFAZOLIN SODIUM 2 G: 2 INJECTION, SOLUTION INTRAVENOUS at 03:02

## 2023-05-09 RX ADMIN — INSULIN LISPRO 4 UNITS: 100 INJECTION, SOLUTION INTRAVENOUS; SUBCUTANEOUS at 08:19

## 2023-05-09 RX ADMIN — LOSARTAN POTASSIUM 25 MG: 25 TABLET, FILM COATED ORAL at 08:20

## 2023-05-09 RX ADMIN — ASPIRIN 81 MG: 81 TABLET, COATED ORAL at 08:20

## 2023-05-09 RX ADMIN — AMLODIPINE BESYLATE 10 MG: 10 TABLET ORAL at 08:19

## 2023-05-09 RX ADMIN — INSULIN LISPRO 3 UNITS: 100 INJECTION, SOLUTION INTRAVENOUS; SUBCUTANEOUS at 08:21

## 2023-05-09 RX ADMIN — NICARDIPINE HYDROCHLORIDE 10 MG/HR: 25 INJECTION, SOLUTION INTRAVENOUS at 03:02

## 2023-05-09 NOTE — PROGRESS NOTES
"Nicholas Yin  [id number]  1945     LOS: 1 day   Patient Care Team:  Waldemar Trejo MD as PCP - General  NeilWaldemar townsend MD as PCP - Family Medicine    Chief Complaint: Severe aortic stenosis      Subjective: No specific complaints    Objective:     Vital Sign Min/Max for last 24 hours  Temp  Min: 96.2 °F (35.7 °C)  Max: 97.9 °F (36.6 °C)   BP  Min: 109/53  Max: 153/60   Pulse  Min: 60  Max: 83   Resp  Min: 11  Max: 20   SpO2  Min: 89 %  Max: 99 %   No data recorded   No data recorded     Flowsheet Rows    Flowsheet Row First Filed Value   Admission Height 174.6 cm (68.75\") Documented at 05/08/2023 0616   Admission Weight 106 kg (233 lb) Documented at 05/08/2023 0616          Physical Exam:    Wound: Satisfactory    Pulses:     Mediastinal and Chest Tube Drainage:       Results Review:   Results from last 7 days   Lab Units 05/09/23  0308   WBC 10*3/mm3 22.21*   HEMOGLOBIN g/dL 10.1*   HEMATOCRIT % 31.0*   PLATELETS 10*3/mm3 183     Results from last 7 days   Lab Units 05/09/23  0308   SODIUM mmol/L 137   POTASSIUM mmol/L 5.2   CHLORIDE mmol/L 108*   CO2 mmol/L 17.0*   BUN mg/dL 41*   CREATININE mg/dL 2.47*   GLUCOSE mg/dL 258*   CALCIUM mg/dL 8.1*     Results from last 7 days   Lab Units 05/08/23  0707   PH, ARTERIAL pH units 7.41         Assessment      Moderate-severe aortic stenosis     GERD    Mixed hyperlipidemia    Essential hypertension    T2DM     CKD (baseline Cr 2-2.3)      CAD s/p stent     Class 1 obesity in adult    Moderate to severe aortic stenosis      Patient alert and doing well.  Patient wants to go home.  His creatinine is bumped to 2.47 but he is making good urine output.  I suspect he can go home but I will leave it to Dr. Mendoza's final decision.  He could have his creatinine checked by his local physician later this week.  He has no further questions.        Arben Lopez MD  05/09/23  06:41 EDT      Please note that portions of this note were " completed with a voice recognition program. Efforts were made to edit the dictations, but words may be mistranscribed

## 2023-05-09 NOTE — OUTREACH NOTE
Prep Survey    Flowsheet Row Responses   [facility] patient discharged from? [place]   Is LACE score < 7 ? No   Eligibility [facility]   Date of Admission 05/08/23   Date of Discharge 05/09/23   Discharge Disposition Home or Self Care   Discharge diagnosis Moderate-severe aortic stenosis,   TAVR   Does the patient have one of the following disease processes/diagnoses(primary or secondary)? Other   Does the patient have Home health ordered? No   Is there a DME ordered? No   Prep survey completed? Yes          Rachel BALDERAS - Registered Nurse

## 2023-05-09 NOTE — CASE MANAGEMENT/SOCIAL WORK
Case Management Discharge Note      Final Note: Mr. Yin is post op TAVR with discharge orders in place.   No needs identified.  Plan is home, has transportation.                   Final Discharge Disposition Code: 01 - home or self-care

## 2023-05-09 NOTE — PROGRESS NOTES
Pt. Referred for Phase II Cardiac Rehab. Staff discussed benefits of exercise, program protocol, and educational material provided. Teach back verified.  Permission granted from patient for staff to fax referral information to outlying program at this time.  Staff faxed referral info to [facility].

## 2023-05-09 NOTE — PROGRESS NOTES
"[facility] Cardiology Daily Note       LOS: 1 day   Patient Care Team:  Waldemar Trejo MD as PCP - General  Waldemar Trejo MD as PCP - Family Medicine    Chief Complaint: Day 1 status post TAVR    Subjective     Subjective: No complaints.  Feeling good.  Wants to go home.  95% on 2 L nasal cannula.  Blood pressures have been good overnight.  Heart rates have been in the 60s and 70s.    Review of Systems:   As above.    Medications:  amLODIPine, 10 mg, Oral, Q24H  aspirin, 81 mg, Oral, Daily  ceFAZolin, 2 g, Intravenous, Q8H  insulin detemir, 20 Units, Subcutaneous, Nightly  insulin lispro, 0-9 Units, Subcutaneous, 4x Daily With Meals & Nightly  Insulin Lispro, 3 Units, Subcutaneous, TID With Meals  losartan, 25 mg, Oral, BID        Objective     Vital Sign Min/Max for last 24 hours  Temp  Min: 96.2 °F (35.7 °C)  Max: 97.9 °F (36.6 °C)   BP  Min: 109/53  Max: 153/60   Pulse  Min: 60  Max: 83   Resp  Min: 11  Max: 20   SpO2  Min: 89 %  Max: 99 %   Flow (L/min)  Min: 2  Max: 6   No data recorded      Intake/Output Summary (Last 24 hours) at 5/9/2023 0710  Last data filed at 5/9/2023 0600  Gross per 24 hour   Intake 2497 ml   Output 400 ml   Net 2097 ml        Flowsheet Rows    Flowsheet Row First Filed Value   Admission Height 174.6 cm (68.75\") Documented at 05/08/2023 0616   Admission Weight 106 kg (233 lb) Documented at 05/08/2023 0616          Physical Exam:    General: Alert and oriented.   Cardiovascular: Heart has a nondisplaced focal PMI. Regular rate and rhythm without murmur, gallop or rub.  Lungs: Clear without rales or wheezes. Equal expansion is noted.   Abdomen: Soft, nontender.  Extremities: Show no edema. Groins show no bruising.  No bruit.  No hematoma.  Skin: warm and dry.     Results Review:    I reviewed the patient's new clinical results.  EKG:  Tele: Sinus rhythm    Labs:    Results from last 7 days   Lab Units 05/09/23  0308 05/08/23  0939 05/05/23  1232 "   SODIUM mmol/L 137 143 139   POTASSIUM mmol/L 5.2 4.7 4.1   CHLORIDE mmol/L 108* 112* 106   CO2 mmol/L 17.0* 20.0* 22.0   BUN mg/dL 41* 30* 33*   CREATININE mg/dL 2.47* 1.99* 2.17*   CALCIUM mg/dL 8.1* 8.2* 9.2   BILIRUBIN mg/dL  --   --  0.4   ALK PHOS U/L  --   --  69   ALT (SGPT) U/L  --   --  14   AST (SGOT) U/L  --   --  20   GLUCOSE mg/dL 258* 142* 65     Results from last 7 days   Lab Units 05/09/23  0308 05/08/23  0939 05/08/23  0707 05/05/23  1232   WBC 10*3/mm3 22.21* 14.59*  --  8.16   HEMOGLOBIN g/dL 10.1* 10.6*  --  13.2   HEMOGLOBIN, POC g/dL  --   --  11.9*  --    HEMATOCRIT % 31.0* 33.6*  --  41.1   HEMATOCRIT POC %  --   --  35*  --    PLATELETS 10*3/mm3 183 206  --  242     Lab Results   Component Value Date    TROPONINT 0.052 (C) 08/15/2019    TROPONINT 0.086 (C) 08/15/2019    TROPONINT 0.045 (C) 08/14/2019     Lab Results   Component Value Date    CHOL 118 03/10/2023    CHOL 147 10/27/2022    CHOL 97 07/25/2022     Lab Results   Component Value Date    TRIG 151 (H) 03/10/2023    TRIG 134 10/27/2022    TRIG 135 07/25/2022     Lab Results   Component Value Date    HDL 50 03/10/2023    HDL 49 10/27/2022    HDL 46 07/25/2022     No components found for: LDLCALC  Lab Results   Component Value Date    INR 0.98 05/05/2023    INR 0.92 08/14/2019    INR 0.95 07/25/2014    PROTIME 13.1 05/05/2023    PROTIME 12.8 08/14/2019    PROTIME 10.8 07/25/2014         Ejection Fraction:    Assessment   Assessment:    1. Aortic stenosis  a. Echo 12/8/2017 (Rachid): EF 56-60%, mildly dilated LA, mild to moderate AS, mean gradient 19.  b. Echo 7/16/2019 (Rachid): EF normal, grade 1 diastolic dysfunction, mild to moderate AS with mild AI.  c. Echo 7/16/2021 (Rachid): EF 56-60%, grade 1 diastolic dysfunction, moderate AS  d. Echo 2/6/2023: EF 56-60%, moderate to severe AS.  Max gradient 32, mean gradient 18, TUNG 0.79.  Gradients could be underestimated based on LVOT  e. LAURIE 3/14/2023: Severe aortic stenosis.   Stroke-volume index of 29.7 cm/meter squared consistent with preserved ejection low flow aortic stenosis.  2. Coronary artery disease  a. NSTEMI 8/15/2019 (Rachid): 3.0 X18 Christina ARIA to mid LAD  b. Patient remains on dual antiplatelet therapy per his primary cardiologist  c. PET scan 1423 showed no evidence of inducible ischemia.  Calculated ejection fraction with stress 72%.  3. Hypertension  4. Hyperlipidemia  5. PAD  6. CKD with a baseline creatinine of 2.1-2.3      Plan:    Home today is okay with me.  The patient understands he needs to hydrate at home  The patient will have his creatinine checked with his local physician on Thursday  Follow-up with Dr. Reid.    Rachael Mendoza MD  05/09/23  07:10 EDT

## 2023-05-10 ENCOUNTER — TRANSITIONAL CARE MANAGEMENT TELEPHONE ENCOUNTER (OUTPATIENT)
Dept: CALL CENTER | Facility: HOSPITAL | Age: 78
End: 2023-05-10
Payer: COMMERCIAL

## 2023-05-10 DIAGNOSIS — I35.0 NONRHEUMATIC AORTIC VALVE STENOSIS: Chronic | ICD-10-CM

## 2023-05-10 DIAGNOSIS — E78.2 MIXED HYPERLIPIDEMIA: Chronic | ICD-10-CM

## 2023-05-10 DIAGNOSIS — I10 ESSENTIAL HYPERTENSION: Primary | Chronic | ICD-10-CM

## 2023-05-10 DIAGNOSIS — N18.32 CHRONIC RENAL FAILURE, STAGE 3B: Chronic | ICD-10-CM

## 2023-05-10 DIAGNOSIS — E55.9 VITAMIN D DEFICIENCY: ICD-10-CM

## 2023-05-10 LAB
BH CV ECHO MEAS - AO MAX PG: 6.8 MMHG
BH CV ECHO MEAS - AO MEAN PG: 4.1 MMHG
BH CV ECHO MEAS - AO V2 MAX: 130.3 CM/SEC
BH CV ECHO MEAS - AO V2 VTI: 33.2 CM
BH CV ECHO MEAS - AVA(I,D): 2.4 CM2
BH CV ECHO MEAS - LV MAX PG: 3.9 MMHG
BH CV ECHO MEAS - LV MEAN PG: 2.3 MMHG
BH CV ECHO MEAS - LV V1 MAX: 98.8 CM/SEC
BH CV ECHO MEAS - LV V1 VTI: 25 CM
BH CV ECHO MEAS - LVOT AREA: 3.2 CM2
BH CV ECHO MEAS - LVOT DIAM: 2.02 CM
BH CV ECHO MEAS - MV MEAN PG: 3 MMHG
BH CV ECHO MEAS - SV(LVOT): 79.9 ML
LV EF 2D ECHO EST: 55 %

## 2023-05-10 NOTE — OUTREACH NOTE
Call Center TCM Note    Flowsheet Row Responses   [facility] patient discharged from? [facility]   Does the patient have one of the following disease processes/diagnoses(primary or secondary)? Other   TCM attempt successful? Yes  [no verbal release]   Call start time 1153   Call end time 1159   Discharge diagnosis Moderate-severe aortic stenosis,   TAVR   Medication alerts for this patient Understands to restart Brilinta on 5/16/23   Meds reviewed with patient/caregiver? Yes   Is the patient having any side effects they believe may be caused by any medication additions or changes? No   Does the patient have all medications ordered at discharge? Yes   Is the patient taking all medications as directed (includes completed medication regime)? No   What is preventing the patient from taking all medications as directed? Desires to consult PCP first   Nursing Interventions Notified provider  [Xultophy insulin changed at discharge to 50units QD--reports this increase was based on increases in BG r/t meds while in hospital and he is monitoring his BG and only took 32 units this am.  Will discuss with PCP at f/u appt]   Medication comments Pt is managing pain w/tylenol at this time   Comments  PCP FOLLOW UP APPOINTMENT IS 5/15/23@500pm--awaiting on other calls for f/u appts   Does the patient have an appointment with their PCP within 7 days of discharge? Yes   Has home health visited the patient within 72 hours of discharge? N/A   Psychosocial issues? No   Did the patient receive a copy of their discharge instructions? Yes   Nursing interventions Reviewed instructions with patient   What is the patient's perception of their health status since discharge? Improving  [Pt is doing well---is having pain but managing with tylenol at this time.  Reports improvement in SOA post TAVR and hopes to see even more once fluids received have been eliminated.  No issues with incisions and aware of activity precautions.]   Is the  patient/caregiver able to teach back signs and symptoms related to disease process for when to call PCP? Yes   Is the patient/caregiver able to teach back signs and symptoms related to disease process for when to call 911? Yes   Additional teach back comments Pt reports his wife is an APRN so he is getting excellent care post procedure--understands to refer back to AVS for any questions/concerns.   TCM call completed? Yes   Call end time [id number]          Nicole Rasmussen RN    5/10/2023, 12:12 EDT

## 2023-05-11 ENCOUNTER — HOSPITAL ENCOUNTER (OUTPATIENT)
Dept: GENERAL RADIOLOGY | Facility: HOSPITAL | Age: 78
Discharge: HOME | End: 2023-05-11
Payer: COMMERCIAL

## 2023-05-11 ENCOUNTER — LAB (OUTPATIENT)
Dept: LAB | Facility: HOSPITAL | Age: 78
End: 2023-05-11
Payer: COMMERCIAL

## 2023-05-11 DIAGNOSIS — R06.02 SHORTNESS OF BREATH: ICD-10-CM

## 2023-05-11 DIAGNOSIS — I10 ESSENTIAL HYPERTENSION: Chronic | ICD-10-CM

## 2023-05-11 DIAGNOSIS — I35.0 NONRHEUMATIC AORTIC VALVE STENOSIS: ICD-10-CM

## 2023-05-11 DIAGNOSIS — E55.9 VITAMIN D DEFICIENCY: ICD-10-CM

## 2023-05-11 DIAGNOSIS — R06.02 SHORTNESS OF BREATH: Primary | ICD-10-CM

## 2023-05-11 DIAGNOSIS — N18.32 CHRONIC RENAL FAILURE, STAGE 3B: ICD-10-CM

## 2023-05-11 LAB
25(OH)D3 SERPL-MCNC: 19.2 NG/ML (ref 30–100)
ALBUMIN SERPL-MCNC: 3 G/DL (ref 3.5–5.2)
ALBUMIN/GLOB SERPL: 1.2 G/DL
ALP SERPL-CCNC: 53 U/L (ref 39–117)
ALT SERPL W P-5'-P-CCNC: 6 U/L (ref 1–41)
ANION GAP SERPL CALCULATED.3IONS-SCNC: 9 MMOL/L (ref 5–15)
AST SERPL-CCNC: 20 U/L (ref 1–40)
BASOPHILS # BLD AUTO: 0.03 10*3/MM3 (ref 0–0.2)
BASOPHILS NFR BLD AUTO: 0.2 % (ref 0–1.5)
BILIRUB SERPL-MCNC: 0.4 MG/DL (ref 0–1.2)
BUN SERPL-MCNC: 54 MG/DL (ref 8–23)
BUN/CREAT SERPL: 18.9 (ref 7–25)
CALCIUM SPEC-SCNC: 8.4 MG/DL (ref 8.6–10.5)
CHLORIDE SERPL-SCNC: 111 MMOL/L (ref 98–107)
CO2 SERPL-SCNC: 21 MMOL/L (ref 22–29)
CREAT SERPL-MCNC: 2.86 MG/DL (ref 0.76–1.27)
D DIMER PPP FEU-MCNC: 1 MCGFEU/ML (ref 0–0.77)
DEPRECATED RDW RBC AUTO: 46.1 FL (ref 37–54)
EGFRCR SERPLBLD CKD-EPI 2021: 22 ML/MIN/1.73
EOSINOPHIL # BLD AUTO: 0.22 10*3/MM3 (ref 0–0.4)
EOSINOPHIL NFR BLD AUTO: 1.6 % (ref 0.3–6.2)
ERYTHROCYTE [DISTWIDTH] IN BLOOD BY AUTOMATED COUNT: 14.9 % (ref 12.3–15.4)
GLOBULIN UR ELPH-MCNC: 2.6 GM/DL
GLUCOSE SERPL-MCNC: 201 MG/DL (ref 65–99)
HCT VFR BLD AUTO: 27 % (ref 37.5–51)
HGB BLD-MCNC: 8.7 G/DL (ref 13–17.7)
LYMPHOCYTES # BLD AUTO: 1.08 10*3/MM3 (ref 0.7–3.1)
LYMPHOCYTES NFR BLD AUTO: 7.9 % (ref 19.6–45.3)
MCH RBC QN AUTO: 27.7 PG (ref 26.6–33)
MCHC RBC AUTO-ENTMCNC: 32.2 G/DL (ref 31.5–35.7)
MCV RBC AUTO: 86 FL (ref 79–97)
MONOCYTES # BLD AUTO: 1.25 10*3/MM3 (ref 0.1–0.9)
MONOCYTES NFR BLD AUTO: 9.2 % (ref 5–12)
NEUTROPHILS NFR BLD AUTO: 10.97 10*3/MM3 (ref 1.7–7)
NEUTROPHILS NFR BLD AUTO: 80.7 % (ref 42.7–76)
NT-PROBNP SERPL-MCNC: 6169 PG/ML (ref 0–1800)
PLATELET # BLD AUTO: 126 10*3/MM3 (ref 140–450)
PMV BLD AUTO: 11.2 FL (ref 6–12)
POTASSIUM SERPL-SCNC: 4.9 MMOL/L (ref 3.5–5.2)
PROT SERPL-MCNC: 5.6 G/DL (ref 6–8.5)
RBC # BLD AUTO: 3.14 10*6/MM3 (ref 4.14–5.8)
SODIUM SERPL-SCNC: 141 MMOL/L (ref 136–145)
WBC NRBC COR # BLD: 13.6 10*3/MM3 (ref 3.4–10.8)

## 2023-05-11 PROCEDURE — 71046 X-RAY EXAM CHEST 2 VIEWS: CPT

## 2023-05-11 PROCEDURE — 71046 X-RAY EXAM CHEST 2 VIEWS: CPT | Performed by: RADIOLOGY

## 2023-05-11 PROCEDURE — 85379 FIBRIN DEGRADATION QUANT: CPT

## 2023-05-11 PROCEDURE — 36415 COLL VENOUS BLD VENIPUNCTURE: CPT

## 2023-05-11 PROCEDURE — 83880 ASSAY OF NATRIURETIC PEPTIDE: CPT

## 2023-05-11 PROCEDURE — 85025 COMPLETE CBC W/AUTO DIFF WBC: CPT

## 2023-05-11 PROCEDURE — 82306 VITAMIN D 25 HYDROXY: CPT

## 2023-05-11 PROCEDURE — 80053 COMPREHEN METABOLIC PANEL: CPT

## 2023-05-12 ENCOUNTER — OFFICE VISIT (OUTPATIENT)
Dept: FAMILY MEDICINE CLINIC | Facility: CLINIC | Age: 78
End: 2023-05-12
Payer: COMMERCIAL

## 2023-05-12 ENCOUNTER — INFUSION (OUTPATIENT)
Dept: ONCOLOGY | Facility: HOSPITAL | Age: 78
End: 2023-05-12
Payer: COMMERCIAL

## 2023-05-12 VITALS
HEIGHT: 69 IN | OXYGEN SATURATION: 96 % | BODY MASS INDEX: 33.77 KG/M2 | WEIGHT: 228 LBS | HEART RATE: 148 BPM | RESPIRATION RATE: 14 BRPM | SYSTOLIC BLOOD PRESSURE: 122 MMHG | DIASTOLIC BLOOD PRESSURE: 62 MMHG | TEMPERATURE: 98.6 F

## 2023-05-12 VITALS
OXYGEN SATURATION: 94 % | DIASTOLIC BLOOD PRESSURE: 56 MMHG | SYSTOLIC BLOOD PRESSURE: 131 MMHG | HEART RATE: 77 BPM | RESPIRATION RATE: 18 BRPM | TEMPERATURE: 97.9 F

## 2023-05-12 DIAGNOSIS — I10 ESSENTIAL HYPERTENSION: Chronic | ICD-10-CM

## 2023-05-12 DIAGNOSIS — N18.9 CHRONIC KIDNEY DISEASE, UNSPECIFIED CKD STAGE: Primary | ICD-10-CM

## 2023-05-12 DIAGNOSIS — T14.8XXA HEMATOMA: ICD-10-CM

## 2023-05-12 DIAGNOSIS — E11.8 TYPE 2 DIABETES MELLITUS WITH COMPLICATION, WITH LONG-TERM CURRENT USE OF INSULIN: Chronic | ICD-10-CM

## 2023-05-12 DIAGNOSIS — D64.9 ANEMIA, UNSPECIFIED TYPE: Primary | ICD-10-CM

## 2023-05-12 DIAGNOSIS — E78.2 MIXED HYPERLIPIDEMIA: Chronic | ICD-10-CM

## 2023-05-12 DIAGNOSIS — I25.110 CORONARY ARTERY DISEASE INVOLVING NATIVE CORONARY ARTERY OF NATIVE HEART WITH UNSTABLE ANGINA PECTORIS: Chronic | ICD-10-CM

## 2023-05-12 DIAGNOSIS — N18.32 CHRONIC RENAL FAILURE, STAGE 3B: Chronic | ICD-10-CM

## 2023-05-12 DIAGNOSIS — Z79.4 TYPE 2 DIABETES MELLITUS WITH COMPLICATION, WITH LONG-TERM CURRENT USE OF INSULIN: Chronic | ICD-10-CM

## 2023-05-12 DIAGNOSIS — I50.9 ACUTE CONGESTIVE HEART FAILURE, UNSPECIFIED HEART FAILURE TYPE: ICD-10-CM

## 2023-05-12 DIAGNOSIS — Z95.2 S/P TAVR (TRANSCATHETER AORTIC VALVE REPLACEMENT): ICD-10-CM

## 2023-05-12 DIAGNOSIS — R79.9 ABNORMAL BLOOD CHEMISTRY: ICD-10-CM

## 2023-05-12 DIAGNOSIS — I73.9 PAD (PERIPHERAL ARTERY DISEASE): Chronic | ICD-10-CM

## 2023-05-12 DIAGNOSIS — D62 POSTOPERATIVE ANEMIA DUE TO ACUTE BLOOD LOSS: ICD-10-CM

## 2023-05-12 PROBLEM — I35.0 NONRHEUMATIC AORTIC VALVE STENOSIS: Chronic | Status: RESOLVED | Noted: 2021-03-18 | Resolved: 2023-05-12

## 2023-05-12 PROBLEM — I35.0 MODERATE TO SEVERE AORTIC STENOSIS: Status: RESOLVED | Noted: 2023-05-08 | Resolved: 2023-05-12

## 2023-05-12 LAB
BASOPHILS # BLD AUTO: 0.05 10*3/MM3 (ref 0–0.2)
BASOPHILS NFR BLD AUTO: 0.4 % (ref 0–1.5)
DEPRECATED RDW RBC AUTO: 57.3 FL (ref 37–54)
EOSINOPHIL # BLD AUTO: 0.45 10*3/MM3 (ref 0–0.4)
EOSINOPHIL NFR BLD AUTO: 3.8 % (ref 0.3–6.2)
ERYTHROCYTE [DISTWIDTH] IN BLOOD BY AUTOMATED COUNT: 17.4 % (ref 12.3–15.4)
HCT VFR BLD AUTO: 31.1 % (ref 37.5–51)
HGB BLD-MCNC: 9.8 G/DL (ref 13–17.7)
IMM GRANULOCYTES # BLD AUTO: 0.06 10*3/MM3 (ref 0–0.05)
IMM GRANULOCYTES NFR BLD AUTO: 0.5 % (ref 0–0.5)
LYMPHOCYTES # BLD AUTO: 0.8 10*3/MM3 (ref 0.7–3.1)
LYMPHOCYTES NFR BLD AUTO: 6.8 % (ref 19.6–45.3)
MCH RBC QN AUTO: 28.2 PG (ref 26.6–33)
MCHC RBC AUTO-ENTMCNC: 31.5 G/DL (ref 31.5–35.7)
MCV RBC AUTO: 89.6 FL (ref 79–97)
MONOCYTES # BLD AUTO: 1.11 10*3/MM3 (ref 0.1–0.9)
MONOCYTES NFR BLD AUTO: 9.4 % (ref 5–12)
NEUTROPHILS NFR BLD AUTO: 79.1 % (ref 42.7–76)
NEUTROPHILS NFR BLD AUTO: 9.29 10*3/MM3 (ref 1.7–7)
NRBC BLD AUTO-RTO: 0 /100 WBC (ref 0–0.2)
PLATELET # BLD AUTO: 147 10*3/MM3 (ref 140–450)
PMV BLD AUTO: 11.4 FL (ref 6–12)
RBC # BLD AUTO: 3.47 10*6/MM3 (ref 4.14–5.8)
WBC NRBC COR # BLD: 11.76 10*3/MM3 (ref 3.4–10.8)

## 2023-05-12 PROCEDURE — 85025 COMPLETE CBC W/AUTO DIFF WBC: CPT | Performed by: GENERAL PRACTICE

## 2023-05-12 PROCEDURE — G0463 HOSPITAL OUTPT CLINIC VISIT: HCPCS

## 2023-05-12 RX ORDER — FUROSEMIDE 10 MG/ML
40 INJECTION INTRAMUSCULAR; INTRAVENOUS ONCE
Status: DISCONTINUED | OUTPATIENT
Start: 2023-05-12 | End: 2023-05-12

## 2023-05-12 NOTE — PROGRESS NOTES
Subjective   Nicholas Yin is a 78 y.o. male.     Chief Complaint  Hospital follow-up    History of Present Illness     Hospital Follow-Up  He underwent a TAVR and left femoral endarterectomy on 5/8/2023.  His course in hospital was uncomplicated and he was discharged home the following day.  Since his return home, he has experienced SOBOE with PND, intermittent lightheadedness, an intermittent cough, and mild swelling of his hands and feet.  These symptoms have been associated with significant bruising about the left groin and scrotum, mild numbness and tingling of the right anterior thigh, a decreased appetite, and generalized weakness.  Hemoglobin drawn yesterday returned at 8.7 down from a preoperative level of 13.2.  BUN and creatinine returned at 54 and 2.86 with a proBNP of 6169.  Chest x-ray was reported as showing cardiomegaly, coarsened interstitial markings bilaterally, and a possible tiny right pleural effusion.  He was recommended ER evaluation but declined.    Coronary Artery Disease  He underwent a coronary angiogram by Dr. Villatoro on 8/15/2019 with stenting of the mid LAD.      Transient Visual Loss  Several years ago he experienced a sudden progressive decrease in the vision in his right eye while driving. He stated that the vision in the eye darkened gradually over several minutes to the point where the only things he could make out were very bright lights and the sun. This lasted for about 30 minutes then resolved over several minutes. He was hospitalized at [facility] over 10 years ago following a similar episodes that was ultimately felt to be vascular in origin. MRI of the brain performed on 12/8/17 was reported as showing mild cerebral atrophy with chronic small vessel ischemic changes. MRA of the carotid arteries performed the same day was unremarkable.  Bilateral carotid ultrasound performed on 2/6/2023 was unremarkable.  He undergoes regular ophthalmology assessments    Type 2  Diabetes Mellitus  Current treatments: SGLT2 -empagliflozin, GLP agonist-liraglutide and basal insulin - tresiba (together as xultophy).      Hyperlipidemia  He remains on rosuvastatin    Essential Hypertension  Home blood pressure readings: have generally been at or near goal. Current antihypertensive medications includes losartan, carvedilol, and amlodipine.     Chronic Renal Failure  This has been contributed to longstanding type 2 diabetes mellitus and hypertension.  He is aware to avoid any NSAIDs oral or prescription.     Chronic Back Pain  He gives a long history of increasing mid and low back pain. This is described as a sharp ache. The pain does not radiate and has been unassociated with any other symptoms. The pain is worse with prolonged weight bearing and limits his activities more then anything else at present.  MRI performed on 1/7/2021 was reported as showing degenerative disc disease and osteoarthritis with mild to moderate central canal and bilateral foraminal narrowing at L2-3 and L3-4.    Gastroesophageal Reflux Disease  He remains heartburn free on dexlansoprazole.    The following portions of the patient's history were reviewed and updated as appropriate: allergies, current medications, past family history, past medical history, past social history, past surgical history and problem list.    Review of Systems   Constitutional: Positive for appetite change and fatigue. Negative for chills, fever and unexpected weight change.   HENT: Negative for congestion, ear pain, rhinorrhea, sneezing and sore throat.    Eyes: Negative for visual disturbance.   Respiratory: Positive for cough and shortness of breath. Negative for wheezing.    Cardiovascular: Negative for chest pain, palpitations and leg swelling.   Gastrointestinal: Positive for constipation (no BM since his return home). Negative for abdominal pain, blood in stool, diarrhea, nausea and vomiting.   Endocrine: Negative for polydipsia and  polyuria.   Genitourinary: Negative for difficulty urinating, dysuria, frequency, hematuria and urgency.        Erectile dysfunction - chronic progressive. Nocturia x 2-3 with occasional sense of incomplete voiding.   Musculoskeletal: Positive for back pain (chronic - progressive). Negative for arthralgias, joint swelling and myalgias.   Skin: Negative for rash.        Skin lesions   Neurological: Positive for weakness (generalized) and light-headedness. Negative for numbness and headaches.     Objective   Physical Exam  Constitutional:       General: He is not in acute distress.     Appearance: Normal appearance. He is well-developed. He is not ill-appearing or diaphoretic.      Comments: Accompanied by his wife. Alert and oriented. Sits and stands with an exaggerated thoracic kyphosis.  Uncomfortable with movement.  No apparent distress at rest.  Pale.  No cyanosis, diaphoresis, or jaundice   HENT:      Head: Atraumatic.      Right Ear: Tympanic membrane, ear canal and external ear normal.      Left Ear: Tympanic membrane, ear canal and external ear normal.      Mouth/Throat:      Lips: No lesions.      Mouth: Mucous membranes are moist. No oral lesions.      Pharynx: No oropharyngeal exudate or posterior oropharyngeal erythema.   Eyes:      General: Lids are normal. Gaze aligned appropriately.      Extraocular Movements: Extraocular movements intact.      Conjunctiva/sclera: Conjunctivae normal.      Pupils: Pupils are equal.   Neck:      Thyroid: No thyroid mass or thyromegaly.      Vascular: No carotid bruit or JVD.      Trachea: Trachea normal. No tracheal deviation.   Cardiovascular:      Rate and Rhythm: Regular rhythm. Tachycardia present.      Heart sounds: S1 normal and S2 normal.     No gallop. No S3 or S4 sounds.   Pulmonary:      Effort: Pulmonary effort is normal.      Breath sounds: Normal breath sounds.   Abdominal:      General: Bowel sounds are normal. There is no distension or abdominal bruit.       Palpations: Abdomen is soft. There is no hepatomegaly, splenomegaly or mass.      Tenderness: There is no abdominal tenderness.      Hernia: No hernia is present.   Musculoskeletal:      Right lower leg: No edema.      Left lower leg: No edema.   Lymphadenopathy:      Head:      Right side of head: No submental, submandibular, tonsillar, preauricular, posterior auricular or occipital adenopathy.      Left side of head: No submental, submandibular, tonsillar, preauricular, posterior auricular or occipital adenopathy.      Cervical: No cervical adenopathy.      Upper Body:      Right upper body: No supraclavicular adenopathy.      Left upper body: No supraclavicular adenopathy.   Skin:     General: Skin is warm and dry.      Coloration: Skin is not cyanotic, jaundiced or pale.      Findings: Bruising (significant bruising about the left groin and hemiscrotum) present. No rash.      Nails: There is no clubbing.   Neurological:      Mental Status: He is alert and oriented to person, place, and time.      Cranial Nerves: No cranial nerve deficit, dysarthria or facial asymmetry.      Sensory: No sensory deficit.      Motor: No tremor.      Coordination: Coordination normal.      Gait: Gait normal.   Psychiatric:         Attention and Perception: Attention normal.         Mood and Affect: Mood normal.         Speech: Speech normal.         Behavior: Behavior normal.         Thought Content: Thought content normal.       Assessment & Plan   Problems Addressed this Visit        Cardiac and Vasculature    CAD s/p stent  (Chronic)  Continue risk factor modification    Essential hypertension (Chronic)  Continue current medication    Mixed hyperlipidemia (Chronic)  As above.    PAD (peripheral artery disease) (HCC) (Chronic)  As above.    Acute congestive heart failure  S/P TAVR with anemia and acute on chronic CRF  Once again recommended hospitalization and patient declined  Agreed on outpatient transfusion with reassessment  in 3 days time.  Encouraged to proceed to the ER in the meantime if any worse or if any new symptoms or concerns    S/P TAVR (transcatheter aortic valve replacement)  Follow up with cardiology        Endocrine and Metabolic    T2DM  (Chronic)  As above.       Hematology and Neoplasia    Postoperative anemia due to acute blood loss  As above.       Musculoskeletal and Injuries    Hematoma  As above.         Other    CKD (baseline Cr 2-2.3)   (Chronic)  Reminded to avoid any NSAIDs prescription or OTC  As above.         Diagnoses       Codes Comments    Chronic kidney disease, unspecified CKD stage    -  Primary ICD-10-CM: N18.9  ICD-9-CM: 585.9     Mixed hyperlipidemia     ICD-10-CM: E78.2  ICD-9-CM: 272.2     Essential hypertension     ICD-10-CM: I10  ICD-9-CM: 401.9     PAD (peripheral artery disease) (HCC)     ICD-10-CM: I73.9  ICD-9-CM: 443.9     S/P TAVR (transcatheter aortic valve replacement)     ICD-10-CM: Z95.2  ICD-9-CM: V43.3     Coronary artery disease involving native coronary artery of native heart with unstable angina pectoris     ICD-10-CM: I25.110  ICD-9-CM: 414.01, 411.1     T2DM      ICD-10-CM: E11.8, Z79.4  ICD-9-CM: 250.90, V58.67     CKD (baseline Cr 2-2.3)       ICD-10-CM: N18.32  ICD-9-CM: 585.3     Postoperative anemia due to acute blood loss     ICD-10-CM: D62  ICD-9-CM: 285.1     Hematoma     ICD-10-CM: T14.8XXA  ICD-9-CM: 924.9     Acute congestive heart failure, unspecified heart failure type     ICD-10-CM: I50.9  ICD-9-CM: 428.0         I spent 74 minutes caring for Nicholas Yin on this date of service. This time includes time spent by me in the following activities:reviewing tests, performing a medically appropriate examination and/or evaluation , counseling and educating the patient/family/caregiver, ordering medications, tests, or procedures and documenting information in the medical record

## 2023-05-12 NOTE — PROGRESS NOTES
Patient arrived for treatment.  Vital signs noted.  Nursing assessment completed.  Blood draw performed and hemoglobin noted to be 9.8. No blood infusion provided today.    Ayleen Lozano RN

## 2023-05-13 LAB — CREAT BLDA-MCNC: 2.5 MG/DL (ref 0.6–1.3)

## 2023-05-14 ENCOUNTER — APPOINTMENT (OUTPATIENT)
Dept: GENERAL RADIOLOGY | Facility: HOSPITAL | Age: 78
DRG: 243 | End: 2023-05-14
Payer: COMMERCIAL

## 2023-05-14 ENCOUNTER — HOSPITAL ENCOUNTER (EMERGENCY)
Facility: HOSPITAL | Age: 78
Discharge: ANOTHER HOSPITAL | End: 2023-05-14
Attending: EMERGENCY MEDICINE | Admitting: EMERGENCY MEDICINE
Payer: COMMERCIAL

## 2023-05-14 ENCOUNTER — HOSPITAL ENCOUNTER (INPATIENT)
Facility: HOSPITAL | Age: 78
LOS: 2 days | Discharge: HOME | DRG: 243 | End: 2023-05-16
Attending: THORACIC SURGERY (CARDIOTHORACIC VASCULAR SURGERY) | Admitting: THORACIC SURGERY (CARDIOTHORACIC VASCULAR SURGERY)
Payer: COMMERCIAL

## 2023-05-14 VITALS
BODY MASS INDEX: 33.33 KG/M2 | DIASTOLIC BLOOD PRESSURE: 79 MMHG | WEIGHT: 225 LBS | TEMPERATURE: 98.5 F | HEART RATE: 90 BPM | OXYGEN SATURATION: 93 % | HEIGHT: 69 IN | RESPIRATION RATE: 20 BRPM | SYSTOLIC BLOOD PRESSURE: 170 MMHG

## 2023-05-14 DIAGNOSIS — Z95.2 S/P TAVR (TRANSCATHETER AORTIC VALVE REPLACEMENT): ICD-10-CM

## 2023-05-14 DIAGNOSIS — I48.91 ATRIAL FIBRILLATION WITH RVR: Primary | ICD-10-CM

## 2023-05-14 DIAGNOSIS — I48.92 ATRIAL FLUTTER, PAROXYSMAL: ICD-10-CM

## 2023-05-14 DIAGNOSIS — I44.2 CHB (COMPLETE HEART BLOCK): Primary | ICD-10-CM

## 2023-05-14 PROBLEM — I49.01 VENTRICULAR FIBRILLATION: Status: ACTIVE | Noted: 2023-05-14

## 2023-05-14 LAB
ALBUMIN SERPL-MCNC: 3.1 G/DL (ref 3.5–5.2)
ALBUMIN/GLOB SERPL: 1 G/DL
ALP SERPL-CCNC: 68 U/L (ref 39–117)
ALT SERPL W P-5'-P-CCNC: 10 U/L (ref 1–41)
ANION GAP SERPL CALCULATED.3IONS-SCNC: 10 MMOL/L (ref 5–15)
ANION GAP SERPL CALCULATED.3IONS-SCNC: 11 MMOL/L (ref 5–15)
APTT PPP: 21.3 SECONDS (ref 22–39)
AST SERPL-CCNC: 22 U/L (ref 1–40)
BASOPHILS # BLD AUTO: 0.05 10*3/MM3 (ref 0–0.2)
BASOPHILS NFR BLD AUTO: 0.7 % (ref 0–1.5)
BILIRUB SERPL-MCNC: 0.6 MG/DL (ref 0–1.2)
BUN SERPL-MCNC: 33 MG/DL (ref 8–23)
BUN SERPL-MCNC: 34 MG/DL (ref 8–23)
BUN/CREAT SERPL: 15 (ref 7–25)
BUN/CREAT SERPL: 16.7 (ref 7–25)
CALCIUM SPEC-SCNC: 8.7 MG/DL (ref 8.6–10.5)
CALCIUM SPEC-SCNC: 9.4 MG/DL (ref 8.6–10.5)
CHLORIDE SERPL-SCNC: 110 MMOL/L (ref 98–107)
CHLORIDE SERPL-SCNC: 112 MMOL/L (ref 98–107)
CO2 SERPL-SCNC: 20 MMOL/L (ref 22–29)
CO2 SERPL-SCNC: 20 MMOL/L (ref 22–29)
CREAT SERPL-MCNC: 2.03 MG/DL (ref 0.76–1.27)
CREAT SERPL-MCNC: 2.2 MG/DL (ref 0.76–1.27)
DEPRECATED RDW RBC AUTO: 51.4 FL (ref 37–54)
DEPRECATED RDW RBC AUTO: 53.8 FL (ref 37–54)
EGFRCR SERPLBLD CKD-EPI 2021: 30.1 ML/MIN/1.73
EGFRCR SERPLBLD CKD-EPI 2021: 33.1 ML/MIN/1.73
EOSINOPHIL # BLD AUTO: 0.26 10*3/MM3 (ref 0–0.4)
EOSINOPHIL NFR BLD AUTO: 3.6 % (ref 0.3–6.2)
ERYTHROCYTE [DISTWIDTH] IN BLOOD BY AUTOMATED COUNT: 16.6 % (ref 12.3–15.4)
ERYTHROCYTE [DISTWIDTH] IN BLOOD BY AUTOMATED COUNT: 16.8 % (ref 12.3–15.4)
GEN 5 2HR TROPONIN T REFLEX: 79 NG/L
GLOBULIN UR ELPH-MCNC: 3 GM/DL
GLUCOSE SERPL-MCNC: 105 MG/DL (ref 65–99)
GLUCOSE SERPL-MCNC: 109 MG/DL (ref 65–99)
HCT VFR BLD AUTO: 30.7 % (ref 37.5–51)
HCT VFR BLD AUTO: 31.1 % (ref 37.5–51)
HGB BLD-MCNC: 10 G/DL (ref 13–17.7)
HGB BLD-MCNC: 9.9 G/DL (ref 13–17.7)
IMM GRANULOCYTES # BLD AUTO: 0.05 10*3/MM3 (ref 0–0.05)
IMM GRANULOCYTES NFR BLD AUTO: 0.7 % (ref 0–0.5)
INR PPP: 1.07 (ref 0.89–1.12)
LYMPHOCYTES # BLD AUTO: 0.75 10*3/MM3 (ref 0.7–3.1)
LYMPHOCYTES NFR BLD AUTO: 10.3 % (ref 19.6–45.3)
MAGNESIUM SERPL-MCNC: 2.2 MG/DL (ref 1.6–2.4)
MAGNESIUM SERPL-MCNC: 2.2 MG/DL (ref 1.6–2.4)
MCH RBC QN AUTO: 27.7 PG (ref 26.6–33)
MCH RBC QN AUTO: 27.9 PG (ref 26.6–33)
MCHC RBC AUTO-ENTMCNC: 31.8 G/DL (ref 31.5–35.7)
MCHC RBC AUTO-ENTMCNC: 32.6 G/DL (ref 31.5–35.7)
MCV RBC AUTO: 85.5 FL (ref 79–97)
MCV RBC AUTO: 86.9 FL (ref 79–97)
MONOCYTES # BLD AUTO: 0.73 10*3/MM3 (ref 0.1–0.9)
MONOCYTES NFR BLD AUTO: 10 % (ref 5–12)
NEUTROPHILS NFR BLD AUTO: 5.46 10*3/MM3 (ref 1.7–7)
NEUTROPHILS NFR BLD AUTO: 74.7 % (ref 42.7–76)
NRBC BLD AUTO-RTO: 0 /100 WBC (ref 0–0.2)
PHOSPHATE SERPL-MCNC: 2.9 MG/DL (ref 2.5–4.5)
PLATELET # BLD AUTO: 177 10*3/MM3 (ref 140–450)
PLATELET # BLD AUTO: 178 10*3/MM3 (ref 140–450)
PMV BLD AUTO: 10.1 FL (ref 6–12)
PMV BLD AUTO: 10.2 FL (ref 6–12)
POTASSIUM SERPL-SCNC: 4.2 MMOL/L (ref 3.5–5.2)
POTASSIUM SERPL-SCNC: 4.3 MMOL/L (ref 3.5–5.2)
PROT SERPL-MCNC: 6.1 G/DL (ref 6–8.5)
PROTHROMBIN TIME: 14 SECONDS (ref 12.2–14.5)
RBC # BLD AUTO: 3.58 10*6/MM3 (ref 4.14–5.8)
RBC # BLD AUTO: 3.59 10*6/MM3 (ref 4.14–5.8)
SODIUM SERPL-SCNC: 140 MMOL/L (ref 136–145)
SODIUM SERPL-SCNC: 143 MMOL/L (ref 136–145)
TROPONIN T DELTA: 1 NG/L
TROPONIN T SERPL HS-MCNC: 78 NG/L
TROPONIN T SERPL HS-MCNC: 88 NG/L
TSH SERPL DL<=0.05 MIU/L-ACNC: 5.61 UIU/ML (ref 0.27–4.2)
WBC NRBC COR # BLD: 10.1 10*3/MM3 (ref 3.4–10.8)
WBC NRBC COR # BLD: 7.3 10*3/MM3 (ref 3.4–10.8)

## 2023-05-14 PROCEDURE — 99231 SBSQ HOSP IP/OBS SF/LOW 25: CPT | Performed by: NURSE PRACTITIONER

## 2023-05-14 PROCEDURE — 96375 TX/PRO/DX INJ NEW DRUG ADDON: CPT

## 2023-05-14 PROCEDURE — 83735 ASSAY OF MAGNESIUM: CPT | Performed by: EMERGENCY MEDICINE

## 2023-05-14 PROCEDURE — 25010000002 AMIODARONE IN DEXTROSE 5% 360-4.14 MG/200ML-% SOLUTION: Performed by: EMERGENCY MEDICINE

## 2023-05-14 PROCEDURE — 96376 TX/PRO/DX INJ SAME DRUG ADON: CPT

## 2023-05-14 PROCEDURE — 96365 THER/PROPH/DIAG IV INF INIT: CPT

## 2023-05-14 PROCEDURE — 25010000002 AMIODARONE PER 30 MG: Performed by: EMERGENCY MEDICINE

## 2023-05-14 PROCEDURE — 82948 REAGENT STRIP/BLOOD GLUCOSE: CPT

## 2023-05-14 PROCEDURE — 71045 X-RAY EXAM CHEST 1 VIEW: CPT

## 2023-05-14 PROCEDURE — 85610 PROTHROMBIN TIME: CPT | Performed by: THORACIC SURGERY (CARDIOTHORACIC VASCULAR SURGERY)

## 2023-05-14 PROCEDURE — 80050 GENERAL HEALTH PANEL: CPT | Performed by: EMERGENCY MEDICINE

## 2023-05-14 PROCEDURE — 85730 THROMBOPLASTIN TIME PARTIAL: CPT | Performed by: THORACIC SURGERY (CARDIOTHORACIC VASCULAR SURGERY)

## 2023-05-14 PROCEDURE — 84100 ASSAY OF PHOSPHORUS: CPT | Performed by: THORACIC SURGERY (CARDIOTHORACIC VASCULAR SURGERY)

## 2023-05-14 PROCEDURE — 84484 ASSAY OF TROPONIN QUANT: CPT | Performed by: THORACIC SURGERY (CARDIOTHORACIC VASCULAR SURGERY)

## 2023-05-14 PROCEDURE — 85027 COMPLETE CBC AUTOMATED: CPT | Performed by: THORACIC SURGERY (CARDIOTHORACIC VASCULAR SURGERY)

## 2023-05-14 PROCEDURE — 93005 ELECTROCARDIOGRAM TRACING: CPT | Performed by: THORACIC SURGERY (CARDIOTHORACIC VASCULAR SURGERY)

## 2023-05-14 PROCEDURE — 25010000002 AMIODARONE IN DEXTROSE 5% 360-4.14 MG/200ML-% SOLUTION: Performed by: THORACIC SURGERY (CARDIOTHORACIC VASCULAR SURGERY)

## 2023-05-14 PROCEDURE — 83735 ASSAY OF MAGNESIUM: CPT | Performed by: THORACIC SURGERY (CARDIOTHORACIC VASCULAR SURGERY)

## 2023-05-14 PROCEDURE — 93005 ELECTROCARDIOGRAM TRACING: CPT | Performed by: EMERGENCY MEDICINE

## 2023-05-14 PROCEDURE — 93010 ELECTROCARDIOGRAM REPORT: CPT | Performed by: INTERNAL MEDICINE

## 2023-05-14 PROCEDURE — 99284 EMERGENCY DEPT VISIT MOD MDM: CPT

## 2023-05-14 PROCEDURE — 80048 BASIC METABOLIC PNL TOTAL CA: CPT | Performed by: THORACIC SURGERY (CARDIOTHORACIC VASCULAR SURGERY)

## 2023-05-14 PROCEDURE — 92950 HEART/LUNG RESUSCITATION CPR: CPT

## 2023-05-14 PROCEDURE — 84484 ASSAY OF TROPONIN QUANT: CPT | Performed by: EMERGENCY MEDICINE

## 2023-05-14 RX ORDER — CALCIUM GLUCONATE 20 MG/ML
1 INJECTION, SOLUTION INTRAVENOUS ONCE
Status: DISCONTINUED | OUTPATIENT
Start: 2023-05-14 | End: 2023-05-14

## 2023-05-14 RX ORDER — HYDROCODONE BITARTRATE AND ACETAMINOPHEN 5; 325 MG/1; MG/1
1 TABLET ORAL EVERY 8 HOURS PRN
Status: DISCONTINUED | OUTPATIENT
Start: 2023-05-14 | End: 2023-05-16 | Stop reason: HOSPADM

## 2023-05-14 RX ORDER — PANTOPRAZOLE SODIUM 40 MG/1
40 TABLET, DELAYED RELEASE ORAL
Status: DISCONTINUED | OUTPATIENT
Start: 2023-05-15 | End: 2023-05-16 | Stop reason: HOSPADM

## 2023-05-14 RX ORDER — AMLODIPINE BESYLATE 10 MG/1
10 TABLET ORAL
Status: DISCONTINUED | OUTPATIENT
Start: 2023-05-15 | End: 2023-05-16 | Stop reason: HOSPADM

## 2023-05-14 RX ORDER — DOPAMINE HYDROCHLORIDE 160 MG/100ML
2-20 INJECTION, SOLUTION INTRAVENOUS
Status: DISCONTINUED | OUTPATIENT
Start: 2023-05-14 | End: 2023-05-14 | Stop reason: HOSPADM

## 2023-05-14 RX ORDER — ROSUVASTATIN CALCIUM 20 MG/1
20 TABLET, COATED ORAL NIGHTLY
Status: DISCONTINUED | OUTPATIENT
Start: 2023-05-14 | End: 2023-05-16 | Stop reason: HOSPADM

## 2023-05-14 RX ORDER — LOSARTAN POTASSIUM 25 MG/1
25 TABLET ORAL
Status: DISCONTINUED | OUTPATIENT
Start: 2023-05-14 | End: 2023-05-16 | Stop reason: HOSPADM

## 2023-05-14 RX ORDER — CARVEDILOL 3.12 MG/1
3.12 TABLET ORAL 2 TIMES DAILY WITH MEALS
Status: DISCONTINUED | OUTPATIENT
Start: 2023-05-14 | End: 2023-05-16 | Stop reason: HOSPADM

## 2023-05-14 RX ORDER — DILTIAZEM HYDROCHLORIDE 5 MG/ML
10 INJECTION INTRAVENOUS ONCE
Status: COMPLETED | OUTPATIENT
Start: 2023-05-14 | End: 2023-05-14

## 2023-05-14 RX ORDER — ALPRAZOLAM 0.5 MG/1
0.5 TABLET ORAL 2 TIMES DAILY PRN
Status: DISCONTINUED | OUTPATIENT
Start: 2023-05-14 | End: 2023-05-16 | Stop reason: HOSPADM

## 2023-05-14 RX ORDER — OXYCODONE AND ACETAMINOPHEN 10; 325 MG/1; MG/1
1 TABLET ORAL EVERY 6 HOURS PRN
Status: DISCONTINUED | OUTPATIENT
Start: 2023-05-14 | End: 2023-05-16 | Stop reason: HOSPADM

## 2023-05-14 RX ORDER — DOCUSATE SODIUM 100 MG/1
100 CAPSULE, LIQUID FILLED ORAL 2 TIMES DAILY
Status: DISCONTINUED | OUTPATIENT
Start: 2023-05-14 | End: 2023-05-16 | Stop reason: HOSPADM

## 2023-05-14 RX ORDER — OXYCODONE AND ACETAMINOPHEN 10; 325 MG/1; MG/1
1 TABLET ORAL EVERY 6 HOURS PRN
Status: DISCONTINUED | OUTPATIENT
Start: 2023-05-14 | End: 2023-05-14

## 2023-05-14 RX ADMIN — DILTIAZEM HYDROCHLORIDE 10 MG: 5 INJECTION INTRAVENOUS at 16:08

## 2023-05-14 RX ADMIN — ALPRAZOLAM 0.5 MG: 0.5 TABLET ORAL at 22:02

## 2023-05-14 RX ADMIN — CARVEDILOL 3.12 MG: 3.12 TABLET, FILM COATED ORAL at 20:21

## 2023-05-14 RX ADMIN — LOSARTAN POTASSIUM 25 MG: 25 TABLET, FILM COATED ORAL at 20:21

## 2023-05-14 RX ADMIN — ROSUVASTATIN 20 MG: 20 TABLET, FILM COATED ORAL at 20:21

## 2023-05-14 RX ADMIN — AMIODARONE HYDROCHLORIDE 10 MG: 50 INJECTION, SOLUTION INTRAVENOUS at 17:38

## 2023-05-14 RX ADMIN — AMIODARONE HYDROCHLORIDE 0.5 MG/MIN: 1.8 INJECTION, SOLUTION INTRAVENOUS at 21:50

## 2023-05-14 RX ADMIN — CALCIUM CHLORIDE 1 G: 100 INJECTION, SOLUTION INTRAVENOUS at 16:36

## 2023-05-14 RX ADMIN — AMIODARONE HYDROCHLORIDE 0.5 MG/MIN: 1.8 INJECTION, SOLUTION INTRAVENOUS at 17:55

## 2023-05-14 RX ADMIN — SODIUM BICARBONATE 50 MEQ: 84 INJECTION INTRAVENOUS at 16:37

## 2023-05-14 NOTE — NURSING NOTE
ACC REVIEW REPORT: [facility]        PATIENT NAME: Nicholas Yin    PATIENT ID: [id number]        COVID-19 ACC SCREENING       DOES THE PATIENT HAVE A FEVER GREATER THAN OR EQUAL .4:  NO    IS THE PATIENT EXPERIENCING SHORTNESS OF BREATH:   NO    DOES THE PATIENT HAVE A COUGH:  NO    DOES THE PATIENT HAVE ANY OF THE FOLLOWING RISK FACTORS:    EXPOSURE TO SUSPECTED OR KNOWN COVID-19:  NO    RECENT TRAVEL HISTORY TO ENDEMIC AREA (DOMESTIC/LOCAL):  NO    IS THE PATIENT A HEALTHCARE WORKER:  NO    HAS THE PATIENT EXPERIENCED A LOSS OF SENSE OF TASTE OR SMELL:  NO    HAS THE PATIENT BEEN TESTED FOR COVID-19:      DATE TESTED:      LAB TESTING SENT TO:            BED:  s 261 / 2H    BED TYPE:  ICU    BED GIVEN TO:  ARGENTINA SOLORZANO RN    TIME BED GIVEN:  15:40    TODAY'S DATE: 5/14/2023    TRANSFER DATE:  5/14/2023    ETA:  18:00 - 18:30    TRANSFERRING FACILITY:   [facility] [place] ED    TRANSFERRING FACILITY PHONE # :      TRANSFERRING MD:  DR LYMAN    DATE/TIME REQUEST RECEIVED:  5/14/2023 @ 16:57    [facility] RN:   SANTY LUCAS RN    REPORT FROM:  ARGENTINA SOLORZANO RN    TIME REPORT TAKEN:  16:08    DIAGNOSIS:  A FIB WITH RVR    REASON FOR TRANSFER TO [facility]:  HIGHER LEVEL OF CARE    TRANSPORTATION:  AIR EVAC    CLINICAL REASON FOR TRANSFER TO [facility]:  The patient presented to the ED this afternoon after c/o feeling lightheaded and dizzy to his wife.  He had a TAVR with Dr Arben Lopez and Rachael Mendoza on 5/8/2023.  He was discharged to his home with his wife an APRN.    On arrival to the ED, 15:40,  he was found to be in A Fib with RVR, rate of 160.  At 15:50 he went into Fine V Fib, CPR was started and at 16:00 the patient was alert and oriented with a rate of 87 in A Fib and /79 at 17:19.   O2 Sat on 2L/NC was 94%.  He was given an Amiodarone Bolus of 10 mg with a drip to follow.     PLAN OF CARE:  Stabilize for transfer to [facility] into 2H ICU for admission per Dr Arben Lopez      CLINICAL  INFORMATION    HEIGHT:      WEIGHT:  225 LB  (102 kg)    ALLERGIES:  CT Contrast   Anaphylaxis                          Iodinated Contrast Media   Shortness of Air    INFECTIOUS DISEASE:  none    ISOLATION:  n/a    VITAL SIGNS:   TIME:   16:30  TEMP:  98.5  PULSE:  87  B/P:  148/79  RESP:  20, O2 SAT ON 2L/NC 94%        LAB INFORMATION:   BUN 33,  Creatinine 2.20,  K+ 4.2,  Hgb 9.9    CULTURE INFORMATION:  none    MEDS/IV FLUIDS:  IV ACCESS # 18 ga in LEFT A/C,  # 20 ga in RIGHT HAND  Cardizem bolus 10 mg at 16:08 followed by Cardizem drip  1 gm Calcium Chloride  16:37 1 amp Sodium Bicarb      CARDIAC SYSTEM:    CHEST PAIN:  NONE    RHYTHM:  AT 15:40 the patient was in Atrial Fib with RVR and a rate of 160.                      The patient was treated with Cardizem 10 mg bolus at 16:08,                      pt V/S as follows:  A Fib rate 87,  /79, Resp. 20, O2 Sat 94%.     Is patient taking or has patient been given any drugs that could increase bleeding?  unknown    DRUG:       DOSE/FREQUENCY:      TROPONIN:    DATE:   5/14/2023  TIME:  16:04  TROP:  88      CARDIAC NOTES:  The patient has been in and out of V Fib multiple times this afternoon returning to Atrial Fib after the initiation of CPR. He has been awake and able to converse with staff once he is in A Fib.       RESPIRATORY SYSTEM:    LUNG SOUNDS:  diminished    OXYGEN:  2L / NC    O2 SAT:  94%    IMAGING FINDINGS:       RESPIRATORY STATUS:  Respiratory rate is 20 at 15:40.       CNS/MUSCULOSKELETAL    ALERT AND ORIENTED:    PERSON:   yes  PLACE:  yes  TIME:  yes    INJURY:  none      ESTRELLA COMA SCALE:    E:  4  M:  6  V:  5        EXTREMITY WEAKNESS:  NONE    CNS/MUSCULOSKELETAL NOTES:  Normally the pt is alert and oriented.  Mr Yin is able to answer questions and follow directions at 17:30.       GI//GY      ABDOMINAL PAIN:  NO    VOMITING:   NO    DIARRHEA:  NO    NAUSEA:  NO    BOWEL SOUNDS:  NO    OCCULT STOOL:  N/A    HEMATURIA:   NONE    GI//GY NOTES:  CHINEDU Gill RN  5/14/2023  17:17 EDT

## 2023-05-14 NOTE — H&P
Nicholas Yin  [id number]  1945    Referring Provider:  Reason for Consultation: Atrial fibrillation, ventricular fibrillation    Patient Care Team:  Waldemar Trjeo MD as PCP - General  NeilWaldemar townsend MD as PCP - Family Medicine    Chief complaint: Patient became weak and lightheaded this afternoon while sitting    .     History of present illness: The patient had a TAVR done on 8 May.  The patient was discharged the following day.  The patient had a slight bump in his renal function but has felt reasonably well the last few days.  He this afternoon while sitting on the couch developed lightheadedness and had her fast heartbeat.  He was taken to the [facility] emergency room.  When Cardizem was initiated he went into V. tach ventricular fibrillation.  He coded for approximately 15 minutes.  He regained consciousness and was completely lucid.  He then had another short episode of ventricular fibrillation and regained consciousness spontaneously in a few seconds.  Discussions were held with the emergency room physicians Dr. Mendoza and myself.  He was started on amiodarone and has been transferred here by the air ambulance.  He is completely lucid and is comfortable at this time.  He does have some tenderness from the compressions.    Review of Systems    The following systems were reviewed and negative;  constitution, eyes, ENT, respiratory, gastrointestinal, integument, breast, hematologic / lymphatic, musculoskeletal, neurological, behavioral/psych, endocrine and allergies / immunologic    History  Past Medical History:   Diagnosis Date   • Anemia    • Aortic stenosis    • Arthritis    • Back pain    • CancerTesticular/Colon     test -1982  Colon -2005   • CHF (congestive heart failure)    • Coronary artery disease    • COVID-19 2022   • DDD (degenerative disc disease), lumbosacral    • Diabetes mellitus    • Dyspnea    • Elevated cholesterol    • Erectile dysfunction    • GERD  (gastroesophageal reflux disease)    • History of transfusion    • Hyperlipidemia    • Hypertension    • TIA (transient ischemic attack)    ,   Past Surgical History:   Procedure Laterality Date   • AORTIC VALVE REPAIR/REPLACEMENT N/A 5/8/2023    Procedure: TRANSCATHETER AORTIC VALVE REPLACEMENT + LAURIE, LEFT FEMORAL ENDARTERECTOMY WITH PATCH;  Surgeon: Arben Lopez MD;  Location: [facility];  Service: Cardiothoracic;  Laterality: N/A;   • AORTIC VALVE REPAIR/REPLACEMENT N/A 5/8/2023    Procedure: Transfemoral Transcatheter Aortic Valve Replacement;  Surgeon: Rachael Mendoza MD;  Location: [facility];  Service: Cardiovascular;  Laterality: N/A;   • CARDIAC CATHETERIZATION N/A 08/15/2019    Procedure: Left Heart Cath;  Surgeon: Paul Villatoro MD;  Location: [facility] CATH INVASIVE LOCATION;  Service: Cardiology   • CARDIAC CATHETERIZATION      04/10/2023 PER DR. MENDOZA   • CATARACT EXTRACTION, BILATERAL     • COLON SURGERY      colon resection for cancer - sigmoid removed - no chemo no radiation   • COLONOSCOPY     • COLONOSCOPY N/A 01/27/2021    Procedure: COLONOSCOPY;  Surgeon: Greg Barraza MD;  Location: [facility];  Service: General;  Laterality: N/A;   • CORONARY STENT PLACEMENT      1 STENT   • ENDOSCOPY  1989   • EYE SURGERY Right     retinal detachment   • KS RT/LT HEART CATHETERS N/A 08/15/2019    Procedure: Percutaneous Coronary Intervention;  Surgeon: Paul Villatoro MD;  Location: [facility] CATH INVASIVE LOCATION;  Service: Cardiology   • SCALP LESION REMOVAL W/ FLAP AND SKIN GRAFT  12/16/2022    basal cell removal   • SHOULDER ARTHROSCOPY Right    • SKIN LESION EXCISION N/A 01/27/2021    Procedure: SKIN LESIONS EXCISION FROM LEFT TEMPLE AREA AND ABDOMEN.;  Surgeon: Greg Barraza MD;  Location: [facility] OR;  Service: General;  Laterality: N/A;   • VASECTOMY Right     testicle removed d/t cancer - with radiation   ,   Family History    Problem Relation Age of Onset   • Stroke Mother    • Hypertension Mother    • Alcohol abuse Mother    • COPD Mother    • COPD Father    • Alcohol abuse Father    • Hypertension Father    • Alzheimer's disease Father    • Heart disease Brother    • Hypertension Brother    • Diabetes Brother    • Stroke Maternal Grandfather    • Cancer Paternal Grandmother    ,   Social History     Tobacco Use   • Smoking status: Never     Passive exposure: Never   • Smokeless tobacco: Never   Vaping Use   • Vaping Use: Never used   Substance Use Topics   • Alcohol use: Yes     Comment: occ   • Drug use: No   ,   Medications Prior to Admission   Medication Sig Dispense Refill Last Dose   • amLODIPine (NORVASC) 10 MG tablet Take 1 tablet by mouth Daily for blood pressure. 90 tablet 3    • aspirin 81 MG EC tablet Take 1 tablet by mouth Daily.      • carvedilol (COREG) 3.125 MG tablet Take 1 tablet by mouth 2 (Two) Times a Day. 180 tablet 3    • coenzyme Q10 100 MG capsule Take 2 capsules by mouth Daily.      • dapagliflozin Propanediol (Farxiga) 10 MG tablet Take 10 mg by mouth Daily. 49 tablet 0    • docusate sodium (Colace) 100 MG capsule Take 1 capsule by mouth 2 (Two) Times a Day. 72 capsule 0    • HYDROcodone-acetaminophen (NORCO) 5-325 MG per tablet Take 1 tablet by mouth Every 8 (Eight) Hours As Needed for Moderate Pain. 15 tablet 0    • losartan (COZAAR) 50 MG tablet Take 1/2 tablet by mouth 2 (Two) Times a Day. 60 tablet 11    • MAGNESIUM CITRATE PO Take 250 mg by mouth Daily.      • multivitamin with minerals tablet tablet Take 1 tablet by mouth Daily.      • omeprazole (priLOSEC) 40 MG capsule Take 1 capsule by mouth Daily.      • rosuvastatin (CRESTOR) 20 MG tablet Take 1 tablet by mouth Daily. 90 tablet 3    • ticagrelor (BRILINTA) 60 MG tablet tablet Take 1 tablet by mouth 2 (Two) Times a Day.  **Do not resume until 5/16/23** 180 tablet 3    • vitamin D (ERGOCALCIFEROL) 1.25 MG (00936 UT) capsule capsule Take 1 capsule  by mouth Every 7 (Seven) Days. 12 capsule 1    • Xultophy 100-3.6 UNIT-MG/ML solution pen-injector subcutaneous pen Inject 50 Units under the skin into the appropriate area as directed Daily. 15 mL 5    , Scheduled Meds:  , Continuous Infusions:  No current facility-administered medications for this encounter.  , PRN Meds:   and Allergies:  Ct contrast and Iodinated contrast media    Objective     Vital Sign Min/Max for last 24 hours  Temp  Min: 98.5 °F (36.9 °C)  Max: 98.5 °F (36.9 °C)   BP  Min: 103/72  Max: 174/82   Pulse  Min: 57  Max: 160   Resp  Min: 20  Max: 20   SpO2  Min: 92 %  Max: 100 %   Flow (L/min)  Min: 2  Max: 2   Weight  Min: 102 kg (225 lb)  Max: 106 kg (233 lb)              Physical Exam:     General Appearance:    Alert, cooperative, in no acute distress   Head:    Normocephalic, without obvious abnormality, atraumatic   Eyes:            Lids and lashes normal, conjunctivae and sclerae normal, no   icterus, no pallor, corneas clear, PERRLA   Ears:    Ears appear intact with no abnormalities noted   Throat:   No oral lesions, no thrush, oral mucosa moist   Neck:   No adenopathy, supple, trachea midline, no thyromegaly, no   carotid bruit, no JVD   Back:     No kyphosis present, no scoliosis present, no skin lesions,      erythema or scars, no tenderness to percussion or                   palpation,   range of motion normal   Lungs:     Clear to auscultation,respirations regular, even and                  unlabored    Heart:    Regular rhythm and normal rate, normal S1 and S2, no            murmur, no gallop, no rub, no click   Chest Wall:    No abnormalities observed   Abdomen:     Normal bowel sounds, no masses, no organomegaly, soft        non-tender, non-distended, no guarding, no rebound                tenderness   Rectal:     Deferred   Extremities:  Incision looks excellent.   Pulses:   Pulses palpable and equal bilaterally   Skin:   No bleeding, bruising or rash   Lymph nodes:   No palpable  adenopathy   Neurologic:   Cranial nerves 2 - 12 grossly intact, sensation intact, DTR       present and equal bilaterally             Assessment & Plan       GERD    Mixed hyperlipidemia    Essential hypertension    T2DM     CKD (baseline Cr 2-2.3)      CAD s/p stent     Moderate-severe aortic stenosis; s/p TAVR (Dr. Lopez/Dr. Mendoza)    Class 1 obesity in adult    Atrial fibrillation      Patient developed atrial fibrillation and ventricular fibrillation today.  The patient's creatinine is 2.2 which is almost back to baseline.  His potassium was 4.2.  We will admit the patient to ICU.  We will continue the amiodarone at 0.5 mg.  We will get baseline EKG chest x-ray and labs at this point.  The patient denies any chest pain.    I discussed the patients findings and my recommendations with patient, nursing staff and primary care team    Please note that portions of this note were completed with a voice recognition program. Efforts were made to edit the dictations, but words may be mistranscribed    Arben Lopez MD  05/14/23  19:26 EDT

## 2023-05-14 NOTE — PROGRESS NOTES
"   ICU ADMISSION NOTE    Chief complaint :  S/p AF/ VF arrest    Subjective     Patient is a 78 y.o. male presented to [facility] ED on 5/14 w/ c/o palpitations, dizziness, & lightheadedness that started @ 1300 while he was sitting on the couch.  He had a TAVR w/ Dr. Lopez & Dr. Mendoza on 5/8.      Upon presentation to [facility] ED he was found to be in AF w/ RVR (rate 160) @ 1540 w/ development of fine VF after Cardizem .  ACLS/CPR performed for ~ 10 minutes to achieve ROSC.  Amiodarone bolused and gtt started.  He has reportedly had recurrence of VF \"multiple times\" since.      In speaking w/ the patient he states that he has been in his regular level of health since discharge w/o issues.  He has had an intermittent cough w/ clear/white sputum production but no SOA.  He has had a \"futter\" in his chest in the past but never consistent like that which he experienced today.  His LLE is mildly edematous and has been so since his surgery, though it is less swollen since previously.  He denies F/C, CP, SOA, N/V/D, or abdominal pain.      Review of Systems: see HPI and H&P      Home Medications  Medications Prior to Admission   Medication Sig Dispense Refill Last Dose   • amLODIPine (NORVASC) 10 MG tablet Take 1 tablet by mouth Daily for blood pressure. 90 tablet 3    • aspirin 81 MG EC tablet Take 1 tablet by mouth Daily.      • carvedilol (COREG) 3.125 MG tablet Take 1 tablet by mouth 2 (Two) Times a Day. 180 tablet 3    • coenzyme Q10 100 MG capsule Take 2 capsules by mouth Daily.      • dapagliflozin Propanediol (Farxiga) 10 MG tablet Take 10 mg by mouth Daily. 49 tablet 0    • docusate sodium (Colace) 100 MG capsule Take 1 capsule by mouth 2 (Two) Times a Day. 72 capsule 0    • HYDROcodone-acetaminophen (NORCO) 5-325 MG per tablet Take 1 tablet by mouth Every 8 (Eight) Hours As Needed for Moderate Pain. 15 tablet 0    • losartan (COZAAR) 50 MG tablet Take 1/2 tablet by mouth 2 (Two) Times a Day. 60 tablet 11    • " MAGNESIUM CITRATE PO Take 250 mg by mouth Daily.      • multivitamin with minerals tablet tablet Take 1 tablet by mouth Daily.      • omeprazole (priLOSEC) 40 MG capsule Take 1 capsule by mouth Daily.      • rosuvastatin (CRESTOR) 20 MG tablet Take 1 tablet by mouth Daily. 90 tablet 3    • ticagrelor (BRILINTA) 60 MG tablet tablet Take 1 tablet by mouth 2 (Two) Times a Day.  **Do not resume until 5/16/23** 180 tablet 3    • vitamin D (ERGOCALCIFEROL) 1.25 MG (22136 UT) capsule capsule Take 1 capsule by mouth Every 7 (Seven) Days. 12 capsule 1    • Xultophy 100-3.6 UNIT-MG/ML solution pen-injector subcutaneous pen Inject 50 Units under the skin into the appropriate area as directed Daily. (Patient taking differently: Inject 32 Units under the skin into the appropriate area as directed Daily.) 15 mL 5        History  Past Medical History:   Diagnosis Date   • Anemia    • Aortic stenosis    • Arthritis    • Back pain    • CancerTesticular/Colon     test -1982  Colon -2005   • CHF (congestive heart failure)    • Coronary artery disease    • COVID-19 2022   • DDD (degenerative disc disease), lumbosacral    • Diabetes mellitus    • Dyspnea    • Elevated cholesterol    • Erectile dysfunction    • GERD (gastroesophageal reflux disease)    • History of transfusion    • Hyperlipidemia    • Hypertension    • TIA (transient ischemic attack)      Past Surgical History:   Procedure Laterality Date   • AORTIC VALVE REPAIR/REPLACEMENT N/A 5/8/2023    Procedure: TRANSCATHETER AORTIC VALVE REPLACEMENT + LAURIE, LEFT FEMORAL ENDARTERECTOMY WITH PATCH;  Surgeon: Arben Lopez MD;  Location: [facility];  Service: Cardiothoracic;  Laterality: N/A;   • AORTIC VALVE REPAIR/REPLACEMENT N/A 5/8/2023    Procedure: Transfemoral Transcatheter Aortic Valve Replacement;  Surgeon: Rachael Mendoza MD;  Location: [facility];  Service: Cardiovascular;  Laterality: N/A;   • CARDIAC CATHETERIZATION N/A 08/15/2019    Procedure:  Left Heart Cath;  Surgeon: Paul Villatoro MD;  Location:  [facility];  Service: Cardiology   • CARDIAC CATHETERIZATION      04/10/2023 PER DR. MCCORD   • CATARACT EXTRACTION, BILATERAL     • COLON SURGERY      colon resection for cancer - sigmoid removed - no chemo no radiation   • COLONOSCOPY     • COLONOSCOPY N/A 01/27/2021    Procedure: COLONOSCOPY;  Surgeon: Greg Barraza MD;  Location:  [facility] OR;  Service: General;  Laterality: N/A;   • CORONARY STENT PLACEMENT      1 STENT   • ENDOSCOPY  1989   • EYE SURGERY Right     retinal detachment   • MT RT/LT HEART CATHETERS N/A 08/15/2019    Procedure: Percutaneous Coronary Intervention;  Surgeon: Paul Villatoro MD;  Location:  [facility];  Service: Cardiology   • SCALP LESION REMOVAL W/ FLAP AND SKIN GRAFT  12/16/2022    basal cell removal   • SHOULDER ARTHROSCOPY Right    • SKIN LESION EXCISION N/A 01/27/2021    Procedure: SKIN LESIONS EXCISION FROM LEFT TEMPLE AREA AND ABDOMEN.;  Surgeon: Greg Barraza MD;  Location: [facility] OR;  Service: General;  Laterality: N/A;   • VASECTOMY Right     testicle removed d/t cancer - with radiation     Family History   Problem Relation Age of Onset   • Stroke Mother    • Hypertension Mother    • Alcohol abuse Mother    • COPD Mother    • COPD Father    • Alcohol abuse Father    • Hypertension Father    • Alzheimer's disease Father    • Heart disease Brother    • Hypertension Brother    • Diabetes Brother    • Stroke Maternal Grandfather    • Cancer Paternal Grandmother      Social History     Tobacco Use   • Smoking status: Never     Passive exposure: Never   • Smokeless tobacco: Never   Vaping Use   • Vaping Use: Never used   Substance Use Topics   • Alcohol use: Yes     Comment: occ   • Drug use: No     Medications Prior to Admission   Medication Sig Dispense Refill Last Dose   • amLODIPine (NORVASC) 10 MG tablet Take 1 tablet by mouth Daily for  "blood pressure. 90 tablet 3    • aspirin 81 MG EC tablet Take 1 tablet by mouth Daily.      • carvedilol (COREG) 3.125 MG tablet Take 1 tablet by mouth 2 (Two) Times a Day. 180 tablet 3    • coenzyme Q10 100 MG capsule Take 2 capsules by mouth Daily.      • dapagliflozin Propanediol (Farxiga) 10 MG tablet Take 10 mg by mouth Daily. 49 tablet 0    • docusate sodium (Colace) 100 MG capsule Take 1 capsule by mouth 2 (Two) Times a Day. 72 capsule 0    • HYDROcodone-acetaminophen (NORCO) 5-325 MG per tablet Take 1 tablet by mouth Every 8 (Eight) Hours As Needed for Moderate Pain. 15 tablet 0    • losartan (COZAAR) 50 MG tablet Take 1/2 tablet by mouth 2 (Two) Times a Day. 60 tablet 11    • MAGNESIUM CITRATE PO Take 250 mg by mouth Daily.      • multivitamin with minerals tablet tablet Take 1 tablet by mouth Daily.      • omeprazole (priLOSEC) 40 MG capsule Take 1 capsule by mouth Daily.      • rosuvastatin (CRESTOR) 20 MG tablet Take 1 tablet by mouth Daily. 90 tablet 3    • ticagrelor (BRILINTA) 60 MG tablet tablet Take 1 tablet by mouth 2 (Two) Times a Day.  **Do not resume until 5/16/23** 180 tablet 3    • vitamin D (ERGOCALCIFEROL) 1.25 MG (68631 UT) capsule capsule Take 1 capsule by mouth Every 7 (Seven) Days. 12 capsule 1    • Xultophy 100-3.6 UNIT-MG/ML solution pen-injector subcutaneous pen Inject 50 Units under the skin into the appropriate area as directed Daily. (Patient taking differently: Inject 32 Units under the skin into the appropriate area as directed Daily.) 15 mL 5      Allergies:  Ct contrast and Iodinated contrast media      Objective     Vital Signs  Blood pressure 129/58, pulse 84, temperature 98.3 °F (36.8 °C), temperature source Axillary, resp. rate 20, height 174.6 cm (68.75\"), weight 102 kg (224 lb 13.9 oz), SpO2 97 %.    Physical Exam:  Physical Exam  Vitals and nursing note reviewed.   Constitutional:       General: He is not in acute distress.     Appearance: He is not toxic-appearing. "   HENT:      Head: Normocephalic and atraumatic.      Mouth/Throat:      Mouth: Mucous membranes are moist.   Eyes:      Extraocular Movements: Extraocular movements intact.      Conjunctiva/sclera: Conjunctivae normal.      Pupils: Pupils are equal, round, and reactive to light.   Cardiovascular:      Rate and Rhythm: Normal rate and regular rhythm.      Pulses: Normal pulses.      Heart sounds: Normal heart sounds.   Pulmonary:      Effort: Pulmonary effort is normal.      Breath sounds: Normal breath sounds.   Abdominal:      General: Bowel sounds are normal.      Palpations: Abdomen is soft.   Musculoskeletal:         General: Normal range of motion.      Cervical back: Normal range of motion and neck supple.   Skin:     General: Skin is warm and dry.      Capillary Refill: Capillary refill takes less than 2 seconds.   Neurological:      General: No focal deficit present.      Mental Status: He is alert and oriented to person, place, and time. Mental status is at baseline.   Psychiatric:         Mood and Affect: Mood normal.         Behavior: Behavior normal.         Thought Content: Thought content normal.         Judgment: Judgment normal.                                                                             Results Review:   Lab Results (last 24 hours)     Procedure Component Value Units Date/Time    POC Glucose Once [[id number]]  (Normal) Collected: 05/14/23 1937    Specimen: Blood Updated: 05/15/23 0620     Glucose 112 mg/dL      Comment: Meter: [id number] : [id number] Aminata Arianne Vernon       Phosphorus [[id number]]  (Normal) Collected: 05/15/23 0348    Specimen: Blood Updated: 05/15/23 0420     Phosphorus 3.7 mg/dL     Magnesium [[id number]]  (Normal) Collected: 05/15/23 0348    Specimen: Blood Updated: 05/15/23 0414     Magnesium 2.3 mg/dL     Basic Metabolic Panel [[id number]]  (Abnormal) Collected: 05/15/23 0348    Specimen: Blood Updated: 05/15/23 0414     Glucose 97 mg/dL      BUN 29 mg/dL       Creatinine 1.98 mg/dL      Sodium 143 mmol/L      Potassium 4.1 mmol/L      Comment: Slight hemolysis detected by analyzer. Results may be affected.        Chloride 111 mmol/L      CO2 23.0 mmol/L      Calcium 8.6 mg/dL      BUN/Creatinine Ratio 14.6     Anion Gap 9.0 mmol/L      eGFR 34.2 mL/min/1.73     Narrative:      GFR Normal >60  Chronic Kidney Disease <60  Kidney Failure <15    The GFR formula is only valid for adults with stable renal function between ages 18 and 70.    CBC & Differential [[id number]]  (Abnormal) Collected: 05/15/23 0348    Specimen: Blood Updated: 05/15/23 0357    Narrative:      The following orders were created for panel order CBC & Differential.  Procedure                               Abnormality         Status                     ---------                               -----------         ------                     CBC Auto Differential[[id number]]        Abnormal            Final result                 Please view results for these tests on the individual orders.    CBC Auto Differential [[id number]]  (Abnormal) Collected: 05/15/23 0348    Specimen: Blood Updated: 05/15/23 0357     WBC 5.62 10*3/mm3      RBC 3.32 10*6/mm3      Hemoglobin 9.2 g/dL      Hematocrit 29.2 %      MCV 88.0 fL      MCH 27.7 pg      MCHC 31.5 g/dL      RDW 16.5 %      RDW-SD 53.0 fl      MPV 10.2 fL      Platelets 155 10*3/mm3      Neutrophil % 71.4 %      Lymphocyte % 14.4 %      Monocyte % 11.0 %      Eosinophil % 2.3 %      Basophil % 0.4 %      Immature Grans % 0.5 %      Neutrophils, Absolute 4.01 10*3/mm3      Lymphocytes, Absolute 0.81 10*3/mm3      Monocytes, Absolute 0.62 10*3/mm3      Eosinophils, Absolute 0.13 10*3/mm3      Basophils, Absolute 0.02 10*3/mm3      Immature Grans, Absolute 0.03 10*3/mm3      nRBC 0.0 /100 WBC     High Sensitivity Troponin T 2Hr [[id number]]  (Abnormal) Collected: 05/14/23 2207    Specimen: Blood Updated: 05/14/23 2251     HS Troponin T 79 ng/L      Troponin T Delta 1 ng/L      Narrative:      High Sensitive Troponin T Reference Range:  <10.0 ng/L- Negative Female for AMI  <15.0 ng/L- Negative Male for AMI  >=10 - Abnormal Female indicating possible myocardial injury.  >=15 - Abnormal Male indicating possible myocardial injury.   Clinicians would have to utilize clinical acumen, EKG, Troponin, and serial changes to determine if it is an Acute Myocardial Infarction or myocardial injury due to an underlying chronic condition.         High Sensitivity Troponin T [[id number]]  (Abnormal) Collected: 05/14/23 2004    Specimen: Blood Updated: 05/14/23 2035     HS Troponin T 78 ng/L     Narrative:      High Sensitive Troponin T Reference Range:  <10.0 ng/L- Negative Female for AMI  <15.0 ng/L- Negative Male for AMI  >=10 - Abnormal Female indicating possible myocardial injury.  >=15 - Abnormal Male indicating possible myocardial injury.   Clinicians would have to utilize clinical acumen, EKG, Troponin, and serial changes to determine if it is an Acute Myocardial Infarction or myocardial injury due to an underlying chronic condition.         Magnesium [[id number]]  (Normal) Collected: 05/14/23 2004    Specimen: Blood Updated: 05/14/23 2033     Magnesium 2.2 mg/dL     Basic Metabolic Panel [[id number]]  (Abnormal) Collected: 05/14/23 2004    Specimen: Blood Updated: 05/14/23 2033     Glucose 105 mg/dL      BUN 34 mg/dL      Creatinine 2.03 mg/dL      Sodium 140 mmol/L      Potassium 4.3 mmol/L      Comment: Slight hemolysis detected by analyzer. Results may be affected.        Chloride 110 mmol/L      CO2 20.0 mmol/L      Calcium 9.4 mg/dL      BUN/Creatinine Ratio 16.7     Anion Gap 10.0 mmol/L      eGFR 33.1 mL/min/1.73     Narrative:      GFR Normal >60  Chronic Kidney Disease <60  Kidney Failure <15    The GFR formula is only valid for adults with stable renal function between ages 18 and 70.    Phosphorus [[id number]]  (Normal) Collected: 05/14/23 2004    Specimen: Blood Updated: 05/14/23 2033      Phosphorus 2.9 mg/dL     aPTT [[id number]]  (Abnormal) Collected: 05/14/23 2004    Specimen: Blood Updated: 05/14/23 2031     PTT 21.3 seconds     Narrative:      PTT = The equivalent PTT values for the therapeutic range of heparin levels at 0.3 to 0.5 U/ml are 60 to 70 seconds.    Protime-INR [[id number]]  (Normal) Collected: 05/14/23 2004    Specimen: Blood Updated: 05/14/23 2031     Protime 14.0 Seconds      INR 1.07    CBC (No Diff) [[id number]]  (Abnormal) Collected: 05/14/23 2004    Specimen: Blood Updated: 05/14/23 2013     WBC 10.10 10*3/mm3      RBC 3.59 10*6/mm3      Hemoglobin 10.0 g/dL      Hematocrit 30.7 %      MCV 85.5 fL      MCH 27.9 pg      MCHC 32.6 g/dL      RDW 16.6 %      RDW-SD 51.4 fl      MPV 10.1 fL      Platelets 178 10*3/mm3         Imaging Results (Last 24 Hours)     Procedure Component Value Units Date/Time    XR Chest 1 View [[id number]] Collected: 05/14/23 2018     Updated: 05/14/23 2024    Narrative:      XR CHEST 1 VW    Date of Exam: 5/14/2023 7:39 PM EDT    Indication: new admission    Comparison: 5/11/2023    Findings:  Cardiac size is similar to prior exam. No focal opacity, pleural effusion or pneumothorax. No evidence of acute osseous abnormalities. Gas-distended loops of bowel seen in the upper abdomen. Defibrillator pad.      Impression:      Impression:  No radiographic evidence of acute cardiopulmonary process.      Electronically Signed: Drew Foley    5/14/2023 8:20 PM EDT    Workstation ID: [id number]           PROBLEM LIST  Patient Active Problem List   Diagnosis   • DDD (degenerative disc disease), lumbar   • GERD   • H/O testicular cancer   • Mixed hyperlipidemia   • Essential hypertension   • T2DM    • Vitamin D deficiency   • Encounter for immunization   • Healthcare maintenance   • Left shoulder pain   • History of hepatitis C   • Vasculogenic erectile dysfunction   • Benign prostatic hyperplasia with nocturia   • Amaurosis fugax of right eye   • CKD (baseline  Cr 2-2.3)     • PAD (peripheral artery disease) (HCC)   • CAD s/p stent    • Osteopenia of multiple sites   • History of nonmelanoma skin cancer   • History of colon cancer   • Moderate-severe aortic stenosis; s/p TAVR (Dr. Lopez/Dr. Mendoza)   • Class 1 obesity in adult   • S/P TAVR (transcatheter aortic valve replacement)   • Hematoma   • Postoperative anemia due to acute blood loss   • Acute congestive heart failure   • Atrial fibrillation w/ RVR   • Ventricular fibrillation   • Ventricular arrhythmia         Assessment & Plan     Patient comfortable in bed.  He has already been assessed by Dr. Lopez and plan is for monitoring for arrythmia, amiodarone gtt, and evaluation by Dr. Mendoza and Dr. Lopez for PPM placement.    I discussed the patients findings and my recommendations with family, patient, and staff.    CIHDI Kingsley  05/15/23  19:06 EDT      6 minutes of critical care provided by CHIDI Bryant in addendum accordance with split/shared billing. This time excludes other billable procedures. Time does include preparation of documents, medical consultations, review of old records, and direct bedside care. Patient is at high risk for life-threatening deterioration due to arrythmia.   Electronically signed by CHIDI Kingsley, 05/14/23, 7:08 PM EDT.

## 2023-05-14 NOTE — ED PROVIDER NOTES
Subjective   History of Present Illness  78-year-old male with coronary artery disease s/p LAD stent in 2019 reports emerged department with lightheadedness since 1:30 PM today.  Patient had a successful transcatheter aortic valve replacement on Monday at [facility], by cardiac surgeon Arben Lopez.  He states that he was sitting on the couch couple hours ago when he suddenly felt lightheaded and felt like he might pass out.  His daughter-in-law is a nurse and checked his pulse and it was very high so he was brought to the emergency department via private vehicle.    Patient denies any chest pain difficulty breathing.  He does have CHF and chronic kidney disease last creatinine 2.68 few days ago.  He is under the care of Arben Lopez and Dr. Mendoza at [facility].              Review of Systems   Constitutional: Negative.  Negative for fever.   HENT: Negative.    Respiratory: Negative.    Cardiovascular: Negative.  Negative for chest pain.   Gastrointestinal: Negative.  Negative for abdominal pain.   Endocrine: Negative.    Genitourinary: Negative.  Negative for dysuria.   Skin: Negative.    Neurological: Negative.    Psychiatric/Behavioral: Negative.    All other systems reviewed and are negative.      Past Medical History:   Diagnosis Date   • Anemia    • Aortic stenosis    • Arthritis    • Back pain    • CancerTesticular/Colon     test -1982  Colon -2005   • CHF (congestive heart failure)    • Coronary artery disease    • COVID-19 2022   • DDD (degenerative disc disease), lumbosacral    • Diabetes mellitus    • Dyspnea    • Elevated cholesterol    • Erectile dysfunction    • GERD (gastroesophageal reflux disease)    • History of transfusion    • Hyperlipidemia    • Hypertension    • TIA (transient ischemic attack)        Allergies   Allergen Reactions   • Ct Contrast Anaphylaxis   • Iodinated Contrast Media Shortness Of Breath       Past Surgical History:    Procedure Laterality Date   • AORTIC VALVE REPAIR/REPLACEMENT N/A 5/8/2023    Procedure: TRANSCATHETER AORTIC VALVE REPLACEMENT + LAURIE, LEFT FEMORAL ENDARTERECTOMY WITH PATCH;  Surgeon: Arben Lopez MD;  Location: [facility];  Service: Cardiothoracic;  Laterality: N/A;   • AORTIC VALVE REPAIR/REPLACEMENT N/A 5/8/2023    Procedure: Transfemoral Transcatheter Aortic Valve Replacement;  Surgeon: Rachael Mendoza MD;  Location: [facility];  Service: Cardiovascular;  Laterality: N/A;   • CARDIAC CATHETERIZATION N/A 08/15/2019    Procedure: Left Heart Cath;  Surgeon: Paul Villatoro MD;  Location: [facility] INVASIVE LOCATION;  Service: Cardiology   • CARDIAC CATHETERIZATION      04/10/2023 PER DR. MENDOZA   • CATARACT EXTRACTION, BILATERAL     • COLON SURGERY      colon resection for cancer - sigmoid removed - no chemo no radiation   • COLONOSCOPY     • COLONOSCOPY N/A 01/27/2021    Procedure: COLONOSCOPY;  Surgeon: Greg Barraza MD;  Location: [facility];  Service: General;  Laterality: N/A;   • CORONARY STENT PLACEMENT      1 STENT   • ENDOSCOPY  1989   • EYE SURGERY Right     retinal detachment   • MD RT/LT HEART CATHETERS N/A 08/15/2019    Procedure: Percutaneous Coronary Intervention;  Surgeon: Paul Villatoro MD;  Location: [facility] INVASIVE LOCATION;  Service: Cardiology   • SCALP LESION REMOVAL W/ FLAP AND SKIN GRAFT  12/16/2022    basal cell removal   • SHOULDER ARTHROSCOPY Right    • SKIN LESION EXCISION N/A 01/27/2021    Procedure: SKIN LESIONS EXCISION FROM LEFT TEMPLE AREA AND ABDOMEN.;  Surgeon: Greg Barraza MD;  Location: [facility];  Service: General;  Laterality: N/A;   • VASECTOMY Right     testicle removed d/t cancer - with radiation       Family History   Problem Relation Age of Onset   • Stroke Mother    • Hypertension Mother    • Alcohol abuse Mother    • COPD Mother    • COPD Father    • Alcohol abuse Father    •  Hypertension Father    • Alzheimer's disease Father    • Heart disease Brother    • Hypertension Brother    • Diabetes Brother    • Stroke Maternal Grandfather    • Cancer Paternal Grandmother        Social History     Socioeconomic History   • Marital status:      Spouse name: lita   • Number of children: 3   • Years of education: 16   Tobacco Use   • Smoking status: Never     Passive exposure: Never   • Smokeless tobacco: Never   Vaping Use   • Vaping Use: Never used   Substance and Sexual Activity   • Alcohol use: Yes     Comment: occ   • Drug use: No   • Sexual activity: Defer           Objective   Physical Exam  Vitals and nursing note reviewed.   Constitutional:       General: He is not in acute distress.     Appearance: He is well-developed. He is not diaphoretic.   HENT:      Head: Normocephalic and atraumatic.      Right Ear: External ear normal.      Left Ear: External ear normal.      Nose: Nose normal.   Eyes:      Conjunctiva/sclera: Conjunctivae normal.      Pupils: Pupils are equal, round, and reactive to light.   Neck:      Vascular: No JVD.      Trachea: No tracheal deviation.   Cardiovascular:      Rate and Rhythm: Tachycardia present. Rhythm irregular.      Heart sounds: Normal heart sounds. No murmur heard.  Pulmonary:      Effort: Pulmonary effort is normal. No respiratory distress.      Breath sounds: Normal breath sounds. No wheezing.   Abdominal:      General: Bowel sounds are normal.      Palpations: Abdomen is soft.      Tenderness: There is no abdominal tenderness.   Musculoskeletal:         General: No deformity. Normal range of motion.      Cervical back: Normal range of motion and neck supple.   Skin:     General: Skin is warm and dry.      Coloration: Skin is not pale.      Findings: No erythema or rash.   Neurological:      Mental Status: He is alert and oriented to person, place, and time.      Cranial Nerves: No cranial nerve deficit.   Psychiatric:         Behavior:  Behavior normal.         Thought Content: Thought content normal.         Procedures           ED Course  ED Course as of 05/14/23 2223   Sun May 14, 2023   1602 ECG 12 Lead Other; afib  EKG dated May 14, 2023, 1542 hrs. shows atrial fibrillation with rapid ventricular response at 161 bpm with extreme right axis deviation and right bundle branch block [JY]   1700 While getting ready to give at 10 mg test dose of Cardizem about 10 seconds into the slow IV drip infusion via piggyback patient went into ventricular fibrillation and lost consciousness.  CPR was started immediately 10 seconds after which patient regained consciousness.  Ventricular fibrillation was noticed on the monitor.  Patient regained spontaneous circulation and after regaining his pulse he went into atrial flutter as documented also on the monitor strip.  Patient went into a flutter at 180 bpm but ventricular response was 35 to 50 bpm.  Dr. Ibarra was consulted whether or not to give any antiarrhythmics decision was made not to give antiarrhythmics at this time because of a low ventricular response rate.  Dopamine was recommended by Dr. Ibarra if rate causes low blood pressure, dopamine was hanged on bedside and placed on standby.  (Patient lost consciousness at 1609 hrs.)  Subsequently Dr. Lopez who is a cardiovascular surgeon was also consulted as well as Dr. Mendoza.  Decision was made to start patient eventually on low-dose amiodarone drip and at this time patient's heart rate is around 100 at a sinus rhythm. [JY]   1712 Based on further consultation with Dr. Mendoza patient will be started on low-dose amiodarone and transferred to [facility] [JY]   1712 EKG dated May 14, 2023 at 1616 hrs. shows 180 bpm atrial flutter at 180 bpm with low ventricular response rate at below 50 bpm [JY]   1719 EKG dated May 14, 2023 at 1646 shows ventricular rate of 101 bpm which appears to be normal sinus rhythm with P waves  in lead I visible [JY]   1752 At 1745 just before starting amiodarone drip patient went into ventricular fibrillation again and lost consciousness I was at the bedside and started CPR again and before we can even charge the defibrillator patient regained his consciousness and remained a main sinus rhythm EKG done at this time at 1747 hrs. shows a sinus rhythm with first-degree AV block at 90 bpm.  Dr. Lopez was paged again and case discussed plan will not change helicopter is here and patient will be transferred to [facility] [JY]      ED Course User Index  [JY] Bryson Adams MD                                           [facility]    Final diagnoses:   Atrial fibrillation with RVR   Atrial flutter, paroxysmal       ED Disposition  ED Disposition     ED Disposition   Transfer to Another Facility     Condition   --    Comment   --             No follow-up provider specified.       Medication List      No changes were made to your prescriptions during this visit.          Bryson Adams MD  05/14/23 8867

## 2023-05-15 ENCOUNTER — READMISSION MANAGEMENT (OUTPATIENT)
Dept: CALL CENTER | Facility: HOSPITAL | Age: 78
End: 2023-05-15
Payer: COMMERCIAL

## 2023-05-15 PROBLEM — I49.9 VENTRICULAR ARRHYTHMIA: Status: ACTIVE | Noted: 2023-05-15

## 2023-05-15 LAB
ANION GAP SERPL CALCULATED.3IONS-SCNC: 9 MMOL/L (ref 5–15)
BASOPHILS # BLD AUTO: 0.02 10*3/MM3 (ref 0–0.2)
BASOPHILS NFR BLD AUTO: 0.4 % (ref 0–1.5)
BUN SERPL-MCNC: 29 MG/DL (ref 8–23)
BUN/CREAT SERPL: 14.6 (ref 7–25)
CALCIUM SPEC-SCNC: 8.6 MG/DL (ref 8.6–10.5)
CHLORIDE SERPL-SCNC: 111 MMOL/L (ref 98–107)
CO2 SERPL-SCNC: 23 MMOL/L (ref 22–29)
CREAT SERPL-MCNC: 1.98 MG/DL (ref 0.76–1.27)
DEPRECATED RDW RBC AUTO: 53 FL (ref 37–54)
EGFRCR SERPLBLD CKD-EPI 2021: 34.2 ML/MIN/1.73
EOSINOPHIL # BLD AUTO: 0.13 10*3/MM3 (ref 0–0.4)
EOSINOPHIL NFR BLD AUTO: 2.3 % (ref 0.3–6.2)
ERYTHROCYTE [DISTWIDTH] IN BLOOD BY AUTOMATED COUNT: 16.5 % (ref 12.3–15.4)
GLUCOSE BLDC GLUCOMTR-MCNC: 112 MG/DL (ref 70–130)
GLUCOSE BLDC GLUCOMTR-MCNC: 291 MG/DL (ref 70–130)
GLUCOSE SERPL-MCNC: 97 MG/DL (ref 65–99)
HCT VFR BLD AUTO: 29.2 % (ref 37.5–51)
HGB BLD-MCNC: 9.2 G/DL (ref 13–17.7)
IMM GRANULOCYTES # BLD AUTO: 0.03 10*3/MM3 (ref 0–0.05)
IMM GRANULOCYTES NFR BLD AUTO: 0.5 % (ref 0–0.5)
LYMPHOCYTES # BLD AUTO: 0.81 10*3/MM3 (ref 0.7–3.1)
LYMPHOCYTES NFR BLD AUTO: 14.4 % (ref 19.6–45.3)
MAGNESIUM SERPL-MCNC: 2.3 MG/DL (ref 1.6–2.4)
MCH RBC QN AUTO: 27.7 PG (ref 26.6–33)
MCHC RBC AUTO-ENTMCNC: 31.5 G/DL (ref 31.5–35.7)
MCV RBC AUTO: 88 FL (ref 79–97)
MONOCYTES # BLD AUTO: 0.62 10*3/MM3 (ref 0.1–0.9)
MONOCYTES NFR BLD AUTO: 11 % (ref 5–12)
NEUTROPHILS NFR BLD AUTO: 4.01 10*3/MM3 (ref 1.7–7)
NEUTROPHILS NFR BLD AUTO: 71.4 % (ref 42.7–76)
NRBC BLD AUTO-RTO: 0 /100 WBC (ref 0–0.2)
PHOSPHATE SERPL-MCNC: 3.7 MG/DL (ref 2.5–4.5)
PLATELET # BLD AUTO: 155 10*3/MM3 (ref 140–450)
PMV BLD AUTO: 10.2 FL (ref 6–12)
POTASSIUM SERPL-SCNC: 4.1 MMOL/L (ref 3.5–5.2)
QT INTERVAL: 300 MS
QT INTERVAL: 350 MS
QT INTERVAL: 386 MS
QT INTERVAL: 78 MS
QTC INTERVAL: 135 MS
QTC INTERVAL: 453 MS
QTC INTERVAL: 479 MS
QTC INTERVAL: 491 MS
RBC # BLD AUTO: 3.32 10*6/MM3 (ref 4.14–5.8)
SODIUM SERPL-SCNC: 143 MMOL/L (ref 136–145)
WBC NRBC COR # BLD: 5.62 10*3/MM3 (ref 3.4–10.8)

## 2023-05-15 PROCEDURE — 99222 1ST HOSP IP/OBS MODERATE 55: CPT | Performed by: INTERNAL MEDICINE

## 2023-05-15 PROCEDURE — C1785 PMKR, DUAL, RATE-RESP: HCPCS | Performed by: INTERNAL MEDICINE

## 2023-05-15 PROCEDURE — C1892 INTRO/SHEATH,FIXED,PEEL-AWAY: HCPCS | Performed by: INTERNAL MEDICINE

## 2023-05-15 PROCEDURE — 99153 MOD SED SAME PHYS/QHP EA: CPT | Performed by: INTERNAL MEDICINE

## 2023-05-15 PROCEDURE — 99291 CRITICAL CARE FIRST HOUR: CPT | Performed by: INTERNAL MEDICINE

## 2023-05-15 PROCEDURE — 84100 ASSAY OF PHOSPHORUS: CPT | Performed by: THORACIC SURGERY (CARDIOTHORACIC VASCULAR SURGERY)

## 2023-05-15 PROCEDURE — 85025 COMPLETE CBC W/AUTO DIFF WBC: CPT | Performed by: THORACIC SURGERY (CARDIOTHORACIC VASCULAR SURGERY)

## 2023-05-15 PROCEDURE — C1898 LEAD, PMKR, OTHER THAN TRANS: HCPCS | Performed by: INTERNAL MEDICINE

## 2023-05-15 PROCEDURE — 83735 ASSAY OF MAGNESIUM: CPT | Performed by: THORACIC SURGERY (CARDIOTHORACIC VASCULAR SURGERY)

## 2023-05-15 PROCEDURE — 82948 REAGENT STRIP/BLOOD GLUCOSE: CPT

## 2023-05-15 PROCEDURE — 25010000002 FENTANYL CITRATE (PF) 50 MCG/ML SOLUTION: Performed by: INTERNAL MEDICINE

## 2023-05-15 PROCEDURE — 02HK3JZ INSERTION OF PACEMAKER LEAD INTO RIGHT VENTRICLE, PERCUTANEOUS APPROACH: ICD-10-PCS | Performed by: INTERNAL MEDICINE

## 2023-05-15 PROCEDURE — 25010000002 DOBUTAMINE PER 250 MG: Performed by: INTERNAL MEDICINE

## 2023-05-15 PROCEDURE — 25010000002 DIPHENHYDRAMINE PER 50 MG: Performed by: INTERNAL MEDICINE

## 2023-05-15 PROCEDURE — 0 METHYLPREDNISOLONE PER 125 MG: Performed by: INTERNAL MEDICINE

## 2023-05-15 PROCEDURE — 80048 BASIC METABOLIC PNL TOTAL CA: CPT | Performed by: THORACIC SURGERY (CARDIOTHORACIC VASCULAR SURGERY)

## 2023-05-15 PROCEDURE — 02H63JZ INSERTION OF PACEMAKER LEAD INTO RIGHT ATRIUM, PERCUTANEOUS APPROACH: ICD-10-PCS | Performed by: INTERNAL MEDICINE

## 2023-05-15 PROCEDURE — 99152 MOD SED SAME PHYS/QHP 5/>YRS: CPT | Performed by: INTERNAL MEDICINE

## 2023-05-15 PROCEDURE — 33208 INSRT HEART PM ATRIAL & VENT: CPT | Performed by: INTERNAL MEDICINE

## 2023-05-15 PROCEDURE — 25010000002 CEFAZOLIN IN DEXTROSE 2-4 GM/100ML-% SOLUTION: Performed by: INTERNAL MEDICINE

## 2023-05-15 PROCEDURE — 63710000001 INSULIN LISPRO (HUMAN) PER 5 UNITS: Performed by: NURSE PRACTITIONER

## 2023-05-15 PROCEDURE — 25010000002 MIDAZOLAM PER 1 MG: Performed by: INTERNAL MEDICINE

## 2023-05-15 PROCEDURE — 0JH604Z INSERTION OF PACEMAKER, SINGLE CHAMBER INTO CHEST SUBCUTANEOUS TISSUE AND FASCIA, OPEN APPROACH: ICD-10-PCS | Performed by: INTERNAL MEDICINE

## 2023-05-15 DEVICE — PACE/SENSE LEAD
Type: IMPLANTABLE DEVICE | Site: HEART | Status: FUNCTIONAL
Brand: INGEVITY™+

## 2023-05-15 DEVICE — PACEMAKER
Type: IMPLANTABLE DEVICE | Site: HEART | Status: FUNCTIONAL
Brand: ACCOLADE™ MRI DR

## 2023-05-15 RX ORDER — FAMOTIDINE 10 MG/ML
20 INJECTION, SOLUTION INTRAVENOUS ONCE
Status: COMPLETED | OUTPATIENT
Start: 2023-05-15 | End: 2023-05-15

## 2023-05-15 RX ORDER — HEPARIN SODIUM 5000 [USP'U]/ML
5000 INJECTION, SOLUTION INTRAVENOUS; SUBCUTANEOUS EVERY 8 HOURS SCHEDULED
Status: DISCONTINUED | OUTPATIENT
Start: 2023-05-16 | End: 2023-05-16 | Stop reason: HOSPADM

## 2023-05-15 RX ORDER — DIPHENHYDRAMINE HYDROCHLORIDE 50 MG/ML
50 INJECTION INTRAMUSCULAR; INTRAVENOUS ONCE
Status: COMPLETED | OUTPATIENT
Start: 2023-05-15 | End: 2023-05-15

## 2023-05-15 RX ORDER — FENTANYL CITRATE 50 UG/ML
INJECTION, SOLUTION INTRAMUSCULAR; INTRAVENOUS
Status: DISCONTINUED | OUTPATIENT
Start: 2023-05-15 | End: 2023-05-15 | Stop reason: HOSPADM

## 2023-05-15 RX ORDER — SODIUM CHLORIDE 0.9 % (FLUSH) 0.9 %
10 SYRINGE (ML) INJECTION EVERY 12 HOURS SCHEDULED
Status: DISCONTINUED | OUTPATIENT
Start: 2023-05-15 | End: 2023-05-16 | Stop reason: HOSPADM

## 2023-05-15 RX ORDER — CEFAZOLIN SODIUM 2 G/100ML
2 INJECTION, SOLUTION INTRAVENOUS EVERY 8 HOURS
Status: COMPLETED | OUTPATIENT
Start: 2023-05-15 | End: 2023-05-16

## 2023-05-15 RX ORDER — NALOXONE HCL 0.4 MG/ML
0.4 VIAL (ML) INJECTION
Status: DISCONTINUED | OUTPATIENT
Start: 2023-05-15 | End: 2023-05-16 | Stop reason: HOSPADM

## 2023-05-15 RX ORDER — MIDAZOLAM HYDROCHLORIDE 1 MG/ML
INJECTION INTRAMUSCULAR; INTRAVENOUS
Status: DISCONTINUED | OUTPATIENT
Start: 2023-05-15 | End: 2023-05-15 | Stop reason: HOSPADM

## 2023-05-15 RX ORDER — INSULIN LISPRO 100 [IU]/ML
5 INJECTION, SOLUTION INTRAVENOUS; SUBCUTANEOUS
Status: DISCONTINUED | OUTPATIENT
Start: 2023-05-16 | End: 2023-05-16 | Stop reason: HOSPADM

## 2023-05-15 RX ORDER — SODIUM CHLORIDE 0.9 % (FLUSH) 0.9 %
1-10 SYRINGE (ML) INJECTION AS NEEDED
Status: DISCONTINUED | OUTPATIENT
Start: 2023-05-15 | End: 2023-05-16 | Stop reason: HOSPADM

## 2023-05-15 RX ORDER — DOPAMINE HYDROCHLORIDE 160 MG/100ML
5 INJECTION, SOLUTION INTRAVENOUS
Status: DISCONTINUED | OUTPATIENT
Start: 2023-05-15 | End: 2023-05-15

## 2023-05-15 RX ORDER — SODIUM CHLORIDE 9 MG/ML
40 INJECTION, SOLUTION INTRAVENOUS AS NEEDED
Status: DISCONTINUED | OUTPATIENT
Start: 2023-05-15 | End: 2023-05-16 | Stop reason: HOSPADM

## 2023-05-15 RX ORDER — DEXTROSE MONOHYDRATE 25 G/50ML
25 INJECTION, SOLUTION INTRAVENOUS
Status: DISCONTINUED | OUTPATIENT
Start: 2023-05-15 | End: 2023-05-16 | Stop reason: HOSPADM

## 2023-05-15 RX ORDER — INSULIN LISPRO 100 [IU]/ML
0-7 INJECTION, SOLUTION INTRAVENOUS; SUBCUTANEOUS
Status: DISCONTINUED | OUTPATIENT
Start: 2023-05-15 | End: 2023-05-16 | Stop reason: HOSPADM

## 2023-05-15 RX ORDER — CEFAZOLIN SODIUM 2 G/100ML
2 INJECTION, SOLUTION INTRAVENOUS EVERY 8 HOURS
Status: DISCONTINUED | OUTPATIENT
Start: 2023-05-15 | End: 2023-05-15

## 2023-05-15 RX ORDER — IBUPROFEN 400 MG/1
400 TABLET ORAL EVERY 6 HOURS PRN
Status: DISCONTINUED | OUTPATIENT
Start: 2023-05-15 | End: 2023-05-16 | Stop reason: HOSPADM

## 2023-05-15 RX ORDER — LIDOCAINE HYDROCHLORIDE 10 MG/ML
INJECTION, SOLUTION EPIDURAL; INFILTRATION; INTRACAUDAL; PERINEURAL
Status: DISCONTINUED | OUTPATIENT
Start: 2023-05-15 | End: 2023-05-15 | Stop reason: HOSPADM

## 2023-05-15 RX ORDER — METHYLPREDNISOLONE SODIUM SUCCINATE 40 MG/ML
120 INJECTION, POWDER, LYOPHILIZED, FOR SOLUTION INTRAMUSCULAR; INTRAVENOUS ONCE
Status: COMPLETED | OUTPATIENT
Start: 2023-05-15 | End: 2023-05-15

## 2023-05-15 RX ORDER — IBUPROFEN 600 MG/1
1 TABLET ORAL
Status: DISCONTINUED | OUTPATIENT
Start: 2023-05-15 | End: 2023-05-16 | Stop reason: HOSPADM

## 2023-05-15 RX ORDER — NICOTINE POLACRILEX 4 MG
15 LOZENGE BUCCAL
Status: DISCONTINUED | OUTPATIENT
Start: 2023-05-15 | End: 2023-05-16 | Stop reason: HOSPADM

## 2023-05-15 RX ORDER — DOBUTAMINE HYDROCHLORIDE 100 MG/100ML
5 INJECTION INTRAVENOUS
Status: DISCONTINUED | OUTPATIENT
Start: 2023-05-15 | End: 2023-05-15

## 2023-05-15 RX ORDER — MORPHINE SULFATE 2 MG/ML
2 INJECTION, SOLUTION INTRAMUSCULAR; INTRAVENOUS EVERY 4 HOURS PRN
Status: DISCONTINUED | OUTPATIENT
Start: 2023-05-15 | End: 2023-05-16 | Stop reason: HOSPADM

## 2023-05-15 RX ADMIN — INSULIN LISPRO 4 UNITS: 100 INJECTION, SOLUTION INTRAVENOUS; SUBCUTANEOUS at 21:05

## 2023-05-15 RX ADMIN — DOBUTAMINE HYDROCHLORIDE 5 MCG/KG/MIN: 100 INJECTION INTRAVENOUS at 09:20

## 2023-05-15 RX ADMIN — ALPRAZOLAM 0.5 MG: 0.5 TABLET ORAL at 21:53

## 2023-05-15 RX ADMIN — ASPIRIN 81 MG: 81 TABLET, COATED ORAL at 09:20

## 2023-05-15 RX ADMIN — DIPHENHYDRAMINE HYDROCHLORIDE 50 MG: 50 INJECTION, SOLUTION INTRAMUSCULAR; INTRAVENOUS at 09:58

## 2023-05-15 RX ADMIN — CEFAZOLIN SODIUM 2 G: 2 INJECTION, SOLUTION INTRAVENOUS at 11:57

## 2023-05-15 RX ADMIN — CARVEDILOL 3.12 MG: 3.12 TABLET, FILM COATED ORAL at 17:33

## 2023-05-15 RX ADMIN — ROSUVASTATIN 20 MG: 20 TABLET, FILM COATED ORAL at 20:12

## 2023-05-15 RX ADMIN — Medication 10 ML: at 20:13

## 2023-05-15 RX ADMIN — AMLODIPINE BESYLATE 10 MG: 10 TABLET ORAL at 09:19

## 2023-05-15 RX ADMIN — Medication 10 ML: at 13:49

## 2023-05-15 RX ADMIN — FAMOTIDINE 20 MG: 10 INJECTION INTRAVENOUS at 09:58

## 2023-05-15 RX ADMIN — TICAGRELOR 60 MG: 60 TABLET ORAL at 20:12

## 2023-05-15 RX ADMIN — PANTOPRAZOLE SODIUM 40 MG: 40 TABLET, DELAYED RELEASE ORAL at 05:57

## 2023-05-15 RX ADMIN — METHYLPREDNISOLONE SODIUM SUCCINATE 120 MG: 1 INJECTION, POWDER, FOR SOLUTION INTRAMUSCULAR; INTRAVENOUS at 09:58

## 2023-05-15 RX ADMIN — CEFAZOLIN SODIUM 2 G: 2 INJECTION, SOLUTION INTRAVENOUS at 20:13

## 2023-05-15 NOTE — PROGRESS NOTES
Nicholas Yin  [id number]  1945     LOS: 1 day   Patient Care Team:  Waldemar Trejo MD as PCP - General  NeilWaldemar townsend MD as PCP - Family Medicine    Chief Complaint: Atrial fibrillation, ventricular fibrillation      Subjective: Alert, no complaints    Objective:     Vital Sign Min/Max for last 24 hours  Temp  Min: 98.5 °F (36.9 °C)  Max: 98.5 °F (36.9 °C)   BP  Min: 103/72  Max: 174/82   Pulse  Min: 57  Max: 160   Resp  Min: 16  Max: 20   SpO2  Min: 92 %  Max: 100 %   No data recorded   Weight  Min: 102 kg (225 lb)  Max: 106 kg (233 lb)         Physical Exam: Normal sinus rhythm, questionable second or third-degree heart block    Wound:    Pulses:     Mediastinal and Chest Tube Drainage:       Results Review:   Results from last 7 days   Lab Units 05/15/23  0348   WBC 10*3/mm3 5.62   HEMOGLOBIN g/dL 9.2*   HEMATOCRIT % 29.2*   PLATELETS 10*3/mm3 155     Results from last 7 days   Lab Units 05/15/23  0348   SODIUM mmol/L 143   POTASSIUM mmol/L 4.1   CHLORIDE mmol/L 111*   CO2 mmol/L 23.0   BUN mg/dL 29*   CREATININE mg/dL 1.98*   GLUCOSE mg/dL 97   CALCIUM mg/dL 8.6     Results from last 7 days   Lab Units 05/08/23  0707   PH, ARTERIAL pH units 7.41         Assessment      Ventricular fibrillation    GERD    Mixed hyperlipidemia    Essential hypertension    T2DM     History of hepatitis C    CKD (baseline Cr 2-2.3)      PAD (peripheral artery disease) (HCC)    CAD s/p stent     Moderate-severe aortic stenosis; s/p TAVR (Dr. Lopez/Dr. Mendoza)    Class 1 obesity in adult    Atrial fibrillation w/ RVR      Patient stable as either second or third-degree heart block it appears.  We will await Dr. Mendoza's assessment but I suspect he will need a pacemaker and possible defibrillator.  Discussed fully with patient        Arben Lopez MD  05/15/23  06:26 EDT      Please note that portions of this note were completed with a voice recognition program. Efforts were made to  edit the dictations, but words may be mistranscribed

## 2023-05-15 NOTE — CASE MANAGEMENT/SOCIAL WORK
Discharge Planning Assessment  [facility]     Patient Name: Nicholas Yin  MRN: [id number]  Today's Date: 5/15/2023    Admit Date: 5/14/2023    Plan: Home with family   Discharge Needs Assessment     Row Name 05/15/23 1626       Living Environment    People in Home spouse    Current Living Arrangements home    Potentially Unsafe Housing Conditions none    Primary Care Provided by self    Provides Primary Care For no one    Family Caregiver if Needed spouse    Family Caregiver Names Jeremías Yin (Spouse)   [phone] (Home Phone)    Quality of Family Relationships helpful;involved;supportive    Able to Return to Prior Arrangements yes       Resource/Environmental Concerns    Resource/Environmental Concerns none    Transportation Concerns none       Food Insecurity    Within the past 12 months, you worried that your food would run out before you got the money to buy more. Never true    Within the past 12 months, the food you bought just didn't last and you didn't have money to get more. Never true       Transition Planning    Patient/Family Anticipates Transition to home with family    Patient/Family Anticipated Services at Transition none    Transportation Anticipated family or friend will provide       Discharge Needs Assessment    Equipment Currently Used at Home none    Concerns to be Addressed denies needs/concerns at this time    Anticipated Changes Related to Illness none    Equipment Needed After Discharge none               Discharge Plan     Row Name 05/15/23 1627       Plan    Plan Home with family    Patient/Family in Agreement with Plan yes    Plan Comments I spoke with the patient and his family at the bedside. He lives in [place] in a house with his spouse. He confirmed his PCP and insurance with me. No safety concerns identified at this time. Patient denied using medical equipment at his home. He stated that he is a , is independent of all ADLs, and drives. His plan is  to return home at discharge. Patient identified his wife as a RN and stated that she will assist in his recovery. He denied any medical equipment needs. He is on oxygen here but not at home. At this time, it is unknown if he will need oxygen at home or not. CM to follow and assist as needed.    Final Discharge Disposition Code 01 - home or self-care              Continued Care and Services - Admitted Since 5/14/2023    Coordination has not been started for this encounter.     Selected Continued Care - Episodes Includes continued care and service providers with selected services from the active episodes listed below    Rising Risk Care Management Episode start date: 5/15/2023   There are no active outsourced providers for this episode.                  Demographic Summary    No documentation.                Functional Status     Row Name 05/15/23 1625       Functional Status    Usual Activity Tolerance good    Current Activity Tolerance moderate       Physical Activity    On average, how many days per week do you engage in moderate to strenuous exercise (like a brisk walk)? 0 days    On average, how many minutes do you engage in exercise at this level? 0 min    Number of minutes of exercise per week 0       Assessment of Health Literacy    How often do you have someone help you read hospital materials? Never    How often do you have problems learning about your medical condition because of difficulty understanding written information? Never    How often do you have a problem understanding what is told to you about your medical condition? Never    How confident are you filling out medical forms by yourself? Extremely    Health Literacy Excellent       Functional Status, IADL    Medications independent    Meal Preparation independent    Housekeeping independent    Laundry independent    Shopping independent       Mental Status    General Appearance WDL WDL       Mental Status Summary    Recent Changes in Mental  Status/Cognitive Functioning no changes       Employment/    Employment Status self-employed    Employment/ Comments                Psychosocial    No documentation.                Abuse/Neglect    No documentation.                Legal    No documentation.                Substance Abuse    No documentation.                Patient Forms    No documentation.                   Sherrell Stark RN
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
## 2023-05-15 NOTE — PLAN OF CARE
Goal Outcome Evaluation:        NEURO: pt A&Ox4. MAEE.     RESP: pt on 3L NC with sats greater than 98%. Pt with dry cough.    CARDIAC:  Pt in NSR with 1st degree AV block. Pt arrived on amio gtt. Pt having multiple 2-3 second pauses with which he is feeling lightheaded. Pt's heart rate not dropping below 50 with these episodes, and pt appears to still have p-waves during pauses. Amio stopped. S1S2. Palpable pulses.     GI/: NPO, audible bowel sounds. UOP 1,350 mL.     Afebrile. Left groin bruised and very tender to touch.   VSS currently.   No gtts.

## 2023-05-15 NOTE — OUTREACH NOTE
Medical Week 2 Survey    Flowsheet Row Responses   [facility] patient discharged from? [facility]   Does the patient have one of the following disease processes/diagnoses(primary or secondary)? Other   Week 2 attempt successful? No   Unsuccessful attempts Attempt 1   Revoke Readmitted          Queenie CHAPMAN - Registered Nurse

## 2023-05-15 NOTE — PLAN OF CARE
Goal Outcome Evaluation:  Plan of Care Reviewed With: patient, spouse        Progress: improving    Patient having multiple episodes on pauses/complete heart block, patient symptomatic with every episodes, permanent pacemaker placed with rate of 60, SBP stable, afebrile,  ml, tolerating diet, no c/o pain, patient aware of arm restrictions, left arm in the sling, ekg and PA and lateral xray in am, spouse at bedside and updated.

## 2023-05-16 ENCOUNTER — APPOINTMENT (OUTPATIENT)
Dept: GENERAL RADIOLOGY | Facility: HOSPITAL | Age: 78
DRG: 243 | End: 2023-05-16
Payer: COMMERCIAL

## 2023-05-16 ENCOUNTER — READMISSION MANAGEMENT (OUTPATIENT)
Dept: CALL CENTER | Facility: HOSPITAL | Age: 78
End: 2023-05-16
Payer: COMMERCIAL

## 2023-05-16 VITALS
HEIGHT: 69 IN | WEIGHT: 224.87 LBS | BODY MASS INDEX: 33.31 KG/M2 | OXYGEN SATURATION: 89 % | RESPIRATION RATE: 16 BRPM | TEMPERATURE: 97.4 F | SYSTOLIC BLOOD PRESSURE: 145 MMHG | DIASTOLIC BLOOD PRESSURE: 71 MMHG | HEART RATE: 74 BPM

## 2023-05-16 LAB
ANION GAP SERPL CALCULATED.3IONS-SCNC: 10 MMOL/L (ref 5–15)
BUN SERPL-MCNC: 36 MG/DL (ref 8–23)
BUN/CREAT SERPL: 18.9 (ref 7–25)
CALCIUM SPEC-SCNC: 8.8 MG/DL (ref 8.6–10.5)
CHLORIDE SERPL-SCNC: 108 MMOL/L (ref 98–107)
CO2 SERPL-SCNC: 22 MMOL/L (ref 22–29)
CREAT SERPL-MCNC: 1.9 MG/DL (ref 0.76–1.27)
DEPRECATED RDW RBC AUTO: 51.4 FL (ref 37–54)
EGFRCR SERPLBLD CKD-EPI 2021: 35.9 ML/MIN/1.73
ERYTHROCYTE [DISTWIDTH] IN BLOOD BY AUTOMATED COUNT: 16.1 % (ref 12.3–15.4)
GLUCOSE BLDC GLUCOMTR-MCNC: 216 MG/DL (ref 70–130)
GLUCOSE SERPL-MCNC: 216 MG/DL (ref 65–99)
HCT VFR BLD AUTO: 29.9 % (ref 37.5–51)
HGB BLD-MCNC: 9.5 G/DL (ref 13–17.7)
MAGNESIUM SERPL-MCNC: 2.2 MG/DL (ref 1.6–2.4)
MCH RBC QN AUTO: 27.8 PG (ref 26.6–33)
MCHC RBC AUTO-ENTMCNC: 31.8 G/DL (ref 31.5–35.7)
MCV RBC AUTO: 87.4 FL (ref 79–97)
PHOSPHATE SERPL-MCNC: 4.7 MG/DL (ref 2.5–4.5)
PLATELET # BLD AUTO: 170 10*3/MM3 (ref 140–450)
PMV BLD AUTO: 10.1 FL (ref 6–12)
POTASSIUM SERPL-SCNC: 4.6 MMOL/L (ref 3.5–5.2)
QT INTERVAL: 442 MS
QTC INTERVAL: 473 MS
RBC # BLD AUTO: 3.42 10*6/MM3 (ref 4.14–5.8)
SODIUM SERPL-SCNC: 140 MMOL/L (ref 136–145)
WBC NRBC COR # BLD: 5.07 10*3/MM3 (ref 3.4–10.8)

## 2023-05-16 PROCEDURE — 85027 COMPLETE CBC AUTOMATED: CPT | Performed by: INTERNAL MEDICINE

## 2023-05-16 PROCEDURE — 25010000002 HEPARIN (PORCINE) PER 1000 UNITS: Performed by: INTERNAL MEDICINE

## 2023-05-16 PROCEDURE — 71046 X-RAY EXAM CHEST 2 VIEWS: CPT

## 2023-05-16 PROCEDURE — 63710000001 INSULIN LISPRO (HUMAN) PER 5 UNITS: Performed by: NURSE PRACTITIONER

## 2023-05-16 PROCEDURE — 25010000002 CEFAZOLIN IN DEXTROSE 2-4 GM/100ML-% SOLUTION: Performed by: INTERNAL MEDICINE

## 2023-05-16 PROCEDURE — 63710000001 INSULIN LISPRO (HUMAN) PER 5 UNITS: Performed by: INTERNAL MEDICINE

## 2023-05-16 PROCEDURE — 63710000001 INSULIN DETEMIR PER 5 UNITS: Performed by: INTERNAL MEDICINE

## 2023-05-16 PROCEDURE — 82948 REAGENT STRIP/BLOOD GLUCOSE: CPT

## 2023-05-16 PROCEDURE — 99232 SBSQ HOSP IP/OBS MODERATE 35: CPT | Performed by: INTERNAL MEDICINE

## 2023-05-16 PROCEDURE — 83735 ASSAY OF MAGNESIUM: CPT | Performed by: INTERNAL MEDICINE

## 2023-05-16 PROCEDURE — 93005 ELECTROCARDIOGRAM TRACING: CPT | Performed by: INTERNAL MEDICINE

## 2023-05-16 PROCEDURE — 80048 BASIC METABOLIC PNL TOTAL CA: CPT | Performed by: INTERNAL MEDICINE

## 2023-05-16 PROCEDURE — 84100 ASSAY OF PHOSPHORUS: CPT | Performed by: INTERNAL MEDICINE

## 2023-05-16 PROCEDURE — 99024 POSTOP FOLLOW-UP VISIT: CPT | Performed by: INTERNAL MEDICINE

## 2023-05-16 RX ADMIN — INSULIN DETEMIR 8 UNITS: 100 INJECTION, SOLUTION SUBCUTANEOUS at 00:44

## 2023-05-16 RX ADMIN — CARVEDILOL 3.12 MG: 3.12 TABLET, FILM COATED ORAL at 09:04

## 2023-05-16 RX ADMIN — ASPIRIN 81 MG: 81 TABLET, COATED ORAL at 09:03

## 2023-05-16 RX ADMIN — AMLODIPINE BESYLATE 10 MG: 10 TABLET ORAL at 09:04

## 2023-05-16 RX ADMIN — INSULIN LISPRO 3 UNITS: 100 INJECTION, SOLUTION INTRAVENOUS; SUBCUTANEOUS at 09:06

## 2023-05-16 RX ADMIN — LOSARTAN POTASSIUM 25 MG: 25 TABLET, FILM COATED ORAL at 09:04

## 2023-05-16 RX ADMIN — INSULIN LISPRO 5 UNITS: 100 INJECTION, SOLUTION INTRAVENOUS; SUBCUTANEOUS at 09:05

## 2023-05-16 RX ADMIN — HEPARIN SODIUM 5000 UNITS: 5000 INJECTION INTRAVENOUS; SUBCUTANEOUS at 00:44

## 2023-05-16 RX ADMIN — INSULIN DETEMIR 8 UNITS: 100 INJECTION, SOLUTION SUBCUTANEOUS at 09:34

## 2023-05-16 RX ADMIN — PANTOPRAZOLE SODIUM 40 MG: 40 TABLET, DELAYED RELEASE ORAL at 05:06

## 2023-05-16 RX ADMIN — HEPARIN SODIUM 5000 UNITS: 5000 INJECTION INTRAVENOUS; SUBCUTANEOUS at 05:14

## 2023-05-16 RX ADMIN — CEFAZOLIN SODIUM 2 G: 2 INJECTION, SOLUTION INTRAVENOUS at 05:06

## 2023-05-16 RX ADMIN — TICAGRELOR 60 MG: 60 TABLET ORAL at 09:04

## 2023-05-16 NOTE — CASE MANAGEMENT/SOCIAL WORK
Case Management Discharge Note      Final Note: d/c today to home, on RA and no discharge needs per CM perspective.         Selected Continued Care - Discharged on 5/16/2023 Admission date: 5/14/2023 - Discharge disposition: Home or Self Care    Destination    No services have been selected for the patient.              Durable Medical Equipment    No services have been selected for the patient.              Dialysis/Infusion    No services have been selected for the patient.              Home Medical Care    No services have been selected for the patient.              Therapy    No services have been selected for the patient.              Community Resources    No services have been selected for the patient.              Community & DME    No services have been selected for the patient.                Selected Continued Care - Episodes Includes continued care and service providers with selected services from the active episodes listed below    Rising Risk Care Management Episode start date: 5/15/2023   There are no active outsourced providers for this episode.                    Final Discharge Disposition Code: 01 - home or self-care

## 2023-05-16 NOTE — PAYOR COMM NOTE
"Ximena ROBERTSON    phone: [phone]  fax: [phone]      Nicholas Schuster \"BILL\" (78 y.o. Male)       Date of Birth   1945    Social Security Number       Address   [address] [place] [address]    Home Phone   [phone]    MRN   [id number]       Adventism   Zoroastrianism    Marital Status                               Admission Date   5/14/23    Admission Type   Urgent    Admitting Provider   Arben Lopez MD    Attending Provider   Arben Lopez MD    Department, Room/Bed   [facility]       Discharge Date       Discharge Disposition   Home or Self Care    Discharge Destination                                 Attending Provider: Arben Lopez MD    Allergies: Ct Contrast, Iodinated Contrast Media    Isolation: None   Infection: None   Code Status: Prior    Ht: 174.6 cm (68.75\")   Wt: 102 kg (224 lb 13.9 oz)    Admission Cmt: None   Principal Problem: Ventricular fibrillation [I49.01]                   Active Insurance as of 5/14/2023       Primary Coverage       Payor Plan Insurance Group Employer/Plan Group    [employer] EMPLOYEE [id number]       Payor Plan Address Payor Plan Phone Number Payor Plan Fax Number Effective Dates    [address] [phone]  1/1/2018 - None Entered    [facility] [id number]         Subscriber Name Subscriber Birth Date Member ID       MARAL SCHUSTER 5/6/1948 [id number]               Secondary Coverage       Payor Plan Insurance Group Employer/Plan Group    MEDICARE MEDICARE A ONLY        Payor Plan Address Payor Plan Phone Number Payor Plan Fax Number Effective Dates    [address] [phone]  2/1/2011 - None Entered    [facility] [id number]         Subscriber Name Subscriber Birth Date Member ID       NICHOLAS SCHUSTER 1945 [id number]                     Emergency Contacts        (Rel.) Home Phone Work Phone Mobile Phone    HumphreyDennymichaelliyah (Spouse) " [phone] -- [phone]                 History & Physical        Arben Lopez MD at 05/14/23 1926          Nicholas Yin  [id number]  1945    Referring Provider:  Reason for Consultation: Atrial fibrillation, ventricular fibrillation    Patient Care Team:  Waldemar Trejo MD as PCP - General  NeilWaldemar townsend MD as PCP - Family Medicine    Chief complaint: Patient became weak and lightheaded this afternoon while sitting    .     History of present illness: The patient had a TAVR done on 8 May.  The patient was discharged the following day.  The patient had a slight bump in his renal function but has felt reasonably well the last few days.  He this afternoon while sitting on the couch developed lightheadedness and had her fast heartbeat.  He was taken to the [facility] emergency room.  When Cardizem was initiated he went into V. tach ventricular fibrillation.  He coded for approximately 15 minutes.  He regained consciousness and was completely lucid.  He then had another short episode of ventricular fibrillation and regained consciousness spontaneously in a few seconds.  Discussions were held with the emergency room physicians Dr. Mendoza and myself.  He was started on amiodarone and has been transferred here by the air ambulance.  He is completely lucid and is comfortable at this time.  He does have some tenderness from the compressions.    Review of Systems    The following systems were reviewed and negative;  constitution, eyes, ENT, respiratory, gastrointestinal, integument, breast, hematologic / lymphatic, musculoskeletal, neurological, behavioral/psych, endocrine and allergies / immunologic    History  Past Medical History:   Diagnosis Date    Anemia     Aortic stenosis     Arthritis     Back pain     CancerTesticular/Colon     test -1982  Colon -2005    CHF (congestive heart failure)     Coronary artery disease     COVID-19 2022    DDD (degenerative disc disease),  lumbosacral     Diabetes mellitus     Dyspnea     Elevated cholesterol     Erectile dysfunction     GERD (gastroesophageal reflux disease)     History of transfusion     Hyperlipidemia     Hypertension     TIA (transient ischemic attack)    ,   Past Surgical History:   Procedure Laterality Date    AORTIC VALVE REPAIR/REPLACEMENT N/A 5/8/2023    Procedure: TRANSCATHETER AORTIC VALVE REPLACEMENT + LAURIE, LEFT FEMORAL ENDARTERECTOMY WITH PATCH;  Surgeon: Arben Lopez MD;  Location: [facility];  Service: Cardiothoracic;  Laterality: N/A;    AORTIC VALVE REPAIR/REPLACEMENT N/A 5/8/2023    Procedure: Transfemoral Transcatheter Aortic Valve Replacement;  Surgeon: Rachael Mendoza MD;  Location: [facility];  Service: Cardiovascular;  Laterality: N/A;    CARDIAC CATHETERIZATION N/A 08/15/2019    Procedure: Left Heart Cath;  Surgeon: Paul Villatoro MD;  Location: [facility] CATH INVASIVE LOCATION;  Service: Cardiology    CARDIAC CATHETERIZATION      04/10/2023 PER DR. MENDOZA    CATARACT EXTRACTION, BILATERAL      COLON SURGERY      colon resection for cancer - sigmoid removed - no chemo no radiation    COLONOSCOPY      COLONOSCOPY N/A 01/27/2021    Procedure: COLONOSCOPY;  Surgeon: Greg Barraza MD;  Location: [facility];  Service: General;  Laterality: N/A;    CORONARY STENT PLACEMENT      1 STENT    ENDOSCOPY  1989    EYE SURGERY Right     retinal detachment    LA RT/LT HEART CATHETERS N/A 08/15/2019    Procedure: Percutaneous Coronary Intervention;  Surgeon: Paul Villatoro MD;  Location: [facility] CATH INVASIVE LOCATION;  Service: Cardiology    SCALP LESION REMOVAL W/ FLAP AND SKIN GRAFT  12/16/2022    basal cell removal    SHOULDER ARTHROSCOPY Right     SKIN LESION EXCISION N/A 01/27/2021    Procedure: SKIN LESIONS EXCISION FROM LEFT TEMPLE AREA AND ABDOMEN.;  Surgeon: Greg Barraza MD;  Location: [facility];  Service: General;  Laterality: N/A;     VASECTOMY Right     testicle removed d/t cancer - with radiation   ,   Family History   Problem Relation Age of Onset    Stroke Mother     Hypertension Mother     Alcohol abuse Mother     COPD Mother     COPD Father     Alcohol abuse Father     Hypertension Father     Alzheimer's disease Father     Heart disease Brother     Hypertension Brother     Diabetes Brother     Stroke Maternal Grandfather     Cancer Paternal Grandmother    ,   Social History     Tobacco Use    Smoking status: Never     Passive exposure: Never    Smokeless tobacco: Never   Vaping Use    Vaping Use: Never used   Substance Use Topics    Alcohol use: Yes     Comment: occ    Drug use: No   ,   Medications Prior to Admission   Medication Sig Dispense Refill Last Dose    amLODIPine (NORVASC) 10 MG tablet Take 1 tablet by mouth Daily for blood pressure. 90 tablet 3     aspirin 81 MG EC tablet Take 1 tablet by mouth Daily.       carvedilol (COREG) 3.125 MG tablet Take 1 tablet by mouth 2 (Two) Times a Day. 180 tablet 3     coenzyme Q10 100 MG capsule Take 2 capsules by mouth Daily.       dapagliflozin Propanediol (Farxiga) 10 MG tablet Take 10 mg by mouth Daily. 49 tablet 0     docusate sodium (Colace) 100 MG capsule Take 1 capsule by mouth 2 (Two) Times a Day. 72 capsule 0     HYDROcodone-acetaminophen (NORCO) 5-325 MG per tablet Take 1 tablet by mouth Every 8 (Eight) Hours As Needed for Moderate Pain. 15 tablet 0     losartan (COZAAR) 50 MG tablet Take 1/2 tablet by mouth 2 (Two) Times a Day. 60 tablet 11     MAGNESIUM CITRATE PO Take 250 mg by mouth Daily.       multivitamin with minerals tablet tablet Take 1 tablet by mouth Daily.       omeprazole (priLOSEC) 40 MG capsule Take 1 capsule by mouth Daily.       rosuvastatin (CRESTOR) 20 MG tablet Take 1 tablet by mouth Daily. 90 tablet 3     ticagrelor (BRILINTA) 60 MG tablet tablet Take 1 tablet by mouth 2 (Two) Times a Day.  **Do not resume until 5/16/23** 180 tablet 3     vitamin D  (ERGOCALCIFEROL) 1.25 MG (73565 UT) capsule capsule Take 1 capsule by mouth Every 7 (Seven) Days. 12 capsule 1     Xultophy 100-3.6 UNIT-MG/ML solution pen-injector subcutaneous pen Inject 50 Units under the skin into the appropriate area as directed Daily. 15 mL 5    , Scheduled Meds:  , Continuous Infusions:  No current facility-administered medications for this encounter.  , PRN Meds:   and Allergies:  Ct contrast and Iodinated contrast media    Objective     Vital Sign Min/Max for last 24 hours  Temp  Min: 98.5 °F (36.9 °C)  Max: 98.5 °F (36.9 °C)   BP  Min: 103/72  Max: 174/82   Pulse  Min: 57  Max: 160   Resp  Min: 20  Max: 20   SpO2  Min: 92 %  Max: 100 %   Flow (L/min)  Min: 2  Max: 2   Weight  Min: 102 kg (225 lb)  Max: 106 kg (233 lb)              Physical Exam:     General Appearance:    Alert, cooperative, in no acute distress   Head:    Normocephalic, without obvious abnormality, atraumatic   Eyes:            Lids and lashes normal, conjunctivae and sclerae normal, no   icterus, no pallor, corneas clear, PERRLA   Ears:    Ears appear intact with no abnormalities noted   Throat:   No oral lesions, no thrush, oral mucosa moist   Neck:   No adenopathy, supple, trachea midline, no thyromegaly, no   carotid bruit, no JVD   Back:     No kyphosis present, no scoliosis present, no skin lesions,      erythema or scars, no tenderness to percussion or                   palpation,   range of motion normal   Lungs:     Clear to auscultation,respirations regular, even and                  unlabored    Heart:    Regular rhythm and normal rate, normal S1 and S2, no            murmur, no gallop, no rub, no click   Chest Wall:    No abnormalities observed   Abdomen:     Normal bowel sounds, no masses, no organomegaly, soft        non-tender, non-distended, no guarding, no rebound                tenderness   Rectal:     Deferred   Extremities:  Incision looks excellent.   Pulses:   Pulses palpable and equal bilaterally    Skin:   No bleeding, bruising or rash   Lymph nodes:   No palpable adenopathy   Neurologic:   Cranial nerves 2 - 12 grossly intact, sensation intact, DTR       present and equal bilaterally             Assessment & Plan       GERD    Mixed hyperlipidemia    Essential hypertension    T2DM     CKD (baseline Cr 2-2.3)      CAD s/p stent     Moderate-severe aortic stenosis; s/p TAVR (Dr. Lopez/Dr. Mendoza)    Class 1 obesity in adult    Atrial fibrillation      Patient developed atrial fibrillation and ventricular fibrillation today.  The patient's creatinine is 2.2 which is almost back to baseline.  His potassium was 4.2.  We will admit the patient to ICU.  We will continue the amiodarone at 0.5 mg.  We will get baseline EKG chest x-ray and labs at this point.  The patient denies any chest pain.    I discussed the patients findings and my recommendations with patient, nursing staff and primary care team    Please note that portions of this note were completed with a voice recognition program. Efforts were made to edit the dictations, but words may be mistranscribed    Arben Lopez MD  05/14/23  19:26 EDT              Electronically signed by Arben Lopez MD at 05/14/23 1930       Lab Results (last 48 hours)       Procedure Component Value Units Date/Time    POC Glucose Once [[id number]]  (Abnormal) Collected: 05/16/23 0706    Specimen: Blood Updated: 05/16/23 0710     Glucose 216 mg/dL      Comment: Meter: [id number] : [id number] Demetrio Olivas       Phosphorus [[id number]]  (Abnormal) Collected: 05/16/23 0500    Specimen: Blood Updated: 05/16/23 0629     Phosphorus 4.7 mg/dL     Magnesium [[id number]]  (Normal) Collected: 05/16/23 0500    Specimen: Blood Updated: 05/16/23 0623     Magnesium 2.2 mg/dL     Basic Metabolic Panel [[id number]]  (Abnormal) Collected: 05/16/23 0500    Specimen: Blood Updated: 05/16/23 0623     Glucose 216 mg/dL      BUN 36 mg/dL      Creatinine 1.90 mg/dL      Sodium  140 mmol/L      Potassium 4.6 mmol/L      Chloride 108 mmol/L      CO2 22.0 mmol/L      Calcium 8.8 mg/dL      BUN/Creatinine Ratio 18.9     Anion Gap 10.0 mmol/L      eGFR 35.9 mL/min/1.73     Narrative:      GFR Normal >60  Chronic Kidney Disease <60  Kidney Failure <15    The GFR formula is only valid for adults with stable renal function between ages 18 and 70.    CBC (No Diff) [[id number]]  (Abnormal) Collected: 05/16/23 0500    Specimen: Blood Updated: 05/16/23 0539     WBC 5.07 10*3/mm3      RBC 3.42 10*6/mm3      Hemoglobin 9.5 g/dL      Hematocrit 29.9 %      MCV 87.4 fL      MCH 27.8 pg      MCHC 31.8 g/dL      RDW 16.1 %      RDW-SD 51.4 fl      MPV 10.1 fL      Platelets 170 10*3/mm3     POC Glucose Once [[id number]]  (Abnormal) Collected: 05/15/23 2034    Specimen: Blood Updated: 05/15/23 2036     Glucose 291 mg/dL      Comment: Meter: [id number] : [id number] Allie Hutton       POC Glucose Once [[id number]]  (Normal) Collected: 05/14/23 1937    Specimen: Blood Updated: 05/15/23 0620     Glucose 112 mg/dL      Comment: Meter: [id number] : [id number] Aminata Arianne Jenningsh       Phosphorus [[id number]]  (Normal) Collected: 05/15/23 0348    Specimen: Blood Updated: 05/15/23 0420     Phosphorus 3.7 mg/dL     Magnesium [[id number]]  (Normal) Collected: 05/15/23 0348    Specimen: Blood Updated: 05/15/23 0414     Magnesium 2.3 mg/dL     Basic Metabolic Panel [[id number]]  (Abnormal) Collected: 05/15/23 0348    Specimen: Blood Updated: 05/15/23 0414     Glucose 97 mg/dL      BUN 29 mg/dL      Creatinine 1.98 mg/dL      Sodium 143 mmol/L      Potassium 4.1 mmol/L      Comment: Slight hemolysis detected by analyzer. Results may be affected.        Chloride 111 mmol/L      CO2 23.0 mmol/L      Calcium 8.6 mg/dL      BUN/Creatinine Ratio 14.6     Anion Gap 9.0 mmol/L      eGFR 34.2 mL/min/1.73     Narrative:      GFR Normal >60  Chronic Kidney Disease <60  Kidney Failure <15    The GFR formula is only valid for adults  with stable renal function between ages 18 and 70.    CBC & Differential [[id number]]  (Abnormal) Collected: 05/15/23 0348    Specimen: Blood Updated: 05/15/23 0357    Narrative:      The following orders were created for panel order CBC & Differential.  Procedure                               Abnormality         Status                     ---------                               -----------         ------                     CBC Auto Differential[[id number]]        Abnormal            Final result                 Please view results for these tests on the individual orders.    CBC Auto Differential [[id number]]  (Abnormal) Collected: 05/15/23 0348    Specimen: Blood Updated: 05/15/23 0357     WBC 5.62 10*3/mm3      RBC 3.32 10*6/mm3      Hemoglobin 9.2 g/dL      Hematocrit 29.2 %      MCV 88.0 fL      MCH 27.7 pg      MCHC 31.5 g/dL      RDW 16.5 %      RDW-SD 53.0 fl      MPV 10.2 fL      Platelets 155 10*3/mm3      Neutrophil % 71.4 %      Lymphocyte % 14.4 %      Monocyte % 11.0 %      Eosinophil % 2.3 %      Basophil % 0.4 %      Immature Grans % 0.5 %      Neutrophils, Absolute 4.01 10*3/mm3      Lymphocytes, Absolute 0.81 10*3/mm3      Monocytes, Absolute 0.62 10*3/mm3      Eosinophils, Absolute 0.13 10*3/mm3      Basophils, Absolute 0.02 10*3/mm3      Immature Grans, Absolute 0.03 10*3/mm3      nRBC 0.0 /100 WBC     High Sensitivity Troponin T 2Hr [[id number]]  (Abnormal) Collected: 05/14/23 2207    Specimen: Blood Updated: 05/14/23 2251     HS Troponin T 79 ng/L      Troponin T Delta 1 ng/L     Narrative:      High Sensitive Troponin T Reference Range:  <10.0 ng/L- Negative Female for AMI  <15.0 ng/L- Negative Male for AMI  >=10 - Abnormal Female indicating possible myocardial injury.  >=15 - Abnormal Male indicating possible myocardial injury.   Clinicians would have to utilize clinical acumen, EKG, Troponin, and serial changes to determine if it is an Acute Myocardial Infarction or myocardial injury due to an  underlying chronic condition.         High Sensitivity Troponin T [[id number]]  (Abnormal) Collected: 05/14/23 2004    Specimen: Blood Updated: 05/14/23 2035     HS Troponin T 78 ng/L     Narrative:      High Sensitive Troponin T Reference Range:  <10.0 ng/L- Negative Female for AMI  <15.0 ng/L- Negative Male for AMI  >=10 - Abnormal Female indicating possible myocardial injury.  >=15 - Abnormal Male indicating possible myocardial injury.   Clinicians would have to utilize clinical acumen, EKG, Troponin, and serial changes to determine if it is an Acute Myocardial Infarction or myocardial injury due to an underlying chronic condition.         Magnesium [[id number]]  (Normal) Collected: 05/14/23 2004    Specimen: Blood Updated: 05/14/23 2033     Magnesium 2.2 mg/dL     Basic Metabolic Panel [[id number]]  (Abnormal) Collected: 05/14/23 2004    Specimen: Blood Updated: 05/14/23 2033     Glucose 105 mg/dL      BUN 34 mg/dL      Creatinine 2.03 mg/dL      Sodium 140 mmol/L      Potassium 4.3 mmol/L      Comment: Slight hemolysis detected by analyzer. Results may be affected.        Chloride 110 mmol/L      CO2 20.0 mmol/L      Calcium 9.4 mg/dL      BUN/Creatinine Ratio 16.7     Anion Gap 10.0 mmol/L      eGFR 33.1 mL/min/1.73     Narrative:      GFR Normal >60  Chronic Kidney Disease <60  Kidney Failure <15    The GFR formula is only valid for adults with stable renal function between ages 18 and 70.    Phosphorus [[id number]]  (Normal) Collected: 05/14/23 2004    Specimen: Blood Updated: 05/14/23 2033     Phosphorus 2.9 mg/dL     aPTT [[id number]]  (Abnormal) Collected: 05/14/23 2004    Specimen: Blood Updated: 05/14/23 2031     PTT 21.3 seconds     Narrative:      PTT = The equivalent PTT values for the therapeutic range of heparin levels at 0.3 to 0.5 U/ml are 60 to 70 seconds.    Protime-INR [[id number]]  (Normal) Collected: 05/14/23 2004    Specimen: Blood Updated: 05/14/23 2031     Protime 14.0 Seconds      INR 1.07     CBC (No Diff) [[id number]]  (Abnormal) Collected: 05/14/23 2004    Specimen: Blood Updated: 05/14/23 2013     WBC 10.10 10*3/mm3      RBC 3.59 10*6/mm3      Hemoglobin 10.0 g/dL      Hematocrit 30.7 %      MCV 85.5 fL      MCH 27.9 pg      MCHC 32.6 g/dL      RDW 16.6 %      RDW-SD 51.4 fl      MPV 10.1 fL      Platelets 178 10*3/mm3           Imaging Results (Last 48 Hours)       Procedure Component Value Units Date/Time    XR Chest PA & Lateral [[id number]] Collected: 05/16/23 0439     Updated: 05/16/23 0642    Narrative:      FRONTAL VIEW OF THE CHEST    CLINICAL INDICATION: Pacemaker placement.    COMPARISON: 5/14/2023.    FINDINGS: And implantable pacing device is present in the left chest wall with leads over the heart. No focal consolidation, pleural effusion or pneumothorax. Cardiomediastinal morphology is stable.       Impression:      Pacemaker placement without acute complication.    Electronically signed by:  Albert Maldonado M.D.    5/16/2023 4:41 AM Mountain Time    XR Chest 1 View [[id number]] Collected: 05/14/23 2018     Updated: 05/14/23 2024    Narrative:      XR CHEST 1 VW    Date of Exam: 5/14/2023 7:39 PM EDT    Indication: new admission    Comparison: 5/11/2023    Findings:  Cardiac size is similar to prior exam. No focal opacity, pleural effusion or pneumothorax. No evidence of acute osseous abnormalities. Gas-distended loops of bowel seen in the upper abdomen. Defibrillator pad.      Impression:      Impression:  No radiographic evidence of acute cardiopulmonary process.      Electronically Signed: Drew Foley    5/14/2023 8:20 PM EDT    Workstation ID: [id number]             Physician Progress Notes (last 48 hours)        Arben Lopez MD at 05/16/23 0634          Nicholas Yin  [id number]  1945     LOS: 2 days   Patient Care Team:  Waldemar Trejo MD as PCP - General  Waldemar Trejo MD as PCP - Family Medicine  Carlie Gill RN as Ambulatory   [facility]    Chief Complaint: Complete heart block      Subjective: Alert, wants to go home    Objective:     Vital Sign Min/Max for last 24 hours  Temp  Min: 97.5 °F (36.4 °C)  Max: 98.4 °F (36.9 °C)   BP  Min: 116/75  Max: 171/79   Pulse  Min: 61  Max: 87   Resp  Min: 16  Max: 20   SpO2  Min: 89 %  Max: 100 %   No data recorded   Weight  Min: 102 kg (224 lb 13.9 oz)  Max: 102 kg (224 lb 13.9 oz)     Flowsheet Rows      Flowsheet Row First Filed Value   Admission Height 174.6 cm (68.75\") Documented at 05/15/2023 1730   Admission Weight 102 kg (224 lb 13.9 oz) Documented at 05/15/2023 1730            Physical Exam:    Wound: Satisfactory    Pulses:     Mediastinal and Chest Tube Drainage:       Results Review:   Results from last 7 days   Lab Units 05/16/23  0500   WBC 10*3/mm3 5.07   HEMOGLOBIN g/dL 9.5*   HEMATOCRIT % 29.9*   PLATELETS 10*3/mm3 170     Results from last 7 days   Lab Units 05/16/23  0500   SODIUM mmol/L 140   POTASSIUM mmol/L 4.6   CHLORIDE mmol/L 108*   CO2 mmol/L 22.0   BUN mg/dL 36*   CREATININE mg/dL 1.90*   GLUCOSE mg/dL 216*   CALCIUM mg/dL 8.8             Assessment      Ventricular fibrillation    GERD    Mixed hyperlipidemia    Essential hypertension    T2DM     History of hepatitis C    CKD (baseline Cr 2-2.3)      PAD (peripheral artery disease) (HCC)    CAD s/p stent     Moderate-severe aortic stenosis; s/p TAVR (Dr. Lopez/Dr. Mendoza)    Class 1 obesity in adult    Atrial fibrillation w/ RVR    Ventricular arrhythmia      Discharge patient if okay with Dr. Reji Lopez MD  05/16/23  06:34 EDT      Please note that portions of this note were completed with a voice recognition program. Efforts were made to edit the dictations, but words may be mistranscribed    Electronically signed by Arben Lopez MD at 05/16/23 0635       Rachael Mendoza MD at 05/16/23 0624          [facility] Cardiology Daily Note       LOS: 2 " "days   Patient Care Team:  Waldemar Trejo MD as PCP - General  Waldemar Trejo MD as PCP - Family Medicine  Carlie Gill RN as Ambulatory  ([facility])    Chief Complaint: Complete heart block    Subjective     Subjective: No complaints this morning.  Slept fairly well last night.  Pacemaker site is a little bit sore.  95% on 2 L nasal cannula.  Blood pressures and heart rates have been good overnight.    Review of Systems:   As above.    Medications:  amLODIPine, 10 mg, Oral, Q24H  aspirin, 81 mg, Oral, Daily  carvedilol, 3.125 mg, Oral, BID With Meals  docusate sodium, 100 mg, Oral, BID  heparin (porcine), 5,000 Units, Subcutaneous, Q8H  insulin detemir, 8 Units, Subcutaneous, Q12H  insulin lispro, 0-7 Units, Subcutaneous, 4x Daily With Meals & Nightly  Insulin Lispro, 5 Units, Subcutaneous, TID With Meals  losartan, 25 mg, Oral, Q24H  pantoprazole, 40 mg, Oral, Q AM  rosuvastatin, 20 mg, Oral, Nightly  sodium chloride, 10 mL, Intravenous, Q12H  ticagrelor, 60 mg, Oral, BID        Objective     Vital Sign Min/Max for last 24 hours  Temp  Min: 97.5 °F (36.4 °C)  Max: 98.4 °F (36.9 °C)   BP  Min: 116/75  Max: 171/79   Pulse  Min: 61  Max: 87   Resp  Min: 16  Max: 20   SpO2  Min: 89 %  Max: 100 %   Flow (L/min)  Min: 2  Max: 4   Weight  Min: 102 kg (224 lb 13.9 oz)  Max: 102 kg (224 lb 13.9 oz)      Intake/Output Summary (Last 24 hours) at 5/16/2023 0624  Last data filed at 5/15/2023 2200  Gross per 24 hour   Intake 540 ml   Output 1500 ml   Net -960 ml        Flowsheet Rows      Flowsheet Row First Filed Value   Admission Height 174.6 cm (68.75\") Documented at 05/15/2023 1730   Admission Weight 102 kg (224 lb 13.9 oz) Documented at 05/15/2023 1730            Physical Exam:    General: Alert and oriented.   Cardiovascular: Heart has a nondisplaced focal PMI. Regular rate and rhythm without murmur, gallop or rub.  Pacemaker site is clean and dry.  Lungs: Clear without rales or " wheezes. Equal expansion is noted.   Abdomen: Soft, nontender.  Extremities: Show no edema.   Skin: warm and dry.     Results Review:    I reviewed the patient's new clinical results.  EKG:  Tele: Sinus rhythm    Labs:    Results from last 7 days   Lab Units 05/16/23  0500 05/15/23  0348 05/14/23 2004 05/14/23  1603 05/11/23  0926   SODIUM mmol/L 140 143 140 143 141   POTASSIUM mmol/L 4.6 4.1 4.3 4.2 4.9   CHLORIDE mmol/L 108* 111* 110* 112* 111*   CO2 mmol/L 22.0 23.0 20.0* 20.0* 21.0*   BUN mg/dL 36* 29* 34* 33* 54*   CREATININE mg/dL 1.90* 1.98* 2.03* 2.20* 2.86*   CALCIUM mg/dL 8.8 8.6 9.4 8.7 8.4*   BILIRUBIN mg/dL  --   --   --  0.6 0.4   ALK PHOS U/L  --   --   --  68 53   ALT (SGPT) U/L  --   --   --  10 6   AST (SGOT) U/L  --   --   --  22 20   GLUCOSE mg/dL 216* 97 105* 109* 201*     Results from last 7 days   Lab Units 05/16/23  0500 05/15/23  0348 05/14/23 2004   WBC 10*3/mm3 5.07 5.62 10.10   HEMOGLOBIN g/dL 9.5* 9.2* 10.0*   HEMATOCRIT % 29.9* 29.2* 30.7*   PLATELETS 10*3/mm3 170 155 178     Lab Results   Component Value Date    TROPONINT 79 (C) 05/14/2023    TROPONINT 78 (C) 05/14/2023    TROPONINT 88 (C) 05/14/2023     Lab Results   Component Value Date    CHOL 118 03/10/2023    CHOL 147 10/27/2022    CHOL 97 07/25/2022     Lab Results   Component Value Date    TRIG 151 (H) 03/10/2023    TRIG 134 10/27/2022    TRIG 135 07/25/2022     Lab Results   Component Value Date    HDL 50 03/10/2023    HDL 49 10/27/2022    HDL 46 07/25/2022     No components found for: LDLCALC  Lab Results   Component Value Date    INR 1.07 05/14/2023    INR 0.98 05/05/2023    INR 0.92 08/14/2019    PROTIME 14.0 05/14/2023    PROTIME 13.1 05/05/2023    PROTIME 12.8 08/14/2019         Ejection Fraction: 55%  CXR: Leads in good position.  No evidence of pneumothorax.    Assessment   Assessment:    Complete heart block  Baseline right bundle branch block prior to TAVR  Complete heart block 1 week following TAVR.  Severe aortic  stenosis status post 26 mm TONY 3 pericardial prosthesis on 5/8/2020.  CAD, s/p LAD stent 2019  Hypertension   Hyperlipidemia  Chronic kidney disease      Plan:    Home today with no change in medications if okay with Dr. Lopez.  Follow-up with me in 8 to 10 days for wound check.    Rachael Mendoza MD  05/16/23  06:24 EDT            Electronically signed by Rachael Mendoza MD at 05/16/23 0648       Yunior Stone MD at 05/15/23 2306          Pulmonary/Critical Care Follow-up     LOS: 1 day   Patient Care Team:  Waldemar Trejo MD as PCP - General  Waldemar Trejo MD as PCP - Family Medicine  Carlie Gill RN as Ambulatory  ([facility])    Chief Complaint/reason: Palpitations and lightheadedness/recent TAVR with V-fib arrest      Subjective      History reviewed and updated in EMR as indicated.    Interval History:     Patient has had multiple symptomatic episodes of complete heart block this morning.  He is intermittently being paced externally.  He is going for permanent pacemaker this morning.  He has anxiety.  No fevers or chills.  No current chest pain.    History taken from: Patient    PMH/FH/Social History were reviewed and updated appropriately in the electronic medical record.     Review of Systems:    Review of 14 systems was completed with positives and pertinent negatives noted in the subjective section.  All other systems reviewed and are negative.         Objective     Vital Signs  Temp:  [97.7 °F (36.5 °C)-98.4 °F (36.9 °C)] 97.7 °F (36.5 °C)  Heart Rate:  [68-87] 80  Resp:  [16-20] 18  BP: (116-171)/(54-91) 145/65  05/14 0701 - 05/15 0700  In: -   Out: 1350 [Urine:1350]  Body mass index is 33.45 kg/m².     IV drips:  amiodarone, Last Rate: 0.5 mg/min (05/14/23 2150)       Physical Exam:     Constitutional:   Alert, in no acute distress   Head:   Normocephalic, atraumatic   Eyes:           Lids and lashes normal, conjunctivae and sclerae  normal.  PER   ENMT:  Ears appear intact with no abnormalities noted     Lips normal.     Neck:  Trachea midline, no JVD   Lungs/Resp:    Normal effort, symmetric chest rise, no crepitus, clear to      auscultation bilaterally.               Heart/CV:    Irregularly irregular and normal rate, no murmur   Abdomen/GI:    Soft, nontender, nondistended   :    Deferred   Extremities/MSK:  No clubbing or cyanosis.  No edema.     Pulses:  Pulses palpable and equal bilaterally   Skin:  No bleeding, bruising or rash   Heme/Lymph:  No cervical or supraclavicular adenopathy.   Neurologic:    Psychiatric:    Moves all extremities with no obvious focal motor deficit.  Cranial nerves 2 - 12 grossly intact  Non-agitated, normal affect.    The above physical exam findings were reviewed and reflect my exam findings as of today's exam.   Electronically signed by:  Yunior Stone MD  05/15/23  23:03 EDT      Results Review:     I reviewed the patient's new clinical results.   Results from last 7 days   Lab Units 05/15/23  0348 05/14/23  2004 05/14/23  1603 05/11/23  0926   SODIUM mmol/L 143 140 143 141   POTASSIUM mmol/L 4.1 4.3 4.2 4.9   CHLORIDE mmol/L 111* 110* 112* 111*   CO2 mmol/L 23.0 20.0* 20.0* 21.0*   BUN mg/dL 29* 34* 33* 54*   CREATININE mg/dL 1.98* 2.03* 2.20* 2.86*   CALCIUM mg/dL 8.6 9.4 8.7 8.4*   BILIRUBIN mg/dL  --   --  0.6 0.4   ALK PHOS U/L  --   --  68 53   ALT (SGPT) U/L  --   --  10 6   AST (SGOT) U/L  --   --  22 20   GLUCOSE mg/dL 97 105* 109* 201*     Results from last 7 days   Lab Units 05/15/23  0348 05/14/23  2004 05/14/23  1603   WBC 10*3/mm3 5.62 10.10 7.30   HEMOGLOBIN g/dL 9.2* 10.0* 9.9*   HEMATOCRIT % 29.2* 30.7* 31.1*   PLATELETS 10*3/mm3 155 178 177         Results from last 7 days   Lab Units 05/15/23  0348 05/14/23 2004 05/14/23  1603   MAGNESIUM mg/dL 2.3 2.2 2.2   PHOSPHORUS mg/dL 3.7 2.9  --        Hemoglobin A1c 5/5/2023: 7.5%    I reviewed the patient's new imaging including images  and reports.    Chest x-ray 5/14/2023 shows dilated loops of bowel with mildly elevated left hemidiaphragm but otherwise no acute radiographic chest abnormality.    Medication Review:   amLODIPine, 10 mg, Oral, Q24H  aspirin, 81 mg, Oral, Daily  atropine sulfate, , ,   carvedilol, 3.125 mg, Oral, BID With Meals  ceFAZolin, 2 g, Intravenous, Q8H  docusate sodium, 100 mg, Oral, BID  insulin lispro, 0-7 Units, Subcutaneous, 4x Daily With Meals & Nightly  losartan, 25 mg, Oral, Q24H  pantoprazole, 40 mg, Oral, Q AM  rosuvastatin, 20 mg, Oral, Nightly  sodium chloride, 10 mL, Intravenous, Q12H  ticagrelor, 60 mg, Oral, BID      amiodarone, 0.5 mg/min, Last Rate: 0.5 mg/min (05/14/23 2150)        Assessment & Plan         Ventricular fibrillation    GERD    Mixed hyperlipidemia    Essential hypertension    T2DM     History of hepatitis C    CKD (baseline Cr 2-2.3)      PAD (peripheral artery disease) (HCC)    CAD s/p stent     Moderate-severe aortic stenosis; s/p TAVR (Dr. Lopez/Dr. Mendoza)    Class 1 obesity in adult    Atrial fibrillation w/ RVR    Ventricular arrhythmia    78 y.o. male with history of HTN, HL, CAD, remote testicular and colon cancer, T2DM, aortic stenosis status post TAVR on 5/8/2023, presented to [facility] ED with A-fib with RVR on 5/14/2023.  Subsequently was given Cardizem and developed fine ventricular fibrillation.  ACLS/CPR performed for approximately 10 minutes prior to ROSC.  He was started on amiodarone and reportedly had several instances of recurrent V-fib.    Patient is having ongoing pauses this morning requiring intermittent pacing.  He is going for permanent pacemaker placement.    Plan:  1.  For intermittent complete heart block: Permanent pacemaker today per Dr. Mendoza.  2.  For HTN: Hold beta-blocker for now given complete heart block.  3.  For CKD: Baseline.  Monitor closely and avoid nephrotoxins/renally dose medications if indicated.  4.  For atrial fibrillation/flutter:  Per cardiology.  5.  For GERD: Protonix.  6.  For PAD/CAD: Aspirin, Brilinta, statin, beta-blocker currently held.  7.  DVT prophylaxis: Subcutaneous heparin.  8.  For diabetes: Start low-dose basal and prandial insulin with correction.    Patient is critically ill secondary to symptomatic complete heart block and has high risk of life-threatening decompensation in condition.   As such, the patient requires continuous monitoring and frequent reassessment for consideration of adjustment in management to minimize this risk.  I personally reassessed the patient multiple times today.    Critical care time : 36 minutes spent by me personally and independently.(This excludes time spent performing separately reportable procedures and services).  Critical care time includes high complexity decision making to assess, manipulate, and support vital organ system failure in this individual who has impairment of one or more vital organ systems such that there is a high probability of imminent or life threatening deterioration in the patient’s condition.    Electronically signed by:    Yunior Stone MD  05/15/23  23:03 EDT      *. Please note that portions of this note were completed with [employer] - a voice recognition program.     Electronically signed by Yunior Stone MD at 05/15/23 2327       Arben Lopez MD at 05/15/23 0626          Nicholas Yin  [id number]  1945     LOS: 1 day   Patient Care Team:  Waldemar Trejo MD as PCP - General  Waldemar Trejo MD as PCP - Family Medicine    Chief Complaint: Atrial fibrillation, ventricular fibrillation      Subjective: Alert, no complaints    Objective:     Vital Sign Min/Max for last 24 hours  Temp  Min: 98.5 °F (36.9 °C)  Max: 98.5 °F (36.9 °C)   BP  Min: 103/72  Max: 174/82   Pulse  Min: 57  Max: 160   Resp  Min: 16  Max: 20   SpO2  Min: 92 %  Max: 100 %   No data recorded   Weight  Min: 102 kg (225 lb)  Max: 106 kg (233 lb)          Physical Exam: Normal sinus rhythm, questionable second or third-degree heart block    Wound:    Pulses:     Mediastinal and Chest Tube Drainage:       Results Review:   Results from last 7 days   Lab Units 05/15/23  0348   WBC 10*3/mm3 5.62   HEMOGLOBIN g/dL 9.2*   HEMATOCRIT % 29.2*   PLATELETS 10*3/mm3 155     Results from last 7 days   Lab Units 05/15/23  0348   SODIUM mmol/L 143   POTASSIUM mmol/L 4.1   CHLORIDE mmol/L 111*   CO2 mmol/L 23.0   BUN mg/dL 29*   CREATININE mg/dL 1.98*   GLUCOSE mg/dL 97   CALCIUM mg/dL 8.6     Results from last 7 days   Lab Units 05/08/23  0707   PH, ARTERIAL pH units 7.41         Assessment      Ventricular fibrillation    GERD    Mixed hyperlipidemia    Essential hypertension    T2DM     History of hepatitis C    CKD (baseline Cr 2-2.3)      PAD (peripheral artery disease) (HCC)    CAD s/p stent     Moderate-severe aortic stenosis; s/p TAVR (Dr. Lopez/Dr. Mendoza)    Class 1 obesity in adult    Atrial fibrillation w/ RVR      Patient stable as either second or third-degree heart block it appears.  We will await Dr. Mendoza's assessment but I suspect he will need a pacemaker and possible defibrillator.  Discussed fully with patient        Arben Lopez MD  05/15/23  06:26 EDT      Please note that portions of this note were completed with a voice recognition program. Efforts were made to edit the dictations, but words may be mistranscribed    Electronically signed by Arben Lopez MD at 05/15/23 0675       Saul Kirk APRN at 05/14/23 1906             ICU ADMISSION NOTE    Chief complaint :  S/p AF/ VF arrest    Subjective     Patient is a 78 y.o. male presented to [facility] ED on 5/14 w/ c/o palpitations, dizziness, & lightheadedness that started @ 1300 while he was sitting on the couch.  He had a TAVR w/ Dr. Lopez & Dr. Mendoza on 5/8.      Upon presentation to [facility] ED he was found to be in AF w/ RVR (rate 160) @ 1540 w/ development of  "fine VF after Cardizem .  ACLS/CPR performed for ~ 10 minutes to achieve ROSC.  Amiodarone bolused and gtt started.  He has reportedly had recurrence of VF \"multiple times\" since.      In speaking w/ the patient he states that he has been in his regular level of health since discharge w/o issues.  He has had an intermittent cough w/ clear/white sputum production but no SOA.  He has had a \"futter\" in his chest in the past but never consistent like that which he experienced today.  His LLE is mildly edematous and has been so since his surgery, though it is less swollen since previously.  He denies F/C, CP, SOA, N/V/D, or abdominal pain.      Review of Systems: see HPI and H&P      Home Medications  Medications Prior to Admission   Medication Sig Dispense Refill Last Dose    amLODIPine (NORVASC) 10 MG tablet Take 1 tablet by mouth Daily for blood pressure. 90 tablet 3     aspirin 81 MG EC tablet Take 1 tablet by mouth Daily.       carvedilol (COREG) 3.125 MG tablet Take 1 tablet by mouth 2 (Two) Times a Day. 180 tablet 3     coenzyme Q10 100 MG capsule Take 2 capsules by mouth Daily.       dapagliflozin Propanediol (Farxiga) 10 MG tablet Take 10 mg by mouth Daily. 49 tablet 0     docusate sodium (Colace) 100 MG capsule Take 1 capsule by mouth 2 (Two) Times a Day. 72 capsule 0     HYDROcodone-acetaminophen (NORCO) 5-325 MG per tablet Take 1 tablet by mouth Every 8 (Eight) Hours As Needed for Moderate Pain. 15 tablet 0     losartan (COZAAR) 50 MG tablet Take 1/2 tablet by mouth 2 (Two) Times a Day. 60 tablet 11     MAGNESIUM CITRATE PO Take 250 mg by mouth Daily.       multivitamin with minerals tablet tablet Take 1 tablet by mouth Daily.       omeprazole (priLOSEC) 40 MG capsule Take 1 capsule by mouth Daily.       rosuvastatin (CRESTOR) 20 MG tablet Take 1 tablet by mouth Daily. 90 tablet 3     ticagrelor (BRILINTA) 60 MG tablet tablet Take 1 tablet by mouth 2 (Two) Times a Day.  **Do not resume until 5/16/23** 180 " tablet 3     vitamin D (ERGOCALCIFEROL) 1.25 MG (11038 UT) capsule capsule Take 1 capsule by mouth Every 7 (Seven) Days. 12 capsule 1     Xultophy 100-3.6 UNIT-MG/ML solution pen-injector subcutaneous pen Inject 50 Units under the skin into the appropriate area as directed Daily. (Patient taking differently: Inject 32 Units under the skin into the appropriate area as directed Daily.) 15 mL 5        History  Past Medical History:   Diagnosis Date    Anemia     Aortic stenosis     Arthritis     Back pain     CancerTesticular/Colon     test -1982  Colon -2005    CHF (congestive heart failure)     Coronary artery disease     COVID-19 2022    DDD (degenerative disc disease), lumbosacral     Diabetes mellitus     Dyspnea     Elevated cholesterol     Erectile dysfunction     GERD (gastroesophageal reflux disease)     History of transfusion     Hyperlipidemia     Hypertension     TIA (transient ischemic attack)      Past Surgical History:   Procedure Laterality Date    AORTIC VALVE REPAIR/REPLACEMENT N/A 5/8/2023    Procedure: TRANSCATHETER AORTIC VALVE REPLACEMENT + LAURIE, LEFT FEMORAL ENDARTERECTOMY WITH PATCH;  Surgeon: Arben Lopez MD;  Location: [facility];  Service: Cardiothoracic;  Laterality: N/A;    AORTIC VALVE REPAIR/REPLACEMENT N/A 5/8/2023    Procedure: Transfemoral Transcatheter Aortic Valve Replacement;  Surgeon: Rachael Mendoza MD;  Location: [facility];  Service: Cardiovascular;  Laterality: N/A;    CARDIAC CATHETERIZATION N/A 08/15/2019    Procedure: Left Heart Cath;  Surgeon: Paul Villatoro MD;  [facility]:  [facility];  Service: Cardiology    CARDIAC CATHETERIZATION      04/10/2023 PER DR. MENDOZA    CATARACT EXTRACTION, BILATERAL      COLON SURGERY      colon resection for cancer - sigmoid removed - no chemo no radiation    COLONOSCOPY      COLONOSCOPY N/A 01/27/2021    Procedure: COLONOSCOPY;  Surgeon: Greg Barraza MD;  Location:   [facility];  Service: General;  Laterality: N/A;    CORONARY STENT PLACEMENT      1 STENT    ENDOSCOPY  1989    EYE SURGERY Right     retinal detachment    OR RT/LT HEART CATHETERS N/A 08/15/2019    Procedure: Percutaneous Coronary Intervention;  Surgeon: Paul Villatoro MD;  [facility]:  [facility];  Service: Cardiology    SCALP LESION REMOVAL W/ FLAP AND SKIN GRAFT  12/16/2022    basal cell removal    SHOULDER ARTHROSCOPY Right     SKIN LESION EXCISION N/A 01/27/2021    Procedure: SKIN LESIONS EXCISION FROM LEFT TEMPLE AREA AND ABDOMEN.;  Surgeon: Greg Barraza MD;  Location:  [facility];  Service: General;  Laterality: N/A;    VASECTOMY Right     testicle removed d/t cancer - with radiation     Family History   Problem Relation Age of Onset    Stroke Mother     Hypertension Mother     Alcohol abuse Mother     COPD Mother     COPD Father     Alcohol abuse Father     Hypertension Father     Alzheimer's disease Father     Heart disease Brother     Hypertension Brother     Diabetes Brother     Stroke Maternal Grandfather     Cancer Paternal Grandmother      Social History     Tobacco Use    Smoking status: Never     Passive exposure: Never    Smokeless tobacco: Never   Vaping Use    Vaping Use: Never used   Substance Use Topics    Alcohol use: Yes     Comment: occ    Drug use: No     Medications Prior to Admission   Medication Sig Dispense Refill Last Dose    amLODIPine (NORVASC) 10 MG tablet Take 1 tablet by mouth Daily for blood pressure. 90 tablet 3     aspirin 81 MG EC tablet Take 1 tablet by mouth Daily.       carvedilol (COREG) 3.125 MG tablet Take 1 tablet by mouth 2 (Two) Times a Day. 180 tablet 3     coenzyme Q10 100 MG capsule Take 2 capsules by mouth Daily.       dapagliflozin Propanediol (Farxiga) 10 MG tablet Take 10 mg by mouth Daily. 49 tablet 0     docusate sodium (Colace) 100 MG capsule Take 1 capsule by mouth 2 (Two) Times a Day. 72 capsule 0      "HYDROcodone-acetaminophen (NORCO) 5-325 MG per tablet Take 1 tablet by mouth Every 8 (Eight) Hours As Needed for Moderate Pain. 15 tablet 0     losartan (COZAAR) 50 MG tablet Take 1/2 tablet by mouth 2 (Two) Times a Day. 60 tablet 11     MAGNESIUM CITRATE PO Take 250 mg by mouth Daily.       multivitamin with minerals tablet tablet Take 1 tablet by mouth Daily.       omeprazole (priLOSEC) 40 MG capsule Take 1 capsule by mouth Daily.       rosuvastatin (CRESTOR) 20 MG tablet Take 1 tablet by mouth Daily. 90 tablet 3     ticagrelor (BRILINTA) 60 MG tablet tablet Take 1 tablet by mouth 2 (Two) Times a Day.  **Do not resume until 5/16/23** 180 tablet 3     vitamin D (ERGOCALCIFEROL) 1.25 MG (35517 UT) capsule capsule Take 1 capsule by mouth Every 7 (Seven) Days. 12 capsule 1     Xultophy 100-3.6 UNIT-MG/ML solution pen-injector subcutaneous pen Inject 50 Units under the skin into the appropriate area as directed Daily. (Patient taking differently: Inject 32 Units under the skin into the appropriate area as directed Daily.) 15 mL 5      Allergies:  Ct contrast and Iodinated contrast media      Objective     Vital Signs  Blood pressure 129/58, pulse 84, temperature 98.3 °F (36.8 °C), temperature source Axillary, resp. rate 20, height 174.6 cm (68.75\"), weight 102 kg (224 lb 13.9 oz), SpO2 97 %.    Physical Exam:  Physical Exam  Vitals and nursing note reviewed.   Constitutional:       General: He is not in acute distress.     Appearance: He is not toxic-appearing.   HENT:      Head: Normocephalic and atraumatic.      Mouth/Throat:      Mouth: Mucous membranes are moist.   Eyes:      Extraocular Movements: Extraocular movements intact.      Conjunctiva/sclera: Conjunctivae normal.      Pupils: Pupils are equal, round, and reactive to light.   Cardiovascular:      Rate and Rhythm: Normal rate and regular rhythm.      Pulses: Normal pulses.      Heart sounds: Normal heart sounds.   Pulmonary:      Effort: Pulmonary effort is " normal.      Breath sounds: Normal breath sounds.   Abdominal:      General: Bowel sounds are normal.      Palpations: Abdomen is soft.   Musculoskeletal:         General: Normal range of motion.      Cervical back: Normal range of motion and neck supple.   Skin:     General: Skin is warm and dry.      Capillary Refill: Capillary refill takes less than 2 seconds.   Neurological:      General: No focal deficit present.      Mental Status: He is alert and oriented to person, place, and time. Mental status is at baseline.   Psychiatric:         Mood and Affect: Mood normal.         Behavior: Behavior normal.         Thought Content: Thought content normal.         Judgment: Judgment normal.                                                                             Results Review:   Lab Results (last 24 hours)       Procedure Component Value Units Date/Time    POC Glucose Once [[id number]]  (Normal) Collected: 05/14/23 1937    Specimen: Blood Updated: 05/15/23 0620     Glucose 112 mg/dL      Comment: Meter: [id number] : [id number] Aminata Vernon       Phosphorus [[id number]]  (Normal) Collected: 05/15/23 0348    Specimen: Blood Updated: 05/15/23 0420     Phosphorus 3.7 mg/dL     Magnesium [[id number]]  (Normal) Collected: 05/15/23 0348    Specimen: Blood Updated: 05/15/23 0414     Magnesium 2.3 mg/dL     Basic Metabolic Panel [[id number]]  (Abnormal) Collected: 05/15/23 0348    Specimen: Blood Updated: 05/15/23 0414     Glucose 97 mg/dL      BUN 29 mg/dL      Creatinine 1.98 mg/dL      Sodium 143 mmol/L      Potassium 4.1 mmol/L      Comment: Slight hemolysis detected by analyzer. Results may be affected.        Chloride 111 mmol/L      CO2 23.0 mmol/L      Calcium 8.6 mg/dL      BUN/Creatinine Ratio 14.6     Anion Gap 9.0 mmol/L      eGFR 34.2 mL/min/1.73     Narrative:      GFR Normal >60  Chronic Kidney Disease <60  Kidney Failure <15    The GFR formula is only valid for adults with stable renal function between  ages 18 and 70.    CBC & Differential [[id number]]  (Abnormal) Collected: 05/15/23 0348    Specimen: Blood Updated: 05/15/23 0357    Narrative:      The following orders were created for panel order CBC & Differential.  Procedure                               Abnormality         Status                     ---------                               -----------         ------                     CBC Auto Differential[[id number]]        Abnormal            Final result                 Please view results for these tests on the individual orders.    CBC Auto Differential [[id number]]  (Abnormal) Collected: 05/15/23 0348    Specimen: Blood Updated: 05/15/23 0357     WBC 5.62 10*3/mm3      RBC 3.32 10*6/mm3      Hemoglobin 9.2 g/dL      Hematocrit 29.2 %      MCV 88.0 fL      MCH 27.7 pg      MCHC 31.5 g/dL      RDW 16.5 %      RDW-SD 53.0 fl      MPV 10.2 fL      Platelets 155 10*3/mm3      Neutrophil % 71.4 %      Lymphocyte % 14.4 %      Monocyte % 11.0 %      Eosinophil % 2.3 %      Basophil % 0.4 %      Immature Grans % 0.5 %      Neutrophils, Absolute 4.01 10*3/mm3      Lymphocytes, Absolute 0.81 10*3/mm3      Monocytes, Absolute 0.62 10*3/mm3      Eosinophils, Absolute 0.13 10*3/mm3      Basophils, Absolute 0.02 10*3/mm3      Immature Grans, Absolute 0.03 10*3/mm3      nRBC 0.0 /100 WBC     High Sensitivity Troponin T 2Hr [[id number]]  (Abnormal) Collected: 05/14/23 2207    Specimen: Blood Updated: 05/14/23 2251     HS Troponin T 79 ng/L      Troponin T Delta 1 ng/L     Narrative:      High Sensitive Troponin T Reference Range:  <10.0 ng/L- Negative Female for AMI  <15.0 ng/L- Negative Male for AMI  >=10 - Abnormal Female indicating possible myocardial injury.  >=15 - Abnormal Male indicating possible myocardial injury.   Clinicians would have to utilize clinical acumen, EKG, Troponin, and serial changes to determine if it is an Acute Myocardial Infarction or myocardial injury due to an underlying chronic condition.          High Sensitivity Troponin T [[id number]]  (Abnormal) Collected: 05/14/23 2004    Specimen: Blood Updated: 05/14/23 2035     HS Troponin T 78 ng/L     Narrative:      High Sensitive Troponin T Reference Range:  <10.0 ng/L- Negative Female for AMI  <15.0 ng/L- Negative Male for AMI  >=10 - Abnormal Female indicating possible myocardial injury.  >=15 - Abnormal Male indicating possible myocardial injury.   Clinicians would have to utilize clinical acumen, EKG, Troponin, and serial changes to determine if it is an Acute Myocardial Infarction or myocardial injury due to an underlying chronic condition.         Magnesium [[id number]]  (Normal) Collected: 05/14/23 2004    Specimen: Blood Updated: 05/14/23 2033     Magnesium 2.2 mg/dL     Basic Metabolic Panel [[id number]]  (Abnormal) Collected: 05/14/23 2004    Specimen: Blood Updated: 05/14/23 2033     Glucose 105 mg/dL      BUN 34 mg/dL      Creatinine 2.03 mg/dL      Sodium 140 mmol/L      Potassium 4.3 mmol/L      Comment: Slight hemolysis detected by analyzer. Results may be affected.        Chloride 110 mmol/L      CO2 20.0 mmol/L      Calcium 9.4 mg/dL      BUN/Creatinine Ratio 16.7     Anion Gap 10.0 mmol/L      eGFR 33.1 mL/min/1.73     Narrative:      GFR Normal >60  Chronic Kidney Disease <60  Kidney Failure <15    The GFR formula is only valid for adults with stable renal function between ages 18 and 70.    Phosphorus [[id number]]  (Normal) Collected: 05/14/23 2004    Specimen: Blood Updated: 05/14/23 2033     Phosphorus 2.9 mg/dL     aPTT [[id number]]  (Abnormal) Collected: 05/14/23 2004    Specimen: Blood Updated: 05/14/23 2031     PTT 21.3 seconds     Narrative:      PTT = The equivalent PTT values for the therapeutic range of heparin levels at 0.3 to 0.5 U/ml are 60 to 70 seconds.    Protime-INR [[id number]]  (Normal) Collected: 05/14/23 2004    Specimen: Blood Updated: 05/14/23 2031     Protime 14.0 Seconds      INR 1.07    CBC (No Diff)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

## 2023-05-16 NOTE — PROGRESS NOTES
"[facility] Emergency Department Cardiology Daily Note       LOS: 2 days   Patient Care Team:  Waldemar Trejo MD as PCP - General  Waldemar Trejo MD as PCP - Family Medicine  Carlie Gill RN as Ambulatory  ([facility])    Chief Complaint: Complete heart block    Subjective     Subjective: No complaints this morning.  Slept fairly well last night.  Pacemaker site is a little bit sore.  95% on 2 L nasal cannula.  Blood pressures and heart rates have been good overnight.    Review of Systems:   As above.    Medications:  amLODIPine, 10 mg, Oral, Q24H  aspirin, 81 mg, Oral, Daily  carvedilol, 3.125 mg, Oral, BID With Meals  docusate sodium, 100 mg, Oral, BID  heparin (porcine), 5,000 Units, Subcutaneous, Q8H  insulin detemir, 8 Units, Subcutaneous, Q12H  insulin lispro, 0-7 Units, Subcutaneous, 4x Daily With Meals & Nightly  Insulin Lispro, 5 Units, Subcutaneous, TID With Meals  losartan, 25 mg, Oral, Q24H  pantoprazole, 40 mg, Oral, Q AM  rosuvastatin, 20 mg, Oral, Nightly  sodium chloride, 10 mL, Intravenous, Q12H  ticagrelor, 60 mg, Oral, BID        Objective     Vital Sign Min/Max for last 24 hours  Temp  Min: 97.5 °F (36.4 °C)  Max: 98.4 °F (36.9 °C)   BP  Min: 116/75  Max: 171/79   Pulse  Min: 61  Max: 87   Resp  Min: 16  Max: 20   SpO2  Min: 89 %  Max: 100 %   Flow (L/min)  Min: 2  Max: 4   Weight  Min: 102 kg (224 lb 13.9 oz)  Max: 102 kg (224 lb 13.9 oz)      Intake/Output Summary (Last 24 hours) at 5/16/2023 0624  Last data filed at 5/15/2023 2200  Gross per 24 hour   Intake 540 ml   Output 1500 ml   Net -960 ml        Flowsheet Rows    Flowsheet Row First Filed Value   Admission Height 174.6 cm (68.75\") Documented at 05/15/2023 1730   Admission Weight 102 kg (224 lb 13.9 oz) Documented at 05/15/2023 1730          Physical Exam:    General: Alert and oriented.   Cardiovascular: Heart has a nondisplaced focal PMI. Regular rate and rhythm without murmur, gallop or rub.  " Pacemaker site is clean and dry.  Lungs: Clear without rales or wheezes. Equal expansion is noted.   Abdomen: Soft, nontender.  Extremities: Show no edema.   Skin: warm and dry.     Results Review:    I reviewed the patient's new clinical results.  EKG:  Tele: Sinus rhythm    Labs:    Results from last 7 days   Lab Units 05/16/23  0500 05/15/23  0348 05/14/23  2004 05/14/23  1603 05/11/23  0926   SODIUM mmol/L 140 143 140 143 141   POTASSIUM mmol/L 4.6 4.1 4.3 4.2 4.9   CHLORIDE mmol/L 108* 111* 110* 112* 111*   CO2 mmol/L 22.0 23.0 20.0* 20.0* 21.0*   BUN mg/dL 36* 29* 34* 33* 54*   CREATININE mg/dL 1.90* 1.98* 2.03* 2.20* 2.86*   CALCIUM mg/dL 8.8 8.6 9.4 8.7 8.4*   BILIRUBIN mg/dL  --   --   --  0.6 0.4   ALK PHOS U/L  --   --   --  68 53   ALT (SGPT) U/L  --   --   --  10 6   AST (SGOT) U/L  --   --   --  22 20   GLUCOSE mg/dL 216* 97 105* 109* 201*     Results from last 7 days   Lab Units 05/16/23  0500 05/15/23  0348 05/14/23  2004   WBC 10*3/mm3 5.07 5.62 10.10   HEMOGLOBIN g/dL 9.5* 9.2* 10.0*   HEMATOCRIT % 29.9* 29.2* 30.7*   PLATELETS 10*3/mm3 170 155 178     Lab Results   Component Value Date    TROPONINT 79 (C) 05/14/2023    TROPONINT 78 (C) 05/14/2023    TROPONINT 88 (C) 05/14/2023     Lab Results   Component Value Date    CHOL 118 03/10/2023    CHOL 147 10/27/2022    CHOL 97 07/25/2022     Lab Results   Component Value Date    TRIG 151 (H) 03/10/2023    TRIG 134 10/27/2022    TRIG 135 07/25/2022     Lab Results   Component Value Date    HDL 50 03/10/2023    HDL 49 10/27/2022    HDL 46 07/25/2022     No components found for: LDLCALC  Lab Results   Component Value Date    INR 1.07 05/14/2023    INR 0.98 05/05/2023    INR 0.92 08/14/2019    PROTIME 14.0 05/14/2023    PROTIME 13.1 05/05/2023    PROTIME 12.8 08/14/2019         Ejection Fraction: 55%  CXR: Leads in good position.  No evidence of pneumothorax.    Assessment   Assessment:    1. Complete heart block  1. Baseline right bundle branch block prior  to TAVR  2. Complete heart block 1 week following TAVR.  2. Severe aortic stenosis status post 26 mm TONY 3 pericardial prosthesis on 5/8/2020.  3. CAD, s/p LAD stent 2019  4. Hypertension   5. Hyperlipidemia  6. Chronic kidney disease      Plan:    Home today with no change in medications if okay with Dr. Lopez.  Follow-up with me in 8 to 10 days for wound check.    Rachael Mendoza MD  05/16/23  06:24 EDT

## 2023-05-16 NOTE — PROGRESS NOTES
"Nicholas Yin  [id number]  1945     LOS: 2 days   Patient Care Team:  Waldemar Trejo MD as PCP - General  NeilWaldemar townsend MD as PCP - Family Medicine  Carlie Gill, MARCELO as Ambulatory  ([facility])    Chief Complaint: Complete heart block      Subjective: Alert, wants to go home    Objective:     Vital Sign Min/Max for last 24 hours  Temp  Min: 97.5 °F (36.4 °C)  Max: 98.4 °F (36.9 °C)   BP  Min: 116/75  Max: 171/79   Pulse  Min: 61  Max: 87   Resp  Min: 16  Max: 20   SpO2  Min: 89 %  Max: 100 %   No data recorded   Weight  Min: 102 kg (224 lb 13.9 oz)  Max: 102 kg (224 lb 13.9 oz)     Flowsheet Rows    Flowsheet Row First Filed Value   Admission Height 174.6 cm (68.75\") Documented at 05/15/2023 1730   Admission Weight 102 kg (224 lb 13.9 oz) Documented at 05/15/2023 1730          Physical Exam:    Wound: Satisfactory    Pulses:     Mediastinal and Chest Tube Drainage:       Results Review:   Results from last 7 days   Lab Units 05/16/23  0500   WBC 10*3/mm3 5.07   HEMOGLOBIN g/dL 9.5*   HEMATOCRIT % 29.9*   PLATELETS 10*3/mm3 170     Results from last 7 days   Lab Units 05/16/23  0500   SODIUM mmol/L 140   POTASSIUM mmol/L 4.6   CHLORIDE mmol/L 108*   CO2 mmol/L 22.0   BUN mg/dL 36*   CREATININE mg/dL 1.90*   GLUCOSE mg/dL 216*   CALCIUM mg/dL 8.8             Assessment      Ventricular fibrillation    GERD    Mixed hyperlipidemia    Essential hypertension    T2DM     History of hepatitis C    CKD (baseline Cr 2-2.3)      PAD (peripheral artery disease) (HCC)    CAD s/p stent     Moderate-severe aortic stenosis; s/p TAVR (Dr. Lopez/Dr. Mendoza)    Class 1 obesity in adult    Atrial fibrillation w/ RVR    Ventricular arrhythmia      Discharge patient if okay with Dr. Reji Lopez MD  05/16/23  06:34 EDT      Please note that portions of this note were completed with a voice recognition program. Efforts were made to edit the dictations, " but words may be mistranscribed

## 2023-05-16 NOTE — PROGRESS NOTES
Pulmonary/Critical Care Follow-up     LOS: 1 day   Patient Care Team:  Waldemar Trejo MD as PCP - General  Waldemar Trejo MD as PCP - Family Medicine  Carlie Gill RN as Ambulatory  ([facility])    Chief Complaint/reason: Palpitations and lightheadedness/recent TAVR with V-fib arrest      Subjective      History reviewed and updated in EMR as indicated.    Interval History:     Patient has had multiple symptomatic episodes of complete heart block this morning.  He is intermittently being paced externally.  He is going for permanent pacemaker this morning.  He has anxiety.  No fevers or chills.  No current chest pain.    History taken from: Patient    PMH/FH/Social History were reviewed and updated appropriately in the electronic medical record.     Review of Systems:    Review of 14 systems was completed with positives and pertinent negatives noted in the subjective section.  All other systems reviewed and are negative.         Objective     Vital Signs  Temp:  [97.7 °F (36.5 °C)-98.4 °F (36.9 °C)] 97.7 °F (36.5 °C)  Heart Rate:  [68-87] 80  Resp:  [16-20] 18  BP: (116-171)/(54-91) 145/65  05/14 0701 - 05/15 0700  In: -   Out: 1350 [Urine:1350]  Body mass index is 33.45 kg/m².     IV drips:  amiodarone, Last Rate: 0.5 mg/min (05/14/23 2150)       Physical Exam:     Constitutional:   Alert, in no acute distress   Head:   Normocephalic, atraumatic   Eyes:           Lids and lashes normal, conjunctivae and sclerae normal.  PER   ENMT:  Ears appear intact with no abnormalities noted     Lips normal.     Neck:  Trachea midline, no JVD   Lungs/Resp:    Normal effort, symmetric chest rise, no crepitus, clear to      auscultation bilaterally.               Heart/CV:    Irregularly irregular and normal rate, no murmur   Abdomen/GI:    Soft, nontender, nondistended   :    Deferred   Extremities/MSK:  No clubbing or cyanosis.  No edema.     Pulses:  Pulses palpable and equal bilaterally    Skin:  No bleeding, bruising or rash   Heme/Lymph:  No cervical or supraclavicular adenopathy.   Neurologic:    Psychiatric:    Moves all extremities with no obvious focal motor deficit.  Cranial nerves 2 - 12 grossly intact  Non-agitated, normal affect.    The above physical exam findings were reviewed and reflect my exam findings as of today's exam.   Electronically signed by:  Yunior Stone MD  05/15/23  23:03 EDT      Results Review:     I reviewed the patient's new clinical results.   Results from last 7 days   Lab Units 05/15/23  0348 05/14/23  2004 05/14/23  1603 05/11/23  0926   SODIUM mmol/L 143 140 143 141   POTASSIUM mmol/L 4.1 4.3 4.2 4.9   CHLORIDE mmol/L 111* 110* 112* 111*   CO2 mmol/L 23.0 20.0* 20.0* 21.0*   BUN mg/dL 29* 34* 33* 54*   CREATININE mg/dL 1.98* 2.03* 2.20* 2.86*   CALCIUM mg/dL 8.6 9.4 8.7 8.4*   BILIRUBIN mg/dL  --   --  0.6 0.4   ALK PHOS U/L  --   --  68 53   ALT (SGPT) U/L  --   --  10 6   AST (SGOT) U/L  --   --  22 20   GLUCOSE mg/dL 97 105* 109* 201*     Results from last 7 days   Lab Units 05/15/23  0348 05/14/23  2004 05/14/23  1603   WBC 10*3/mm3 5.62 10.10 7.30   HEMOGLOBIN g/dL 9.2* 10.0* 9.9*   HEMATOCRIT % 29.2* 30.7* 31.1*   PLATELETS 10*3/mm3 155 178 177         Results from last 7 days   Lab Units 05/15/23  0348 05/14/23  2004 05/14/23  1603   MAGNESIUM mg/dL 2.3 2.2 2.2   PHOSPHORUS mg/dL 3.7 2.9  --        Hemoglobin A1c 5/5/2023: 7.5%    I reviewed the patient's new imaging including images and reports.    Chest x-ray 5/14/2023 shows dilated loops of bowel with mildly elevated left hemidiaphragm but otherwise no acute radiographic chest abnormality.    Medication Review:   amLODIPine, 10 mg, Oral, Q24H  aspirin, 81 mg, Oral, Daily  atropine sulfate, , ,   carvedilol, 3.125 mg, Oral, BID With Meals  ceFAZolin, 2 g, Intravenous, Q8H  docusate sodium, 100 mg, Oral, BID  insulin lispro, 0-7 Units, Subcutaneous, 4x Daily With Meals & Nightly  losartan, 25 mg,  Oral, Q24H  pantoprazole, 40 mg, Oral, Q AM  rosuvastatin, 20 mg, Oral, Nightly  sodium chloride, 10 mL, Intravenous, Q12H  ticagrelor, 60 mg, Oral, BID      amiodarone, 0.5 mg/min, Last Rate: 0.5 mg/min (05/14/23 2150)        Assessment & Plan         Ventricular fibrillation    GERD    Mixed hyperlipidemia    Essential hypertension    T2DM     History of hepatitis C    CKD (baseline Cr 2-2.3)      PAD (peripheral artery disease) (HCC)    CAD s/p stent     Moderate-severe aortic stenosis; s/p TAVR (Dr. Lopez/Dr. Mendoza)    Class 1 obesity in adult    Atrial fibrillation w/ RVR    Ventricular arrhythmia    78 y.o. male with history of HTN, HL, CAD, remote testicular and colon cancer, T2DM, aortic stenosis status post TAVR on 5/8/2023, presented to [facility] ED with A-fib with RVR on 5/14/2023.  Subsequently was given Cardizem and developed fine ventricular fibrillation.  ACLS/CPR performed for approximately 10 minutes prior to ROSC.  He was started on amiodarone and reportedly had several instances of recurrent V-fib.    Patient is having ongoing pauses this morning requiring intermittent pacing.  He is going for permanent pacemaker placement.    Plan:  1.  For intermittent complete heart block: Permanent pacemaker today per Dr. Mendoza.  2.  For HTN: Hold beta-blocker for now given complete heart block.  3.  For CKD: Baseline.  Monitor closely and avoid nephrotoxins/renally dose medications if indicated.  4.  For atrial fibrillation/flutter: Per cardiology.  5.  For GERD: Protonix.  6.  For PAD/CAD: Aspirin, Brilinta, statin, beta-blocker currently held.  7.  DVT prophylaxis: Subcutaneous heparin.  8.  For diabetes: Start low-dose basal and prandial insulin with correction.    Patient is critically ill secondary to symptomatic complete heart block and has high risk of life-threatening decompensation in condition.   As such, the patient requires continuous monitoring and frequent reassessment for  consideration of adjustment in management to minimize this risk.  I personally reassessed the patient multiple times today.    Critical care time : 36 minutes spent by me personally and independently.(This excludes time spent performing separately reportable procedures and services).  Critical care time includes high complexity decision making to assess, manipulate, and support vital organ system failure in this individual who has impairment of one or more vital organ systems such that there is a high probability of imminent or life threatening deterioration in the patient’s condition.    Electronically signed by:    Yunior Stone MD  05/15/23  23:03 EDT      *. Please note that portions of this note were completed with [employer] - a voice recognition program.

## 2023-05-16 NOTE — PLAN OF CARE
Goal Outcome Evaluation:  Plan of Care Reviewed With: patient, spouse        Progress: improving  Outcome Evaluation: Patient VSS overnight, NSR with AV block, occasional pacing noted on monitor, no complaints of pain, ambulated well, PA and lateral CXR, AM labs WNL.

## 2023-05-16 NOTE — OUTREACH NOTE
Prep Survey    Flowsheet Row Responses   [facility] patient discharged from? [place]   Is LACE score < 7 ? No   Eligibility [facility]   Date of Admission 05/14/23   Date of Discharge 05/16/23   Discharge Disposition Home or Self Care   Discharge diagnosis Ventricular fibrillation   Does the patient have one of the following disease processes/diagnoses(primary or secondary)? Other   Does the patient have Home health ordered? No   Is there a DME ordered? No   Prep survey completed? Yes          Rachel BALDERAS - Registered Nurse

## 2023-05-16 NOTE — DISCHARGE SUMMARY
Discharge Summary Transcatheter Valve Replacement    Date of Admission: 5/8/2023  Date of Discharge: 5/9/2023    PCP: Waldemar Trejo MD  ATTENDING: Arben Lopez MD    Consults:   Consulting Physician(s)     Provider Relationship Specialty    Rachael Mendoza MD Consulting Physician Cardiology          Structural Heart Team  1.  Arben Lopez MD  2.  Jim Verma MD  3.  Bartolo Presley MD  4.  Albert Caal MD  5..  Christal Domingo MD  6.  Rachael Mendoza MD  7.  Alfred Toure MD  8.  CHIDI Sanchez    Presenting Problem/ HPI: The patient is a 78-year-old male non-smoker who has been getting increasingly short of breath over the last several months.  He has had a recent transesophageal echo by Dr. Mendoza and he meets the criteria for a TAVR placement.  He denies rheumatic fever that he is aware of.  He does not have ankle swelling.  He does have a history of fast heart rate perhaps consistent with atrial fibrillation.    Discharge Diagnosis:     Moderate-severe aortic stenosis; s/p TAVR (Dr. Lopez/Dr. Mendoza)    GERD    Mixed hyperlipidemia    Essential hypertension    T2DM     CKD (baseline Cr 2-2.3)      CAD s/p stent     Class 1 obesity in adult      Procedures Performed:   Procedure(s):  TRANSCATHETER AORTIC VALVE REPLACEMENT + LAURIE, LEFT FEMORAL ENDARTERECTOMY WITH PATCH  Transfemoral Transcatheter Aortic Valve Replacement    Hospital Course:The patient is an 78 y.o. male from with symptoms of severe aortic stenosis as noted above in the HPI.  An echocardiogram revealed the following:    Left ventricular systolic function is normal. Estimated left ventricular EF = 60%    Left ventricular wall thickness is consistent with mild concentric hypertrophy.    Severe aortic valve stenosis is present. Aortic valve area is 0.9 cm2.  Aortic diameter 2.5 cm.    Aortic valve mean pressure gradient is 21 mmHg.  Stroke-volume index is 29.7 cm/mý.    The patient meets  the definition for preserved ejection fraction, low-flow, aortic stenosis.    Mild to moderate mitral regurgitation.    Trace to mild tricuspid regurgitation.      The patient was evaluated and deemed to be at greater risk of mortality with traditional open AVR primarily based upon physical exam, laboratory studies, and imaging findings.   The patient was admitted the morning of the scheduled OR procedure.  5/8: Patient was taken the operating room for TAVR with Dr. Lopez, Dr. Eaton, Dr. Mendoza, and Dr. Domingo.  Details of the operation can be found in separate operative note.  The patient had a 26 Lujan TONY valve placed.  In addition, the patient required a left femoral endarterectomy and pericardial patch.  The patient tolerated the procedure well.  At the end of the case bilateral pedal pulses were intact.  Patient was in extubated and transferred to ICU in stable condition.  5/9: Patient was recovering well, incisions were clean, dry, intact.  Patient able to ambulate without issue.  Patient stable for discharge home.   all consulting teams agree.  Due to a minor increase in serum creatinine with his baseline chronic kidney disease, the patient was scheduled for a repeat BMP 2 days after discharge.  Patient understood this.  He was also given specific wound care instructions and a follow-up appointment in cardiology and CT surgery clinic.    Pertinent Test Results:   Results from last 7 days   Lab Units 05/16/23  0500   WBC 10*3/mm3 5.07   HEMOGLOBIN g/dL 9.5*   HEMATOCRIT % 29.9*   PLATELETS 10*3/mm3 170     Results from last 7 days   Lab Units 05/16/23  0500   SODIUM mmol/L 140   POTASSIUM mmol/L 4.6   CHLORIDE mmol/L 108*   CO2 mmol/L 22.0   BUN mg/dL 36*   CREATININE mg/dL 1.90*   GLUCOSE mg/dL 216*   CALCIUM mg/dL 8.8       Discharge Disposition  Home or Self Care    Discharge Medications     Discharge Medications      New Medications      Instructions Start Date   HYDROcodone-acetaminophen  5-325 MG per tablet  Commonly known as: NORCO   1 tablet, Oral, Every 8 Hours PRN         Changes to Medications      Instructions Start Date   docusate sodium 100 MG capsule  Commonly known as: Colace  What changed:   ú when to take this  ú reasons to take this   100 mg, Oral, 2 Times Daily      Xultophy 100-3.6 UNIT-MG/ML solution pen-injector subcutaneous pen  Generic drug: Insulin Degludec-Liraglutide  What changed: how much to take   50 Units, Subcutaneous, Daily         Continue These Medications      Instructions Start Date   amLODIPine 10 MG tablet  Commonly known as: NORVASC   Take 1 tablet by mouth Daily for blood pressure.      aspirin 81 MG EC tablet   81 mg, Oral, Daily      carvedilol 3.125 MG tablet  Commonly known as: COREG   3.125 mg, Oral, 2 Times Daily      coenzyme Q10 100 MG capsule   200 mg, Oral, Daily      Farxiga 10 MG tablet  Generic drug: dapagliflozin Propanediol   10 mg, Oral, Daily      losartan 50 MG tablet  Commonly known as: COZAAR   Take 1/2 tablet by mouth 2 (Two) Times a Day.      MAGNESIUM CITRATE PO   250 mg, Oral, Daily      multivitamin with minerals tablet tablet   1 tablet, Oral, Daily      omeprazole 40 MG capsule  Commonly known as: priLOSEC   40 mg, Oral, Daily      rosuvastatin 20 MG tablet  Commonly known as: CRESTOR   20 mg, Oral, Daily      ticagrelor 60 MG tablet tablet  Commonly known as: BRILINTA   Take 1 tablet by mouth 2 (Two) Times a Day.  **Do not resume until 5/16/23**      vitamin D 1.25 MG (85681 UT) capsule capsule  Commonly known as: ERGOCALCIFEROL   50,000 Units, Oral, Every 7 Days             Discharge Diet:     Activity at Discharge:   Activity Instructions     Bathing Restrictions      Showering is permitted. No tub baths, swimming pools, hot tubs until your surgeon approves.    Type of Restriction: Bathing    Bathing Restrictions: No Tub Bath    Driving Restrictions      Type of Restriction: Driving    Driving Restrictions: No Driving (Time Limited)     Length: 1 Week    Lifting Restrictions      Type of Restriction: Lifting    Lifting Restrictions: Other    Explain Lifing Restrictions: Do not lift anything heavier than 10 pounds for 1 week (a gallon of milk weighs 8 pounds).    Other Activity Instructions      Walking is one of the best ways to get   stronger after your TAVR. Start with a 5  minute walk 3-4 times a day. Walk a little   more each day.     Climbing stairs at a slow pace is okay          Special Instructions Following Valve Procedure    Check incision/groin sites daily. Clean daily with a clean washcloth, soap and water. Pat dry. Do not scrub. Wear loose fitting clothing over groin incision site until healed.   Shower:  May shower daily, but no tub baths, hot tubs or pools until Cardiac (CT) Surgeon approves.    Walk daily starting with a 5 minute walk four times daily.  Increase walking by 1-2 minutes per walk as tolerated. No strenuous activity until CT Surgeon approves.    No lifting over 10 lbs for 7 days.   No driving for 7 days unless your physician tells you otherwise.    Heart valve infection: Tell your doctors/dentists that your heart valve has been replaced before any procedures or dental work. You will be given a special wallet card at discharge to show your doctor/dentist. The card provides recommendations on when antibiotics are needed and the dose.     Dental care: Reduce your risk of heart valve infection by brushing your teeth twice a day, using dental floss and seeing your dentist at least twice a year.    Monitor your heart rate, blood pressure and weight. Weigh yourself first thing in the morning wearing similar weight clothing. Report a weight gain of 3 pounds or more in a 24 hour period or 5 pounds in one week to your CT surgeon. Record your numbers and bring to your doctor appointments.   Report groin/incision site redness, drainage, swelling and/or significant tenderness, leg/feet swelling, chills and/or fever of  101 degrees at anytime or 100 for more than 24 hours, chest pain or increased shortness of breath to [facility] CT Surgery at [phone].    Call CT Surgery Office for all questions or concerns at [phone].     Follow-up Appointments  Future Appointments   Date Time Provider Department Center   5/22/2023  3:45 PM Waldemar Trejo MD [facility]   5/24/2023  1:30 PM Rachael Mendoza MD [facility]   6/5/2023  2:45 PM Arben Lopez MD [facility]   8/14/2023 10:00 AM Carlie Adrian APRN [facility]     Additional Instructions for the Follow-ups that You Need to Schedule     Basic Metabolic Panel    May 14, 2023 (Approximate)      Release to patient: Routine Release         Cardiac Rehab Evaluation and Enrollment   May 14, 2023      Reason for Consult: Education         Call MD With Problems / Concerns   As directed      Monitor your heart rate, blood pressure,   and weight. Weigh yourself first thing in   the morning wearing similar weight   clothing. If you gain 3 pounds or more in   24 hours or 5 pounds or more in one   week, call your cardiac surgeon. Record   your numbers and bring to your doctor   appointments.   Call the cardiac surgery office for   groin/incision site redness, drainage,   swelling and/or significant tenderness,   leg swelling, chills and/or fever of 101   degrees or higher at any time or 100   degrees for more than 24 hours.    If you have any of the following   CALL 911- Don't Drive   ~Chest pain or trouble breathing  ~Sudden numbness or weakness in your   face, arms, or legs   ~Shortness of breath that doesn't get better   when you rest  ~Heart rate faster than 120 beats per minute   with shortness of breath  ~Heart rate lower than 50 beats per minute   or a new irregular heart rate  ~Bowel movement that is dark black or   bright red    Order Comments: Monitor your heart rate, blood pressure, and weight. Weigh yourself first thing  in the morning wearing similar weight clothing. If you gain 3 pounds or more in 24 hours or 5 pounds or more in one week, call your cardiac surgeon. Record your numbers and bring to your doctor appointments. Call the cardiac surgery office for groin/incision site redness, drainage, swelling and/or significant tenderness, leg swelling, chills and/or fever of 101 degrees or higher at any time or 100 degrees for more than 24 hours.  If you have any of the following CALL 911- Don't Drive ~Chest pain or trouble breathing ~Sudden numbness or weakness in your face, arms, or legs ~Shortness of breath that doesn't get better when you rest ~Heart rate faster than 120 beats per minute with shortness of breath ~Heart rate lower than 50 beats per minute or a new irregular heart rate ~Bowel movement that is dark black or bright red          Discharge Follow-up with Specialty: Cardiothoracic Surgery   As directed      Follow Up Details: Follow Up in 2-4 Weeks    Specialty: Cardiothoracic Surgery         Discharge Follow-up with Specialty: Heart & Valve Center; 1 Week   As directed      Specialty: Heart [facility] Valve Center    Follow Up: 1 Week    Follow Up Details: Follow Up With Heart & Valve Center Within 7 Days of Discharge. If Discharged on a Weekend, Schedule Follow Up For The Following Friday at 0900.               Time spent for discharge: 30 minutes    Thank you for allowing the [facility] Structural Heart Team to participate in your care.    If you have any questions or concerns please call:     Cardiothoracic Surgery   Dr. Lopez/Luci/Fernandez/Afua at [phone]    Cardiology  Dr. Mendoza/Chayo at [phone]    Dr. Toure at [phone]    Chiquita Reddy PA-C  05/16/23  13:16 EDT

## 2023-05-16 NOTE — PROGRESS NOTES
Pulmonary/Critical Care Follow-up     LOS: 2 days   Patient Care Team:  Waldemar Trejo MD as PCP - General  Waldemar Trejo MD as PCP - Family Medicine  Carlie Gill RN as Ambulatory  ([facility])    Chief Complaint/reason: Palpitations and lightheadedness/recent TAVR with V-fib arrest      Subjective      History reviewed and updated in EMR as indicated.    Interval History:     Patient had permanent pacemaker placement yesterday (5/15/2023).  Patient reports he is feeling much better today.  No complaints.  No cardiac issues.  Adequate oxygenation and hemodynamics.  No fevers or chills.    History taken from: Patient    PMH/FH/Social History were reviewed and updated appropriately in the electronic medical record.     Review of Systems:    Review of 14 systems was completed with positives and pertinent negatives noted in the subjective section.  All other systems reviewed and are negative.         Objective     Vital Signs  Temp:  [97.5 °F (36.4 °C)-98.4 °F (36.9 °C)] 97.5 °F (36.4 °C)  Heart Rate:  [61-87] 74  Resp:  [16-20] 16  BP: (116-171)/(54-91) 145/71  05/15 0701 - 05/16 0700  In: 640 [P.O.:440; I.V.:100]  Out: 2000 [Urine:2000]  Body mass index is 33.45 kg/m².     IV drips:  amiodarone, Last Rate: 0.5 mg/min (05/14/23 2150)       Physical Exam:     Constitutional:   Alert, in no acute distress   Head:   Normocephalic, atraumatic   Eyes:           Lids and lashes normal, conjunctivae and sclerae normal.  PER   ENMT:  Ears appear intact with no abnormalities noted     Lips normal.     Neck:  Trachea midline, no JVD   Lungs/Resp:    Normal effort, symmetric chest rise, no crepitus, clear to      auscultation bilaterally.  Left chest dressing in place.              Heart/CV:    Regular rate and rhythm, no murmur   Abdomen/GI:    Soft, nontender, nondistended   :    Deferred   Extremities/MSK:  No clubbing or cyanosis.  No edema.     Pulses:  Pulses palpable and equal  bilaterally   Skin:  No bleeding, bruising or rash   Heme/Lymph:  No cervical or supraclavicular adenopathy.   Neurologic:    Psychiatric:    Moves all extremities with no obvious focal motor deficit.  Cranial nerves 2 - 12 grossly intact  Non-agitated, normal affect.    The above physical exam findings were reviewed and reflect my exam findings as of today's exam.   Electronically signed by:  Yunior Stone MD  05/16/23  09:22 EDT      Results Review:     I reviewed the patient's new clinical results.   Results from last 7 days   Lab Units 05/16/23  0500 05/15/23  0348 05/14/23  2004 05/14/23  1603 05/11/23  0926   SODIUM mmol/L 140 143 140 143 141   POTASSIUM mmol/L 4.6 4.1 4.3 4.2 4.9   CHLORIDE mmol/L 108* 111* 110* 112* 111*   CO2 mmol/L 22.0 23.0 20.0* 20.0* 21.0*   BUN mg/dL 36* 29* 34* 33* 54*   CREATININE mg/dL 1.90* 1.98* 2.03* 2.20* 2.86*   CALCIUM mg/dL 8.8 8.6 9.4 8.7 8.4*   BILIRUBIN mg/dL  --   --   --  0.6 0.4   ALK PHOS U/L  --   --   --  68 53   ALT (SGPT) U/L  --   --   --  10 6   AST (SGOT) U/L  --   --   --  22 20   GLUCOSE mg/dL 216* 97 105* 109* 201*     Results from last 7 days   Lab Units 05/16/23  0500 05/15/23  0348 05/14/23  2004   WBC 10*3/mm3 5.07 5.62 10.10   HEMOGLOBIN g/dL 9.5* 9.2* 10.0*   HEMATOCRIT % 29.9* 29.2* 30.7*   PLATELETS 10*3/mm3 170 155 178         Results from last 7 days   Lab Units 05/16/23  0500 05/15/23  0348 05/14/23  2004   MAGNESIUM mg/dL 2.2 2.3 2.2   PHOSPHORUS mg/dL 4.7* 3.7 2.9       Hemoglobin A1c 5/5/2023: 7.5%    I reviewed the patient's new imaging including images and reports.    Chest x-ray PA and lateral 5/16/2023: Permanent pacemaker in place.  No significant pleural effusion or pneumothorax.    Medication Review:   amLODIPine, 10 mg, Oral, Q24H  aspirin, 81 mg, Oral, Daily  carvedilol, 3.125 mg, Oral, BID With Meals  docusate sodium, 100 mg, Oral, BID  heparin (porcine), 5,000 Units, Subcutaneous, Q8H  insulin detemir, 8 Units, Subcutaneous,  Q12H  insulin lispro, 0-7 Units, Subcutaneous, 4x Daily With Meals & Nightly  Insulin Lispro, 5 Units, Subcutaneous, TID With Meals  losartan, 25 mg, Oral, Q24H  pantoprazole, 40 mg, Oral, Q AM  rosuvastatin, 20 mg, Oral, Nightly  sodium chloride, 10 mL, Intravenous, Q12H  ticagrelor, 60 mg, Oral, BID      amiodarone, 0.5 mg/min, Last Rate: 0.5 mg/min (05/14/23 2150)        Assessment & Plan         Ventricular fibrillation    GERD    Mixed hyperlipidemia    Essential hypertension    T2DM     History of hepatitis C    CKD (baseline Cr 2-2.3)      PAD (peripheral artery disease) ([facility])    CAD s/p stent     Moderate-severe aortic stenosis; s/p TAVR (Dr. Lopez/Dr. Mendoza)    Class 1 obesity in adult    Atrial fibrillation w/ RVR    Ventricular arrhythmia    78 y.o. male with history of HTN, HL, CAD, remote testicular and colon cancer, T2DM, aortic stenosis status post TAVR on 5/8/2023, presented to [facility] ED with A-fib with RVR on 5/14/2023.  Subsequently was given Cardizem and developed fine ventricular fibrillation.  ACLS/CPR performed for approximately 10 minutes prior to ROSC.  He was started on amiodarone and reportedly had several instances of recurrent V-fib.    Patient underwent permanent pacemaker placement for intermittent complete heart block on 5/15/2023.  Currently doing well.  No new complaints.  Plan to discharge home today per primary services.     Plan:  1.  For intermittent complete heart block: Permanent pacemaker 5/15/2023 per Dr. Mendoza.  2.  For HTN: Beta-blocker resumed.  3.  For CKD: Baseline.  Monitor closely and avoid nephrotoxins/renally dose medications if indicated.  4.  For atrial fibrillation/flutter: Per cardiology.  5.  For GERD: Protonix.  6.  For PAD/CAD: Aspirin, Brilinta, statin, beta-blocker.  7.  DVT prophylaxis: Subcutaneous heparin.  8.  For diabetes: Continue low-dose basal and prandial insulin with correction.  .    Electronically signed by:    Yunior Stone,  MD  05/16/23  09:22 EDT      *. Please note that portions of this note were completed with [employer] - a voice recognition program.

## 2023-05-17 ENCOUNTER — TRANSITIONAL CARE MANAGEMENT TELEPHONE ENCOUNTER (OUTPATIENT)
Dept: CALL CENTER | Facility: HOSPITAL | Age: 78
End: 2023-05-17
Payer: COMMERCIAL

## 2023-05-17 NOTE — OUTREACH NOTE
Call Center TCM Note    Flowsheet Row Responses   [facility] patient discharged from? [place]   Does the patient have one of the following disease processes/diagnoses(primary or secondary)? Other   TCM attempt successful? Yes   Call start time 1225   Call end time 1231   Discharge diagnosis Ventricular fibrillation   Meds reviewed with patient/caregiver? Yes   Is the patient having any side effects they believe may be caused by any medication additions or changes? No   Does the patient have all medications ordered at discharge? Yes   Is the patient taking all medications as directed (includes completed medication regime)? Yes   Medication comments BS a little high this morning d/t medicine.   Comments A f/u appt is 05/22/23  @ 345. Please consider this for a TCM appt. TCM complete.   Does the patient have an appointment with their PCP within 7 days of discharge? Yes   Has home health visited the patient within 72 hours of discharge? N/A   Psychosocial issues? No   Did the patient receive a copy of their discharge instructions? Yes   Nursing interventions Reviewed instructions with patient   What is the patient's perception of their health status since discharge? Improving   Is the patient/caregiver able to teach back signs and symptoms related to disease process for when to call PCP? Yes   Is the patient/caregiver able to teach back signs and symptoms related to disease process for when to call 911? Yes   Is the patient/caregiver able to teach back the hierarchy of who to call/visit for symptoms/problems? PCP, Specialist, Home health nurse, Urgent Care, ED, 911 Yes   If the patient is a current smoker, are they able to teach back resources for cessation? Not a smoker   Additional teach back comments Says he is weak and recovering but feels better today.   TCM call completed? Yes   Wrap up additional comments No questions or issues at this time.   Call end time 1231   Would this patient benefit from a  Referral to [facility] Social Work? No   Is the patient interested in additional calls from an ambulatory ?  NOTE:  applies to high risk patients requiring additional follow-up. No          Cindy Chacon RN    5/17/2023, 12:32 EDT

## 2023-05-19 ENCOUNTER — DOCUMENTATION (OUTPATIENT)
Dept: CARDIOLOGY | Facility: CLINIC | Age: 78
End: 2023-05-19
Payer: COMMERCIAL

## 2023-05-19 LAB
QT INTERVAL: 378 MS
QT INTERVAL: 386 MS
QTC INTERVAL: 462 MS
QTC INTERVAL: 474 MS

## 2023-05-19 NOTE — PROGRESS NOTES
"Enter Query Response Below      Query Response: Unable  Electronically signed by Arben Lopez MD, 23, 2:02 PM EDT.               If applicable, please update the problem list.       Patient: Nicholas Yin \"Bill\"        : 1945  Account: [id number]           Admit Date: 2023        How to Respond to this query:       a. Click New Note     b. Answer query within the yellow box.                c. Update the Problem List, if applicable.      If you have any questions about this query contact me at: [email or web address]    Dr. Lopez    78 year old male who was one week status post TAVR was admitted for ventricular tachycardia/ventricular fibrillation after receiving Cardizem for rapid atrial fibrillation. The patient was found to have intermittent complete heart block. Treatment included amiodarone and placement of permanent pacemaker. Pacemaker procedure note states \"Severe symptomatic bradycardia due to sinus node dysfunction in a patient who underwent TAVR last week and had a chronic right bundle branch block.\"    Can this be specified as:    Complete heart block related to TAVR  Complete heart block unrelated to TAVR  Other, please specify____________  Unable to clinically determine    By submitting this query, we are merely seeking further clarification of documentation to accurately reflect all conditions that you are monitoring, evaluating, treating or that extend the hospitalization or utilize additional resources of care. Please utilize your independent clinical judgment when addressing the question(s) above.     This query and your response, once completed, will be entered into the legal medical record.    Sincerely,  Apryl Batista RN CCDS  Clinical Documentation Integrity Program     "

## 2023-05-19 NOTE — PROGRESS NOTES
Per [employer] remote cardiac device transmission received today, 5/19/2023:    Pt is s/p TAVR 5/8/2023 & s/p pacemaker implant 5/15/2023.    Afib burden 70% since 5/15/2023-report in [facility].   Not ongoing in presenting EGM.  Longest episode: 1 day, 2 hours, 24 minutes.  V-rates poorly controlled-see below.     Per medication list, pt is taking Brillinta and aspirin.

## 2023-05-22 ENCOUNTER — OFFICE VISIT (OUTPATIENT)
Dept: FAMILY MEDICINE CLINIC | Facility: CLINIC | Age: 78
End: 2023-05-22
Payer: COMMERCIAL

## 2023-05-22 VITALS — WEIGHT: 220 LBS | HEIGHT: 69 IN | BODY MASS INDEX: 32.58 KG/M2

## 2023-05-22 DIAGNOSIS — E11.8 TYPE 2 DIABETES MELLITUS WITH COMPLICATION, WITH LONG-TERM CURRENT USE OF INSULIN: Chronic | ICD-10-CM

## 2023-05-22 DIAGNOSIS — T14.8XXA HEMATOMA: ICD-10-CM

## 2023-05-22 DIAGNOSIS — I10 ESSENTIAL HYPERTENSION: Chronic | ICD-10-CM

## 2023-05-22 DIAGNOSIS — D62 POSTOPERATIVE ANEMIA DUE TO ACUTE BLOOD LOSS: ICD-10-CM

## 2023-05-22 DIAGNOSIS — K21.9 GASTROESOPHAGEAL REFLUX DISEASE WITHOUT ESOPHAGITIS: Chronic | ICD-10-CM

## 2023-05-22 DIAGNOSIS — E66.9 CLASS 1 OBESITY WITH SERIOUS COMORBIDITY AND BODY MASS INDEX (BMI) OF 32.0 TO 32.9 IN ADULT, UNSPECIFIED OBESITY TYPE: ICD-10-CM

## 2023-05-22 DIAGNOSIS — N18.32 CHRONIC RENAL FAILURE, STAGE 3B: Chronic | ICD-10-CM

## 2023-05-22 DIAGNOSIS — Z79.4 TYPE 2 DIABETES MELLITUS WITH COMPLICATION, WITH LONG-TERM CURRENT USE OF INSULIN: Chronic | ICD-10-CM

## 2023-05-22 DIAGNOSIS — Z95.2 S/P TAVR (TRANSCATHETER AORTIC VALVE REPLACEMENT): ICD-10-CM

## 2023-05-22 DIAGNOSIS — R35.1 BENIGN PROSTATIC HYPERPLASIA WITH NOCTURIA: ICD-10-CM

## 2023-05-22 DIAGNOSIS — I25.110 CORONARY ARTERY DISEASE INVOLVING NATIVE CORONARY ARTERY OF NATIVE HEART WITH UNSTABLE ANGINA PECTORIS: Chronic | ICD-10-CM

## 2023-05-22 DIAGNOSIS — I73.9 PAD (PERIPHERAL ARTERY DISEASE): Chronic | ICD-10-CM

## 2023-05-22 DIAGNOSIS — Z00.00 HEALTHCARE MAINTENANCE: ICD-10-CM

## 2023-05-22 DIAGNOSIS — E78.2 MIXED HYPERLIPIDEMIA: Primary | Chronic | ICD-10-CM

## 2023-05-22 DIAGNOSIS — N40.1 BENIGN PROSTATIC HYPERPLASIA WITH NOCTURIA: ICD-10-CM

## 2023-05-22 DIAGNOSIS — G45.3 AMAUROSIS FUGAX OF RIGHT EYE: ICD-10-CM

## 2023-05-22 DIAGNOSIS — I48.0 PAROXYSMAL ATRIAL FIBRILLATION: ICD-10-CM

## 2023-05-22 DIAGNOSIS — Z95.0 PACEMAKER: ICD-10-CM

## 2023-05-22 PROBLEM — I49.01 VENTRICULAR FIBRILLATION: Status: RESOLVED | Noted: 2023-05-14 | Resolved: 2023-05-22

## 2023-05-22 PROBLEM — I50.9 ACUTE CONGESTIVE HEART FAILURE: Status: RESOLVED | Noted: 2023-05-12 | Resolved: 2023-05-22

## 2023-05-22 PROBLEM — I35.0 NONRHEUMATIC AORTIC VALVE STENOSIS: Chronic | Status: RESOLVED | Noted: 2021-03-18 | Resolved: 2023-05-22

## 2023-05-22 PROBLEM — I49.9 VENTRICULAR ARRHYTHMIA: Status: RESOLVED | Noted: 2023-05-15 | Resolved: 2023-05-22

## 2023-05-22 RX ORDER — DAPAGLIFLOZIN 10 MG/1
10 TABLET, FILM COATED ORAL DAILY
Qty: 90 TABLET | Refills: 3 | Status: SHIPPED | OUTPATIENT
Start: 2023-05-22

## 2023-05-22 NOTE — PROGRESS NOTES
Subjective   Nicholas Yin is a 78 y.o. male.     Chief Complaint  Hospital follow-up    History of Present Illness     Hospital Follow-Up  He underwent a TAVR and left femoral endarterectomy on 5/8/2023.  His course in hospital was uncomplicated and he was discharged home the next day.  Following his return home, he experienced SOBOE with PND, intermittent lightheadedness, an intermittent cough, and mild swelling of his hands and feet.  These symptoms were associated with significant bruising about the left groin and scrotum, mild numbness and tingling of the right anterior thigh, a decreased appetite, and generalized weakness.  Labs drawn on 5/11/2023 returned with a hemoglobin of 8.7 down from a preoperative level of 13.2.  BUN and creatinine returned at 54 and 2.86 with a proBNP of 6169.  Chest x-ray was reported as showing cardiomegaly, coarsened interstitial markings bilaterally, and a possible tiny right pleural effusion.  He was recommended ER evaluation, declined, but ultimately presented to [facility] ER on 5/14/2023 following a presyncopal episode.  He was found to be in atrial fibrillation with a rapid ventricular response.  He was started on IV diltiazem and progressed to a wide-complex tachycardia with loss of consciousness.  CPR was administered with a prompt improvement and he regained consciousness.  He was then started on IV amiodarone and transferred to [facility].  On arrival there he was found to be in a third-degree block and underwent placement of a pacemaker by Dr. Mendoza.  His course in hospital was once again uncomplicated and he was discharged home on 5/16/2023.  He admits to anterior chest pain with deep breaths, coughing, or movement.  He remains short of breath with exertion with a cough productive of whitish to yellowish sputum.  He complains of continued bruising about his left groin and scrotum.  He denies any other chest discomfort and there is no history of any palpitations or  lightheadedness.  He denies any hemoptysis, calf pain, or some of the ankles.  His appetite is steadily improving and he denies any nausea, vomiting, diarrhea, constipation, hematochezia, or melena.  There is no history of any dysuria or hematuria and he has had no fever or chills.  He is scheduled to undergo a reassessment with Dr. Mendoza on 5/24/2023 as well as a CT surgery reassessment with Dr. Lopez on 6/5/2023.  Lab Results   Component Value Date    WBC 5.07 05/16/2023    HGB 9.5 (L) 05/16/2023    HCT 29.9 (L) 05/16/2023    MCV 87.4 05/16/2023     05/16/2023     Lab Results   Component Value Date    TSH 5.610 (H) 05/14/2023     Coronary Artery Disease  He underwent a coronary angiogram by Dr. Villatoro on 8/15/2019 with stenting of the mid LAD.      Transient Visual Loss  Several years ago he experienced a sudden progressive decrease in the vision in his right eye while driving. He stated that the vision in the eye darkened gradually over several minutes to the point where the only things he could make out were very bright lights and the sun. This lasted for about 30 minutes then resolved over several minutes. He was hospitalized at [facility] over 10 years ago following a similar episodes that was ultimately felt to be vascular in origin. MRI of the brain performed on 12/8/17 was reported as showing mild cerebral atrophy with chronic small vessel ischemic changes. MRA of the carotid arteries performed the same day was unremarkable.  Bilateral carotid ultrasound performed on 2/6/2023 was unremarkable.  He undergoes regular ophthalmology assessments    Type 2 Diabetes Mellitus  Current treatments: SGLT2 -empagliflozin, GLP agonist-liraglutide and basal insulin - tresiba (together as xultophy).      Hyperlipidemia  He remains on rosuvastatin    Essential Hypertension  Home blood pressure readings: have generally been at or near goal. Current antihypertensive medications includes losartan, carvedilol,  and amlodipine.     Chronic Renal Failure  This has been contributed to longstanding type 2 diabetes mellitus and hypertension.  He is aware to avoid any NSAIDs oral or prescription.   Lab Results   Component Value Date    GLUCOSE 216 (H) 05/16/2023    CALCIUM 8.8 05/16/2023     05/16/2023    K 4.6 05/16/2023    CO2 22.0 05/16/2023     (H) 05/16/2023    BUN 36 (H) 05/16/2023    CREATININE 1.90 (H) 05/16/2023    EGFR 35.9 (L) 05/16/2023    BCR 18.9 05/16/2023    ANIONGAP 10.0 05/16/2023     Chronic Back Pain  He gives a long history of increasing mid and low back pain. This is described as a sharp ache. The pain does not radiate and has been unassociated with any other symptoms. The pain is worse with prolonged weight bearing and limits his activities more then anything else at present.  MRI performed on 1/7/2021 was reported as showing degenerative disc disease and osteoarthritis with mild to moderate central canal and bilateral foraminal narrowing at L2-3 and L3-4.    Gastroesophageal Reflux Disease  He remains heartburn free on dexlansoprazole.    The following portions of the patient's history were reviewed and updated as appropriate: allergies, current medications, past family history, past medical history, past social history, past surgical history and problem list.    Review of Systems   Constitutional: Positive for fatigue. Negative for appetite change, chills, fever and unexpected weight change.   HENT: Negative for congestion, ear pain, rhinorrhea, sneezing and sore throat.    Eyes: Negative for visual disturbance.   Respiratory: Positive for cough and shortness of breath. Negative for wheezing.    Cardiovascular: Positive for chest pain. Negative for palpitations and leg swelling.   Gastrointestinal: Negative for abdominal pain, blood in stool, constipation, diarrhea, nausea and vomiting.   Genitourinary: Positive for scrotal swelling. Negative for difficulty urinating, dysuria, frequency,  hematuria and urgency.        Erectile dysfunction - chronic progressive. Nocturia x 2-3 with occasional sense of incomplete voiding.   Musculoskeletal: Positive for back pain (chronic - progressive). Negative for arthralgias, joint swelling and myalgias.   Skin: Positive for wound. Negative for rash.        Skin lesions   Neurological: Positive for weakness (generalized) and light-headedness. Negative for numbness and headaches.     Objective   Physical Exam  Constitutional:       Comments: Bright and in good spirits. No apparent distress. No pallor, jaundice, diaphoresis, or cyanosis.   Pulmonary:      Comments: Normal respiratory effort.  Occasional mild cough.  No apparent shortness of breath and no audible wheezing  Skin:     Coloration: Skin is not cyanotic, jaundiced or pale.   Neurological:      Mental Status: He is alert and oriented to person, place, and time.      Cranial Nerves: No dysarthria or facial asymmetry.   Psychiatric:         Attention and Perception: Attention normal.         Mood and Affect: Mood normal.         Speech: Speech normal.         Behavior: Behavior normal.         Thought Content: Thought content normal.       Assessment & Plan   Problems Addressed this Visit        Cardiac and Vasculature    CAD s/p stent  (Chronic)  Reminded regarding the importance of risk factor modification.  Continue dual antiplatelet therapy    Relevant Medications    dapagliflozin Propanediol (Farxiga) 10 MG tablet    Essential hypertension (Chronic)  Encouraged to continue to work on his diet and exercise plan.  Continue current medication    Mixed hyperlipidemia  As above.   Continue current medication.    PAD (peripheral artery disease) (HCC) (Chronic)  As above.   Continue current medication.    Atrial fibrillation w/ RVR  Transient  Follow up with cardiology     Pacemaker  For third-degree AV block  Follow up with cardiology     S/P TAVR (transcatheter aortic valve replacement)  Follow up with CT  surgery       Endocrine and Metabolic    T2DM  (Chronic)  Diabetes mellitus Type II, under excellent control.   Encouraged to continue to pursue ADA diet  Encouraged aerobic exercise.  Continue current medication    Relevant Medications    dapagliflozin Propanediol (Farxiga) 10 MG tablet    Class 1 obesity with serious comorbidity and body mass index (BMI) of 32.0 to 32.9 in adult       Gastrointestinal Abdominal     GERD (Chronic)       Genitourinary and Reproductive     Benign prostatic hyperplasia with nocturia       Health Encounters    Healthcare maintenance  We will discuss Shingrix again at his return       Hematology and Neoplasia    Postoperative anemia due to acute blood loss  Recommended updated labs next week       Musculoskeletal and Injuries    Hematoma  Encouraged to report if any worse or if any new symptoms or concerns.       Neuro    Amaurosis fugax of right eye       Other    CKD (baseline Cr 2-2.3)   (Chronic)  Continue current medication  Will continue to monitor    Relevant Medications    dapagliflozin Propanediol (Farxiga) 10 MG tablet   Diagnoses       Codes Comments    Mixed hyperlipidemia    -  Primary ICD-10-CM: E78.2  ICD-9-CM: 272.2     Essential hypertension     ICD-10-CM: I10  ICD-9-CM: 401.9     PAD (peripheral artery disease) ([facility])     ICD-10-CM: I73.9  ICD-9-CM: 443.9     Coronary artery disease involving native coronary artery of native heart with unstable angina pectoris     ICD-10-CM: I25.110  ICD-9-CM: 414.01, 411.1     S/P TAVR (transcatheter aortic valve replacement)     ICD-10-CM: Z95.2  ICD-9-CM: V43.3     T2DM      ICD-10-CM: E11.8, Z79.4  ICD-9-CM: 250.90, V58.67     Pacemaker     ICD-10-CM: Z95.0  ICD-9-CM: V45.01     Paroxysmal atrial fibrillation     ICD-10-CM: I48.0  ICD-9-CM: 427.31     Class 1 obesity with serious comorbidity and body mass index (BMI) of 32.0 to 32.9 in adult, unspecified obesity type     ICD-10-CM: E66.9, Z68.32  ICD-9-CM: 278.00, V85.32     GERD      ICD-10-CM: K21.9  ICD-9-CM: 530.81     Benign prostatic hyperplasia with nocturia     ICD-10-CM: N40.1, R35.1  ICD-9-CM: 600.01, 788.43     Healthcare maintenance     ICD-10-CM: Z00.00  ICD-9-CM: V70.0     Postoperative anemia due to acute blood loss     ICD-10-CM: D62  ICD-9-CM: 285.1     Hematoma     ICD-10-CM: T14.8XXA  ICD-9-CM: 924.9     Amaurosis fugax of right eye     ICD-10-CM: G45.3  ICD-9-CM: 362.34     CKD (baseline Cr 2-2.3)       ICD-10-CM: N18.32  ICD-9-CM: 585.3

## 2023-05-24 ENCOUNTER — OFFICE VISIT (OUTPATIENT)
Dept: CARDIOLOGY | Facility: CLINIC | Age: 78
End: 2023-05-24
Payer: COMMERCIAL

## 2023-05-24 DIAGNOSIS — E78.2 MIXED HYPERLIPIDEMIA: ICD-10-CM

## 2023-05-24 DIAGNOSIS — I10 ESSENTIAL HYPERTENSION: ICD-10-CM

## 2023-05-24 DIAGNOSIS — I48.0 PAROXYSMAL ATRIAL FIBRILLATION: ICD-10-CM

## 2023-05-24 DIAGNOSIS — I25.10 CORONARY ARTERY DISEASE INVOLVING NATIVE CORONARY ARTERY OF NATIVE HEART WITHOUT ANGINA PECTORIS: ICD-10-CM

## 2023-05-24 DIAGNOSIS — Z95.2 S/P TAVR (TRANSCATHETER AORTIC VALVE REPLACEMENT): ICD-10-CM

## 2023-05-24 DIAGNOSIS — I44.2 POSTOPERATIVE COMPLETE HEART BLOCK: ICD-10-CM

## 2023-05-24 DIAGNOSIS — I97.89 POSTOPERATIVE COMPLETE HEART BLOCK: ICD-10-CM

## 2023-05-24 DIAGNOSIS — I35.0 AORTIC STENOSIS, SEVERE: Primary | ICD-10-CM

## 2023-05-24 PROCEDURE — 99024 POSTOP FOLLOW-UP VISIT: CPT | Performed by: INTERNAL MEDICINE

## 2023-05-24 RX ORDER — TERAZOSIN 5 MG/1
5 CAPSULE ORAL NIGHTLY
Qty: 60 CAPSULE | Refills: 3 | Status: SHIPPED | OUTPATIENT
Start: 2023-05-24

## 2023-05-24 NOTE — PROGRESS NOTES
[facility] Cardiology    Patient ID: Nicholas Yin is a 78 y.o. male.  : 1945   Contact: [phone]    Encounter date: 2023    PCP: Waldemar Trejo MD      Chief complaint: No chief complaint on file.      Problem List:    1. Coronary artery disease  2. Nonrheumatic aortic valve stenosis  a. Echo, 2017: EF 56-60%. LA mildly dilated. Mild to moderate aortic valve stenosis. Trace MR.  b. 2019 Echo: Left ventricular diastolic (grade I a) consistent with impaired relaxation.  Mild mitral valve regurgitation is present.  Mild to moderate aortic valve stenosis is present.  Mild aortic valve regurgitation is present.  Left atrial cavity size is mildly dilated.  There is mild calcification of the aortic valve.  c. Echo, 2019: EF 65%. Left ventricular diastolic dysfunction (grade I a) consistent with impaired relaxation.  Mild aortic valve stenosis is present. There is no evidence of pericardial effusion.  LV systolic function appears improved from previous study.   d. Echo 2021: EF 61-65%. Left ventricular systolic function is normal.  Left ventricular diastolic function is consistent with (grade I) impaired relaxation.  Left atrial volume is mildly increased.  Moderate aortic valve stenosis is present with peak systolic velocity of 3.4 m/sec, mildly progressed from previous study.  e. Echo 2021: EF 56-60%. Left ventricular systolic function is normal.  Left ventricular diastolic function is consistent with (grade I) impaired relaxation.  The aortic valve is abnormal in structure. The aortic valve exhibits sclerosis. There is severe calcification of the aortic valve. Trace aortic valve regurgitation is present. Moderate aortic valve stenosis is present.     f. TTE 2022: EF = 65% Left ventricular systolic function is normal.  Mild to moderate mitral valve stenosis is present. Peak gradient of 9 and mean gradient of 3 mmHg.  There  is moderate calcification of the aortic valve mainly affecting the non-coronary, left coronary and right coronary cusp(s).  Moderate aortic stenosis with peak gradient of 35 and mean gradient of 20 mmHg.  No change since prev study of 7/16/21.    g. Echo 02/06/2023: Left ventricular systolic function is normal. Left ventricular ejection fraction appears to be 56 - 60%.  Left ventricular diastolic function was indeterminate.  Moderate to severe aortic valve stenosis is present.  Mild mitral valve stenosis is present with functional MAC.   Limted echo. Pt does have significant calcified AV leaflets, gradients could be underestimated and TUNG calculcated based on LVOT diameter was < 1 cm^2. Recommend LAURIE for better assement of AV stenosis  h. LAURIE, 03/14/2023;  EF = 60%. Mild concentric LVH. Severe aortic valve stenosis is present. Aortic valve area is 0.9 cm2.  Aortic diameter 2.5 cm. Aortic valve mean pressure gradient is 21 mmHg.  Stroke-volume index is 29.7 cm/m². The patient meets the definition for preserved ejection fraction, low-flow, aortic stenosis. Mild to moderate MR. Trace to mild TR.   i. Stress PET, 04/10/2023; EF 72%. No ischemia.  j. Echo, 04/10/2023; EF 55%. Borderline concentric LVH. Following placement of a 26 mm TONY 3 pericardial prosthesis: No paravalvular leak is seen. Mean gradient 4 mmHg. Calculated aortic valve area 2.36 cm². Moderate MR.   k. TAVR, 05/08/2023; Dr. Mendoza. Successful transcatheter aortic valve replacement with 26 mm  Lujan TONY 3 pericardial prosthesis.  l. Echo, 05/09/2023; EF 55%. There is a 26 mm TONY 3 pericardial prosthesis in the aortic position.  Mean gradient 10 mmHg. Calculated aortic valve area 1.59 cm². No paravalvular leak is seen. Mild mitral valve stenosis. Mean gradient is 6 mmHg. Trace MR.   3. Atrial fibrillation with RVR  4. Bradycardia  a. PPM implantation, 05/15/2023; PPM implantation using a Spark Mobile Accolade MRI DR model L3 1 1  serial #[id number], Dr. Mendoza.   5. Hypertension  6. Hyperlipidemia  7. PAD  8. DM, type 2.  9. CKD   10. GERD  11. H/o testicular cancer  12. H/o nonmelanoma skin cancer  13. H/o colon cancer  14. DDD    Allergies   Allergen Reactions   • Ct Contrast Anaphylaxis   • Iodinated Contrast Media Shortness Of Breath       Current Medications:    Current Outpatient Medications:   •  aspirin 81 MG EC tablet, Take 1 tablet by mouth Daily., Disp: , Rfl:   •  carvedilol (COREG) 3.125 MG tablet, Take 1 tablet by mouth 2 (Two) Times a Day., Disp: 180 tablet, Rfl: 3  •  coenzyme Q10 100 MG capsule, Take 2 capsules by mouth Daily., Disp: , Rfl:   •  dapagliflozin Propanediol (Farxiga) 10 MG tablet, Take 1 tablet by mouth Daily., Disp: 90 tablet, Rfl: 3  •  docusate sodium (Colace) 100 MG capsule, Take 1 capsule by mouth 2 (Two) Times a Day., Disp: 72 capsule, Rfl: 0  •  HYDROcodone-acetaminophen (NORCO) 5-325 MG per tablet, Take 1 tablet by mouth Every 8 (Eight) Hours As Needed for Moderate Pain., Disp: 15 tablet, Rfl: 0  •  losartan (COZAAR) 50 MG tablet, Take 1/2 tablet by mouth 2 (Two) Times a Day., Disp: 60 tablet, Rfl: 11  •  MAGNESIUM CITRATE PO, Take 250 mg by mouth Daily., Disp: , Rfl:   •  multivitamin with minerals tablet tablet, Take 1 tablet by mouth Daily., Disp: , Rfl:   •  omeprazole (priLOSEC) 40 MG capsule, Take 1 capsule by mouth Daily., Disp: , Rfl:   •  rivaroxaban (XARELTO) 15 MG tablet, Take 1 tablet by mouth Daily., Disp: 90 tablet, Rfl: 3  •  rosuvastatin (CRESTOR) 20 MG tablet, Take 1 tablet by mouth Daily., Disp: 90 tablet, Rfl: 3  •  terazosin (HYTRIN) 5 MG capsule, Take 1 capsule by mouth Every Night. After 1 week, increase dose to 2 capsules every night if SBP is > 140 mmHg., Disp: 60 capsule, Rfl: 3  •  vitamin D (ERGOCALCIFEROL) 1.25 MG (46911 UT) capsule capsule, Take 1 capsule by mouth Every 7 (Seven) Days., Disp: 12 capsule, Rfl: 1  •  Xultophy 100-3.6 UNIT-MG/ML solution pen-injector  subcutaneous pen, Inject 50 Units under the skin into the appropriate area as directed Daily. (Patient taking differently: Inject 32 Units under the skin into the appropriate area as directed Daily.), Disp: 15 mL, Rfl: 5  No current facility-administered medications for this visit.    Facility-Administered Medications Ordered in Other Visits:   •  Chlorhexidine Gluconate Cloth 2 % pads 1 application, 1 application, Topical, Q12H PRN, Nneka Araujo APRN    HPI    Nicholas Yin is a 78 y.o. male who presents today for a wound check s/p TAVR with a history of aortic valve stenosis and cardiac risk factors.  He presents today for a wound check following permanent pacemaker placement.  He had extreme bradycardia and complete heart block several days following TAVR.  He has a number of questions which include what can he take for his lower extremity edema as his creatinine runs between 1.9 and 2.0.  He also notes that he becomes somewhat raspy when he lays down to sleep at night and he is more short of breath with exertion than he had hoped to be following TAVR.  He also wants to know when he can begin cardiac rehab.      The following portions of the patient's history were reviewed and updated as appropriate: allergies, current medications and problem list.    Pertinent positives as listed in the HPI.  All other systems reviewed are negative.         There were no vitals filed for this visit.    Physical Exam:  General: Alert and oriented.  The pacemaker site is clean and dry.  The edges are well opposed.  There is no fluctuance.  No erythema or purulence.  Extremities: Show 1+ edema.  Skin: Warm and dry.  Neurologic: Nonfocal.     Diagnostic Data (reviewed with patient):   Lab Results   Component Value Date    GLUCOSE 216 (H) 05/16/2023    BUN 36 (H) 05/16/2023    CREATININE 1.90 (H) 05/16/2023    EGFRIFNONA 29 (L) 01/13/2022    BCR 18.9 05/16/2023     05/16/2023    K 4.6 05/16/2023     (H)  05/16/2023    CO2 22.0 05/16/2023    CALCIUM 8.8 05/16/2023    ALBUMIN 3.1 (L) 05/14/2023    ALKPHOS 68 05/14/2023    AST 22 05/14/2023    ALT 10 05/14/2023     Lab Results   Component Value Date    CHOL 118 03/10/2023    TRIG 151 (H) 03/10/2023    HDL 50 03/10/2023    LDL 43 03/10/2023      Lab Results   Component Value Date    WBC 5.07 05/16/2023    RBC 3.42 (L) 05/16/2023    HGB 9.5 (L) 05/16/2023    HCT 29.9 (L) 05/16/2023    MCV 87.4 05/16/2023     05/16/2023      Lab Results   Component Value Date    TSH 5.610 (H) 05/14/2023        Advance Care Planning   ACP discussion was held with the patient during this visit. Patient does not have an advance directive, declines further assistance.       Procedures      Assessment:    ICD-10-CM ICD-9-CM   1. Aortic stenosis, severe  I35.0 424.1   2. S/P TAVR (transcatheter aortic valve replacement)  Z95.2 V43.3   3. Coronary artery disease involving native coronary artery of native heart without angina pectoris  I25.10 414.01   4. Essential hypertension  I10 401.9   5. Mixed hyperlipidemia  E78.2 272.2   6. Postoperative complete heart block  I97.89 997.1    I44.2 426.0   7. Paroxysmal atrial fibrillation  I48.0 427.31         Plan:  1. Begin Xarelto 15 mg daily as he is having frequent intermittent atrial fibrillation the longest on the pacemaker interrogation was 1 day and 4 hours.  2. Further discussion regarding antiarrhythmic will be determined after he has had his pacemaker for longer than a week.  3. Discontinue amlodipine as it may be contributing to his lower extremity edema.  4. Begin 5 mg of terazosin nightly and increase to 10 mg nightly after 1 week if his systolic pressure remains greater than 140 mmHg.  5. Continue losartan 50 mg daily for hypertension.  6. Continue on aspirin 81 mg daily for antiplatelet therapy.  7. Continue on carvedilol 3.125 mg daily for rate control.  8. Continue on rosuvastatin 20 mg daily for hyperlipidemia.  9. Continue all  other current medications.  10. F/up in with Dr. Reid in [place] in 1 month.      Rachael Mendoza MD, FACC

## 2023-05-25 ENCOUNTER — READMISSION MANAGEMENT (OUTPATIENT)
Dept: CALL CENTER | Facility: HOSPITAL | Age: 78
End: 2023-05-25
Payer: COMMERCIAL

## 2023-05-25 DIAGNOSIS — Z95.2 S/P TAVR (TRANSCATHETER AORTIC VALVE REPLACEMENT): Primary | ICD-10-CM

## 2023-05-25 NOTE — OUTREACH NOTE
Medical Week 2 Survey    Flowsheet Row Responses   [facility] patient discharged from? [place]   Does the patient have one of the following disease processes/diagnoses(primary or secondary)? Other   Week 2 attempt successful? Yes   Call start time 1324   Discharge diagnosis Ventricular fibrillation   Call end time 1331   Person spoke with today (if not patient) and relationship Christopher   Medication alerts for this patient Changed to Brilanta Xarelto- patient will pick that up today and start. Had one 24 period of Afib since discharge   Meds reviewed with patient/caregiver? Yes   Is the patient having any side effects they believe may be caused by any medication additions or changes? No   Does the patient have all medications ordered at discharge? Yes   Is the patient taking all medications as directed (includes completed medication regime)? Yes   Comments regarding appointments Seen by Dr Mendoza yesterday.   Does the patient have a primary care provider?  Yes   Does the patient have an appointment with their PCP within 7 days of discharge? Yes   Has the patient kept scheduled appointments due by today? Yes   Comments Will start cardiac rehab next week.   Has home health visited the patient within 72 hours of discharge? N/A   Psychosocial issues? No   Did the patient receive a copy of their discharge instructions? Yes   Nursing interventions Reviewed instructions with patient   What is the patient's perception of their health status since discharge? Improving   Is the patient/caregiver able to teach back signs and symptoms related to disease process for when to call PCP? Yes   Is the patient/caregiver able to teach back signs and symptoms related to disease process for when to call 911? Yes   Is the patient/caregiver able to teach back the hierarchy of who to call/visit for symptoms/problems? PCP, Specialist, Home health nurse, Urgent Care, ED, 911 Yes   If the patient is a current smoker, are they  able to teach back resources for cessation? Not a smoker   Additional teach back comments Doing much better now.   Week 2 Call Completed? Yes   Graduated Yes   Is the patient interested in additional calls from an ambulatory ?  NOTE:  applies to high risk patients requiring additional follow-up. No   Did the patient feel the follow up calls were helpful during their recovery period? No   Was the number of calls appropriate? No   Wrap up additional comments No questions or issues at this time.          Ruperto HUMPHRIES - Registered Nurse

## 2023-05-25 NOTE — OUTREACH NOTE
Medical Week 2 Survey    Flowsheet Row Responses   [facility] patient discharged from? [place]   Does the patient have one of the following disease processes/diagnoses(primary or secondary)? Other   Week 2 attempt successful? Yes   Call start time 1324   Discharge diagnosis Ventricular fibrillation   Call end time 1331   Person spoke with today (if not patient) and chase White   Medication alerts for this patient Changed to Brilanta Xarelto- patient will pick that up today and start.   Meds reviewed with patient/caregiver? Yes   Is the patient having any side effects they believe may be caused by any medication additions or changes? No   Does the patient have all medications ordered at discharge? Yes   Is the patient taking all medications as directed (includes completed medication regime)? Yes   Comments regarding appointments Seen by Dr Mendoza yesterday.   Does the patient have a primary care provider?  Yes   Does the patient have an appointment with their PCP within 7 days of discharge? Yes   Has the patient kept scheduled appointments due by today? Yes   Comments Will start cardiac rehab next week.   Has home health visited the patient within 72 hours of discharge? N/A   Psychosocial issues? No   Did the patient receive a copy of their discharge instructions? Yes   Nursing interventions Reviewed instructions with patient   What is the patient's perception of their health status since discharge? Improving   Is the patient/caregiver able to teach back signs and symptoms related to disease process for when to call PCP? Yes   Is the patient/caregiver able to teach back signs and symptoms related to disease process for when to call 911? Yes   Is the patient/caregiver able to teach back the hierarchy of who to call/visit for symptoms/problems? PCP, Specialist, Home health nurse, Urgent Care, ED, 911 Yes   If the patient is a current smoker, are they able to teach back resources for cessation?  Not a smoker   Additional teach back comments Doing much better now.   Week 2 Call Completed? Yes   Graduated Yes   Is the patient interested in additional calls from an ambulatory ?  NOTE:  applies to high risk patients requiring additional follow-up. No   Did the patient feel the follow up calls were helpful during their recovery period? No   Was the number of calls appropriate? No   Wrap up additional comments No questions or issues at this time.          Ruperto HUMPHRIES - Registered Nurse

## 2023-05-26 DIAGNOSIS — I48.0 PAROXYSMAL ATRIAL FIBRILLATION: ICD-10-CM

## 2023-05-26 DIAGNOSIS — D62 POSTOPERATIVE ANEMIA DUE TO ACUTE BLOOD LOSS: ICD-10-CM

## 2023-05-26 DIAGNOSIS — I10 ESSENTIAL HYPERTENSION: Primary | Chronic | ICD-10-CM

## 2023-05-26 DIAGNOSIS — N18.32 CHRONIC RENAL FAILURE, STAGE 3B: Chronic | ICD-10-CM

## 2023-05-26 NOTE — DISCHARGE SUMMARY
CTS Discharge Summary    Patient Care Team:  Waldemar Trejo MD as PCP - General  Waldemar Trejo MD as PCP - Family Medicine  Carlie Gill, MARCELO as Ambulatory  ([facility])  Consults:   Consults     No orders found from 4/15/2023 to 5/15/2023.          Date of Admission: 5/14/2023  7:24 PM  Date of Discharge:  5/16/23    Discharge Diagnosis  Past Medical History:   Diagnosis Date   • Anemia    • Aortic stenosis    • Arthritis    • Back pain    • CancerTesticular/Colon     test -1982  Colon -2005   • CHF (congestive heart failure)    • Coronary artery disease    • COVID-19 2022   • DDD (degenerative disc disease), lumbosacral    • Diabetes mellitus    • Dyspnea    • Elevated cholesterol    • Erectile dysfunction    • GERD (gastroesophageal reflux disease)    • History of transfusion    • Hyperlipidemia    • Hypertension    • TIA (transient ischemic attack)      Irregular heart rate [I49.9]  Ventricular arrhythmia [I49.9]     Procedures Performed  Procedure(s):  Pacemaker DC - [facility]    History of Present Illness  The patient had a TAVR done on 8 May.  The patient was discharged the following day.  The patient had a slight bump in his renal function but has felt reasonably well the last few days.  He this afternoon while sitting on the couch developed lightheadedness and had her fast heartbeat.  He was taken to the [place] emergency room.  When Cardizem was initiated he went into V. tach ventricular fibrillation.  He coded for approximately 15 minutes.  He regained consciousness and was completely lucid.  He then had another short episode of ventricular fibrillation and regained consciousness spontaneously in a few seconds.  Discussions were held with the emergency room physicians Dr. Mendoza and myself.  He was started on amiodarone and has been transferred here by the air ambulance.  He is completely lucid and is comfortable at this time.  He does have some  tenderness from the compressions.    5/15 Cardiology consulted for heart block, pacemaker placed by Dr. Mendoza   5/16 The patient met discharge criteria and was discharged to home      Discharge Medications     Discharge Medications      Continue These Medications      Instructions Start Date   aspirin 81 MG EC tablet   81 mg, Oral, Daily      carvedilol 3.125 MG tablet  Commonly known as: COREG   3.125 mg, Oral, 2 Times Daily      coenzyme Q10 100 MG capsule   200 mg, Oral, Daily      docusate sodium 100 MG capsule  Commonly known as: Colace   100 mg, Oral, 2 Times Daily      HYDROcodone-acetaminophen 5-325 MG per tablet  Commonly known as: NORCO   1 tablet, Oral, Every 8 Hours PRN      losartan 50 MG tablet  Commonly known as: COZAAR   Take 1/2 tablet by mouth 2 (Two) Times a Day.      MAGNESIUM CITRATE PO   250 mg, Oral, Daily      multivitamin with minerals tablet tablet   1 tablet, Oral, Daily      omeprazole 40 MG capsule  Commonly known as: priLOSEC   40 mg, Oral, Daily      rosuvastatin 20 MG tablet  Commonly known as: CRESTOR   20 mg, Oral, Daily      vitamin D 1.25 MG (50913 UT) capsule capsule  Commonly known as: ERGOCALCIFEROL   50,000 Units, Oral, Every 7 Days         ASK your doctor about these medications      Instructions Start Date   Xultophy 100-3.6 UNIT-MG/ML solution pen-injector subcutaneous pen  Generic drug: Insulin Degludec-Liraglutide   50 Units, Subcutaneous, Daily             Discharge Diet:     Activity at Discharge:   Activity Instructions     Bathing Restrictions      Showering is permitted. No tub baths, swimming pools, hot tubs until your surgeon approves.    Type of Restriction: Bathing    Bathing Restrictions: No Tub Bath    Driving Restrictions      Type of Restriction: Driving    Driving Restrictions: No Driving (Time Limited)    Length: 1 Week    Lifting Restrictions      Type of Restriction: Lifting    Lifting Restrictions: Other    Explain Lifing Restrictions: Do not  lift anything heavier than 10 pounds for 1 week (a gallon of milk weighs 8 pounds).    Other Activity Instructions      Walking is one of the best ways to get   stronger after your TAVR. Start with a 5  minute walk 3-4 times a day. Walk a little   more each day.     Climbing stairs at a slow pace is okay          Follow-up Appointments  Future Appointments   Date Time Provider Department Center   5/30/2023 11:00 AM CHAIR 5 COR CARD REHAB  COR CAR COR   6/5/2023  2:45 PM Arben Lopez MD [facility]   6/19/2023  8:30 AM COR ODC ECHO/VAS CART 4  COR NLA COR   7/24/2023  4:30 PM Waldemar Trejo MD [facility]   8/14/2023 10:00 AM Carlie Adrian APRN [facility] IC CORBN COR        WOUND CARE:  Monitor surgical wounds daily. Keep incisons clean and dry.   Call CT Surgery office [phone]  with any questions or concerns, specifically let them know of increased redness, drainage, or opening up of incision.       Marie Bello PA-C  05/26/23  13:17 EDT

## 2023-05-30 ENCOUNTER — DOCUMENTATION (OUTPATIENT)
Dept: CARDIAC REHAB | Facility: HOSPITAL | Age: 78
End: 2023-05-30

## 2023-05-30 NOTE — PROGRESS NOTES
Patient spouse called and stated they needed to put Cardiac Rehab on hold for now until he sees MD about incision site, She is to call back and reschedule after treatment plan for incision is decided.

## 2023-05-31 ENCOUNTER — LAB (OUTPATIENT)
Dept: FAMILY MEDICINE CLINIC | Facility: CLINIC | Age: 78
End: 2023-05-31

## 2023-05-31 DIAGNOSIS — I10 ESSENTIAL HYPERTENSION: ICD-10-CM

## 2023-05-31 DIAGNOSIS — I48.0 PAROXYSMAL ATRIAL FIBRILLATION: ICD-10-CM

## 2023-05-31 DIAGNOSIS — D62 POSTOPERATIVE ANEMIA DUE TO ACUTE BLOOD LOSS: ICD-10-CM

## 2023-05-31 DIAGNOSIS — N18.32 CHRONIC RENAL FAILURE, STAGE 3B: ICD-10-CM

## 2023-05-31 PROCEDURE — 80053 COMPREHEN METABOLIC PANEL: CPT | Performed by: GENERAL PRACTICE

## 2023-05-31 PROCEDURE — 82728 ASSAY OF FERRITIN: CPT | Performed by: GENERAL PRACTICE

## 2023-05-31 PROCEDURE — 83540 ASSAY OF IRON: CPT | Performed by: GENERAL PRACTICE

## 2023-05-31 PROCEDURE — 82607 VITAMIN B-12: CPT | Performed by: GENERAL PRACTICE

## 2023-05-31 PROCEDURE — 85025 COMPLETE CBC W/AUTO DIFF WBC: CPT | Performed by: GENERAL PRACTICE

## 2023-05-31 PROCEDURE — 36415 COLL VENOUS BLD VENIPUNCTURE: CPT

## 2023-05-31 PROCEDURE — 84466 ASSAY OF TRANSFERRIN: CPT | Performed by: GENERAL PRACTICE

## 2023-06-01 ENCOUNTER — OFFICE VISIT (OUTPATIENT)
Dept: CARDIAC SURGERY | Facility: CLINIC | Age: 78
End: 2023-06-01

## 2023-06-01 VITALS
TEMPERATURE: 97.9 F | HEART RATE: 72 BPM | HEIGHT: 68 IN | OXYGEN SATURATION: 98 % | DIASTOLIC BLOOD PRESSURE: 75 MMHG | SYSTOLIC BLOOD PRESSURE: 170 MMHG | BODY MASS INDEX: 34.4 KG/M2 | WEIGHT: 227 LBS

## 2023-06-01 DIAGNOSIS — Z95.2 S/P TAVR (TRANSCATHETER AORTIC VALVE REPLACEMENT): ICD-10-CM

## 2023-06-01 DIAGNOSIS — I73.9 PERIPHERAL VASCULAR DISEASE: Primary | ICD-10-CM

## 2023-06-01 LAB
ALBUMIN SERPL-MCNC: 3.3 G/DL (ref 3.5–5.2)
ALBUMIN/GLOB SERPL: 1.1 G/DL
ALP SERPL-CCNC: 98 U/L (ref 39–117)
ALT SERPL W P-5'-P-CCNC: 9 U/L (ref 1–41)
ANION GAP SERPL CALCULATED.3IONS-SCNC: 9 MMOL/L (ref 5–15)
AST SERPL-CCNC: 19 U/L (ref 1–40)
BASOPHILS # BLD AUTO: 0.05 10*3/MM3 (ref 0–0.2)
BASOPHILS NFR BLD AUTO: 0.7 % (ref 0–1.5)
BILIRUB SERPL-MCNC: 0.4 MG/DL (ref 0–1.2)
BUN SERPL-MCNC: 23 MG/DL (ref 8–23)
BUN/CREAT SERPL: 12.2 (ref 7–25)
CALCIUM SPEC-SCNC: 8.7 MG/DL (ref 8.6–10.5)
CHLORIDE SERPL-SCNC: 110 MMOL/L (ref 98–107)
CO2 SERPL-SCNC: 23 MMOL/L (ref 22–29)
CREAT SERPL-MCNC: 1.88 MG/DL (ref 0.76–1.27)
DEPRECATED RDW RBC AUTO: 44.4 FL (ref 37–54)
EGFRCR SERPLBLD CKD-EPI 2021: 36.3 ML/MIN/1.73
EOSINOPHIL # BLD AUTO: 0.5 10*3/MM3 (ref 0–0.4)
EOSINOPHIL NFR BLD AUTO: 6.6 % (ref 0.3–6.2)
ERYTHROCYTE [DISTWIDTH] IN BLOOD BY AUTOMATED COUNT: 14.4 % (ref 12.3–15.4)
FERRITIN SERPL-MCNC: 81.7 NG/ML (ref 30–400)
GLOBULIN UR ELPH-MCNC: 2.9 GM/DL
GLUCOSE SERPL-MCNC: 114 MG/DL (ref 65–99)
HCT VFR BLD AUTO: 30.5 % (ref 37.5–51)
HGB BLD-MCNC: 9.9 G/DL (ref 13–17.7)
IMM GRANULOCYTES # BLD AUTO: 0.02 10*3/MM3 (ref 0–0.05)
IMM GRANULOCYTES NFR BLD AUTO: 0.3 % (ref 0–0.5)
IRON 24H UR-MRATE: 15 MCG/DL (ref 59–158)
LYMPHOCYTES # BLD AUTO: 1.13 10*3/MM3 (ref 0.7–3.1)
LYMPHOCYTES NFR BLD AUTO: 14.8 % (ref 19.6–45.3)
MCH RBC QN AUTO: 27.6 PG (ref 26.6–33)
MCHC RBC AUTO-ENTMCNC: 32.5 G/DL (ref 31.5–35.7)
MCV RBC AUTO: 85 FL (ref 79–97)
MONOCYTES # BLD AUTO: 0.62 10*3/MM3 (ref 0.1–0.9)
MONOCYTES NFR BLD AUTO: 8.1 % (ref 5–12)
NEUTROPHILS NFR BLD AUTO: 5.31 10*3/MM3 (ref 1.7–7)
NEUTROPHILS NFR BLD AUTO: 69.5 % (ref 42.7–76)
NRBC BLD AUTO-RTO: 0.1 /100 WBC (ref 0–0.2)
PLATELET # BLD AUTO: 209 10*3/MM3 (ref 140–450)
PMV BLD AUTO: 11.2 FL (ref 6–12)
POTASSIUM SERPL-SCNC: 4.3 MMOL/L (ref 3.5–5.2)
PROT SERPL-MCNC: 6.2 G/DL (ref 6–8.5)
RBC # BLD AUTO: 3.59 10*6/MM3 (ref 4.14–5.8)
SODIUM SERPL-SCNC: 142 MMOL/L (ref 136–145)
TRANSFERRIN SERPL-MCNC: 227 MG/DL (ref 200–360)
VIT B12 BLD-MCNC: 508 PG/ML (ref 211–946)
WBC NRBC COR # BLD: 7.63 10*3/MM3 (ref 3.4–10.8)

## 2023-06-01 RX ORDER — LANOLIN ALCOHOL/MO/W.PET/CERES
325 CREAM (GRAM) TOPICAL DAILY
Qty: 90 TABLET | Refills: 0 | Status: SHIPPED | OUTPATIENT
Start: 2023-06-01

## 2023-06-01 NOTE — PROGRESS NOTES
06/01/2023  Patient Information  Nicholas Yin                                                                                          [address]  [place] [address]   1945  'PCP/Referring Physician'  Waldemar Trejo MD  [phone]  No ref. provider found    Chief Complaint   Patient presents with   • Post-op     Hospital follow up s/p TAVR and left femoral endarterectomy 5/8/2023. Complains of drainage from left groin incision site.       History of Present Illness:    The patient is a 78-year-old male who returns today for follow-up after a TAVR and left femoral endarerectomy. He has been having drainage from his left femoral area.       Patient Active Problem List   Diagnosis   • DDD (degenerative disc disease), lumbar   • GERD   • H/O testicular cancer   • Mixed hyperlipidemia   • Essential hypertension   • T2DM    • Vitamin D deficiency   • Encounter for immunization   • Healthcare maintenance   • Left shoulder pain   • History of hepatitis C   • Vasculogenic erectile dysfunction   • Benign prostatic hyperplasia with nocturia   • Amaurosis fugax of right eye   • CKD (baseline Cr 2-2.3)     • Peripheral vascular disease   • CAD s/p stent    • Osteopenia of multiple sites   • History of nonmelanoma skin cancer   • History of colon cancer   • Class 1 obesity with serious comorbidity and body mass index (BMI) of 32.0 to 32.9 in adult   • S/P TAVR (transcatheter aortic valve replacement)   • Hematoma   • Postoperative anemia due to acute blood loss   • Atrial fibrillation w/ RVR   • Pacemaker   • Postoperative complete heart block     Past Medical History:   Diagnosis Date   • Anemia    • Aortic stenosis    • Arthritis    • Back pain    • CancerTesticular/Colon     test -1982  Colon -2005   • CHF (congestive heart failure)    • Coronary artery disease    • COVID-19 2022   • DDD (degenerative disc disease), lumbosacral    • Diabetes mellitus    • Dyspnea    • Elevated cholesterol    •  Erectile dysfunction    • GERD (gastroesophageal reflux disease)    • History of transfusion    • Hyperlipidemia    • Hypertension    • TIA (transient ischemic attack)      Past Surgical History:   Procedure Laterality Date   • AORTIC VALVE REPAIR/REPLACEMENT N/A 5/8/2023    Procedure: TRANSCATHETER AORTIC VALVE REPLACEMENT + LAURIE, LEFT FEMORAL ENDARTERECTOMY WITH PATCH;  Surgeon: Arben Lopez MD;  Location: [facility];  Service: Cardiothoracic;  Laterality: N/A;   • AORTIC VALVE REPAIR/REPLACEMENT N/A 5/8/2023    Procedure: Transfemoral Transcatheter Aortic Valve Replacement;  Surgeon: Rachael Mendoza MD;  Location: [facility];  Service: Cardiovascular;  Laterality: N/A;   • CARDIAC CATHETERIZATION N/A 08/15/2019    Procedure: Left Heart Cath;  Surgeon: Paul Villatoro MD;  Location: [facility] INVASIVE LOCATION;  Service: Cardiology   • CARDIAC CATHETERIZATION      04/10/2023 PER DR. MENDOZA   • CARDIAC ELECTROPHYSIOLOGY PROCEDURE N/A 5/15/2023    Procedure: Pacemaker DC - [employer];  Surgeon: Rachael Mendoza MD;  Location: [facility] CATH INVASIVE LOCATION;  Service: Cardiology;  Laterality: N/A;   • CATARACT EXTRACTION, BILATERAL     • COLON SURGERY      colon resection for cancer - sigmoid removed - no chemo no radiation   • COLONOSCOPY     • COLONOSCOPY N/A 01/27/2021    Procedure: COLONOSCOPY;  Surgeon: Greg Barraza MD;  [facility];  Service: General;  Laterality: N/A;   • CORONARY STENT PLACEMENT      1 STENT   • ENDOSCOPY  1989   • EYE SURGERY Right     retinal detachment   • MO RT/LT HEART CATHETERS N/A 08/15/2019    Procedure: Percutaneous Coronary Intervention;  Surgeon: Paul Villatoro MD;  Location: [facility] INVASIVE LOCATION;  Service: Cardiology   • SCALP LESION REMOVAL W/ FLAP AND SKIN GRAFT  12/16/2022    basal cell removal   • SHOULDER ARTHROSCOPY Right    • SKIN LESION EXCISION N/A 01/27/2021     Procedure: SKIN LESIONS EXCISION FROM LEFT TEMPLE AREA AND ABDOMEN.;  Surgeon: Greg Barraza MD;  Location: [facility] OR;  Service: General;  Laterality: N/A;   • VASECTOMY Right     testicle removed d/t cancer - with radiation       Current Outpatient Medications:   •  aspirin 81 MG EC tablet, Take 1 tablet by mouth Daily., Disp: , Rfl:   •  carvedilol (COREG) 3.125 MG tablet, Take 1 tablet by mouth 2 (Two) Times a Day., Disp: 180 tablet, Rfl: 3  •  coenzyme Q10 100 MG capsule, Take 2 capsules by mouth Daily., Disp: , Rfl:   •  dapagliflozin Propanediol (Farxiga) 10 MG tablet, Take 1 tablet by mouth Daily., Disp: 90 tablet, Rfl: 3  •  docusate sodium (Colace) 100 MG capsule, Take 1 capsule by mouth 2 (Two) Times a Day., Disp: 72 capsule, Rfl: 0  •  ferrous sulfate 325 (65 FE) MG EC tablet, Take 1 tablet by mouth Daily., Disp: 90 tablet, Rfl: 0  •  HYDROcodone-acetaminophen (NORCO) 5-325 MG per tablet, Take 1 tablet by mouth Every 8 (Eight) Hours As Needed for Moderate Pain., Disp: 15 tablet, Rfl: 0  •  losartan (COZAAR) 50 MG tablet, Take 1/2 tablet by mouth 2 (Two) Times a Day., Disp: 60 tablet, Rfl: 11  •  MAGNESIUM CITRATE PO, Take 250 mg by mouth Daily., Disp: , Rfl:   •  multivitamin with minerals tablet tablet, Take 1 tablet by mouth Daily., Disp: , Rfl:   •  omeprazole (priLOSEC) 40 MG capsule, Take 1 capsule by mouth Daily., Disp: , Rfl:   •  rivaroxaban (XARELTO) 15 MG tablet, Take 1 tablet by mouth Daily., Disp: 90 tablet, Rfl: 3  •  rosuvastatin (CRESTOR) 20 MG tablet, Take 1 tablet by mouth Daily., Disp: 90 tablet, Rfl: 3  •  terazosin (HYTRIN) 5 MG capsule, Take 1 capsule by mouth Every Night. After 1 week, increase dose to 2 capsules every night if SBP is > 140 mmHg., Disp: 60 capsule, Rfl: 3  •  vitamin D (ERGOCALCIFEROL) 1.25 MG (69951 UT) capsule capsule, Take 1 capsule by mouth Every 7 (Seven) Days., Disp: 12 capsule, Rfl: 1  •  Xultophy 100-3.6 UNIT-MG/ML solution pen-injector subcutaneous  "pen, Inject 50 Units under the skin into the appropriate area as directed Daily. (Patient taking differently: Inject 32 Units under the skin into the appropriate area as directed Daily.), Disp: 15 mL, Rfl: 5  No current facility-administered medications for this visit.    Facility-Administered Medications Ordered in Other Visits:   •  Chlorhexidine Gluconate Cloth 2 % pads 1 application, 1 application, Topical, Q12H PRN, Gayle, Nneka, APRN  Allergies   Allergen Reactions   • Ct Contrast Anaphylaxis   • Iodinated Contrast Media Shortness Of Breath     Social History     Socioeconomic History   • Marital status:      Spouse name: lita   • Number of children: 3   • Years of education: 16   Tobacco Use   • Smoking status: Never     Passive exposure: Never   • Smokeless tobacco: Never   Vaping Use   • Vaping Use: Never used   Substance and Sexual Activity   • Alcohol use: Yes     Comment: occ   • Drug use: No   • Sexual activity: Defer     Partners: Female     Family History   Problem Relation Age of Onset   • Stroke Mother    • Hypertension Mother    • Alcohol abuse Mother    • COPD Mother    • COPD Father    • Alcohol abuse Father    • Hypertension Father    • Alzheimer's disease Father    • Heart disease Brother    • Hypertension Brother    • Diabetes Brother    • Stroke Maternal Grandfather    • Cancer Paternal Grandmother      ROS  Vitals:    06/01/23 1316   BP: 170/75   BP Location: Right arm   Patient Position: Sitting   Pulse: 72   Temp: 97.9 °F (36.6 °C)   SpO2: 98%   Weight: 103 kg (227 lb)   Height: 172.7 cm (68\")      Physical Exam   CONSTITUTIONAL: Oriented x3, well-nourished, well developed, in no acute distress.  EYES:    Eyes anicteric.  EOMI.  PERRLA.   ENT:                Good dentition.  NECK:    Supple without masses or thyromegaly.  LUNGS:           Decreased in the bases; no wheezing, rales or rhonchi.  CARDIOVASCULAR:  Regular rate and rhythm without murmur, rub or gallop.  There are " no carotid bruits.  GI:     Abdomen is soft, nontender, nondistended with normal bowel sounds.  No hepatosplenomegaly and no masses.  EXTREMITIES:   No cyanosis, clubbing or edema.  SKIN:   No ulcerations, petechiae or bruising.  MUSCULOSKELETAL:  Full range of motion with no deformity of extremities.  NEURO:  Cranial nerves II-XII, motor and sensory exams are intact.  PSYCH:  Alert and oriented; mood and affect good.    The ROS, surgical history, family history, social history and vitals were reviewed by myself and corrected as needed.        Assessment/Plan:    Mr. Yin is a 78-year-old male who returns today for follow-up. He has drainage from his incision. The incision was opened with a 15 blade and he had a large pocket of fluid. It looked non-infected. This was drained and cleaned out and packed with a kerlix dressing with an ABD placed on incision. A prescription [facility] given [facility] [place] to provide dressing changes every 12 hours. His wife is a nurse practitioner so she will change part of these. We will see him back in follow-up in approximately two weeks.     Patient Active Problem List   Diagnosis   • DDD (degenerative disc disease), lumbar   • GERD   • H/O testicular cancer   • Mixed hyperlipidemia   • Essential hypertension   • T2DM    • Vitamin D deficiency   • Encounter for immunization   • Healthcare maintenance   • Left shoulder pain   • History of hepatitis C   • Vasculogenic erectile dysfunction   • Benign prostatic hyperplasia with nocturia   • Amaurosis fugax of right eye   • CKD (baseline Cr 2-2.3)     • Peripheral vascular disease   • CAD s/p stent    • Osteopenia of multiple sites   • History of nonmelanoma skin cancer   • History of colon cancer   • Class 1 obesity with serious comorbidity and body mass index (BMI) of 32.0 to 32.9 in adult   • S/P TAVR (transcatheter aortic valve replacement)   • Hematoma   • Postoperative anemia due to acute blood loss   • Atrial  fibrillation w/ RVR   • Pacemaker   • Postoperative complete heart block       CC: MD Linda Brooks editing for Arben Lopez MD      I, Arben Lopez MD, have read and agree with the editing done by Linda Ramos, .

## 2023-06-05 ENCOUNTER — HOSPITAL ENCOUNTER (OUTPATIENT)
Dept: WOUND CARE | Facility: HOSPITAL | Age: 78
Discharge: HOME | End: 2023-06-05
Admitting: NURSE PRACTITIONER
Payer: COMMERCIAL

## 2023-06-05 ENCOUNTER — TELEPHONE (OUTPATIENT)
Dept: CARDIAC SURGERY | Facility: CLINIC | Age: 78
End: 2023-06-05

## 2023-06-05 VITALS
HEART RATE: 67 BPM | DIASTOLIC BLOOD PRESSURE: 80 MMHG | SYSTOLIC BLOOD PRESSURE: 135 MMHG | RESPIRATION RATE: 17 BRPM | TEMPERATURE: 98 F

## 2023-06-05 DIAGNOSIS — T14.8XXA OPEN WOUND: Primary | ICD-10-CM

## 2023-06-05 PROCEDURE — 87205 SMEAR GRAM STAIN: CPT | Performed by: NURSE PRACTITIONER

## 2023-06-05 PROCEDURE — G0463 HOSPITAL OUTPT CLINIC VISIT: HCPCS

## 2023-06-05 PROCEDURE — 87070 CULTURE OTHR SPECIMN AEROBIC: CPT | Performed by: NURSE PRACTITIONER

## 2023-06-05 RX ORDER — LIDOCAINE HYDROCHLORIDE 20 MG/ML
10 INJECTION, SOLUTION INFILTRATION; PERINEURAL ONCE
Status: CANCELLED | OUTPATIENT
Start: 2023-06-05 | End: 2023-06-05

## 2023-06-05 RX ORDER — LIDOCAINE HYDROCHLORIDE AND EPINEPHRINE BITARTRATE 20; .01 MG/ML; MG/ML
10 INJECTION, SOLUTION SUBCUTANEOUS ONCE
Status: CANCELLED | OUTPATIENT
Start: 2023-06-05 | End: 2023-06-05

## 2023-06-05 RX ORDER — DIAPER,BRIEF,INFANT-TODD,DISP
1 EACH MISCELLANEOUS ONCE
Status: CANCELLED | OUTPATIENT
Start: 2023-06-05 | End: 2023-06-05

## 2023-06-05 RX ORDER — LIDOCAINE HYDROCHLORIDE 20 MG/ML
JELLY TOPICAL AS NEEDED
Status: CANCELLED | OUTPATIENT
Start: 2023-06-05

## 2023-06-05 RX ORDER — NYSTATIN 100000 [USP'U]/G
1 POWDER TOPICAL ONCE
Status: COMPLETED | OUTPATIENT
Start: 2023-06-05 | End: 2023-06-05

## 2023-06-05 RX ORDER — SODIUM HYPOCHLORITE 2.5 MG/ML
1 SOLUTION TOPICAL AS NEEDED
Status: DISCONTINUED | OUTPATIENT
Start: 2023-06-05 | End: 2023-06-06 | Stop reason: HOSPADM

## 2023-06-05 RX ORDER — LIDOCAINE HYDROCHLORIDE 20 MG/ML
6 JELLY TOPICAL ONCE
Status: CANCELLED | OUTPATIENT
Start: 2023-06-05 | End: 2023-06-05

## 2023-06-05 RX ORDER — SODIUM HYPOCHLORITE 1.25 MG/ML
1 SOLUTION TOPICAL AS NEEDED
Status: CANCELLED | OUTPATIENT
Start: 2023-06-05

## 2023-06-05 RX ORDER — LIDOCAINE HYDROCHLORIDE 20 MG/ML
6 JELLY TOPICAL ONCE
Status: COMPLETED | OUTPATIENT
Start: 2023-06-05 | End: 2023-06-05

## 2023-06-05 RX ORDER — NYSTATIN 100000 [USP'U]/G
1 POWDER TOPICAL ONCE
Status: CANCELLED | OUTPATIENT
Start: 2023-06-05 | End: 2023-06-05

## 2023-06-05 RX ORDER — CASTOR OIL AND BALSAM, PERU 788; 87 MG/G; MG/G
1 OINTMENT TOPICAL AS NEEDED
Status: CANCELLED | OUTPATIENT
Start: 2023-06-05

## 2023-06-05 RX ORDER — SODIUM HYPOCHLORITE 2.5 MG/ML
1 SOLUTION TOPICAL AS NEEDED
Status: CANCELLED | OUTPATIENT
Start: 2023-06-05

## 2023-06-05 RX ADMIN — NYSTATIN 1 APPLICATION: 100000 POWDER TOPICAL at 13:14

## 2023-06-05 RX ADMIN — HYOSCYAMINE SULFATE 473 ML: 16 SOLUTION at 12:20

## 2023-06-05 RX ADMIN — LIDOCAINE HYDROCHLORIDE 6 ML: 20 JELLY TOPICAL at 12:20

## 2023-06-05 NOTE — TELEPHONE ENCOUNTER
Kanika with [facility] wound care called stating that groin incision is tunneling with depth. She feels wound vac could be beneficial if ok with Dr. Lopez  Spoke with Dr. Lopez- verbal ok to start wound vac.

## 2023-06-05 NOTE — PROGRESS NOTES
Wound Clinic Note  Patient Identification:  Name:  Nicholas Yin  Age:  78 y.o.  Sex:  male  :  1945  MRN:  [id number]   Visit Number:  [id number]  Primary Care Physician:  Waldemar Trejo MD     Subjective     Chief complaint:     Left groin    History of presenting illness:     Patient is a 78 y.o. male with past medical history significant for CHF, CAD, diabetes, hyperlipidemia, hypertension, and obesity. Presented today for evaluation of left groin. The patient had a TAVR done on 8 May.  The patient was discharged the following day. He was evaluated by Dr. Lopez on 2023: He has drainage from his incision. The incision was opened with a 15 blade and he had a large pocket of fluid. It looked non-infected. This was drained and cleaned out and packed with a kerlix dressing with an ABD placed on incision. He has been packing the area with kerlix and ABD pad. He reports tenderness to the area. Wound base with some yellow slough to lateral aspect and edges. Denies any fever or chills. Reports glucose levels average less than 200.     ---------------------------------------------------------------------------------------------------------------------   Review of Systems   Constitutional: Negative for chills and fever.   HENT: Negative for congestion.    Eyes: Negative for pain and redness.   Respiratory: Negative for cough and shortness of breath.    Cardiovascular: Positive for leg swelling. Negative for chest pain.   Gastrointestinal: Negative for nausea and vomiting.   Genitourinary: Negative for difficulty urinating and frequency.   Musculoskeletal: Negative for back pain and gait problem.   Skin: Positive for wound.   Neurological: Negative for dizziness and weakness.   Hematological: Does not bruise/bleed easily.   Psychiatric/Behavioral: Negative for confusion. The patient is not nervous/anxious.        ---------------------------------------------------------------------------------------------------------------------   Past Medical History:   Diagnosis Date   • Anemia    • Aortic stenosis    • Arthritis    • Back pain    • CancerTesticular/Colon     test -1982  Colon -2005   • CHF (congestive heart failure)    • Coronary artery disease    • COVID-19 2022   • DDD (degenerative disc disease), lumbosacral    • Diabetes mellitus    • Dyspnea    • Elevated cholesterol    • Erectile dysfunction    • GERD (gastroesophageal reflux disease)    • History of transfusion    • Hyperlipidemia    • Hypertension    • TIA (transient ischemic attack)      Past Surgical History:   Procedure Laterality Date   • AORTIC VALVE REPAIR/REPLACEMENT N/A 5/8/2023    Procedure: TRANSCATHETER AORTIC VALVE REPLACEMENT + LAURIE, LEFT FEMORAL ENDARTERECTOMY WITH PATCH;  Surgeon: Arben Lopez MD;  Location: [facility];  Service: Cardiothoracic;  Laterality: N/A;   • AORTIC VALVE REPAIR/REPLACEMENT N/A 5/8/2023    Procedure: Transfemoral Transcatheter Aortic Valve Replacement;  Surgeon: Rachael Mendoza MD;  Location: [facility];  Service: Cardiovascular;  Laterality: N/A;   • CARDIAC CATHETERIZATION N/A 08/15/2019    Procedure: Left Heart Cath;  Surgeon: Paul Villatoro MD;  Location: [facility] CATH INVASIVE LOCATION;  Service: Cardiology   • CARDIAC CATHETERIZATION      04/10/2023 PER DR. MENDOZA   • CARDIAC ELECTROPHYSIOLOGY PROCEDURE N/A 5/15/2023    Procedure: Pacemaker DC - [employer];  Surgeon: Rachael Mendoza MD;  Location: [facility] CATH INVASIVE LOCATION;  Service: Cardiology;  Laterality: N/A;   • CATARACT EXTRACTION, BILATERAL     • COLON SURGERY      colon resection for cancer - sigmoid removed - no chemo no radiation   • COLONOSCOPY     • COLONOSCOPY N/A 01/27/2021    Procedure: COLONOSCOPY;  Surgeon: Greg Barraza MD;  Location: [facility];  Service: General;  Laterality:  N/A;   • CORONARY STENT PLACEMENT      1 STENT   • ENDOSCOPY  1989   • EYE SURGERY Right     retinal detachment   • [place] RT/LT HEART CATHETERS N/A 08/15/2019    Procedure: Percutaneous Coronary Intervention;  Surgeon: Paul Villatoro MD;  Location: [facility] CATH INVASIVE LOCATION;  Service: Cardiology   • SCALP LESION REMOVAL W/ FLAP AND SKIN GRAFT  12/16/2022    basal cell removal   • SHOULDER ARTHROSCOPY Right    • SKIN LESION EXCISION N/A 01/27/2021    Procedure: SKIN LESIONS EXCISION FROM LEFT TEMPLE AREA AND ABDOMEN.;  Surgeon: Greg Barraza MD;  Location: [facility] OR;  Service: General;  Laterality: N/A;   • VASECTOMY Right     testicle removed d/t cancer - with radiation     Family History   Problem Relation Age of Onset   • Stroke Mother    • Hypertension Mother    • Alcohol abuse Mother    • COPD Mother    • COPD Father    • Alcohol abuse Father    • Hypertension Father    • Alzheimer's disease Father    • Heart disease Brother    • Hypertension Brother    • Diabetes Brother    • Stroke Maternal Grandfather    • Cancer Paternal Grandmother      Social History     Socioeconomic History   • Marital status:      Spouse name: lita   • Number of children: 3   • Years of education: 16   Tobacco Use   • Smoking status: Never     Passive exposure: Never   • Smokeless tobacco: Never   Vaping Use   • Vaping Use: Never used   Substance and Sexual Activity   • Alcohol use: Yes     Comment: occ   • Drug use: No   • Sexual activity: Defer     Partners: Female     ---------------------------------------------------------------------------------------------------------------------   Allergies:  Ct contrast and Iodinated contrast media  ---------------------------------------------------------------------------------------------------------------------  Objective     ---------------------------------------------------------------------------------------------------------------------   Vital  "Signs:  There were no vitals taken for this visit.  Estimated body mass index is 34.52 kg/m² as calculated from the following:    Height as of 6/1/23: 172.7 cm (68\").    Weight as of 6/1/23: 103 kg (227 lb).  There is no height or weight on file to calculate BMI.  Wt Readings from Last 3 Encounters:   06/01/23 103 kg (227 lb)   05/22/23 99.8 kg (220 lb)   05/15/23 102 kg (224 lb 13.9 oz)       ---------------------------------------------------------------------------------------------------------------------   Physical Exam  HENT:      Head: Normocephalic.      Nose: Nose normal.      Mouth/Throat:      Mouth: Mucous membranes are moist.   Eyes:      Pupils: Pupils are equal, round, and reactive to light.   Cardiovascular:      Rate and Rhythm: Tachycardia present.      Pulses: Normal pulses.   Pulmonary:      Effort: Pulmonary effort is normal.   Abdominal:      General: Abdomen is flat.   Musculoskeletal:         General: Normal range of motion.   Skin:     Capillary Refill: Capillary refill takes less than 2 seconds.   Neurological:      General: No focal deficit present.      Mental Status: He is alert and oriented to person, place, and time.   Psychiatric:         Mood and Affect: Mood normal.       Wound Assessment:  Location: Left, groin  Dressing Appearance: dressingappearance: clean  Closure: NA  Changes since last exam: this is initial exam  Etiology and classification: surgical wound dehiscence (primary closure has failed in one or more areas which are now open)  Wound bed structures/characteristics: full-thickness (subcutaneous tissue is exposed in at least a portion of the wound), moist, red, slough  Edges moist  Periwound characteristics: intact  Periwound Temperature: normal turgor and temperature  Drainage characteristics: moderate, serous   Perfusion characteristics: NA    Wound Goal (s):Closure, Epithelization, Free of infection and No further symptoms  Assessment & Plan      Patient Active " Problem List    Diagnosis    • Surgical wound dehiscence [T81.31XA], S/P TAVR (transcatheter aortic valve replacement) [Z95.2], Non-healing left groin open wound, sequela [S31.104S]  -Debridement completed see below for procedure details  -Discussed option for wound vac to insure no contraindications. Reports none. Wound vac applied, see nursing documentation for details.   -Recommend kilo protein diet 0.75g/kg/day along with vitamin C 2000mg/day, vitamin A 5000 Units/day, vitamin D3 5000 Units/day, zinc 50mg/day to help promote wound healing       • Class 1 obesity with serious comorbidity and body mass index (BMI) of 32.0 to 32.9 in adult [E66.9, Z68.32]  -An obese person?is at greater risk for wound infection and dehiscence or evisceration.    • Peripheral vascular disease [I73.9]  -Recent vascular intervetion. Following with vascular surgeon Dr. Lopez     • CKD (baseline Cr 2-2.3)   [N18.32]  -Can complicate wound healing    • T2DM  [E11.8, Z79.4]  -Recommend adequate glycemic control to help promote wound healing  -Poor glycemic control increases risk of infection, prolonged healing, loss of limb and premature death   -Primary care provider managing medications       Wound Care Procedure Note   Pre-Procedure  Pre-Procedure Diagnosis: Surgical wound dehiscence [T81.31XA], S/P TAVR (transcatheter aortic valve replacement) [Z95.2], Non-healing left groin open wound, sequela [S31.104S] with fat layer exposed  Checked for Allergies: yes  Consent:Consent obtained, consent given by Patient,Risks Discussed, Alternatives Discussed  Indication: slough  Vascular status:Pedal pulses left were found to be adequate and safe for debridment.  Time out was called prior to procedure.   Pre procedure Pain assessment: mild  Pre debridement measurements: 4.5 X 6 X 3.5 cm sinus/tunnelYes, undermining No    Post Procedure  Post-Procedure Diagnosis: Surgical wound dehiscence [T81.31XA], S/P TAVR (transcatheter aortic valve  replacement) [Z95.2], Non-healing left groin open wound, sequela [S31.104S] with fat layer exposed  Post debridement measurements: 4.6 X 6.1 X 3.6 cm, sinus/tunnelNo, undermining No  Post procedure Pain assessment: mild  Graft/Implant/Prosthetics/Implanted Device/Transplants:  None  Complication(s):  None    Procedure details:  Method of Debridement: excissional (Surgical removal or cutting away, outside or beyond the wound margin devitalized tissue, necrosis or slough.)  Procedure: The site was prepared using clean techniques, subcutaneous tissue was removed by surgical excision.  Instrument(s) used: Curette 4mm  Anesthesia:After checking patient allergies,lidocaine topical 2% was administered to provide anesthesia.  Tissue removed: subuctaneous, Percent Removed 80%  Culture or Biopsy: culture and sensitivity  Estimated Blood Loss: Small  Hemostasis Obtained: pressure    Clinical Impression:Moderate Complexity    Follow-up: 1 week    CHIDI Matthews   [facility]  06/05/23  11:48 EDT

## 2023-06-07 ENCOUNTER — HOSPITAL ENCOUNTER (OUTPATIENT)
Dept: WOUND CARE | Facility: HOSPITAL | Age: 78
Discharge: HOME | End: 2023-06-07
Payer: COMMERCIAL

## 2023-06-07 VITALS
DIASTOLIC BLOOD PRESSURE: 70 MMHG | SYSTOLIC BLOOD PRESSURE: 123 MMHG | TEMPERATURE: 98.6 F | HEART RATE: 79 BPM | RESPIRATION RATE: 17 BRPM

## 2023-06-07 DIAGNOSIS — T14.8XXA OPEN WOUND: Primary | ICD-10-CM

## 2023-06-07 LAB
BACTERIA SPEC AEROBE CULT: ABNORMAL
GRAM STN SPEC: ABNORMAL

## 2023-06-07 RX ORDER — CASTOR OIL AND BALSAM, PERU 788; 87 MG/G; MG/G
1 OINTMENT TOPICAL AS NEEDED
Status: CANCELLED | OUTPATIENT
Start: 2023-06-07

## 2023-06-07 RX ORDER — LIDOCAINE HYDROCHLORIDE 20 MG/ML
6 JELLY TOPICAL ONCE
Status: COMPLETED | OUTPATIENT
Start: 2023-06-07 | End: 2023-06-07

## 2023-06-07 RX ORDER — SODIUM HYPOCHLORITE 1.25 MG/ML
1 SOLUTION TOPICAL AS NEEDED
Status: CANCELLED | OUTPATIENT
Start: 2023-06-07

## 2023-06-07 RX ORDER — SODIUM HYPOCHLORITE 2.5 MG/ML
1 SOLUTION TOPICAL AS NEEDED
Status: CANCELLED | OUTPATIENT
Start: 2023-06-07

## 2023-06-07 RX ORDER — DIAPER,BRIEF,INFANT-TODD,DISP
1 EACH MISCELLANEOUS ONCE
Status: CANCELLED | OUTPATIENT
Start: 2023-06-07 | End: 2023-06-07

## 2023-06-07 RX ORDER — LIDOCAINE HYDROCHLORIDE AND EPINEPHRINE BITARTRATE 20; .01 MG/ML; MG/ML
10 INJECTION, SOLUTION SUBCUTANEOUS ONCE
Status: CANCELLED | OUTPATIENT
Start: 2023-06-07 | End: 2023-06-07

## 2023-06-07 RX ORDER — NYSTATIN 100000 [USP'U]/G
1 POWDER TOPICAL ONCE
Status: CANCELLED | OUTPATIENT
Start: 2023-06-07 | End: 2023-06-07

## 2023-06-07 RX ORDER — LIDOCAINE HYDROCHLORIDE 20 MG/ML
10 INJECTION, SOLUTION INFILTRATION; PERINEURAL ONCE
Status: CANCELLED | OUTPATIENT
Start: 2023-06-07 | End: 2023-06-07

## 2023-06-07 RX ORDER — NYSTATIN 100000 [USP'U]/G
1 POWDER TOPICAL ONCE
Status: COMPLETED | OUTPATIENT
Start: 2023-06-07 | End: 2023-06-07

## 2023-06-07 RX ORDER — LIDOCAINE HYDROCHLORIDE 20 MG/ML
6 JELLY TOPICAL ONCE
Status: CANCELLED | OUTPATIENT
Start: 2023-06-07 | End: 2023-06-07

## 2023-06-07 RX ORDER — LIDOCAINE HYDROCHLORIDE 20 MG/ML
JELLY TOPICAL AS NEEDED
Status: CANCELLED | OUTPATIENT
Start: 2023-06-07

## 2023-06-07 RX ADMIN — LIDOCAINE HYDROCHLORIDE 6 ML: 20 JELLY TOPICAL at 10:46

## 2023-06-07 RX ADMIN — NYSTATIN 1 APPLICATION: 100000 POWDER TOPICAL at 10:47

## 2023-06-07 NOTE — PROGRESS NOTES
Here for wound vac change. No new issues or concerns reported from patient or nursing staff. Reapplied, see nursing documentation for details

## 2023-06-09 ENCOUNTER — HOSPITAL ENCOUNTER (OUTPATIENT)
Dept: WOUND CARE | Facility: HOSPITAL | Age: 78
Discharge: HOME | End: 2023-06-09
Payer: COMMERCIAL

## 2023-06-09 DIAGNOSIS — T14.8XXA OPEN WOUND: Primary | ICD-10-CM

## 2023-06-09 PROCEDURE — 97605 NEG PRS WND THER DME<=50SQCM: CPT

## 2023-06-09 RX ORDER — LIDOCAINE HYDROCHLORIDE 20 MG/ML
6 JELLY TOPICAL ONCE
Status: COMPLETED | OUTPATIENT
Start: 2023-06-09 | End: 2023-06-09

## 2023-06-09 RX ORDER — LIDOCAINE HYDROCHLORIDE 20 MG/ML
6 JELLY TOPICAL ONCE
Status: CANCELLED | OUTPATIENT
Start: 2023-06-09 | End: 2023-06-09

## 2023-06-09 RX ORDER — DIAPER,BRIEF,INFANT-TODD,DISP
1 EACH MISCELLANEOUS ONCE
Status: CANCELLED | OUTPATIENT
Start: 2023-06-09 | End: 2023-06-09

## 2023-06-09 RX ORDER — LIDOCAINE HYDROCHLORIDE AND EPINEPHRINE BITARTRATE 20; .01 MG/ML; MG/ML
10 INJECTION, SOLUTION SUBCUTANEOUS ONCE
Status: CANCELLED | OUTPATIENT
Start: 2023-06-09 | End: 2023-06-09

## 2023-06-09 RX ORDER — NYSTATIN 100000 [USP'U]/G
1 POWDER TOPICAL ONCE
Status: CANCELLED | OUTPATIENT
Start: 2023-06-09 | End: 2023-06-09

## 2023-06-09 RX ORDER — LIDOCAINE HYDROCHLORIDE 20 MG/ML
JELLY TOPICAL AS NEEDED
Status: CANCELLED | OUTPATIENT
Start: 2023-06-09

## 2023-06-09 RX ORDER — CASTOR OIL AND BALSAM, PERU 788; 87 MG/G; MG/G
1 OINTMENT TOPICAL AS NEEDED
Status: CANCELLED | OUTPATIENT
Start: 2023-06-09

## 2023-06-09 RX ORDER — SODIUM HYPOCHLORITE 2.5 MG/ML
1 SOLUTION TOPICAL AS NEEDED
Status: CANCELLED | OUTPATIENT
Start: 2023-06-09

## 2023-06-09 RX ORDER — LIDOCAINE HYDROCHLORIDE 20 MG/ML
10 INJECTION, SOLUTION INFILTRATION; PERINEURAL ONCE
Status: CANCELLED | OUTPATIENT
Start: 2023-06-09 | End: 2023-06-09

## 2023-06-09 RX ORDER — SODIUM HYPOCHLORITE 1.25 MG/ML
1 SOLUTION TOPICAL AS NEEDED
Status: CANCELLED | OUTPATIENT
Start: 2023-06-09

## 2023-06-09 RX ADMIN — LIDOCAINE HYDROCHLORIDE 6 ML: 20 JELLY TOPICAL at 09:21

## 2023-06-12 ENCOUNTER — HOSPITAL ENCOUNTER (OUTPATIENT)
Dept: WOUND CARE | Facility: HOSPITAL | Age: 78
Discharge: HOME | End: 2023-06-12
Admitting: NURSE PRACTITIONER
Payer: COMMERCIAL

## 2023-06-12 VITALS
RESPIRATION RATE: 17 BRPM | TEMPERATURE: 98.7 F | HEART RATE: 77 BPM | SYSTOLIC BLOOD PRESSURE: 110 MMHG | DIASTOLIC BLOOD PRESSURE: 45 MMHG

## 2023-06-12 DIAGNOSIS — T14.8XXA OPEN WOUND: Primary | ICD-10-CM

## 2023-06-12 PROBLEM — S31.104S: Status: ACTIVE | Noted: 2023-06-12

## 2023-06-12 PROBLEM — T81.31XA SURGICAL WOUND DEHISCENCE: Status: ACTIVE | Noted: 2023-06-12

## 2023-06-12 RX ORDER — LIDOCAINE HYDROCHLORIDE 20 MG/ML
6 JELLY TOPICAL ONCE
Status: COMPLETED | OUTPATIENT
Start: 2023-06-12 | End: 2023-06-12

## 2023-06-12 RX ORDER — DIAPER,BRIEF,INFANT-TODD,DISP
1 EACH MISCELLANEOUS ONCE
Status: CANCELLED | OUTPATIENT
Start: 2023-06-12 | End: 2023-06-12

## 2023-06-12 RX ORDER — NYSTATIN 100000 [USP'U]/G
1 POWDER TOPICAL ONCE
Status: CANCELLED | OUTPATIENT
Start: 2023-06-12 | End: 2023-06-12

## 2023-06-12 RX ORDER — SODIUM HYPOCHLORITE 1.25 MG/ML
1 SOLUTION TOPICAL AS NEEDED
Status: CANCELLED | OUTPATIENT
Start: 2023-06-12

## 2023-06-12 RX ORDER — CASTOR OIL AND BALSAM, PERU 788; 87 MG/G; MG/G
1 OINTMENT TOPICAL AS NEEDED
Status: CANCELLED | OUTPATIENT
Start: 2023-06-12

## 2023-06-12 RX ORDER — LIDOCAINE HYDROCHLORIDE 20 MG/ML
10 INJECTION, SOLUTION INFILTRATION; PERINEURAL ONCE
Status: CANCELLED | OUTPATIENT
Start: 2023-06-12 | End: 2023-06-12

## 2023-06-12 RX ORDER — LIDOCAINE HYDROCHLORIDE 20 MG/ML
JELLY TOPICAL AS NEEDED
Status: CANCELLED | OUTPATIENT
Start: 2023-06-12

## 2023-06-12 RX ORDER — LIDOCAINE HYDROCHLORIDE AND EPINEPHRINE BITARTRATE 20; .01 MG/ML; MG/ML
10 INJECTION, SOLUTION SUBCUTANEOUS ONCE
Status: CANCELLED | OUTPATIENT
Start: 2023-06-12 | End: 2023-06-12

## 2023-06-12 RX ORDER — SODIUM HYPOCHLORITE 2.5 MG/ML
1 SOLUTION TOPICAL AS NEEDED
Status: CANCELLED | OUTPATIENT
Start: 2023-06-12

## 2023-06-12 RX ORDER — LIDOCAINE HYDROCHLORIDE 20 MG/ML
6 JELLY TOPICAL ONCE
Status: CANCELLED | OUTPATIENT
Start: 2023-06-12 | End: 2023-06-12

## 2023-06-12 RX ADMIN — LIDOCAINE HYDROCHLORIDE 6 ML: 20 JELLY TOPICAL at 09:43

## 2023-06-16 ENCOUNTER — HOSPITAL ENCOUNTER (OUTPATIENT)
Dept: WOUND CARE | Facility: HOSPITAL | Age: 78
Discharge: HOME | End: 2023-06-16
Payer: COMMERCIAL

## 2023-06-16 VITALS
TEMPERATURE: 98.7 F | HEART RATE: 66 BPM | DIASTOLIC BLOOD PRESSURE: 57 MMHG | RESPIRATION RATE: 17 BRPM | SYSTOLIC BLOOD PRESSURE: 121 MMHG

## 2023-06-16 DIAGNOSIS — T14.8XXA OPEN WOUND: Primary | ICD-10-CM

## 2023-06-16 LAB
QT INTERVAL: 442 MS
QTC INTERVAL: 473 MS

## 2023-06-16 RX ORDER — SODIUM HYPOCHLORITE 2.5 MG/ML
1 SOLUTION TOPICAL AS NEEDED
OUTPATIENT
Start: 2023-06-16

## 2023-06-16 RX ORDER — DIAPER,BRIEF,INFANT-TODD,DISP
1 EACH MISCELLANEOUS ONCE
OUTPATIENT
Start: 2023-06-16 | End: 2023-06-16

## 2023-06-16 RX ORDER — LIDOCAINE HYDROCHLORIDE 20 MG/ML
6 JELLY TOPICAL ONCE
Status: CANCELLED | OUTPATIENT
Start: 2023-06-16 | End: 2023-06-16

## 2023-06-16 RX ORDER — NYSTATIN 100000 [USP'U]/G
1 POWDER TOPICAL ONCE
OUTPATIENT
Start: 2023-06-16 | End: 2023-06-16

## 2023-06-16 RX ORDER — LIDOCAINE HYDROCHLORIDE 20 MG/ML
JELLY TOPICAL AS NEEDED
OUTPATIENT
Start: 2023-06-16

## 2023-06-16 RX ORDER — LIDOCAINE HYDROCHLORIDE 20 MG/ML
10 INJECTION, SOLUTION INFILTRATION; PERINEURAL ONCE
OUTPATIENT
Start: 2023-06-16 | End: 2023-06-16

## 2023-06-16 RX ORDER — CASTOR OIL AND BALSAM, PERU 788; 87 MG/G; MG/G
1 OINTMENT TOPICAL AS NEEDED
OUTPATIENT
Start: 2023-06-16

## 2023-06-16 RX ORDER — LIDOCAINE HYDROCHLORIDE AND EPINEPHRINE BITARTRATE 20; .01 MG/ML; MG/ML
10 INJECTION, SOLUTION SUBCUTANEOUS ONCE
OUTPATIENT
Start: 2023-06-16 | End: 2023-06-16

## 2023-06-16 RX ORDER — SODIUM HYPOCHLORITE 1.25 MG/ML
1 SOLUTION TOPICAL AS NEEDED
OUTPATIENT
Start: 2023-06-16

## 2023-06-16 RX ORDER — LIDOCAINE HYDROCHLORIDE 20 MG/ML
6 JELLY TOPICAL ONCE
Status: DISCONTINUED | OUTPATIENT
Start: 2023-06-16 | End: 2023-06-17 | Stop reason: HOSPADM

## 2023-06-18 NOTE — PROGRESS NOTES
Wound Clinic Note  Patient Identification:  Name:  Nicholas Yin  Age:  78 y.o.  Sex:  male  :  1945  MRN:  [id number]   Visit Number:  [id number]  Primary Care Physician:  Waldemar Trejo MD     Subjective     Chief complaint:     Left groin    History of presenting illness:     Patient is a 78 y.o. male with past medical history significant for CHF, CAD, diabetes, hyperlipidemia, hypertension, and obesity. Presented today for evaluation of left groin. The patient had a TAVR done on 8 May.  The patient was discharged the following day. He was evaluated by Dr. Lopez on 2023: He has drainage from his incision. The incision was opened with a 15 blade and he had a large pocket of fluid. It looked non-infected. This was drained and cleaned out and packed with a kerlix dressing with an ABD placed on incision. He has been packing the area with kerlix and ABD pad. He reports tenderness to the area. Wound base with some yellow slough to lateral aspect and edges. Denies any fever or chills. Reports glucose levels average less than 200.     Interval History:  2023: Seen in clinic today for follow-up to left groin. He has had wound vac in place and tolerating current treatment. Denies any fever or chills. He is getting wound vac changed in clinic and no reported complications.   ---------------------------------------------------------------------------------------------------------------------   Review of Systems   Constitutional: Negative for chills and fever.   HENT: Negative for congestion.    Eyes: Negative for pain and redness.   Respiratory: Negative for cough and shortness of breath.    Cardiovascular: Positive for leg swelling. Negative for chest pain.   Gastrointestinal: Negative for nausea and vomiting.   Genitourinary: Negative for difficulty urinating and frequency.   Musculoskeletal: Negative for back pain and gait problem.   Skin: Positive for wound.   Neurological:  Negative for dizziness and weakness.   Hematological: Does not bruise/bleed easily.   Psychiatric/Behavioral: Negative for confusion. The patient is not nervous/anxious.       ---------------------------------------------------------------------------------------------------------------------   Past Medical History:   Diagnosis Date   • Anemia    • Aortic stenosis    • Arthritis    • Back pain    • CancerTesticular/Colon     test -1982  Colon -2005   • CHF (congestive heart failure)    • Coronary artery disease    • COVID-19 2022   • DDD (degenerative disc disease), lumbosacral    • Diabetes mellitus    • Dyspnea    • Elevated cholesterol    • Erectile dysfunction    • GERD (gastroesophageal reflux disease)    • History of transfusion    • Hyperlipidemia    • Hypertension    • TIA (transient ischemic attack)      Past Surgical History:   Procedure Laterality Date   • AORTIC VALVE REPAIR/REPLACEMENT N/A 5/8/2023    Procedure: TRANSCATHETER AORTIC VALVE REPLACEMENT + LAURIE, LEFT FEMORAL ENDARTERECTOMY WITH PATCH;  Surgeon: Arben Lopez MD;  Location: [facility];  Service: Cardiothoracic;  Laterality: N/A;   • AORTIC VALVE REPAIR/REPLACEMENT N/A 5/8/2023    Procedure: Transfemoral Transcatheter Aortic Valve Replacement;  Surgeon: Rachael Mccord MD;  Location: [facility];  Service: Cardiovascular;  Laterality: N/A;   • CARDIAC CATHETERIZATION N/A 08/15/2019    Procedure: Left Heart Cath;  Surgeon: Paul Villatoro MD;  [facility] CATH INVASIVE [facility];  Service: Cardiology   • CARDIAC CATHETERIZATION      04/10/2023 PER DR. MCCORD   • CARDIAC ELECTROPHYSIOLOGY PROCEDURE N/A 5/15/2023    Procedure: Pacemaker DC - [employer];  Surgeon: Rachael Mccord MD;  [facility];  Service: Cardiology;  Laterality: N/A;   • CATARACT EXTRACTION, BILATERAL     • COLON SURGERY      colon resection for cancer - sigmoid removed - no chemo no  radiation   • COLONOSCOPY     • COLONOSCOPY N/A 01/27/2021    Procedure: COLONOSCOPY;  Surgeon: Greg Barraza MD;  [facility] OR;  Service: General;  Laterality: N/A;   • CORONARY STENT PLACEMENT      1 STENT   • ENDOSCOPY  1989   • EYE SURGERY Right     retinal detachment   • [place] RT/LT HEART CATHETERS N/A 08/15/2019    Procedure: Percutaneous Coronary Intervention;  Surgeon: Paul Villatoro MD;  [facility] CATH INVASIVE LOC[facility];  Service: Cardiology   • SCALP LESION REMOVAL W/ FLAP AND SKIN GRAFT  12/16/2022    basal cell removal   • SHOULDER ARTHROSCOPY Right    • SKIN LESION EXCISION N/A 01/27/2021    Procedure: SKIN LESIONS EXCISION FROM LEFT TEMPLE AREA AND ABDOMEN.;  Surgeon: Greg Barraza MD;  Location:  [facility] OR;  Service: General;  Laterality: N/A;   • VASECTOMY Right     testicle removed d/t cancer - with radiation     Family History   Problem Relation Age of Onset   • Stroke Mother    • Hypertension Mother    • Alcohol abuse Mother    • COPD Mother    • COPD Father    • Alcohol abuse Father    • Hypertension Father    • Alzheimer's disease Father    • Heart disease Brother    • Hypertension Brother    • Diabetes Brother    • Stroke Maternal Grandfather    • Cancer Paternal Grandmother      Social History     Socioeconomic History   • Marital status:      Spouse name: lita   • Number of children: 3   • Years of education: 16   Tobacco Use   • Smoking status: Never     Passive exposure: Never   • Smokeless tobacco: Never   Vaping Use   • Vaping Use: Never used   Substance and Sexual Activity   • Alcohol use: Yes     Comment: occ   • Drug use: No   • Sexual activity: Defer     Partners: Female     ---------------------------------------------------------------------------------------------------------------------   Allergies:  Ct contrast and Iodinated contrast  "media  ---------------------------------------------------------------------------------------------------------------------  Objective     ---------------------------------------------------------------------------------------------------------------------   Vital Signs:  /45   Pulse 77   Temp 98.7 °F (37.1 °C) (Infrared)   Resp 17   Estimated body mass index is 34.52 kg/m² as calculated from the following:    Height as of 6/1/23: 172.7 cm (68\").    Weight as of 6/1/23: 103 kg (227 lb).  There is no height or weight on file to calculate BMI.  Wt Readings from Last 3 Encounters:   06/01/23 103 kg (227 lb)   05/22/23 99.8 kg (220 lb)   05/15/23 102 kg (224 lb 13.9 oz)       ---------------------------------------------------------------------------------------------------------------------   Physical Exam  HENT:      Head: Normocephalic.      Nose: Nose normal.      Mouth/Throat:      Mouth: Mucous membranes are moist.   Eyes:      Pupils: Pupils are equal, round, and reactive to light.   Cardiovascular:      Rate and Rhythm: Tachycardia present.      Pulses: Normal pulses.   Pulmonary:      Effort: Pulmonary effort is normal.   Abdominal:      General: Abdomen is flat.   Musculoskeletal:         General: Normal range of motion.   Skin:     Capillary Refill: Capillary refill takes less than 2 seconds.   Neurological:      General: No focal deficit present.      Mental Status: He is alert and oriented to person, place, and time.   Psychiatric:         Mood and Affect: Mood normal.       Wound Assessment:  Location: Left, groin  Dressing Appearance: dressingappearance: clean  Closure: NA  Changes since last exam: surface area has diminished   Etiology and classification: surgical wound dehiscence (primary closure has failed in one or more areas which are now open)  Wound bed structures/characteristics: full-thickness (subcutaneous tissue is exposed in at least a portion of the wound), moist, red, slough  Edges " moist  Periwound characteristics: intact  Periwound Temperature: normal turgor and temperature  Drainage characteristics: moderate, serous   Perfusion characteristics: NA    Wound Goal (s):Closure, Epithelization, Free of infection and No further symptoms  Assessment & Plan      Patient Active Problem List    Diagnosis    • Surgical wound dehiscence [T81.31XA], S/P TAVR (transcatheter aortic valve replacement) [Z95.2], Non-healing left groin open wound, sequela [S31.104S]  -Debridement completed see below for procedure details  -Discussed option for wound vac to insure no contraindications. Reports none. Wound vac applied, see nursing documentation for details.   -Recommend kilo protein diet 0.75g/kg/day along with vitamin C 2000mg/day, vitamin A 5000 Units/day, vitamin D3 5000 Units/day, zinc 50mg/day to help promote wound healing       • Class 1 obesity with serious comorbidity and body mass index (BMI) of 32.0 to 32.9 in adult [E66.9, Z68.32]  -An obese person?is at greater risk for wound infection and dehiscence or evisceration.    • Peripheral vascular disease [I73.9]  -Recent vascular intervetion. Following with vascular surgeon Dr. Lopez     • CKD (baseline Cr 2-2.3)   [N18.32]  -Can complicate wound healing    • T2DM  [E11.8, Z79.4]  -Recommend adequate glycemic control to help promote wound healing  -Poor glycemic control increases risk of infection, prolonged healing, loss of limb and premature death   -Primary care provider managing medications       Wound Care Procedure Note   Pre-Procedure  Pre-Procedure Diagnosis: Surgical wound dehiscence [T81.31XA], S/P TAVR (transcatheter aortic valve replacement) [Z95.2], Non-healing left groin open wound, sequela [S31.104S] with fat layer exposed  Checked for Allergies: yes  Consent:Consent obtained, consent given by Patient,Risks Discussed, Alternatives Discussed  Indication: slough  Vascular status:Pedal pulses left were found to be adequate and safe for  debridment.  Time out was called prior to procedure.   Pre procedure Pain assessment: mild  Pre debridement measurements: 3 X 5 X 1.5 cm sinus/tunnelYes, undermining No    Post Procedure  Post-Procedure Diagnosis: Surgical wound dehiscence [T81.31XA], S/P TAVR (transcatheter aortic valve replacement) [Z95.2], Non-healing left groin open wound, sequela [S31.104S] with fat layer exposed  Post debridement measurements: 3.1 X 5.1 X 1.6 cm, sinus/tunnelNo, undermining No  Post procedure Pain assessment: mild  Graft/Implant/Prosthetics/Implanted Device/Transplants:  None  Complication(s):  None    Procedure details:  Method of Debridement: excissional (Surgical removal or cutting away, outside or beyond the wound margin devitalized tissue, necrosis or slough.)  Procedure: The site was prepared using clean techniques, subcutaneous tissue was removed by surgical excision.  Instrument(s) used: Curette 4mm  Anesthesia:After checking patient allergies,lidocaine topical 2% was administered to provide anesthesia.  Tissue removed: subuctaneous, Percent Removed 80%  Culture or Biopsy: culture and sensitivity  Estimated Blood Loss: Small  Hemostasis Obtained: pressure    Clinical Impression:Moderate Complexity    Follow-up: 1 week    CHIDI Matthews   [facility]  06/12/2023  [id number]

## 2023-06-19 ENCOUNTER — HOSPITAL ENCOUNTER (OUTPATIENT)
Dept: WOUND CARE | Facility: HOSPITAL | Age: 78
Discharge: HOME | End: 2023-06-19
Payer: COMMERCIAL

## 2023-06-19 DIAGNOSIS — T14.8XXA OPEN WOUND: Primary | ICD-10-CM

## 2023-06-19 RX ORDER — LIDOCAINE HYDROCHLORIDE 20 MG/ML
JELLY TOPICAL AS NEEDED
OUTPATIENT
Start: 2023-06-19

## 2023-06-19 RX ORDER — LIDOCAINE HYDROCHLORIDE 20 MG/ML
6 JELLY TOPICAL ONCE
Status: COMPLETED | OUTPATIENT
Start: 2023-06-19 | End: 2023-06-19

## 2023-06-19 RX ORDER — LIDOCAINE HYDROCHLORIDE AND EPINEPHRINE BITARTRATE 20; .01 MG/ML; MG/ML
10 INJECTION, SOLUTION SUBCUTANEOUS ONCE
OUTPATIENT
Start: 2023-06-19 | End: 2023-06-19

## 2023-06-19 RX ORDER — LIDOCAINE HYDROCHLORIDE 20 MG/ML
6 JELLY TOPICAL ONCE
Status: CANCELLED | OUTPATIENT
Start: 2023-06-19 | End: 2023-06-19

## 2023-06-19 RX ORDER — CASTOR OIL AND BALSAM, PERU 788; 87 MG/G; MG/G
1 OINTMENT TOPICAL AS NEEDED
OUTPATIENT
Start: 2023-06-19

## 2023-06-19 RX ORDER — NYSTATIN 100000 [USP'U]/G
1 POWDER TOPICAL ONCE
OUTPATIENT
Start: 2023-06-19 | End: 2023-06-19

## 2023-06-19 RX ORDER — SODIUM HYPOCHLORITE 2.5 MG/ML
1 SOLUTION TOPICAL AS NEEDED
OUTPATIENT
Start: 2023-06-19

## 2023-06-19 RX ORDER — SODIUM HYPOCHLORITE 1.25 MG/ML
1 SOLUTION TOPICAL AS NEEDED
OUTPATIENT
Start: 2023-06-19

## 2023-06-19 RX ORDER — DIAPER,BRIEF,INFANT-TODD,DISP
1 EACH MISCELLANEOUS ONCE
OUTPATIENT
Start: 2023-06-19 | End: 2023-06-19

## 2023-06-19 RX ORDER — LIDOCAINE HYDROCHLORIDE 20 MG/ML
10 INJECTION, SOLUTION INFILTRATION; PERINEURAL ONCE
OUTPATIENT
Start: 2023-06-19 | End: 2023-06-19

## 2023-06-19 RX ADMIN — LIDOCAINE HYDROCHLORIDE 6 ML: 20 JELLY TOPICAL at 09:12

## 2023-06-19 NOTE — PROGRESS NOTES
Wound Clinic Note  Patient Identification:  Name:  Nicholas Yin  Age:  78 y.o.  Sex:  male  :  1945  MRN:  [id number]   Visit Number:  [id number]  Primary Care Physician:  Waldemar Trejo MD     Subjective     Chief complaint:     Left groin    History of presenting illness:     Patient is a 78 y.o. male with past medical history significant for CHF, CAD, diabetes, hyperlipidemia, hypertension, and obesity. Presented today for evaluation of left groin. The patient had a TAVR done on 8 May.  The patient was discharged the following day. He was evaluated by Dr. Lopez on 2023: He has drainage from his incision. The incision was opened with a 15 blade and he had a large pocket of fluid. It looked non-infected. This was drained and cleaned out and packed with a kerlix dressing with an ABD placed on incision. He has been packing the area with kerlix and ABD pad. He reports tenderness to the area. Wound base with some yellow slough to lateral aspect and edges. Denies any fever or chills. Reports glucose levels average less than 200.     Interval History:  2023: Seen in clinic today for follow-up to left groin. He has had wound vac in place and tolerating current treatment. Denies any fever or chills. He is getting wound vac changed in clinic and no reported complications.     2023: Seen in clinic today for follow-up to left groin. He continues with wound vac therapy. Denies any new issues or concerns. Tolerating without complications. Denies any fever or chills. Base with moist red tissue, small area of slough is present. Moderate amount of drainage.   ---------------------------------------------------------------------------------------------------------------------   Review of Systems   Constitutional: Negative for chills and fever.   HENT: Negative for congestion.    Eyes: Negative for pain and redness.   Respiratory: Negative for cough and shortness of breath.     Cardiovascular: Positive for leg swelling. Negative for chest pain.   Gastrointestinal: Negative for nausea and vomiting.   Genitourinary: Negative for difficulty urinating and frequency.   Musculoskeletal: Negative for back pain and gait problem.   Skin: Positive for wound.   Neurological: Negative for dizziness and weakness.   Hematological: Does not bruise/bleed easily.   Psychiatric/Behavioral: Negative for confusion. The patient is not nervous/anxious.       ---------------------------------------------------------------------------------------------------------------------   Past Medical History:   Diagnosis Date   • Anemia    • Aortic stenosis    • Arthritis    • Back pain    • CancerTesticular/Colon     test -1982  Colon -2005   • CHF (congestive heart failure)    • Coronary artery disease    • COVID-19 2022   • DDD (degenerative disc disease), lumbosacral    • Diabetes mellitus    • Dyspnea    • Elevated cholesterol    • Erectile dysfunction    • GERD (gastroesophageal reflux disease)    • History of transfusion    • Hyperlipidemia    • Hypertension    • TIA (transient ischemic attack)      Past Surgical History:   Procedure Laterality Date   • AORTIC VALVE REPAIR/REPLACEMENT N/A 5/8/2023    Procedure: TRANSCATHETER AORTIC VALVE REPLACEMENT + LAURIE, LEFT FEMORAL ENDARTERECTOMY WITH PATCH;  Surgeon: Arben Lopez MD;  Location: [facility];  Service: Cardiothoracic;  Laterality: N/A;   • AORTIC VALVE REPAIR/REPLACEMENT N/A 5/8/2023    Procedure: Transfemoral Transcatheter Aortic Valve Replacement;  Surgeon: Rachael Mccord MD;  Location: [facility];  Service: Cardiovascular;  Laterality: N/A;   • CARDIAC CATHETERIZATION N/A 08/15/2019    Procedure: Left Heart Cath;  Surgeon: Paul Villatoro MD;  Location: [facility] CATH INVASIVE LOCATION;  Service: Cardiology   • CARDIAC CATHETERIZATION      04/10/2023 PER DR. MCCORD   • CARDIAC ELECTROPHYSIOLOGY PROCEDURE N/A  5/15/2023    Procedure: Pacemaker DC - [employer];  Surgeon: Rachael Mendoza MD;  Location:  [facility] CATH INVASIVE LOCATION;  Service: Cardiology;  Laterality: N/A;   • CATARACT EXTRACTION, BILATERAL     • COLON SURGERY      colon resection for cancer - sigmoid removed - no chemo no radiation   • COLONOSCOPY     • COLONOSCOPY N/A 01/27/2021    Procedure: COLONOSCOPY;  Surgeon: Greg Barraza MD;  Location:  [facility];  Service: General;  Laterality: N/A;   • CORONARY STENT PLACEMENT      1 STENT   • ENDOSCOPY  1989   • EYE SURGERY Right     retinal detachment   • NV RT/LT HEART CATHETERS N/A 08/15/2019    Procedure: Percutaneous Coronary Intervention;  Surgeon: Paul Villatoro MD;  [facility] CATH INVASIVE [facility];  Service: Cardiology   • SCALP LESION REMOVAL W/ FLAP AND SKIN GRAFT  12/16/2022    basal cell removal   • SHOULDER ARTHROSCOPY Right    • SKIN LESION EXCISION N/A 01/27/2021    Procedure: SKIN LESIONS EXCISION FROM LEFT TEMPLE AREA AND ABDOMEN.;  Surgeon: Greg Barraza MD;  Location:  [facility];  Service: General;  Laterality: N/A;   • VASECTOMY Right     testicle removed d/t cancer - with radiation     Family History   Problem Relation Age of Onset   • Stroke Mother    • Hypertension Mother    • Alcohol abuse Mother    • COPD Mother    • COPD Father    • Alcohol abuse Father    • Hypertension Father    • Alzheimer's disease Father    • Heart disease Brother    • Hypertension Brother    • Diabetes Brother    • Stroke Maternal Grandfather    • Cancer Paternal Grandmother      Social History     Socioeconomic History   • Marital status:      Spouse name: lita   • Number of children: 3   • Years of education: 16   Tobacco Use   • Smoking status: Never     Passive exposure: Never   • Smokeless tobacco: Never   Vaping Use   • Vaping Use: Never used   Substance and Sexual Activity   • Alcohol use: Yes     Comment: occ   • Drug use: No   • Sexual  "activity: Defer     Partners: Female     ---------------------------------------------------------------------------------------------------------------------   Allergies:  Ct contrast and Iodinated contrast media  ---------------------------------------------------------------------------------------------------------------------  Objective     ---------------------------------------------------------------------------------------------------------------------   Vital Signs:  There were no vitals taken for this visit.  Estimated body mass index is 34.52 kg/m² as calculated from the following:    Height as of 6/1/23: 172.7 cm (68\").    Weight as of 6/1/23: 103 kg (227 lb).  There is no height or weight on file to calculate BMI.  Wt Readings from Last 3 Encounters:   06/01/23 103 kg (227 lb)   05/22/23 99.8 kg (220 lb)   05/15/23 102 kg (224 lb 13.9 oz)       ---------------------------------------------------------------------------------------------------------------------   Physical Exam  HENT:      Head: Normocephalic.      Nose: Nose normal.      Mouth/Throat:      Mouth: Mucous membranes are moist.   Eyes:      Pupils: Pupils are equal, round, and reactive to light.   Cardiovascular:      Rate and Rhythm: Tachycardia present.      Pulses: Normal pulses.   Pulmonary:      Effort: Pulmonary effort is normal.   Abdominal:      General: Abdomen is flat.   Musculoskeletal:         General: Normal range of motion.   Skin:     Capillary Refill: Capillary refill takes less than 2 seconds.   Neurological:      General: No focal deficit present.      Mental Status: He is alert and oriented to person, place, and time.   Psychiatric:         Mood and Affect: Mood normal.       Wound Assessment:  Location: Left, groin  Dressing Appearance: dressingappearance: clean  Closure: NA  Changes since last exam: surface area has diminished   Etiology and classification: surgical wound dehiscence (primary closure has failed in one or " more areas which are now open)  Wound bed structures/characteristics: full-thickness (subcutaneous tissue is exposed in at least a portion of the wound), moist, red, slough  Edges moist  Periwound characteristics: intact  Periwound Temperature: normal turgor and temperature  Drainage characteristics: moderate, serous   Perfusion characteristics: NA    Wound Goal (s):Closure, Epithelization, Free of infection and No further symptoms  Assessment & Plan      Patient Active Problem List    Diagnosis    • Surgical wound dehiscence [T81.31XA], S/P TAVR (transcatheter aortic valve replacement) [Z95.2], Non-healing left groin open wound, sequela [S31.104S]  -Wound vac applied, see nursing documentation for details  -Recommend kilo protein diet 0.75g/kg/day along with vitamin C 2000mg/day, vitamin A 5000 Units/day, vitamin D3 5000 Units/day, zinc 50mg/day to help promote wound healing       • Class 1 obesity with serious comorbidity and body mass index (BMI) of 32.0 to 32.9 in adult [E66.9, Z68.32]  -An obese person?is at greater risk for wound infection and dehiscence or evisceration.    • Peripheral vascular disease [I73.9]  -Recent vascular intervetion. Following with vascular surgeon Dr. Lopez     • CKD (baseline Cr 2-2.3)   [N18.32]  -Can complicate wound healing    • T2DM  [E11.8, Z79.4]  -Recommend adequate glycemic control to help promote wound healing  -Poor glycemic control increases risk of infection, prolonged healing, loss of limb and premature death   -Primary care provider managing medications       Clinical Impression:Moderate Complexity    Follow-up: 1 week    CHIDI Matthews   [facility]  06/19/2023  0800

## 2023-06-23 ENCOUNTER — HOSPITAL ENCOUNTER (OUTPATIENT)
Dept: WOUND CARE | Facility: HOSPITAL | Age: 78
Discharge: HOME | End: 2023-06-23
Payer: COMMERCIAL

## 2023-06-23 VITALS
SYSTOLIC BLOOD PRESSURE: 123 MMHG | TEMPERATURE: 98.5 F | HEART RATE: 71 BPM | DIASTOLIC BLOOD PRESSURE: 97 MMHG | RESPIRATION RATE: 16 BRPM

## 2023-06-23 DIAGNOSIS — S31.104S NON-HEALING LEFT GROIN OPEN WOUND, SEQUELA: Primary | ICD-10-CM

## 2023-07-03 PROBLEM — I48.0 PAROXYSMAL ATRIAL FIBRILLATION: Chronic | Status: ACTIVE | Noted: 2023-05-14

## 2023-07-03 PROBLEM — Z95.0 PACEMAKER: Chronic | Status: ACTIVE | Noted: 2023-05-22

## 2023-07-03 PROBLEM — I48.0 PAROXYSMAL ATRIAL FIBRILLATION: Status: ACTIVE | Noted: 2023-05-14

## 2023-07-03 PROBLEM — Z95.2 S/P TAVR (TRANSCATHETER AORTIC VALVE REPLACEMENT): Chronic | Status: ACTIVE | Noted: 2023-05-12

## 2023-07-12 PROBLEM — K46.9 ABDOMINAL HERNIA: Status: ACTIVE | Noted: 2023-07-12

## 2023-07-12 PROBLEM — K30 INDIGESTION: Status: ACTIVE | Noted: 2023-07-12

## 2023-07-12 PROBLEM — E11.9 DIABETES MELLITUS: Status: ACTIVE | Noted: 2023-07-12

## 2023-07-12 PROBLEM — K27.9 PEPTIC ULCER: Status: ACTIVE | Noted: 2023-07-12

## 2023-07-22 ENCOUNTER — LAB (OUTPATIENT)
Dept: LAB | Facility: HOSPITAL | Age: 78
End: 2023-07-22
Payer: COMMERCIAL

## 2023-07-22 DIAGNOSIS — E61.1 IRON DEFICIENCY: ICD-10-CM

## 2023-07-22 DIAGNOSIS — E78.2 MIXED HYPERLIPIDEMIA: Chronic | ICD-10-CM

## 2023-07-22 DIAGNOSIS — E11.8 TYPE 2 DIABETES MELLITUS WITH COMPLICATION, WITH LONG-TERM CURRENT USE OF INSULIN: Chronic | ICD-10-CM

## 2023-07-22 DIAGNOSIS — I10 ESSENTIAL HYPERTENSION: ICD-10-CM

## 2023-07-22 DIAGNOSIS — E55.9 VITAMIN D DEFICIENCY: ICD-10-CM

## 2023-07-22 DIAGNOSIS — Z79.4 TYPE 2 DIABETES MELLITUS WITH COMPLICATION, WITH LONG-TERM CURRENT USE OF INSULIN: Chronic | ICD-10-CM

## 2023-07-22 LAB
25(OH)D3 SERPL-MCNC: 30.1 NG/ML (ref 30–100)
ALBUMIN SERPL-MCNC: 3.5 G/DL (ref 3.5–5.2)
ALBUMIN UR-MCNC: 58.9 MG/DL
ALBUMIN/GLOB SERPL: 1.1 G/DL
ALP SERPL-CCNC: 74 U/L (ref 39–117)
ALT SERPL W P-5'-P-CCNC: 14 U/L (ref 1–41)
ANION GAP SERPL CALCULATED.3IONS-SCNC: 11 MMOL/L (ref 5–15)
AST SERPL-CCNC: 24 U/L (ref 1–40)
BASOPHILS # BLD AUTO: 0.06 10*3/MM3 (ref 0–0.2)
BASOPHILS NFR BLD AUTO: 0.8 % (ref 0–1.5)
BILIRUB SERPL-MCNC: 0.4 MG/DL (ref 0–1.2)
BUN SERPL-MCNC: 27 MG/DL (ref 8–23)
BUN/CREAT SERPL: 12.5 (ref 7–25)
CALCIUM SPEC-SCNC: 9.6 MG/DL (ref 8.6–10.5)
CHLORIDE SERPL-SCNC: 108 MMOL/L (ref 98–107)
CHOLEST SERPL-MCNC: 123 MG/DL (ref 0–200)
CO2 SERPL-SCNC: 22 MMOL/L (ref 22–29)
CREAT SERPL-MCNC: 2.16 MG/DL (ref 0.76–1.27)
DEPRECATED RDW RBC AUTO: 42.6 FL (ref 37–54)
EGFRCR SERPLBLD CKD-EPI 2021: 30.8 ML/MIN/1.73
EOSINOPHIL # BLD AUTO: 0.38 10*3/MM3 (ref 0–0.4)
EOSINOPHIL NFR BLD AUTO: 5.3 % (ref 0.3–6.2)
ERYTHROCYTE [DISTWIDTH] IN BLOOD BY AUTOMATED COUNT: 14.8 % (ref 12.3–15.4)
FERRITIN SERPL-MCNC: 15.2 NG/ML (ref 30–400)
GLOBULIN UR ELPH-MCNC: 3.1 GM/DL
GLUCOSE SERPL-MCNC: 76 MG/DL (ref 65–99)
HBA1C MFR BLD: 7.3 % (ref 4.8–5.6)
HCT VFR BLD AUTO: 37.5 % (ref 37.5–51)
HDLC SERPL-MCNC: 56 MG/DL (ref 40–60)
HGB BLD-MCNC: 11.3 G/DL (ref 13–17.7)
IMM GRANULOCYTES # BLD AUTO: 0.01 10*3/MM3 (ref 0–0.05)
IMM GRANULOCYTES NFR BLD AUTO: 0.1 % (ref 0–0.5)
IRON 24H UR-MRATE: 30 MCG/DL (ref 59–158)
LDLC SERPL CALC-MCNC: 45 MG/DL (ref 0–100)
LDLC/HDLC SERPL: 0.76 {RATIO}
LYMPHOCYTES # BLD AUTO: 1.4 10*3/MM3 (ref 0.7–3.1)
LYMPHOCYTES NFR BLD AUTO: 19.7 % (ref 19.6–45.3)
MCH RBC QN AUTO: 24 PG (ref 26.6–33)
MCHC RBC AUTO-ENTMCNC: 30.1 G/DL (ref 31.5–35.7)
MCV RBC AUTO: 79.6 FL (ref 79–97)
MONOCYTES # BLD AUTO: 0.67 10*3/MM3 (ref 0.1–0.9)
MONOCYTES NFR BLD AUTO: 9.4 % (ref 5–12)
NEUTROPHILS NFR BLD AUTO: 4.59 10*3/MM3 (ref 1.7–7)
NEUTROPHILS NFR BLD AUTO: 64.7 % (ref 42.7–76)
NRBC BLD AUTO-RTO: 0 /100 WBC (ref 0–0.2)
PLATELET # BLD AUTO: 217 10*3/MM3 (ref 140–450)
PMV BLD AUTO: 10.8 FL (ref 6–12)
POTASSIUM SERPL-SCNC: 4.7 MMOL/L (ref 3.5–5.2)
PROT SERPL-MCNC: 6.6 G/DL (ref 6–8.5)
RBC # BLD AUTO: 4.71 10*6/MM3 (ref 4.14–5.8)
SODIUM SERPL-SCNC: 141 MMOL/L (ref 136–145)
TRANSFERRIN SERPL-MCNC: 286 MG/DL (ref 200–360)
TRIGL SERPL-MCNC: 122 MG/DL (ref 0–150)
TSH SERPL DL<=0.05 MIU/L-ACNC: 5.01 UIU/ML (ref 0.27–4.2)
VLDLC SERPL-MCNC: 22 MG/DL (ref 5–40)
WBC NRBC COR # BLD: 7.11 10*3/MM3 (ref 3.4–10.8)

## 2023-07-22 PROCEDURE — 84466 ASSAY OF TRANSFERRIN: CPT

## 2023-07-22 PROCEDURE — 82306 VITAMIN D 25 HYDROXY: CPT

## 2023-07-22 PROCEDURE — 80050 GENERAL HEALTH PANEL: CPT

## 2023-07-22 PROCEDURE — 80061 LIPID PANEL: CPT

## 2023-07-22 PROCEDURE — 82728 ASSAY OF FERRITIN: CPT

## 2023-07-22 PROCEDURE — 82043 UR ALBUMIN QUANTITATIVE: CPT

## 2023-07-22 PROCEDURE — 83036 HEMOGLOBIN GLYCOSYLATED A1C: CPT

## 2023-07-22 PROCEDURE — 83540 ASSAY OF IRON: CPT

## 2023-07-24 ENCOUNTER — TREATMENT (OUTPATIENT)
Dept: CARDIAC REHAB | Facility: HOSPITAL | Age: 78
End: 2023-07-24
Payer: COMMERCIAL

## 2023-07-24 ENCOUNTER — OFFICE VISIT (OUTPATIENT)
Dept: FAMILY MEDICINE CLINIC | Facility: CLINIC | Age: 78
End: 2023-07-24
Payer: COMMERCIAL

## 2023-07-24 ENCOUNTER — HOSPITAL ENCOUNTER (OUTPATIENT)
Dept: WOUND CARE | Facility: HOSPITAL | Age: 78
Discharge: HOME | End: 2023-07-24
Payer: COMMERCIAL

## 2023-07-24 VITALS — OXYGEN SATURATION: 98 % | SYSTOLIC BLOOD PRESSURE: 128 MMHG | HEART RATE: 70 BPM | DIASTOLIC BLOOD PRESSURE: 60 MMHG

## 2023-07-24 VITALS
SYSTOLIC BLOOD PRESSURE: 121 MMHG | HEART RATE: 98 BPM | WEIGHT: 223 LBS | HEIGHT: 68 IN | BODY MASS INDEX: 33.8 KG/M2 | RESPIRATION RATE: 15 BRPM | TEMPERATURE: 98.6 F | OXYGEN SATURATION: 100 % | DIASTOLIC BLOOD PRESSURE: 62 MMHG

## 2023-07-24 VITALS
SYSTOLIC BLOOD PRESSURE: 145 MMHG | TEMPERATURE: 98.1 F | RESPIRATION RATE: 16 BRPM | DIASTOLIC BLOOD PRESSURE: 53 MMHG | HEART RATE: 79 BPM

## 2023-07-24 DIAGNOSIS — S31.104S NON-HEALING LEFT GROIN OPEN WOUND, SEQUELA: Primary | ICD-10-CM

## 2023-07-24 DIAGNOSIS — Z00.00 HEALTHCARE MAINTENANCE: ICD-10-CM

## 2023-07-24 DIAGNOSIS — E66.9 CLASS 1 OBESITY WITH SERIOUS COMORBIDITY AND BODY MASS INDEX (BMI) OF 32.0 TO 32.9 IN ADULT, UNSPECIFIED OBESITY TYPE: ICD-10-CM

## 2023-07-24 DIAGNOSIS — R35.1 BENIGN PROSTATIC HYPERPLASIA WITH NOCTURIA: ICD-10-CM

## 2023-07-24 DIAGNOSIS — I10 ESSENTIAL HYPERTENSION: Chronic | ICD-10-CM

## 2023-07-24 DIAGNOSIS — N52.01 ERECTILE DYSFUNCTION DUE TO ARTERIAL INSUFFICIENCY: ICD-10-CM

## 2023-07-24 DIAGNOSIS — I25.10 CORONARY ARTERY DISEASE INVOLVING NATIVE CORONARY ARTERY OF NATIVE HEART WITHOUT ANGINA PECTORIS: Chronic | ICD-10-CM

## 2023-07-24 DIAGNOSIS — Z79.4 TYPE 2 DIABETES MELLITUS WITH COMPLICATION, WITH LONG-TERM CURRENT USE OF INSULIN: Chronic | ICD-10-CM

## 2023-07-24 DIAGNOSIS — Z85.47 H/O TESTICULAR CANCER: ICD-10-CM

## 2023-07-24 DIAGNOSIS — Z85.038 HISTORY OF COLON CANCER: ICD-10-CM

## 2023-07-24 DIAGNOSIS — E55.9 VITAMIN D DEFICIENCY: ICD-10-CM

## 2023-07-24 DIAGNOSIS — E78.2 MIXED HYPERLIPIDEMIA: Primary | Chronic | ICD-10-CM

## 2023-07-24 DIAGNOSIS — I73.9 PERIPHERAL VASCULAR DISEASE: ICD-10-CM

## 2023-07-24 DIAGNOSIS — E11.8 TYPE 2 DIABETES MELLITUS WITH COMPLICATION, WITH LONG-TERM CURRENT USE OF INSULIN: Chronic | ICD-10-CM

## 2023-07-24 DIAGNOSIS — Z95.2 S/P TAVR (TRANSCATHETER AORTIC VALVE REPLACEMENT): Primary | ICD-10-CM

## 2023-07-24 DIAGNOSIS — K21.9 GASTROESOPHAGEAL REFLUX DISEASE WITHOUT ESOPHAGITIS: Chronic | ICD-10-CM

## 2023-07-24 DIAGNOSIS — N40.1 BENIGN PROSTATIC HYPERPLASIA WITH NOCTURIA: ICD-10-CM

## 2023-07-24 DIAGNOSIS — N18.32 CHRONIC RENAL FAILURE, STAGE 3B: Chronic | ICD-10-CM

## 2023-07-24 DIAGNOSIS — Z95.0 PACEMAKER: Chronic | ICD-10-CM

## 2023-07-24 DIAGNOSIS — D62 POSTOPERATIVE ANEMIA DUE TO ACUTE BLOOD LOSS: ICD-10-CM

## 2023-07-24 DIAGNOSIS — M85.89 OSTEOPENIA OF MULTIPLE SITES: ICD-10-CM

## 2023-07-24 DIAGNOSIS — Z95.2 S/P TAVR (TRANSCATHETER AORTIC VALVE REPLACEMENT): Chronic | ICD-10-CM

## 2023-07-24 DIAGNOSIS — I48.0 PAROXYSMAL ATRIAL FIBRILLATION: Chronic | ICD-10-CM

## 2023-07-24 DIAGNOSIS — M51.36 DDD (DEGENERATIVE DISC DISEASE), LUMBAR: ICD-10-CM

## 2023-07-24 PROBLEM — K27.9 PEPTIC ULCER: Status: RESOLVED | Noted: 2023-07-12 | Resolved: 2023-07-24

## 2023-07-24 PROBLEM — K30 INDIGESTION: Status: RESOLVED | Noted: 2023-07-12 | Resolved: 2023-07-24

## 2023-07-24 PROBLEM — T14.8XXA HEMATOMA: Status: RESOLVED | Noted: 2023-05-12 | Resolved: 2023-07-24

## 2023-07-24 PROBLEM — E11.9 DIABETES MELLITUS: Status: RESOLVED | Noted: 2023-07-12 | Resolved: 2023-07-24

## 2023-07-24 PROCEDURE — 93798 PHYS/QHP OP CAR RHAB W/ECG: CPT

## 2023-07-24 PROCEDURE — 99214 OFFICE O/P EST MOD 30 MIN: CPT | Performed by: GENERAL PRACTICE

## 2023-07-24 PROCEDURE — 97602 WOUND(S) CARE NON-SELECTIVE: CPT

## 2023-07-24 RX ORDER — LANOLIN ALCOHOL/MO/W.PET/CERES
325 CREAM (GRAM) TOPICAL DAILY
Qty: 90 TABLET | Refills: 1
Start: 2023-07-24

## 2023-07-24 RX ORDER — CARVEDILOL 6.25 MG/1
6.25 TABLET ORAL 2 TIMES DAILY
Qty: 180 TABLET | Refills: 3 | Status: SHIPPED | OUTPATIENT
Start: 2023-07-24

## 2023-07-24 RX ORDER — TETANUS TOXOID, REDUCED DIPHTHERIA TOXOID AND ACELLULAR PERTUSSIS VACCINE, ADSORBED 5; 2.5; 8; 8; 2.5 [IU]/.5ML; [IU]/.5ML; UG/.5ML; UG/.5ML; UG/.5ML
0.5 SUSPENSION INTRAMUSCULAR ONCE
Qty: 0.5 ML | Refills: 0 | Status: SHIPPED | OUTPATIENT
Start: 2023-07-24 | End: 2023-07-26

## 2023-07-24 NOTE — PROGRESS NOTES
Wound Clinic Note  Patient Identification:  Name:  Nicholas Yin  Age:  78 y.o.  Sex:  male  :  1945  MRN:  [id number]   Visit Number:  [id number]  Primary Care Physician:  Waldemar Trejo MD     Subjective     Chief complaint:     Left groin    History of presenting illness:     Patient is a 78 y.o. male with past medical history significant for CHF, CAD, diabetes, hyperlipidemia, hypertension, and obesity. Presented today for evaluation of left groin. The patient had a TAVR done on 8 May.  The patient was discharged the following day. He was evaluated by Dr. Lopez on 2023: He has drainage from his incision. The incision was opened with a 15 blade and he had a large pocket of fluid. It looked non-infected. This was drained and cleaned out and packed with a kerlix dressing with an ABD placed on incision. He has been packing the area with kerlix and ABD pad. He reports tenderness to the area. Wound base with some yellow slough to lateral aspect and edges. Denies any fever or chills. Reports glucose levels average less than 200.     Interval History:  2023: Seen in clinic today for follow-up to left groin. He has had wound vac in place and tolerating current treatment. Denies any fever or chills. He is getting wound vac changed in clinic and no reported complications.     2023: Seen in clinic today for follow-up to left groin. He continues with wound vac therapy. Denies any new issues or concerns. Tolerating without complications. Denies any fever or chills. Base with moist red tissue, small area of slough is present. Moderate amount of drainage.     2023: Seen in clinic today for follow-up to left groin. He continues with wound vac therapy. Denies any new issues or concerns. Tolerating without complications. Denies any fever or chills. Base with moist red tissue, small area of slough is present. Moderate amount of drainage. Depth has decreased. No new issues or  concerns reported.     07/03/2023: Seen in clinic today for follow-up to left groin. He has had wound vac in place and tolerating current treatment. Denies any fever or chills. No new issues or concerns reported.     07/10/2023: Seen in clinic today for follow-up to left groin. He tolerating current treatment. Wound is clean with only a small amount of drainage noted. Denies any fever or chills. No new issues or concerns reported.     07/24/2023: Seen in clinic today for follow-up to left groin.  Wound with pinpoint opening.  There is no surrounding evidence of cellulitis or necrosis.  Denies any fever or chills. No new issues or concerns reported.   ---------------------------------------------------------------------------------------------------------------------   Review of Systems   Constitutional:  Negative for chills and fever.   HENT:  Negative for congestion.    Eyes:  Negative for pain and redness.   Respiratory:  Negative for cough and shortness of breath.    Cardiovascular:  Positive for leg swelling. Negative for chest pain.   Gastrointestinal:  Negative for nausea and vomiting.   Genitourinary:  Negative for difficulty urinating and frequency.   Musculoskeletal:  Negative for back pain and gait problem.   Skin:  Positive for wound.   Neurological:  Negative for dizziness and weakness.   Hematological:  Does not bruise/bleed easily.   Psychiatric/Behavioral:  Negative for confusion. The patient is not nervous/anxious.     ---------------------------------------------------------------------------------------------------------------------   Past Medical History:   Diagnosis Date    Anemia     Aortic stenosis     Arthritis     Back pain     CancerTesticular/Colon     test -1982  Colon -2005    CHF (congestive heart failure)     Coronary artery disease     COVID-19 2022    DDD (degenerative disc disease), lumbosacral     Diabetes mellitus     Dyspnea     Elevated cholesterol     Erectile dysfunction      GERD (gastroesophageal reflux disease)     History of transfusion     Hyperlipidemia     Hypertension     TIA (transient ischemic attack)      Past Surgical History:   Procedure Laterality Date    AORTIC VALVE REPAIR/REPLACEMENT N/A 5/8/2023    Procedure: TRANSCATHETER AORTIC VALVE REPLACEMENT + LAURIE, LEFT FEMORAL ENDARTERECTOMY WITH PATCH;  Surgeon: Arben Lopez MD;  Location: [facility];  Service: Cardiothoracic;  Laterality: N/A;    AORTIC VALVE REPAIR/REPLACEMENT N/A 5/8/2023    Procedure: Transfemoral Transcatheter Aortic Valve Replacement;  Surgeon: Rachael Mendoza MD;  Location: [facility];  Service: Cardiovascular;  Laterality: N/A;    CARDIAC CATHETERIZATION N/A 08/15/2019    Procedure: Left Heart Cath;  Surgeon: Paul Villatoro MD;  Location: [facility] INVASIVE LOCATION;  Service: Cardiology    CARDIAC CATHETERIZATION      04/10/2023 PER DR. MENDOZA    CARDIAC ELECTROPHYSIOLOGY PROCEDURE N/A 5/15/2023    Procedure: Pacemaker DC - [employer];  Surgeon: Rachael Mendoza MD;  Location: [facility] INVASIVE LOCATION;  Service: Cardiology;  Laterality: N/A;    CATARACT EXTRACTION, BILATERAL      COLON SURGERY      colon resection for cancer - sigmoid removed - no chemo no radiation    COLONOSCOPY      COLONOSCOPY N/A 01/27/2021    Procedure: COLONOSCOPY;  Surgeon: Greg Barraza MD;  Location: [facility];  Service: General;  Laterality: N/A;    CORONARY STENT PLACEMENT      1 STENT    ENDOSCOPY  1989    EYE SURGERY Right     retinal detachment    CT RT/LT HEART CATHETERS N/A 08/15/2019    Procedure: Percutaneous Coronary Intervention;  Surgeon: Paul Villatoro MD;  Location: [facility] INVASIVE LOCATION;  Service: Cardiology    SCALP LESION REMOVAL W/ FLAP AND SKIN GRAFT  12/16/2022    basal cell removal    SHOULDER ARTHROSCOPY Right     SKIN LESION EXCISION N/A 01/27/2021    Procedure: SKIN LESIONS EXCISION FROM LEFT TEMPLE AREA  "AND ABDOMEN.;  Surgeon: Greg Barraza MD;  Location: [facility] OR;  Service: General;  Laterality: N/A;    VASECTOMY Right     testicle removed d/t cancer - with radiation     Family History   Problem Relation Age of Onset    Stroke Mother     Hypertension Mother     Alcohol abuse Mother     COPD Mother     COPD Father     Alcohol abuse Father     Hypertension Father     Alzheimer's disease Father     Heart disease Brother     Hypertension Brother     Diabetes Brother     Stroke Maternal Grandfather     Cancer Paternal Grandmother      Social History     Socioeconomic History    Marital status:      Spouse name: lita    Number of children: 3    Years of education: 16   Tobacco Use    Smoking status: Never     Passive exposure: Never    Smokeless tobacco: Never   Vaping Use    Vaping Use: Never used   Substance and Sexual Activity    Alcohol use: Yes     Comment: occ    Drug use: No    Sexual activity: Defer     Partners: Female     ---------------------------------------------------------------------------------------------------------------------   Allergies:  Ct contrast and Iodinated contrast media  ---------------------------------------------------------------------------------------------------------------------  Objective     ---------------------------------------------------------------------------------------------------------------------   Vital Signs:  /53   Pulse 79   Temp 98.1 °F (36.7 °C) (Infrared)   Resp 16   Estimated body mass index is 33.54 kg/m² as calculated from the following:    Height as of 7/3/23: 172.7 cm (68\").    Weight as of 7/3/23: 100 kg (220 lb 9.6 oz).  There is no height or weight on file to calculate BMI.  Wt Readings from Last 3 Encounters:   07/03/23 100 kg (220 lb 9.6 oz)   06/01/23 103 kg (227 lb)   05/22/23 99.8 kg (220 lb)       --------------------------------------------------------------------------------------------------------------------- "   Physical Exam  HENT:      Head: Normocephalic.      Nose: Nose normal.      Mouth/Throat:      Mouth: Mucous membranes are moist.   Eyes:      Pupils: Pupils are equal, round, and reactive to light.   Cardiovascular:      Rate and Rhythm: Tachycardia present.      Pulses: Normal pulses.   Pulmonary:      Effort: Pulmonary effort is normal.   Abdominal:      General: Abdomen is flat.   Musculoskeletal:         General: Normal range of motion.   Skin:     Capillary Refill: Capillary refill takes less than 2 seconds.   Neurological:      General: No focal deficit present.      Mental Status: He is alert and oriented to person, place, and time.   Psychiatric:         Mood and Affect: Mood normal.     Wound Assessment:  Location: Left, groin  Dressing Appearance: dressingappearance: clean  Closure: NA  Changes since last exam: surface area has diminished   Etiology and classification: surgical wound dehiscence (primary closure has failed in one or more areas which are now open)  Wound bed structures/characteristics: full-thickness (subcutaneous tissue is exposed in at least a portion of the wound), moist, red  Edges moist  Periwound characteristics: intact  Periwound Temperature: normal turgor and temperature  Drainage characteristics: moderate, serous   Perfusion characteristics: NA    Wound Goal (s):Closure, Epithelization, Free of infection and No further symptoms  Assessment & Plan      Patient Active Problem List    Diagnosis     Surgical wound dehiscence [T81.31XA], S/P TAVR (transcatheter aortic valve replacement) [Z95.2], Non-healing left groin open wound, sequela [S31.104S]  -Wound vac Dc 06/26/2023  -paint area with betadine and cover with adhesive dressing  -Recommend kilo protein diet 0.75g/kg/day along with vitamin C 2000mg/day, vitamin A 5000 Units/day, vitamin D3 5000 Units/day, zinc 50mg/day to help promote wound healing        Class 1 obesity with serious comorbidity and body mass index (BMI) of 32.0  to 32.9 in adult [E66.9, Z68.32]  -An obese person?is at greater risk for wound infection and dehiscence or evisceration.     Peripheral vascular disease [I73.9]  -Recent vascular intervetion. Following with vascular surgeon Dr. Lopez      CKD (baseline Cr 2-2.3)   [N18.32]  -Can complicate wound healing     T2DM  [E11.8, Z79.4]  -Recommend adequate glycemic control to help promote wound healing  -Poor glycemic control increases risk of infection, prolonged healing, loss of limb and premature death   -Primary care provider managing medications       Clinical Impression:Low Complexity    Follow-up: as needed as he did not wish to make follow-up advised to return if area worsens or develops signs of infection.    CHIDI Matthews   [facility]- [facility]  07/24/2023  [id number]

## 2023-07-24 NOTE — PROGRESS NOTES
Pt attended phase II visit.  Tolerated exercise well, NAD noted, no complaints voiced, skin warm, pink, dry, resp nl and even, denies chest discomfort, denies SOA.   Monitor shows NSR-ST 1 AVB and BBB noted. Pacer present, V/S WDL ,see [facility] documentation for exercise data.  Dr. Reid physician immediately available

## 2023-07-24 NOTE — PROGRESS NOTES
Subjective   Nicholas Yin is a 78 y.o. male.     Chief Complaint  He returns for a scheduled reassessment of multiple medical problems including    History of Present Illness     Recent TAVR  He underwent a TAVR and left femoral endarterectomy on 5/8/2023.  His course in hospital was uncomplicated and he was discharged home the next day.  Following his return home, he experienced SOBOE with PND, intermittent lightheadedness, an intermittent cough, and mild swelling of his hands and feet.  These symptoms were associated with significant bruising about the left groin and scrotum, mild numbness and tingling of the right anterior thigh, a decreased appetite, and generalized weakness. He was recommended ER evaluation, declined, but ultimately presented to [facility] ER on 5/14/2023 following a presyncopal episode.  He was found to be in atrial fibrillation with a rapid ventricular response.  He was started on IV diltiazem and progressed to a wide-complex tachycardia with loss of consciousness.  CPR was administered with a prompt improvement and he regained consciousness.  He was then started on IV amiodarone and transferred to [facility].  On arrival there he was found to be in a third-degree block and underwent placement of a pacemaker by Dr. Mendoza.  His course in hospital was once again uncomplicated and he was discharged home on 5/16/2023.  He required drainage of a left inguinal seroma by Dr. Lopez on 6/1/2023.  He has been attending wound care and the resulting sinus has largely closed.  He has started cardiopulmonary rehab and has noted an improvement in his shortness of breath and exercise tolerance.  He admits to several episodes of tachycardia during which his heart rate was as high as 160.  He denies any chest pain or lightheadedness and there is no history of any calf pain, swelling the ankles, fever, or chills.  He is scheduled to undergo a cardiology reassessment with CHIDI Ku on 8/14/2023.   He remains on rivaroxaban  Lab Results   Component Value Date    WBC 7.11 07/22/2023    HGB 11.3 (L) 07/22/2023    HCT 37.5 07/22/2023    MCV 79.6 07/22/2023     07/22/2023     Lab Results   Component Value Date    IRON 30 (L) 07/22/2023    TIBC 346 03/06/2017    FERRITIN 15.20 (L) 07/22/2023     Coronary Artery Disease  He underwent a coronary angiogram by Dr. Villatoro on 8/15/2019 with stenting of the mid LAD.      Transient Visual Loss  Several years ago he experienced a sudden progressive decrease in the vision in his right eye while driving. He stated that the vision in the eye darkened gradually over several minutes to the point where the only things he could make out were very bright lights and the sun. This lasted for about 30 minutes then resolved over several minutes. He was hospitalized at [facility] over 10 years ago following a similar episodes that was ultimately felt to be vascular in origin. MRI of the brain performed on 12/8/17 was reported as showing mild cerebral atrophy with chronic small vessel ischemic changes. MRA of the carotid arteries performed the same day was unremarkable.  Bilateral carotid ultrasound performed on 2/6/2023 was unremarkable.  He undergoes regular ophthalmology assessments    Type 2 Diabetes Mellitus  Current treatments: SGLT2 -empagliflozin, GLP agonist-liraglutide and basal insulin - tresiba (together as xultophy).  He is currently on 30 units daily of the latter  Lab Results   Component Value Date    HGBA1C 7.30 (H) 07/22/2023     Lab Results   Component Value Date    MICROALBUR 58.9 07/22/2023     Hyperlipidemia  He remains on rosuvastatin    Essential Hypertension  Home blood pressure readings: have generally been at or near goal. Current antihypertensive medications includes losartan, and carvedilol    Chronic Renal Failure  This has been contributed to longstanding type 2 diabetes mellitus and hypertension.  He is aware to avoid any NSAIDs oral or prescription.    Lab Results   Component Value Date    GLUCOSE 76 07/22/2023    BUN 27 (H) 07/22/2023    CREATININE 2.16 (H) 07/22/2023    EGFR 30.8 (L) 07/22/2023    BCR 12.5 07/22/2023    K 4.7 07/22/2023    CO2 22.0 07/22/2023    CALCIUM 9.6 07/22/2023    ALBUMIN 3.5 07/22/2023    BILITOT 0.4 07/22/2023    AST 24 07/22/2023    ALT 14 07/22/2023     Lab Results   Component Value Date    ALKPHOS 74 07/22/2023     Chronic Back Pain  He gives a long history of increasing mid and low back pain. This is described as a sharp ache. The pain does not radiate and has been unassociated with any other symptoms. The pain is worse with prolonged weight bearing and limits his activities more then anything else at present.  MRI performed on 1/7/2021 was reported as showing degenerative disc disease and osteoarthritis with mild to moderate central canal and bilateral foraminal narrowing at L2-3 and L3-4.    Gastroesophageal Reflux Disease  He remains heartburn free on omeprazole    Labs  Most recent vitamin D 30.1  Lab Results   Component Value Date    TSH 5.010 (H) 07/22/2023     The following portions of the patient's history were reviewed and updated as appropriate: allergies, current medications, past medical history, past social history, past surgical history, and problem list.    Review of Systems   Constitutional:  Positive for fatigue. Negative for appetite change, chills, fever and unexpected weight change.   HENT:  Negative for congestion, ear pain, rhinorrhea, sneezing and sore throat.    Eyes:  Negative for visual disturbance.   Respiratory:  Positive for cough and shortness of breath. Negative for wheezing.    Cardiovascular:  Positive for palpitations. Negative for chest pain and leg swelling.   Gastrointestinal:  Negative for abdominal pain, blood in stool, constipation, diarrhea, nausea and vomiting.   Genitourinary:  Negative for difficulty urinating, dysuria, frequency, hematuria and urgency.        Erectile dysfunction -  chronic progressive. Nocturia x 2-3 with occasional sense of incomplete voiding.   Musculoskeletal:  Positive for back pain (chronic - progressive). Negative for arthralgias, joint swelling and myalgias.   Skin:  Positive for wound. Negative for rash.        Skin lesions   Neurological:  Positive for weakness (generalized). Negative for light-headedness, numbness and headaches.     Objective   Physical Exam  Constitutional:       General: He is not in acute distress.     Appearance: Normal appearance. He is well-developed. He is not ill-appearing or diaphoretic.      Comments: Bright and in good spirits.  Sits and stands with an exaggerated thoracic kyphosis.  Uncomfortable with movement.  No apparent distress at rest.  No pallor, cyanosis, diaphoresis, or jaundice   HENT:      Head: Atraumatic.      Right Ear: Tympanic membrane, ear canal and external ear normal.      Left Ear: Tympanic membrane, ear canal and external ear normal.      Mouth/Throat:      Lips: No lesions.      Mouth: Mucous membranes are moist. No oral lesions.      Pharynx: No oropharyngeal exudate or posterior oropharyngeal erythema.   Eyes:      General: Lids are normal. Gaze aligned appropriately.      Extraocular Movements: Extraocular movements intact.      Conjunctiva/sclera: Conjunctivae normal.      Pupils: Pupils are equal.   Neck:      Thyroid: No thyroid mass or thyromegaly.      Vascular: No carotid bruit or JVD.      Trachea: Trachea normal. No tracheal deviation.   Cardiovascular:      Rate and Rhythm: Normal rate and regular rhythm.      Heart sounds: S1 normal and S2 normal. No murmur heard.  Crescendo decrescendo systolic murmur is present with a grade of 3/6.     No gallop. No S3 or S4 sounds.   Pulmonary:      Effort: Pulmonary effort is normal.      Breath sounds: Normal breath sounds.   Abdominal:      General: Bowel sounds are normal. There is no distension.   Musculoskeletal:      Right lower leg: No edema.      Left lower  leg: No edema.   Lymphadenopathy:      Head:      Right side of head: No submental, submandibular, tonsillar, preauricular, posterior auricular or occipital adenopathy.      Left side of head: No submental, submandibular, tonsillar, preauricular, posterior auricular or occipital adenopathy.      Cervical: No cervical adenopathy.      Upper Body:      Right upper body: No supraclavicular adenopathy.      Left upper body: No supraclavicular adenopathy.   Skin:     General: Skin is warm and dry.      Coloration: Skin is not cyanotic, jaundiced or pale.      Findings: Lesion (three erythematous and slightly scaly lesions varying from 1 to 2.5 cm about the mid and left upper back) present. No rash.      Nails: There is no clubbing.   Neurological:      Mental Status: He is alert and oriented to person, place, and time.      Cranial Nerves: No cranial nerve deficit, dysarthria or facial asymmetry.      Sensory: No sensory deficit.      Motor: No tremor.      Coordination: Coordination normal.      Gait: Gait normal.   Psychiatric:         Attention and Perception: Attention normal.         Mood and Affect: Mood normal.         Speech: Speech normal.         Behavior: Behavior normal.         Thought Content: Thought content normal.     Assessment & Plan   Problems Addressed this Visit          Cardiac and Vasculature    CAD s/p stent  (Chronic)  Reminded regarding the importance of risk factor modification.  Continue low-dose ASA.    Relevant Medications    carvedilol (COREG) 6.25 MG tablet    Essential hypertension (Chronic)   Hypertension: at goal. Evidence of target organ damage: coronary artery disease, peripheral artery disease, chronic kidney disease, and transient ischemic attack.  Encouraged to continue to work on diet and exercise plan.   Carvedilol will be titrated as tolerated to achieve better rate control if he should experience further episodes of atrial fibrillation    Relevant Medications    carvedilol  (COREG) 6.25 MG tablet    Other Relevant Orders    Comprehensive Metabolic Panel    Mixed hyperlipidemia   As above.   Continue current medication.    Relevant Orders    Comprehensive Metabolic Panel    Lipid Panel    Pacemaker (Chronic)  For SSS  Follow up with cardiology     Paroxysmal atrial fibrillation (Chronic)  See above  Continue apixaban prophylaxis    Relevant Medications    carvedilol (COREG) 6.25 MG tablet    S/P TAVR (transcatheter aortic valve replacement) (Chronic)  Follow up with cardiology     Peripheral vascular disease       Endocrine and Metabolic    T2DM  (Chronic)  Diabetes mellitus Type II, under excellent control.   Encouraged to continue to pursue ADA diet  Encouraged aerobic exercise.  Continue current medication  Scheduled for updated labs just prior to his return    Relevant Orders    Comprehensive Metabolic Panel    Hemoglobin A1c    Class 1 obesity with serious comorbidity and body mass index (BMI) of 32.0 to 32.9 in adult    Vitamin D deficiency       Gastrointestinal Abdominal     GERD (Chronic)       Genitourinary and Reproductive     Benign prostatic hyperplasia with nocturia    Vasculogenic erectile dysfunction       Health Encounters    Healthcare maintenance  Recommended an updated Tdap.  Prescription emailed to his pharmacy  Reminded to get a flu shot when available.    Relevant Medications    Tdap (BOOSTRIX) 5-2.5-18.5 LF-MCG/0.5 injection       Hematology and Neoplasia    H/O testicular cancer    History of colon cancer    Postoperative anemia due to acute blood loss  Iron supplementation will be resumed with monitoring    Relevant Medications    ferrous sulfate 325 (65 FE) MG EC tablet    Other Relevant Orders    CBC & Differential    Iron    Transferrin    Ferritin       Musculoskeletal and Injuries    Osteopenia of multiple sites       Neuro    DDD (degenerative disc disease), lumbar       Other    CKD (baseline Cr 2-2.3)   (Chronic)  Reminded to avoid any NSAIDs  prescription or OTC  Continue current medication  Will continue to monitor    Relevant Orders    CBC & Differential    Comprehensive Metabolic Panel     Diagnoses         Codes Comments    Mixed hyperlipidemia    -  Primary ICD-10-CM: E78.2  ICD-9-CM: 272.2     Essential hypertension     ICD-10-CM: I10  ICD-9-CM: 401.9     Coronary artery disease involving native coronary artery of native heart without angina pectoris     ICD-10-CM: I25.10  ICD-9-CM: 414.01     S/P TAVR (transcatheter aortic valve replacement)     ICD-10-CM: Z95.2  ICD-9-CM: V43.3     Paroxysmal atrial fibrillation     ICD-10-CM: I48.0  ICD-9-CM: 427.31     Pacemaker     ICD-10-CM: Z95.0  ICD-9-CM: V45.01     Peripheral vascular disease     ICD-10-CM: I73.9  ICD-9-CM: 443.9     T2DM      ICD-10-CM: E11.8, Z79.4  ICD-9-CM: 250.90, V58.67     Vitamin D deficiency     ICD-10-CM: E55.9  ICD-9-CM: 268.9     Class 1 obesity with serious comorbidity and body mass index (BMI) of 32.0 to 32.9 in adult, unspecified obesity type     ICD-10-CM: E66.9, Z68.32  ICD-9-CM: 278.00, V85.32     GERD     ICD-10-CM: K21.9  ICD-9-CM: 530.81     Erectile dysfunction due to arterial insufficiency     ICD-10-CM: N52.01  ICD-9-CM: 607.84     Benign prostatic hyperplasia with nocturia     ICD-10-CM: N40.1, R35.1  ICD-9-CM: 600.01, 788.43     Healthcare maintenance     ICD-10-CM: Z00.00  ICD-9-CM: V70.0     H/O testicular cancer     ICD-10-CM: Z85.47  ICD-9-CM: V10.47     History of colon cancer     ICD-10-CM: Z85.038  ICD-9-CM: V10.05     Osteopenia of multiple sites     ICD-10-CM: M85.89  ICD-9-CM: 733.90     DDD (degenerative disc disease), lumbar     ICD-10-CM: M51.36  ICD-9-CM: 722.52     CKD (baseline Cr 2-2.3)       ICD-10-CM: N18.32  ICD-9-CM: 585.3     Postoperative anemia due to acute blood loss     ICD-10-CM: D62  ICD-9-CM: 285.1

## 2023-07-24 NOTE — ADDENDUM NOTE
Encounter addended by: Adilia Pineda RN on: 7/24/2023 12:51 PM   Actions taken: LDA properties accepted

## 2023-07-26 ENCOUNTER — TREATMENT (OUTPATIENT)
Dept: CARDIAC REHAB | Facility: HOSPITAL | Age: 78
End: 2023-07-26
Payer: COMMERCIAL

## 2023-07-26 VITALS — SYSTOLIC BLOOD PRESSURE: 140 MMHG | OXYGEN SATURATION: 98 % | DIASTOLIC BLOOD PRESSURE: 60 MMHG | HEART RATE: 53 BPM

## 2023-07-26 DIAGNOSIS — Z95.2 S/P TAVR (TRANSCATHETER AORTIC VALVE REPLACEMENT): Primary | ICD-10-CM

## 2023-07-26 PROCEDURE — 93798 PHYS/QHP OP CAR RHAB W/ECG: CPT

## 2023-07-28 ENCOUNTER — TREATMENT (OUTPATIENT)
Dept: CARDIAC REHAB | Facility: HOSPITAL | Age: 78
End: 2023-07-28
Payer: COMMERCIAL

## 2023-07-28 VITALS
OXYGEN SATURATION: 98 % | BODY MASS INDEX: 32.95 KG/M2 | WEIGHT: 216.7 LBS | DIASTOLIC BLOOD PRESSURE: 58 MMHG | SYSTOLIC BLOOD PRESSURE: 136 MMHG | HEART RATE: 66 BPM

## 2023-07-28 DIAGNOSIS — Z95.2 S/P TAVR (TRANSCATHETER AORTIC VALVE REPLACEMENT): Primary | ICD-10-CM

## 2023-07-28 PROCEDURE — 93798 PHYS/QHP OP CAR RHAB W/ECG: CPT

## 2023-07-31 ENCOUNTER — TREATMENT (OUTPATIENT)
Dept: CARDIAC REHAB | Facility: HOSPITAL | Age: 78
End: 2023-07-31
Payer: COMMERCIAL

## 2023-07-31 VITALS — DIASTOLIC BLOOD PRESSURE: 60 MMHG | HEART RATE: 65 BPM | SYSTOLIC BLOOD PRESSURE: 142 MMHG | OXYGEN SATURATION: 98 %

## 2023-07-31 DIAGNOSIS — Z95.2 S/P TAVR (TRANSCATHETER AORTIC VALVE REPLACEMENT): Primary | ICD-10-CM

## 2023-07-31 PROCEDURE — 93798 PHYS/QHP OP CAR RHAB W/ECG: CPT

## 2023-08-01 DIAGNOSIS — I48.0 PAROXYSMAL ATRIAL FIBRILLATION: Primary | Chronic | ICD-10-CM

## 2023-08-01 PROCEDURE — 93280 PM DEVICE PROGR EVAL DUAL: CPT | Performed by: NURSE PRACTITIONER

## 2023-08-01 RX ORDER — AMIODARONE HYDROCHLORIDE 200 MG/1
TABLET ORAL
Qty: 40 TABLET | Refills: 0 | Status: CANCELLED | OUTPATIENT
Start: 2023-08-01

## 2023-08-02 ENCOUNTER — TREATMENT (OUTPATIENT)
Dept: CARDIAC REHAB | Facility: HOSPITAL | Age: 78
End: 2023-08-02
Payer: COMMERCIAL

## 2023-08-02 VITALS — SYSTOLIC BLOOD PRESSURE: 120 MMHG | HEART RATE: 68 BPM | DIASTOLIC BLOOD PRESSURE: 64 MMHG

## 2023-08-02 DIAGNOSIS — Z95.2 S/P TAVR (TRANSCATHETER AORTIC VALVE REPLACEMENT): Primary | ICD-10-CM

## 2023-08-02 PROCEDURE — 93798 PHYS/QHP OP CAR RHAB W/ECG: CPT

## 2023-08-04 ENCOUNTER — TREATMENT (OUTPATIENT)
Dept: CARDIAC REHAB | Facility: HOSPITAL | Age: 78
End: 2023-08-04
Payer: COMMERCIAL

## 2023-08-04 VITALS — DIASTOLIC BLOOD PRESSURE: 60 MMHG | SYSTOLIC BLOOD PRESSURE: 126 MMHG | HEART RATE: 74 BPM

## 2023-08-04 DIAGNOSIS — Z95.2 S/P TAVR (TRANSCATHETER AORTIC VALVE REPLACEMENT): Primary | ICD-10-CM

## 2023-08-04 PROCEDURE — 93798 PHYS/QHP OP CAR RHAB W/ECG: CPT

## 2023-08-06 ENCOUNTER — LAB (OUTPATIENT)
Dept: LAB | Facility: HOSPITAL | Age: 78
End: 2023-08-06
Payer: COMMERCIAL

## 2023-08-06 ENCOUNTER — DOCUMENTATION (OUTPATIENT)
Dept: FAMILY MEDICINE CLINIC | Facility: CLINIC | Age: 78
End: 2023-08-06
Payer: COMMERCIAL

## 2023-08-06 DIAGNOSIS — Z20.822 CLOSE EXPOSURE TO COVID-19 VIRUS: Primary | ICD-10-CM

## 2023-08-06 DIAGNOSIS — Z20.822 CLOSE EXPOSURE TO COVID-19 VIRUS: ICD-10-CM

## 2023-08-06 PROCEDURE — 87636 SARSCOV2 & INF A&B AMP PRB: CPT

## 2023-08-07 ENCOUNTER — APPOINTMENT (OUTPATIENT)
Dept: CARDIAC REHAB | Facility: HOSPITAL | Age: 78
End: 2023-08-07
Payer: COMMERCIAL

## 2023-08-08 ENCOUNTER — DOCUMENTATION (OUTPATIENT)
Dept: CARDIOLOGY | Facility: CLINIC | Age: 78
End: 2023-08-08
Payer: COMMERCIAL

## 2023-08-09 ENCOUNTER — APPOINTMENT (OUTPATIENT)
Dept: CARDIAC REHAB | Facility: HOSPITAL | Age: 78
End: 2023-08-09
Payer: COMMERCIAL

## 2023-08-11 ENCOUNTER — TREATMENT (OUTPATIENT)
Dept: CARDIAC REHAB | Facility: HOSPITAL | Age: 78
End: 2023-08-11
Payer: COMMERCIAL

## 2023-08-11 VITALS — HEART RATE: 85 BPM | DIASTOLIC BLOOD PRESSURE: 50 MMHG | SYSTOLIC BLOOD PRESSURE: 110 MMHG

## 2023-08-11 DIAGNOSIS — Z95.2 S/P TAVR (TRANSCATHETER AORTIC VALVE REPLACEMENT): Primary | ICD-10-CM

## 2023-08-11 PROCEDURE — 93798 PHYS/QHP OP CAR RHAB W/ECG: CPT

## 2023-08-11 NOTE — PROGRESS NOTES
Pt attended phase II visit.  Tolerated exercise well, NAD noted, no complaints voiced, skin warm, pink, dry, resp nl and even, denies chest discomfort, denies SOA.   Monitor shows NSR-ST 1 AVB and BBB noted. Pacer present, V/S WDL, see Logan documentation for exercise data.  Dr. Reid physician immediately available

## 2023-08-14 ENCOUNTER — TREATMENT (OUTPATIENT)
Dept: CARDIAC REHAB | Facility: HOSPITAL | Age: 78
End: 2023-08-14
Payer: COMMERCIAL

## 2023-08-14 ENCOUNTER — OFFICE VISIT (OUTPATIENT)
Dept: CARDIOLOGY | Facility: CLINIC | Age: 78
End: 2023-08-14
Payer: COMMERCIAL

## 2023-08-14 VITALS — OXYGEN SATURATION: 98 % | HEART RATE: 60 BPM | DIASTOLIC BLOOD PRESSURE: 64 MMHG | SYSTOLIC BLOOD PRESSURE: 122 MMHG

## 2023-08-14 VITALS
WEIGHT: 223.6 LBS | HEIGHT: 68 IN | OXYGEN SATURATION: 98 % | SYSTOLIC BLOOD PRESSURE: 146 MMHG | DIASTOLIC BLOOD PRESSURE: 68 MMHG | HEART RATE: 66 BPM | BODY MASS INDEX: 33.89 KG/M2

## 2023-08-14 DIAGNOSIS — E78.2 MIXED HYPERLIPIDEMIA: Chronic | ICD-10-CM

## 2023-08-14 DIAGNOSIS — I10 ESSENTIAL HYPERTENSION: Chronic | ICD-10-CM

## 2023-08-14 DIAGNOSIS — Z95.2 S/P TAVR (TRANSCATHETER AORTIC VALVE REPLACEMENT): Primary | ICD-10-CM

## 2023-08-14 DIAGNOSIS — I48.0 PAROXYSMAL ATRIAL FIBRILLATION: Primary | Chronic | ICD-10-CM

## 2023-08-14 DIAGNOSIS — Z95.2 S/P TAVR (TRANSCATHETER AORTIC VALVE REPLACEMENT): Chronic | ICD-10-CM

## 2023-08-14 DIAGNOSIS — I25.10 CORONARY ARTERY DISEASE INVOLVING NATIVE CORONARY ARTERY OF NATIVE HEART WITHOUT ANGINA PECTORIS: Chronic | ICD-10-CM

## 2023-08-14 PROCEDURE — 93798 PHYS/QHP OP CAR RHAB W/ECG: CPT

## 2023-08-14 PROCEDURE — 99214 OFFICE O/P EST MOD 30 MIN: CPT | Performed by: NURSE PRACTITIONER

## 2023-08-14 NOTE — PROGRESS NOTES
Patient here today for pacemaker interrogation.  Interrogation showed 10% A-fib burden.  Patient had several episodes of A-fib RVR.    In reviewing the interrogation Dr. Villatoro suggested patient be placed on amiodarone.  Also recommended baseline pulmonary function test, TSH, CMP.    Patient refused amiodarone.  He has history of allergy/anaphylaxis with contrast and there is some cross sensitivity.

## 2023-08-14 NOTE — PROGRESS NOTES
Pt attended phase II visit.  Tolerated exercise well, NAD noted, no complaints voiced, skin warm, pink, dry, resp nl and even, denies chest discomfort, denies SOA.   Monitor shows NSR-ST 1 AVB and BBB noted. Pacer present, V/S WDL, see Logan documentation for exercise data.   physician immediately available

## 2023-08-14 NOTE — PROGRESS NOTES
"Chief Complaint  Coronary Artery Disease    Subjective          Nicholas Yin presents to [facility] for follow up.    History of Present Illness    Bill was last seen in clinic on 7/3/2023.  At that visit he was still recovering from his TAVR and pacemaker placement.  His pacemaker was in need of interrogation and arrangements were made for that.  On 8/1/2023 patient had his pacemaker interrogated.  It showed a 10% atrial fibrillation burden and several episodes of A-fib RVR.  Dr. Villatoro recommended patient be placed on amiodarone and baseline pulmonary function test, TSH and CMP be completed.  Patient refused amiodarone due to cross sensitivity with contrast and his previous anaphylaxis.    Patient presents accompanied by his wife for today's visit.  He states that since starting metoprolol he has felt a little better.  He reports that he believes his palpitations have decreased.  He brings with him a blood pressure log which indicates his blood pressures have been in the 130s with a low down to 110s systolically.  His heart rate has always been controlled between 60 and 80.  He reports that he has just finished cardiac rehab and his blood pressure was in the 120s systolically.    Objective     Vital Signs:   /68 (BP Location: Left arm, Patient Position: Sitting, Cuff Size: Adult)   Pulse 66   Ht 172.7 cm (68\")   Wt 101 kg (223 lb 9.6 oz)   SpO2 98%   BMI 34.00 kg/mý       Physical Exam  Vitals reviewed.   Constitutional:       Appearance: Normal appearance. He is well-developed.   Cardiovascular:      Rate and Rhythm: Normal rate. Rhythm irregular.      Heart sounds: No murmur heard.    No friction rub. No gallop.   Pulmonary:      Effort: Pulmonary effort is normal. No respiratory distress.      Breath sounds: Normal breath sounds. No wheezing or rales.   Skin:     General: Skin is warm and dry.   Neurological:      Mental Status: He is alert and oriented to " person, place, and time.   Psychiatric:         Mood and Affect: Mood normal.         Behavior: Behavior normal.        Result Review :                Current Outpatient Medications   Medication Sig Dispense Refill    aspirin 81 MG EC tablet Take 1 tablet by mouth Daily.      coenzyme Q10 100 MG capsule Take 2 capsules by mouth Daily.      dapagliflozin Propanediol (Farxiga) 10 MG tablet Take 1 tablet by mouth Daily. 90 tablet 3    docusate sodium (Colace) 100 MG capsule Take 1 capsule by mouth 2 (Two) Times a Day. 72 capsule 0    ferrous sulfate 325 (65 FE) MG EC tablet Take 1 tablet by mouth Daily. 90 tablet 1    losartan (COZAAR) 50 MG tablet Take 1/2 tablet by mouth 2 (Two) Times a Day. (Patient taking differently: Take 0.5 tablets by mouth 2 (Two) Times a Day. Patient stated he takes a half of pill at night due to low BP) 60 tablet 11    metoprolol tartrate (LOPRESSOR) 25 MG tablet Take 1 tablet by mouth 2 (Two) Times a Day. 60 tablet 11    multivitamin with minerals tablet tablet Take 1 tablet by mouth Daily.      Nirmatrelvir&Ritonavir 150/100 (PAXLOVID) 10 x 150 MG & 10 x 100MG tablet therapy pack tablet (for renal adjustment) Take 2 tablets by mouth 2 (Two) Times a Day. 20 tablet 0    omeprazole (priLOSEC) 40 MG capsule Take 1 capsule by mouth Daily.      rivaroxaban (XARELTO) 15 MG tablet Take 1 tablet by mouth Daily. 48 tablet 0    rosuvastatin (CRESTOR) 20 MG tablet Take 1 tablet by mouth Daily. 90 tablet 3    vitamin D (ERGOCALCIFEROL) 1.25 MG (78234 UT) capsule capsule Take 1 capsule by mouth Every 7 (Seven) Days. 12 capsule 1    Xultophy 100-3.6 UNIT-MG/ML solution pen-injector subcutaneous pen Inject 50 Units under the skin into the appropriate area as directed Daily. 15 mL 5     No current facility-administered medications for this visit.     Facility-Administered Medications Ordered in Other Visits   Medication Dose Route Frequency Provider Last Rate Last Admin    Chlorhexidine Gluconate Cloth 2 %  pads 1 application  1 application  Topical Q12H PRN Nneka Araujo APRN                Assessment and Plan    Problem List Items Addressed This Visit          Cardiac and Vasculature    Mixed hyperlipidemia (Chronic)    Overview     7/25/2022 total cholesterol 97, triglycerides 135, HDL 46, and LDL of 28  10/27/2022 total cholesterol 147, triglycerides 134, HDL 49, and LDL 74  3/10/2023 total cholesterol 118, triglycerides 151, HDL 50, and LDL 43         Essential hypertension (Chronic)    CAD s/p stent  (Chronic)    Overview     8/15/2019 [facility] for non-STEMI: PCI ARIA to the mid LAD         S/P TAVR (transcatheter aortic valve replacement) (Chronic)    Overview     5/8/2023 TAVR         Paroxysmal atrial fibrillation - Primary (Chronic)           Follow Up     Medications were reviewed with the patient.    With regard to paroxysmal atrial fibrillation, the patient reports that he is feeling better.  I will leave him on the metoprolol for now.  I did offer him a referral for possible ablation to electrophysiology.  At this time he has deferred this referral stating that he is doing better and is currently not having any issues.  Continue rivaroxaban    Continue aspirin, metoprolol tartrate, and rosuvastatin for CAD.  CAD is stable.    Continue losartan for hypertension.  Patient's blood pressure is controlled.    Continue rosuvastatin for dyslipidemia.    Return in about 2 months (around 10/14/2023).    Patient was given instructions and counseling regarding his condition or for health maintenance advice. Please see specific information pulled into the AVS if appropriate.

## 2023-08-16 ENCOUNTER — TREATMENT (OUTPATIENT)
Dept: CARDIAC REHAB | Facility: HOSPITAL | Age: 78
End: 2023-08-16
Payer: COMMERCIAL

## 2023-08-16 VITALS — SYSTOLIC BLOOD PRESSURE: 124 MMHG | DIASTOLIC BLOOD PRESSURE: 64 MMHG | HEART RATE: 53 BPM

## 2023-08-16 DIAGNOSIS — Z95.2 S/P TAVR (TRANSCATHETER AORTIC VALVE REPLACEMENT): Primary | ICD-10-CM

## 2023-08-16 PROCEDURE — 93798 PHYS/QHP OP CAR RHAB W/ECG: CPT

## 2023-08-18 ENCOUNTER — TREATMENT (OUTPATIENT)
Dept: CARDIAC REHAB | Facility: HOSPITAL | Age: 78
End: 2023-08-18
Payer: COMMERCIAL

## 2023-08-18 VITALS — HEART RATE: 67 BPM | OXYGEN SATURATION: 98 % | SYSTOLIC BLOOD PRESSURE: 110 MMHG | DIASTOLIC BLOOD PRESSURE: 60 MMHG

## 2023-08-18 DIAGNOSIS — Z95.2 S/P TAVR (TRANSCATHETER AORTIC VALVE REPLACEMENT): Primary | ICD-10-CM

## 2023-08-18 PROCEDURE — 93798 PHYS/QHP OP CAR RHAB W/ECG: CPT

## 2023-08-21 ENCOUNTER — TREATMENT (OUTPATIENT)
Dept: CARDIAC REHAB | Facility: HOSPITAL | Age: 78
End: 2023-08-21
Payer: COMMERCIAL

## 2023-08-21 VITALS — DIASTOLIC BLOOD PRESSURE: 76 MMHG | HEART RATE: 61 BPM | SYSTOLIC BLOOD PRESSURE: 140 MMHG

## 2023-08-21 DIAGNOSIS — Z95.2 S/P TAVR (TRANSCATHETER AORTIC VALVE REPLACEMENT): Primary | ICD-10-CM

## 2023-08-21 PROCEDURE — 93798 PHYS/QHP OP CAR RHAB W/ECG: CPT

## 2023-08-21 NOTE — PROGRESS NOTES
Pt attended phase II visit.  Tolerated exercise well, NAD noted, no complaints voiced, skin warm, pink, dry, resp nl and even, denies chest discomfort, denies SOA.   Monitor shows NSR-ST 1 AVB and BBB noted. Pacer present, V/S WDL, see Logan documentation for exercise data.  Dr Reid physician immediately available

## 2023-08-23 ENCOUNTER — TREATMENT (OUTPATIENT)
Dept: CARDIAC REHAB | Facility: HOSPITAL | Age: 78
End: 2023-08-23
Payer: COMMERCIAL

## 2023-08-23 VITALS — DIASTOLIC BLOOD PRESSURE: 40 MMHG | HEART RATE: 76 BPM | SYSTOLIC BLOOD PRESSURE: 110 MMHG

## 2023-08-23 DIAGNOSIS — E55.9 VITAMIN D DEFICIENCY: ICD-10-CM

## 2023-08-23 DIAGNOSIS — Z95.2 S/P TAVR (TRANSCATHETER AORTIC VALVE REPLACEMENT): Primary | ICD-10-CM

## 2023-08-23 PROCEDURE — 93798 PHYS/QHP OP CAR RHAB W/ECG: CPT

## 2023-08-23 RX ORDER — ERGOCALCIFEROL 1.25 MG/1
50000 CAPSULE ORAL
Qty: 12 CAPSULE | Refills: 1 | Status: SHIPPED | OUTPATIENT
Start: 2023-08-23

## 2023-08-25 ENCOUNTER — TREATMENT (OUTPATIENT)
Dept: CARDIAC REHAB | Facility: HOSPITAL | Age: 78
End: 2023-08-25
Payer: COMMERCIAL

## 2023-08-25 VITALS — HEART RATE: 62 BPM | SYSTOLIC BLOOD PRESSURE: 130 MMHG | DIASTOLIC BLOOD PRESSURE: 50 MMHG

## 2023-08-25 DIAGNOSIS — Z95.2 S/P TAVR (TRANSCATHETER AORTIC VALVE REPLACEMENT): Primary | ICD-10-CM

## 2023-08-25 PROCEDURE — 93798 PHYS/QHP OP CAR RHAB W/ECG: CPT

## 2023-08-28 ENCOUNTER — TREATMENT (OUTPATIENT)
Dept: CARDIAC REHAB | Facility: HOSPITAL | Age: 78
End: 2023-08-28
Payer: COMMERCIAL

## 2023-08-28 VITALS — HEART RATE: 69 BPM | SYSTOLIC BLOOD PRESSURE: 134 MMHG | DIASTOLIC BLOOD PRESSURE: 64 MMHG

## 2023-08-28 DIAGNOSIS — Z95.2 S/P TAVR (TRANSCATHETER AORTIC VALVE REPLACEMENT): Primary | ICD-10-CM

## 2023-08-28 PROCEDURE — 93798 PHYS/QHP OP CAR RHAB W/ECG: CPT

## 2023-08-28 NOTE — PROGRESS NOTES
Pt attended phase II visit.  Tolerated exercise well, NAD noted, no complaints voiced, skin warm, pink, dry, resp nl and even, denies chest discomfort, denies SOA.   Monitor shows NSR-ST 1 AVB and BBB noted. Pacer present, V/S WDL, see Logan documentation for exercise data.  Dr Ibarra physician immediately available

## 2023-08-30 ENCOUNTER — TREATMENT (OUTPATIENT)
Dept: CARDIAC REHAB | Facility: HOSPITAL | Age: 78
End: 2023-08-30
Payer: COMMERCIAL

## 2023-08-30 VITALS — SYSTOLIC BLOOD PRESSURE: 140 MMHG | HEART RATE: 60 BPM | DIASTOLIC BLOOD PRESSURE: 72 MMHG

## 2023-08-30 DIAGNOSIS — Z95.2 S/P TAVR (TRANSCATHETER AORTIC VALVE REPLACEMENT): Primary | ICD-10-CM

## 2023-08-30 PROCEDURE — 93798 PHYS/QHP OP CAR RHAB W/ECG: CPT

## 2023-09-01 ENCOUNTER — APPOINTMENT (OUTPATIENT)
Dept: CARDIAC REHAB | Facility: HOSPITAL | Age: 78
End: 2023-09-01
Payer: COMMERCIAL

## 2023-09-05 ENCOUNTER — DOCUMENTATION (OUTPATIENT)
Dept: CARDIOLOGY | Facility: CLINIC | Age: 78
End: 2023-09-05
Payer: COMMERCIAL

## 2023-09-05 NOTE — PROGRESS NOTES
Dr. Lopez informed last week that Mr. Yin was continuing to have problems with atrial fibrillation.  He apparently was in a unable to take propafenone and with his renal issues the only other choices really are amiodarone and Multaq.  He did not want to take amiodarone due to his iodine allergy.  He has been following in [place].  I left him a message today suggesting that he consider discussing Multaq with his providers in [place].  Multaq does not contain iodine.    Racahel Mendoza MD, FACC

## 2023-09-06 ENCOUNTER — TREATMENT (OUTPATIENT)
Dept: CARDIAC REHAB | Facility: HOSPITAL | Age: 78
End: 2023-09-06
Payer: COMMERCIAL

## 2023-09-06 VITALS — OXYGEN SATURATION: 98 % | DIASTOLIC BLOOD PRESSURE: 58 MMHG | SYSTOLIC BLOOD PRESSURE: 142 MMHG | HEART RATE: 79 BPM

## 2023-09-06 DIAGNOSIS — Z95.2 S/P TAVR (TRANSCATHETER AORTIC VALVE REPLACEMENT): Primary | ICD-10-CM

## 2023-09-06 PROCEDURE — 93798 PHYS/QHP OP CAR RHAB W/ECG: CPT

## 2023-09-08 ENCOUNTER — TREATMENT (OUTPATIENT)
Dept: CARDIAC REHAB | Facility: HOSPITAL | Age: 78
End: 2023-09-08
Payer: COMMERCIAL

## 2023-09-08 VITALS — SYSTOLIC BLOOD PRESSURE: 142 MMHG | HEART RATE: 73 BPM | DIASTOLIC BLOOD PRESSURE: 68 MMHG | OXYGEN SATURATION: 98 %

## 2023-09-08 DIAGNOSIS — Z95.2 S/P TAVR (TRANSCATHETER AORTIC VALVE REPLACEMENT): Primary | ICD-10-CM

## 2023-09-08 PROCEDURE — 93798 PHYS/QHP OP CAR RHAB W/ECG: CPT

## 2023-09-11 ENCOUNTER — APPOINTMENT (OUTPATIENT)
Dept: GENERAL RADIOLOGY | Facility: HOSPITAL | Age: 78
End: 2023-09-11
Payer: COMMERCIAL

## 2023-09-11 ENCOUNTER — TREATMENT (OUTPATIENT)
Dept: CARDIAC REHAB | Facility: HOSPITAL | Age: 78
End: 2023-09-11
Payer: COMMERCIAL

## 2023-09-11 ENCOUNTER — HOSPITAL ENCOUNTER (EMERGENCY)
Facility: HOSPITAL | Age: 78
Discharge: LEFT BEFORE DISCHARGE | End: 2023-09-11
Attending: STUDENT IN AN ORGANIZED HEALTH CARE EDUCATION/TRAINING PROGRAM
Payer: COMMERCIAL

## 2023-09-11 VITALS
RESPIRATION RATE: 18 BRPM | TEMPERATURE: 98 F | SYSTOLIC BLOOD PRESSURE: 160 MMHG | DIASTOLIC BLOOD PRESSURE: 95 MMHG | HEIGHT: 68 IN | OXYGEN SATURATION: 98 % | BODY MASS INDEX: 33.34 KG/M2 | WEIGHT: 220 LBS | HEART RATE: 60 BPM

## 2023-09-11 VITALS — DIASTOLIC BLOOD PRESSURE: 66 MMHG | OXYGEN SATURATION: 99 % | SYSTOLIC BLOOD PRESSURE: 138 MMHG | HEART RATE: 98 BPM

## 2023-09-11 DIAGNOSIS — Z95.2 S/P TAVR (TRANSCATHETER AORTIC VALVE REPLACEMENT): Primary | ICD-10-CM

## 2023-09-11 DIAGNOSIS — I48.0 PAROXYSMAL ATRIAL FIBRILLATION: Primary | ICD-10-CM

## 2023-09-11 LAB
ALBUMIN SERPL-MCNC: 3.3 G/DL (ref 3.5–5.2)
ALBUMIN/GLOB SERPL: 1.1 G/DL
ALP SERPL-CCNC: 71 U/L (ref 39–117)
ALT SERPL W P-5'-P-CCNC: 12 U/L (ref 1–41)
ANION GAP SERPL CALCULATED.3IONS-SCNC: 9 MMOL/L (ref 5–15)
ANISOCYTOSIS BLD QL: NORMAL
AST SERPL-CCNC: 22 U/L (ref 1–40)
BASOPHILS # BLD AUTO: 0.06 10*3/MM3 (ref 0–0.2)
BASOPHILS NFR BLD AUTO: 0.9 % (ref 0–1.5)
BILIRUB SERPL-MCNC: 0.3 MG/DL (ref 0–1.2)
BUN SERPL-MCNC: 41 MG/DL (ref 8–23)
BUN/CREAT SERPL: 19.2 (ref 7–25)
CALCIUM SPEC-SCNC: 9.1 MG/DL (ref 8.6–10.5)
CHLORIDE SERPL-SCNC: 111 MMOL/L (ref 98–107)
CO2 SERPL-SCNC: 24 MMOL/L (ref 22–29)
CREAT SERPL-MCNC: 2.14 MG/DL (ref 0.76–1.27)
DEPRECATED RDW RBC AUTO: 70.3 FL (ref 37–54)
EGFRCR SERPLBLD CKD-EPI 2021: 31.1 ML/MIN/1.73
EOSINOPHIL # BLD AUTO: 0.37 10*3/MM3 (ref 0–0.4)
EOSINOPHIL NFR BLD AUTO: 5.4 % (ref 0.3–6.2)
ERYTHROCYTE [DISTWIDTH] IN BLOOD BY AUTOMATED COUNT: 22.2 % (ref 12.3–15.4)
GEN 5 2HR TROPONIN T REFLEX: 36 NG/L
GLOBULIN UR ELPH-MCNC: 3 GM/DL
GLUCOSE SERPL-MCNC: 130 MG/DL (ref 65–99)
HCT VFR BLD AUTO: 43.6 % (ref 37.5–51)
HGB BLD-MCNC: 12.5 G/DL (ref 13–17.7)
HOLD SPECIMEN: NORMAL
HOLD SPECIMEN: NORMAL
HYPOCHROMIA BLD QL: NORMAL
IMM GRANULOCYTES # BLD AUTO: 0.02 10*3/MM3 (ref 0–0.05)
IMM GRANULOCYTES NFR BLD AUTO: 0.3 % (ref 0–0.5)
INR PPP: 1.2 (ref 0.9–1.1)
LYMPHOCYTES # BLD AUTO: 1.29 10*3/MM3 (ref 0.7–3.1)
LYMPHOCYTES NFR BLD AUTO: 19 % (ref 19.6–45.3)
MCH RBC QN AUTO: 25.1 PG (ref 26.6–33)
MCHC RBC AUTO-ENTMCNC: 28.7 G/DL (ref 31.5–35.7)
MCV RBC AUTO: 87.6 FL (ref 79–97)
MONOCYTES # BLD AUTO: 0.62 10*3/MM3 (ref 0.1–0.9)
MONOCYTES NFR BLD AUTO: 9.1 % (ref 5–12)
NEUTROPHILS NFR BLD AUTO: 4.44 10*3/MM3 (ref 1.7–7)
NEUTROPHILS NFR BLD AUTO: 65.3 % (ref 42.7–76)
NRBC BLD AUTO-RTO: 0 /100 WBC (ref 0–0.2)
OVALOCYTES BLD QL SMEAR: NORMAL
PLAT MORPH BLD: NORMAL
PLATELET # BLD AUTO: 142 10*3/MM3 (ref 140–450)
PMV BLD AUTO: 10.4 FL (ref 6–12)
POTASSIUM SERPL-SCNC: 4.6 MMOL/L (ref 3.5–5.2)
PROT SERPL-MCNC: 6.3 G/DL (ref 6–8.5)
PROTHROMBIN TIME: 15.8 SECONDS (ref 12.1–14.7)
QT INTERVAL: 456 MS
QTC INTERVAL: 474 MS
RBC # BLD AUTO: 4.98 10*6/MM3 (ref 4.14–5.8)
SODIUM SERPL-SCNC: 144 MMOL/L (ref 136–145)
TROPONIN T DELTA: -1 NG/L
TROPONIN T SERPL HS-MCNC: 37 NG/L
WBC NRBC COR # BLD: 6.8 10*3/MM3 (ref 3.4–10.8)
WHOLE BLOOD HOLD COAG: NORMAL
WHOLE BLOOD HOLD SPECIMEN: NORMAL

## 2023-09-11 PROCEDURE — 71045 X-RAY EXAM CHEST 1 VIEW: CPT | Performed by: RADIOLOGY

## 2023-09-11 PROCEDURE — 84484 ASSAY OF TROPONIN QUANT: CPT | Performed by: STUDENT IN AN ORGANIZED HEALTH CARE EDUCATION/TRAINING PROGRAM

## 2023-09-11 PROCEDURE — 99284 EMERGENCY DEPT VISIT MOD MDM: CPT

## 2023-09-11 PROCEDURE — 93010 ELECTROCARDIOGRAM REPORT: CPT | Performed by: SPECIALIST

## 2023-09-11 PROCEDURE — 93005 ELECTROCARDIOGRAM TRACING: CPT | Performed by: STUDENT IN AN ORGANIZED HEALTH CARE EDUCATION/TRAINING PROGRAM

## 2023-09-11 PROCEDURE — 93798 PHYS/QHP OP CAR RHAB W/ECG: CPT

## 2023-09-11 PROCEDURE — 71045 X-RAY EXAM CHEST 1 VIEW: CPT

## 2023-09-11 PROCEDURE — 85025 COMPLETE CBC W/AUTO DIFF WBC: CPT | Performed by: STUDENT IN AN ORGANIZED HEALTH CARE EDUCATION/TRAINING PROGRAM

## 2023-09-11 PROCEDURE — 80053 COMPREHEN METABOLIC PANEL: CPT | Performed by: STUDENT IN AN ORGANIZED HEALTH CARE EDUCATION/TRAINING PROGRAM

## 2023-09-11 PROCEDURE — 85007 BL SMEAR W/DIFF WBC COUNT: CPT | Performed by: STUDENT IN AN ORGANIZED HEALTH CARE EDUCATION/TRAINING PROGRAM

## 2023-09-11 PROCEDURE — 36415 COLL VENOUS BLD VENIPUNCTURE: CPT

## 2023-09-11 PROCEDURE — 85610 PROTHROMBIN TIME: CPT | Performed by: STUDENT IN AN ORGANIZED HEALTH CARE EDUCATION/TRAINING PROGRAM

## 2023-09-11 RX ORDER — SODIUM CHLORIDE 0.9 % (FLUSH) 0.9 %
10 SYRINGE (ML) INJECTION AS NEEDED
Status: DISCONTINUED | OUTPATIENT
Start: 2023-09-11 | End: 2023-09-11 | Stop reason: HOSPADM

## 2023-09-11 RX ORDER — ASPIRIN 81 MG/1
324 TABLET, CHEWABLE ORAL ONCE
Status: COMPLETED | OUTPATIENT
Start: 2023-09-11 | End: 2023-09-11

## 2023-09-11 RX ADMIN — ASPIRIN 243 MG: 81 TABLET, CHEWABLE ORAL at 08:53

## 2023-09-11 NOTE — ED NOTES
"Pt states \"All my test come back fine so I don't want to stay and wait for my lab work\". Dr. Liang aware and informed pt the risk of leaving. Pt verbalized understanding and states to call him if lab comes back bad.   "

## 2023-09-11 NOTE — PROGRESS NOTES
Pt attended phase II visit.  Patient stated during exercise on the recumbent at 0815 he was having chest pressure, and did not feel well  He denied chest pain..  I had him stop exercise. Heart rate went up to 154 and stayed in 150's after stopping exercise.  Monitor shows AFib. Patient has a history of Paroxymal Afib. He stated he has no chest pain and his SOA resolved with rest. skin warm, pink, dry, resp nl and even,   Called House supervisor  and Stefany came to take him to ER per wheelchair.   Patient has extensive history of cardiac issues. /66 heart [id number], Sats 98% resp 16.   Monitor showed ST at 101 on arrival 1 AVB and BBB noted. Pacer present, V/S WDL, see Logan documentation for exercise data.  Dr Ibarra  physician immediately available  vitals on arrival  bp 138/66 hr 95 sats 99%    0835 Spoke with his spouse Janay she is aware he has been taken to ER and stated she will go meet him there.

## 2023-09-11 NOTE — ED PROVIDER NOTES
Subjective   History of Present Illness  78-year-old male w/ PMH cardiac arrest in March 2023, aortic stenosis s/p transthoracic aortic valve replacement, s/p pacemaker, paradoxical atrial fibrillation, HTN, DM II, sent over from outpatient cardiac rehabilitation for a tachyarrhythmia that was noted during therapy.    Patient reports that is currently and is having a week of cardiac rehab with no prior events during rehab treatments who states that he became acutely winded and felt that his heart rate was elevated.  Patient admits that he does have paradoxical atrial fibrillation occasionally has had no recent events.  Pt reports that his HR      Reports a history of cardiac arrest this year in March 2023 but since this time he has been doing well.  States that he also has had a recent echocardiogram in the past 2 to 3 months he has had no acute hospitalizations for chest pain or shortness of breath.  Currently, patient has no complaints and denies chest pain or shortness of breath while in the ER.      Review of Systems    Past Medical History:   Diagnosis Date    Anemia     Aortic stenosis     Arthritis     Back pain     CancerTesticular/Colon     test -1982  Colon -2005    CHF (congestive heart failure)     Coronary artery disease     COVID-19 2022    DDD (degenerative disc disease), lumbosacral     Diabetes mellitus     Dyspnea     Elevated cholesterol     Erectile dysfunction     GERD (gastroesophageal reflux disease)     History of transfusion     Hyperlipidemia     Hypertension     TIA (transient ischemic attack)        Allergies   Allergen Reactions    Ct Contrast Anaphylaxis    Iodinated Contrast Media Shortness Of Breath       Past Surgical History:   Procedure Laterality Date    AORTIC VALVE REPAIR/REPLACEMENT N/A 5/8/2023    Procedure: TRANSCATHETER AORTIC VALVE REPLACEMENT + LAURIE, LEFT FEMORAL ENDARTERECTOMY WITH PATCH;  Surgeon: Arben Lopez MD;  Location: [facility];  Service:  Cardiothoracic;  Laterality: N/A;    AORTIC VALVE REPAIR/REPLACEMENT N/A 5/8/2023    Procedure: Transfemoral Transcatheter Aortic Valve Replacement;  Surgeon: Rachael Mccord MD;  Location:  [facility];  Service: Cardiovascular;  Laterality: N/A;    CARDIAC CATHETERIZATION N/A 08/15/2019    Procedure: Left Heart Cath;  Surgeon: Paul Villatoro MD;  [facility];  Service: Cardiology    CARDIAC CATHETERIZATION      04/10/2023 PER DR. MCCORD    CARDIAC ELECTROPHYSIOLOGY PROCEDURE N/A 5/15/2023    Procedure: Pacemaker DC - [employer];  Surgeon: Rachael Mccord MD;  Location:  [facility];  Service: Cardiology;  Laterality: N/A;    CATARACT EXTRACTION, BILATERAL      COLON SURGERY      colon resection for cancer - sigmoid removed - no chemo no radiation    COLONOSCOPY      COLONOSCOPY N/A 01/27/2021    Procedure: COLONOSCOPY;  Surgeon: Greg Barraza MD;  Location:  [facility];  Service: General;  Laterality: N/A;    CORONARY STENT PLACEMENT      1 STENT    ENDOSCOPY  1989    EYE SURGERY Right     retinal detachment    IL RT/LT HEART CATHETERS N/A 08/15/2019    Procedure: Percutaneous Coronary Intervention;  Surgeon: Paul Villatoro MD;  [facility]:  [facility];  Service: Cardiology    SCALP LESION REMOVAL W/ FLAP AND SKIN GRAFT  12/16/2022    basal cell removal    SHOULDER ARTHROSCOPY Right     SKIN LESION EXCISION N/A 01/27/2021    Procedure: SKIN LESIONS EXCISION FROM LEFT TEMPLE AREA AND ABDOMEN.;  Surgeon: Greg Barraza MD;  Location:  [facility];  Service: General;  Laterality: N/A;    VASECTOMY Right     testicle removed d/t cancer - with radiation       Family History   Problem Relation Age of Onset    Stroke Mother     Hypertension Mother     Alcohol abuse Mother     COPD Mother     COPD Father     Alcohol abuse Father     Hypertension Father     Alzheimer's disease Father      Heart disease Brother     Hypertension Brother     Diabetes Brother     Stroke Maternal Grandfather     Cancer Paternal Grandmother        Social History     Socioeconomic History    Marital status:      Spouse name: lita    Number of children: 3    Years of education: 16   Tobacco Use    Smoking status: Never     Passive exposure: Never    Smokeless tobacco: Never   Vaping Use    Vaping Use: Never used   Substance and Sexual Activity    Alcohol use: Yes     Comment: occ    Drug use: No    Sexual activity: Defer     Partners: Female           Objective   Physical Exam  Vitals and nursing note reviewed.   Constitutional:       General: He is not in acute distress.     Appearance: Normal appearance. He is well-developed. He is not diaphoretic.   HENT:      Head: Normocephalic and atraumatic.      Right Ear: External ear normal.      Left Ear: External ear normal.      Nose: Nose normal.   Eyes:      Extraocular Movements: Extraocular movements intact.      Conjunctiva/sclera: Conjunctivae normal.      Pupils: Pupils are equal, round, and reactive to light.   Neck:      Vascular: No JVD.      Trachea: No tracheal deviation.   Cardiovascular:      Rate and Rhythm: Normal rate and regular rhythm.      Heart sounds: Normal heart sounds. No murmur heard.  Pulmonary:      Effort: Pulmonary effort is normal. No respiratory distress.      Breath sounds: Normal breath sounds. No wheezing.   Abdominal:      General: Bowel sounds are normal.      Palpations: Abdomen is soft.      Tenderness: There is no abdominal tenderness.   Musculoskeletal:         General: No deformity. Normal range of motion.      Cervical back: Normal range of motion and neck supple.   Skin:     General: Skin is warm and dry.      Coloration: Skin is not pale.      Findings: No erythema or rash.   Neurological:      General: No focal deficit present.      Mental Status: He is alert and oriented to person, place, and time.      Cranial Nerves:  No cranial nerve deficit.   Psychiatric:         Behavior: Behavior normal.         Thought Content: Thought content normal.       Procedures       Results for orders placed or performed during the hospital encounter of 09/11/23   Comprehensive Metabolic Panel    Specimen: Arm, Left; Blood   Result Value Ref Range    Glucose 130 (H) 65 - 99 mg/dL    BUN 41 (H) 8 - 23 mg/dL    Creatinine 2.14 (H) 0.76 - 1.27 mg/dL    Sodium 144 136 - 145 mmol/L    Potassium 4.6 3.5 - 5.2 mmol/L    Chloride 111 (H) 98 - 107 mmol/L    CO2 24.0 22.0 - 29.0 mmol/L    Calcium 9.1 8.6 - 10.5 mg/dL    Total Protein 6.3 6.0 - 8.5 g/dL    Albumin 3.3 (L) 3.5 - 5.2 g/dL    ALT (SGPT) 12 1 - 41 U/L    AST (SGOT) 22 1 - 40 U/L    Alkaline Phosphatase 71 39 - 117 U/L    Total Bilirubin 0.3 0.0 - 1.2 mg/dL    Globulin 3.0 gm/dL    A/G Ratio 1.1 g/dL    BUN/Creatinine Ratio 19.2 7.0 - 25.0    Anion Gap 9.0 5.0 - 15.0 mmol/L    eGFR 31.1 (L) >60.0 mL/min/1.73   High Sensitivity Troponin T    Specimen: Arm, Left; Blood   Result Value Ref Range    HS Troponin T 37 (H) <15 ng/L   Protime-INR    Specimen: Blood   Result Value Ref Range    Protime 15.8 (H) 12.1 - 14.7 Seconds    INR 1.20 (H) 0.90 - 1.10   CBC Auto Differential    Specimen: Blood   Result Value Ref Range    WBC 6.80 3.40 - 10.80 10*3/mm3    RBC 4.98 4.14 - 5.80 10*6/mm3    Hemoglobin 12.5 (L) 13.0 - 17.7 g/dL    Hematocrit 43.6 37.5 - 51.0 %    MCV 87.6 79.0 - 97.0 fL    MCH 25.1 (L) 26.6 - 33.0 pg    MCHC 28.7 (L) 31.5 - 35.7 g/dL    RDW 22.2 (H) 12.3 - 15.4 %    RDW-SD 70.3 (H) 37.0 - 54.0 fl    MPV 10.4 6.0 - 12.0 fL    Platelets 142 140 - 450 10*3/mm3    Neutrophil % 65.3 42.7 - 76.0 %    Lymphocyte % 19.0 (L) 19.6 - 45.3 %    Monocyte % 9.1 5.0 - 12.0 %    Eosinophil % 5.4 0.3 - 6.2 %    Basophil % 0.9 0.0 - 1.5 %    Immature Grans % 0.3 0.0 - 0.5 %    Neutrophils, Absolute 4.44 1.70 - 7.00 10*3/mm3    Lymphocytes, Absolute 1.29 0.70 - 3.10 10*3/mm3    Monocytes, Absolute 0.62 0.10 -  0.90 10*3/mm3    Eosinophils, Absolute 0.37 0.00 - 0.40 10*3/mm3    Basophils, Absolute 0.06 0.00 - 0.20 10*3/mm3    Immature Grans, Absolute 0.02 0.00 - 0.05 10*3/mm3    nRBC 0.0 0.0 - 0.2 /100 WBC   Scan Slide    Specimen: Blood   Result Value Ref Range    Anisocytosis Large/3+ None Seen    Hypochromia Slight/1+ None Seen    Ovalocytes Slight/1+ None Seen    Platelet Morphology Normal Normal   High Sensitivity Troponin T 2Hr    Specimen: Arm, Left; Blood   Result Value Ref Range    HS Troponin T 36 (H) <15 ng/L    Troponin T Delta -1 >=-4 - <+4 ng/L   ECG 12 Lead Chest Pain   Result Value Ref Range    QT Interval 456 ms    QTC Interval 474 ms   Green Top (Gel)   Result Value Ref Range    Extra Tube Hold for add-ons.    Lavender Top   Result Value Ref Range    Extra Tube hold for add-on    Gold Top - SST   Result Value Ref Range    Extra Tube Hold for add-ons.    Light Blue Top   Result Value Ref Range    Extra Tube Hold for add-ons.      XR Chest 1 View   Final Result   No radiographic evidence of acute cardiac or pulmonary disease.       This report was finalized on 9/11/2023 8:54 AM by Dr. Adama Feliz MD.                ED Course  ED Course as of 09/16/23 2216   Mon Sep 11, 2023   0854 ECG 12 Lead Chest Pain  Sinus rhythm with first-degree AV block.  Right bundle branch block.  Rate 65   QTc 474 [LK]   1306 Patient left AMA.  Troponin was 36 with a delta -1.  Patient at that time had previously been assessed at bedside and deemed medically stable however his troponin had not resulted prior to patient wanting to leave the ER. [LK]      ED Course User Index  [LK] Carey Liang DO   Patient was encouraged to stay for repeat second troponin.  He was finally agreeable for lab redraw however decided ultimately AMA prior to results.  He had no chest pain or rapid heart rate while in the ER.                                       Medical Decision Making  Problems Addressed:  Paroxysmal atrial  fibrillation: chronic illness or injury    Amount and/or Complexity of Data Reviewed  Labs: ordered.  Radiology: ordered.  ECG/medicine tests: ordered. Decision-making details documented in ED Course.    Risk  OTC drugs.  Prescription drug management.        Final diagnoses:   Paroxysmal atrial fibrillation       ED Disposition  ED Disposition       ED Disposition   AMA    Condition   --    Comment   --               No follow-up provider specified.       Medication List      No changes were made to your prescriptions during this visit.            Carey Liang,   09/16/23 5521

## 2023-09-13 ENCOUNTER — TREATMENT (OUTPATIENT)
Dept: CARDIAC REHAB | Facility: HOSPITAL | Age: 78
End: 2023-09-13
Payer: COMMERCIAL

## 2023-09-13 VITALS — HEART RATE: 68 BPM | DIASTOLIC BLOOD PRESSURE: 58 MMHG | OXYGEN SATURATION: 98 % | SYSTOLIC BLOOD PRESSURE: 130 MMHG

## 2023-09-13 DIAGNOSIS — Z95.2 S/P TAVR (TRANSCATHETER AORTIC VALVE REPLACEMENT): Primary | ICD-10-CM

## 2023-09-13 PROCEDURE — 93798 PHYS/QHP OP CAR RHAB W/ECG: CPT

## 2023-09-13 NOTE — PROGRESS NOTES
Pt attended phase II visit.  Tolerated exercise well, NAD noted, no complaints voiced, skin warm, pink, dry, resp nl and even, denies chest discomfort, denies SOA.   Monitor shows NSR-ST 1 AVB and BBB noted. Pacer present, V/S WDL, see [facility] documentation for exercise data.  Dr Ibarra physician immediately available     Patient is returning after his trip to ER on Tuesday. He stated he has felt better the past two days. I told him I would like for him to start on a lower level and work up as tolerated

## 2023-09-15 ENCOUNTER — TELEPHONE (OUTPATIENT)
Dept: CARDIOLOGY | Facility: CLINIC | Age: 78
End: 2023-09-15
Payer: COMMERCIAL

## 2023-09-15 ENCOUNTER — TREATMENT (OUTPATIENT)
Dept: CARDIAC REHAB | Facility: HOSPITAL | Age: 78
End: 2023-09-15
Payer: COMMERCIAL

## 2023-09-15 VITALS — SYSTOLIC BLOOD PRESSURE: 130 MMHG | HEART RATE: 89 BPM | DIASTOLIC BLOOD PRESSURE: 50 MMHG

## 2023-09-15 DIAGNOSIS — Z95.2 S/P TAVR (TRANSCATHETER AORTIC VALVE REPLACEMENT): Primary | ICD-10-CM

## 2023-09-15 PROCEDURE — 93798 PHYS/QHP OP CAR RHAB W/ECG: CPT

## 2023-09-15 NOTE — TELEPHONE ENCOUNTER
Called patient to assist with remote transmission at request of MD office and [facility] rep.   I explained that dr chen follows the patient remotely.  Patient is not currently home, and will attempt to send the transmission once he arrives home.  I gave him my number for further assistance if needed.

## 2023-09-18 ENCOUNTER — TREATMENT (OUTPATIENT)
Dept: CARDIAC REHAB | Facility: HOSPITAL | Age: 78
End: 2023-09-18
Payer: COMMERCIAL

## 2023-09-18 VITALS — DIASTOLIC BLOOD PRESSURE: 68 MMHG | OXYGEN SATURATION: 98 % | SYSTOLIC BLOOD PRESSURE: 142 MMHG | HEART RATE: 66 BPM

## 2023-09-18 DIAGNOSIS — Z95.2 S/P TAVR (TRANSCATHETER AORTIC VALVE REPLACEMENT): Primary | ICD-10-CM

## 2023-09-18 PROCEDURE — 93798 PHYS/QHP OP CAR RHAB W/ECG: CPT

## 2023-09-19 ENCOUNTER — PATIENT OUTREACH (OUTPATIENT)
Dept: CASE MANAGEMENT | Facility: OTHER | Age: 78
End: 2023-09-19
Payer: COMMERCIAL

## 2023-09-19 NOTE — OUTREACH NOTE
"AMBULATORY CASE MANAGEMENT NOTE    Name and Relationship of Patient/Support Person: Nicholas Yin \"BILL\" - Self    Patient Outreach  Call to patient for ECM outreach. He states he is doing well at this time. Spouse is a APRN with [employer] and is involved in care. No concerns and declines for now    MICHELLE JEAN BAPTISTE  Ambulatory Case Management    9/19/2023, 12:12 EDT  "

## 2023-09-20 ENCOUNTER — TREATMENT (OUTPATIENT)
Dept: CARDIAC REHAB | Facility: HOSPITAL | Age: 78
End: 2023-09-20
Payer: COMMERCIAL

## 2023-09-20 VITALS — OXYGEN SATURATION: 99 % | SYSTOLIC BLOOD PRESSURE: 140 MMHG | HEART RATE: 59 BPM | DIASTOLIC BLOOD PRESSURE: 58 MMHG

## 2023-09-20 DIAGNOSIS — Z95.2 S/P TAVR (TRANSCATHETER AORTIC VALVE REPLACEMENT): Primary | ICD-10-CM

## 2023-09-20 PROCEDURE — 93798 PHYS/QHP OP CAR RHAB W/ECG: CPT

## 2023-09-22 ENCOUNTER — TREATMENT (OUTPATIENT)
Dept: CARDIAC REHAB | Facility: HOSPITAL | Age: 78
End: 2023-09-22
Payer: COMMERCIAL

## 2023-09-22 VITALS — DIASTOLIC BLOOD PRESSURE: 60 MMHG | HEART RATE: 60 BPM | SYSTOLIC BLOOD PRESSURE: 132 MMHG

## 2023-09-22 DIAGNOSIS — Z95.2 S/P TAVR (TRANSCATHETER AORTIC VALVE REPLACEMENT): Primary | ICD-10-CM

## 2023-09-22 PROCEDURE — 93798 PHYS/QHP OP CAR RHAB W/ECG: CPT

## 2023-09-25 ENCOUNTER — TREATMENT (OUTPATIENT)
Dept: CARDIAC REHAB | Facility: HOSPITAL | Age: 78
End: 2023-09-25

## 2023-09-25 VITALS — OXYGEN SATURATION: 98 % | HEART RATE: 61 BPM | DIASTOLIC BLOOD PRESSURE: 62 MMHG | SYSTOLIC BLOOD PRESSURE: 126 MMHG

## 2023-09-25 DIAGNOSIS — Z95.2 S/P TAVR (TRANSCATHETER AORTIC VALVE REPLACEMENT): Primary | ICD-10-CM

## 2023-09-25 PROCEDURE — 93798 PHYS/QHP OP CAR RHAB W/ECG: CPT

## 2023-09-27 ENCOUNTER — APPOINTMENT (OUTPATIENT)
Dept: CARDIAC REHAB | Facility: HOSPITAL | Age: 78
End: 2023-09-27
Payer: COMMERCIAL

## 2023-09-29 ENCOUNTER — TREATMENT (OUTPATIENT)
Dept: CARDIAC REHAB | Facility: HOSPITAL | Age: 78
End: 2023-09-29
Payer: COMMERCIAL

## 2023-09-29 VITALS — HEART RATE: 58 BPM | DIASTOLIC BLOOD PRESSURE: 60 MMHG | SYSTOLIC BLOOD PRESSURE: 130 MMHG

## 2023-09-29 DIAGNOSIS — Z95.2 S/P TAVR (TRANSCATHETER AORTIC VALVE REPLACEMENT): Primary | ICD-10-CM

## 2023-09-29 PROCEDURE — 93798 PHYS/QHP OP CAR RHAB W/ECG: CPT

## 2023-09-29 NOTE — PROGRESS NOTES
Pt attended phase II visit.  Tolerated exercise well, NAD noted, no complaints voiced, skin warm, pink, dry, resp nl and even, denies chest discomfort, denies SOA.   Monitor shows NSR-ST 1 AVB and BBB noted. Pacer present, V/S WDL, see [facility] documentation for exercise data.  Dr Ibarra physician immediately available

## 2023-09-30 PROCEDURE — 93296 REM INTERROG EVL PM/IDS: CPT | Performed by: INTERNAL MEDICINE

## 2023-09-30 PROCEDURE — 93294 REM INTERROG EVL PM/LDLS PM: CPT | Performed by: INTERNAL MEDICINE

## 2023-10-02 ENCOUNTER — TREATMENT (OUTPATIENT)
Dept: CARDIAC REHAB | Facility: HOSPITAL | Age: 78
End: 2023-10-02
Payer: COMMERCIAL

## 2023-10-02 VITALS — OXYGEN SATURATION: 98 % | SYSTOLIC BLOOD PRESSURE: 140 MMHG | HEART RATE: 77 BPM | DIASTOLIC BLOOD PRESSURE: 60 MMHG

## 2023-10-02 DIAGNOSIS — Z95.2 S/P TAVR (TRANSCATHETER AORTIC VALVE REPLACEMENT): Primary | ICD-10-CM

## 2023-10-02 PROCEDURE — 93798 PHYS/QHP OP CAR RHAB W/ECG: CPT

## 2023-10-02 NOTE — PROGRESS NOTES
Pt attended phase II visit.  Tolerated exercise well, NAD noted, no complaints voiced, skin warm, pink, dry, resp nl and even, denies chest discomfort, denies SOA.   Monitor shows NSR-ST 1 AVB and BBB noted. Pacer present, V/S WDL, see [facility] documentation for exercise data.  Dr Reid  physician immediately available

## 2023-10-04 ENCOUNTER — TREATMENT (OUTPATIENT)
Dept: CARDIAC REHAB | Facility: HOSPITAL | Age: 78
End: 2023-10-04
Payer: COMMERCIAL

## 2023-10-04 VITALS — DIASTOLIC BLOOD PRESSURE: 60 MMHG | SYSTOLIC BLOOD PRESSURE: 136 MMHG | HEART RATE: 75 BPM | OXYGEN SATURATION: 98 %

## 2023-10-04 DIAGNOSIS — Z95.2 S/P TAVR (TRANSCATHETER AORTIC VALVE REPLACEMENT): Primary | ICD-10-CM

## 2023-10-04 PROCEDURE — 93798 PHYS/QHP OP CAR RHAB W/ECG: CPT

## 2023-10-04 NOTE — PROGRESS NOTES
Pt attended phase II visit.  Tolerated exercise well, NAD noted, no complaints voiced, skin warm, pink, dry, resp nl and even, denies chest discomfort, denies SOA.   Monitor shows NSR-ST with 1 AVB and BBB noted , V/S WDL ,see [facility] documentation for exercise data.  [facility] cardiology physician immediately available

## 2023-10-06 ENCOUNTER — TREATMENT (OUTPATIENT)
Dept: CARDIAC REHAB | Facility: HOSPITAL | Age: 78
End: 2023-10-06
Payer: COMMERCIAL

## 2023-10-06 VITALS — HEART RATE: 57 BPM | SYSTOLIC BLOOD PRESSURE: 122 MMHG | DIASTOLIC BLOOD PRESSURE: 62 MMHG

## 2023-10-06 DIAGNOSIS — Z95.2 S/P TAVR (TRANSCATHETER AORTIC VALVE REPLACEMENT): Primary | ICD-10-CM

## 2023-10-06 PROCEDURE — 93798 PHYS/QHP OP CAR RHAB W/ECG: CPT

## 2023-10-06 NOTE — PROGRESS NOTES
Pt attended phase II visit.  Tolerated exercise well, NAD noted, no complaints voiced, skin warm, pink, dry, resp nl and even, denies chest discomfort, denies SOA.   Monitor shows NSR-ST with 1 AVB and BBB noted , V/S WDL ,see [facility] documentation for exercise data.  [facility] cardiology physician immediately available

## 2023-10-16 ENCOUNTER — TREATMENT (OUTPATIENT)
Dept: CARDIAC REHAB | Facility: HOSPITAL | Age: 78
End: 2023-10-16
Payer: COMMERCIAL

## 2023-10-16 VITALS — SYSTOLIC BLOOD PRESSURE: 110 MMHG | HEART RATE: 60 BPM | DIASTOLIC BLOOD PRESSURE: 46 MMHG

## 2023-10-16 DIAGNOSIS — Z95.2 S/P TAVR (TRANSCATHETER AORTIC VALVE REPLACEMENT): Primary | ICD-10-CM

## 2023-10-16 PROCEDURE — 93798 PHYS/QHP OP CAR RHAB W/ECG: CPT

## 2023-10-16 NOTE — PROGRESS NOTES
Pt attended phase II visit.  Tolerated exercise well, NAD noted, no complaints voiced, skin warm, pink, dry, resp nl and even, denies chest discomfort, denies SOA.   Monitor shows NSR-ST with 1 AVB and BBB noted , V/S WDL, see [facility] documentation for exercise data.  [facility] cardiology physician immediately available

## 2023-10-18 ENCOUNTER — TREATMENT (OUTPATIENT)
Dept: CARDIAC REHAB | Facility: HOSPITAL | Age: 78
End: 2023-10-18
Payer: COMMERCIAL

## 2023-10-18 ENCOUNTER — OFFICE VISIT (OUTPATIENT)
Dept: CARDIOLOGY | Facility: CLINIC | Age: 78
End: 2023-10-18
Payer: COMMERCIAL

## 2023-10-18 VITALS
HEART RATE: 82 BPM | HEIGHT: 68 IN | BODY MASS INDEX: 33.68 KG/M2 | OXYGEN SATURATION: 100 % | SYSTOLIC BLOOD PRESSURE: 143 MMHG | WEIGHT: 222.2 LBS | DIASTOLIC BLOOD PRESSURE: 63 MMHG

## 2023-10-18 VITALS — SYSTOLIC BLOOD PRESSURE: 140 MMHG | HEART RATE: 57 BPM | DIASTOLIC BLOOD PRESSURE: 58 MMHG

## 2023-10-18 DIAGNOSIS — I25.10 CORONARY ARTERY DISEASE INVOLVING NATIVE CORONARY ARTERY OF NATIVE HEART WITHOUT ANGINA PECTORIS: Chronic | ICD-10-CM

## 2023-10-18 DIAGNOSIS — I48.0 PAROXYSMAL ATRIAL FIBRILLATION: Primary | Chronic | ICD-10-CM

## 2023-10-18 DIAGNOSIS — Z95.2 S/P TAVR (TRANSCATHETER AORTIC VALVE REPLACEMENT): Primary | ICD-10-CM

## 2023-10-18 DIAGNOSIS — I10 ESSENTIAL HYPERTENSION: Chronic | ICD-10-CM

## 2023-10-18 PROCEDURE — 93000 ELECTROCARDIOGRAM COMPLETE: CPT | Performed by: NURSE PRACTITIONER

## 2023-10-18 PROCEDURE — 93798 PHYS/QHP OP CAR RHAB W/ECG: CPT

## 2023-10-18 PROCEDURE — 99214 OFFICE O/P EST MOD 30 MIN: CPT | Performed by: NURSE PRACTITIONER

## 2023-10-18 NOTE — PROGRESS NOTES
Pt attended phase II visit.  Tolerated exercise well, NAD noted, no complaints voiced, skin warm, pink, dry, resp nl and even, denies chest discomfort, denies SOA.   Monitor shows NSR-ST with 1 AVB and BBB noted , V/S WDL, see [facility] documentation for exercise data.  [facility] cardiology physician immediately available

## 2023-10-18 NOTE — PROGRESS NOTES
"Chief Complaint  Coronary Artery Disease (Patient denies chest pain , states SOA is stable )    Subjective          Nicholas Yin presents to [facility] for follow up.    History of Present Illness    Bill was last seen in clinic on 8/14/2023.  He did report palpitations and was offered amiodarone, refused due to cross sensitivity with iodine.  He was offered referral to EP for ablation and wanted to hold off on doing that.    On 9/18/2023 he presented for interrogation of his pacemaker.  He was placed on Multaq at that time.    At today's visit he states that since starting Multaq he has not had any issues with atrial fibrillation.  He is not had any incidence of symptomatic fast heart rate.  He states that he feels like he is getting some of his energy back.  He denies any chest pain.  He denies shortness of breath or dyspnea on exertion.  He has been participating in cardiac rehabilitation and will be finished with that at the end of this week.    Objective     Vital Signs:   /63 (BP Location: Left arm, Patient Position: Sitting, Cuff Size: Adult)   Pulse 82   Ht 172.7 cm (68\")   Wt 101 kg (222 lb 3.2 oz)   SpO2 100%   BMI 33.79 kg/m²       Physical Exam  Vitals reviewed.   Constitutional:       Appearance: Normal appearance. He is well-developed.   Cardiovascular:      Rate and Rhythm: Normal rate and regular rhythm.      Heart sounds: Murmur heard.      No friction rub. No gallop.   Pulmonary:      Effort: Pulmonary effort is normal. No respiratory distress.      Breath sounds: Normal breath sounds. No wheezing or rales.   Skin:     General: Skin is warm and dry.   Neurological:      Mental Status: He is alert and oriented to person, place, and time.   Psychiatric:         Mood and Affect: Mood normal.         Behavior: Behavior normal.          Result Review :            ECG 12 Lead    Date/Time: 10/18/2023 9:26 AM  Performed by: Carlie Adrian, " CHIDI    Authorized by: Carlie Adrian APRN  Comparison: compared with previous ECG from 9/11/2023  Comparison to previous ECG: Sinus rhythm with first-degree AV block, right bundle branch block  Rhythm: paced  Rate: normal  BPM: 65  Conduction: right bundle branch block  Comments: QTc 459           Most recent Stress Test  Results for orders placed during the hospital encounter of 04/10/23    Stress Test With Pet Myocardial Perfusion    Interpretation Summary    Patient denied any chest discomfort/pain, or any other symptoms with the infusion    Baseline EKG shows a right bundle branch block with mild first-degree AV block.  No ST segment shift from baseline with the infusion.    Normal hemodynamic response to regadenoson.    When the stress and rest PET images were compared no areas of fixed hypoperfusion or stress-induced hypoperfusion were seen.  The 3D reconstruction showed normal contractility in all segments with a calculated ejection at rest of 63% and with stress 72%..    No evidence of inducible ischemia by scintigraphic criteria.  (The patient was known to have a near normal ramus circumflex and right coronary artery at the time of his LAD stent in 2019.)  The patient may proceed with TAVR.       Most recent Echocardiogram  Results for orders placed during the hospital encounter of 06/19/23    Adult Transthoracic Echo Complete W/ Cont if Necessary Per Protocol    Interpretation Summary    Left ventricular systolic function is normal. Left ventricular ejection fraction appears to be 56 - 60%.    There is a 26 mm S3 TAVR valve present with gradients within limit and similar to previous study    There is no evidence of pericardial effusion        Current Outpatient Medications   Medication Sig Dispense Refill    aspirin 81 MG EC tablet Take 1 tablet by mouth Daily.      coenzyme Q10 100 MG capsule Take 2 capsules by mouth Daily.      dapagliflozin Propanediol (Farxiga) 10 MG tablet Take 1 tablet by mouth  Daily. 90 tablet 3    docusate sodium (Colace) 100 MG capsule Take 1 capsule by mouth 2 (Two) Times a Day. 72 capsule 0    dronedarone (Multaq) 400 MG tablet Take 1 tablet by mouth 2 (Two) Times a Day With Meals. 60 tablet 11    ferrous sulfate 325 (65 FE) MG EC tablet Take 1 tablet by mouth Daily. 90 tablet 1    losartan (COZAAR) 50 MG tablet Take 1/2 tablet by mouth 2 (Two) Times a Day. (Patient taking differently: Take 0.5 tablets by mouth 2 (Two) Times a Day. Patient stated he takes a half of pill at night due to low BP) 60 tablet 11    metoprolol tartrate (LOPRESSOR) 25 MG tablet Take 1 tablet by mouth 2 (Two) Times a Day. (Patient taking differently: Take 1 tablet by mouth 2 (Two) Times a Day. Patient states takes 1/2 tab bid) 60 tablet 11    multivitamin with minerals tablet tablet Take 1 tablet by mouth Daily.      omeprazole (priLOSEC) 40 MG capsule Take 1 capsule by mouth Daily.      rivaroxaban (XARELTO) 15 MG tablet Take 1 tablet by mouth Daily. 84 tablet 0    rosuvastatin (CRESTOR) 20 MG tablet Take 1 tablet by mouth Daily. 90 tablet 3    vitamin D (ERGOCALCIFEROL) 1.25 MG (46558 UT) capsule capsule Take 1 capsule by mouth Every 7 (Seven) Days. 12 capsule 1    Xultophy 100-3.6 UNIT-MG/ML solution pen-injector subcutaneous pen Inject 50 Units under the skin into the appropriate area as directed Daily. 15 mL 5     No current facility-administered medications for this visit.     Facility-Administered Medications Ordered in Other Visits   Medication Dose Route Frequency Provider Last Rate Last Admin    Chlorhexidine Gluconate Cloth 2 % pads 1 application  1 application  Topical Q12H PRN Nneka Araujo APRN                Assessment and Plan    Problem List Items Addressed This Visit          Cardiac and Vasculature    Essential hypertension (Chronic)    CAD s/p stent  (Chronic)    Overview     8/15/2019 [facility] for non-STEMI: PCI ARIA to the mid LAD         Paroxysmal atrial fibrillation - Primary (Chronic)            Follow Up     Medications were reviewed with the patient.    CAD is stable.  Continue aspirin, losartan, metoprolol tartrate, and rosuvastatin.    Hypertension is controlled.  Continue current medications.    Paroxysmal atrial fibrillation is stable.  Patient is to continue Multaq for rhythm control.  Continue Xarelto for stroke prophylaxis. [id number] Score: 8 (10/20/2023  2:26 PM)      Return in about 4 months (around 2/18/2024).    Patient was given instructions and counseling regarding his condition or for health maintenance advice. Please see specific information pulled into the AVS if appropriate.

## 2023-10-20 ENCOUNTER — TREATMENT (OUTPATIENT)
Dept: CARDIAC REHAB | Facility: HOSPITAL | Age: 78
End: 2023-10-20
Payer: COMMERCIAL

## 2023-10-20 VITALS — HEART RATE: 78 BPM | DIASTOLIC BLOOD PRESSURE: 56 MMHG | SYSTOLIC BLOOD PRESSURE: 142 MMHG | OXYGEN SATURATION: 100 %

## 2023-10-20 DIAGNOSIS — Z95.2 S/P TAVR (TRANSCATHETER AORTIC VALVE REPLACEMENT): Primary | ICD-10-CM

## 2023-10-20 DIAGNOSIS — Z95.2 HEART VALVE REPLACED: Primary | ICD-10-CM

## 2023-10-20 PROCEDURE — 93798 PHYS/QHP OP CAR RHAB W/ECG: CPT

## 2023-10-20 NOTE — PROGRESS NOTES
Pt attended phase II visit.  Tolerated exercise well, NAD noted, no complaints voiced, skin warm, pink, dry, resp nl and even, denies chest discomfort, denies SOA.   Monitor shows NSR-ST with 1 AVB and BBB noted , V/S WDL, see [facility] documentation for exercise data.  [facility] cardiology physician immediately available     Discussed Bill's goal set at the beginning of the program of beginning a personal exercise program. Informed patient of options for exercise after discharge, including Phase III. Patient stated he will continue on in the Phase III program  non monitored with us. He stated he has trip planned so he will start the first week of November.

## 2023-10-25 DIAGNOSIS — N18.32 CHRONIC RENAL FAILURE, STAGE 3B: Chronic | ICD-10-CM

## 2023-10-25 DIAGNOSIS — I48.0 PAROXYSMAL ATRIAL FIBRILLATION: Chronic | ICD-10-CM

## 2023-10-25 DIAGNOSIS — I10 ESSENTIAL HYPERTENSION: Primary | Chronic | ICD-10-CM

## 2023-10-26 ENCOUNTER — LAB (OUTPATIENT)
Dept: LAB | Facility: HOSPITAL | Age: 78
End: 2023-10-26
Payer: COMMERCIAL

## 2023-10-26 DIAGNOSIS — N18.32 CHRONIC RENAL FAILURE, STAGE 3B: ICD-10-CM

## 2023-10-26 DIAGNOSIS — I10 ESSENTIAL HYPERTENSION: Chronic | ICD-10-CM

## 2023-10-26 DIAGNOSIS — I48.0 PAROXYSMAL ATRIAL FIBRILLATION: Chronic | ICD-10-CM

## 2023-10-26 DIAGNOSIS — Z79.4 TYPE 2 DIABETES MELLITUS WITH COMPLICATION, WITH LONG-TERM CURRENT USE OF INSULIN: Chronic | ICD-10-CM

## 2023-10-26 DIAGNOSIS — E11.8 TYPE 2 DIABETES MELLITUS WITH COMPLICATION, WITH LONG-TERM CURRENT USE OF INSULIN: Chronic | ICD-10-CM

## 2023-10-26 DIAGNOSIS — E78.2 MIXED HYPERLIPIDEMIA: Chronic | ICD-10-CM

## 2023-10-26 DIAGNOSIS — D62 POSTOPERATIVE ANEMIA DUE TO ACUTE BLOOD LOSS: ICD-10-CM

## 2023-10-26 LAB
ALBUMIN SERPL-MCNC: 3.4 G/DL (ref 3.5–5.2)
ALBUMIN/GLOB SERPL: 1.1 G/DL
ALP SERPL-CCNC: 71 U/L (ref 39–117)
ALT SERPL W P-5'-P-CCNC: 13 U/L (ref 1–41)
ANION GAP SERPL CALCULATED.3IONS-SCNC: 12 MMOL/L (ref 5–15)
AST SERPL-CCNC: 20 U/L (ref 1–40)
BASOPHILS # BLD AUTO: 0.05 10*3/MM3 (ref 0–0.2)
BASOPHILS NFR BLD AUTO: 0.8 % (ref 0–1.5)
BILIRUB SERPL-MCNC: 0.6 MG/DL (ref 0–1.2)
BUN SERPL-MCNC: 36 MG/DL (ref 8–23)
BUN/CREAT SERPL: 14.2 (ref 7–25)
CALCIUM SPEC-SCNC: 9.1 MG/DL (ref 8.6–10.5)
CHLORIDE SERPL-SCNC: 109 MMOL/L (ref 98–107)
CHOLEST SERPL-MCNC: 105 MG/DL (ref 0–200)
CO2 SERPL-SCNC: 20 MMOL/L (ref 22–29)
CREAT SERPL-MCNC: 2.54 MG/DL (ref 0.76–1.27)
DEPRECATED RDW RBC AUTO: 50.7 FL (ref 37–54)
EGFRCR SERPLBLD CKD-EPI 2021: 25.3 ML/MIN/1.73
EOSINOPHIL # BLD AUTO: 0.53 10*3/MM3 (ref 0–0.4)
EOSINOPHIL NFR BLD AUTO: 8.3 % (ref 0.3–6.2)
ERYTHROCYTE [DISTWIDTH] IN BLOOD BY AUTOMATED COUNT: 17.4 % (ref 12.3–15.4)
FERRITIN SERPL-MCNC: 30 NG/ML (ref 30–400)
GLOBULIN UR ELPH-MCNC: 3.1 GM/DL
GLUCOSE SERPL-MCNC: 88 MG/DL (ref 65–99)
HBA1C MFR BLD: 7 % (ref 4.8–5.6)
HCT VFR BLD AUTO: 38.3 % (ref 37.5–51)
HDLC SERPL-MCNC: 48 MG/DL (ref 40–60)
HGB BLD-MCNC: 12.1 G/DL (ref 13–17.7)
IMM GRANULOCYTES # BLD AUTO: 0.01 10*3/MM3 (ref 0–0.05)
IMM GRANULOCYTES NFR BLD AUTO: 0.2 % (ref 0–0.5)
IRON 24H UR-MRATE: 43 MCG/DL (ref 59–158)
LDLC SERPL CALC-MCNC: 38 MG/DL (ref 0–100)
LDLC/HDLC SERPL: 0.78 {RATIO}
LYMPHOCYTES # BLD AUTO: 1.28 10*3/MM3 (ref 0.7–3.1)
LYMPHOCYTES NFR BLD AUTO: 20 % (ref 19.6–45.3)
MAGNESIUM SERPL-MCNC: 2.4 MG/DL (ref 1.6–2.4)
MCH RBC QN AUTO: 25.7 PG (ref 26.6–33)
MCHC RBC AUTO-ENTMCNC: 31.6 G/DL (ref 31.5–35.7)
MCV RBC AUTO: 81.5 FL (ref 79–97)
MONOCYTES # BLD AUTO: 0.69 10*3/MM3 (ref 0.1–0.9)
MONOCYTES NFR BLD AUTO: 10.8 % (ref 5–12)
NEUTROPHILS NFR BLD AUTO: 3.84 10*3/MM3 (ref 1.7–7)
NEUTROPHILS NFR BLD AUTO: 59.9 % (ref 42.7–76)
NRBC BLD AUTO-RTO: 0 /100 WBC (ref 0–0.2)
PLATELET # BLD AUTO: 200 10*3/MM3 (ref 140–450)
PMV BLD AUTO: 10.3 FL (ref 6–12)
POTASSIUM SERPL-SCNC: 4.5 MMOL/L (ref 3.5–5.2)
PROT SERPL-MCNC: 6.5 G/DL (ref 6–8.5)
RBC # BLD AUTO: 4.7 10*6/MM3 (ref 4.14–5.8)
SODIUM SERPL-SCNC: 141 MMOL/L (ref 136–145)
TRANSFERRIN SERPL-MCNC: 240 MG/DL (ref 200–360)
TRIGL SERPL-MCNC: 99 MG/DL (ref 0–150)
VLDLC SERPL-MCNC: 19 MG/DL (ref 5–40)
WBC NRBC COR # BLD: 6.4 10*3/MM3 (ref 3.4–10.8)

## 2023-10-26 PROCEDURE — 82728 ASSAY OF FERRITIN: CPT

## 2023-10-26 PROCEDURE — 84466 ASSAY OF TRANSFERRIN: CPT

## 2023-10-26 PROCEDURE — 36415 COLL VENOUS BLD VENIPUNCTURE: CPT

## 2023-10-26 PROCEDURE — 85025 COMPLETE CBC W/AUTO DIFF WBC: CPT

## 2023-10-26 PROCEDURE — 83735 ASSAY OF MAGNESIUM: CPT

## 2023-10-26 PROCEDURE — 80053 COMPREHEN METABOLIC PANEL: CPT

## 2023-10-26 PROCEDURE — 83540 ASSAY OF IRON: CPT

## 2023-10-26 PROCEDURE — 83036 HEMOGLOBIN GLYCOSYLATED A1C: CPT

## 2023-10-26 PROCEDURE — 80061 LIPID PANEL: CPT

## 2023-10-27 ENCOUNTER — OFFICE VISIT (OUTPATIENT)
Dept: FAMILY MEDICINE CLINIC | Facility: CLINIC | Age: 78
End: 2023-10-27
Payer: COMMERCIAL

## 2023-10-27 DIAGNOSIS — I73.9 PERIPHERAL VASCULAR DISEASE: ICD-10-CM

## 2023-10-27 DIAGNOSIS — N18.32 CHRONIC RENAL FAILURE, STAGE 3B: Chronic | ICD-10-CM

## 2023-10-27 DIAGNOSIS — I10 ESSENTIAL HYPERTENSION: Chronic | ICD-10-CM

## 2023-10-27 DIAGNOSIS — Z86.19 HISTORY OF HEPATITIS C: ICD-10-CM

## 2023-10-27 DIAGNOSIS — R35.1 BENIGN PROSTATIC HYPERPLASIA WITH NOCTURIA: ICD-10-CM

## 2023-10-27 DIAGNOSIS — I44.2 POSTOPERATIVE COMPLETE HEART BLOCK: ICD-10-CM

## 2023-10-27 DIAGNOSIS — I48.0 PAROXYSMAL ATRIAL FIBRILLATION: Chronic | ICD-10-CM

## 2023-10-27 DIAGNOSIS — E78.2 MIXED HYPERLIPIDEMIA: Chronic | ICD-10-CM

## 2023-10-27 DIAGNOSIS — Z79.4 TYPE 2 DIABETES MELLITUS WITH COMPLICATION, WITH LONG-TERM CURRENT USE OF INSULIN: Chronic | ICD-10-CM

## 2023-10-27 DIAGNOSIS — G45.3 AMAUROSIS FUGAX OF RIGHT EYE: ICD-10-CM

## 2023-10-27 DIAGNOSIS — Z00.00 HEALTHCARE MAINTENANCE: ICD-10-CM

## 2023-10-27 DIAGNOSIS — E66.9 CLASS 1 OBESITY WITH SERIOUS COMORBIDITY AND BODY MASS INDEX (BMI) OF 33.0 TO 33.9 IN ADULT, UNSPECIFIED OBESITY TYPE: ICD-10-CM

## 2023-10-27 DIAGNOSIS — N40.1 BENIGN PROSTATIC HYPERPLASIA WITH NOCTURIA: ICD-10-CM

## 2023-10-27 DIAGNOSIS — M51.36 DDD (DEGENERATIVE DISC DISEASE), LUMBAR: ICD-10-CM

## 2023-10-27 DIAGNOSIS — E55.9 VITAMIN D DEFICIENCY: ICD-10-CM

## 2023-10-27 DIAGNOSIS — Z95.2 S/P TAVR (TRANSCATHETER AORTIC VALVE REPLACEMENT): Primary | Chronic | ICD-10-CM

## 2023-10-27 DIAGNOSIS — I25.10 CORONARY ARTERY DISEASE INVOLVING NATIVE CORONARY ARTERY OF NATIVE HEART WITHOUT ANGINA PECTORIS: Chronic | ICD-10-CM

## 2023-10-27 DIAGNOSIS — I97.89 POSTOPERATIVE COMPLETE HEART BLOCK: ICD-10-CM

## 2023-10-27 DIAGNOSIS — Z85.47 H/O TESTICULAR CANCER: ICD-10-CM

## 2023-10-27 DIAGNOSIS — K21.9 GASTROESOPHAGEAL REFLUX DISEASE WITHOUT ESOPHAGITIS: Chronic | ICD-10-CM

## 2023-10-27 DIAGNOSIS — E11.8 TYPE 2 DIABETES MELLITUS WITH COMPLICATION, WITH LONG-TERM CURRENT USE OF INSULIN: Chronic | ICD-10-CM

## 2023-10-27 DIAGNOSIS — Z85.038 HISTORY OF COLON CANCER: ICD-10-CM

## 2023-10-27 NOTE — PROGRESS NOTES
December 17, 2019       Bruce S Bernheim, MD  [address] [facility] [address]  VIA In Basket      Patient: Ambrosio Izaguirre   YOB: 1946   Date of Visit: 12/17/2019       Dear Dr. Bernheim:    Thank you for referring Ambrosio Izaguirre to me for evaluation. Below are my notes for this visit with him.    If you have questions, please do not hesitate to call me. I look forward to following your patient along with you.      Sincerely,        Nando Corrales MD        CC: No Recipients  Nando Corrales MD  12/17/2019 10:45 AM  Sign when Signing Visit    Vitals:   Subjective   Patient ID: Ambrosio is a 73 year old male.    Chief Complaint   Patient presents with   • Colonoscopy   • Cirrhosis     HPI    72 yo man with PMH significant for etoh induced pancreatitis in the 1995 who presents after recent episode of diverticulitis and also having an abdominal ultrasound suggestive of possible cirrhosis.    Patient was seen by Dr. Nevarez in June 2018 for watery diarrhea which had been going on for months.  He had stool studies done including GI pathogen panel, C. difficile and ova and parasite which were negative.    Patient had an upper endoscopy July 2018 done for diarrhea which showed normal esophagus, gastritis and normal duodenum.  Biopsies showed normal small intestine, normal stomach.  Colonoscopy showed diverticulosis in the sigmoid colon, internal hemorrhoids.  TI was not intubated.  Random colon biopsies were normal.  Diarrhea attributed to metformin and resolved after stopping it.     Beginning of November he noticed worse lower extremity edema. He had an ultrasound of liver 11/6/19 which showed echogenic and coarsened hepatic echotexture - may be seen with cirrhosis or hepatocellular disease, heterogeneous pancreatic parenchymal echotexture, possibly sequela of chronic pancreatitis, no discrete lesions, otherwise normal.    Patient presented to the emergency room December 2019 with left lower quadrant pain  Subjective   Nicholas Yin is a 78 y.o. male.     Chief Complaint  He returns for a scheduled reassessment of multiple medical problems including a recent TAVR, paroxysmal atrial fibrillation, coronary artery disease, type 2 diabetes mellitus, chronic renal failure, and chronic back pain    History of Present Illness     Recent TAVR  He underwent a TAVR and left femoral endarterectomy on 5/8/2023.  His course in hospital was uncomplicated and he was discharged home the next day.  He required drainage of a left inguinal seroma by Dr. Lopez on 6/1/2023.  He attended wound care with eventual closure.  He has completed cardiopulmonary rehab and has noted a continued improvement in his shortness of breath and exercise tolerance.    Paroxysmal Atrial Fibrillation  He required readmission to hospital on 5/14/2023 following a presyncopal episode.  He was found to be in atrial fibrillation with a rapid ventricular response.  He was started on IV diltiazem and progressed to a wide-complex tachycardia with loss of consciousness.  CPR was administered with a prompt improvement and he regained consciousness.  He was then started on IV amiodarone and transferred to [facility].  On arrival there he was found to be in a third-degree block and underwent placement of a pacemaker by Dr. Mendoza.  He was started on Multaq since last here and denies any further palpitations.  He continues to deny any chest pain or lightheadedness, and there is no history of any calf pain, swelling of the ankles, fever, or chills. He remains on rivaroxaban.  He continues to be followed by cardiology and is scheduled to undergo a reassessment with CHIDI Ku on 2/22/2024  Lab Results   Component Value Date    WBC 6.40 10/26/2023    HGB 12.1 (L) 10/26/2023    HCT 38.3 10/26/2023    MCV 81.5 10/26/2023     10/26/2023     Lab Results   Component Value Date    IRON 43 (L) 10/26/2023    TIBC 346 03/06/2017    FERRITIN 30.00 10/26/2023      Coronary Artery Disease  He underwent a coronary angiogram by Dr. Villatoro on 8/15/2019 with stenting of the mid LAD.  He remains on low-dose ASA    Transient Visual Loss  Several years ago he experienced a sudden progressive decrease in the vision in his right eye while driving. He stated that the vision in the eye darkened gradually over several minutes to the point where the only things he could make out were very bright lights and the sun. This lasted for about 30 minutes then resolved over several minutes. He was hospitalized at [facility] over 10 years ago following a similar episodes that was ultimately felt to be vascular in origin. MRI of the brain performed on 12/8/17 was reported as showing mild cerebral atrophy with chronic small vessel ischemic changes. MRA of the carotid arteries performed the same day was unremarkable.  Bilateral carotid ultrasound performed on 2/6/2023 was unremarkable.  He undergoes regular ophthalmology assessments    Type 2 Diabetes Mellitus  Current treatments: SGLT2 -empagliflozin, GLP agonist-liraglutide and basal insulin - tresiba (together as xultophy).   Lab Results   Component Value Date    HGBA1C 7.00 (H) 10/26/2023     Hyperlipidemia  He remains on rosuvastatin  Lab Results   Component Value Date    CHOL 105 10/26/2023    CHLPL 125 09/28/2015    TRIG 99 10/26/2023    HDL 48 10/26/2023    LDL 38 10/26/2023     Essential Hypertension  Home blood pressure readings: have generally been at or below r goal.  He has have both the dose of losartan and metoprolol  Lab Results   Component Value Date    GLUCOSE 88 10/26/2023    BUN 36 (H) 10/26/2023    CREATININE 2.54 (H) 10/26/2023    EGFR 25.3 (L) 10/26/2023    BCR 14.2 10/26/2023    K 4.5 10/26/2023    CO2 20.0 (L) 10/26/2023    CALCIUM 9.1 10/26/2023    ALBUMIN 3.4 (L) 10/26/2023    BILITOT 0.6 10/26/2023    AST 20 10/26/2023    ALT 13 10/26/2023     Lab Results   Component Value Date    ALKPHOS 71 10/26/2023     Chronic Renal  (described as both sharp/dull, constant, severe, non-radiating, without any specific alleviating or exacerbating factors)  associated with loose stools without any GI bleeding, nausea, vomiting or fevers.  He had labs which showed a normal CMP including normal liver enzymes as well as a normal CBC except for hemoglobin of 10.5 which is normocytic.  Platelet count was 149.  He had a CT scan which showed short segment acute sigmoid diverticulitis without perforation or abscess, hepatic steatosis, and indeterminate mixed cystic and calcified lesion in the tail the pancreas essentially stable in size although internal calcifications have increased compared to CT from May 2013).  He was discharged on a course of Cipro and Flagyl which resolved the pain.   He is now having soft stools. He now has 3 bowel movements per day without any blood.   He has not drank any alcohol since November 2019. Prior to this would drink a pitcher of beer and 12 ounces of scotch every day for many years.       Past Medical History:   Diagnosis Date   • Diverticulosis    • Hyperlipidemia    • Hypertension    • Pancreatitis      Past Surgical History:   Procedure Laterality Date   • Colonoscopy  07/17/2018    He had a the EGD and colonoscopy on 7/17/2018 by Dr. Miquel Nevarez. EGD revealed gastritis. Colonoscopy no polyps or mass lesions. Pathology revealed normal mucosa and negative for H pylori and negative for sprue. Random colonic biopsies were normal.   • Esophagogastroduodenoscopy  07/17/2018    He had a the EGD and colonoscopy on 7/17/2018 by Dr. Miquel Nevarez. EGD revealed gastritis. Colonoscopy no polyps or mass lesions. Pathology revealed normal mucosa and negative for H pylori and negative for sprue. Random colonic biopsies were normal.     Current Outpatient Medications   Medication Sig Dispense Refill   • metroNIDAZOLE (FLAGYL) 500 MG tablet daily.  0   • furosemide (LASIX) 20 MG tablet TAKE 1 TABLET BY MOUTH DAILY 90 tablet 0   •  Failure  This has been contributed to longstanding type 2 diabetes mellitus and hypertension.  He is aware to avoid any NSAIDs oral or prescription.     Chronic Back Pain  He gives a long history of increasing mid and low back pain. This is described as a sharp ache. The pain does not radiate and has been unassociated with any other symptoms. The pain is worse with prolonged weight bearing and limits his activities more then anything else at present.  MRI performed on 1/7/2021 was reported as showing degenerative disc disease and osteoarthritis with mild to moderate central canal and bilateral foraminal narrowing at L2-3 and L3-4.    Gastroesophageal Reflux Disease  He remains heartburn free on omeprazole    The following portions of the patient's history were reviewed and updated as appropriate: allergies, current medications, past medical history, past social history, past surgical history, and problem list.    Review of Systems   Constitutional:  Positive for fatigue. Negative for appetite change, chills, fever and unexpected weight change.   HENT:  Negative for congestion, ear pain, rhinorrhea, sneezing and sore throat.    Eyes:  Negative for visual disturbance.   Respiratory:  Positive for shortness of breath. Negative for cough and wheezing.    Cardiovascular:  Negative for chest pain, palpitations and leg swelling.   Gastrointestinal:  Negative for abdominal pain, blood in stool, constipation, diarrhea, nausea and vomiting.   Genitourinary:  Negative for difficulty urinating, dysuria, frequency, hematuria and urgency.        Erectile dysfunction - chronic progressive. Nocturia x 2-3 with occasional sense of incomplete voiding.   Musculoskeletal:  Positive for back pain (chronic - progressive). Negative for arthralgias, joint swelling and myalgias.   Skin:  Negative for rash.        Skin lesions   Neurological:  Positive for weakness (generalized). Negative for light-headedness, numbness and headaches.  simvastatin (ZOCOR) 40 MG tablet TAKE 1 TABLET BY MOUTH AT BEDTIME 90 tablet 1   • meloxicam (MOBIC) 7.5 MG tablet      • furosemide (LASIX) 20 MG tablet TAKE 1 TABLET BY MOUTH DAILY 90 tablet 0   • doxycycline hyclate (VIBRAMYCIN) 50 MG capsule      • JANUVIA 50 MG tablet TAKE 1 TABLET BY MOUTH EVERY DAY 30 tablet 3   • finasteride (PROSCAR) 5 MG tablet Take 1 tablet by mouth daily. 30 tablet 5   • lisinopril-hydroCHLOROthiazide (PRINZIDE,ZESTORETIC) 20-25 MG per tablet TAKE 1 TABLET BY MOUTH DAILY 90 tablet 0   • pregabalin (LYRICA) 75 MG capsule TAKE ONE CAPSULE BY MOUTH TWICE DAILY 60 capsule 5   • mupirocin (BACTROBAN) 2 % ointment Apply topically 2 times daily. 30 g 0   • traMADol (ULTRAM) 50 MG tablet   0   • polyethylene glycol-electrolytes (NULYTELY) 420 g solution Take 4,000 mLs by mouth 1 time for 1 dose. 4000 mL 0     No current facility-administered medications for this visit.        ALLERGIES:   Allergen Reactions   • Aspirin Other (See Comments)     Unknown   • Cefuroxime Axetil RASH   • Fluzone Other (See Comments)     feels very sick   • Metformin Hcl Other (See Comments)     dirrhea with metformin   • Oxycontin Other (See Comments)       Family History   Problem Relation Age of Onset   • Cancer, Colon Neg Hx        Social History     Tobacco Use   • Smoking status: Never Smoker   • Smokeless tobacco: Never Used   Substance Use Topics   • Alcohol use: Not Currently   • Drug use: Not Currently           ROS  Except as noted elsewhere in the note, the ROS is negative for Constitutional, HEENT, CV, Respiratory, GI, , Musculoskeletal, Neuro, Psychiatric, Heme/Lymph note, Allergy,Endocrine, and Skin.       Objective   /63   Pulse 68   Ht 5' 8\" (1.727 m)   Wt 109.8 kg (242 lb)   BMI 36.80 kg/m²   BSA 2.22 m²    The patient is alert and oriented, in no acute distress.  HEENT: Oropharynx is clear. Sclera are nonicteric. Neck: There is no lymphadenopathy; the thyroid is notpalpable.  CARDIAC:      Objective   Physical Exam  Constitutional:       General: He is not in acute distress.     Appearance: Normal appearance. He is well-developed. He is not ill-appearing or diaphoretic.      Comments: Bright and in good spirits.  Sits and stands with an exaggerated thoracic kyphosis.  Uncomfortable with movement.  No apparent distress at rest.  No pallor, cyanosis, diaphoresis, or jaundice   HENT:      Head: Atraumatic.      Right Ear: Tympanic membrane, ear canal and external ear normal.      Left Ear: Tympanic membrane, ear canal and external ear normal.      Mouth/Throat:      Lips: No lesions.      Mouth: Mucous membranes are moist. No oral lesions.      Pharynx: No oropharyngeal exudate or posterior oropharyngeal erythema.   Eyes:      General: Lids are normal. Gaze aligned appropriately.      Extraocular Movements: Extraocular movements intact.      Conjunctiva/sclera: Conjunctivae normal.      Pupils: Pupils are equal.   Neck:      Thyroid: No thyroid mass or thyromegaly.      Vascular: No carotid bruit or JVD.      Trachea: Trachea normal. No tracheal deviation.   Cardiovascular:      Rate and Rhythm: Normal rate and regular rhythm.      Heart sounds: S1 normal and S2 normal. Murmur heard.      Crescendo decrescendo systolic murmur is present with a grade of 3/6.      No gallop. No S3 or S4 sounds.   Pulmonary:      Effort: Pulmonary effort is normal.      Breath sounds: Normal breath sounds.   Abdominal:      General: Bowel sounds are normal. There is no distension.   Musculoskeletal:      Right lower leg: No edema.      Left lower leg: No edema.   Lymphadenopathy:      Head:      Right side of head: No submental, submandibular, tonsillar, preauricular, posterior auricular or occipital adenopathy.      Left side of head: No submental, submandibular, tonsillar, preauricular, posterior auricular or occipital adenopathy.      Cervical: No cervical adenopathy.      Upper Body:      Right upper body: No  RRR, normal S1, S2. No murmurs, rubs or gallops.  PULMONARY: Clear bilaterally to ascultation and percussion.  BACK: No CVA tenderness  ABDOMEN: Soft, non-tender, non-distended. Bowel sounds arepresent. No obvious hepatomegaly, splenomegaly, or masses. No hernias or ascites.  PERIANAL: Examination deferred.  GENITALS: Examination deferred.  LYMPATICS: No palpable nodes in the neck  MUSCULOSKELETAL: Normalgait and station, digits and nails. Extremities are all normal without edema.  SKIN: Anicteric.  NEUROLOGICAL: No obvious gross neurological deficients. Alert.  PSYCHIATRIC: Appropriate judgement, insight, mood and affect.      Assessment   Problem List Items Addressed This Visit        Digestive    Diverticulitis of colon    Relevant Medications    polyethylene glycol-electrolytes (NULYTELY) 420 g solution    Other Relevant Orders    PREAUTHORIZATION MAY HAVE BILLING CONSENT      Other Visit Diagnoses     Fatty liver    -  Primary    Relevant Medications    polyethylene glycol-electrolytes (NULYTELY) 420 g solution    Other Relevant Orders    LIVER ELASTOGRAPHY (FIBROSCAN) IN ENDOSCOPY    PREAUTHORIZATION MAY HAVE BILLING CONSENT         Diverticulitis  He has had his first episode of diverticulitis which has responded to a course of Cipro and Flagyl.  He does not have any abdominal pain today.  Since he has not had a colonoscopy within the last year, I will proceed with another one.  I have provided some counseling regarding diet.  He does not need to avoid nuts and seeds as more recent research suggests this is not a risk factor for recurrent diverticulitis.  I did discuss how fiber particularly from fruit may help reduce risk of recurrence and he should eat an apple or pear per day.    Fatty liver  He had what appeared to be fatty infiltration of the liver on ultrasound as well as on CT scan.  Given his significant alcohol history, this is likely alcoholic steatosis.  His liver enzyme elevations on previous  blood tests probably were related to alcohol which have since normalized.  I am recommending we go forward with a FibroScan to determine what degree of fibrosis he may have.  If he does have signs of cirrhosis, I would plan to send a comprehensive serologic work-up to rule out other potential causes of liver disease but again this is most likely alcohol induced.    Nando Corrales MD  Gastroenterology  Specialist in Inflammatory Bowel Disease (Crohn's and Colitis)              supraclavicular adenopathy.      Left upper body: No supraclavicular adenopathy.   Skin:     General: Skin is warm and dry.      Coloration: Skin is not cyanotic, jaundiced or pale.      Findings: Lesion (three erythematous and slightly scaly lesions varying from 1 to 2.5 cm about the mid and left upper back) present. No rash.      Nails: There is no clubbing.   Neurological:      Mental Status: He is alert and oriented to person, place, and time.      Cranial Nerves: No cranial nerve deficit, dysarthria or facial asymmetry.      Sensory: No sensory deficit.      Motor: No tremor.      Coordination: Coordination normal.      Gait: Gait normal.   Psychiatric:         Attention and Perception: Attention normal.         Mood and Affect: Mood normal.         Speech: Speech normal.         Behavior: Behavior normal.         Thought Content: Thought content normal.       Assessment & Plan   Problems Addressed this Visit          Cardiac and Vasculature    CAD s/p stent  (Chronic)  Reminded regarding the importance of risk factor modification.  Continue low-dose ASA.    Essential hypertension (Chronic)   Hypertension:  BP a bit low . Evidence of target organ damage: coronary artery disease, peripheral artery disease, chronic kidney disease, and transient ischemic attack.  Encouraged to continue to work on diet and exercise plan.   Continue current medication for now    Mixed hyperlipidemia (Chronic)  As above.   Continue current medication.    Paroxysmal atrial fibrillation (Chronic)   Rate control is appropriate.. Patient is anticoagulated.   Avoid caffeine.  Avoid oral decongestants.  Continue current medication.  Follow up with cardiology     S/P TAVR (transcatheter aortic valve replacement)   Continues to do well  Encouraged to report if this should change.    Peripheral vascular disease  As above.   Continue current medication.    Postoperative complete heart block  S/P PPP  Follow up with cardiology        Endocrine  and Metabolic    T2DM  (Chronic)  Diabetes mellitus Type II, under excellent control.   Encouraged to continue to pursue ADA diet  Encouraged aerobic exercise.  Continue current medication    Class 1 obesity with serious comorbidity and body mass index (BMI) of 33.0 to 33.9 in adult    Vitamin D deficiency       Gastrointestinal Abdominal     GERD (Chronic)       Genitourinary and Reproductive     Benign prostatic hyperplasia with nocturia       Health Encounters    Healthcare maintenance  Patient has already received a flu shot fall.  Recommended an updated COVID-19 vaccination.  Reviewed the potential benefits of RSV vaccination.  Reminded that he is due for an updated Tdap       Hematology and Neoplasia    H/O testicular cancer    History of colon cancer  We will discuss an updated colonoscopy at his return       Neuro    Amaurosis fugax of right eye  As above.   Continue current medication.    DDD (degenerative disc disease), lumbar  Reminded regarding symptomatic treatment.   Encouraged to report if any worse or if any new symptoms or concerns.       Other    CKD (baseline Cr 2-2.3)   (Chronic)  With recent increase in creatinine.  Possibly due to addition of Multaq  Scheduled for an updated BMP in 10 to 14 days.  If any worse, will discuss with cardiology    Relevant Orders    Basic metabolic panel    History of hepatitis C     Diagnoses         Codes Comments    S/P TAVR (transcatheter aortic valve replacement)    -  Primary ICD-10-CM: Z95.2  ICD-9-CM: V43.3     Postoperative complete heart block     ICD-10-CM: I97.89, I44.2  ICD-9-CM: 997.1, 426.0     Peripheral vascular disease     ICD-10-CM: I73.9  ICD-9-CM: 443.9     Paroxysmal atrial fibrillation     ICD-10-CM: I48.0  ICD-9-CM: 427.31     Mixed hyperlipidemia     ICD-10-CM: E78.2  ICD-9-CM: 272.2     Essential hypertension     ICD-10-CM: I10  ICD-9-CM: 401.9     Coronary artery disease involving native coronary artery of native heart without angina  pectoris     ICD-10-CM: I25.10  ICD-9-CM: 414.01     Vitamin D deficiency     ICD-10-CM: E55.9  ICD-9-CM: 268.9     T2DM      ICD-10-CM: E11.8, Z79.4  ICD-9-CM: 250.90, V58.67     Class 1 obesity with serious comorbidity and body mass index (BMI) of 33.0 to 33.9 in adult, unspecified obesity type     ICD-10-CM: E66.9, Z68.33  ICD-9-CM: 278.00, V85.33     GERD     ICD-10-CM: K21.9  ICD-9-CM: 530.81     Benign prostatic hyperplasia with nocturia     ICD-10-CM: N40.1, R35.1  ICD-9-CM: 600.01, 788.43     Healthcare maintenance     ICD-10-CM: Z00.00  ICD-9-CM: V70.0     History of colon cancer     ICD-10-CM: Z85.038  ICD-9-CM: V10.05     H/O testicular cancer     ICD-10-CM: Z85.47  ICD-9-CM: V10.47     DDD (degenerative disc disease), lumbar     ICD-10-CM: M51.36  ICD-9-CM: 722.52     Amaurosis fugax of right eye     ICD-10-CM: G45.3  ICD-9-CM: 362.34     History of hepatitis C     ICD-10-CM: Z86.19  ICD-9-CM: V12.09     CKD (baseline Cr 2-2.3)       ICD-10-CM: N18.32  ICD-9-CM: 585.3

## 2023-10-28 VITALS
TEMPERATURE: 98.6 F | DIASTOLIC BLOOD PRESSURE: 58 MMHG | RESPIRATION RATE: 14 BRPM | HEIGHT: 68 IN | HEART RATE: 92 BPM | SYSTOLIC BLOOD PRESSURE: 118 MMHG | BODY MASS INDEX: 33.65 KG/M2 | OXYGEN SATURATION: 95 % | WEIGHT: 222 LBS

## 2023-10-28 PROBLEM — T81.31XA SURGICAL WOUND DEHISCENCE: Status: RESOLVED | Noted: 2023-06-12 | Resolved: 2023-10-28

## 2023-10-28 PROBLEM — T14.8XXA OPEN WOUND: Status: RESOLVED | Noted: 2023-06-05 | Resolved: 2023-10-28

## 2023-10-28 PROBLEM — M25.512 LEFT SHOULDER PAIN: Status: RESOLVED | Noted: 2017-10-04 | Resolved: 2023-10-28

## 2023-10-28 PROBLEM — D62 POSTOPERATIVE ANEMIA DUE TO ACUTE BLOOD LOSS: Status: RESOLVED | Noted: 2023-05-12 | Resolved: 2023-10-28

## 2023-10-28 PROBLEM — K46.9 ABDOMINAL HERNIA: Status: RESOLVED | Noted: 2023-07-12 | Resolved: 2023-10-28

## 2023-11-01 ENCOUNTER — APPOINTMENT (OUTPATIENT)
Dept: CARDIAC REHAB | Facility: HOSPITAL | Age: 78
End: 2023-11-01
Payer: COMMERCIAL

## 2023-11-01 DIAGNOSIS — U07.1 COVID-19 VIRUS INFECTION: Primary | ICD-10-CM

## 2023-11-02 ENCOUNTER — APPOINTMENT (OUTPATIENT)
Dept: CARDIAC REHAB | Facility: HOSPITAL | Age: 78
End: 2023-11-02
Payer: COMMERCIAL

## 2023-11-07 ENCOUNTER — TREATMENT (OUTPATIENT)
Dept: CARDIAC REHAB | Facility: HOSPITAL | Age: 78
End: 2023-11-07

## 2023-11-07 VITALS
BODY MASS INDEX: 33.97 KG/M2 | DIASTOLIC BLOOD PRESSURE: 56 MMHG | OXYGEN SATURATION: 98 % | HEART RATE: 84 BPM | SYSTOLIC BLOOD PRESSURE: 138 MMHG | WEIGHT: 223.4 LBS

## 2023-11-07 DIAGNOSIS — Z95.2 S/P TAVR (TRANSCATHETER AORTIC VALVE REPLACEMENT): Primary | ICD-10-CM

## 2023-11-07 NOTE — PROGRESS NOTES
Pt attended phase III session as scheduled.  [facility] physician immediately available. NAD note, skin warm, pink and dry, denies chest pain, chest pressure , SOA, tolerated exercise well. V/S WIDL See exercise flow  for exercise data     We used patient last 6 MWT from Phase II . For the entrance into Phase III program.     Cardiac Rehab Phase III Nursing Assessment     Admitting Diagnosis: S/P TAVR  Date of Onset: 5/8/2023    NURSING ASSESSMENT  Lung Sounds: CTAB    HR: 84   Rhythm: NSR    Heart Sounds: S1 S2 no Rubs or Murmurs noted   Skin:  ? Warm   ?   ? Dry   ?    Edema:none noted  Incisions: ? NA  Pain: zero  (based on Pain Scale 0 -10)   Location: _______________________   Chronic ? Yes ? No  Fall Risk    High   Exercise At home:       Yes   If yes include mode, and frequency:   Bike 3-4 times a week for about 30-40 minutes   Immunizations:  Flu yes         Pneumonia yes   Diabetic: yes  A1C: 10/26/2023   7.0  How often checks BG: daily  Lives with: Spouse  Social/Family Support:  family   Education level: college  Risk Factors: HX MI, TAVR, DM Paroxysmal A Fib , HTN  Angina: none noted   Orthopedic problems:lower Back   Arms: WDL  Legs:   WDL     CHART REVIEW (Pertinent Information):   (EF, List ALL interventions, remaining blockages, pacemakers, ICD and surgical details)    EF 56/60% 5/8/23     Pacemaker/Defib  PVD  CAD, Stents

## 2023-11-13 RX ORDER — ROSUVASTATIN CALCIUM 20 MG/1
20 TABLET, COATED ORAL DAILY
Qty: 90 TABLET | Refills: 3 | Status: SHIPPED | OUTPATIENT
Start: 2023-11-13

## 2023-11-14 ENCOUNTER — APPOINTMENT (OUTPATIENT)
Dept: CARDIAC REHAB | Facility: HOSPITAL | Age: 78
End: 2023-11-14
Payer: COMMERCIAL

## 2023-11-14 ENCOUNTER — LAB (OUTPATIENT)
Dept: LAB | Facility: HOSPITAL | Age: 78
End: 2023-11-14
Payer: COMMERCIAL

## 2023-11-14 DIAGNOSIS — N18.32 CHRONIC RENAL FAILURE, STAGE 3B: Chronic | ICD-10-CM

## 2023-11-14 LAB
ANION GAP SERPL CALCULATED.3IONS-SCNC: 8 MMOL/L (ref 5–15)
BUN SERPL-MCNC: 31 MG/DL (ref 8–23)
BUN/CREAT SERPL: 12.9 (ref 7–25)
CALCIUM SPEC-SCNC: 8.8 MG/DL (ref 8.6–10.5)
CHLORIDE SERPL-SCNC: 109 MMOL/L (ref 98–107)
CO2 SERPL-SCNC: 24 MMOL/L (ref 22–29)
CREAT SERPL-MCNC: 2.41 MG/DL (ref 0.76–1.27)
EGFRCR SERPLBLD CKD-EPI 2021: 27 ML/MIN/1.73
GLUCOSE SERPL-MCNC: 80 MG/DL (ref 65–99)
POTASSIUM SERPL-SCNC: 4.3 MMOL/L (ref 3.5–5.2)
SODIUM SERPL-SCNC: 141 MMOL/L (ref 136–145)

## 2023-11-14 PROCEDURE — 80048 BASIC METABOLIC PNL TOTAL CA: CPT

## 2023-11-14 PROCEDURE — 36415 COLL VENOUS BLD VENIPUNCTURE: CPT

## 2023-11-16 ENCOUNTER — APPOINTMENT (OUTPATIENT)
Dept: CARDIAC REHAB | Facility: HOSPITAL | Age: 78
End: 2023-11-16
Payer: COMMERCIAL

## 2023-11-21 ENCOUNTER — APPOINTMENT (OUTPATIENT)
Dept: CARDIAC REHAB | Facility: HOSPITAL | Age: 78
End: 2023-11-21
Payer: COMMERCIAL

## 2023-11-28 ENCOUNTER — TREATMENT (OUTPATIENT)
Dept: CARDIAC REHAB | Facility: HOSPITAL | Age: 78
End: 2023-11-28
Payer: COMMERCIAL

## 2023-11-28 VITALS — SYSTOLIC BLOOD PRESSURE: 130 MMHG | HEART RATE: 82 BPM | DIASTOLIC BLOOD PRESSURE: 60 MMHG | OXYGEN SATURATION: 98 %

## 2023-11-28 DIAGNOSIS — Z95.2 S/P TAVR (TRANSCATHETER AORTIC VALVE REPLACEMENT): Primary | ICD-10-CM

## 2023-11-28 NOTE — PROGRESS NOTES
Pt attended phase III session as scheduled.  [facility] physician immediately available. NAD note, skin warm, pink and dry, denies chest pain, chest pressure , SOA, tolerated exercise well. V/S WIDL See exercise flow  for exercise data

## 2023-11-29 DIAGNOSIS — N18.32 CHRONIC RENAL FAILURE, STAGE 3B: Primary | Chronic | ICD-10-CM

## 2023-11-30 ENCOUNTER — APPOINTMENT (OUTPATIENT)
Dept: CARDIAC REHAB | Facility: HOSPITAL | Age: 78
End: 2023-11-30
Payer: COMMERCIAL

## 2023-12-05 ENCOUNTER — APPOINTMENT (OUTPATIENT)
Dept: CARDIAC REHAB | Facility: HOSPITAL | Age: 78
End: 2023-12-05
Payer: COMMERCIAL

## 2023-12-07 ENCOUNTER — APPOINTMENT (OUTPATIENT)
Dept: CARDIAC REHAB | Facility: HOSPITAL | Age: 78
End: 2023-12-07
Payer: COMMERCIAL

## 2023-12-12 ENCOUNTER — LAB (OUTPATIENT)
Dept: LAB | Facility: HOSPITAL | Age: 78
End: 2023-12-12
Payer: COMMERCIAL

## 2023-12-12 ENCOUNTER — APPOINTMENT (OUTPATIENT)
Dept: CARDIAC REHAB | Facility: HOSPITAL | Age: 78
End: 2023-12-12
Payer: COMMERCIAL

## 2023-12-12 DIAGNOSIS — N18.32 CHRONIC RENAL FAILURE, STAGE 3B: Chronic | ICD-10-CM

## 2023-12-12 LAB
ANION GAP SERPL CALCULATED.3IONS-SCNC: 10 MMOL/L (ref 5–15)
BUN SERPL-MCNC: 32 MG/DL (ref 8–23)
BUN/CREAT SERPL: 13.2 (ref 7–25)
CALCIUM SPEC-SCNC: 8.9 MG/DL (ref 8.6–10.5)
CHLORIDE SERPL-SCNC: 110 MMOL/L (ref 98–107)
CO2 SERPL-SCNC: 22 MMOL/L (ref 22–29)
CREAT SERPL-MCNC: 2.42 MG/DL (ref 0.76–1.27)
EGFRCR SERPLBLD CKD-EPI 2021: 26.8 ML/MIN/1.73
GLUCOSE SERPL-MCNC: 173 MG/DL (ref 65–99)
POTASSIUM SERPL-SCNC: 4.6 MMOL/L (ref 3.5–5.2)
SODIUM SERPL-SCNC: 142 MMOL/L (ref 136–145)

## 2023-12-12 PROCEDURE — 80048 BASIC METABOLIC PNL TOTAL CA: CPT

## 2023-12-12 PROCEDURE — 36415 COLL VENOUS BLD VENIPUNCTURE: CPT

## 2023-12-14 ENCOUNTER — APPOINTMENT (OUTPATIENT)
Dept: CARDIAC REHAB | Facility: HOSPITAL | Age: 78
End: 2023-12-14
Payer: COMMERCIAL

## 2023-12-19 ENCOUNTER — TREATMENT (OUTPATIENT)
Dept: CARDIAC REHAB | Facility: HOSPITAL | Age: 78
End: 2023-12-19

## 2023-12-19 VITALS — SYSTOLIC BLOOD PRESSURE: 138 MMHG | OXYGEN SATURATION: 98 % | HEART RATE: 84 BPM | DIASTOLIC BLOOD PRESSURE: 64 MMHG

## 2023-12-19 DIAGNOSIS — Z95.2 HEART VALVE REPLACED: Primary | ICD-10-CM

## 2023-12-19 NOTE — PROGRESS NOTES
Pt attended phase III session as scheduled.  [facility] physician immediately available. NAD note, skin warm, pink and dry, denies chest pain, chest pressure , SOA, tolerated exercise well. V/S WIDL See exercise flow  for exercise data

## 2023-12-20 DIAGNOSIS — Z79.4 TYPE 2 DIABETES MELLITUS WITH COMPLICATION, WITH LONG-TERM CURRENT USE OF INSULIN: ICD-10-CM

## 2023-12-20 DIAGNOSIS — E11.8 TYPE 2 DIABETES MELLITUS WITH COMPLICATION, WITH LONG-TERM CURRENT USE OF INSULIN: ICD-10-CM

## 2023-12-20 RX ORDER — (INSULIN DEGLUDEC AND LIRAGLUTIDE) 100; 3.6 [IU]/ML; MG/ML
50 INJECTION, SOLUTION SUBCUTANEOUS DAILY
Qty: 15 ML | Refills: 5 | Status: SHIPPED | OUTPATIENT
Start: 2023-12-20

## 2023-12-21 ENCOUNTER — APPOINTMENT (OUTPATIENT)
Dept: CARDIAC REHAB | Facility: HOSPITAL | Age: 78
End: 2023-12-21
Payer: COMMERCIAL

## 2023-12-26 ENCOUNTER — TREATMENT (OUTPATIENT)
Dept: CARDIAC REHAB | Facility: HOSPITAL | Age: 78
End: 2023-12-26
Payer: COMMERCIAL

## 2023-12-26 VITALS — DIASTOLIC BLOOD PRESSURE: 62 MMHG | HEART RATE: 72 BPM | OXYGEN SATURATION: 98 % | SYSTOLIC BLOOD PRESSURE: 140 MMHG

## 2023-12-26 DIAGNOSIS — Z95.2 S/P TAVR (TRANSCATHETER AORTIC VALVE REPLACEMENT): Primary | ICD-10-CM

## 2023-12-26 NOTE — PROGRESS NOTES
Pt attended phase III session as scheduled.  [facility] Cardiology physician immediately available. NAD note, skin warm, pink and dry, denies chest pain, chest pressure , SOA, tolerated exercise well. V/S WIDL See exercise flow  for exercise data

## 2023-12-28 ENCOUNTER — TREATMENT (OUTPATIENT)
Dept: CARDIAC REHAB | Facility: HOSPITAL | Age: 78
End: 2023-12-28
Payer: COMMERCIAL

## 2023-12-28 VITALS — DIASTOLIC BLOOD PRESSURE: 48 MMHG | HEART RATE: 70 BPM | SYSTOLIC BLOOD PRESSURE: 122 MMHG | OXYGEN SATURATION: 99 %

## 2023-12-28 DIAGNOSIS — Z95.2 HEART VALVE REPLACED: Primary | ICD-10-CM

## 2023-12-28 NOTE — PROGRESS NOTES
Pt attended phase III session as scheduled.  [facility] Card[facility] physician immediately available. NAD note, skin warm, pink and dry, denies chest pain, chest pressure , SOA, tolerated exercise well. V/S WIDL See exercise flow  for exercise data

## 2024-01-02 ENCOUNTER — TREATMENT (OUTPATIENT)
Dept: CARDIAC REHAB | Facility: HOSPITAL | Age: 79
End: 2024-01-02

## 2024-01-02 VITALS — SYSTOLIC BLOOD PRESSURE: 138 MMHG | DIASTOLIC BLOOD PRESSURE: 58 MMHG | OXYGEN SATURATION: 98 % | HEART RATE: 72 BPM

## 2024-01-02 DIAGNOSIS — Z95.2 HEART VALVE REPLACED: Primary | ICD-10-CM

## 2024-01-02 NOTE — PROGRESS NOTES
Pt attended phase III session as scheduled.  [facility] physician immediately available. NAD note, skin warm, pink and dry, denies chest pain, chest pressure , SOA, tolerated exercise well. V/S WIDL See exercise flow  for exercise data

## 2024-01-04 ENCOUNTER — TREATMENT (OUTPATIENT)
Dept: CARDIAC REHAB | Facility: HOSPITAL | Age: 79
End: 2024-01-04

## 2024-01-04 VITALS — HEART RATE: 71 BPM | SYSTOLIC BLOOD PRESSURE: 130 MMHG | DIASTOLIC BLOOD PRESSURE: 64 MMHG

## 2024-01-04 DIAGNOSIS — Z95.2 S/P TAVR (TRANSCATHETER AORTIC VALVE REPLACEMENT): Primary | ICD-10-CM

## 2024-01-04 NOTE — PROGRESS NOTES
Pt attended phase III session as scheduled.  [facility] Cardiology physician immediately available. NAD note, skin warm, pink and dry, denies chest pain, chest pressure, SOA, tolerated exercise well. V/S WIDL See exercise flow  for exercise data

## 2024-01-09 ENCOUNTER — TREATMENT (OUTPATIENT)
Dept: CARDIAC REHAB | Facility: HOSPITAL | Age: 79
End: 2024-01-09

## 2024-01-09 VITALS — DIASTOLIC BLOOD PRESSURE: 60 MMHG | HEART RATE: 62 BPM | SYSTOLIC BLOOD PRESSURE: 130 MMHG | OXYGEN SATURATION: 100 %

## 2024-01-09 DIAGNOSIS — Z95.2 S/P TAVR (TRANSCATHETER AORTIC VALVE REPLACEMENT): Primary | ICD-10-CM

## 2024-01-09 NOTE — PROGRESS NOTES
Pt attended phase III session as scheduled.  [facility] Cardiology physician immediately available. NAD note, skin warm, pink and dry, denies chest pain, chest pressure, SOA, tolerated exercise well. V/S WIDL See exercise flow  for exercise data

## 2024-01-11 ENCOUNTER — TREATMENT (OUTPATIENT)
Dept: CARDIAC REHAB | Facility: HOSPITAL | Age: 79
End: 2024-01-11

## 2024-01-11 VITALS — HEART RATE: 72 BPM | OXYGEN SATURATION: 98 % | SYSTOLIC BLOOD PRESSURE: 140 MMHG | DIASTOLIC BLOOD PRESSURE: 58 MMHG

## 2024-01-11 DIAGNOSIS — Z95.2 S/P TAVR (TRANSCATHETER AORTIC VALVE REPLACEMENT): Primary | ICD-10-CM

## 2024-01-11 NOTE — PROGRESS NOTES
Pt attended phase III session as scheduled.  [facility] Cardiology physician immediately available. NAD note, skin warm, pink and dry, denies chest pain, chest pressure, SOA, tolerated exercise well. V/S WIDL See exercise flow  for exercise data

## 2024-01-16 ENCOUNTER — APPOINTMENT (OUTPATIENT)
Dept: CARDIAC REHAB | Facility: HOSPITAL | Age: 79
End: 2024-01-16

## 2024-01-18 ENCOUNTER — TELEPHONE (OUTPATIENT)
Dept: FAMILY MEDICINE CLINIC | Facility: CLINIC | Age: 79
End: 2024-01-18

## 2024-01-18 ENCOUNTER — APPOINTMENT (OUTPATIENT)
Dept: CARDIAC REHAB | Facility: HOSPITAL | Age: 79
End: 2024-01-18

## 2024-01-18 DIAGNOSIS — N52.01 ERECTILE DYSFUNCTION DUE TO ARTERIAL INSUFFICIENCY: Primary | ICD-10-CM

## 2024-01-18 RX ORDER — SILDENAFIL 100 MG/1
TABLET, FILM COATED ORAL
Qty: 10 TABLET | Refills: 5 | Status: SHIPPED | OUTPATIENT
Start: 2024-01-18

## 2024-01-23 ENCOUNTER — TREATMENT (OUTPATIENT)
Dept: CARDIAC REHAB | Facility: HOSPITAL | Age: 79
End: 2024-01-23

## 2024-01-23 VITALS — HEART RATE: 80 BPM | DIASTOLIC BLOOD PRESSURE: 56 MMHG | SYSTOLIC BLOOD PRESSURE: 138 MMHG

## 2024-01-23 DIAGNOSIS — Z95.2 S/P TAVR (TRANSCATHETER AORTIC VALVE REPLACEMENT): Primary | ICD-10-CM

## 2024-01-23 NOTE — PROGRESS NOTES
Pt attended phase III session as scheduled.  [facility] Cardiology physician immediately available. NAD note, skin warm, pink and dry, denies chest pain, chest pressure, SOA, tolerated exercise well. V/S WIDL See exercise flow  for exercise data

## 2024-01-24 DIAGNOSIS — N18.32 CHRONIC RENAL FAILURE, STAGE 3B: Chronic | ICD-10-CM

## 2024-01-24 DIAGNOSIS — I48.0 PAROXYSMAL ATRIAL FIBRILLATION: Chronic | ICD-10-CM

## 2024-01-24 DIAGNOSIS — E78.2 MIXED HYPERLIPIDEMIA: Chronic | ICD-10-CM

## 2024-01-24 DIAGNOSIS — E55.9 VITAMIN D DEFICIENCY: ICD-10-CM

## 2024-01-24 DIAGNOSIS — Z79.4 TYPE 2 DIABETES MELLITUS WITH COMPLICATION, WITH LONG-TERM CURRENT USE OF INSULIN: Chronic | ICD-10-CM

## 2024-01-24 DIAGNOSIS — I10 ESSENTIAL HYPERTENSION: Primary | Chronic | ICD-10-CM

## 2024-01-24 DIAGNOSIS — E11.8 TYPE 2 DIABETES MELLITUS WITH COMPLICATION, WITH LONG-TERM CURRENT USE OF INSULIN: Chronic | ICD-10-CM

## 2024-01-25 ENCOUNTER — APPOINTMENT (OUTPATIENT)
Dept: CARDIAC REHAB | Facility: HOSPITAL | Age: 79
End: 2024-01-25

## 2024-01-25 ENCOUNTER — LAB (OUTPATIENT)
Dept: LAB | Facility: HOSPITAL | Age: 79
End: 2024-01-25
Payer: COMMERCIAL

## 2024-01-25 DIAGNOSIS — E55.9 VITAMIN D DEFICIENCY: ICD-10-CM

## 2024-01-25 DIAGNOSIS — N18.32 CHRONIC RENAL FAILURE, STAGE 3B: ICD-10-CM

## 2024-01-25 DIAGNOSIS — E11.8 TYPE 2 DIABETES MELLITUS WITH COMPLICATION, WITH LONG-TERM CURRENT USE OF INSULIN: Chronic | ICD-10-CM

## 2024-01-25 DIAGNOSIS — E78.2 MIXED HYPERLIPIDEMIA: Chronic | ICD-10-CM

## 2024-01-25 DIAGNOSIS — I48.0 PAROXYSMAL ATRIAL FIBRILLATION: ICD-10-CM

## 2024-01-25 DIAGNOSIS — I10 ESSENTIAL HYPERTENSION: ICD-10-CM

## 2024-01-25 DIAGNOSIS — Z79.4 TYPE 2 DIABETES MELLITUS WITH COMPLICATION, WITH LONG-TERM CURRENT USE OF INSULIN: Chronic | ICD-10-CM

## 2024-01-25 LAB
25(OH)D3 SERPL-MCNC: 23.6 NG/ML (ref 30–100)
ALBUMIN SERPL-MCNC: 3.6 G/DL (ref 3.5–5.2)
ALBUMIN UR-MCNC: 45.1 MG/DL
ALBUMIN/GLOB SERPL: 1.2 G/DL
ALP SERPL-CCNC: 72 U/L (ref 39–117)
ALT SERPL W P-5'-P-CCNC: 15 U/L (ref 1–41)
ANION GAP SERPL CALCULATED.3IONS-SCNC: 10.1 MMOL/L (ref 5–15)
AST SERPL-CCNC: 23 U/L (ref 1–40)
BASOPHILS # BLD AUTO: 0.07 10*3/MM3 (ref 0–0.2)
BASOPHILS NFR BLD AUTO: 1 % (ref 0–1.5)
BILIRUB SERPL-MCNC: 0.5 MG/DL (ref 0–1.2)
BUN SERPL-MCNC: 34 MG/DL (ref 8–23)
BUN/CREAT SERPL: 14.3 (ref 7–25)
CALCIUM SPEC-SCNC: 9.2 MG/DL (ref 8.6–10.5)
CHLORIDE SERPL-SCNC: 108 MMOL/L (ref 98–107)
CO2 SERPL-SCNC: 22.9 MMOL/L (ref 22–29)
CREAT SERPL-MCNC: 2.37 MG/DL (ref 0.76–1.27)
DEPRECATED RDW RBC AUTO: 43.7 FL (ref 37–54)
EGFRCR SERPLBLD CKD-EPI 2021: 27.5 ML/MIN/1.73
EOSINOPHIL # BLD AUTO: 0.34 10*3/MM3 (ref 0–0.4)
EOSINOPHIL NFR BLD AUTO: 4.8 % (ref 0.3–6.2)
ERYTHROCYTE [DISTWIDTH] IN BLOOD BY AUTOMATED COUNT: 14.7 % (ref 12.3–15.4)
GLOBULIN UR ELPH-MCNC: 2.9 GM/DL
GLUCOSE SERPL-MCNC: 167 MG/DL (ref 65–99)
HBA1C MFR BLD: 7.6 % (ref 4.8–5.6)
HCT VFR BLD AUTO: 39.6 % (ref 37.5–51)
HGB BLD-MCNC: 12.3 G/DL (ref 13–17.7)
IMM GRANULOCYTES # BLD AUTO: 0.02 10*3/MM3 (ref 0–0.05)
IMM GRANULOCYTES NFR BLD AUTO: 0.3 % (ref 0–0.5)
LYMPHOCYTES # BLD AUTO: 1.2 10*3/MM3 (ref 0.7–3.1)
LYMPHOCYTES NFR BLD AUTO: 17 % (ref 19.6–45.3)
MCH RBC QN AUTO: 25.9 PG (ref 26.6–33)
MCHC RBC AUTO-ENTMCNC: 31.1 G/DL (ref 31.5–35.7)
MCV RBC AUTO: 83.4 FL (ref 79–97)
MONOCYTES # BLD AUTO: 0.58 10*3/MM3 (ref 0.1–0.9)
MONOCYTES NFR BLD AUTO: 8.2 % (ref 5–12)
NEUTROPHILS NFR BLD AUTO: 4.86 10*3/MM3 (ref 1.7–7)
NEUTROPHILS NFR BLD AUTO: 68.7 % (ref 42.7–76)
NRBC BLD AUTO-RTO: 0 /100 WBC (ref 0–0.2)
PLATELET # BLD AUTO: 207 10*3/MM3 (ref 140–450)
PMV BLD AUTO: 10.5 FL (ref 6–12)
POTASSIUM SERPL-SCNC: 4.4 MMOL/L (ref 3.5–5.2)
PROT SERPL-MCNC: 6.5 G/DL (ref 6–8.5)
RBC # BLD AUTO: 4.75 10*6/MM3 (ref 4.14–5.8)
SODIUM SERPL-SCNC: 141 MMOL/L (ref 136–145)
TSH SERPL DL<=0.05 MIU/L-ACNC: 4.94 UIU/ML (ref 0.27–4.2)
VIT B12 BLD-MCNC: 380 PG/ML (ref 211–946)
WBC NRBC COR # BLD AUTO: 7.07 10*3/MM3 (ref 3.4–10.8)

## 2024-01-25 PROCEDURE — 83036 HEMOGLOBIN GLYCOSYLATED A1C: CPT

## 2024-01-25 PROCEDURE — 82607 VITAMIN B-12: CPT

## 2024-01-25 PROCEDURE — 80050 GENERAL HEALTH PANEL: CPT

## 2024-01-25 PROCEDURE — 82306 VITAMIN D 25 HYDROXY: CPT

## 2024-01-25 PROCEDURE — 82043 UR ALBUMIN QUANTITATIVE: CPT

## 2024-01-26 ENCOUNTER — OFFICE VISIT (OUTPATIENT)
Dept: FAMILY MEDICINE CLINIC | Facility: CLINIC | Age: 79
End: 2024-01-26
Payer: COMMERCIAL

## 2024-01-26 DIAGNOSIS — I97.89 POSTOPERATIVE COMPLETE HEART BLOCK: ICD-10-CM

## 2024-01-26 DIAGNOSIS — I10 ESSENTIAL HYPERTENSION: Chronic | ICD-10-CM

## 2024-01-26 DIAGNOSIS — N18.32 CHRONIC RENAL FAILURE, STAGE 3B: Chronic | ICD-10-CM

## 2024-01-26 DIAGNOSIS — E11.8 TYPE 2 DIABETES MELLITUS WITH COMPLICATION, WITH LONG-TERM CURRENT USE OF INSULIN: Chronic | ICD-10-CM

## 2024-01-26 DIAGNOSIS — Z00.00 HEALTHCARE MAINTENANCE: ICD-10-CM

## 2024-01-26 DIAGNOSIS — Z95.2 S/P TAVR (TRANSCATHETER AORTIC VALVE REPLACEMENT): Primary | Chronic | ICD-10-CM

## 2024-01-26 DIAGNOSIS — M51.36 DDD (DEGENERATIVE DISC DISEASE), LUMBAR: ICD-10-CM

## 2024-01-26 DIAGNOSIS — Z79.4 TYPE 2 DIABETES MELLITUS WITH COMPLICATION, WITH LONG-TERM CURRENT USE OF INSULIN: Chronic | ICD-10-CM

## 2024-01-26 DIAGNOSIS — N40.1 BENIGN PROSTATIC HYPERPLASIA WITH NOCTURIA: ICD-10-CM

## 2024-01-26 DIAGNOSIS — M85.89 OSTEOPENIA OF MULTIPLE SITES: ICD-10-CM

## 2024-01-26 DIAGNOSIS — I44.2 POSTOPERATIVE COMPLETE HEART BLOCK: ICD-10-CM

## 2024-01-26 DIAGNOSIS — I25.10 CORONARY ARTERY DISEASE INVOLVING NATIVE CORONARY ARTERY OF NATIVE HEART WITHOUT ANGINA PECTORIS: Chronic | ICD-10-CM

## 2024-01-26 DIAGNOSIS — I48.0 PAROXYSMAL ATRIAL FIBRILLATION: Chronic | ICD-10-CM

## 2024-01-26 DIAGNOSIS — Z85.828 HISTORY OF NONMELANOMA SKIN CANCER: ICD-10-CM

## 2024-01-26 DIAGNOSIS — E55.9 VITAMIN D DEFICIENCY: ICD-10-CM

## 2024-01-26 DIAGNOSIS — E78.2 MIXED HYPERLIPIDEMIA: Chronic | ICD-10-CM

## 2024-01-26 DIAGNOSIS — G45.3 AMAUROSIS FUGAX OF RIGHT EYE: ICD-10-CM

## 2024-01-26 DIAGNOSIS — E66.9 CLASS 1 OBESITY WITH SERIOUS COMORBIDITY AND BODY MASS INDEX (BMI) OF 33.0 TO 33.9 IN ADULT, UNSPECIFIED OBESITY TYPE: ICD-10-CM

## 2024-01-26 DIAGNOSIS — N52.01 ERECTILE DYSFUNCTION DUE TO ARTERIAL INSUFFICIENCY: ICD-10-CM

## 2024-01-26 DIAGNOSIS — K21.9 GASTROESOPHAGEAL REFLUX DISEASE WITHOUT ESOPHAGITIS: Chronic | ICD-10-CM

## 2024-01-26 DIAGNOSIS — R35.1 BENIGN PROSTATIC HYPERPLASIA WITH NOCTURIA: ICD-10-CM

## 2024-01-26 DIAGNOSIS — I73.9 PERIPHERAL VASCULAR DISEASE: ICD-10-CM

## 2024-01-26 NOTE — PROGRESS NOTES
Subjective   Nicholas Yin is a 78 y.o. male.     Chief Complaint  He returns for a scheduled reassessment of multiple medical problems including a previous TAVR, paroxysmal atrial fibrillation, coronary artery disease, type 2 diabetes mellitus, chronic renal failure, and nonmelanoma skin cancer    History of Present Illness     Previous TAVR  He underwent a TAVR and left femoral endarterectomy on 5/8/2023.  His course in hospital was uncomplicated and he was discharged home the next day.  He required drainage of a left inguinal seroma by Dr. Lopez on 6/1/2023.  He attended wound care with eventual closure.  He is attending phase 3 cardiopulmonary rehab with a continued improvement in his shortness of breath and exercise tolerance.    Paroxysmal Atrial Fibrillation  He required readmission to hospital on 5/14/2023 following a presyncopal episode.  He was found to be in atrial fibrillation with a rapid ventricular response.  He was started on IV diltiazem and progressed to a wide-complex tachycardia with loss of consciousness.  CPR was administered with a prompt improvement and he regained consciousness.  He was then started on IV amiodarone and transferred to [facility].  On arrival there he was found to be in a third-degree block and underwent placement of a pacemaker by Dr. Mendoza.  He was started on late last year since and denies any significant palpitations.  He continues to deny any chest pain or lightheadedness, and there is no history of any calf pain, swelling of the ankles, fever, or chills. He remains on rivaroxaban.  He continues to be followed by cardiology and is scheduled to undergo a reassessment with CHIDI Ku on 2/22/2024  Lab Results   Component Value Date    WBC 7.07 01/25/2024    HGB 12.3 (L) 01/25/2024    HCT 39.6 01/25/2024    MCV 83.4 01/25/2024     01/25/2024     Coronary Artery Disease  He underwent a coronary angiogram by Dr. Villatoro on 8/15/2019 with  stenting of the mid LAD.  He remains on low-dose ASA    Transient Visual Loss  Several years ago he experienced a sudden progressive decrease in the vision in his right eye while driving. He stated that the vision in the eye darkened gradually over several minutes to the point where the only things he could make out were very bright lights and the sun. This lasted for about 30 minutes then resolved over several minutes. He was hospitalized at [facility] over 10 years ago following a similar episodes that was ultimately felt to be vascular in origin. MRI of the brain performed on 12/8/17 was reported as showing mild cerebral atrophy with chronic small vessel ischemic changes. MRA of the carotid arteries performed the same day was unremarkable.  Bilateral carotid ultrasound performed on 2/6/2023 was unremarkable.  He undergoes regular ophthalmology assessments    Type 2 Diabetes Mellitus  He remains on a SGLT2 -empagliflozin, GLP agonist-liraglutide and basal insulin - tresiba (together as xultophy).  He adopted an intermittent fasting diet 1 week ago  Lab Results   Component Value Date    HGBA1C 7.60 (H) 01/25/2024     Lab Results   Component Value Date    MICROALBUR 45.1 01/25/2024     Hyperlipidemia  He remains on rosuvastatin  Lab Results   Component Value Date    CHOL 105 10/26/2023    CHLPL 125 09/28/2015    TRIG 99 10/26/2023    HDL 48 10/26/2023    LDL 38 10/26/2023     Essential Hypertension  Home blood pressure readings: have generally been at goal.  He remains on losartan and metoprolol  Lab Results   Component Value Date    GLUCOSE 167 (H) 01/25/2024    BUN 34 (H) 01/25/2024    CREATININE 2.37 (H) 01/25/2024    EGFR 27.5 (L) 01/25/2024    BCR 14.3 01/25/2024    K 4.4 01/25/2024    CO2 22.9 01/25/2024    CALCIUM 9.2 01/25/2024    ALBUMIN 3.6 01/25/2024    BILITOT 0.5 01/25/2024    AST 23 01/25/2024    ALT 15 01/25/2024     Lab Results   Component Value Date    ALKPHOS 72 01/25/2024     Chronic Renal Failure  This  has been contributed to longstanding type 2 diabetes mellitus and hypertension.  He is aware to avoid any NSAIDs oral or prescription.     Chronic Back Pain  He gives a long history of increasing mid and low back pain. This is described as a sharp ache. The pain does not radiate and has been unassociated with any other symptoms. The pain is worse with prolonged weight bearing and limits his activities more then anything else at present.  MRI performed on 1/7/2021 was reported as showing degenerative disc disease and osteoarthritis with mild to moderate central canal and bilateral foraminal narrowing at L2-3 and L3-4.    Gastroesophageal Reflux Disease  He remains heartburn free on omeprazole    Labs  Most recent vitamin D 23.6    The following portions of the patient's history were reviewed and updated as appropriate: allergies, current medications, past medical history, past social history, and problem list.    Review of Systems   Constitutional:  Positive for fatigue. Negative for chills and fever.   HENT:  Negative for congestion, dental problem, ear pain, rhinorrhea, sneezing and sore throat.    Respiratory:  Positive for shortness of breath. Negative for cough and wheezing.    Cardiovascular:  Negative for chest pain, palpitations and leg swelling.   Gastrointestinal:  Negative for abdominal pain, blood in stool, constipation, diarrhea, nausea and vomiting.   Genitourinary:  Positive for difficulty urinating (sense of incomplete voding), nocturia (x 2-3) and erectile dysfunction. Negative for dysuria and hematuria.   Musculoskeletal:  Positive for back pain. Negative for arthralgias, gait problem, joint swelling, myalgias, neck pain and neck stiffness.   Skin:  Positive for skin lesions (back). Negative for rash.   Neurological:  Positive for weakness (generalized). Negative for numbness and headache.   Psychiatric/Behavioral:  Negative for behavioral problems, sleep disturbance and depressed mood. The patient  "is not nervous/anxious.      /60   Pulse 97   Temp 98.6 °F (37 °C) (Temporal)   Resp 14   Ht 172.7 cm (68\")   Wt 105 kg (232 lb)   SpO2 97%   BMI 35.28 kg/m²     Class 2 Severe Obesity (BMI >=35 and <=39.9). Obesity-related health conditions include the following: hypertension, coronary heart disease, diabetes mellitus, dyslipidemias, GERD, peripheral vascular disease, and osteoarthritis. Obesity is unchanged. BMI is is above average; BMI management plan is completed. We discussed portion control and increasing exercise.    Objective   Physical Exam  Constitutional:       General: He is not in acute distress.     Appearance: Normal appearance. He is well-developed. He is not ill-appearing or diaphoretic.      Comments: Bright and in good spirits.  Sits and stands with an exaggerated thoracic kyphosis.  Uncomfortable with movement.  No apparent distress at rest.  No pallor, cyanosis, diaphoresis, or jaundice   HENT:      Head: Atraumatic.      Right Ear: Tympanic membrane, ear canal and external ear normal.      Left Ear: Tympanic membrane, ear canal and external ear normal.      Mouth/Throat:      Lips: No lesions.      Mouth: Mucous membranes are moist. No oral lesions.      Pharynx: No oropharyngeal exudate or posterior oropharyngeal erythema.   Eyes:      General: Lids are normal. Gaze aligned appropriately.      Extraocular Movements: Extraocular movements intact.      Conjunctiva/sclera: Conjunctivae normal.      Pupils: Pupils are equal.   Neck:      Thyroid: No thyroid mass or thyromegaly.      Vascular: No carotid bruit or JVD.      Trachea: Trachea normal. No tracheal deviation.   Cardiovascular:      Rate and Rhythm: Normal rate and regular rhythm.      Heart sounds: S1 normal and S2 normal. Murmur heard.      Crescendo decrescendo systolic murmur is present with a grade of 3/6.      No gallop. No S3 or S4 sounds.   Pulmonary:      Effort: Pulmonary effort is normal.      Breath sounds: Normal " breath sounds.   Abdominal:      General: Bowel sounds are normal. There is no distension.   Musculoskeletal:      Right lower leg: No edema.      Left lower leg: No edema.   Lymphadenopathy:      Head:      Right side of head: No submental, submandibular, tonsillar, preauricular, posterior auricular or occipital adenopathy.      Left side of head: No submental, submandibular, tonsillar, preauricular, posterior auricular or occipital adenopathy.      Cervical: No cervical adenopathy.      Upper Body:      Right upper body: No supraclavicular adenopathy.      Left upper body: No supraclavicular adenopathy.   Skin:     General: Skin is warm and dry.      Coloration: Skin is not cyanotic, jaundiced or pale.      Findings: Lesion (three erythematous and slightly scaly lesions varying from 1 to 2.5 cm about the mid and left upper back) present. No rash.      Nails: There is no clubbing.   Neurological:      Mental Status: He is alert and oriented to person, place, and time.      Cranial Nerves: No cranial nerve deficit, dysarthria or facial asymmetry.      Sensory: No sensory deficit.      Motor: No tremor.      Coordination: Coordination normal.      Gait: Gait normal.   Psychiatric:         Attention and Perception: Attention normal.         Mood and Affect: Mood normal.         Speech: Speech normal.         Behavior: Behavior normal.         Thought Content: Thought content normal.       Assessment & Plan   Problems Addressed this Visit          Cardiac and Vasculature    CAD s/p stent  (Chronic)  Reminded regarding risk factor modification with an emphasis on diet and exercise.  Continue low-dose ASA.    Essential hypertension (Chronic)   Hypertension: at goal. Evidence of target organ damage: coronary artery disease, peripheral artery disease, chronic kidney disease, and transient ischemic attack.  Encouraged to continue to work on diet and exercise plan.   Continue current medication    Mixed hyperlipidemia  (Chronic)  As above.   Continue current medication.    Paroxysmal atrial fibrillation (Chronic)   Rate control is appropriate.. Patient is anticoagulated.   Continue current medication.    S/P TAVR (transcatheter aortic valve replacement)   Continues to do well    Peripheral vascular disease  As above.   Continue current medication.    Postoperative complete heart block  S/P PPP  Follow up with cardiology        Endocrine and Metabolic    T2DM  (Chronic)  Diabetes mellitus Type II, under good control.   Encouraged to continue to pursue ADA diet  Encouraged aerobic exercise.  Continue current medication    Class 1 obesity with serious comorbidity and body mass index (BMI) of 33.0 to 33.9 in adult    Vitamin D deficiency       Gastrointestinal Abdominal     GERD (Chronic)       Genitourinary and Reproductive     Benign prostatic hyperplasia with nocturia    Vasculogenic erectile dysfunction       Health Encounters    Healthcare maintenance  Recommended RSV and an updated COVID-19 vaccination.  Reminded that he is due for Shingrix and an updated Tdap       Hematology and Neoplasia    History of nonmelanoma skin cancer  With 3 further suspicious lesions on his back  He will let us know when he wants to proceed with excisional biopsy       Musculoskeletal and Injuries    Osteopenia of multiple sites       Neuro    Amaurosis fugax of right eye  As above.   Continue current medication.    DDD (degenerative disc disease), lumbar       Other    CKD (baseline Cr 2-2.3)   (Chronic)  Reminded to avoid any NSAIDs prescription or OTC  Continue current medication   Will continue to monitor     Diagnoses         Codes Comments    S/P TAVR (transcatheter aortic valve replacement)    -  Primary ICD-10-CM: Z95.2  ICD-9-CM: V43.3     Postoperative complete heart block     ICD-10-CM: I97.89, I44.2  ICD-9-CM: 997.1, 426.0     Peripheral vascular disease     ICD-10-CM: I73.9  ICD-9-CM: 443.9     Paroxysmal atrial fibrillation      ICD-10-CM: I48.0  ICD-9-CM: 427.31     Mixed hyperlipidemia     ICD-10-CM: E78.2  ICD-9-CM: 272.2     Essential hypertension     ICD-10-CM: I10  ICD-9-CM: 401.9     Coronary artery disease involving native coronary artery of native heart without angina pectoris     ICD-10-CM: I25.10  ICD-9-CM: 414.01     Vitamin D deficiency     ICD-10-CM: E55.9  ICD-9-CM: 268.9     T2DM      ICD-10-CM: E11.8, Z79.4  ICD-9-CM: 250.90, V58.67     Class 1 obesity with serious comorbidity and body mass index (BMI) of 33.0 to 33.9 in adult, unspecified obesity type     ICD-10-CM: E66.9, Z68.33  ICD-9-CM: 278.00, V85.33     GERD     ICD-10-CM: K21.9  ICD-9-CM: 530.81     Erectile dysfunction due to arterial insufficiency     ICD-10-CM: N52.01  ICD-9-CM: 607.84     Benign prostatic hyperplasia with nocturia     ICD-10-CM: N40.1, R35.1  ICD-9-CM: 600.01, 788.43     Healthcare maintenance     ICD-10-CM: Z00.00  ICD-9-CM: V70.0     History of nonmelanoma skin cancer     ICD-10-CM: Z85.828  ICD-9-CM: V10.83     Osteopenia of multiple sites     ICD-10-CM: M85.89  ICD-9-CM: 733.90     DDD (degenerative disc disease), lumbar     ICD-10-CM: M51.36  ICD-9-CM: 722.52     Amaurosis fugax of right eye     ICD-10-CM: G45.3  ICD-9-CM: 362.34     CKD (baseline Cr 2-2.3)       ICD-10-CM: N18.32  ICD-9-CM: 585.3

## 2024-01-27 VITALS
WEIGHT: 232 LBS | SYSTOLIC BLOOD PRESSURE: 124 MMHG | DIASTOLIC BLOOD PRESSURE: 60 MMHG | RESPIRATION RATE: 14 BRPM | HEIGHT: 68 IN | OXYGEN SATURATION: 97 % | TEMPERATURE: 98.6 F | BODY MASS INDEX: 35.16 KG/M2 | HEART RATE: 97 BPM

## 2024-01-30 ENCOUNTER — TREATMENT (OUTPATIENT)
Dept: CARDIAC REHAB | Facility: HOSPITAL | Age: 79
End: 2024-01-30

## 2024-01-30 VITALS — OXYGEN SATURATION: 98 % | DIASTOLIC BLOOD PRESSURE: 58 MMHG | SYSTOLIC BLOOD PRESSURE: 136 MMHG | HEART RATE: 70 BPM

## 2024-01-30 DIAGNOSIS — Z95.2 S/P TAVR (TRANSCATHETER AORTIC VALVE REPLACEMENT): Primary | ICD-10-CM

## 2024-01-30 NOTE — PROGRESS NOTES
Pt attended phase III session as scheduled.  [facility] Cardiology physician immediately available. NAD note, skin warm, pink and dry, denies chest pain, chest pressure, SOA, tolerated exercise well. V/S WIDL See exercise flow  for exercise data

## 2024-02-01 ENCOUNTER — APPOINTMENT (OUTPATIENT)
Dept: CARDIAC REHAB | Facility: HOSPITAL | Age: 79
End: 2024-02-01

## 2024-02-06 ENCOUNTER — TREATMENT (OUTPATIENT)
Dept: CARDIAC REHAB | Facility: HOSPITAL | Age: 79
End: 2024-02-06

## 2024-02-06 VITALS — DIASTOLIC BLOOD PRESSURE: 50 MMHG | HEART RATE: 72 BPM | SYSTOLIC BLOOD PRESSURE: 126 MMHG | OXYGEN SATURATION: 98 %

## 2024-02-06 DIAGNOSIS — Z95.2 S/P TAVR (TRANSCATHETER AORTIC VALVE REPLACEMENT): Primary | ICD-10-CM

## 2024-02-06 NOTE — PROGRESS NOTES
Pt attended phase III session as scheduled.  [facility] Cardiology physician immediately available. NAD note, skin warm, pink and dry, denies chest pain, chest pressure, SOA, tolerated exercise well. V/S WIDL See exercise flow  for exercise data

## 2024-02-08 ENCOUNTER — TREATMENT (OUTPATIENT)
Dept: CARDIAC REHAB | Facility: HOSPITAL | Age: 79
End: 2024-02-08

## 2024-02-08 VITALS — OXYGEN SATURATION: 98 % | SYSTOLIC BLOOD PRESSURE: 146 MMHG | HEART RATE: 71 BPM | DIASTOLIC BLOOD PRESSURE: 58 MMHG

## 2024-02-08 DIAGNOSIS — Z95.2 S/P TAVR (TRANSCATHETER AORTIC VALVE REPLACEMENT): Primary | ICD-10-CM

## 2024-02-08 NOTE — PROGRESS NOTES
Pt attended phase III session as scheduled.  [facility] Cardiology physician immediately available. NAD note, skin warm, pink and dry, denies chest pain, chest pressure, SOA, tolerated exercise well. V/S WIDL See exercise flow  for exercise data

## 2024-02-13 ENCOUNTER — TREATMENT (OUTPATIENT)
Dept: CARDIAC REHAB | Facility: HOSPITAL | Age: 79
End: 2024-02-13

## 2024-02-13 VITALS — HEART RATE: 62 BPM | OXYGEN SATURATION: 97 % | DIASTOLIC BLOOD PRESSURE: 58 MMHG | SYSTOLIC BLOOD PRESSURE: 120 MMHG

## 2024-02-13 DIAGNOSIS — Z95.2 S/P TAVR (TRANSCATHETER AORTIC VALVE REPLACEMENT): Primary | ICD-10-CM

## 2024-02-15 ENCOUNTER — TREATMENT (OUTPATIENT)
Dept: CARDIAC REHAB | Facility: HOSPITAL | Age: 79
End: 2024-02-15

## 2024-02-15 VITALS — DIASTOLIC BLOOD PRESSURE: 58 MMHG | HEART RATE: 68 BPM | SYSTOLIC BLOOD PRESSURE: 114 MMHG

## 2024-02-15 DIAGNOSIS — Z95.2 S/P TAVR (TRANSCATHETER AORTIC VALVE REPLACEMENT): Primary | ICD-10-CM

## 2024-02-20 ENCOUNTER — TREATMENT (OUTPATIENT)
Dept: CARDIAC REHAB | Facility: HOSPITAL | Age: 79
End: 2024-02-20

## 2024-02-20 VITALS — SYSTOLIC BLOOD PRESSURE: 136 MMHG | HEART RATE: 63 BPM | DIASTOLIC BLOOD PRESSURE: 54 MMHG

## 2024-02-20 DIAGNOSIS — Z95.2 S/P TAVR (TRANSCATHETER AORTIC VALVE REPLACEMENT): Primary | ICD-10-CM

## 2024-02-20 NOTE — PROGRESS NOTES
Pt attended phase III session as scheduled.  [facility] Cardiology physician immediately available. NAD note, skin warm, pink and dry, denies chest pain, chest pressure, SOA, tolerated exercise well. V/S WIDL See exercise flow  for exercise data

## 2024-02-22 ENCOUNTER — TREATMENT (OUTPATIENT)
Dept: CARDIAC REHAB | Facility: HOSPITAL | Age: 79
End: 2024-02-22

## 2024-02-22 ENCOUNTER — OFFICE VISIT (OUTPATIENT)
Dept: CARDIOLOGY | Facility: CLINIC | Age: 79
End: 2024-02-22
Payer: COMMERCIAL

## 2024-02-22 VITALS — SYSTOLIC BLOOD PRESSURE: 130 MMHG | DIASTOLIC BLOOD PRESSURE: 52 MMHG | HEART RATE: 75 BPM

## 2024-02-22 VITALS
OXYGEN SATURATION: 95 % | HEIGHT: 68 IN | HEART RATE: 67 BPM | DIASTOLIC BLOOD PRESSURE: 71 MMHG | BODY MASS INDEX: 34.56 KG/M2 | SYSTOLIC BLOOD PRESSURE: 139 MMHG | WEIGHT: 228 LBS

## 2024-02-22 DIAGNOSIS — I48.0 PAROXYSMAL ATRIAL FIBRILLATION: Chronic | ICD-10-CM

## 2024-02-22 DIAGNOSIS — I10 ESSENTIAL HYPERTENSION: Chronic | ICD-10-CM

## 2024-02-22 DIAGNOSIS — Z95.2 S/P TAVR (TRANSCATHETER AORTIC VALVE REPLACEMENT): Primary | ICD-10-CM

## 2024-02-22 DIAGNOSIS — Z95.0 PACEMAKER: Chronic | ICD-10-CM

## 2024-02-22 DIAGNOSIS — Z95.2 S/P TAVR (TRANSCATHETER AORTIC VALVE REPLACEMENT): Chronic | ICD-10-CM

## 2024-02-22 DIAGNOSIS — E78.2 MIXED HYPERLIPIDEMIA: Chronic | ICD-10-CM

## 2024-02-22 DIAGNOSIS — I25.10 CORONARY ARTERY DISEASE INVOLVING NATIVE CORONARY ARTERY OF NATIVE HEART WITHOUT ANGINA PECTORIS: Primary | Chronic | ICD-10-CM

## 2024-02-22 PROCEDURE — 99214 OFFICE O/P EST MOD 30 MIN: CPT | Performed by: NURSE PRACTITIONER

## 2024-02-22 NOTE — PROGRESS NOTES
"Chief Complaint  Atrial Fibrillation (Patient states short of air on exertion, but otherwise doing well , denies chest pain , states reduction of palpitations since multaq )    Subjective          Nicholas Yin presents to [facility] CARDIOLOGY for follow up.    History of Present Illness    Jairo was last seen in clinic on 10/18/2023.  He reported at that time that his episodes of atrial fibrillation seem to have decreased significantly since placed on Multaq.  He also reported that he had started back to cardiac rehab.    His pacemaker was interrogated remotely on 10/30/2023.  According to the report he had had 0 episodes of atrial fibrillation and the battery life was approximately 8 years.    At today's visit Jairo reports that he continues in cardiac rehab and is doing well.  He does note that he does get short of breath with exertion and has experienced some improvement since participation in cardiac rehab.  He also states that he has not had any known further episodes of atrial fibrillation.  He denies any bleeding issues.    Objective     Vital Signs:   /71 (BP Location: Left arm, Patient Position: Sitting, Cuff Size: Adult)   Pulse 67   Ht 172.7 cm (68\")   Wt 103 kg (228 lb)   SpO2 95%   BMI 34.67 kg/m²       Physical Exam  Constitutional:       Appearance: Normal appearance. He is well-developed.   Cardiovascular:      Rate and Rhythm: Normal rate and regular rhythm.      Heart sounds: Murmur heard.      No friction rub. No gallop.   Pulmonary:      Effort: Pulmonary effort is normal. No respiratory distress.      Breath sounds: Normal breath sounds. No wheezing or rales.   Skin:     General: Skin is warm and dry.   Neurological:      Mental Status: He is alert and oriented to person, place, and time.   Psychiatric:         Mood and Affect: Mood normal.         Behavior: Behavior normal.          Result Review :                Most recent echocardiogram  Results for " orders placed during the hospital encounter of 06/19/23    Adult Transthoracic Echo Complete W/ Cont if Necessary Per Protocol    Interpretation Summary    Left ventricular systolic function is normal. Left ventricular ejection fraction appears to be 56 - 60%.    There is a 26 mm S3 TAVR valve present with gradients within limit and similar to previous study    There is no evidence of pericardial effusion      Most recent Stress Test  Results for orders placed during the hospital encounter of 04/10/23    Stress Test With Pet Myocardial Perfusion    Interpretation Summary    Patient denied any chest discomfort/pain, or any other symptoms with the infusion    Baseline EKG shows a right bundle branch block with mild first-degree AV block.  No ST segment shift from baseline with the infusion.    Normal hemodynamic response to regadenoson.    When the stress and rest PET images were compared no areas of fixed hypoperfusion or stress-induced hypoperfusion were seen.  The 3D reconstruction showed normal contractility in all segments with a calculated ejection at rest of 63% and with stress 72%..    No evidence of inducible ischemia by scintigraphic criteria.  (The patient was known to have a near normal ramus circumflex and right coronary artery at the time of his LAD stent in 2019.)  The patient may proceed with TAVR.       Most recent Cardiac Cath  Results for orders placed during the hospital encounter of 05/08/23    Cardiac Catheterization/Vascular Study    Conclusion  Please see operative report.      Current Outpatient Medications   Medication Sig Dispense Refill    aspirin 81 MG EC tablet Take 1 tablet by mouth Daily.      coenzyme Q10 100 MG capsule Take 2 capsules by mouth Daily.      dapagliflozin Propanediol (Farxiga) 10 MG tablet Take 1 tablet by mouth Daily. 90 tablet 3    docusate sodium (Colace) 100 MG capsule Take 1 capsule by mouth 2 (Two) Times a Day. 72 capsule 0    dronedarone (Multaq) 400 MG tablet  Take 1 tablet by mouth 2 (Two) Times a Day With Meals. 60 tablet 11    ferrous sulfate 325 (65 FE) MG EC tablet Take 1 tablet by mouth Daily. 90 tablet 1    losartan (COZAAR) 50 MG tablet Take 1/2 tablet by mouth 2 (Two) Times a Day. (Patient taking differently: Take 0.5 tablets by mouth 2 (Two) Times a Day. Patient stated he takes a half of pill at night due to low BP) 60 tablet 11    metoprolol tartrate (LOPRESSOR) 25 MG tablet Take 1 tablet by mouth 2 (Two) Times a Day. (Patient taking differently: Take 1 tablet by mouth 2 (Two) Times a Day. Patient states takes 1 tab a.m. and 1/2 tab p. M.) 60 tablet 11    multivitamin with minerals tablet tablet Take 1 tablet by mouth Daily.      omeprazole (priLOSEC) 40 MG capsule Take 1 capsule by mouth Daily.      rivaroxaban (XARELTO) 15 MG tablet Take 1 tablet by mouth Daily. 28 tablet 0    rosuvastatin (CRESTOR) 20 MG tablet Take 1 tablet by mouth Daily. 90 tablet 3    sildenafil (VIAGRA) 100 MG tablet 1/2-1 po daily prn 10 tablet 5    vitamin D (ERGOCALCIFEROL) 1.25 MG (60000 UT) capsule capsule Take 1 capsule by mouth Every 7 (Seven) Days. 12 capsule 1    Xultophy 100-3.6 UNIT-MG/ML solution pen-injector subcutaneous pen Inject 50 Units under the skin into the appropriate area as directed Daily. 15 mL 5     No current facility-administered medications for this visit.     Facility-Administered Medications Ordered in Other Visits   Medication Dose Route Frequency Provider Last Rate Last Admin    Chlorhexidine Gluconate Cloth 2 % pads 1 application  1 application  Topical Q12H PRN Nneka Araujo APRN                Assessment and Plan    Problem List Items Addressed This Visit          Cardiac and Vasculature    Mixed hyperlipidemia (Chronic)    Overview     7/25/2022 total cholesterol 97, triglycerides 135, HDL 46, and LDL of 28  10/27/2022 total cholesterol 147, triglycerides 134, HDL 49, and LDL 74  3/10/2023 total cholesterol 118, triglycerides 151, HDL 50, and LDL  43         Essential hypertension (Chronic)    CAD s/p stent  - Primary (Chronic)    Overview     8/15/2019 [facility] for non-STEMI: PCI ARIA to the mid LAD         S/P TAVR (transcatheter aortic valve replacement) (Chronic)    Overview     5/8/2023 TAVR         Paroxysmal atrial fibrillation (Chronic)    Pacemaker (Chronic)    Overview     5/15/2023 [employer] pacemaker placed                Follow Up     Medications were reviewed with the patient.    CAD is stable.  Continue aspirin, rosuvastatin, metoprolol, and losartan.    Hypertension is controlled, continue current medications.    Continue rosuvastatin for dyslipidemia.    Pacemaker and TAVR stable.  I will coordinate either with [employer] or with [employer] for in person interrogation in the coming months.  And, will arrange for echocardiogram at next visit.    Return in about 6 months (around 8/22/2024).    Patient was given instructions and counseling regarding his condition or for health maintenance advice. Please see specific information pulled into the AVS if appropriate.

## 2024-02-22 NOTE — PROGRESS NOTES
Pt attended phase III session as scheduled.  [facility] Cardiology physician immediately available. NAD note, skin warm, pink and dry, denies chest pain, chest pressure, SOA, tolerated exercise well. V/S WIDL See exercise flow  for exercise data

## 2024-02-27 ENCOUNTER — APPOINTMENT (OUTPATIENT)
Dept: CARDIAC REHAB | Facility: HOSPITAL | Age: 79
End: 2024-02-27

## 2024-02-29 ENCOUNTER — TREATMENT (OUTPATIENT)
Dept: CARDIAC REHAB | Facility: HOSPITAL | Age: 79
End: 2024-02-29

## 2024-02-29 VITALS — HEART RATE: 68 BPM | SYSTOLIC BLOOD PRESSURE: 134 MMHG | OXYGEN SATURATION: 98 % | DIASTOLIC BLOOD PRESSURE: 50 MMHG

## 2024-02-29 DIAGNOSIS — Z95.2 S/P TAVR (TRANSCATHETER AORTIC VALVE REPLACEMENT): Primary | ICD-10-CM

## 2024-02-29 NOTE — PROGRESS NOTES
Pt attended phase III session as scheduled.  [facility] Cardiology physician immediately available. NAD note, skin warm, pink and dry, denies chest pain, chest pressure, SOA, tolerated exercise well. V/S WIDL See exercise flow  for exercise data

## 2024-03-05 ENCOUNTER — TREATMENT (OUTPATIENT)
Dept: CARDIAC REHAB | Facility: HOSPITAL | Age: 79
End: 2024-03-05

## 2024-03-05 VITALS — OXYGEN SATURATION: 97 % | HEART RATE: 69 BPM | DIASTOLIC BLOOD PRESSURE: 60 MMHG | SYSTOLIC BLOOD PRESSURE: 138 MMHG

## 2024-03-05 DIAGNOSIS — Z95.2 S/P TAVR (TRANSCATHETER AORTIC VALVE REPLACEMENT): Primary | ICD-10-CM

## 2024-03-07 ENCOUNTER — TREATMENT (OUTPATIENT)
Dept: CARDIAC REHAB | Facility: HOSPITAL | Age: 79
End: 2024-03-07

## 2024-03-07 DIAGNOSIS — Z95.2 S/P TAVR (TRANSCATHETER AORTIC VALVE REPLACEMENT): Primary | ICD-10-CM

## 2024-03-07 NOTE — PROGRESS NOTES
Pt attended phase III session as scheduled.  [facility] Cardiology physician immediately available. NAD note, skin warm, pink and dry, denies chest pain, chest pressure, SOA, tolerated exercise well. V/S WIDL See exercise flow  for exercise data

## 2024-03-12 ENCOUNTER — TREATMENT (OUTPATIENT)
Dept: CARDIAC REHAB | Facility: HOSPITAL | Age: 79
End: 2024-03-12

## 2024-03-12 VITALS — DIASTOLIC BLOOD PRESSURE: 58 MMHG | SYSTOLIC BLOOD PRESSURE: 138 MMHG | HEART RATE: 70 BPM

## 2024-03-12 DIAGNOSIS — Z95.2 S/P TAVR (TRANSCATHETER AORTIC VALVE REPLACEMENT): Primary | ICD-10-CM

## 2024-03-12 NOTE — PROGRESS NOTES
Pt attended phase III session as scheduled.  [facility] Cardiology physician immediately available. NAD note, skin warm, pink and dry, denies chest pain, chest pressure, SOA, tolerated exercise well. V/S WIDL See exercise flow  for exercise data

## 2024-03-13 PROBLEM — C44.90 NONMELANOMA SKIN CANCER: Status: ACTIVE | Noted: 2024-03-13

## 2024-03-13 NOTE — PROGRESS NOTES
Subjective   Nicholas Yin is a 79 y.o. male.     Chief Complaint  Skin lesions    History of Present Illness     Skin Lesions  He underwent excision of two basal cell carcinomas from his back in December 2022.  He has three further similar lesions that he would like removed. The lowest has bled on and off for the last month.  The highest has been a bit itchy.  To date, there have been no other associated symptoms.      The following portions of the patient's history were reviewed and updated as appropriate: allergies, current medications, past medical history, past social history, past surgical history and problem list.    Review of Systems   Constitutional:  Positive for fatigue. Negative for appetite change, chills, fever and unexpected weight change.   HENT:  Negative for congestion, ear pain, rhinorrhea, sneezing and sore throat.    Eyes:  Negative for visual disturbance.   Respiratory:  Positive for shortness of breath. Negative for cough and wheezing.    Cardiovascular:  Negative for chest pain, palpitations and leg swelling.   Gastrointestinal:  Negative for abdominal pain, blood in stool, constipation, diarrhea, nausea and vomiting.   Endocrine: Negative for polydipsia and polyuria.   Genitourinary:  Positive for difficulty urinating (decreased stream with sense of incomplete voiding) and frequency (nocturia x 2-3). Negative for dysuria, hematuria and urgency.   Musculoskeletal:  Positive for back pain (chronic - progressive). Negative for arthralgias, joint swelling and myalgias.   Skin:  Negative for rash.        Skin lesions   Neurological:  Positive for weakness (generalized). Negative for light-headedness, numbness and headaches.   Psychiatric/Behavioral:  Negative for sleep disturbance.      Objective   Physical Exam  Constitutional:       General: He is not in acute distress.     Appearance: Normal appearance. He is well-developed. He is not ill-appearing or diaphoretic.      Comments: Bright  and in good spirits.  Sits and stands with an exaggerated thoracic kyphosis.  Uncomfortable with movement.  No apparent distress at rest.  No pallor, cyanosis, diaphoresis, or jaundice   HENT:      Head: Atraumatic.   Eyes:      Conjunctiva/sclera: Conjunctivae normal.   Neck:      Thyroid: No thyroid mass or thyromegaly.      Vascular: No carotid bruit or JVD.      Trachea: Trachea normal. No tracheal deviation.   Cardiovascular:      Rate and Rhythm: Normal rate and regular rhythm.   Pulmonary:      Effort: Pulmonary effort is normal.      Breath sounds: Normal breath sounds.   Abdominal:      General: Bowel sounds are normal. There is no distension.   Musculoskeletal:      Right lower leg: No edema.      Left lower leg: No edema.   Lymphadenopathy:      Head:      Right side of head: No submental, submandibular, tonsillar, preauricular, posterior auricular or occipital adenopathy.      Left side of head: No submental, submandibular, tonsillar, preauricular, posterior auricular or occipital adenopathy.      Cervical: No cervical adenopathy.      Upper Body:      Right upper body: No supraclavicular adenopathy.      Left upper body: No supraclavicular adenopathy.   Skin:     General: Skin is warm and dry.      Coloration: Skin is not cyanotic, jaundiced or pale.      Findings: Lesion (2 cm oval fairly well demarcated slightly pearly erythematous violaceous plaque right lower back with early central ulceration- two similar but slightly smaller lesions left mid and left upper back) present. No rash.      Nails: There is no clubbing.   Neurological:      Mental Status: He is alert and oriented to person, place, and time.      Cranial Nerves: No cranial nerve deficit.      Motor: No tremor.      Coordination: Coordination normal.      Gait: Gait normal.   Psychiatric:         Attention and Perception: Attention normal.         Mood and Affect: Mood normal.         Speech: Speech normal.         Behavior: Behavior  normal.         Thought Content: Thought content normal.       Assessment & Plan   Problems Addressed this Visit          Skin    Skin lesions   High index of suspicion for nonmelanoma (likely BCC) skin cancer  Reminded of the risks of excisional biopsy under local anesthesia including damage to underlying structures, bleeding, infection, and keloid formation  The 3 remaining lesions were all removed.  See procedure note  Instructions given regarding wound care.  His wife, who is an APRN, will remove the stitches in 10 days time  Encouraged to report if any worse or if any concerns whatsoever     Diagnoses         Codes Comments    Skin lesions    -  Primary ICD-10-CM: L98.9  ICD-9-CM: 709.9

## 2024-03-13 NOTE — PROGRESS NOTES
Procedure   Procedures    PROCEDURE NOTE    PRE-OP DIAGNOSIS:  Nonmelanoma skin cancer x 3    PROCEDURE:  Excision    INDICATIONS:  Nicholas Yin is a 79 y.o. male who presents for minor skin surgery.  The patient understands all risks, benefits, indications, potential complications, and alternatives, and freely consents for the procedure.  The patient also understands the option of performing no surgery, the risk for scarring, and the technique of the procedure.    ANESTHESIA:  Local.    TECHNIQUE:  After informed consent was obtained, and after the skin was prepped and draped, 1% lidocaine with epinephrine was injected around and underneath each site. One 3 cm and two 1.5 cm elliptical excisions were  performed and the specimens sent for pathology.  Subcuticular stitches were placed using 4-0 monocryl and the skin edges were approximated with 4-0 nylon.  Dressings were applied and wound care instructions provided.  Nicholas tolerated the procedure well and without complications.  The patient will be alert for any signs of cutaneous infection and will follow up as instructed.

## 2024-03-14 ENCOUNTER — APPOINTMENT (OUTPATIENT)
Dept: CARDIAC REHAB | Facility: HOSPITAL | Age: 79
End: 2024-03-14

## 2024-03-14 ENCOUNTER — PROCEDURE VISIT (OUTPATIENT)
Dept: FAMILY MEDICINE CLINIC | Facility: CLINIC | Age: 79
End: 2024-03-14
Payer: COMMERCIAL

## 2024-03-14 DIAGNOSIS — C44.90 NONMELANOMA SKIN CANCER: Primary | ICD-10-CM

## 2024-03-15 LAB
REF LAB TEST METHOD: NORMAL
REF LAB TEST METHOD: NORMAL

## 2024-03-18 LAB — REF LAB TEST METHOD: NORMAL

## 2024-03-19 ENCOUNTER — APPOINTMENT (OUTPATIENT)
Dept: CARDIAC REHAB | Facility: HOSPITAL | Age: 79
End: 2024-03-19

## 2024-03-19 RX ORDER — SULFAMETHOXAZOLE AND TRIMETHOPRIM 800; 160 MG/1; MG/1
1 TABLET ORAL 2 TIMES DAILY
Qty: 20 TABLET | Refills: 0 | Status: SHIPPED | OUTPATIENT
Start: 2024-03-19 | End: 2024-03-29

## 2024-03-21 ENCOUNTER — APPOINTMENT (OUTPATIENT)
Dept: CARDIAC REHAB | Facility: HOSPITAL | Age: 79
End: 2024-03-21

## 2024-03-25 DIAGNOSIS — E55.9 VITAMIN D DEFICIENCY: ICD-10-CM

## 2024-03-25 DIAGNOSIS — I48.0 PAROXYSMAL ATRIAL FIBRILLATION: Primary | Chronic | ICD-10-CM

## 2024-03-25 RX ORDER — ERGOCALCIFEROL 1.25 MG/1
50000 CAPSULE ORAL
Qty: 12 CAPSULE | Refills: 1 | Status: SHIPPED | OUTPATIENT
Start: 2024-03-25

## 2024-03-26 ENCOUNTER — APPOINTMENT (OUTPATIENT)
Dept: CARDIAC REHAB | Facility: HOSPITAL | Age: 79
End: 2024-03-26

## 2024-03-27 DIAGNOSIS — Z79.4 TYPE 2 DIABETES MELLITUS WITH COMPLICATION, WITH LONG-TERM CURRENT USE OF INSULIN: Chronic | ICD-10-CM

## 2024-03-27 DIAGNOSIS — N18.32 CHRONIC RENAL FAILURE, STAGE 3B: Chronic | ICD-10-CM

## 2024-03-27 DIAGNOSIS — E11.8 TYPE 2 DIABETES MELLITUS WITH COMPLICATION, WITH LONG-TERM CURRENT USE OF INSULIN: Chronic | ICD-10-CM

## 2024-03-27 DIAGNOSIS — I25.110 CORONARY ARTERY DISEASE INVOLVING NATIVE CORONARY ARTERY OF NATIVE HEART WITH UNSTABLE ANGINA PECTORIS: Chronic | ICD-10-CM

## 2024-03-27 RX ORDER — DAPAGLIFLOZIN 10 MG/1
10 TABLET, FILM COATED ORAL DAILY
Qty: 28 TABLET | Refills: 0 | COMMUNITY
Start: 2024-03-27

## 2024-03-28 ENCOUNTER — APPOINTMENT (OUTPATIENT)
Dept: CARDIAC REHAB | Facility: HOSPITAL | Age: 79
End: 2024-03-28

## 2024-04-02 ENCOUNTER — APPOINTMENT (OUTPATIENT)
Dept: CARDIAC REHAB | Facility: HOSPITAL | Age: 79
End: 2024-04-02

## 2024-04-04 ENCOUNTER — APPOINTMENT (OUTPATIENT)
Dept: CARDIAC REHAB | Facility: HOSPITAL | Age: 79
End: 2024-04-04

## 2024-04-04 DIAGNOSIS — Z95.2 S/P TAVR (TRANSCATHETER AORTIC VALVE REPLACEMENT): Primary | Chronic | ICD-10-CM

## 2024-04-09 ENCOUNTER — TREATMENT (OUTPATIENT)
Dept: CARDIAC REHAB | Facility: HOSPITAL | Age: 79
End: 2024-04-09

## 2024-04-09 VITALS — SYSTOLIC BLOOD PRESSURE: 128 MMHG | HEART RATE: 60 BPM | OXYGEN SATURATION: 96 % | DIASTOLIC BLOOD PRESSURE: 42 MMHG

## 2024-04-09 DIAGNOSIS — Z95.2 S/P TAVR (TRANSCATHETER AORTIC VALVE REPLACEMENT): Primary | ICD-10-CM

## 2024-04-09 NOTE — PROGRESS NOTES
Pt attended phase III session as scheduled.  [facility] Cardiology physician immediately available. NAD note, skin warm, pink and dry, denies chest pain, chest pressure, SOA, tolerated exercise well. V/S WIDL See exercise flow  for exercise data

## 2024-04-11 ENCOUNTER — APPOINTMENT (OUTPATIENT)
Dept: CARDIAC REHAB | Facility: HOSPITAL | Age: 79
End: 2024-04-11

## 2024-04-16 ENCOUNTER — TREATMENT (OUTPATIENT)
Dept: CARDIAC REHAB | Facility: HOSPITAL | Age: 79
End: 2024-04-16

## 2024-04-16 VITALS — HEART RATE: 68 BPM | DIASTOLIC BLOOD PRESSURE: 68 MMHG | SYSTOLIC BLOOD PRESSURE: 128 MMHG | OXYGEN SATURATION: 95 %

## 2024-04-16 DIAGNOSIS — Z95.2 S/P TAVR (TRANSCATHETER AORTIC VALVE REPLACEMENT): Primary | ICD-10-CM

## 2024-04-16 NOTE — PROGRESS NOTES
Pt attended phase III session as scheduled.  [facility] Cardiology physician immediately available. NAD note, skin warm, pink and dry, denies chest pain, chest pressure, SOA, tolerated exercise well. V/S WIDL See exercise flow  for exercise data

## 2024-04-18 ENCOUNTER — APPOINTMENT (OUTPATIENT)
Dept: CARDIAC REHAB | Facility: HOSPITAL | Age: 79
End: 2024-04-18

## 2024-04-23 ENCOUNTER — APPOINTMENT (OUTPATIENT)
Dept: CARDIAC REHAB | Facility: HOSPITAL | Age: 79
End: 2024-04-23

## 2024-04-25 ENCOUNTER — APPOINTMENT (OUTPATIENT)
Dept: CARDIAC REHAB | Facility: HOSPITAL | Age: 79
End: 2024-04-25

## 2024-04-30 ENCOUNTER — APPOINTMENT (OUTPATIENT)
Dept: CARDIAC REHAB | Facility: HOSPITAL | Age: 79
End: 2024-04-30

## 2024-04-30 DIAGNOSIS — I48.0 PAROXYSMAL ATRIAL FIBRILLATION: Chronic | ICD-10-CM

## 2024-05-02 ENCOUNTER — TREATMENT (OUTPATIENT)
Dept: CARDIAC REHAB | Facility: HOSPITAL | Age: 79
End: 2024-05-02

## 2024-05-02 VITALS — HEART RATE: 64 BPM | SYSTOLIC BLOOD PRESSURE: 140 MMHG | DIASTOLIC BLOOD PRESSURE: 66 MMHG

## 2024-05-02 DIAGNOSIS — Z95.2 S/P TAVR (TRANSCATHETER AORTIC VALVE REPLACEMENT): Primary | ICD-10-CM

## 2024-05-07 ENCOUNTER — APPOINTMENT (OUTPATIENT)
Dept: CARDIAC REHAB | Facility: HOSPITAL | Age: 79
End: 2024-05-07

## 2024-05-09 ENCOUNTER — APPOINTMENT (OUTPATIENT)
Dept: CARDIAC REHAB | Facility: HOSPITAL | Age: 79
End: 2024-05-09

## 2024-05-14 ENCOUNTER — APPOINTMENT (OUTPATIENT)
Dept: CARDIAC REHAB | Facility: HOSPITAL | Age: 79
End: 2024-05-14

## 2024-05-16 ENCOUNTER — APPOINTMENT (OUTPATIENT)
Dept: CARDIAC REHAB | Facility: HOSPITAL | Age: 79
End: 2024-05-16

## 2024-05-16 ENCOUNTER — HOSPITAL ENCOUNTER (OUTPATIENT)
Dept: CARDIOLOGY | Facility: HOSPITAL | Age: 79
Discharge: HOME | End: 2024-05-16
Payer: COMMERCIAL

## 2024-05-16 DIAGNOSIS — Z95.2 S/P TAVR (TRANSCATHETER AORTIC VALVE REPLACEMENT): Chronic | ICD-10-CM

## 2024-05-16 PROCEDURE — 93306 TTE W/DOPPLER COMPLETE: CPT

## 2024-05-20 ENCOUNTER — DOCUMENTATION (OUTPATIENT)
Dept: CARDIOLOGY | Facility: CLINIC | Age: 79
End: 2024-05-20
Payer: COMMERCIAL

## 2024-05-20 LAB
BH CV ECHO MEAS - AO MAX PG: 15.1 MMHG
BH CV ECHO MEAS - AO MEAN PG: 9 MMHG
BH CV ECHO MEAS - AO ROOT DIAM: 2.1 CM
BH CV ECHO MEAS - AO V2 MAX: 194.5 CM/SEC
BH CV ECHO MEAS - AO V2 VTI: 51.3 CM
BH CV ECHO MEAS - AVA(I,D): 0.66 CM2
BH CV ECHO MEAS - EDV(MOD-SP4): 38.4 ML
BH CV ECHO MEAS - EF(MOD-BP): 53 %
BH CV ECHO MEAS - EF(MOD-SP4): 52.9 %
BH CV ECHO MEAS - ESV(MOD-SP4): 18.1 ML
BH CV ECHO MEAS - LA DIMENSION: 5.4 CM
BH CV ECHO MEAS - LAT PEAK E' VEL: 6.3 CM/SEC
BH CV ECHO MEAS - LV DIASTOLIC VOL/BSA (35-75): 17.8 CM2
BH CV ECHO MEAS - LV MAX PG: 1.47 MMHG
BH CV ECHO MEAS - LV MEAN PG: 1 MMHG
BH CV ECHO MEAS - LV SYSTOLIC VOL/BSA (12-30): 8.4 CM2
BH CV ECHO MEAS - LV V1 MAX: 60.6 CM/SEC
BH CV ECHO MEAS - LV V1 VTI: 16.9 CM
BH CV ECHO MEAS - LVOT AREA: 2.01 CM2
BH CV ECHO MEAS - LVOT DIAM: 1.6 CM
BH CV ECHO MEAS - MED PEAK E' VEL: 7.1 CM/SEC
BH CV ECHO MEAS - MV A MAX VEL: 136 CM/SEC
BH CV ECHO MEAS - MV E MAX VEL: 132 CM/SEC
BH CV ECHO MEAS - MV E/A: 0.97
BH CV ECHO MEAS - SV(LVOT): 34 ML
BH CV ECHO MEAS - SV(MOD-SP4): 20.3 ML
BH CV ECHO MEAS - SVI(LVOT): 15.7 ML/M2
BH CV ECHO MEAS - SVI(MOD-SP4): 9.4 ML/M2
BH CV ECHO MEASUREMENTS AVERAGE E/E' RATIO: 19.7
LEFT ATRIUM VOLUME INDEX: 20.5 ML/M2

## 2024-05-20 NOTE — PROGRESS NOTES
Transthoracic echocardiogram from 5/16/2024 reviewed.  LVEF is estimated at 55%.  A 26 mm TONY 3 pericardial prosthesis is in place.  Mean aortic valve gradient is 7.6 mmHg.  No paravalvular leak seen.  LVOT measured at 2.1 cm.  Calculated aortic valve area 1.3 cm².  Rachael Mendoza MD, [facility]

## 2024-05-21 ENCOUNTER — APPOINTMENT (OUTPATIENT)
Dept: CARDIAC REHAB | Facility: HOSPITAL | Age: 79
End: 2024-05-21

## 2024-05-23 ENCOUNTER — APPOINTMENT (OUTPATIENT)
Dept: CARDIAC REHAB | Facility: HOSPITAL | Age: 79
End: 2024-05-23

## 2024-05-25 DIAGNOSIS — I48.0 PAROXYSMAL ATRIAL FIBRILLATION: Chronic | ICD-10-CM

## 2024-05-25 DIAGNOSIS — M85.89 OSTEOPENIA OF MULTIPLE SITES: ICD-10-CM

## 2024-05-25 DIAGNOSIS — E78.2 MIXED HYPERLIPIDEMIA: Chronic | ICD-10-CM

## 2024-05-25 DIAGNOSIS — I10 ESSENTIAL HYPERTENSION: Primary | Chronic | ICD-10-CM

## 2024-05-25 DIAGNOSIS — E55.9 VITAMIN D DEFICIENCY: ICD-10-CM

## 2024-05-25 DIAGNOSIS — E11.8 TYPE 2 DIABETES MELLITUS WITH COMPLICATION, WITH LONG-TERM CURRENT USE OF INSULIN: Chronic | ICD-10-CM

## 2024-05-25 DIAGNOSIS — Z79.4 TYPE 2 DIABETES MELLITUS WITH COMPLICATION, WITH LONG-TERM CURRENT USE OF INSULIN: Chronic | ICD-10-CM

## 2024-05-28 ENCOUNTER — APPOINTMENT (OUTPATIENT)
Dept: CARDIAC REHAB | Facility: HOSPITAL | Age: 79
End: 2024-05-28

## 2024-05-28 RX ORDER — LOSARTAN POTASSIUM 50 MG/1
25 TABLET ORAL 2 TIMES DAILY
Qty: 60 TABLET | Refills: 11 | Status: CANCELLED | OUTPATIENT
Start: 2024-05-28

## 2024-05-30 ENCOUNTER — APPOINTMENT (OUTPATIENT)
Dept: CARDIAC REHAB | Facility: HOSPITAL | Age: 79
End: 2024-05-30

## 2024-05-30 RX ORDER — LOSARTAN POTASSIUM 50 MG/1
50 TABLET ORAL 2 TIMES DAILY
Qty: 90 TABLET | Refills: 3 | Status: SHIPPED | OUTPATIENT
Start: 2024-05-30

## 2024-06-03 ENCOUNTER — LAB (OUTPATIENT)
Dept: LAB | Facility: HOSPITAL | Age: 79
End: 2024-06-03
Payer: COMMERCIAL

## 2024-06-03 DIAGNOSIS — M85.89 OSTEOPENIA OF MULTIPLE SITES: ICD-10-CM

## 2024-06-03 DIAGNOSIS — Z79.4 TYPE 2 DIABETES MELLITUS WITH COMPLICATION, WITH LONG-TERM CURRENT USE OF INSULIN: Chronic | ICD-10-CM

## 2024-06-03 DIAGNOSIS — E11.8 TYPE 2 DIABETES MELLITUS WITH COMPLICATION, WITH LONG-TERM CURRENT USE OF INSULIN: Chronic | ICD-10-CM

## 2024-06-03 DIAGNOSIS — E55.9 VITAMIN D DEFICIENCY: ICD-10-CM

## 2024-06-03 DIAGNOSIS — I48.0 PAROXYSMAL ATRIAL FIBRILLATION: ICD-10-CM

## 2024-06-03 DIAGNOSIS — E78.2 MIXED HYPERLIPIDEMIA: Chronic | ICD-10-CM

## 2024-06-03 DIAGNOSIS — I10 ESSENTIAL HYPERTENSION: ICD-10-CM

## 2024-06-03 LAB
25(OH)D3 SERPL-MCNC: 23.5 NG/ML (ref 30–100)
ALBUMIN SERPL-MCNC: 3.5 G/DL (ref 3.5–5.2)
ALBUMIN UR-MCNC: 68.2 MG/DL
ALBUMIN/GLOB SERPL: 1.3 G/DL
ALP SERPL-CCNC: 76 U/L (ref 39–117)
ALT SERPL W P-5'-P-CCNC: 19 U/L (ref 1–41)
ANION GAP SERPL CALCULATED.3IONS-SCNC: 8 MMOL/L (ref 5–15)
AST SERPL-CCNC: 28 U/L (ref 1–40)
BASOPHILS # BLD AUTO: 0.05 10*3/MM3 (ref 0–0.2)
BASOPHILS NFR BLD AUTO: 0.7 % (ref 0–1.5)
BILIRUB SERPL-MCNC: 0.5 MG/DL (ref 0–1.2)
BUN SERPL-MCNC: 33 MG/DL (ref 8–23)
BUN/CREAT SERPL: 12.5 (ref 7–25)
CALCIUM SPEC-SCNC: 8.7 MG/DL (ref 8.6–10.5)
CHLORIDE SERPL-SCNC: 109 MMOL/L (ref 98–107)
CHOLEST SERPL-MCNC: 103 MG/DL (ref 0–200)
CO2 SERPL-SCNC: 24 MMOL/L (ref 22–29)
CREAT SERPL-MCNC: 2.65 MG/DL (ref 0.76–1.27)
DEPRECATED RDW RBC AUTO: 50.3 FL (ref 37–54)
EGFRCR SERPLBLD CKD-EPI 2021: 23.9 ML/MIN/1.73
EOSINOPHIL # BLD AUTO: 0.34 10*3/MM3 (ref 0–0.4)
EOSINOPHIL NFR BLD AUTO: 4.9 % (ref 0.3–6.2)
ERYTHROCYTE [DISTWIDTH] IN BLOOD BY AUTOMATED COUNT: 16.3 % (ref 12.3–15.4)
GLOBULIN UR ELPH-MCNC: 2.7 GM/DL
GLUCOSE SERPL-MCNC: 93 MG/DL (ref 65–99)
HBA1C MFR BLD: 7.4 % (ref 4.8–5.6)
HCT VFR BLD AUTO: 38.1 % (ref 37.5–51)
HDLC SERPL-MCNC: 49 MG/DL (ref 40–60)
HGB BLD-MCNC: 11.9 G/DL (ref 13–17.7)
IMM GRANULOCYTES # BLD AUTO: 0.02 10*3/MM3 (ref 0–0.05)
IMM GRANULOCYTES NFR BLD AUTO: 0.3 % (ref 0–0.5)
LDLC SERPL CALC-MCNC: 35 MG/DL (ref 0–100)
LDLC/HDLC SERPL: 0.7 {RATIO}
LYMPHOCYTES # BLD AUTO: 1.19 10*3/MM3 (ref 0.7–3.1)
LYMPHOCYTES NFR BLD AUTO: 17.1 % (ref 19.6–45.3)
MCH RBC QN AUTO: 26.7 PG (ref 26.6–33)
MCHC RBC AUTO-ENTMCNC: 31.2 G/DL (ref 31.5–35.7)
MCV RBC AUTO: 85.6 FL (ref 79–97)
MONOCYTES # BLD AUTO: 0.7 10*3/MM3 (ref 0.1–0.9)
MONOCYTES NFR BLD AUTO: 10 % (ref 5–12)
NEUTROPHILS NFR BLD AUTO: 4.67 10*3/MM3 (ref 1.7–7)
NEUTROPHILS NFR BLD AUTO: 67 % (ref 42.7–76)
NRBC BLD AUTO-RTO: 0 /100 WBC (ref 0–0.2)
PLATELET # BLD AUTO: 231 10*3/MM3 (ref 140–450)
PMV BLD AUTO: 11.6 FL (ref 6–12)
POTASSIUM SERPL-SCNC: 4.7 MMOL/L (ref 3.5–5.2)
PROT SERPL-MCNC: 6.2 G/DL (ref 6–8.5)
RBC # BLD AUTO: 4.45 10*6/MM3 (ref 4.14–5.8)
SODIUM SERPL-SCNC: 141 MMOL/L (ref 136–145)
T4 FREE SERPL-MCNC: 1.15 NG/DL (ref 0.92–1.68)
TRIGL SERPL-MCNC: 99 MG/DL (ref 0–150)
TSH SERPL DL<=0.05 MIU/L-ACNC: 7.59 UIU/ML (ref 0.27–4.2)
VLDLC SERPL-MCNC: 19 MG/DL (ref 5–40)
WBC NRBC COR # BLD AUTO: 6.97 10*3/MM3 (ref 3.4–10.8)

## 2024-06-03 PROCEDURE — 80061 LIPID PANEL: CPT

## 2024-06-03 PROCEDURE — 82043 UR ALBUMIN QUANTITATIVE: CPT

## 2024-06-03 PROCEDURE — 83036 HEMOGLOBIN GLYCOSYLATED A1C: CPT

## 2024-06-03 PROCEDURE — 82306 VITAMIN D 25 HYDROXY: CPT

## 2024-06-03 PROCEDURE — 84439 ASSAY OF FREE THYROXINE: CPT

## 2024-06-03 PROCEDURE — 80050 GENERAL HEALTH PANEL: CPT

## 2024-06-04 ENCOUNTER — OFFICE VISIT (OUTPATIENT)
Dept: FAMILY MEDICINE CLINIC | Facility: CLINIC | Age: 79
End: 2024-06-04
Payer: COMMERCIAL

## 2024-06-04 DIAGNOSIS — I48.0 PAROXYSMAL ATRIAL FIBRILLATION: Chronic | ICD-10-CM

## 2024-06-04 DIAGNOSIS — N18.4 CHRONIC RENAL FAILURE, STAGE 4 (SEVERE): ICD-10-CM

## 2024-06-04 DIAGNOSIS — R35.1 BENIGN PROSTATIC HYPERPLASIA WITH NOCTURIA: ICD-10-CM

## 2024-06-04 DIAGNOSIS — E55.9 VITAMIN D DEFICIENCY: ICD-10-CM

## 2024-06-04 DIAGNOSIS — E11.8 TYPE 2 DIABETES MELLITUS WITH COMPLICATION, WITH LONG-TERM CURRENT USE OF INSULIN: Chronic | ICD-10-CM

## 2024-06-04 DIAGNOSIS — Z00.00 HEALTHCARE MAINTENANCE: ICD-10-CM

## 2024-06-04 DIAGNOSIS — Z86.19 HISTORY OF HEPATITIS C: ICD-10-CM

## 2024-06-04 DIAGNOSIS — Z79.4 TYPE 2 DIABETES MELLITUS WITH COMPLICATION, WITH LONG-TERM CURRENT USE OF INSULIN: Chronic | ICD-10-CM

## 2024-06-04 DIAGNOSIS — E78.2 MIXED HYPERLIPIDEMIA: Chronic | ICD-10-CM

## 2024-06-04 DIAGNOSIS — E66.01 CLASS 2 SEVERE OBESITY WITH SERIOUS COMORBIDITY AND BODY MASS INDEX (BMI) OF 35.0 TO 35.9 IN ADULT, UNSPECIFIED OBESITY TYPE: ICD-10-CM

## 2024-06-04 DIAGNOSIS — Z85.47 H/O TESTICULAR CANCER: ICD-10-CM

## 2024-06-04 DIAGNOSIS — I10 ESSENTIAL HYPERTENSION: Chronic | ICD-10-CM

## 2024-06-04 DIAGNOSIS — Z95.2 S/P TAVR (TRANSCATHETER AORTIC VALVE REPLACEMENT): Primary | Chronic | ICD-10-CM

## 2024-06-04 DIAGNOSIS — I44.2 POSTOPERATIVE COMPLETE HEART BLOCK: ICD-10-CM

## 2024-06-04 DIAGNOSIS — M85.89 OSTEOPENIA OF MULTIPLE SITES: ICD-10-CM

## 2024-06-04 DIAGNOSIS — Z85.828 HISTORY OF NONMELANOMA SKIN CANCER: ICD-10-CM

## 2024-06-04 DIAGNOSIS — I73.9 PERIPHERAL VASCULAR DISEASE: ICD-10-CM

## 2024-06-04 DIAGNOSIS — N40.1 BENIGN PROSTATIC HYPERPLASIA WITH NOCTURIA: ICD-10-CM

## 2024-06-04 DIAGNOSIS — Z85.038 HISTORY OF COLON CANCER: ICD-10-CM

## 2024-06-04 DIAGNOSIS — M51.36 DDD (DEGENERATIVE DISC DISEASE), LUMBAR: ICD-10-CM

## 2024-06-04 DIAGNOSIS — G45.3 AMAUROSIS FUGAX OF RIGHT EYE: ICD-10-CM

## 2024-06-04 DIAGNOSIS — N52.01 ERECTILE DYSFUNCTION DUE TO ARTERIAL INSUFFICIENCY: ICD-10-CM

## 2024-06-04 DIAGNOSIS — I25.10 CORONARY ARTERY DISEASE INVOLVING NATIVE CORONARY ARTERY OF NATIVE HEART WITHOUT ANGINA PECTORIS: Chronic | ICD-10-CM

## 2024-06-04 DIAGNOSIS — K21.9 GASTROESOPHAGEAL REFLUX DISEASE WITHOUT ESOPHAGITIS: Chronic | ICD-10-CM

## 2024-06-04 DIAGNOSIS — I97.89 POSTOPERATIVE COMPLETE HEART BLOCK: ICD-10-CM

## 2024-06-04 PROBLEM — E66.812 CLASS 2 SEVERE OBESITY WITH SERIOUS COMORBIDITY AND BODY MASS INDEX (BMI) OF 35.0 TO 35.9 IN ADULT: Status: ACTIVE | Noted: 2023-05-08

## 2024-06-04 PROCEDURE — 99214 OFFICE O/P EST MOD 30 MIN: CPT | Performed by: GENERAL PRACTICE

## 2024-06-04 NOTE — PROGRESS NOTES
Subjective   Nicholas Yin is a 79 y.o. male.     Chief Complaint  He returns for a scheduled reassessment of multiple medical problems including previous TAVR, paroxysmal atrial fibrillation, coronary artery disease, type 2 diabetes mellitus, chronic renal failure, and chronic back pain    History of Present Illness     Previous TAVR  He underwent a TAVR and left femoral endarterectomy on 5/8/2023.  His course in hospital was uncomplicated and he was discharged home the next day.  He required drainage of a left inguinal seroma by Dr. Lopez afterward.      Paroxysmal Atrial Fibrillation  He required readmission to hospital on 5/14/2023 following a presyncopal episode. He was found to be in atrial fibrillation with a rapid ventricular response.  He was started on IV diltiazem and progressed to a wide-complex tachycardia with loss of consciousness.  CPR was administered with a prompt improvement and he regained consciousness.  He was then started on IV amiodarone and transferred to [facility].  On arrival there he was found to be in a third-degree block and underwent placement of a pacemaker by Dr. Mendoza.  He was started on dronedarone late last year since and denies any significant palpitations.  He continues to deny any chest pain or lightheadedness, and there is no history of any calf pain, swelling of the ankles, fever, or chills. He remains on rivaroxaban.  He continues to be followed by cardiology and is scheduled to undergo a reassessment with CHIDI Ku on 8/22/2024  Lab Results   Component Value Date    WBC 6.97 06/03/2024    HGB 11.9 (L) 06/03/2024    HCT 38.1 06/03/2024    MCV 85.6 06/03/2024     06/03/2024     Coronary Artery Disease  He underwent a coronary angiogram by Dr. Villatoro on 8/15/2019 with stenting of the mid LAD.  He remains on low-dose ASA    Transient Visual Loss  Several years ago he experienced a sudden progressive decrease in the vision in his right eye while  driving. He stated that the vision in the eye darkened gradually over several minutes to the point where the only things he could make out were very bright lights and the sun. This lasted for about 30 minutes then resolved over several minutes. He was hospitalized at [facility] over 10 years ago following a similar episodes that was ultimately felt to be vascular in origin. MRI of the brain performed on 12/8/17 was reported as showing mild cerebral atrophy with chronic small vessel ischemic changes. MRA of the carotid arteries performed the same day was unremarkable.  Bilateral carotid ultrasound performed on 2/6/2023 was unremarkable.  He undergoes regular ophthalmology assessments    Type 2 Diabetes Mellitus  He remains on a SGLT2 -empagliflozin, GLP agonist-liraglutide and basal insulin - tresiba (together as xultophy).    Lab Results   Component Value Date    HGBA1C 7.40 (H) 06/03/2024     Lab Results   Component Value Date    MICROALBUR 68.2 06/03/2024     Hyperlipidemia  He remains on rosuvastatin  Lab Results   Component Value Date    CHOL 103 06/03/2024    CHLPL 125 09/28/2015    TRIG 99 06/03/2024    HDL 49 06/03/2024    LDL 35 06/03/2024     Essential Hypertension  Home blood pressure readings: have generally been at goal.  He remains on losartan and metoprolol  Lab Results   Component Value Date    GLUCOSE 93 06/03/2024    BUN 33 (H) 06/03/2024    CREATININE 2.65 (H) 06/03/2024    EGFR 23.9 (L) 06/03/2024    BCR 12.5 06/03/2024    K 4.7 06/03/2024    CO2 24.0 06/03/2024    CALCIUM 8.7 06/03/2024    ALBUMIN 3.5 06/03/2024    BILITOT 0.5 06/03/2024    AST 28 06/03/2024    ALT 19 06/03/2024     Lab Results   Component Value Date    ALKPHOS 76 06/03/2024     Chronic Renal Failure  This has been contributed to longstanding type 2 diabetes mellitus and hypertension.  He is aware to avoid any NSAIDs oral or prescription.     Chronic Back Pain  He gives a long history of increasing mid and low back pain. This is  described as a sharp ache. The pain does not radiate and has been unassociated with any other symptoms. The pain is worse with prolonged weight bearing and limits his activities more then anything else at present.  MRI performed on 1/7/2021 was reported as showing degenerative disc disease and osteoarthritis with mild to moderate central canal and bilateral foraminal narrowing at L2-3 and L3-4.    Gastroesophageal Reflux Disease  He remains heartburn free on omeprazole    Labs  Most recent vitamin D 23.5.  Free T41.15  Lab Results   Component Value Date    TSH 7.590 (H) 06/03/2024     The following portions of the patient's history were reviewed and updated as appropriate: allergies, current medications, past medical history, past social history, and problem list.    Review of Systems   Constitutional:  Positive for fatigue. Negative for chills and fever.   HENT:  Negative for congestion, dental problem, ear pain, rhinorrhea, sneezing and sore throat.    Respiratory:  Positive for shortness of breath. Negative for cough and wheezing.    Cardiovascular:  Negative for chest pain, palpitations and leg swelling.   Gastrointestinal:  Negative for abdominal pain, blood in stool, constipation, diarrhea, nausea and vomiting.   Genitourinary:  Positive for difficulty urinating (sense of incomplete voding), nocturia (x 2-3) and erectile dysfunction. Negative for dysuria and hematuria.   Musculoskeletal:  Positive for back pain. Negative for arthralgias, gait problem, joint swelling, myalgias, neck pain and neck stiffness.   Skin:  Negative for rash and skin lesions.   Neurological:  Positive for weakness (generalized). Negative for numbness and headache.   Psychiatric/Behavioral:  Negative for behavioral problems, sleep disturbance and depressed mood. The patient is not nervous/anxious.      Objective   Physical Exam  Constitutional:       General: He is not in acute distress.     Appearance: Normal appearance. He is  well-developed. He is not ill-appearing or diaphoretic.      Comments: Bright and in good spirits.  Sits and stands with an exaggerated thoracic kyphosis.  Uncomfortable with movement.  No apparent distress at rest.  No pallor, cyanosis, diaphoresis, or jaundice   HENT:      Head: Atraumatic.   Eyes:      Conjunctiva/sclera: Conjunctivae normal.   Neck:      Thyroid: No thyroid mass or thyromegaly.      Vascular: No carotid bruit or JVD.      Trachea: Trachea normal. No tracheal deviation.   Cardiovascular:      Rate and Rhythm: Normal rate and regular rhythm.   Pulmonary:      Effort: Pulmonary effort is normal.      Breath sounds: Normal breath sounds.   Abdominal:      General: Bowel sounds are normal. There is no distension.   Musculoskeletal:      Right lower leg: No edema.      Left lower leg: No edema.   Lymphadenopathy:      Head:      Right side of head: No submental, submandibular, tonsillar, preauricular, posterior auricular or occipital adenopathy.      Left side of head: No submental, submandibular, tonsillar, preauricular, posterior auricular or occipital adenopathy.      Cervical: No cervical adenopathy.      Upper Body:      Right upper body: No supraclavicular adenopathy.      Left upper body: No supraclavicular adenopathy.   Skin:     General: Skin is warm and dry.      Coloration: Skin is not cyanotic, jaundiced or pale.      Findings: Lesion (5-6 mm slightly irregular well-demarcated blanchable erythematous macule left upper shoulder) present. No rash.      Nails: There is no clubbing.      Comments: Well-healed scars back   Neurological:      Mental Status: He is alert and oriented to person, place, and time.      Cranial Nerves: No cranial nerve deficit.      Motor: No tremor.      Coordination: Coordination normal.      Gait: Gait normal.   Psychiatric:         Attention and Perception: Attention normal.         Mood and Affect: Mood normal.         Speech: Speech normal.         Behavior:  Behavior normal.         Thought Content: Thought content normal.       Assessment & Plan   Problems Addressed this Visit          Cardiac and Vasculature    CAD s/p stent  (Chronic)  Reminded regarding the importance of risk factor modification.  Continue low-dose ASA.    Relevant Orders    CBC & Differential    Essential hypertension (Chronic)  Encouraged to continue to work on his diet and exercise plan.  Continue current medication    Relevant Orders    Comprehensive Metabolic Panel    Mixed hyperlipidemia (Chronic)  As above.   Continue current medication.    Relevant Orders    Comprehensive Metabolic Panel    Lipid Panel    Paroxysmal atrial fibrillation (Chronic)   Rate control is appropriate.. Patient is anticoagulated.   Avoid caffeine.  Avoid oral decongestants.  Continue current medication.  Follow up with cardiology     Relevant Orders    CBC & Differential    TSH    S/P TAVR (transcatheter aortic valve replacement)  Continues to do well  Follow up with cardiology     Relevant Orders    CBC & Differential    Peripheral vascular disease    Postoperative complete heart block  S/P PPP   Follow up with cardiology       Endocrine and Metabolic    T2DM  (Chronic)  Diabetes mellitus Type II, under good control.   Encouraged to continue to pursue ADA diet  Encouraged aerobic exercise.  Continue current medication    Relevant Orders    Comprehensive Metabolic Panel    Hemoglobin A1c    Class 2 severe obesity with serious comorbidity and body mass index (BMI) of 35.0 to 35.9 in adult    Vitamin D deficiency       Gastrointestinal Abdominal     GERD (Chronic)       Genitourinary and Reproductive     Benign prostatic hyperplasia with nocturia    Chronic renal failure, stage 4 (severe)  Reminded to avoid any NSAIDs prescription or OTC  Patient's renal function has declined some since starting on dronedarone and he will discuss this with cardiology  Reviewed the potential benefits of another trial of finerenone.   Patient will consider  Scheduled for updated labs just prior to his return    Relevant Orders    CBC & Differential    Comprehensive Metabolic Panel    Vasculogenic erectile dysfunction       Health Encounters    Healthcare maintenance  Recommended RSV vaccination with his flu shot this fall       Hematology and Neoplasia    H/O testicular cancer    History of colon cancer    History of nonmelanoma skin cancer  Will monitor the lesion over his left upper shoulder  Encourage port if any changes, or any new lesions       Musculoskeletal and Injuries    Osteopenia of multiple sites       Neuro    Amaurosis fugax of right eye    DDD (degenerative disc disease), lumbar  Reminded regarding symptomatic treatment.   Reviewed options and agreed on a pain management assessment    Relevant Orders    Ambulatory Referral to [facility] Management Clinic       Other    History of hepatitis C     Diagnoses         Codes Comments    S/P TAVR (transcatheter aortic valve replacement)    -  Primary ICD-10-CM: Z95.2  ICD-9-CM: V43.3     Postoperative complete heart block     ICD-10-CM: I97.89, I44.2  ICD-9-CM: 997.1, 426.0     Peripheral vascular disease     ICD-10-CM: I73.9  ICD-9-CM: 443.9     Paroxysmal atrial fibrillation     ICD-10-CM: I48.0  ICD-9-CM: 427.31     Mixed hyperlipidemia     ICD-10-CM: E78.2  ICD-9-CM: 272.2     Essential hypertension     ICD-10-CM: I10  ICD-9-CM: 401.9     Coronary artery disease involving native coronary artery of native heart without angina pectoris     ICD-10-CM: I25.10  ICD-9-CM: 414.01     Vitamin D deficiency     ICD-10-CM: E55.9  ICD-9-CM: 268.9     T2DM      ICD-10-CM: E11.8, Z79.4  ICD-9-CM: 250.90, V58.67     Class 2 severe obesity with serious comorbidity and body mass index (BMI) of 35.0 to 35.9 in adult, unspecified obesity type     ICD-10-CM: E66.01, Z68.35  ICD-9-CM: 278.01, V85.35     GERD     ICD-10-CM: K21.9  ICD-9-CM: 530.81     Erectile dysfunction due to arterial insufficiency      ICD-10-CM: N52.01  ICD-9-CM: 607.84     Benign prostatic hyperplasia with nocturia     ICD-10-CM: N40.1, R35.1  ICD-9-CM: 600.01, 788.43     Healthcare maintenance     ICD-10-CM: Z00.00  ICD-9-CM: V70.0     History of nonmelanoma skin cancer     ICD-10-CM: Z85.828  ICD-9-CM: V10.83     History of colon cancer     ICD-10-CM: Z85.038  ICD-9-CM: V10.05     H/O testicular cancer     ICD-10-CM: Z85.47  ICD-9-CM: V10.47     Osteopenia of multiple sites     ICD-10-CM: M85.89  ICD-9-CM: 733.90     DDD (degenerative disc disease), lumbar     ICD-10-CM: M51.36  ICD-9-CM: 722.52     Amaurosis fugax of right eye     ICD-10-CM: G45.3  ICD-9-CM: 362.34     History of hepatitis C     ICD-10-CM: Z86.19  ICD-9-CM: V12.09     Chronic renal failure, stage 4 (severe)     ICD-10-CM: N18.4  ICD-9-CM: 585.4

## 2024-06-05 VITALS
BODY MASS INDEX: 35.16 KG/M2 | DIASTOLIC BLOOD PRESSURE: 62 MMHG | SYSTOLIC BLOOD PRESSURE: 154 MMHG | HEIGHT: 68 IN | HEART RATE: 70 BPM | RESPIRATION RATE: 14 BRPM | WEIGHT: 232 LBS | OXYGEN SATURATION: 97 %

## 2024-06-05 DIAGNOSIS — I97.89 POSTOPERATIVE COMPLETE HEART BLOCK: ICD-10-CM

## 2024-06-05 DIAGNOSIS — I44.2 POSTOPERATIVE COMPLETE HEART BLOCK: ICD-10-CM

## 2024-06-05 DIAGNOSIS — N18.4 CHRONIC RENAL FAILURE, STAGE 4 (SEVERE): Primary | ICD-10-CM

## 2024-06-05 DIAGNOSIS — Z79.4 TYPE 2 DIABETES MELLITUS WITH COMPLICATION, WITH LONG-TERM CURRENT USE OF INSULIN: Chronic | ICD-10-CM

## 2024-06-05 DIAGNOSIS — E11.8 TYPE 2 DIABETES MELLITUS WITH COMPLICATION, WITH LONG-TERM CURRENT USE OF INSULIN: Chronic | ICD-10-CM

## 2024-06-05 RX ORDER — FINERENONE 10 MG/1
1 TABLET, FILM COATED ORAL DAILY
Qty: 56 TABLET | Refills: 0 | COMMUNITY
Start: 2024-06-05

## 2024-06-12 ENCOUNTER — DOCUMENTATION (OUTPATIENT)
Dept: CARDIAC REHAB | Facility: HOSPITAL | Age: 79
End: 2024-06-12
Payer: COMMERCIAL

## 2024-06-25 ENCOUNTER — TELEPHONE (OUTPATIENT)
Dept: CARDIOLOGY | Facility: HOSPITAL | Age: 79
End: 2024-06-25
Payer: COMMERCIAL

## 2024-06-25 ENCOUNTER — LAB (OUTPATIENT)
Dept: LAB | Facility: HOSPITAL | Age: 79
End: 2024-06-25
Payer: COMMERCIAL

## 2024-06-25 DIAGNOSIS — I48.0 PAROXYSMAL ATRIAL FIBRILLATION: ICD-10-CM

## 2024-06-25 DIAGNOSIS — Z95.2 S/P TAVR (TRANSCATHETER AORTIC VALVE REPLACEMENT): ICD-10-CM

## 2024-06-25 DIAGNOSIS — I25.10 CORONARY ARTERY DISEASE INVOLVING NATIVE CORONARY ARTERY OF NATIVE HEART WITHOUT ANGINA PECTORIS: ICD-10-CM

## 2024-06-25 DIAGNOSIS — N18.4 CHRONIC RENAL FAILURE, STAGE 4 (SEVERE): ICD-10-CM

## 2024-06-25 DIAGNOSIS — Z79.4 TYPE 2 DIABETES MELLITUS WITH COMPLICATION, WITH LONG-TERM CURRENT USE OF INSULIN: Chronic | ICD-10-CM

## 2024-06-25 DIAGNOSIS — E78.2 MIXED HYPERLIPIDEMIA: Chronic | ICD-10-CM

## 2024-06-25 DIAGNOSIS — I10 ESSENTIAL HYPERTENSION: Chronic | ICD-10-CM

## 2024-06-25 DIAGNOSIS — E11.8 TYPE 2 DIABETES MELLITUS WITH COMPLICATION, WITH LONG-TERM CURRENT USE OF INSULIN: Chronic | ICD-10-CM

## 2024-06-25 LAB
ALBUMIN SERPL-MCNC: 3.2 G/DL (ref 3.5–5.2)
ALBUMIN/GLOB SERPL: 1.1 G/DL
ALP SERPL-CCNC: 65 U/L (ref 39–117)
ALT SERPL W P-5'-P-CCNC: 15 U/L (ref 1–41)
ANION GAP SERPL CALCULATED.3IONS-SCNC: 11 MMOL/L (ref 5–15)
AST SERPL-CCNC: 22 U/L (ref 1–40)
BASOPHILS # BLD AUTO: 0.05 10*3/MM3 (ref 0–0.2)
BASOPHILS NFR BLD AUTO: 0.7 % (ref 0–1.5)
BILIRUB SERPL-MCNC: 0.3 MG/DL (ref 0–1.2)
BUN SERPL-MCNC: 45 MG/DL (ref 8–23)
BUN/CREAT SERPL: 16.6 (ref 7–25)
CALCIUM SPEC-SCNC: 8.4 MG/DL (ref 8.6–10.5)
CHLORIDE SERPL-SCNC: 111 MMOL/L (ref 98–107)
CHOLEST SERPL-MCNC: 100 MG/DL (ref 0–200)
CO2 SERPL-SCNC: 21 MMOL/L (ref 22–29)
CREAT SERPL-MCNC: 2.71 MG/DL (ref 0.76–1.27)
DEPRECATED RDW RBC AUTO: 53.1 FL (ref 37–54)
EGFRCR SERPLBLD CKD-EPI 2021: 23.3 ML/MIN/1.73
EOSINOPHIL # BLD AUTO: 0.35 10*3/MM3 (ref 0–0.4)
EOSINOPHIL NFR BLD AUTO: 5.1 % (ref 0.3–6.2)
ERYTHROCYTE [DISTWIDTH] IN BLOOD BY AUTOMATED COUNT: 16.9 % (ref 12.3–15.4)
GLOBULIN UR ELPH-MCNC: 2.9 GM/DL
GLUCOSE SERPL-MCNC: 166 MG/DL (ref 65–99)
HBA1C MFR BLD: 7.3 % (ref 4.8–5.6)
HCT VFR BLD AUTO: 34.9 % (ref 37.5–51)
HDLC SERPL-MCNC: 42 MG/DL (ref 40–60)
HGB BLD-MCNC: 11 G/DL (ref 13–17.7)
IMM GRANULOCYTES # BLD AUTO: 0.02 10*3/MM3 (ref 0–0.05)
IMM GRANULOCYTES NFR BLD AUTO: 0.3 % (ref 0–0.5)
LDLC SERPL CALC-MCNC: 24 MG/DL (ref 0–100)
LDLC/HDLC SERPL: 0.33 {RATIO}
LYMPHOCYTES # BLD AUTO: 1.46 10*3/MM3 (ref 0.7–3.1)
LYMPHOCYTES NFR BLD AUTO: 21.2 % (ref 19.6–45.3)
MCH RBC QN AUTO: 26.8 PG (ref 26.6–33)
MCHC RBC AUTO-ENTMCNC: 31.5 G/DL (ref 31.5–35.7)
MCV RBC AUTO: 85.1 FL (ref 79–97)
MONOCYTES # BLD AUTO: 0.62 10*3/MM3 (ref 0.1–0.9)
MONOCYTES NFR BLD AUTO: 9 % (ref 5–12)
NEUTROPHILS NFR BLD AUTO: 4.38 10*3/MM3 (ref 1.7–7)
NEUTROPHILS NFR BLD AUTO: 63.7 % (ref 42.7–76)
NRBC BLD AUTO-RTO: 0 /100 WBC (ref 0–0.2)
PLATELET # BLD AUTO: 187 10*3/MM3 (ref 140–450)
PMV BLD AUTO: 10.7 FL (ref 6–12)
POTASSIUM SERPL-SCNC: 4.3 MMOL/L (ref 3.5–5.2)
PROT SERPL-MCNC: 6.1 G/DL (ref 6–8.5)
RBC # BLD AUTO: 4.1 10*6/MM3 (ref 4.14–5.8)
SODIUM SERPL-SCNC: 143 MMOL/L (ref 136–145)
TRIGL SERPL-MCNC: 220 MG/DL (ref 0–150)
TSH SERPL DL<=0.05 MIU/L-ACNC: 6.92 UIU/ML (ref 0.27–4.2)
VLDLC SERPL-MCNC: 34 MG/DL (ref 5–40)
WBC NRBC COR # BLD AUTO: 6.88 10*3/MM3 (ref 3.4–10.8)

## 2024-06-25 PROCEDURE — 83036 HEMOGLOBIN GLYCOSYLATED A1C: CPT

## 2024-06-25 PROCEDURE — 80061 LIPID PANEL: CPT

## 2024-06-25 PROCEDURE — 80050 GENERAL HEALTH PANEL: CPT

## 2024-06-30 DIAGNOSIS — I25.110 CORONARY ARTERY DISEASE INVOLVING NATIVE CORONARY ARTERY OF NATIVE HEART WITH UNSTABLE ANGINA PECTORIS: Chronic | ICD-10-CM

## 2024-06-30 DIAGNOSIS — E11.8 TYPE 2 DIABETES MELLITUS WITH COMPLICATION, WITH LONG-TERM CURRENT USE OF INSULIN: Chronic | ICD-10-CM

## 2024-06-30 DIAGNOSIS — N18.32 CHRONIC RENAL FAILURE, STAGE 3B: Chronic | ICD-10-CM

## 2024-06-30 DIAGNOSIS — Z79.4 TYPE 2 DIABETES MELLITUS WITH COMPLICATION, WITH LONG-TERM CURRENT USE OF INSULIN: Chronic | ICD-10-CM

## 2024-06-30 DIAGNOSIS — I48.0 PAROXYSMAL ATRIAL FIBRILLATION: Chronic | ICD-10-CM

## 2024-07-12 RX ORDER — DAPAGLIFLOZIN 10 MG/1
10 TABLET, FILM COATED ORAL DAILY
Qty: 28 TABLET | Refills: 0 | COMMUNITY
Start: 2024-07-12

## 2024-08-22 ENCOUNTER — OFFICE VISIT (OUTPATIENT)
Dept: CARDIOLOGY | Facility: CLINIC | Age: 79
End: 2024-08-22
Payer: COMMERCIAL

## 2024-08-22 VITALS
DIASTOLIC BLOOD PRESSURE: 79 MMHG | WEIGHT: 231 LBS | OXYGEN SATURATION: 94 % | BODY MASS INDEX: 35.01 KG/M2 | SYSTOLIC BLOOD PRESSURE: 135 MMHG | HEART RATE: 78 BPM | HEIGHT: 68 IN

## 2024-08-22 DIAGNOSIS — I25.10 CORONARY ARTERY DISEASE INVOLVING NATIVE CORONARY ARTERY OF NATIVE HEART WITHOUT ANGINA PECTORIS: Primary | Chronic | ICD-10-CM

## 2024-08-22 DIAGNOSIS — E78.2 MIXED HYPERLIPIDEMIA: Chronic | ICD-10-CM

## 2024-08-22 DIAGNOSIS — I48.0 PAROXYSMAL ATRIAL FIBRILLATION: Chronic | ICD-10-CM

## 2024-08-22 DIAGNOSIS — I10 ESSENTIAL HYPERTENSION: Chronic | ICD-10-CM

## 2024-08-22 PROCEDURE — 93000 ELECTROCARDIOGRAM COMPLETE: CPT | Performed by: NURSE PRACTITIONER

## 2024-08-22 PROCEDURE — 99214 OFFICE O/P EST MOD 30 MIN: CPT | Performed by: NURSE PRACTITIONER

## 2024-08-22 NOTE — PROGRESS NOTES
"Chief Complaint  Follow-up (No new symptoms, occasion SOA)    Subjective          Nicholas Yin presents to [facility]ARDIOLOG[facility] for follow up.    History of Present Illness    Nicholas Yin was last seen in clinic on 2/22/2024.  At that visit he was participating in cardiac rehab.  He did report some shortness of breath with exertion but also stated that he had improved somewhat since participating in cardiac rehab.  He reported no further episodes of atrial fibrillation.    At today's visit Bill reports that he feels like he has been doing okay.  He states that he has occasionally had episodes of atrial fibrillation but nothing like he was having previous to being on Multaq.  He states that he is happy with how the Multaq is working.  He denies any chest pain.  He does have chronic shortness of breath.  He does have some musculoskeletal issues and is now using a walker most of the time.    His last interrogation of his pacemaker was on 7/3/2024.  Showed a 5% AT/AF burden.  The battery life was 7 years 6 months.    Objective     Vital Signs:   /79 (BP Location: Left arm, Patient Position: Sitting, Cuff Size: Adult)   Pulse 78   Ht 172.7 cm (68\")   Wt 105 kg (231 lb)   SpO2 94%   BMI 35.12 kg/m²       Physical Exam  Vitals reviewed.   Constitutional:       Appearance: Normal appearance. He is well-developed.   Cardiovascular:      Rate and Rhythm: Normal rate and regular rhythm.      Heart sounds: No murmur heard.     No friction rub. No gallop.   Pulmonary:      Effort: Pulmonary effort is normal. No respiratory distress.      Breath sounds: Normal breath sounds. No wheezing or rales.   Skin:     General: Skin is warm and dry.   Neurological:      Mental Status: He is alert and oriented to person, place, and time.   Psychiatric:         Mood and Affect: Mood normal.         Behavior: Behavior normal.          Result Review :     CMP          1/25/2024    08:08 6/3/2024 "    08:10 6/25/2024    08:40   CMP   Glucose 167  93  166    BUN 34  33  45    Creatinine 2.37  2.65  2.71    EGFR 27.5  23.9  23.3    Sodium 141  141  143    Potassium 4.4  4.7  4.3    Chloride 108  109  111    Calcium 9.2  8.7  8.4    Total Protein 6.5  6.2  6.1    Albumin 3.6  3.5  3.2    Globulin 2.9  2.7  2.9    Total Bilirubin 0.5  0.5  0.3    Alkaline Phosphatase 72  76  65    AST (SGOT) 23  28  22    ALT (SGPT) 15  19  15    Albumin/Globulin Ratio 1.2  1.3  1.1    BUN/Creatinine Ratio 14.3  12.5  16.6    Anion Gap 10.1  8.0  11.0      CBC          1/25/2024    08:08 6/3/2024    08:10 6/25/2024    08:40   CBC   WBC 7.07  6.97  6.88    RBC 4.75  4.45  4.10    Hemoglobin 12.3  11.9  11.0    Hematocrit 39.6  38.1  34.9    MCV 83.4  85.6  85.1    MCH 25.9  26.7  26.8    MCHC 31.1  31.2  31.5    RDW 14.7  16.3  16.9    Platelets 207  231  187      Lipid Panel          10/26/2023    08:07 6/3/2024    08:10 6/25/2024    08:40   Lipid Panel   Total Cholesterol 105  103  100    Triglycerides 99  99  220    HDL Cholesterol 48  49  42    VLDL Cholesterol 19  19  34    LDL Cholesterol  38  35  24    LDL/HDL Ratio 0.78  0.70  0.33           ECG 12 Lead    Date/Time: 8/22/2024 5:58 PM  Performed by: Carlie Adrian APRN    Authorized by: Carlie Adrian APRN  Comparison: compared with previous ECG from 10/18/2023  Rhythm: sinus rhythm and sinus arrhythmia  Rate: normal  BPM: 68  Conduction: right bundle branch block and 1st degree AV block  QRS axis: right  Comments: When compared to EKG of 10/18/2023 patient does not appear to be pacer dependent at this time.           Most recent echocardiogram  Results for orders placed during the hospital encounter of 05/16/24    Adult Transthoracic Echo Complete W/ Cont if Necessary Per Protocol    Interpretation Summary    Left ventricular systolic function is normal. Calculated left ventricular EF = 53% Left ventricular ejection fraction appears to be 51 - 55%.    Left  ventricular diastolic function is consistent with (grade I) impaired relaxation.    The left atrial cavity is mild to moderately dilated.    There is a TAVR valve present.      Most recent Stress Test  Results for orders placed during the hospital encounter of 04/10/23    Stress Test With Pet Myocardial Perfusion    Interpretation Summary    Patient denied any chest discomfort/pain, or any other symptoms with the infusion    Baseline EKG shows a right bundle branch block with mild first-degree AV block.  No ST segment shift from baseline with the infusion.    Normal hemodynamic response to regadenoson.    When the stress and rest PET images were compared no areas of fixed hypoperfusion or stress-induced hypoperfusion were seen.  The 3D reconstruction showed normal contractility in all segments with a calculated ejection at rest of 63% and with stress 72%..    No evidence of inducible ischemia by scintigraphic criteria.  (The patient was known to have a near normal ramus circumflex and right coronary artery at the time of his LAD stent in 2019.)  The patient may proceed with TAVR.       Most recent Cardiac Cath  Results for orders placed during the hospital encounter of 05/08/23    Cardiac Catheterization/Vascular Study    Conclusion  Please see operative report.      Current Outpatient Medications   Medication Sig Dispense Refill    aspirin 81 MG EC tablet Take 1 tablet by mouth Daily.      coenzyme Q10 100 MG capsule Take 2 capsules by mouth Daily.      dapagliflozin Propanediol (Farxiga) 10 MG tablet Take 1 tablet by mouth Daily. 28 tablet 0    docusate sodium (Colace) 100 MG capsule Take 1 capsule by mouth 2 (Two) Times a Day. 72 capsule 0    dronedarone (Multaq) 400 MG tablet Take 1 tablet by mouth 2 (Two) Times a Day With Meals. 60 tablet 11    ferrous sulfate 325 (65 FE) MG EC tablet Take 1 tablet by mouth Daily. 90 tablet 1    Finerenone (Kerendia) 10 MG tablet Take 1 tablet by mouth Daily. 56 tablet 0     losartan (COZAAR) 50 MG tablet Take 1 tablet by mouth 2 (Two) Times a Day. 90 tablet 3    metoprolol tartrate (LOPRESSOR) 25 MG tablet Take 1 tablet by mouth 2 (Two) Times a Day. 60 tablet 11    multivitamin with minerals tablet tablet Take 1 tablet by mouth Daily.      omeprazole (priLOSEC) 40 MG capsule Take 1 capsule by mouth Daily.      rivaroxaban (XARELTO) 15 MG tablet Take 1 tablet by mouth Daily. 90 tablet 3    rosuvastatin (CRESTOR) 20 MG tablet Take 1 tablet by mouth Daily. 90 tablet 3    sildenafil (VIAGRA) 100 MG tablet 1/2-1 po daily prn 10 tablet 5    vitamin D (ERGOCALCIFEROL) 1.25 MG (05770 UT) capsule capsule Take 1 capsule by mouth Every 7 (Seven) Days. 12 capsule 1    Xultophy 100-3.6 UNIT-MG/ML solution pen-injector subcutaneous pen Inject 50 Units under the skin into the appropriate area as directed Daily. 15 mL 5     No current facility-administered medications for this visit.     Facility-Administered Medications Ordered in Other Visits   Medication Dose Route Frequency Provider Last Rate Last Admin    Chlorhexidine Gluconate Cloth 2 % pads 1 application  1 application  Topical Q12H PRN Nneka Araujo APRN                Assessment and Plan    Problem List Items Addressed This Visit          Cardiac and Vasculature    Mixed hyperlipidemia (Chronic)    Overview     7/25/2022 total cholesterol 97, triglycerides 135, HDL 46, and LDL of 28  10/27/2022 total cholesterol 147, triglycerides 134, HDL 49, and LDL 74  3/10/2023 total cholesterol 118, triglycerides 151, HDL 50, and LDL 43         Essential hypertension (Chronic)    CAD s/p stent  - Primary (Chronic)    Overview     8/15/2019 [facility] for non-STEMI: PCI ARIA to the mid LAD         Paroxysmal atrial fibrillation (Chronic)           Follow Up     Medications were reviewed with the patient.    ASCVD/CAD is stable.  Continue aspirin, losartan, metoprolol, and rosuvastatin.    HTN is controlled.  Continue current medications.    Continue  rosuvastatin for dyslipidemia.    Continue Xarelto for paroxysmal atrial fibrillation.  Continue Multaq.      Return in about 6 months (around 2/22/2025).    Patient was given instructions and counseling regarding his condition or for health maintenance advice. Please see specific information pulled into the AVS if appropriate.

## 2024-08-23 DIAGNOSIS — I48.0 PAROXYSMAL ATRIAL FIBRILLATION: Chronic | ICD-10-CM

## 2024-08-23 DIAGNOSIS — I10 ESSENTIAL HYPERTENSION: Primary | Chronic | ICD-10-CM

## 2024-08-23 DIAGNOSIS — Z79.4 TYPE 2 DIABETES MELLITUS WITH COMPLICATION, WITH LONG-TERM CURRENT USE OF INSULIN: Chronic | ICD-10-CM

## 2024-08-23 DIAGNOSIS — E11.8 TYPE 2 DIABETES MELLITUS WITH COMPLICATION, WITH LONG-TERM CURRENT USE OF INSULIN: Chronic | ICD-10-CM

## 2024-08-23 DIAGNOSIS — E78.2 MIXED HYPERLIPIDEMIA: Chronic | ICD-10-CM

## 2024-08-23 DIAGNOSIS — N18.4 CHRONIC RENAL FAILURE, STAGE 4 (SEVERE): ICD-10-CM

## 2024-08-23 DIAGNOSIS — R41.0 CONFUSION: ICD-10-CM

## 2024-08-24 ENCOUNTER — HOSPITAL ENCOUNTER (OUTPATIENT)
Dept: GENERAL RADIOLOGY | Facility: HOSPITAL | Age: 79
Discharge: HOME | End: 2024-08-24
Payer: COMMERCIAL

## 2024-08-24 ENCOUNTER — LAB (OUTPATIENT)
Dept: LAB | Facility: HOSPITAL | Age: 79
End: 2024-08-24
Payer: COMMERCIAL

## 2024-08-24 DIAGNOSIS — I48.0 PAROXYSMAL ATRIAL FIBRILLATION: ICD-10-CM

## 2024-08-24 DIAGNOSIS — E11.8 TYPE 2 DIABETES MELLITUS WITH COMPLICATION, WITH LONG-TERM CURRENT USE OF INSULIN: Chronic | ICD-10-CM

## 2024-08-24 DIAGNOSIS — I48.0 PAROXYSMAL ATRIAL FIBRILLATION: Chronic | ICD-10-CM

## 2024-08-24 DIAGNOSIS — N18.4 CHRONIC RENAL FAILURE, STAGE 4 (SEVERE): ICD-10-CM

## 2024-08-24 DIAGNOSIS — I10 ESSENTIAL HYPERTENSION: Chronic | ICD-10-CM

## 2024-08-24 DIAGNOSIS — E78.2 MIXED HYPERLIPIDEMIA: Chronic | ICD-10-CM

## 2024-08-24 DIAGNOSIS — R41.0 CONFUSION: ICD-10-CM

## 2024-08-24 DIAGNOSIS — I10 ESSENTIAL HYPERTENSION: ICD-10-CM

## 2024-08-24 DIAGNOSIS — Z79.4 TYPE 2 DIABETES MELLITUS WITH COMPLICATION, WITH LONG-TERM CURRENT USE OF INSULIN: Chronic | ICD-10-CM

## 2024-08-24 LAB
ALBUMIN SERPL-MCNC: 3.4 G/DL (ref 3.5–5.2)
ALBUMIN/GLOB SERPL: 1 G/DL
ALP SERPL-CCNC: 75 U/L (ref 39–117)
ALT SERPL W P-5'-P-CCNC: 18 U/L (ref 1–41)
ANION GAP SERPL CALCULATED.3IONS-SCNC: 11.2 MMOL/L (ref 5–15)
AST SERPL-CCNC: 27 U/L (ref 1–40)
BACTERIA UR QL AUTO: NORMAL /HPF
BASOPHILS # BLD AUTO: 0.04 10*3/MM3 (ref 0–0.2)
BASOPHILS NFR BLD AUTO: 0.4 % (ref 0–1.5)
BILIRUB SERPL-MCNC: 0.7 MG/DL (ref 0–1.2)
BILIRUB UR QL STRIP: NEGATIVE
BUN SERPL-MCNC: 28 MG/DL (ref 8–23)
BUN/CREAT SERPL: 11.6 (ref 7–25)
CALCIUM SPEC-SCNC: 9.3 MG/DL (ref 8.6–10.5)
CHLORIDE SERPL-SCNC: 106 MMOL/L (ref 98–107)
CK SERPL-CCNC: 73 U/L (ref 20–200)
CLARITY UR: CLEAR
CO2 SERPL-SCNC: 21.8 MMOL/L (ref 22–29)
COLOR UR: YELLOW
CREAT SERPL-MCNC: 2.41 MG/DL (ref 0.76–1.27)
DEPRECATED RDW RBC AUTO: 50.3 FL (ref 37–54)
EGFRCR SERPLBLD CKD-EPI 2021: 26.8 ML/MIN/1.73
EOSINOPHIL # BLD AUTO: 0.2 10*3/MM3 (ref 0–0.4)
EOSINOPHIL NFR BLD AUTO: 1.9 % (ref 0.3–6.2)
ERYTHROCYTE [DISTWIDTH] IN BLOOD BY AUTOMATED COUNT: 16.1 % (ref 12.3–15.4)
GLOBULIN UR ELPH-MCNC: 3.5 GM/DL
GLUCOSE SERPL-MCNC: 195 MG/DL (ref 65–99)
GLUCOSE UR STRIP-MCNC: ABNORMAL MG/DL
HBA1C MFR BLD: 7.7 % (ref 4.8–5.6)
HCT VFR BLD AUTO: 38.4 % (ref 37.5–51)
HGB BLD-MCNC: 11.8 G/DL (ref 13–17.7)
HGB UR QL STRIP.AUTO: NEGATIVE
HOLD SPECIMEN: NORMAL
HYALINE CASTS UR QL AUTO: NORMAL /LPF
IMM GRANULOCYTES # BLD AUTO: 0.03 10*3/MM3 (ref 0–0.05)
IMM GRANULOCYTES NFR BLD AUTO: 0.3 % (ref 0–0.5)
KETONES UR QL STRIP: NEGATIVE
LEUKOCYTE ESTERASE UR QL STRIP.AUTO: NEGATIVE
LYMPHOCYTES # BLD AUTO: 0.97 10*3/MM3 (ref 0.7–3.1)
LYMPHOCYTES NFR BLD AUTO: 9.4 % (ref 19.6–45.3)
MAGNESIUM SERPL-MCNC: 2 MG/DL (ref 1.6–2.4)
MCH RBC QN AUTO: 26.2 PG (ref 26.6–33)
MCHC RBC AUTO-ENTMCNC: 30.7 G/DL (ref 31.5–35.7)
MCV RBC AUTO: 85.1 FL (ref 79–97)
MONOCYTES # BLD AUTO: 1.04 10*3/MM3 (ref 0.1–0.9)
MONOCYTES NFR BLD AUTO: 10 % (ref 5–12)
NEUTROPHILS NFR BLD AUTO: 78 % (ref 42.7–76)
NEUTROPHILS NFR BLD AUTO: 8.09 10*3/MM3 (ref 1.7–7)
NITRITE UR QL STRIP: NEGATIVE
NRBC BLD AUTO-RTO: 0 /100 WBC (ref 0–0.2)
PH UR STRIP.AUTO: 6 [PH] (ref 5–8)
PHOSPHATE SERPL-MCNC: 2.7 MG/DL (ref 2.5–4.5)
PLATELET # BLD AUTO: 149 10*3/MM3 (ref 140–450)
PMV BLD AUTO: 10.2 FL (ref 6–12)
POTASSIUM SERPL-SCNC: 4.5 MMOL/L (ref 3.5–5.2)
PROT SERPL-MCNC: 6.9 G/DL (ref 6–8.5)
PROT UR QL STRIP: ABNORMAL
RBC # BLD AUTO: 4.51 10*6/MM3 (ref 4.14–5.8)
RBC # UR STRIP: NORMAL /HPF
REF LAB TEST METHOD: NORMAL
SODIUM SERPL-SCNC: 139 MMOL/L (ref 136–145)
SP GR UR STRIP: 1.02 (ref 1–1.03)
SQUAMOUS #/AREA URNS HPF: NORMAL /HPF
UROBILINOGEN UR QL STRIP: ABNORMAL
WBC # UR STRIP: NORMAL /HPF
WBC NRBC COR # BLD AUTO: 10.37 10*3/MM3 (ref 3.4–10.8)

## 2024-08-24 PROCEDURE — 84100 ASSAY OF PHOSPHORUS: CPT

## 2024-08-24 PROCEDURE — 81001 URINALYSIS AUTO W/SCOPE: CPT

## 2024-08-24 PROCEDURE — 80053 COMPREHEN METABOLIC PANEL: CPT

## 2024-08-24 PROCEDURE — 82550 ASSAY OF CK (CPK): CPT

## 2024-08-24 PROCEDURE — 36415 COLL VENOUS BLD VENIPUNCTURE: CPT

## 2024-08-24 PROCEDURE — 71046 X-RAY EXAM CHEST 2 VIEWS: CPT

## 2024-08-24 PROCEDURE — 83735 ASSAY OF MAGNESIUM: CPT

## 2024-08-24 PROCEDURE — 83036 HEMOGLOBIN GLYCOSYLATED A1C: CPT

## 2024-08-24 PROCEDURE — 85025 COMPLETE CBC W/AUTO DIFF WBC: CPT

## 2024-09-07 ENCOUNTER — DOCUMENTATION (OUTPATIENT)
Dept: FAMILY MEDICINE CLINIC | Facility: CLINIC | Age: 79
End: 2024-09-07
Payer: COMMERCIAL

## 2024-09-07 ENCOUNTER — LAB (OUTPATIENT)
Dept: LAB | Facility: HOSPITAL | Age: 79
End: 2024-09-07
Payer: COMMERCIAL

## 2024-09-07 ENCOUNTER — HOSPITAL ENCOUNTER (OUTPATIENT)
Dept: GENERAL RADIOLOGY | Facility: HOSPITAL | Age: 79
Discharge: HOME | End: 2024-09-07
Payer: COMMERCIAL

## 2024-09-07 DIAGNOSIS — R05.1 ACUTE COUGH: ICD-10-CM

## 2024-09-07 DIAGNOSIS — J18.9 PNEUMONIA OF LEFT LUNG DUE TO INFECTIOUS ORGANISM, UNSPECIFIED PART OF LUNG: Primary | ICD-10-CM

## 2024-09-07 DIAGNOSIS — R50.9 FEVER, UNSPECIFIED FEVER CAUSE: ICD-10-CM

## 2024-09-07 DIAGNOSIS — E11.43 TYPE 2 DIABETES MELLITUS WITH DIABETIC AUTONOMIC NEUROPATHY, WITH LONG-TERM CURRENT USE OF INSULIN: ICD-10-CM

## 2024-09-07 DIAGNOSIS — Z79.4 TYPE 2 DIABETES MELLITUS WITH DIABETIC AUTONOMIC NEUROPATHY, WITH LONG-TERM CURRENT USE OF INSULIN: ICD-10-CM

## 2024-09-07 DIAGNOSIS — R05.1 ACUTE COUGH: Primary | ICD-10-CM

## 2024-09-07 DIAGNOSIS — N18.4 CHRONIC RENAL FAILURE, STAGE 4 (SEVERE): ICD-10-CM

## 2024-09-07 LAB
ALBUMIN SERPL-MCNC: 3.2 G/DL (ref 3.5–5.2)
ALBUMIN/GLOB SERPL: 0.9 G/DL
ALP SERPL-CCNC: 90 U/L (ref 39–117)
ALT SERPL W P-5'-P-CCNC: 14 U/L (ref 1–41)
ANION GAP SERPL CALCULATED.3IONS-SCNC: 13 MMOL/L (ref 5–15)
AST SERPL-CCNC: 21 U/L (ref 1–40)
B PARAPERT DNA SPEC QL NAA+PROBE: NOT DETECTED
B PERT DNA SPEC QL NAA+PROBE: NOT DETECTED
BASOPHILS # BLD AUTO: 0.04 10*3/MM3 (ref 0–0.2)
BASOPHILS NFR BLD AUTO: 0.4 % (ref 0–1.5)
BILIRUB SERPL-MCNC: 0.5 MG/DL (ref 0–1.2)
BUN SERPL-MCNC: 34 MG/DL (ref 8–23)
BUN/CREAT SERPL: 11.8 (ref 7–25)
C PNEUM DNA NPH QL NAA+NON-PROBE: NOT DETECTED
CALCIUM SPEC-SCNC: 9.1 MG/DL (ref 8.6–10.5)
CHLORIDE SERPL-SCNC: 108 MMOL/L (ref 98–107)
CO2 SERPL-SCNC: 18 MMOL/L (ref 22–29)
CREAT SERPL-MCNC: 2.88 MG/DL (ref 0.76–1.27)
DEPRECATED RDW RBC AUTO: 47.2 FL (ref 37–54)
EGFRCR SERPLBLD CKD-EPI 2021: 21.6 ML/MIN/1.73
EOSINOPHIL # BLD AUTO: 0.26 10*3/MM3 (ref 0–0.4)
EOSINOPHIL NFR BLD AUTO: 2.8 % (ref 0.3–6.2)
ERYTHROCYTE [DISTWIDTH] IN BLOOD BY AUTOMATED COUNT: 15.6 % (ref 12.3–15.4)
FLUAV SUBTYP SPEC NAA+PROBE: NOT DETECTED
FLUBV RNA ISLT QL NAA+PROBE: NOT DETECTED
GLOBULIN UR ELPH-MCNC: 3.7 GM/DL
GLUCOSE SERPL-MCNC: 119 MG/DL (ref 65–99)
HADV DNA SPEC NAA+PROBE: NOT DETECTED
HCOV 229E RNA SPEC QL NAA+PROBE: NOT DETECTED
HCOV HKU1 RNA SPEC QL NAA+PROBE: NOT DETECTED
HCOV NL63 RNA SPEC QL NAA+PROBE: NOT DETECTED
HCOV OC43 RNA SPEC QL NAA+PROBE: NOT DETECTED
HCT VFR BLD AUTO: 37.1 % (ref 37.5–51)
HGB BLD-MCNC: 11.8 G/DL (ref 13–17.7)
HMPV RNA NPH QL NAA+NON-PROBE: NOT DETECTED
HPIV1 RNA ISLT QL NAA+PROBE: NOT DETECTED
HPIV2 RNA SPEC QL NAA+PROBE: NOT DETECTED
HPIV3 RNA NPH QL NAA+PROBE: NOT DETECTED
HPIV4 P GENE NPH QL NAA+PROBE: NOT DETECTED
IMM GRANULOCYTES # BLD AUTO: 0.04 10*3/MM3 (ref 0–0.05)
IMM GRANULOCYTES NFR BLD AUTO: 0.4 % (ref 0–0.5)
LYMPHOCYTES # BLD AUTO: 1.39 10*3/MM3 (ref 0.7–3.1)
LYMPHOCYTES NFR BLD AUTO: 15.1 % (ref 19.6–45.3)
M PNEUMO IGG SER IA-ACNC: NOT DETECTED
MCH RBC QN AUTO: 26.6 PG (ref 26.6–33)
MCHC RBC AUTO-ENTMCNC: 31.8 G/DL (ref 31.5–35.7)
MCV RBC AUTO: 83.7 FL (ref 79–97)
MONOCYTES # BLD AUTO: 0.78 10*3/MM3 (ref 0.1–0.9)
MONOCYTES NFR BLD AUTO: 8.5 % (ref 5–12)
NEUTROPHILS NFR BLD AUTO: 6.7 10*3/MM3 (ref 1.7–7)
NEUTROPHILS NFR BLD AUTO: 72.8 % (ref 42.7–76)
NRBC BLD AUTO-RTO: 0 /100 WBC (ref 0–0.2)
PLATELET # BLD AUTO: 254 10*3/MM3 (ref 140–450)
PMV BLD AUTO: 10.3 FL (ref 6–12)
POTASSIUM SERPL-SCNC: 4 MMOL/L (ref 3.5–5.2)
PROT SERPL-MCNC: 6.9 G/DL (ref 6–8.5)
RBC # BLD AUTO: 4.43 10*6/MM3 (ref 4.14–5.8)
RHINOVIRUS RNA SPEC NAA+PROBE: NOT DETECTED
RSV RNA NPH QL NAA+NON-PROBE: NOT DETECTED
SARS-COV-2 RNA NPH QL NAA+NON-PROBE: NOT DETECTED
SODIUM SERPL-SCNC: 139 MMOL/L (ref 136–145)
WBC NRBC COR # BLD AUTO: 9.21 10*3/MM3 (ref 3.4–10.8)

## 2024-09-07 PROCEDURE — 71046 X-RAY EXAM CHEST 2 VIEWS: CPT

## 2024-09-07 PROCEDURE — 85025 COMPLETE CBC W/AUTO DIFF WBC: CPT

## 2024-09-07 PROCEDURE — 80053 COMPREHEN METABOLIC PANEL: CPT

## 2024-09-07 PROCEDURE — 0202U NFCT DS 22 TRGT SARS-COV-2: CPT

## 2024-09-07 RX ORDER — AMOXICILLIN AND CLAVULANATE POTASSIUM 500; 125 MG/1; MG/1
1 TABLET, FILM COATED ORAL 2 TIMES DAILY
Qty: 20 TABLET | Refills: 0 | Status: SHIPPED | OUTPATIENT
Start: 2024-09-07

## 2024-09-07 RX ORDER — DOXYCYCLINE 100 MG/1
100 CAPSULE ORAL 2 TIMES DAILY
Qty: 20 CAPSULE | Refills: 0 | Status: SHIPPED | OUTPATIENT
Start: 2024-09-07 | End: 2024-09-17

## 2024-09-07 RX ORDER — LEVOFLOXACIN 500 MG/1
500 TABLET, FILM COATED ORAL DAILY
Qty: 10 TABLET | Refills: 0 | Status: CANCELLED | OUTPATIENT
Start: 2024-09-07 | End: 2024-09-17

## 2024-09-07 RX ORDER — LEVOFLOXACIN 500 MG/1
500 TABLET, FILM COATED ORAL DAILY
Qty: 10 TABLET | Refills: 0 | Status: CANCELLED | OUTPATIENT
Start: 2024-09-07

## 2024-09-09 DIAGNOSIS — Z95.2 S/P TAVR (TRANSCATHETER AORTIC VALVE REPLACEMENT): Primary | ICD-10-CM

## 2024-09-10 ENCOUNTER — OFFICE VISIT (OUTPATIENT)
Dept: FAMILY MEDICINE CLINIC | Facility: CLINIC | Age: 79
End: 2024-09-10
Payer: COMMERCIAL

## 2024-09-10 DIAGNOSIS — R79.89 ELEVATED TSH: ICD-10-CM

## 2024-09-10 DIAGNOSIS — E78.2 MIXED HYPERLIPIDEMIA: Chronic | ICD-10-CM

## 2024-09-10 DIAGNOSIS — G45.3 AMAUROSIS FUGAX OF RIGHT EYE: ICD-10-CM

## 2024-09-10 DIAGNOSIS — N52.01 ERECTILE DYSFUNCTION DUE TO ARTERIAL INSUFFICIENCY: ICD-10-CM

## 2024-09-10 DIAGNOSIS — Z00.00 HEALTHCARE MAINTENANCE: ICD-10-CM

## 2024-09-10 DIAGNOSIS — M85.89 OSTEOPENIA OF MULTIPLE SITES: ICD-10-CM

## 2024-09-10 DIAGNOSIS — J18.9 PNEUMONIA OF LEFT LOWER LOBE DUE TO INFECTIOUS ORGANISM: ICD-10-CM

## 2024-09-10 DIAGNOSIS — I25.10 CORONARY ARTERY DISEASE INVOLVING NATIVE CORONARY ARTERY OF NATIVE HEART WITHOUT ANGINA PECTORIS: Chronic | ICD-10-CM

## 2024-09-10 DIAGNOSIS — L57.0 ACTINIC KERATOSIS: ICD-10-CM

## 2024-09-10 DIAGNOSIS — N18.4 CHRONIC RENAL FAILURE, STAGE 4 (SEVERE): ICD-10-CM

## 2024-09-10 DIAGNOSIS — M51.36 DDD (DEGENERATIVE DISC DISEASE), LUMBAR: ICD-10-CM

## 2024-09-10 DIAGNOSIS — R35.1 BENIGN PROSTATIC HYPERPLASIA WITH NOCTURIA: ICD-10-CM

## 2024-09-10 DIAGNOSIS — E66.01 CLASS 2 SEVERE OBESITY WITH SERIOUS COMORBIDITY AND BODY MASS INDEX (BMI) OF 35.0 TO 35.9 IN ADULT, UNSPECIFIED OBESITY TYPE: ICD-10-CM

## 2024-09-10 DIAGNOSIS — I44.2 POSTOPERATIVE COMPLETE HEART BLOCK: ICD-10-CM

## 2024-09-10 DIAGNOSIS — Z85.828 HISTORY OF NONMELANOMA SKIN CANCER: ICD-10-CM

## 2024-09-10 DIAGNOSIS — E55.9 VITAMIN D DEFICIENCY: ICD-10-CM

## 2024-09-10 DIAGNOSIS — Z85.47 H/O TESTICULAR CANCER: ICD-10-CM

## 2024-09-10 DIAGNOSIS — Z95.2 S/P TAVR (TRANSCATHETER AORTIC VALVE REPLACEMENT): Primary | Chronic | ICD-10-CM

## 2024-09-10 DIAGNOSIS — I48.0 PAROXYSMAL ATRIAL FIBRILLATION: Chronic | ICD-10-CM

## 2024-09-10 DIAGNOSIS — K21.9 GASTROESOPHAGEAL REFLUX DISEASE WITHOUT ESOPHAGITIS: Chronic | ICD-10-CM

## 2024-09-10 DIAGNOSIS — Z79.4 TYPE 2 DIABETES MELLITUS WITH COMPLICATION, WITH LONG-TERM CURRENT USE OF INSULIN: Chronic | ICD-10-CM

## 2024-09-10 DIAGNOSIS — Z85.038 HISTORY OF COLON CANCER: ICD-10-CM

## 2024-09-10 DIAGNOSIS — E11.8 TYPE 2 DIABETES MELLITUS WITH COMPLICATION, WITH LONG-TERM CURRENT USE OF INSULIN: Chronic | ICD-10-CM

## 2024-09-10 DIAGNOSIS — I97.89 POSTOPERATIVE COMPLETE HEART BLOCK: ICD-10-CM

## 2024-09-10 DIAGNOSIS — I10 ESSENTIAL HYPERTENSION: Chronic | ICD-10-CM

## 2024-09-10 DIAGNOSIS — N40.1 BENIGN PROSTATIC HYPERPLASIA WITH NOCTURIA: ICD-10-CM

## 2024-09-10 DIAGNOSIS — I73.9 PERIPHERAL VASCULAR DISEASE: ICD-10-CM

## 2024-09-10 NOTE — PROGRESS NOTES
Cryotherapy Procedure Note    Pre-operative Diagnosis: Actinic keratosis    Post-operative Diagnosis: Same    Locations: Upper abdomen     Indications: Ablation    Anesthesia:  None      Procedure Details   Patient informed of risks (permanent scarring, infection, light or dark discoloration, bleeding, infection, weakness, numbness and recurrence of the lesion) and benefits of the procedure and verbal informed consent obtained.    The areas are treated with liquid nitrogen therapy, frozen until ice ball extended 2 mm beyond lesion, allowed to thaw, and treated again. The patient tolerated procedure well.  The patient was instructed on post-op care, warned that there may be blister formation, redness and pain. Recommend OTC analgesia as needed for pain.    Condition:  Stable    Complications:  None .    Plan:  1. Instructed to keep the area dry and covered for 24-48h and clean thereafter.  2. Warning signs of infection were reviewed.    3. Recommended that the patient use OTC acetaminophen as needed for pain.   4. Return in 1 month if lesion has not resolved.

## 2024-09-10 NOTE — PROGRESS NOTES
Subjective   Nicholas Yin is a 79 y.o. male.     Chief Complaint  He returns for a scheduled reassessment of multiple medical problems including previous TAVR, paroxysmal atrial fibrillation, coronary artery disease, type 2 diabetes mellitus, chronic renal failure, and a recent pneumonia    History of Present Illness     Pneumonia  Chest x-ray performed on 9/7/2024 for an abrupt fever preceded by 1 to 2 weeks of increasing cough and fatigue revealed patchy opacities of the left mid lower lung suspicious for pneumonia.  He was started on amoxicillin/clavulanate along with doxycycline and has already noted a significant improvement in his symptoms.  His cough was preceded by cold-like symptoms but these have since resolved.  He denies any chest pain and feels that his breathing is at baseline.  He has experience mild diarrhea since starting on the antibiotics but denies any abdominal pain, hematochezia, or melena.  There is no history of any dysuria or hematuria.    Previous TAVR  He underwent a TAVR and left femoral endarterectomy on 5/8/2023.  His course in hospital was uncomplicated and he was discharged home the next day.  He required drainage of a left inguinal seroma by Dr. Lopez afterward.      Paroxysmal Atrial Fibrillation  He required readmission to hospital on 5/14/2023 following a presyncopal episode. He was found to be in atrial fibrillation with a rapid ventricular response.  He was started on IV diltiazem and progressed to a wide-complex tachycardia with loss of consciousness.  CPR was administered with a prompt improvement and he regained consciousness.  He was then started on IV amiodarone and transferred to [facility].  On arrival there he was found to be in a third-degree block and underwent placement of a pacemaker by Dr. Mendoza.  He was started on dronedarone late last year since and denies any significant palpitations.  He continues to deny any chest pain or lightheadedness, and there  is no history of any calf pain, swelling of the ankles, fever, or chills. He remains on rivaroxaban.  He continues to be followed by cardiology and underwent a reassessment with CHIDI Ku on 8/22/2024 with no changes made in his management.  He is scheduled to return on 2/13/2025  Lab Results   Component Value Date    WBC 9.21 09/07/2024    HGB 11.8 (L) 09/07/2024    HCT 37.1 (L) 09/07/2024    MCV 83.7 09/07/2024     09/07/2024     Coronary Artery Disease  He underwent a coronary angiogram by Dr. Villatoro on 8/15/2019 with stenting of the mid LAD.  He remains on low-dose ASA    Transient Visual Loss  Several years ago he experienced a sudden progressive decrease in the vision in his right eye while driving. He stated that the vision in the eye darkened gradually over several minutes to the point where the only things he could make out were very bright lights and the sun. This lasted for about 30 minutes then resolved over several minutes. He was hospitalized at [facility] over 10 years ago following a similar episodes that was ultimately felt to be vascular in origin. MRI of the brain performed on 12/8/17 was reported as showing mild cerebral atrophy with chronic small vessel ischemic changes. MRA of the carotid arteries performed the same day was unremarkable.  Bilateral carotid ultrasound performed on 2/6/2023 was unremarkable.  He undergoes regular ophthalmology assessments    Type 2 Diabetes Mellitus  He remains on a SGLT2 -empagliflozin, GLP agonist-liraglutide and basal insulin - tresiba (together as xultophy).    Lab Results   Component Value Date    HGBA1C 7.70 (H) 08/24/2024     Hyperlipidemia  He remains on rosuvastatin  Lab Results   Component Value Date    CHOL 100 06/25/2024    CHLPL 125 09/28/2015    TRIG 220 (H) 06/25/2024    HDL 42 06/25/2024    LDL 24 06/25/2024     Essential Hypertension  Home blood pressure readings: have been a bit low at times.  He is currently on metoprolol  alone  Lab Results   Component Value Date    GLUCOSE 119 (H) 09/07/2024    BUN 34 (H) 09/07/2024    CREATININE 2.88 (H) 09/07/2024     09/07/2024    K 4.0 09/07/2024     (H) 09/07/2024    CALCIUM 9.1 09/07/2024    PROTEINTOT 6.9 09/07/2024    ALBUMIN 3.2 (L) 09/07/2024    ALT 14 09/07/2024    AST 21 09/07/2024    ALKPHOS 90 09/07/2024    BILITOT 0.5 09/07/2024    GLOB 3.7 09/07/2024    AGRATIO 0.9 09/07/2024    BCR 11.8 09/07/2024    ANIONGAP 13.0 09/07/2024    EGFR 21.6 (L) 09/07/2024     Chronic Renal Failure  This has been contributed to longstanding type 2 diabetes mellitus and hypertension.  He is aware to avoid any NSAIDs oral or prescription.     Chronic Back Pain  He gives a long history of increasing mid and low back pain. This is described as a sharp ache. The pain does not radiate and has been unassociated with any other symptoms. The pain is worse with prolonged weight bearing and limits his activities more then anything else at present.  MRI performed on 1/7/2021 was reported as showing degenerative disc disease and osteoarthritis with mild to moderate central canal and bilateral foraminal narrowing at L2-3 and L3-4.    Gastroesophageal Reflux Disease  He remains heartburn free on omeprazole    Labs  Lab Results   Component Value Date    TSH 6.920 (H) 06/25/2024     The following portions of the patient's history were reviewed and updated as appropriate: allergies, current medications, past medical history, past social history, and problem list.    Review of Systems   Constitutional:  Positive for fatigue. Negative for chills and fever.   HENT:  Negative for congestion, dental problem, ear pain, rhinorrhea, sneezing and sore throat.    Respiratory:  Positive for cough and shortness of breath. Negative for wheezing.    Cardiovascular:  Negative for chest pain, palpitations and leg swelling.   Gastrointestinal:  Negative for abdominal pain, blood in stool, constipation, diarrhea, nausea and  vomiting.   Genitourinary:  Positive for difficulty urinating (sense of incomplete voding), nocturia (x 2-3) and erectile dysfunction. Negative for dysuria and hematuria.   Musculoskeletal:  Positive for back pain. Negative for arthralgias, gait problem, joint swelling, myalgias, neck pain and neck stiffness.   Skin:  Positive for skin lesions (upper abdomen - has been present for a few years). Negative for rash.   Neurological:  Positive for weakness (generalized). Negative for numbness and headache.   Psychiatric/Behavioral:  Negative for behavioral problems, sleep disturbance and depressed mood. The patient is not nervous/anxious.      Objective   Physical Exam  Constitutional:       General: He is not in acute distress.     Appearance: Normal appearance. He is well-developed. He is not ill-appearing or diaphoretic.      Comments: Looked a bit tired.  Sits and stands with an exaggerated thoracic kyphosis.  Uncomfortable with movement.  No apparent distress at rest.  No pallor, cyanosis, diaphoresis, or jaundice   HENT:      Head: Atraumatic.   Eyes:      Conjunctiva/sclera: Conjunctivae normal.   Neck:      Thyroid: No thyroid mass or thyromegaly.      Vascular: No carotid bruit or JVD.      Trachea: Trachea normal. No tracheal deviation.   Cardiovascular:      Rate and Rhythm: Normal rate and regular rhythm.   Pulmonary:      Effort: Pulmonary effort is normal.      Breath sounds: Normal breath sounds.   Abdominal:      General: Bowel sounds are normal. There is no distension.   Musculoskeletal:      Right lower leg: No edema.      Left lower leg: No edema.   Lymphadenopathy:      Head:      Right side of head: No submental, submandibular, tonsillar, preauricular, posterior auricular or occipital adenopathy.      Left side of head: No submental, submandibular, tonsillar, preauricular, posterior auricular or occipital adenopathy.      Cervical: No cervical adenopathy.      Upper Body:      Right upper body: No  supraclavicular adenopathy.      Left upper body: No supraclavicular adenopathy.   Skin:     General: Skin is warm and dry.      Coloration: Skin is not cyanotic, jaundiced or pale.      Findings: Lesion (7-8 mm fairly well-demarcated circular erythematous papule with thick verrucous surface upper abdomen) present. No rash.      Nails: There is no clubbing.   Neurological:      Mental Status: He is alert and oriented to person, place, and time.      Cranial Nerves: No cranial nerve deficit.      Motor: No tremor.      Coordination: Coordination normal.      Gait: Gait normal.   Psychiatric:         Attention and Perception: Attention normal.         Mood and Affect: Mood normal.         Speech: Speech normal.         Behavior: Behavior normal.         Thought Content: Thought content normal.       Assessment & Plan   Problems Addressed this Visit          Cardiac and Vasculature    CAD s/p stent  (Chronic)  Reminded regarding the importance of risk factor modification.  Continue low-dose ASA.  Follow up with cardiology     Relevant Orders    CBC & Differential    Essential hypertension (Chronic)  Encouraged to continue to work on his diet and exercise plan.  Continue current medication for now  We will consider reintroducing losartan if his blood pressure permits    Relevant Orders    Comprehensive Metabolic Panel    Mixed hyperlipidemia (Chronic)  As above.   Continue current medication.    Relevant Orders    Comprehensive Metabolic Panel    Lipid Panel    Paroxysmal atrial fibrillation (Chronic)   Rate control is appropriate.. Patient is anticoagulated.   Continue current medication.    Relevant Orders    CBC & Differential    S/P TAVR (transcatheter aortic valve replacement) - Primary (Chronic)    Relevant Orders    CBC & Differential    Peripheral vascular disease  As above.   Continue current medication.    Relevant Orders    CBC & Differential    Postoperative complete heart block  S/P PPP  Follow up with  cardiology        Endocrine and Metabolic    T2DM  (Chronic)  Diabetes mellitus Type II, under good control.   Encouraged to continue to pursue ADA diet  Encouraged aerobic exercise.  Continue current medication  Scheduled for updated labs just prior to his return    Relevant Orders    Comprehensive Metabolic Panel    Class 2 severe obesity with serious comorbidity and body mass index (BMI) of 35.0 to 35.9 in adult    Vitamin D deficiency    Relevant Orders    Hemoglobin A1c    MicroAlbumin, Urine, Random - Urine, Clean Catch       Gastrointestinal Abdominal     GERD (Chronic)       Genitourinary and Reproductive     Benign prostatic hyperplasia with nocturia    Chronic renal failure, stage 4 (severe)  Reminded to avoid any NSAIDs prescription or OTC  Continue current medication  Will continue to monitor    Relevant Orders    Comprehensive Metabolic Panel    Vasculogenic erectile dysfunction       Health Encounters    Healthcare maintenance  Recommended RSV, COVID and influenza vaccinations this fall.       Hematology and Neoplasia    H/O testicular cancer    History of colon cancer    History of nonmelanoma skin cancer       Musculoskeletal and Injuries    Osteopenia of multiple sites       Neuro    Amaurosis fugax of right eye    DDD (degenerative disc disease), lumbar       Pulmonary and Pneumonias    Pneumonia of left lower lobe due to infectious organism  Encouraged to complete the prescribed course of antibiotics, and to report if any worse, any new symptoms, or if not back to baseline over the next week or two       Skin    Actinic keratosis  Reviewed treatment options and agreed on a trial of cryotherapy. See procedure note.       Symptoms and Signs    Elevated TSH  Will continue to monitor    Relevant Orders    T4, free    TSH     Diagnoses         Codes Comments    S/P TAVR (transcatheter aortic valve replacement)    -  Primary ICD-10-CM: Z95.2  ICD-9-CM: V43.3     Postoperative complete heart block      ICD-10-CM: I97.89, I44.2  ICD-9-CM: 997.1, 426.0     Peripheral vascular disease     ICD-10-CM: I73.9  ICD-9-CM: 443.9     Paroxysmal atrial fibrillation     ICD-10-CM: I48.0  ICD-9-CM: 427.31     Mixed hyperlipidemia     ICD-10-CM: E78.2  ICD-9-CM: 272.2     Essential hypertension     ICD-10-CM: I10  ICD-9-CM: 401.9     Coronary artery disease involving native coronary artery of native heart without angina pectoris     ICD-10-CM: I25.10  ICD-9-CM: 414.01     Vitamin D deficiency     ICD-10-CM: E55.9  ICD-9-CM: 268.9     T2DM      ICD-10-CM: E11.8, Z79.4  ICD-9-CM: 250.90, V58.67     Class 2 severe obesity with serious comorbidity and body mass index (BMI) of 35.0 to 35.9 in adult, unspecified obesity type     ICD-10-CM: E66.01, Z68.35  ICD-9-CM: 278.01, V85.35     GERD     ICD-10-CM: K21.9  ICD-9-CM: 530.81     Erectile dysfunction due to arterial insufficiency     ICD-10-CM: N52.01  ICD-9-CM: 607.84     Chronic renal failure, stage 4 (severe)     ICD-10-CM: N18.4  ICD-9-CM: 585.4     Benign prostatic hyperplasia with nocturia     ICD-10-CM: N40.1, R35.1  ICD-9-CM: 600.01, 788.43     Healthcare maintenance     ICD-10-CM: Z00.00  ICD-9-CM: V70.0     History of nonmelanoma skin cancer     ICD-10-CM: Z85.828  ICD-9-CM: V10.83     History of colon cancer     ICD-10-CM: Z85.038  ICD-9-CM: V10.05     H/O testicular cancer     ICD-10-CM: Z85.47  ICD-9-CM: V10.47     Osteopenia of multiple sites     ICD-10-CM: M85.89  ICD-9-CM: 733.90     DDD (degenerative disc disease), lumbar     ICD-10-CM: M51.36  ICD-9-CM: 722.52     Amaurosis fugax of right eye     ICD-10-CM: G45.3  ICD-9-CM: 362.34     Elevated TSH     ICD-10-CM: R79.89  ICD-9-CM: 794.5     Pneumonia of left lower lobe due to infectious organism     ICD-10-CM: J18.9  ICD-9-CM: 486     Actinic keratosis     ICD-10-CM: L57.0  ICD-9-CM: 702.0

## 2024-09-11 VITALS
OXYGEN SATURATION: 97 % | TEMPERATURE: 98.9 F | HEART RATE: 77 BPM | RESPIRATION RATE: 15 BRPM | HEIGHT: 68 IN | DIASTOLIC BLOOD PRESSURE: 60 MMHG | BODY MASS INDEX: 35.12 KG/M2 | SYSTOLIC BLOOD PRESSURE: 116 MMHG

## 2024-09-11 PROBLEM — J18.9 PNEUMONIA OF LEFT LOWER LOBE DUE TO INFECTIOUS ORGANISM: Status: ACTIVE | Noted: 2024-09-11

## 2024-09-11 PROBLEM — L57.0 ACTINIC KERATOSIS: Status: ACTIVE | Noted: 2024-03-13

## 2024-09-17 DIAGNOSIS — E11.8 TYPE 2 DIABETES MELLITUS WITH COMPLICATION, WITH LONG-TERM CURRENT USE OF INSULIN: ICD-10-CM

## 2024-09-17 DIAGNOSIS — E55.9 VITAMIN D DEFICIENCY: ICD-10-CM

## 2024-09-17 DIAGNOSIS — Z79.4 TYPE 2 DIABETES MELLITUS WITH COMPLICATION, WITH LONG-TERM CURRENT USE OF INSULIN: ICD-10-CM

## 2024-09-17 RX ORDER — ERGOCALCIFEROL 1.25 MG/1
50000 CAPSULE, LIQUID FILLED ORAL
Qty: 12 CAPSULE | Refills: 1 | Status: SHIPPED | OUTPATIENT
Start: 2024-09-17

## 2024-09-17 RX ORDER — (INSULIN DEGLUDEC AND LIRAGLUTIDE) 100; 3.6 [IU]/ML; MG/ML
50 INJECTION, SOLUTION SUBCUTANEOUS DAILY
Qty: 15 ML | Refills: 5 | Status: SHIPPED | OUTPATIENT
Start: 2024-09-17

## 2024-09-17 RX ORDER — ROSUVASTATIN CALCIUM 20 MG/1
20 TABLET, COATED ORAL DAILY
Qty: 90 TABLET | Refills: 3 | Status: SHIPPED | OUTPATIENT
Start: 2024-09-17

## 2024-09-18 ENCOUNTER — HOSPITAL ENCOUNTER (OUTPATIENT)
Dept: CT IMAGING | Facility: HOSPITAL | Age: 79
Discharge: HOME | End: 2024-09-18
Admitting: GENERAL PRACTICE
Payer: COMMERCIAL

## 2024-09-18 DIAGNOSIS — R41.0 CONFUSION: ICD-10-CM

## 2024-09-18 PROCEDURE — 70450 CT HEAD/BRAIN W/O DYE: CPT

## 2024-09-19 DIAGNOSIS — N18.4 CHRONIC RENAL FAILURE, STAGE 4 (SEVERE): ICD-10-CM

## 2024-09-19 DIAGNOSIS — I48.0 PAROXYSMAL ATRIAL FIBRILLATION: Chronic | ICD-10-CM

## 2024-09-19 DIAGNOSIS — R79.89 ELEVATED TSH: ICD-10-CM

## 2024-09-19 DIAGNOSIS — I10 ESSENTIAL HYPERTENSION: Primary | Chronic | ICD-10-CM

## 2024-09-19 DIAGNOSIS — E11.8 TYPE 2 DIABETES MELLITUS WITH COMPLICATION, WITH LONG-TERM CURRENT USE OF INSULIN: Primary | Chronic | ICD-10-CM

## 2024-09-19 DIAGNOSIS — Z79.4 TYPE 2 DIABETES MELLITUS WITH COMPLICATION, WITH LONG-TERM CURRENT USE OF INSULIN: Chronic | ICD-10-CM

## 2024-09-19 DIAGNOSIS — Z79.4 TYPE 2 DIABETES MELLITUS WITH COMPLICATION, WITH LONG-TERM CURRENT USE OF INSULIN: Primary | Chronic | ICD-10-CM

## 2024-09-19 DIAGNOSIS — I25.10 CORONARY ARTERY DISEASE INVOLVING NATIVE HEART WITHOUT ANGINA PECTORIS, UNSPECIFIED VESSEL OR LESION TYPE: Chronic | ICD-10-CM

## 2024-09-19 DIAGNOSIS — E11.8 TYPE 2 DIABETES MELLITUS WITH COMPLICATION, WITH LONG-TERM CURRENT USE OF INSULIN: Chronic | ICD-10-CM

## 2024-09-19 RX ORDER — FINERENONE 10 MG/1
1 TABLET, FILM COATED ORAL DAILY
Qty: 90 TABLET | Refills: 3 | Status: SHIPPED | OUTPATIENT
Start: 2024-09-19

## 2024-09-24 ENCOUNTER — HOSPITAL ENCOUNTER (OUTPATIENT)
Dept: PHYSICAL THERAPY | Facility: HOSPITAL | Age: 79
Setting detail: THERAPIES SERIES
Discharge: HOME | End: 2024-09-24
Payer: COMMERCIAL

## 2024-09-24 DIAGNOSIS — M51.369 DDD (DEGENERATIVE DISC DISEASE), LUMBAR: Primary | ICD-10-CM

## 2024-09-24 DIAGNOSIS — M79.604 RIGHT LEG PAIN: ICD-10-CM

## 2024-09-24 DIAGNOSIS — G89.29 CHRONIC RIGHT-SIDED LOW BACK PAIN WITH RIGHT-SIDED SCIATICA: Primary | ICD-10-CM

## 2024-09-24 DIAGNOSIS — M54.41 CHRONIC RIGHT-SIDED LOW BACK PAIN WITH RIGHT-SIDED SCIATICA: Primary | ICD-10-CM

## 2024-09-24 PROCEDURE — 97140 MANUAL THERAPY 1/> REGIONS: CPT | Performed by: PHYSICAL THERAPIST

## 2024-09-24 PROCEDURE — 97162 PT EVAL MOD COMPLEX 30 MIN: CPT | Performed by: PHYSICAL THERAPIST

## 2024-09-24 PROCEDURE — 97110 THERAPEUTIC EXERCISES: CPT | Performed by: PHYSICAL THERAPIST

## 2024-09-25 ENCOUNTER — HOSPITAL ENCOUNTER (OUTPATIENT)
Dept: PHYSICAL THERAPY | Facility: HOSPITAL | Age: 79
Setting detail: THERAPIES SERIES
Discharge: HOME | End: 2024-09-25
Payer: COMMERCIAL

## 2024-09-25 DIAGNOSIS — G89.29 CHRONIC RIGHT-SIDED LOW BACK PAIN WITH RIGHT-SIDED SCIATICA: Primary | ICD-10-CM

## 2024-09-25 DIAGNOSIS — M79.604 RIGHT LEG PAIN: ICD-10-CM

## 2024-09-25 DIAGNOSIS — M54.41 CHRONIC RIGHT-SIDED LOW BACK PAIN WITH RIGHT-SIDED SCIATICA: Primary | ICD-10-CM

## 2024-09-25 PROCEDURE — 97140 MANUAL THERAPY 1/> REGIONS: CPT | Performed by: PHYSICAL THERAPIST

## 2024-09-25 PROCEDURE — 97110 THERAPEUTIC EXERCISES: CPT | Performed by: PHYSICAL THERAPIST

## 2024-09-27 ENCOUNTER — APPOINTMENT (OUTPATIENT)
Dept: PHYSICAL THERAPY | Facility: HOSPITAL | Age: 79
End: 2024-09-27
Payer: COMMERCIAL

## 2024-09-30 ENCOUNTER — HOSPITAL ENCOUNTER (OUTPATIENT)
Dept: PHYSICAL THERAPY | Facility: HOSPITAL | Age: 79
Setting detail: THERAPIES SERIES
Discharge: HOME | End: 2024-09-30
Payer: COMMERCIAL

## 2024-09-30 DIAGNOSIS — G89.29 CHRONIC RIGHT-SIDED LOW BACK PAIN WITH RIGHT-SIDED SCIATICA: Primary | ICD-10-CM

## 2024-09-30 DIAGNOSIS — M54.41 CHRONIC RIGHT-SIDED LOW BACK PAIN WITH RIGHT-SIDED SCIATICA: Primary | ICD-10-CM

## 2024-09-30 DIAGNOSIS — M79.604 RIGHT LEG PAIN: ICD-10-CM

## 2024-09-30 PROCEDURE — 97110 THERAPEUTIC EXERCISES: CPT | Performed by: PHYSICAL THERAPIST

## 2024-09-30 NOTE — THERAPY EVALUATION
Physical Therapy Daily Treatment Note      Patient: Nicholas Yin   : 1945  Referring practitioner: Waldemar Trejo,*  Date of Initial Visit: Type: THERAPY  Episode: Sciatica with right leg  Today's Date: 2024  Patient seen for 3 sessions       Visit Diagnoses:    ICD-10-CM ICD-9-CM   1. Chronic right-sided low back pain with right-sided sciatica  M54.41 724.2    G89.29 724.3     338.29   2. Right leg pain  M79.604 729.5       Subjective Evaluation    History of Present Illness    Subjective comment: Pt has 2/10 pain today.  Pt reports ther exercises have helped and he has less pain today.       Objective   See Exercise, Manual, and Modality Logs for complete treatment.       Assessment & Plan       Assessment  Assessment details: Tx today consisted of exercises for improved core stability with added reps; followed by ice to lower back. Pt demonstrated good effort with noted improved mechanics with exercises today. Pt reported 1/10 post pain.    Plan  Plan details: Will follow progressing toward independence with HEP.          Timed:         Manual Therapy:         mins  26452;     Therapeutic Exercise:    24     mins  90041;     Neuromuscular Lisa:        mins  65819;    Therapeutic Activity:          mins  61311;     Gait Training:           mins  76845;     Ultrasound:          mins  93996;    Ionto                                   mins   92806  Self Care                            mins   11875  Canalith Repos         mins 71179      Un-Timed:  Electrical Stimulation:         mins  20270 (MC );  Dry Needling          mins self-pay  Traction          mins 94994      Timed Treatment:   24   mins   Total Treatment:     32   mins(8 mn ice)    Charlie Kaye PT  [place] License: [id number]      Electronically signed by Charlie Kaye PT, 24, 7:31 AM EDT

## 2024-10-02 ENCOUNTER — TELEPHONE (OUTPATIENT)
Dept: CARDIOLOGY | Facility: CLINIC | Age: 79
End: 2024-10-02
Payer: COMMERCIAL

## 2024-10-02 ENCOUNTER — HOSPITAL ENCOUNTER (OUTPATIENT)
Dept: PHYSICAL THERAPY | Facility: HOSPITAL | Age: 79
Setting detail: THERAPIES SERIES
Discharge: HOME | End: 2024-10-02
Payer: COMMERCIAL

## 2024-10-02 DIAGNOSIS — M79.604 RIGHT LEG PAIN: ICD-10-CM

## 2024-10-02 DIAGNOSIS — M54.41 CHRONIC RIGHT-SIDED LOW BACK PAIN WITH RIGHT-SIDED SCIATICA: Primary | ICD-10-CM

## 2024-10-02 DIAGNOSIS — G89.29 CHRONIC RIGHT-SIDED LOW BACK PAIN WITH RIGHT-SIDED SCIATICA: Primary | ICD-10-CM

## 2024-10-02 DIAGNOSIS — Z95.0 PACEMAKER: Primary | Chronic | ICD-10-CM

## 2024-10-02 PROCEDURE — 97140 MANUAL THERAPY 1/> REGIONS: CPT | Performed by: PHYSICAL THERAPIST

## 2024-10-02 PROCEDURE — 97110 THERAPEUTIC EXERCISES: CPT | Performed by: PHYSICAL THERAPIST

## 2024-10-02 NOTE — TELEPHONE ENCOUNTER
Mr Yin has a pacemaker implanted by Dr Mendoza and our office is following pacemaker remotely.  He has not been seen in our office since implant.  Communicated with Carlie Adrian and order was entered to f/u with EP in [place].

## 2024-10-02 NOTE — THERAPY EVALUATION
Physical Therapy Daily Treatment Note      Patient: Nicholas Yin   : 1945  Referring practitioner: Waldemar Trejo,*  Date of Initial Visit: Type: THERAPY  Episode: Sciatica with right leg  Today's Date: 10/2/2024  Patient seen for 4 sessions       Visit Diagnoses:    ICD-10-CM ICD-9-CM   1. Chronic right-sided low back pain with right-sided sciatica  M54.41 724.2    G89.29 724.3     338.29   2. Right leg pain  M79.604 729.5       Subjective Evaluation    History of Present Illness    Subjective comment: Pt has 2/10 pain today.       Objective   See Exercise, Manual, and Modality Logs for complete treatment.       Assessment & Plan       Assessment  Assessment details: Tx today consisted of exercises for improved core stability with added reps; followed by stm to right IT band and ended with ice to right IT band for decreased pain.  Pt responded well to added reps today and reported no pain following session.    Plan  Plan details: Will follow progressing toward independence with HEP.          Timed:         Manual Therapy:    14     mins  91819;     Therapeutic Exercise:    17     mins  26479;     Neuromuscular Lisa:        mins  59851;    Therapeutic Activity:          mins  23750;     Gait Training:           mins  71935;     Ultrasound:          mins  44187;    Ionto                                   mins   99333  Self Care                            mins   25292  Canalith Repos         mins 61492      Un-Timed:  Electrical Stimulation:         mins  18226 ( );  Dry Needling          mins self-pay  Traction          mins 26767      Timed Treatment:   31   mins   Total Treatment:     37   mins    Charlie Kaye PT  [place] License: [id number]      Electronically signed by Charlie Kaye PT, 10/02/24, 7:32 AM EDT

## 2024-10-04 ENCOUNTER — HOSPITAL ENCOUNTER (OUTPATIENT)
Dept: PHYSICAL THERAPY | Facility: HOSPITAL | Age: 79
Setting detail: THERAPIES SERIES
Discharge: HOME | End: 2024-10-04
Payer: COMMERCIAL

## 2024-10-04 DIAGNOSIS — G89.29 CHRONIC RIGHT-SIDED LOW BACK PAIN WITH RIGHT-SIDED SCIATICA: Primary | ICD-10-CM

## 2024-10-04 DIAGNOSIS — M79.604 RIGHT LEG PAIN: ICD-10-CM

## 2024-10-04 DIAGNOSIS — M54.41 CHRONIC RIGHT-SIDED LOW BACK PAIN WITH RIGHT-SIDED SCIATICA: Primary | ICD-10-CM

## 2024-10-04 PROCEDURE — 97110 THERAPEUTIC EXERCISES: CPT | Performed by: PHYSICAL THERAPIST

## 2024-10-04 NOTE — THERAPY EVALUATION
Physical Therapy Daily Treatment Note      Patient: Nicholas Yin   : 1945  Referring practitioner: Waldemar Trejo,*  Date of Initial Visit: Type: THERAPY  Episode: Sciatica with right leg  Today's Date: 10/4/2024  Patient seen for 5 sessions       Visit Diagnoses:    ICD-10-CM ICD-9-CM   1. Chronic right-sided low back pain with right-sided sciatica  M54.41 724.2    G89.29 724.3     338.29   2. Right leg pain  M79.604 729.5       Subjective Evaluation    History of Present Illness    Subjective comment: Pt reports having less pain today.  Pt will take a trip next week.       Objective   See Exercise, Manual, and Modality Logs for complete treatment.       Assessment & Plan       Assessment  Assessment details: Tx today consisted of exercises for improved core stability with added reps. Pt demonstrated good effort with no distress and reported no pain following ice today. Pt demonstrated good mech with no cues today.    Plan  Plan details: Will follow progressing toward independence with HEP.          Timed:         Manual Therapy:         mins  84138;     Therapeutic Exercise:    24     mins  04055;     Neuromuscular Lisa:        mins  33178;    Therapeutic Activity:          mins  06139;     Gait Training:           mins  87722;     Ultrasound:          mins  59220;    Ionto                                   mins   17798  Self Care                            mins   57447  Canalith Repos         mins 44315      Un-Timed:  Electrical Stimulation:         mins  91917 ( );  Dry Needling          mins self-pay  Traction          mins 83312      Timed Treatment:   24   mins   Total Treatment:     30   mins(6min  ice)  Charlie Kaye PT  [place] License: [id number]      Electronically signed by Charlie Kaye PT, 10/04/24, 7:38 AM EDT   yes

## 2024-10-14 ENCOUNTER — HOSPITAL ENCOUNTER (OUTPATIENT)
Dept: PHYSICAL THERAPY | Facility: HOSPITAL | Age: 79
Setting detail: THERAPIES SERIES
Discharge: HOME | End: 2024-10-14
Payer: COMMERCIAL

## 2024-10-14 DIAGNOSIS — G89.29 CHRONIC RIGHT-SIDED LOW BACK PAIN WITH RIGHT-SIDED SCIATICA: Primary | ICD-10-CM

## 2024-10-14 DIAGNOSIS — M54.41 CHRONIC RIGHT-SIDED LOW BACK PAIN WITH RIGHT-SIDED SCIATICA: Primary | ICD-10-CM

## 2024-10-14 DIAGNOSIS — M79.604 RIGHT LEG PAIN: ICD-10-CM

## 2024-10-14 PROCEDURE — 97112 NEUROMUSCULAR REEDUCATION: CPT | Performed by: PHYSICAL THERAPIST

## 2024-10-14 PROCEDURE — 97110 THERAPEUTIC EXERCISES: CPT | Performed by: PHYSICAL THERAPIST

## 2024-10-14 NOTE — THERAPY EVALUATION
Physical Therapy Daily Treatment Note      Patient: Nicholas Yin   : 1945  Referring practitioner: Waldemar Trejo,*  Date of Initial Visit: Type: THERAPY  Episode: Sciatica with right leg pain  Today's Date: 10/14/2024  Patient seen for 6 sessions       Visit Diagnoses:    ICD-10-CM ICD-9-CM   1. Chronic right-sided low back pain with right-sided sciatica  M54.41 724.2    G89.29 724.3     338.29   2. Right leg pain  M79.604 729.5       Subjective Evaluation    History of Present Illness    Subjective comment: Pt reports he has been walking better at home and would like to work on his balance and posture.       Objective   See Exercise, Manual, and Modality Logs for complete treatment.       Assessment & Plan       Assessment  Assessment details: Tx today consisted of exercises for improved posture and proprioceptive training for JUAREZ, foam surfaces, inclined/declined and long stepping.  Pt noted to have difficulty with foam standing and declined standing, yet demonstrated good effort today.    Plan  Plan details: Will follow progressing balance and core stability.          Timed:         Manual Therapy:         mins  99504;     Therapeutic Exercise:    14     mins  72028;     Neuromuscular Lisa:    16    mins  54447;    Therapeutic Activity:          mins  51998;     Gait Training:           mins  93366;     Ultrasound:          mins  60481;    Ionto                                   mins   93949  Self Care                            mins   37907  Canalith Repos         mins 10333      Un-Timed:  Electrical Stimulation:         mins  49665 ( );  Dry Needling          mins self-pay  Traction          mins 23168      Timed Treatment:   30   mins   Total Treatment:     30   mins    Charlie Kaye PT  [place] License: [id number]      Electronically signed by Charlie Kaye PT, 10/14/24, 8:13 AM EDT

## 2024-10-16 ENCOUNTER — HOSPITAL ENCOUNTER (OUTPATIENT)
Dept: PHYSICAL THERAPY | Facility: HOSPITAL | Age: 79
Setting detail: THERAPIES SERIES
Discharge: HOME | End: 2024-10-16
Payer: COMMERCIAL

## 2024-10-16 DIAGNOSIS — G89.29 CHRONIC RIGHT-SIDED LOW BACK PAIN WITH RIGHT-SIDED SCIATICA: Primary | ICD-10-CM

## 2024-10-16 DIAGNOSIS — M79.604 RIGHT LEG PAIN: ICD-10-CM

## 2024-10-16 DIAGNOSIS — M54.41 CHRONIC RIGHT-SIDED LOW BACK PAIN WITH RIGHT-SIDED SCIATICA: Primary | ICD-10-CM

## 2024-10-16 PROCEDURE — 97112 NEUROMUSCULAR REEDUCATION: CPT | Performed by: PHYSICAL THERAPIST

## 2024-10-16 PROCEDURE — 97110 THERAPEUTIC EXERCISES: CPT | Performed by: PHYSICAL THERAPIST

## 2024-10-16 NOTE — THERAPY EVALUATION
Physical Therapy Daily Treatment Note      Patient: Nicholas Yin   : 1945  Referring practitioner: Waldemar Trejo,*  Date of Initial Visit: Type: THERAPY  Episode: Sciatica with right leg pain  Today's Date: 10/16/2024  Patient seen for 7 sessions       Visit Diagnoses:    ICD-10-CM ICD-9-CM   1. Chronic right-sided low back pain with right-sided sciatica  M54.41 724.2    G89.29 724.3     338.29   2. Right leg pain  M79.604 729.5       Subjective Evaluation    History of Present Illness    Subjective comment: Pt has 1/10 pain today.  Pt reports his back cont to improve and he would like to cont to work on balance to reduce falls and reinjuring back.       Objective   See Exercise, Manual, and Modality Logs for complete treatment.       Assessment & Plan       Assessment  Assessment details: Tx today consisted of postural exercises and exercises for centralization of symptoms; followed by proprioceptive training for improved functional mobility and reduced risk for falls and injuring back.  Pt noted to have improved posture and responded well to progression of balance activities.  Pt required cues to increase difficulty with tandem walking.  Pt reported 1/10 post pain.    Plan  Plan details: Will follow progressing core stability and decreased pain and improved balance.          Timed:         Manual Therapy:         mins  85597;     Therapeutic Exercise:    17     mins  17708;     Neuromuscular Lisa:    14    mins  38358;    Therapeutic Activity:          mins  35994;     Gait Training:           mins  23757;     Ultrasound:          mins  35732;    Ionto                                   mins   10665  Self Care                            mins   97641  Canalith Repos         mins 40117      Un-Timed:  Electrical Stimulation:         mins  26079 ( );  Dry Needling          mins self-pay  Traction          mins 38778      Timed Treatment:  31    mins   Total Treatment:     31    mins    Charlie Kaye, PT  [place] License: [id number]      Electronically signed by Charlie Kaye, PT, 10/16/24, 7:31 AM EDT

## 2024-10-18 ENCOUNTER — LAB (OUTPATIENT)
Dept: LAB | Facility: HOSPITAL | Age: 79
End: 2024-10-18
Payer: COMMERCIAL

## 2024-10-18 ENCOUNTER — APPOINTMENT (OUTPATIENT)
Dept: PHYSICAL THERAPY | Facility: HOSPITAL | Age: 79
End: 2024-10-18
Payer: COMMERCIAL

## 2024-10-18 DIAGNOSIS — Z95.2 S/P TAVR (TRANSCATHETER AORTIC VALVE REPLACEMENT): ICD-10-CM

## 2024-10-18 DIAGNOSIS — I25.10 CORONARY ARTERY DISEASE INVOLVING NATIVE CORONARY ARTERY OF NATIVE HEART WITHOUT ANGINA PECTORIS: ICD-10-CM

## 2024-10-18 DIAGNOSIS — I73.9 PERIPHERAL VASCULAR DISEASE: ICD-10-CM

## 2024-10-18 DIAGNOSIS — I48.0 PAROXYSMAL ATRIAL FIBRILLATION: ICD-10-CM

## 2024-10-18 LAB
BASOPHILS # BLD AUTO: 0.07 10*3/MM3 (ref 0–0.2)
BASOPHILS NFR BLD AUTO: 0.7 % (ref 0–1.5)
DEPRECATED RDW RBC AUTO: 66.8 FL (ref 37–54)
EOSINOPHIL # BLD AUTO: 0.51 10*3/MM3 (ref 0–0.4)
EOSINOPHIL NFR BLD AUTO: 4.8 % (ref 0.3–6.2)
ERYTHROCYTE [DISTWIDTH] IN BLOOD BY AUTOMATED COUNT: 19.9 % (ref 12.3–15.4)
HCT VFR BLD AUTO: 36.9 % (ref 37.5–51)
HGB BLD-MCNC: 10.9 G/DL (ref 13–17.7)
IMM GRANULOCYTES # BLD AUTO: 0.04 10*3/MM3 (ref 0–0.05)
IMM GRANULOCYTES NFR BLD AUTO: 0.4 % (ref 0–0.5)
LYMPHOCYTES # BLD AUTO: 1.15 10*3/MM3 (ref 0.7–3.1)
LYMPHOCYTES NFR BLD AUTO: 10.7 % (ref 19.6–45.3)
MCH RBC QN AUTO: 27 PG (ref 26.6–33)
MCHC RBC AUTO-ENTMCNC: 29.5 G/DL (ref 31.5–35.7)
MCV RBC AUTO: 91.3 FL (ref 79–97)
MONOCYTES # BLD AUTO: 1.08 10*3/MM3 (ref 0.1–0.9)
MONOCYTES NFR BLD AUTO: 10.1 % (ref 5–12)
NEUTROPHILS NFR BLD AUTO: 7.88 10*3/MM3 (ref 1.7–7)
NEUTROPHILS NFR BLD AUTO: 73.3 % (ref 42.7–76)
NRBC BLD AUTO-RTO: 0 /100 WBC (ref 0–0.2)
PLATELET # BLD AUTO: 190 10*3/MM3 (ref 140–450)
PMV BLD AUTO: 10.4 FL (ref 6–12)
RBC # BLD AUTO: 4.04 10*6/MM3 (ref 4.14–5.8)
WBC NRBC COR # BLD AUTO: 10.73 10*3/MM3 (ref 3.4–10.8)

## 2024-10-18 PROCEDURE — 85025 COMPLETE CBC W/AUTO DIFF WBC: CPT

## 2024-10-21 ENCOUNTER — HOSPITAL ENCOUNTER (OUTPATIENT)
Dept: PHYSICAL THERAPY | Facility: HOSPITAL | Age: 79
Setting detail: THERAPIES SERIES
Discharge: HOME | End: 2024-10-21
Payer: COMMERCIAL

## 2024-10-21 DIAGNOSIS — I25.110 CORONARY ARTERY DISEASE INVOLVING NATIVE CORONARY ARTERY OF NATIVE HEART WITH UNSTABLE ANGINA PECTORIS: Chronic | ICD-10-CM

## 2024-10-21 DIAGNOSIS — M54.41 CHRONIC RIGHT-SIDED LOW BACK PAIN WITH RIGHT-SIDED SCIATICA: Primary | ICD-10-CM

## 2024-10-21 DIAGNOSIS — G89.29 CHRONIC RIGHT-SIDED LOW BACK PAIN WITH RIGHT-SIDED SCIATICA: Primary | ICD-10-CM

## 2024-10-21 DIAGNOSIS — N18.32 CHRONIC RENAL FAILURE, STAGE 3B: Chronic | ICD-10-CM

## 2024-10-21 DIAGNOSIS — E11.8 TYPE 2 DIABETES MELLITUS WITH COMPLICATION, WITH LONG-TERM CURRENT USE OF INSULIN: Chronic | ICD-10-CM

## 2024-10-21 DIAGNOSIS — M79.604 RIGHT LEG PAIN: ICD-10-CM

## 2024-10-21 DIAGNOSIS — Z79.4 TYPE 2 DIABETES MELLITUS WITH COMPLICATION, WITH LONG-TERM CURRENT USE OF INSULIN: Chronic | ICD-10-CM

## 2024-10-21 PROCEDURE — 97110 THERAPEUTIC EXERCISES: CPT | Performed by: PHYSICAL THERAPIST

## 2024-10-21 PROCEDURE — 97112 NEUROMUSCULAR REEDUCATION: CPT | Performed by: PHYSICAL THERAPIST

## 2024-10-21 RX ORDER — LOSARTAN POTASSIUM 25 MG/1
25 TABLET ORAL 2 TIMES DAILY
Qty: 180 TABLET | Refills: 2 | Status: SHIPPED | OUTPATIENT
Start: 2024-10-21

## 2024-10-21 RX ORDER — METOPROLOL TARTRATE 25 MG/1
25 TABLET, FILM COATED ORAL 2 TIMES DAILY
Qty: 180 TABLET | Refills: 2 | Status: SHIPPED | OUTPATIENT
Start: 2024-10-21 | End: 2025-07-18

## 2024-10-21 NOTE — THERAPY EVALUATION
Physical Therapy Daily Treatment Note      Patient: Nicholas Yin   : 1945  Referring practitioner:   Date of Initial Visit: Type: THERAPY  Episode: Sciatica with right leg pain  Today's Date: 10/21/2024  Patient seen for 8 sessions       Visit Diagnoses:    ICD-10-CM ICD-9-CM   1. Chronic right-sided low back pain with right-sided sciatica  M54.41 724.2    G89.29 724.3     338.29   2. Right leg pain  M79.604 729.5       Subjective Evaluation    History of Present Illness    Subjective comment: Pt has no pain today.       Objective   See Exercise, Manual, and Modality Logs for complete treatment.       Assessment & Plan       Assessment  Assessment details: Tx today consisted of postural exercises and exercises for centralization of symptoms; followed by proprioceptive training for improved functional mobility and reduced risk for falls and injuring back.  Pt demonstrated improved ability with postural exercises and fitter balance.  Pt denied pain following session.    Plan  Plan details: Will follow progressing core stability and decreased pain and improved balance.          Timed:         Manual Therapy:         mins  63251;     Therapeutic Exercise:    16     mins  35964;     Neuromuscular Lisa:    14    mins  39132;    Therapeutic Activity:          mins  65842;     Gait Training:           mins  66126;     Ultrasound:          mins  26982;    Ionto                                   mins   69386  Self Care                            mins   57957  Canalith Repos         mins 09504      Un-Timed:  Electrical Stimulation:         mins  68068 ( );  Dry Needling          mins self-pay  Traction          mins 78772      Timed Treatment:      mins   Total Treatment:     30   mins    Charlie Kaye PT  [place] License: [id number]      Electronically signed by Charlie Kaye PT, 10/21/24, 7:52 AM EDT

## 2024-10-23 ENCOUNTER — HOSPITAL ENCOUNTER (OUTPATIENT)
Dept: PHYSICAL THERAPY | Facility: HOSPITAL | Age: 79
Setting detail: THERAPIES SERIES
Discharge: HOME | End: 2024-10-23
Payer: COMMERCIAL

## 2024-10-23 DIAGNOSIS — G89.29 CHRONIC RIGHT-SIDED LOW BACK PAIN WITH RIGHT-SIDED SCIATICA: Primary | ICD-10-CM

## 2024-10-23 DIAGNOSIS — M79.604 RIGHT LEG PAIN: ICD-10-CM

## 2024-10-23 DIAGNOSIS — M54.41 CHRONIC RIGHT-SIDED LOW BACK PAIN WITH RIGHT-SIDED SCIATICA: Primary | ICD-10-CM

## 2024-10-23 PROCEDURE — 97112 NEUROMUSCULAR REEDUCATION: CPT | Performed by: PHYSICAL THERAPIST

## 2024-10-23 PROCEDURE — 97110 THERAPEUTIC EXERCISES: CPT | Performed by: PHYSICAL THERAPIST

## 2024-10-23 RX ORDER — DAPAGLIFLOZIN 10 MG/1
10 TABLET, FILM COATED ORAL DAILY
Qty: 90 TABLET | Refills: 2 | Status: SHIPPED | OUTPATIENT
Start: 2024-10-23

## 2024-10-23 NOTE — THERAPY EVALUATION
Physical Therapy Daily Treatment Note      Patient: Nicholas Yin   : 1945  Referring practitioner: Waldemar Trejo,*  Date of Initial Visit: Type: THERAPY  Episode: Sciatica with right leg pain  Today's Date: 10/23/2024  Patient seen for 9 sessions       Visit Diagnoses:    ICD-10-CM ICD-9-CM   1. Chronic right-sided low back pain with right-sided sciatica  M54.41 724.2    G89.29 724.3     338.29   2. Right leg pain  M79.604 729.5       Subjective Evaluation    History of Present Illness    Subjective comment: Pt has 2/10 pain today.       Objective   See Exercise, Manual, and Modality Logs for complete treatment.       Assessment & Plan       Assessment  Assessment details: Tx today consisted of postural exercises and proprioceptive training for improved core stability and balance.  Pt demonstrated good effort today, yet cont to require cues for standing with upright posture.  Pt demonstrated good effort with mild fatigue following session.    Plan  Plan details: Will follow progressing core stability and improved balance to prevent falls and injuring lower back.          Timed:         Manual Therapy:         mins  86425;     Therapeutic Exercise:    17     mins  69980;     Neuromuscular Lisa:    14    mins  46462;    Therapeutic Activity:          mins  32249;     Gait Training:           mins  74966;     Ultrasound:          mins  05480;    Ionto                                   mins   74340  Self Care                            mins   28835  Canalith Repos         mins 79853      Un-Timed:  Electrical Stimulation:         mins  80619 ( );  Dry Needling          mins self-pay  Traction          mins 19457      Timed Treatment:   31   mins   Total Treatment:     31   mins    Charlie Kaye PT  [place] License: [id number]      Electronically signed by Charlie Kaye PT, 10/23/24, 7:38 AM EDT

## 2024-10-25 ENCOUNTER — HOSPITAL ENCOUNTER (OUTPATIENT)
Dept: PHYSICAL THERAPY | Facility: HOSPITAL | Age: 79
Setting detail: THERAPIES SERIES
Discharge: HOME | End: 2024-10-25
Payer: COMMERCIAL

## 2024-10-25 ENCOUNTER — FLU SHOT (OUTPATIENT)
Dept: FAMILY MEDICINE CLINIC | Facility: CLINIC | Age: 79
End: 2024-10-25
Payer: COMMERCIAL

## 2024-10-25 DIAGNOSIS — M54.41 CHRONIC RIGHT-SIDED LOW BACK PAIN WITH RIGHT-SIDED SCIATICA: Primary | ICD-10-CM

## 2024-10-25 DIAGNOSIS — M79.604 RIGHT LEG PAIN: ICD-10-CM

## 2024-10-25 DIAGNOSIS — G89.29 CHRONIC RIGHT-SIDED LOW BACK PAIN WITH RIGHT-SIDED SCIATICA: Primary | ICD-10-CM

## 2024-10-25 DIAGNOSIS — Z23 NEED FOR INFLUENZA VACCINATION: Primary | ICD-10-CM

## 2024-10-25 PROCEDURE — 97110 THERAPEUTIC EXERCISES: CPT | Performed by: PHYSICAL THERAPIST

## 2024-10-25 PROCEDURE — 90662 IIV NO PRSV INCREASED AG IM: CPT | Performed by: GENERAL PRACTICE

## 2024-10-25 PROCEDURE — 90471 IMMUNIZATION ADMIN: CPT | Performed by: GENERAL PRACTICE

## 2024-10-25 PROCEDURE — 97112 NEUROMUSCULAR REEDUCATION: CPT | Performed by: PHYSICAL THERAPIST

## 2024-10-25 PROCEDURE — 97530 THERAPEUTIC ACTIVITIES: CPT | Performed by: PHYSICAL THERAPIST

## 2024-10-25 NOTE — THERAPY EVALUATION
Physical Therapy Re Certification Of Plan of Care  Patient: Nicholas Yin   : 1945  Diagnosis/ICD-10 Code:    Referring practitioner: Waldemar Trejo,*  Date of Initial Visit: Type: THERAPY  Episode: Sciatica with right leg pain  Today's Date: 10/25/2024  Patient seen for 10 sessions         Visit Diagnoses:    ICD-10-CM ICD-9-CM   1. Chronic right-sided low back pain with right-sided sciatica  M54.41 724.2    G89.29 724.3     338.29   2. Right leg pain  M79.604 729.5         Nicholas Yin reports:   Subjective Questionnaire: RIVERA 49/56  Clinical Progress: improved  Home Program Compliance: Yes  Treatment has included: therapeutic exercise, neuromuscular re-education, manual therapy, therapeutic activity, gait training, and moist heat      Subjective Evaluation    History of Present Illness    Subjective comment: Pt has requested to cont 1x/week for treatment.Pain  Current pain ratin  At best pain ratin  At worst pain rating: 3             Objective          Strength/Myotome Testing     Left Hip   Planes of Motion   Flexion: 4    Right Hip   Planes of Motion   Flexion: 4    Left Knee   Flexion: 4+  Extension: 4+    Right Knee   Flexion: 4+  Extension: 4+          Assessment & Plan       Assessment  Impairments: abnormal coordination, abnormal gait, abnormal muscle firing, abnormal muscle tone, abnormal or restricted ROM, activity intolerance, impaired balance, impaired physical strength, lacks appropriate home exercise program, pain with function, safety issue and weight-bearing intolerance   Functional limitations: carrying objects, lifting, sleeping, walking, pulling, uncomfortable because of pain, standing, stooping, reaching behind back, reaching overhead and unable to perform repetitive tasks   Assessment details: Pt is a 78 y/o male who has attended 10 sessions with good effort and improvements.  Pt has noted decreased pain, improved leg stability and improved balance.  Pt has  requested to cont at 1x/week and discharge.  Prognosis: good    Goals  Plan Goals: Plan Goals: STG 4 weeks     1 Pt will be instructed in a HEP.met  2 Pt will report a 25% decrease in right radicular symptoms.met  3 Pt will report pain no greater than 3/10 with ADLs and prolonged standing.met     LTG 8 weeks     1 Pt will report a 75% decrease in right radicular symptoms.progressing  2 Pt will be independent with a progressive postural and LE stability program.progressing  3 Pt will report pain no greater than 1/10 with increased walking and ADLs at home.Not met         Plan  Therapy options: will be seen for skilled therapy services  Planned modality interventions: cryotherapy and thermotherapy (hydrocollator packs)  Planned therapy interventions: abdominal trunk stabilization, ADL retraining, balance/weight-bearing training, body mechanics training, flexibility, functional ROM exercises, home exercise program, IADL retraining, joint mobilization, manual therapy, motor coordination training, neuromuscular re-education, postural training, soft tissue mobilization, spinal/joint mobilization, strengthening, stretching and therapeutic activities  Frequency: 1x week  Duration in weeks: 4  Treatment plan discussed with: patient  Plan details: Will follow for optimal gains.  Re-evaluation   89517  Therapeutic exercise  96048  Therapeutic activity    61493  Neuromuscular re-education   82471  Manual therapy   45845  Gait training  42021  Moist heat/cryotherapy 02765                Recommendations: Continue as planned  Timeframe: 1 month  Prognosis to achieve goals: good      Timed:         Manual Therapy:         mins  96775;     Therapeutic Exercise:    16     mins  74171;     Neuromuscular Lisa:    15    mins  85991;    Therapeutic Activity:     14     mins  60708;     Gait Training:           mins  60662;     Ultrasound:          mins  30814;    Ionto                                   mins   91168  Self Care                             mins   51876    Un-Timed:  Electrical Stimulation:         mins  63515 ( );  Dry Needling          mins self-pay  Traction          mins 07956  Re-Eval                               mins  11247  Canalith Repos         mins 64235    Timed Treatment:   45   mins   Total Treatment:     45   mins          PT: Charlie Kaye PT     [place] License:  [id number]    Electronically signed by Charlie Kaye PT, 10/25/24, 7:27 AM EDT    Certification Period: 10/25/2024 thru 1/22/2025  I certify that the therapy services are furnished while this patient is under my care.  The services outlined above are required by this patient, and will be reviewed every 90 days.         Physician Signature:__________________________________________________    PHYSICIAN: Waldemar Trejo MD  NPI: [id number]                                      DATE:  :     Please sign and return via fax to [email or web address] . Thank you, [facility]

## 2024-10-30 ENCOUNTER — HOSPITAL ENCOUNTER (OUTPATIENT)
Dept: PHYSICAL THERAPY | Facility: HOSPITAL | Age: 79
Discharge: HOME | End: 2024-10-30
Admitting: GENERAL PRACTICE
Payer: COMMERCIAL

## 2024-10-30 DIAGNOSIS — M79.604 RIGHT LEG PAIN: ICD-10-CM

## 2024-10-30 DIAGNOSIS — M54.41 CHRONIC RIGHT-SIDED LOW BACK PAIN WITH RIGHT-SIDED SCIATICA: Primary | ICD-10-CM

## 2024-10-30 DIAGNOSIS — G89.29 CHRONIC RIGHT-SIDED LOW BACK PAIN WITH RIGHT-SIDED SCIATICA: Primary | ICD-10-CM

## 2024-10-30 PROCEDURE — 97530 THERAPEUTIC ACTIVITIES: CPT | Performed by: PHYSICAL THERAPIST

## 2024-10-30 PROCEDURE — 97110 THERAPEUTIC EXERCISES: CPT | Performed by: PHYSICAL THERAPIST

## 2024-10-30 PROCEDURE — 97112 NEUROMUSCULAR REEDUCATION: CPT | Performed by: PHYSICAL THERAPIST

## 2024-10-30 NOTE — THERAPY EVALUATION
Physical Therapy Daily Treatment Note      Patient: Nicholas Yin   : 1945  Referring practitioner: Waldemar Trejo,*  Date of Initial Visit: Type: THERAPY  Episode: Sciatica with right leg pain  Today's Date: 10/30/2024  Patient seen for 11 sessions       Visit Diagnoses:    ICD-10-CM ICD-9-CM   1. Chronic right-sided low back pain with right-sided sciatica  M54.41 724.2    G89.29 724.3     338.29   2. Right leg pain  M79.604 729.5       Subjective Evaluation    History of Present Illness    Subjective comment: Pt has 2/10 pain today.       Objective   See Exercise, Manual, and Modality Logs for complete treatment.       Assessment & Plan       Assessment  Assessment details: Tx today consisted of postural exercises and proprioceptive training for improved core stability and balance.  Pt demonstrated good effort today with improved foam step ups and tandem on foam.  Pt demonstrated improvement with upright posture with less clues today.    Plan  Plan details: Will follow progressing core stability and improved balance to prevent falls and injuring lower back.          Timed:         Manual Therapy:         mins  71007;     Therapeutic Exercise:    16     mins  08510;     Neuromuscular Lisa:    14    mins  52800;    Therapeutic Activity:     11     mins  07987;     Gait Training:           mins  29004;     Ultrasound:          mins  14748;    Ionto                                   mins   52109  Self Care                            mins   68530  Canalith Repos         mins 71435      Un-Timed:  Electrical Stimulation:         mins  05528 ( );  Dry Needling          mins self-pay  Traction          mins 18740      Timed Treatment:   41   mins   Total Treatment:     41   mins    Charlie Kaye PT  [place] License: [id number]      Electronically signed by Charlie Kaye PT, 10/30/24, 7:26 AM EDT

## 2024-11-13 ENCOUNTER — HOSPITAL ENCOUNTER (OUTPATIENT)
Dept: PHYSICAL THERAPY | Facility: HOSPITAL | Age: 79
Discharge: HOME | End: 2024-11-13
Admitting: GENERAL PRACTICE
Payer: COMMERCIAL

## 2024-11-13 DIAGNOSIS — M79.604 RIGHT LEG PAIN: ICD-10-CM

## 2024-11-13 DIAGNOSIS — M54.41 CHRONIC RIGHT-SIDED LOW BACK PAIN WITH RIGHT-SIDED SCIATICA: Primary | ICD-10-CM

## 2024-11-13 DIAGNOSIS — G89.29 CHRONIC RIGHT-SIDED LOW BACK PAIN WITH RIGHT-SIDED SCIATICA: Primary | ICD-10-CM

## 2024-11-13 PROCEDURE — 97110 THERAPEUTIC EXERCISES: CPT | Performed by: PHYSICAL THERAPIST

## 2024-11-13 PROCEDURE — 97112 NEUROMUSCULAR REEDUCATION: CPT | Performed by: PHYSICAL THERAPIST

## 2024-11-13 NOTE — THERAPY EVALUATION
Physical Therapy Daily Treatment Note/Discharge      Patient: Nicholas Yin   : 1945  Referring practitioner: Waldemar Trejo,*  Date of Initial Visit: Type: THERAPY  Episode: Sciatica with right leg pain  Today's Date: 2024  Patient seen for 12 sessions       Visit Diagnoses:    ICD-10-CM ICD-9-CM   1. Chronic right-sided low back pain with right-sided sciatica  M54.41 724.2    G89.29 724.3     338.29   2. Right leg pain  M79.604 729.5       Subjective Evaluation    History of Present Illness    Subjective comment: Pt has 1/10 pain today.       Objective   See Exercise, Manual, and Modality Logs for complete treatment.       Assessment & Plan       Assessment  Assessment details: Tx today consisted of review of postural exercises and proprioceptive program with good understanding.  Pt demonstrated good effort with session and demonstrated exercises and balance activities without cues today.  Pt reported mild fatigue with session today.    Plan  Plan details: Will discharge at this time. Pt instructed to contact therapy as needed.          Timed:         Manual Therapy:         mins  21276;     Therapeutic Exercise:    16     mins  44597;     Neuromuscular iLsa:    14    mins  17756;    Therapeutic Activity:          mins  88640;     Gait Training:           mins  95314;     Ultrasound:          mins  62563;    Ionto                                   mins   72931  Self Care                            mins   16218  Canalith Repos         mins 06576      Un-Timed:  Electrical Stimulation:         mins  47439 ( );  Dry Needling          mins self-pay  Traction          mins 91998      Timed Treatment:  30    mins   Total Treatment:     30   mins    Charlie Kaye PT  [place] License: [id number]      Electronically signed by Charlie Kaye PT, 24, 7:28 AM EST

## 2024-12-18 ENCOUNTER — LAB (OUTPATIENT)
Dept: LAB | Facility: HOSPITAL | Age: 79
End: 2024-12-18
Payer: COMMERCIAL

## 2024-12-18 DIAGNOSIS — E11.8 TYPE 2 DIABETES MELLITUS WITH COMPLICATION, WITH LONG-TERM CURRENT USE OF INSULIN: Chronic | ICD-10-CM

## 2024-12-18 DIAGNOSIS — I48.0 PAROXYSMAL ATRIAL FIBRILLATION: Chronic | ICD-10-CM

## 2024-12-18 DIAGNOSIS — Z79.4 TYPE 2 DIABETES MELLITUS WITH COMPLICATION, WITH LONG-TERM CURRENT USE OF INSULIN: Chronic | ICD-10-CM

## 2024-12-18 DIAGNOSIS — I10 ESSENTIAL HYPERTENSION: Chronic | ICD-10-CM

## 2024-12-18 DIAGNOSIS — E78.2 MIXED HYPERLIPIDEMIA: Chronic | ICD-10-CM

## 2024-12-18 DIAGNOSIS — E55.9 VITAMIN D DEFICIENCY: ICD-10-CM

## 2024-12-18 DIAGNOSIS — N18.4 CHRONIC RENAL FAILURE, STAGE 4 (SEVERE): ICD-10-CM

## 2024-12-18 DIAGNOSIS — R79.89 ELEVATED TSH: ICD-10-CM

## 2024-12-18 LAB
ALBUMIN SERPL-MCNC: 3.7 G/DL (ref 3.5–5.2)
ALBUMIN UR-MCNC: 20.2 MG/DL
ALBUMIN/GLOB SERPL: 1.1 G/DL
ALP SERPL-CCNC: 76 U/L (ref 39–117)
ALT SERPL W P-5'-P-CCNC: 12 U/L (ref 1–41)
ANION GAP SERPL CALCULATED.3IONS-SCNC: 9.5 MMOL/L (ref 5–15)
AST SERPL-CCNC: 21 U/L (ref 1–40)
BILIRUB SERPL-MCNC: 0.6 MG/DL (ref 0–1.2)
BUN SERPL-MCNC: 32 MG/DL (ref 8–23)
BUN/CREAT SERPL: 11 (ref 7–25)
CALCIUM SPEC-SCNC: 9.2 MG/DL (ref 8.6–10.5)
CHLORIDE SERPL-SCNC: 108 MMOL/L (ref 98–107)
CHOLEST SERPL-MCNC: 116 MG/DL (ref 0–200)
CO2 SERPL-SCNC: 21.5 MMOL/L (ref 22–29)
CREAT SERPL-MCNC: 2.91 MG/DL (ref 0.76–1.27)
EGFRCR SERPLBLD CKD-EPI 2021: 21.4 ML/MIN/1.73
GLOBULIN UR ELPH-MCNC: 3.4 GM/DL
GLUCOSE SERPL-MCNC: 80 MG/DL (ref 65–99)
HBA1C MFR BLD: 6.8 % (ref 4.8–5.6)
HDLC SERPL-MCNC: 47 MG/DL (ref 40–60)
LDLC SERPL CALC-MCNC: 45 MG/DL (ref 0–100)
LDLC/HDLC SERPL: 0.89 {RATIO}
POTASSIUM SERPL-SCNC: 5.1 MMOL/L (ref 3.5–5.2)
PROT SERPL-MCNC: 7.1 G/DL (ref 6–8.5)
SODIUM SERPL-SCNC: 139 MMOL/L (ref 136–145)
T4 FREE SERPL-MCNC: 1.21 NG/DL (ref 0.92–1.68)
TRIGL SERPL-MCNC: 137 MG/DL (ref 0–150)
TSH SERPL DL<=0.05 MIU/L-ACNC: 3.86 UIU/ML (ref 0.27–4.2)
VLDLC SERPL-MCNC: 24 MG/DL (ref 5–40)

## 2024-12-18 PROCEDURE — 84443 ASSAY THYROID STIM HORMONE: CPT

## 2024-12-18 PROCEDURE — 82043 UR ALBUMIN QUANTITATIVE: CPT

## 2024-12-18 PROCEDURE — 80053 COMPREHEN METABOLIC PANEL: CPT

## 2024-12-18 PROCEDURE — 80061 LIPID PANEL: CPT

## 2024-12-18 PROCEDURE — 84439 ASSAY OF FREE THYROXINE: CPT

## 2024-12-18 PROCEDURE — 36415 COLL VENOUS BLD VENIPUNCTURE: CPT

## 2024-12-18 PROCEDURE — 83036 HEMOGLOBIN GLYCOSYLATED A1C: CPT

## 2024-12-20 ENCOUNTER — OFFICE VISIT (OUTPATIENT)
Dept: FAMILY MEDICINE CLINIC | Facility: CLINIC | Age: 79
End: 2024-12-20
Payer: COMMERCIAL

## 2024-12-20 VITALS
BODY MASS INDEX: 34.25 KG/M2 | RESPIRATION RATE: 14 BRPM | OXYGEN SATURATION: 98 % | HEART RATE: 75 BPM | HEIGHT: 68 IN | TEMPERATURE: 98.6 F | WEIGHT: 226 LBS | DIASTOLIC BLOOD PRESSURE: 62 MMHG | SYSTOLIC BLOOD PRESSURE: 118 MMHG

## 2024-12-20 DIAGNOSIS — E66.811 CLASS 1 OBESITY WITH SERIOUS COMORBIDITY AND BODY MASS INDEX (BMI) OF 34.0 TO 34.9 IN ADULT, UNSPECIFIED OBESITY TYPE: ICD-10-CM

## 2024-12-20 DIAGNOSIS — I73.9 PERIPHERAL VASCULAR DISEASE: ICD-10-CM

## 2024-12-20 DIAGNOSIS — E78.2 MIXED HYPERLIPIDEMIA: Chronic | ICD-10-CM

## 2024-12-20 DIAGNOSIS — I48.0 PAROXYSMAL ATRIAL FIBRILLATION: Chronic | ICD-10-CM

## 2024-12-20 DIAGNOSIS — N18.4 CHRONIC RENAL FAILURE, STAGE 4 (SEVERE): ICD-10-CM

## 2024-12-20 DIAGNOSIS — K21.9 GASTROESOPHAGEAL REFLUX DISEASE WITHOUT ESOPHAGITIS: Chronic | ICD-10-CM

## 2024-12-20 DIAGNOSIS — M51.360 DEGENERATION OF INTERVERTEBRAL DISC OF LUMBAR REGION WITH DISCOGENIC BACK PAIN: ICD-10-CM

## 2024-12-20 DIAGNOSIS — Z85.828 HISTORY OF NONMELANOMA SKIN CANCER: ICD-10-CM

## 2024-12-20 DIAGNOSIS — R35.1 BENIGN PROSTATIC HYPERPLASIA WITH NOCTURIA: ICD-10-CM

## 2024-12-20 DIAGNOSIS — Z95.2 S/P TAVR (TRANSCATHETER AORTIC VALVE REPLACEMENT): Primary | Chronic | ICD-10-CM

## 2024-12-20 DIAGNOSIS — E55.9 VITAMIN D DEFICIENCY: ICD-10-CM

## 2024-12-20 DIAGNOSIS — R79.89 ELEVATED TSH: ICD-10-CM

## 2024-12-20 DIAGNOSIS — M85.89 OSTEOPENIA OF MULTIPLE SITES: ICD-10-CM

## 2024-12-20 DIAGNOSIS — I25.10 CORONARY ARTERY DISEASE INVOLVING NATIVE CORONARY ARTERY OF NATIVE HEART WITHOUT ANGINA PECTORIS: Chronic | ICD-10-CM

## 2024-12-20 DIAGNOSIS — I10 ESSENTIAL HYPERTENSION: Chronic | ICD-10-CM

## 2024-12-20 DIAGNOSIS — Z85.47 H/O TESTICULAR CANCER: ICD-10-CM

## 2024-12-20 DIAGNOSIS — Z79.4 TYPE 2 DIABETES MELLITUS WITH COMPLICATION, WITH LONG-TERM CURRENT USE OF INSULIN: Chronic | ICD-10-CM

## 2024-12-20 DIAGNOSIS — N40.1 BENIGN PROSTATIC HYPERPLASIA WITH NOCTURIA: ICD-10-CM

## 2024-12-20 DIAGNOSIS — E11.8 TYPE 2 DIABETES MELLITUS WITH COMPLICATION, WITH LONG-TERM CURRENT USE OF INSULIN: Chronic | ICD-10-CM

## 2024-12-20 DIAGNOSIS — Z85.038 HISTORY OF COLON CANCER: ICD-10-CM

## 2024-12-20 DIAGNOSIS — Z00.00 HEALTHCARE MAINTENANCE: ICD-10-CM

## 2024-12-20 PROBLEM — J18.9 PNEUMONIA OF LEFT LOWER LOBE DUE TO INFECTIOUS ORGANISM: Status: RESOLVED | Noted: 2024-09-11 | Resolved: 2024-12-20

## 2024-12-20 NOTE — PROGRESS NOTES
Subjective   Nicholas Yin is a 79 y.o. male.     Chief Complaint  He returns for a scheduled reassessment of multiple medical problems including a previous TAVR, paroxysmal atrial fibrillation, type 2 diabetes mellitus, hyperlipidemia, essential hypertension, chronic renal failure, chronic back pain, and gastroesophageal reflux disease    History of Present Illness     Previous TAVR  He underwent a TAVR and left femoral endarterectomy on 5/8/2023.      Paroxysmal Atrial Fibrillation  He required readmission to hospital on 5/14/2023 following a presyncopal episode. He was found to be in atrial fibrillation with a rapid ventricular response.  He was started on IV diltiazem and progressed to a wide-complex tachycardia with loss of consciousness.  CPR was administered with a prompt improvement and he regained consciousness.  He was then started on IV amiodarone and transferred to [facility].  On arrival there he was found to be in a third-degree block and underwent placement of a pacemaker by Dr. Mendoza.  He was started on dronedarone late last year since and denies any significant palpitations since.  He continues to deny any chest pain or lightheadedness, and there is no history of any calf pain, swelling of the ankles, fever, or chills. He remains on rivaroxaban.  He is scheduled to undergo an electrophysiology reassessment with Dr. Howe on 1/17/2025  Lab Results   Component Value Date    WBC 10.73 10/18/2024    HGB 10.9 (L) 10/18/2024    HCT 36.9 (L) 10/18/2024    MCV 91.3 10/18/2024     10/18/2024     Coronary Artery Disease  He underwent a coronary angiogram by Dr. Villatoro on 8/15/2019 with stenting of the mid LAD.  He remains on low-dose ASA.  He is scheduled to undergo a cardiology reassessment with CHIDI Ku on 2/13/2025    Transient Visual Loss  Several years ago he experienced a sudden progressive decrease in the vision in his right eye while driving. He stated that the vision  in the eye darkened gradually over several minutes to the point where the only things he could make out were very bright lights and the sun. This lasted for about 30 minutes then resolved over several minutes. He was hospitalized at [facility] over 10 years ago following a similar episodes that was ultimately felt to be vascular in origin. MRI of the brain performed on 12/8/17 was reported as showing mild cerebral atrophy with chronic small vessel ischemic changes. MRA of the carotid arteries performed the same day was unremarkable.  Bilateral carotid ultrasound performed on 2/6/2023 was unremarkable.  He undergoes regular ophthalmology assessments    Type 2 Diabetes Mellitus  He remains on a SGLT2 -empagliflozin, GLP agonist-liraglutide and basal insulin - tresiba (together as xultophy).    Lab Results   Component Value Date    HGBA1C 6.80 (H) 12/18/2024     Hyperlipidemia  He remains on rosuvastatin  Lab Results   Component Value Date    CHOL 116 12/18/2024    CHLPL 125 09/28/2015    TRIG 137 12/18/2024    HDL 47 12/18/2024    LDL 45 12/18/2024     Essential Hypertension  Home blood pressure readings: have been a bit low at times.  He remains on metoprolol alone  Lab Results   Component Value Date    GLUCOSE 80 12/18/2024    BUN 32 (H) 12/18/2024    CREATININE 2.91 (H) 12/18/2024     12/18/2024    K 5.1 12/18/2024     (H) 12/18/2024    CALCIUM 9.2 12/18/2024    PROTEINTOT 7.1 12/18/2024    ALBUMIN 3.7 12/18/2024    ALT 12 12/18/2024    AST 21 12/18/2024    ALKPHOS 76 12/18/2024    BILITOT 0.6 12/18/2024    GLOB 3.4 12/18/2024    AGRATIO 1.1 12/18/2024    BCR 11.0 12/18/2024    ANIONGAP 9.5 12/18/2024    EGFR 21.4 (L) 12/18/2024     Chronic Renal Failure  This has been contributed to longstanding type 2 diabetes mellitus and hypertension.  He is aware to avoid any NSAIDs oral or prescription.     Chronic Back Pain  He gives a long history of increasing mid and low back pain. This is described as a sharp ache.  The pain does not radiate and has been unassociated with any other symptoms. The pain is worse with prolonged weight bearing and limits his activities more then anything else at present.  MRI performed on 1/7/2021 was reported as showing degenerative disc disease and osteoarthritis with mild to moderate central canal and bilateral foraminal narrowing at L2-3 and L3-4.    Gastroesophageal Reflux Disease  He remains heartburn free on omeprazole    Labs  Lab Results   Component Value Date    TSH 3.860 12/18/2024     The following portions of the patient's history were reviewed and updated as appropriate: allergies, current medications, past medical history, past social history, and problem list.    Review of Systems   Constitutional:  Positive for fatigue. Negative for chills and fever.   HENT:  Negative for congestion, dental problem, ear pain, rhinorrhea, sneezing and sore throat.    Respiratory:  Positive for cough and shortness of breath. Negative for wheezing.    Cardiovascular:  Negative for chest pain, palpitations and leg swelling.   Gastrointestinal:  Negative for abdominal pain, blood in stool, constipation, diarrhea, nausea and vomiting.   Genitourinary:  Positive for difficulty urinating (sense of incomplete voding), nocturia (x 2-3) and erectile dysfunction. Negative for dysuria and hematuria.   Musculoskeletal:  Positive for back pain. Negative for arthralgias, gait problem, joint swelling, myalgias, neck pain and neck stiffness.   Skin:  Negative for rash and skin lesions.   Neurological:  Positive for weakness (generalized). Negative for numbness and headache.   Psychiatric/Behavioral:  Negative for behavioral problems, sleep disturbance and depressed mood. The patient is not nervous/anxious.      Objective   Physical Exam  Constitutional:       General: He is not in acute distress.     Appearance: Normal appearance. He is well-developed. He is not ill-appearing or diaphoretic.      Comments: Looked a  bit tired.  Sits and stands with an exaggerated thoracic kyphosis.  Uncomfortable with movement.  No apparent distress at rest.  No pallor, cyanosis, diaphoresis, or jaundice   HENT:      Head: Atraumatic.   Eyes:      Conjunctiva/sclera: Conjunctivae normal.   Neck:      Thyroid: No thyroid mass or thyromegaly.      Vascular: No carotid bruit or JVD.      Trachea: Trachea normal. No tracheal deviation.   Cardiovascular:      Rate and Rhythm: Normal rate and regular rhythm.   Pulmonary:      Effort: Pulmonary effort is normal.      Breath sounds: Normal breath sounds.   Abdominal:      General: Bowel sounds are normal. There is no distension.   Musculoskeletal:      Right lower leg: No edema.      Left lower leg: No edema.   Lymphadenopathy:      Head:      Right side of head: No submental, submandibular, tonsillar, preauricular, posterior auricular or occipital adenopathy.      Left side of head: No submental, submandibular, tonsillar, preauricular, posterior auricular or occipital adenopathy.      Cervical: No cervical adenopathy.      Upper Body:      Right upper body: No supraclavicular adenopathy.      Left upper body: No supraclavicular adenopathy.   Skin:     General: Skin is warm and dry.      Coloration: Skin is not cyanotic, jaundiced or pale.      Findings: No lesion or rash.      Nails: There is no clubbing.   Neurological:      Mental Status: He is alert and oriented to person, place, and time.      Cranial Nerves: No cranial nerve deficit.      Motor: No tremor.      Coordination: Coordination normal.      Gait: Gait normal.   Psychiatric:         Attention and Perception: Attention normal.         Mood and Affect: Mood normal.         Speech: Speech normal.         Behavior: Behavior normal.         Thought Content: Thought content normal.       Assessment & Plan   Problems Addressed this Visit          Cardiac and Vasculature    CAD s/p stent  (Chronic)  Reminded regarding the importance of risk  factor modification.  Continue low-dose ASA.    Essential hypertension (Chronic)   Hypertension: at goal. Evidence of target organ damage: coronary artery disease, peripheral artery disease, and chronic kidney disease.  Encouraged to continue to work on diet and exercise plan.   Continue current medication    Mixed hyperlipidemia (Chronic)  As above.   Continue current medication.    Paroxysmal atrial fibrillation (Chronic)   Rate control is appropriate.. Patient is anticoagulated.   Avoid caffeine.  Avoid oral decongestants.  Continue current medication.  Follow up with electrophysiology    S/P TAVR (transcatheter aortic valve replacement) - Primary (Chronic)    Peripheral vascular disease       Endocrine and Metabolic    T2DM  (Chronic)  Diabetes mellitus Type II, under excellent control.   Encouraged to continue to pursue ADA diet  Encouraged aerobic exercise.  Continue current medication    Class 1 obesity with serious comorbidity and body mass index (BMI) of 34.0 to 34.9 in adult    Vitamin D deficiency       Gastrointestinal Abdominal     GERD (Chronic)       Genitourinary and Reproductive     Benign prostatic hyperplasia with nocturia    Chronic renal failure, stage 4 (severe)  Reminded to avoid any NSAIDs prescription or OTC  Recommended that he establish care with nephrology.  He would like to proceed and arrangements will be made       Health Encounters    Healthcare maintenance  Patient has already received a flu shot fall.  Recommended RSV and an updated COVID-19  Reminded that he is due for Shingrix and updated Tdap       Hematology and Neoplasia    H/O testicular cancer    History of colon cancer    History of nonmelanoma skin cancer       Musculoskeletal and Injuries    Osteopenia of multiple sites       Neuro    DDD (degenerative disc disease), lumbar  Reminded regarding symptomatic treatment.   Continue current medication  Encouraged to report if any worse or if any new symptoms or concerns.        Symptoms and Signs    Elevated TSH  Will continue to monitor     Diagnoses         Codes Comments    S/P TAVR (transcatheter aortic valve replacement)    -  Primary ICD-10-CM: Z95.2  ICD-9-CM: V43.3     Peripheral vascular disease     ICD-10-CM: I73.9  ICD-9-CM: 443.9     Paroxysmal atrial fibrillation     ICD-10-CM: I48.0  ICD-9-CM: 427.31     Mixed hyperlipidemia     ICD-10-CM: E78.2  ICD-9-CM: 272.2     Essential hypertension     ICD-10-CM: I10  ICD-9-CM: 401.9     Coronary artery disease involving native coronary artery of native heart without angina pectoris     ICD-10-CM: I25.10  ICD-9-CM: 414.01     Vitamin D deficiency     ICD-10-CM: E55.9  ICD-9-CM: 268.9     T2DM      ICD-10-CM: E11.8, Z79.4  ICD-9-CM: 250.90, V58.67     Class 1 obesity with serious comorbidity and body mass index (BMI) of 34.0 to 34.9 in adult, unspecified obesity type     ICD-10-CM: E66.811, Z68.34  ICD-9-CM: 278.00, V85.34     GERD     ICD-10-CM: K21.9  ICD-9-CM: 530.81     Chronic renal failure, stage 4 (severe)     ICD-10-CM: N18.4  ICD-9-CM: 585.4     Benign prostatic hyperplasia with nocturia     ICD-10-CM: N40.1, R35.1  ICD-9-CM: 600.01, 788.43     Healthcare maintenance     ICD-10-CM: Z00.00  ICD-9-CM: V70.0     History of nonmelanoma skin cancer     ICD-10-CM: Z85.828  ICD-9-CM: V10.83     History of colon cancer     ICD-10-CM: Z85.038  ICD-9-CM: V10.05     H/O testicular cancer     ICD-10-CM: Z85.47  ICD-9-CM: V10.47     Osteopenia of multiple sites     ICD-10-CM: M85.89  ICD-9-CM: 733.90     Degeneration of intervertebral disc of lumbar region with discogenic back pain     ICD-10-CM: M51.360  ICD-9-CM: 722.52     Elevated TSH     ICD-10-CM: R79.89  ICD-9-CM: 794.5

## 2024-12-31 DIAGNOSIS — N18.4 CHRONIC RENAL FAILURE, STAGE 4 (SEVERE): ICD-10-CM

## 2024-12-31 DIAGNOSIS — E55.9 VITAMIN D DEFICIENCY: Primary | ICD-10-CM

## 2025-01-02 ENCOUNTER — HOSPITAL ENCOUNTER (OUTPATIENT)
Facility: HOSPITAL | Age: 80
Discharge: HOME | End: 2025-01-02
Admitting: INTERNAL MEDICINE
Payer: COMMERCIAL

## 2025-01-02 ENCOUNTER — TRANSCRIBE ORDERS (OUTPATIENT)
Dept: ADMINISTRATIVE | Facility: HOSPITAL | Age: 80
End: 2025-01-02
Payer: COMMERCIAL

## 2025-01-02 DIAGNOSIS — N18.4 CHRONIC KIDNEY DISEASE, STAGE 4 (SEVERE): ICD-10-CM

## 2025-01-02 DIAGNOSIS — N18.4 CHRONIC KIDNEY DISEASE, STAGE 4 (SEVERE): Primary | ICD-10-CM

## 2025-01-02 PROCEDURE — 76775 US EXAM ABDO BACK WALL LIM: CPT

## 2025-01-02 PROCEDURE — 76775 US EXAM ABDO BACK WALL LIM: CPT | Performed by: RADIOLOGY

## 2025-01-14 ENCOUNTER — HOSPITAL ENCOUNTER (OUTPATIENT)
Dept: ULTRASOUND IMAGING | Facility: HOSPITAL | Age: 80
Discharge: HOME | End: 2025-01-14
Admitting: GENERAL PRACTICE
Payer: COMMERCIAL

## 2025-01-14 DIAGNOSIS — N18.4 CHRONIC RENAL FAILURE, STAGE 4 (SEVERE): ICD-10-CM

## 2025-01-14 PROCEDURE — 76700 US EXAM ABDOM COMPLETE: CPT | Performed by: RADIOLOGY

## 2025-01-14 PROCEDURE — 76700 US EXAM ABDOM COMPLETE: CPT

## 2025-01-17 ENCOUNTER — TELEPHONE (OUTPATIENT)
Dept: FAMILY MEDICINE CLINIC | Facility: CLINIC | Age: 80
End: 2025-01-17
Payer: COMMERCIAL

## 2025-01-17 ENCOUNTER — OFFICE VISIT (OUTPATIENT)
Dept: CARDIOLOGY | Facility: CLINIC | Age: 80
End: 2025-01-17
Payer: COMMERCIAL

## 2025-01-17 VITALS
OXYGEN SATURATION: 93 % | SYSTOLIC BLOOD PRESSURE: 162 MMHG | HEART RATE: 68 BPM | HEIGHT: 68 IN | WEIGHT: 230 LBS | BODY MASS INDEX: 34.86 KG/M2 | DIASTOLIC BLOOD PRESSURE: 86 MMHG

## 2025-01-17 DIAGNOSIS — I48.0 PAROXYSMAL ATRIAL FIBRILLATION: Chronic | ICD-10-CM

## 2025-01-17 DIAGNOSIS — Z95.2 S/P TAVR (TRANSCATHETER AORTIC VALVE REPLACEMENT): ICD-10-CM

## 2025-01-17 DIAGNOSIS — Z95.0 PACEMAKER: Primary | Chronic | ICD-10-CM

## 2025-01-17 NOTE — PROGRESS NOTES
Cardiac Electrophysiology Outpatient Note  [facility] Cardiology at [facility]    Office Visit     Nicholas Yin  [id number]  01/17/2025    Primary Care Physician: Waldemar Trejo MD    Referred By: Carlie Adrian APRN    Subjective     Chief Complaint   Patient presents with    Paroxysmal atrial fibrillation     PROBLEM LIST:  Complete heart block  Post TAVR, Baseline right bundle branch block noted prior to TAVR.  BSC DC PPM 5/2023 Dr. Mendoza  Paroxysmal atrial fibrillation  Aortic stenosis  Echo 12/8/2017 ([place]): EF 56-60%, mildly dilated LA, mild to moderate AS, mean gradient 19.  Echo 7/16/2019 ([place]): EF normal, grade 1 diastolic dysfunction, mild to moderate AS with mild AI.  Echo 7/16/2021 ([place]): EF 56-60%, grade 1 diastolic dysfunction, moderate AS  Echo 2/6/2023: EF 56-60%, moderate to severe AS.  Max gradient 32, mean gradient 18, TUNG 0.79.  Gradients could be underestimated based on LVOT  Status post 26 mm TONY 3 pericardial prosthesis in the aortic position 5/8/2023.  Echocardiogram 5/9/2023: LVEF 55% with a mean gradient of 10 mmHg.  Coronary artery disease  NSTEMI 8/15/2019 ([place]): 3.0 X18 Christina ARIA to mid LAD  Patient remains on dual antiplatelet therapy per his primary cardiologist  PET scan 4/10/23 revealed no evidence of ischemia.  LVEF 72% with stress.  Hypertension  Hyperlipidemia  PAD  CKD  GERD  Lumbar DDD  BPH  Osteopenia  History of testicular cancer    History of Present Illness:   Nicholas Yin is a 79 y.o. male who presents to my electrophysiology clinic as a referral from Carlie MURILLO for pacemaker management and paroxysmal atrial fibrillation on Multaq.  Patient underwent TAVR implant by Dr. Mendoza in 2023.  He presented back with late AV block approximately 1 week later and underwent urgent pacemaker implantation.  He also was diagnosed with atrial fibrillation around that time.  He believes slightly prior  to pacemaker implantation.  He has been treated with Multaq since September 2023.  Overall does not have a lot of symptoms with atrial fibrillation currently.  Can sometimes tell he is out of rhythm and sometimes feels fatigued and queasy.  This usually passes quite quickly.  Otherwise not having any major issues.  Takes his Xarelto without issue.  Blood pressure at home was been well-controlled.    Past Medical History:   Diagnosis Date    Anemia     Aortic stenosis     Arthritis     Back pain     CancerTesticular/Colon     test -1982  Colon -2005    CHF (congestive heart failure)     Coronary artery disease     COVID-19 2022    DDD (degenerative disc disease), lumbosacral     Diabetes mellitus     Dyspnea     Elevated cholesterol     Erectile dysfunction     GERD (gastroesophageal reflux disease)     History of transfusion     Hyperlipidemia     Hypertension     TIA (transient ischemic attack)        Past Surgical History:   Procedure Laterality Date    AORTIC VALVE REPAIR/REPLACEMENT N/A 5/8/2023    Procedure: TRANSCATHETER AORTIC VALVE REPLACEMENT + LAURIE, LEFT FEMORAL ENDARTERECTOMY WITH PATCH;  Surgeon: Arben Lopez MD;  Location: [facility];  Service: Cardiothoracic;  Laterality: N/A;    AORTIC VALVE REPAIR/REPLACEMENT N/A 5/8/2023    Procedure: Transfemoral Transcatheter Aortic Valve Replacement;  Surgeon: Rachael Mccord MD;  Location: [facility];  Service: Cardiovascular;  Laterality: N/A;    CARDIAC CATHETERIZATION N/A 08/15/2019    Procedure: Left Heart Cath;  Surgeon: Paul Villatoro MD;  Location:  [facility];  Service: Cardiology    CARDIAC CATHETERIZATION      04/10/2023 PER DR. MCCORD    CARDIAC ELECTROPHYSIOLOGY PROCEDURE N/A 5/15/2023    Procedure: Pacemaker DC - [employer];  Surgeon: Rachael Mccord MD;  Location:  [facility];  Service: Cardiology;  Laterality: N/A;    CATARACT EXTRACTION, BILATERAL       COLON SURGERY      colon resection for cancer - sigmoid removed - no chemo no radiation    COLONOSCOPY      COLONOSCOPY N/A 01/27/2021    Procedure: COLONOSCOPY;  Surgeon: Greg Barraza MD;  Location: [facility];  Service: General;  Laterality: N/A;    CORONARY STENT PLACEMENT      1 STENT    ENDOSCOPY  1989    EYE SURGERY Right     retinal detachment    LA RT/LT HEART CATHETERS N/A 08/15/2019    Procedure: Percutaneous Coronary Intervention;  Surgeon: Paul Villatoro MD;  Location: [facility] CATH INVASIVE LOCATION;  Service: Cardiology    SCALP LESION REMOVAL W/ FLAP AND SKIN GRAFT  12/16/2022    basal cell removal    SHOULDER ARTHROSCOPY Right     SKIN LESION EXCISION N/A 01/27/2021    Procedure: SKIN LESIONS EXCISION FROM LEFT TEMPLE AREA AND ABDOMEN.;  Surgeon: Greg Barraza MD;  Location:  [facility];  Service: General;  Laterality: N/A;    VASECTOMY Right     testicle removed d/t cancer - with radiation       Family History   Problem Relation Age of Onset    Stroke Mother     Hypertension Mother     Alcohol abuse Mother     COPD Mother     COPD Father     Alcohol abuse Father     Hypertension Father     Alzheimer's disease Father     Heart disease Brother     Hypertension Brother     Diabetes Brother     Stroke Maternal Grandfather     Cancer Paternal Grandmother        Social History     Socioeconomic History    Marital status:      Spouse name: lita    Number of children: 3    Years of education: 16   Tobacco Use    Smoking status: Never     Passive exposure: Never    Smokeless tobacco: Never   Vaping Use    Vaping status: Never Used   Substance and Sexual Activity    Alcohol use: Yes     Comment: vary rarely    Drug use: No    Sexual activity: Defer     Partners: Female         Current Outpatient Medications:     aspirin 81 MG EC tablet, Take 1 tablet by mouth Daily., Disp: , Rfl:     dapagliflozin Propanediol (Farxiga) 10 MG tablet, Take 1 tablet by mouth Daily., Disp:  "90 tablet, Rfl: 2    dronedarone (Multaq) 400 MG tablet, Take 1 tablet by mouth 2 (Two) Times a Day With Meals., Disp: 180 tablet, Rfl: 3    Finerenone (Kerendia) 10 MG tablet, Take 1 tablet by mouth Daily. (Patient taking differently: Take 1 tablet by mouth Daily. ON HOLD), Disp: 90 tablet, Rfl: 3    losartan (Cozaar) 25 MG tablet, Take 1 tablet by mouth 2 (Two) Times a Day., Disp: 180 tablet, Rfl: 2    metoprolol tartrate (LOPRESSOR) 25 MG tablet, Take 1 tablet by mouth 2 (Two) Times a Day., Disp: 180 tablet, Rfl: 2    omeprazole (priLOSEC) 40 MG capsule, Take 1 capsule by mouth Daily., Disp: , Rfl:     rivaroxaban (XARELTO) 15 MG tablet, Take 1 tablet by mouth Daily., Disp: 84 tablet, Rfl: 0    rosuvastatin (CRESTOR) 20 MG tablet, Take 1 tablet by mouth Daily., Disp: 90 tablet, Rfl: 3    sildenafil (VIAGRA) 100 MG tablet, 1/2-1 po daily prn, Disp: 10 tablet, Rfl: 5    vitamin D (ERGOCALCIFEROL) 1.25 MG (28765 UT) capsule capsule, Take 1 capsule by mouth Every 7 (Seven) Days., Disp: 12 capsule, Rfl: 1    Xultophy 100-3.6 UNIT-MG/ML solution pen-injector subcutaneous pen, Inject 50 Units under the skin into the appropriate area as directed Daily., Disp: 15 mL, Rfl: 5    Allergies:   Allergies   Allergen Reactions    Ct Contrast Anaphylaxis    Iodinated Contrast Media Shortness Of Breath       Objective   Vital Signs: Blood pressure 162/86, pulse 68, height 172.7 cm (68\"), weight 104 kg (230 lb), SpO2 93%.    PHYSICAL EXAM  General appearance: Awake, alert, cooperative  Head: Normocephalic, without obvious abnormality, atraumatic  Neck: No JVD  Lungs: Clear to ascultation bilaterally  Heart: Regular rate and rhythm, no murmurs, 2+ LE pulses, no lower extremity swelling  Skin: Skin color, turgor normal, no rashes or lesions  Neurologic: Grossly normal     Lab Results   Component Value Date    GLUCOSE 80 12/18/2024    CALCIUM 9.2 12/18/2024     12/18/2024    K 5.1 12/18/2024    CO2 21.5 (L) 12/18/2024     " (H) 12/18/2024    BUN 32 (H) 12/18/2024    CREATININE 2.91 (H) 12/18/2024    EGFRIFNONA 29 (L) 01/13/2022    BCR 11.0 12/18/2024    ANIONGAP 9.5 12/18/2024     Lab Results   Component Value Date    WBC 10.73 10/18/2024    HGB 10.9 (L) 10/18/2024    HCT 36.9 (L) 10/18/2024    MCV 91.3 10/18/2024     10/18/2024     Lab Results   Component Value Date    INR 1.20 (H) 09/11/2023    INR 1.07 05/14/2023    INR 0.98 05/05/2023    PROTIME 15.8 (H) 09/11/2023    PROTIME 14.0 05/14/2023    PROTIME 13.1 05/05/2023     Lab Results   Component Value Date    TSH 3.860 12/18/2024          Results for orders placed during the hospital encounter of 05/16/24    Adult Transthoracic Echo Complete W/ Cont if Necessary Per Protocol    Interpretation Summary    Left ventricular systolic function is normal. Calculated left ventricular EF = 53% Left ventricular ejection fraction appears to be 51 - 55%.    Left ventricular diastolic function is consistent with (grade I) impaired relaxation.    The left atrial cavity is mild to moderately dilated.    There is a TAVR valve present.     Results for orders placed during the hospital encounter of 05/08/23    Cardiac Catheterization/Vascular Study    Conclusion  Please see operative report.      I personally viewed and interpreted the patient's EKG/Telemetry/lab data      ECG 12 Lead    Date/Time: 1/17/2025 12:30 PM  Performed by: Sulaiman Howe MD    Authorized by: Sulaiman Howe MD  Comparison: compared with previous ECG from 8/22/2024  Similar to previous ECG  Comments: Sinus rhythm, first-degree AV block, right bundle branch block          Nicholas Yin  reports that he has never smoked. He has never been exposed to tobacco smoke. He has never used smokeless tobacco.     Advance Care Planning   Advance Care Planning: ACP discussion was held with the patient during this visit. Patient does not have an advance directive, information provided.     Assessment & Plan    1.  Pacemaker  [employer] dual-chamber pacemaker was interrogated is functioning normally.  He is approximately 7 years of battery life remaining.  He is pacing 36% of time in the atrium and 5% of the time in the ventricle.  No changes were made to his settings today.    2. Paroxysmal atrial fibrillation  Patient has a history of paroxysmal atrial fibrillation.  Most recent burden is 4%.  He does have a slight uptick in frequency of events in the past few weeks.  We discussed this today.  For now he elected to continue monitoring.  Should he have more A-fib then likely not can to be a candidate for any additional antiarrhythmics due to his and is having significant symptoms and we could consider ablation.  CKD.    Of note he is currently taking Multaq.  He did have some increase in the creatinine after this, which is common and is likely not indicative of actual decreased renal function.    3. S/P TAVR (transcatheter aortic valve replacement)  Status post TAVR by Dr. Mendoza.  Doing well.    4.  AV block  Patient had intermittent paroxysmal AV block following his TAVR.  Currently 5% paced.  Will monitor.       Follow Up:  Return in about 1 year (around 1/17/2026) for BSC.      Thank you for allowing me to participate in the care of your patient. Please do not hesitate to contact me with additional questions or concerns.      Sulaiman Howe M.D.  Cardiac Electrophysiologist  [facility]

## 2025-01-17 NOTE — TELEPHONE ENCOUNTER
Completed this PA on the 8th through covermymeds, it was denied, no denial reason was ever sent to us so I resubmitted on the 15th. That PA is still pending and I also faxed their form today.       ----- Message from Mesha GARCIA sent at 1/17/2025 10:21 AM EST -----  Patient called regarding the PA we have been working on.. they have spoken to the insurance company and there is a form we need to call and request fill out and send in    Will you please call the new rx insurance for more information and let the patient know the out come   \

## 2025-01-21 ENCOUNTER — TELEPHONE (OUTPATIENT)
Dept: FAMILY MEDICINE CLINIC | Facility: CLINIC | Age: 80
End: 2025-01-21

## 2025-01-21 NOTE — TELEPHONE ENCOUNTER
Pharmacy Name: [facility]      Pharmacy representative name: ASHWIN    Pharmacy representative phone number: [phone]- ASK FOR ASHWIN    What medication are you calling in regards to: Xultophy 100-3.6 UNIT-MG/ML solution pen-injector subcutaneous pen     What question does the pharmacy have: WAS TOLD THIS WAS DENIED. INSURANCE ASKING FOR THE [facility] RECENT A1C TO BE FAXED TO: [phone]    Who is the provider that prescribed the medication: DR MEDEIROS    PATIENT HAS BEEN OUT OF MEDICATION FOR AT LEAST A WEEK.

## 2025-01-23 DIAGNOSIS — E11.8 TYPE 2 DIABETES MELLITUS WITH COMPLICATION, WITH LONG-TERM CURRENT USE OF INSULIN: Primary | Chronic | ICD-10-CM

## 2025-01-23 DIAGNOSIS — Z79.4 TYPE 2 DIABETES MELLITUS WITH COMPLICATION, WITH LONG-TERM CURRENT USE OF INSULIN: Primary | Chronic | ICD-10-CM

## 2025-01-23 RX ORDER — INSULIN DEGLUDEC 200 U/ML
50 INJECTION, SOLUTION SUBCUTANEOUS DAILY
Qty: 9 ML | Refills: 5 | Status: SHIPPED | OUTPATIENT
Start: 2025-01-23

## 2025-03-19 ENCOUNTER — OFFICE VISIT (OUTPATIENT)
Dept: CARDIOLOGY | Facility: CLINIC | Age: 80
End: 2025-03-19
Payer: COMMERCIAL

## 2025-03-19 VITALS
HEIGHT: 68 IN | SYSTOLIC BLOOD PRESSURE: 139 MMHG | DIASTOLIC BLOOD PRESSURE: 72 MMHG | HEART RATE: 72 BPM | BODY MASS INDEX: 34.71 KG/M2 | OXYGEN SATURATION: 98 % | WEIGHT: 229 LBS

## 2025-03-19 DIAGNOSIS — I25.10 CORONARY ARTERY DISEASE INVOLVING NATIVE CORONARY ARTERY OF NATIVE HEART WITHOUT ANGINA PECTORIS: Primary | Chronic | ICD-10-CM

## 2025-03-19 DIAGNOSIS — E78.2 MIXED HYPERLIPIDEMIA: Chronic | ICD-10-CM

## 2025-03-19 DIAGNOSIS — I10 ESSENTIAL HYPERTENSION: Chronic | ICD-10-CM

## 2025-03-19 DIAGNOSIS — I48.0 PAROXYSMAL ATRIAL FIBRILLATION: Chronic | ICD-10-CM

## 2025-03-19 PROCEDURE — 99214 OFFICE O/P EST MOD 30 MIN: CPT | Performed by: NURSE PRACTITIONER

## 2025-03-19 NOTE — PROGRESS NOTES
"Chief Complaint  Follow-up (Patient denies chest pain , states has a lot of fatigue )    Subjective          Nicholas Yin presents to [facility] for follow up.    History of Present Illness  History of Present Illness  The patient is a 80-year-old male who follows in the clinic with ASCVD, dyslipidemia, hypertension, paroxysmal atrial fibrillation status post PPM, and status post TAVR. He was last seen in the clinic on 08/22/2024, at which time he was doing well, and no changes were made to his medications. He was also seen by Dr. Howe on 01/17/2025 and was doing well at that visit.    He reports a general decline in his health, characterized by persistent fatigue and weakness. He experiences shortness of breath after ascending a single flight of stairs, necessitating rest periods. His physical activity has been significantly reduced, although he attempts to maintain some level of activity. He has discontinued cardiac rehabilitation due to perceived difficulty but continues to perform back exercises at home. He uses a walker for balance and to prevent falls. His bedroom is located on the main floor of his residence, but he frequently ascends to his upstairs office.     He reports no issues with his medications and does not not any recent episodes of atrial fibrillation, which he believes has decreased in frequency. He has not experienced any recent falls, although he had a few significant falls several years ago. He has been working on improving his balance using a wooden platform and foam pad. He reports no issues with Xarelto and has not noticed any bright red blood per rectum or black tarry stools.         Objective     Vital Signs:   /72 (BP Location: Left arm, Patient Position: Sitting, Cuff Size: Adult)   Pulse 72   Ht 172.7 cm (68\")   Wt 104 kg (229 lb)   SpO2 98%   BMI 34.82 kg/m²       Physical Exam  Vitals reviewed.   Constitutional:       Appearance: " Normal appearance. He is well-developed.   Cardiovascular:      Rate and Rhythm: Normal rate and regular rhythm.      Heart sounds: No murmur heard.     No friction rub. No gallop.   Pulmonary:      Effort: Pulmonary effort is normal. No respiratory distress.      Breath sounds: Normal breath sounds. No wheezing or rales.   Skin:     General: Skin is warm and dry.   Neurological:      Mental Status: He is alert and oriented to person, place, and time.   Psychiatric:         Mood and Affect: Mood normal.         Behavior: Behavior normal.          Result Review :     CMP          9/7/2024    10:18 12/18/2024    08:20 3/20/2025    08:29   CMP   Glucose 119  80  102    BUN 34  32  40    Creatinine 2.88  2.91  2.78    EGFR 21.6  21.4  22.4    Sodium 139  139  143    Potassium 4.0  5.1  5.3    Chloride 108  108  112    Calcium 9.1  9.2  8.8    Total Protein 6.9  7.1  6.5    Albumin 3.2  3.7  3.7    Globulin 3.7  3.4  2.8    Total Bilirubin 0.5  0.6  0.5    Alkaline Phosphatase 90  76  68    AST (SGOT) 21  21  22    ALT (SGPT) 14  12  14    Albumin/Globulin Ratio 0.9  1.1  1.3    BUN/Creatinine Ratio 11.8  11.0  14.4    Anion Gap 13.0  9.5  8.2      CBC          8/24/2024    09:24 9/7/2024    10:18 10/18/2024    08:07   CBC   WBC 10.37  9.21  10.73    RBC 4.51  4.43  4.04    Hemoglobin 11.8  11.8  10.9    Hematocrit 38.4  37.1  36.9    MCV 85.1  83.7  91.3    MCH 26.2  26.6  27.0    MCHC 30.7  31.8  29.5    RDW 16.1  15.6  19.9    Platelets 149  254  190      Lipid Panel          6/3/2024    08:10 6/25/2024    08:40 12/18/2024    08:20   Lipid Panel   Total Cholesterol 103  100  116    Triglycerides 99  220  137    HDL Cholesterol 49  42  47    VLDL Cholesterol 19  34  24    LDL Cholesterol  35  24  45    LDL/HDL Ratio 0.70  0.33  0.89               Most recent echocardiogram  Results for orders placed during the hospital encounter of 05/16/24    Adult Transthoracic Echo Complete W/ Cont if Necessary Per  Protocol    Interpretation Summary    Left ventricular systolic function is normal. Calculated left ventricular EF = 53% Left ventricular ejection fraction appears to be 51 - 55%.    Left ventricular diastolic function is consistent with (grade I) impaired relaxation.    The left atrial cavity is mild to moderately dilated.    There is a TAVR valve present.      Most recent Stress Test  Results for orders placed during the hospital encounter of 04/10/23    Stress Test With Pet Myocardial Perfusion    Interpretation Summary    Patient denied any chest discomfort/pain, or any other symptoms with the infusion    Baseline EKG shows a right bundle branch block with mild first-degree AV block.  No ST segment shift from baseline with the infusion.    Normal hemodynamic response to regadenoson.    When the stress and rest PET images were compared no areas of fixed hypoperfusion or stress-induced hypoperfusion were seen.  The 3D reconstruction showed normal contractility in all segments with a calculated ejection at rest of 63% and with stress 72%..    No evidence of inducible ischemia by scintigraphic criteria.  (The patient was known to have a near normal ramus circumflex and right coronary artery at the time of his LAD stent in 2019.)  The patient may proceed with TAVR.       Most recent Cardiac Cath  Results for orders placed during the hospital encounter of 05/08/23    Cardiac Catheterization/Vascular Study    Conclusion  Please see operative report.      Most recent Coronary CT  No results found for this or any previous visit.         Current Outpatient Medications   Medication Sig Dispense Refill    aspirin 81 MG EC tablet Take 1 tablet by mouth Daily.      dapagliflozin Propanediol (Farxiga) 10 MG tablet Take 1 tablet by mouth Daily. 90 tablet 2    dronedarone (Multaq) 400 MG tablet Take 1 tablet by mouth 2 (Two) Times a Day With Meals. 180 tablet 3    losartan (Cozaar) 25 MG tablet Take 1 tablet by mouth 2 (Two)  Times a Day. 180 tablet 2    metoprolol tartrate (LOPRESSOR) 25 MG tablet Take 1 tablet by mouth 2 (Two) Times a Day. 180 tablet 2    omeprazole (priLOSEC) 40 MG capsule Take 1 capsule by mouth Daily.      rivaroxaban (XARELTO) 15 MG tablet Take 1 tablet by mouth Daily. 84 tablet 0    rosuvastatin (CRESTOR) 20 MG tablet Take 1 tablet by mouth Daily. 90 tablet 3    sildenafil (VIAGRA) 100 MG tablet 1/2-1 po daily prn 10 tablet 5    vitamin D (ERGOCALCIFEROL) 1.25 MG (56614 UT) capsule capsule Take 1 capsule by mouth Every 7 (Seven) Days. 12 capsule 1    Xultophy 100-3.6 UNIT-MG/ML solution pen-injector subcutaneous pen Inject 50 Units under the skin into the appropriate area as directed Daily. (Patient taking differently: Inject 30 Units under the skin into the appropriate area as directed Daily.) 15 mL 5    Finerenone (Kerendia) 10 MG tablet Take 1 tablet by mouth Daily. (Patient not taking: Reported on 3/19/2025) 90 tablet 3     No current facility-administered medications for this visit.               Problem List Items Addressed This Visit          Cardiac and Vasculature    Mixed hyperlipidemia (Chronic)    Overview   7/25/2022 total cholesterol 97, triglycerides 135, HDL 46, and LDL of 28  10/27/2022 total cholesterol 147, triglycerides 134, HDL 49, and LDL 74  3/10/2023 total cholesterol 118, triglycerides 151, HDL 50, and LDL 43  12/18/2024 total cholesterol 116, triglycerides 137, HDL 47, and LDL 45         Relevant Orders    Comprehensive Metabolic Panel (Completed)    Magnesium (Completed)    Essential hypertension (Chronic)    Relevant Orders    Comprehensive Metabolic Panel (Completed)    Magnesium (Completed)    CAD s/p stent  - Primary (Chronic)    Overview   8/15/2019 [facility] for non-STEMI: PCI ARIA to the mid LAD         Relevant Orders    Comprehensive Metabolic Panel (Completed)    Magnesium (Completed)    Paroxysmal atrial fibrillation (Chronic)    Relevant Orders    Comprehensive Metabolic Panel  (Completed)    Magnesium (Completed)       Assessment & Plan  1. Atherosclerotic cardiovascular disease (ASCVD).  He reports feeling progressively worse, with increased fatigue and shortness of breath, especially when climbing stairs. Despite these symptoms, he has not experienced any chest pain.  He is doing well with his medications. A comprehensive metabolic panel has been ordered to monitor his kidney and liver function. He is advised to continue his current medication regimen.    2. Dyslipidemia.  He is advised to continue his current medication regimen.  LDL is at goal.    3. Hypertension.  His blood pressure was slightly elevated at 139 systolic during the visit. He reports that his blood pressure is usually lower at home when resting. He is advised to continue monitoring his blood pressure at home and maintain his current medication regimen.    4. Paroxysmal atrial fibrillation.  He does not monitor his atrial fibrillation regularly but reports no significant issues. The last PPM report indicated a reduction in atrial fibrillation episodes to around 4%. He is advised to continue his current medication regimen, including Xarelto. He reports no issues with Xarelto, such as bright red blood per rectum or black tarry stools.    5. Status post permanent pacemaker (PPM) implantation.  He is advised to continue his current care regimen.    6. Status post transcatheter aortic valve replacement (TAVR).  He is advised to continue his current care regimen.             Follow Up     Return in about 6 months (around 9/19/2025).    Patient was given instructions and counseling regarding his condition or for health maintenance advice. Please see specific information pulled into the AVS if appropriate.     Patient or patient representative verbalized consent for the use of Ambient Listening during the visit with  CHIDI Ramírez for chart documentation. 3/21/2025  15:15 EDT

## 2025-03-20 ENCOUNTER — LAB (OUTPATIENT)
Dept: LAB | Facility: HOSPITAL | Age: 80
End: 2025-03-20
Payer: COMMERCIAL

## 2025-03-20 LAB
ALBUMIN SERPL-MCNC: 3.7 G/DL (ref 3.5–5.2)
ALBUMIN/GLOB SERPL: 1.3 G/DL
ALP SERPL-CCNC: 68 U/L (ref 39–117)
ALT SERPL W P-5'-P-CCNC: 14 U/L (ref 1–41)
ANION GAP SERPL CALCULATED.3IONS-SCNC: 8.2 MMOL/L (ref 5–15)
AST SERPL-CCNC: 22 U/L (ref 1–40)
BILIRUB SERPL-MCNC: 0.5 MG/DL (ref 0–1.2)
BUN SERPL-MCNC: 40 MG/DL (ref 8–23)
BUN/CREAT SERPL: 14.4 (ref 7–25)
CALCIUM SPEC-SCNC: 8.8 MG/DL (ref 8.6–10.5)
CHLORIDE SERPL-SCNC: 112 MMOL/L (ref 98–107)
CO2 SERPL-SCNC: 22.8 MMOL/L (ref 22–29)
CREAT SERPL-MCNC: 2.78 MG/DL (ref 0.76–1.27)
EGFRCR SERPLBLD CKD-EPI 2021: 22.4 ML/MIN/1.73
GLOBULIN UR ELPH-MCNC: 2.8 GM/DL
GLUCOSE SERPL-MCNC: 102 MG/DL (ref 65–99)
MAGNESIUM SERPL-MCNC: 2.1 MG/DL (ref 1.6–2.4)
POTASSIUM SERPL-SCNC: 5.3 MMOL/L (ref 3.5–5.2)
PROT SERPL-MCNC: 6.5 G/DL (ref 6–8.5)
SODIUM SERPL-SCNC: 143 MMOL/L (ref 136–145)

## 2025-03-20 PROCEDURE — 83735 ASSAY OF MAGNESIUM: CPT | Performed by: NURSE PRACTITIONER

## 2025-03-20 PROCEDURE — 36415 COLL VENOUS BLD VENIPUNCTURE: CPT | Performed by: NURSE PRACTITIONER

## 2025-03-20 PROCEDURE — 80053 COMPREHEN METABOLIC PANEL: CPT | Performed by: NURSE PRACTITIONER

## 2025-03-25 DIAGNOSIS — E55.9 VITAMIN D DEFICIENCY: ICD-10-CM

## 2025-03-25 RX ORDER — ERGOCALCIFEROL 1.25 MG/1
50000 CAPSULE, LIQUID FILLED ORAL
Qty: 12 CAPSULE | Refills: 1 | Status: SHIPPED | OUTPATIENT
Start: 2025-03-25

## 2025-04-08 DIAGNOSIS — I48.0 PAROXYSMAL ATRIAL FIBRILLATION: Chronic | ICD-10-CM

## 2025-04-09 PROCEDURE — 93294 REM INTERROG EVL PM/LDLS PM: CPT | Performed by: STUDENT IN AN ORGANIZED HEALTH CARE EDUCATION/TRAINING PROGRAM

## 2025-04-09 PROCEDURE — 93296 REM INTERROG EVL PM/IDS: CPT | Performed by: STUDENT IN AN ORGANIZED HEALTH CARE EDUCATION/TRAINING PROGRAM

## 2025-04-11 ENCOUNTER — OFFICE VISIT (OUTPATIENT)
Dept: FAMILY MEDICINE CLINIC | Facility: CLINIC | Age: 80
End: 2025-04-11
Payer: COMMERCIAL

## 2025-04-11 DIAGNOSIS — E55.9 VITAMIN D DEFICIENCY: ICD-10-CM

## 2025-04-11 DIAGNOSIS — E66.812 CLASS 2 SEVERE OBESITY WITH SERIOUS COMORBIDITY AND BODY MASS INDEX (BMI) OF 35.0 TO 35.9 IN ADULT, UNSPECIFIED OBESITY TYPE: ICD-10-CM

## 2025-04-11 DIAGNOSIS — R79.89 ELEVATED TSH: ICD-10-CM

## 2025-04-11 DIAGNOSIS — Z85.038 HISTORY OF COLON CANCER: ICD-10-CM

## 2025-04-11 DIAGNOSIS — I48.0 PAROXYSMAL ATRIAL FIBRILLATION: Chronic | ICD-10-CM

## 2025-04-11 DIAGNOSIS — I10 ESSENTIAL HYPERTENSION: Chronic | ICD-10-CM

## 2025-04-11 DIAGNOSIS — N18.4 CHRONIC RENAL FAILURE, STAGE 4 (SEVERE): ICD-10-CM

## 2025-04-11 DIAGNOSIS — R35.1 BENIGN PROSTATIC HYPERPLASIA WITH NOCTURIA: ICD-10-CM

## 2025-04-11 DIAGNOSIS — Z00.00 HEALTHCARE MAINTENANCE: ICD-10-CM

## 2025-04-11 DIAGNOSIS — E78.2 MIXED HYPERLIPIDEMIA: Chronic | ICD-10-CM

## 2025-04-11 DIAGNOSIS — E11.8 TYPE 2 DIABETES MELLITUS WITH COMPLICATION, WITH LONG-TERM CURRENT USE OF INSULIN: Chronic | ICD-10-CM

## 2025-04-11 DIAGNOSIS — I73.9 PERIPHERAL VASCULAR DISEASE: ICD-10-CM

## 2025-04-11 DIAGNOSIS — N52.01 ERECTILE DYSFUNCTION DUE TO ARTERIAL INSUFFICIENCY: ICD-10-CM

## 2025-04-11 DIAGNOSIS — M85.89 OSTEOPENIA OF MULTIPLE SITES: ICD-10-CM

## 2025-04-11 DIAGNOSIS — M51.360 DEGENERATION OF INTERVERTEBRAL DISC OF LUMBAR REGION WITH DISCOGENIC BACK PAIN: ICD-10-CM

## 2025-04-11 DIAGNOSIS — E66.01 CLASS 2 SEVERE OBESITY WITH SERIOUS COMORBIDITY AND BODY MASS INDEX (BMI) OF 35.0 TO 35.9 IN ADULT, UNSPECIFIED OBESITY TYPE: ICD-10-CM

## 2025-04-11 DIAGNOSIS — Z79.4 TYPE 2 DIABETES MELLITUS WITH COMPLICATION, WITH LONG-TERM CURRENT USE OF INSULIN: Chronic | ICD-10-CM

## 2025-04-11 DIAGNOSIS — G45.3 AMAUROSIS FUGAX OF RIGHT EYE: ICD-10-CM

## 2025-04-11 DIAGNOSIS — Z85.47 H/O TESTICULAR CANCER: ICD-10-CM

## 2025-04-11 DIAGNOSIS — Z95.2 S/P TAVR (TRANSCATHETER AORTIC VALVE REPLACEMENT): Primary | Chronic | ICD-10-CM

## 2025-04-11 DIAGNOSIS — K21.9 GASTROESOPHAGEAL REFLUX DISEASE WITHOUT ESOPHAGITIS: Chronic | ICD-10-CM

## 2025-04-11 DIAGNOSIS — R06.02 SOBOE (SHORTNESS OF BREATH ON EXERTION): ICD-10-CM

## 2025-04-11 DIAGNOSIS — I25.10 CORONARY ARTERY DISEASE INVOLVING NATIVE CORONARY ARTERY OF NATIVE HEART WITHOUT ANGINA PECTORIS: Chronic | ICD-10-CM

## 2025-04-11 DIAGNOSIS — Z86.19 HISTORY OF HEPATITIS C: ICD-10-CM

## 2025-04-11 DIAGNOSIS — N40.1 BENIGN PROSTATIC HYPERPLASIA WITH NOCTURIA: ICD-10-CM

## 2025-04-11 DIAGNOSIS — Z85.828 HISTORY OF NONMELANOMA SKIN CANCER: ICD-10-CM

## 2025-04-11 LAB
EXPIRATION DATE: ABNORMAL
HBA1C MFR BLD: 6.8 % (ref 4.5–5.7)
Lab: ABNORMAL

## 2025-04-11 PROCEDURE — 84156 ASSAY OF PROTEIN URINE: CPT | Performed by: GENERAL PRACTICE

## 2025-04-11 PROCEDURE — 82570 ASSAY OF URINE CREATININE: CPT | Performed by: GENERAL PRACTICE

## 2025-04-11 PROCEDURE — 82043 UR ALBUMIN QUANTITATIVE: CPT | Performed by: GENERAL PRACTICE

## 2025-04-11 NOTE — PROGRESS NOTES
Subjective   Nicholas Yin is a 80 y.o. male.     Chief Complaint  He returns for a scheduled reassessment of multiple medical problems including previous TAVR, paroxysmal atrial fibrillation, coronary artery disease, transient visual loss, type 2 diabetes mellitus, hyperlipidemia, essential hypertension, chronic renal failure, chronic low back pain, and gastroesophageal reflux disease    History of Present Illness     Previous TAVR  He underwent a TAVR and left femoral endarterectomy on 5/8/2023.      Paroxysmal Atrial Fibrillation  He required readmission to hospital on 5/14/2023 following a presyncopal episode. He was found to be in atrial fibrillation with a rapid ventricular response.  He was started on IV diltiazem and progressed to a wide-complex tachycardia with loss of consciousness.  CPR was administered with a prompt improvement and he regained consciousness.  He was then started on IV amiodarone and transferred to [facility].  On arrival there he was found to be in a third-degree block and underwent placement of a pacemaker by Dr. Mendoza.  He remains on dronedarone and continues to deny any significant palpitations.  He continues to deny any chest pain or lightheadedness, and there is no history of any calf pain, swelling of the ankles, fever, or chills. He remains on rivaroxaban.  He is scheduled to undergo an electrophysiology reassessment with Dr. Howe on 5/8/2026    Coronary Artery Disease  He underwent a coronary angiogram by Dr. Villatoro on 8/15/2019 with stenting of the mid LAD.  He remains on low-dose ASA.  He has experienced increased shortness of breath since last here.  He denies any orthopnea or PND, and there is no history of any chest pain, calf pain, or swelling the ankles.  He admits to an occasional cough but denies any fever or chills.  He is scheduled to undergo a cardiology reassessment with CHIDI Ku on 9/19/2026    Transient Visual Loss  Several years ago he  experienced a sudden progressive decrease in the vision in his right eye while driving. He stated that the vision in the eye darkened gradually over several minutes to the point where the only things he could make out were very bright lights and the sun. This lasted for about 30 minutes then resolved over several minutes. He was hospitalized at [facility] over 10 years ago following a similar episodes that was ultimately felt to be vascular in origin. MRI of the brain performed on 12/8/17 was reported as showing mild cerebral atrophy with chronic small vessel ischemic changes. MRA of the carotid arteries performed the same day was unremarkable.  Bilateral carotid ultrasound performed on 2/6/2023 was unremarkable.  He undergoes regular ophthalmology assessments    Type 2 Diabetes Mellitus  He remains on a SGLT2 -empagliflozin, GLP agonist-liraglutide and basal insulin - tresiba (together as xultophy).    Lab Results   Component Value Date    HGBA1C 6.8 (A) 04/11/2025     Hyperlipidemia  He remains on rosuvastatin    Essential Hypertension  Home blood pressure readings: have been a bit low at times.  He appears to be taking both metoprolol and losartan  Lab Results   Component Value Date    GLUCOSE 102 (H) 03/20/2025    BUN 40 (H) 03/20/2025    CREATININE 2.78 (H) 03/20/2025     03/20/2025    K 5.3 (H) 03/20/2025     (H) 03/20/2025    CALCIUM 8.8 03/20/2025    PROTEINTOT 6.5 03/20/2025    ALBUMIN 3.7 03/20/2025    ALT 14 03/20/2025    AST 22 03/20/2025    ALKPHOS 68 03/20/2025    BILITOT 0.5 03/20/2025    GLOB 2.8 03/20/2025    AGRATIO 1.3 03/20/2025    BCR 14.4 03/20/2025    ANIONGAP 8.2 03/20/2025    EGFR 22.4 (L) 03/20/2025     Chronic Renal Failure  This has been contributed to longstanding type 2 diabetes mellitus and hypertension.  He is aware to avoid any NSAIDs oral or prescription.     Chronic Back Pain  He gives a long history of increasing mid and low back pain. This is described as a sharp ache. The  pain does not radiate and has been unassociated with any other symptoms. The pain is worse with prolonged weight bearing and limits his activities more then anything else at present.  MRI performed on 1/7/2021 was reported as showing degenerative disc disease and osteoarthritis with mild to moderate central canal and bilateral foraminal narrowing at L2-3 and L3-4.    Gastroesophageal Reflux Disease  He remains heartburn free on omeprazole    The following portions of the patient's history were reviewed and updated as appropriate: allergies, current medications, past medical history, past social history, and problem list.    Review of Systems   Constitutional:  Positive for fatigue. Negative for chills and fever.   HENT:  Negative for congestion, dental problem, ear pain, rhinorrhea, sneezing and sore throat.    Respiratory:  Positive for cough and shortness of breath. Negative for wheezing.    Cardiovascular:  Negative for chest pain, palpitations and leg swelling.   Gastrointestinal:  Negative for abdominal pain, blood in stool, constipation, diarrhea, nausea and vomiting.   Genitourinary:  Positive for difficulty urinating (sense of incomplete voding), nocturia (x 2-3) and erectile dysfunction. Negative for dysuria and hematuria.   Musculoskeletal:  Positive for back pain. Negative for arthralgias, gait problem, joint swelling, myalgias, neck pain and neck stiffness.   Skin:  Negative for rash and skin lesions.   Neurological:  Positive for weakness (generalized). Negative for numbness and headache.   Psychiatric/Behavioral:  Negative for behavioral problems, sleep disturbance and depressed mood. The patient is not nervous/anxious.      Objective   Physical Exam  Constitutional:       General: He is not in acute distress.     Appearance: Normal appearance. He is well-developed. He is not ill-appearing or diaphoretic.      Comments: Bright and in fair spirits.  Sits and stands with an exaggerated thoracic kyphosis.   Short of breath on walking more than 30 feet. No apparent distress.  No pallor, cyanosis, diaphoresis, or jaundice   HENT:      Head: Atraumatic.   Eyes:      Conjunctiva/sclera: Conjunctivae normal.   Neck:      Thyroid: No thyroid mass or thyromegaly.      Vascular: No carotid bruit or JVD.      Trachea: Trachea normal. No tracheal deviation.   Cardiovascular:      Rate and Rhythm: Normal rate and regular rhythm.   Pulmonary:      Effort: Pulmonary effort is normal.      Breath sounds: Normal breath sounds.   Abdominal:      General: Bowel sounds are normal. There is no distension.   Musculoskeletal:      Right lower leg: No edema.      Left lower leg: No edema.   Lymphadenopathy:      Head:      Right side of head: No submental, submandibular, tonsillar, preauricular, posterior auricular or occipital adenopathy.      Left side of head: No submental, submandibular, tonsillar, preauricular, posterior auricular or occipital adenopathy.      Cervical: No cervical adenopathy.      Upper Body:      Right upper body: No supraclavicular adenopathy.      Left upper body: No supraclavicular adenopathy.   Skin:     General: Skin is warm and dry.      Coloration: Skin is not cyanotic, jaundiced or pale.      Findings: No lesion or rash.      Nails: There is no clubbing.   Neurological:      Mental Status: He is alert and oriented to person, place, and time.      Cranial Nerves: No cranial nerve deficit.      Motor: No tremor.      Coordination: Coordination normal.      Gait: Gait normal.   Psychiatric:         Attention and Perception: Attention normal.         Mood and Affect: Mood normal.         Speech: Speech normal.         Behavior: Behavior normal.         Thought Content: Thought content normal.       Assessment & Plan   Problems Addressed this Visit          Cardiac and Vasculature    CAD s/p stent  (Chronic)  Reminded regarding the importance of risk factor modification.  Continue low-dose ASA.  Follow up with  cardiology     Relevant Orders    XR Chest 2 View    Adult Transthoracic Echo Complete W/ Cont if Necessary Per Protocol    Essential hypertension (Chronic)   Hypertension: at goal.   Encouraged to continue to work on diet and exercise plan.   Continue current medication    Relevant Orders    Adult Transthoracic Echo Complete W/ Cont if Necessary Per Protocol    Mixed hyperlipidemia (Chronic)  As above.   Continue current medication.    Paroxysmal atrial fibrillation (Chronic)   Rate control is appropriate.. Patient is anticoagulated.   Continue current medication.  Follow up with electrophysiology    Relevant Orders    XR Chest 2 View    Adult Transthoracic Echo Complete W/ Cont if Necessary Per Protocol    S/P TAVR (transcatheter aortic valve replacement)   With increasing shortness of breath  Will arrange an updated echocardiogram    Relevant Orders    XR Chest 2 View    Adult Transthoracic Echo Complete W/ Cont if Necessary Per Protocol    Peripheral vascular disease    SOBOE (shortness of breath on exertion)  As above.  Given dronedarone, will also arrange an updated chest x-ray along with PFTs    Relevant Orders    Complete PFT - Pre & Post Bronchodilator    Blood Gas, Arterial -With Co-Ox Panel: Yes    Hemoglobin    XR Chest 2 View    Adult Transthoracic Echo Complete W/ Cont if Necessary Per Protocol    Complete PFT - Pre & Post Bronchodilator       Endocrine and Metabolic    T2DM  (Chronic)  Diabetes mellitus Type II, under excellent control.   Encouraged to continue to pursue ADA diet  Encouraged aerobic exercise.  Continue current medication  Updated labs will be done prior to his return in 3 months    Relevant Orders    POC Glycosylated Hemoglobin (Hb A1C) (Completed)    Microalbumin / Creatinine Urine Ratio - Urine, Clean Catch (Completed)    Protein / Creatinine Ratio, Urine - Urine, Clean Catch (Completed)    Class 2 severe obesity with serious comorbidity and body mass index (BMI) of 35.0 to 35.9 in  adult    Vitamin D deficiency       Gastrointestinal Abdominal     GERD (Chronic)   Symptoms are currently well controlled.  Continue current medication.       Genitourinary and Reproductive     Benign prostatic hyperplasia with nocturia    Chronic renal failure, stage 4 (severe)  Reminded to avoid any NSAIDs prescription or OTC  Continue current medication  Will continue to monitor    Vasculogenic erectile dysfunction       Health Encounters    Healthcare maintenance  We will discuss Shingrix, updated Tdap, and RSV again at his return       Hematology and Neoplasia    H/O testicular cancer    History of colon cancer    History of nonmelanoma skin cancer       Infectious Diseases    History of hepatitis C       Musculoskeletal and Injuries    Osteopenia of multiple sites       Neuro    Amaurosis fugax of right eye    DDD (degenerative disc disease), lumbar  Reminded regarding symptomatic treatment.   Encouraged to report if any worse or if any new symptoms or concerns.       Symptoms and Signs    Elevated TSH  Will continue to monitor     Diagnoses         Codes Comments      S/P TAVR (transcatheter aortic valve replacement)    -  Primary ICD-10-CM: Z95.2  ICD-9-CM: V43.3       Peripheral vascular disease     ICD-10-CM: I73.9  ICD-9-CM: 443.9       Paroxysmal atrial fibrillation     ICD-10-CM: I48.0  ICD-9-CM: 427.31       T2DM      ICD-10-CM: E11.8, Z79.4  ICD-9-CM: 250.90, V58.67       Mixed hyperlipidemia     ICD-10-CM: E78.2  ICD-9-CM: 272.2       Essential hypertension     ICD-10-CM: I10  ICD-9-CM: 401.9       Coronary artery disease involving native coronary artery of native heart without angina pectoris     ICD-10-CM: I25.10  ICD-9-CM: 414.01       Vitamin D deficiency     ICD-10-CM: E55.9  ICD-9-CM: 268.9       Class 2 severe obesity with serious comorbidity and body mass index (BMI) of 35.0 to 35.9 in adult, unspecified obesity type     ICD-10-CM: E66.812, E66.01, Z68.35  ICD-9-CM: 278.01, V85.35       GERD      ICD-10-CM: K21.9  ICD-9-CM: 530.81       Erectile dysfunction due to arterial insufficiency     ICD-10-CM: N52.01  ICD-9-CM: 607.84       Chronic renal failure, stage 4 (severe)     ICD-10-CM: N18.4  ICD-9-CM: 585.4       Benign prostatic hyperplasia with nocturia     ICD-10-CM: N40.1, R35.1  ICD-9-CM: 600.01, 788.43       Healthcare maintenance     ICD-10-CM: Z00.00  ICD-9-CM: V70.0       History of nonmelanoma skin cancer     ICD-10-CM: Z85.828  ICD-9-CM: V10.83       History of colon cancer     ICD-10-CM: Z85.038  ICD-9-CM: V10.05       H/O testicular cancer     ICD-10-CM: Z85.47  ICD-9-CM: V10.47       History of hepatitis C     ICD-10-CM: Z86.19  ICD-9-CM: V12.09       Osteopenia of multiple sites     ICD-10-CM: M85.89  ICD-9-CM: 733.90       Degeneration of intervertebral disc of lumbar region with discogenic back pain     ICD-10-CM: M51.360  ICD-9-CM: 722.52       Amaurosis fugax of right eye     ICD-10-CM: G45.3  ICD-9-CM: 362.34       Elevated TSH     ICD-10-CM: R79.89  ICD-9-CM: 794.5       SOBOE (shortness of breath on exertion)     ICD-10-CM: R06.02  ICD-9-CM: 786.05

## 2025-04-12 VITALS
DIASTOLIC BLOOD PRESSURE: 62 MMHG | SYSTOLIC BLOOD PRESSURE: 118 MMHG | BODY MASS INDEX: 35.31 KG/M2 | RESPIRATION RATE: 16 BRPM | HEART RATE: 96 BPM | OXYGEN SATURATION: 90 % | TEMPERATURE: 97.8 F | WEIGHT: 233 LBS | HEIGHT: 68 IN

## 2025-04-12 LAB
ALBUMIN UR-MCNC: 32.6 MG/DL
CREAT UR-MCNC: 53.4 MG/DL
CREAT UR-MCNC: 53.4 MG/DL
MICROALBUMIN/CREAT UR: 610.5 MG/G (ref 0–29)
PROT ?TM UR-MCNC: 57.6 MG/DL
PROT/CREAT UR: 1078.7 MG/G CREA (ref 0–200)

## 2025-04-23 DIAGNOSIS — I48.0 PAROXYSMAL ATRIAL FIBRILLATION: Chronic | ICD-10-CM

## 2025-04-25 ENCOUNTER — HOSPITAL ENCOUNTER (OUTPATIENT)
Dept: GENERAL RADIOLOGY | Facility: HOSPITAL | Age: 80
Discharge: HOME | End: 2025-04-25
Payer: COMMERCIAL

## 2025-04-25 ENCOUNTER — HOSPITAL ENCOUNTER (OUTPATIENT)
Dept: CARDIOLOGY | Facility: HOSPITAL | Age: 80
Discharge: HOME | End: 2025-04-25
Payer: COMMERCIAL

## 2025-04-25 DIAGNOSIS — I25.10 CORONARY ARTERY DISEASE INVOLVING NATIVE CORONARY ARTERY OF NATIVE HEART WITHOUT ANGINA PECTORIS: Chronic | ICD-10-CM

## 2025-04-25 DIAGNOSIS — Z95.2 S/P TAVR (TRANSCATHETER AORTIC VALVE REPLACEMENT): Chronic | ICD-10-CM

## 2025-04-25 DIAGNOSIS — R06.02 SOBOE (SHORTNESS OF BREATH ON EXERTION): ICD-10-CM

## 2025-04-25 DIAGNOSIS — I10 ESSENTIAL HYPERTENSION: Chronic | ICD-10-CM

## 2025-04-25 DIAGNOSIS — I48.0 PAROXYSMAL ATRIAL FIBRILLATION: Chronic | ICD-10-CM

## 2025-04-25 LAB
AORTIC DIMENSIONLESS INDEX: 0.38 (DI)
AV MEAN PRESS GRAD SYS DOP V1V2: 8 MMHG
AV VMAX SYS DOP: 176 CM/SEC
BH CV ECHO MEAS - AO MAX PG: 12.4 MMHG
BH CV ECHO MEAS - AO V2 VTI: 47.3 CM
BH CV ECHO MEAS - AVA(I,D): 0.76 CM2
BH CV ECHO MEAS - EDV(MOD-SP4): 40.2 ML
BH CV ECHO MEAS - EF(MOD-SP4): 77.8 %
BH CV ECHO MEAS - ESV(MOD-SP4): 8.9 ML
BH CV ECHO MEAS - LAT PEAK E' VEL: 7.5 CM/SEC
BH CV ECHO MEAS - LV DIASTOLIC VOL/BSA (35-75): 18.4 CM2
BH CV ECHO MEAS - LV MAX PG: 1.93 MMHG
BH CV ECHO MEAS - LV MEAN PG: 1 MMHG
BH CV ECHO MEAS - LV SYSTOLIC VOL/BSA (12-30): 4.1 CM2
BH CV ECHO MEAS - LV V1 MAX: 69.5 CM/SEC
BH CV ECHO MEAS - LV V1 VTI: 17.9 CM
BH CV ECHO MEAS - LVOT AREA: 2.01 CM2
BH CV ECHO MEAS - LVOT DIAM: 1.6 CM
BH CV ECHO MEAS - MED PEAK E' VEL: 6.7 CM/SEC
BH CV ECHO MEAS - MV A MAX VEL: 160 CM/SEC
BH CV ECHO MEAS - MV E MAX VEL: 143 CM/SEC
BH CV ECHO MEAS - MV E/A: 0.89
BH CV ECHO MEAS - SV(LVOT): 36 ML
BH CV ECHO MEAS - SV(MOD-SP4): 31.3 ML
BH CV ECHO MEAS - SVI(LVOT): 16.5 ML/M2
BH CV ECHO MEAS - SVI(MOD-SP4): 14.3 ML/M2
BH CV ECHO MEAS - TAPSE (>1.6): 1.34 CM
BH CV ECHO MEASUREMENTS AVERAGE E/E' RATIO: 20.14
LEFT ATRIUM VOLUME INDEX: 17.5 ML/M2

## 2025-04-25 PROCEDURE — 93306 TTE W/DOPPLER COMPLETE: CPT

## 2025-04-25 PROCEDURE — 71046 X-RAY EXAM CHEST 2 VIEWS: CPT

## 2025-04-25 PROCEDURE — 71046 X-RAY EXAM CHEST 2 VIEWS: CPT | Performed by: RADIOLOGY

## 2025-05-06 ENCOUNTER — PATIENT OUTREACH (OUTPATIENT)
Dept: CASE MANAGEMENT | Facility: OTHER | Age: 80
End: 2025-05-06
Payer: COMMERCIAL

## 2025-05-06 NOTE — OUTREACH NOTE
"AMBULATORY CASE MANAGEMENT NOTE    Names and Relationships of Patient/Support Persons: Contact: Nicholas Yin \"BILL\"; Relationship: Self  Contact: Nicholas Yin \"BILL\"; Relationship: Self -     ACM completed successful outreach to patient. Patient declined case management at this time. Pt reports that he has all under control with no needs for additional support currently. Pt reports that his doctors and specialists are managing his disease processes. AC provided all contact information for all future needs/inquiries.    Ander CHAPMAN  Ambulatory Case Management    5/6/2025, 13:48 EDT  "

## 2025-05-19 DIAGNOSIS — I48.0 PAROXYSMAL ATRIAL FIBRILLATION: Chronic | ICD-10-CM

## 2025-05-20 DIAGNOSIS — Z79.4 TYPE 2 DIABETES MELLITUS WITH COMPLICATION, WITH LONG-TERM CURRENT USE OF INSULIN: Chronic | ICD-10-CM

## 2025-05-20 DIAGNOSIS — I25.110 CORONARY ARTERY DISEASE INVOLVING NATIVE CORONARY ARTERY OF NATIVE HEART WITH UNSTABLE ANGINA PECTORIS: Chronic | ICD-10-CM

## 2025-05-20 DIAGNOSIS — I48.0 PAROXYSMAL ATRIAL FIBRILLATION: Chronic | ICD-10-CM

## 2025-05-20 DIAGNOSIS — N18.32 CHRONIC RENAL FAILURE, STAGE 3B: Chronic | ICD-10-CM

## 2025-05-20 DIAGNOSIS — E11.8 TYPE 2 DIABETES MELLITUS WITH COMPLICATION, WITH LONG-TERM CURRENT USE OF INSULIN: Chronic | ICD-10-CM

## 2025-05-20 RX ORDER — DAPAGLIFLOZIN 10 MG/1
10 TABLET, FILM COATED ORAL DAILY
Qty: 90 TABLET | Refills: 2 | Status: CANCELLED | OUTPATIENT
Start: 2025-05-20

## 2025-05-22 DIAGNOSIS — I48.0 PAROXYSMAL ATRIAL FIBRILLATION: Chronic | ICD-10-CM

## 2025-05-23 DIAGNOSIS — I48.0 PAROXYSMAL ATRIAL FIBRILLATION: Chronic | ICD-10-CM

## 2025-06-02 DIAGNOSIS — N18.4 CHRONIC RENAL FAILURE, STAGE 4 (SEVERE): ICD-10-CM

## 2025-06-02 DIAGNOSIS — R50.9 FEVER, UNSPECIFIED FEVER CAUSE: Primary | ICD-10-CM

## 2025-06-02 DIAGNOSIS — E11.8 TYPE 2 DIABETES MELLITUS WITH COMPLICATION, WITH LONG-TERM CURRENT USE OF INSULIN: Chronic | ICD-10-CM

## 2025-06-02 DIAGNOSIS — E78.2 MIXED HYPERLIPIDEMIA: Chronic | ICD-10-CM

## 2025-06-02 DIAGNOSIS — I10 ESSENTIAL HYPERTENSION: Chronic | ICD-10-CM

## 2025-06-02 DIAGNOSIS — Z79.4 TYPE 2 DIABETES MELLITUS WITH COMPLICATION, WITH LONG-TERM CURRENT USE OF INSULIN: Chronic | ICD-10-CM

## 2025-06-03 ENCOUNTER — APPOINTMENT (OUTPATIENT)
Dept: CT IMAGING | Facility: HOSPITAL | Age: 80
End: 2025-06-03
Payer: COMMERCIAL

## 2025-06-03 ENCOUNTER — HOSPITAL ENCOUNTER (EMERGENCY)
Facility: HOSPITAL | Age: 80
Discharge: HOME | End: 2025-06-03
Attending: STUDENT IN AN ORGANIZED HEALTH CARE EDUCATION/TRAINING PROGRAM | Admitting: STUDENT IN AN ORGANIZED HEALTH CARE EDUCATION/TRAINING PROGRAM
Payer: COMMERCIAL

## 2025-06-03 ENCOUNTER — APPOINTMENT (OUTPATIENT)
Dept: GENERAL RADIOLOGY | Facility: HOSPITAL | Age: 80
End: 2025-06-03
Payer: COMMERCIAL

## 2025-06-03 VITALS
TEMPERATURE: 98.5 F | DIASTOLIC BLOOD PRESSURE: 90 MMHG | OXYGEN SATURATION: 92 % | HEART RATE: 68 BPM | WEIGHT: 223 LBS | BODY MASS INDEX: 33.03 KG/M2 | SYSTOLIC BLOOD PRESSURE: 136 MMHG | RESPIRATION RATE: 26 BRPM | HEIGHT: 69 IN

## 2025-06-03 DIAGNOSIS — A41.9 SEPSIS, DUE TO UNSPECIFIED ORGANISM, UNSPECIFIED WHETHER ACUTE ORGAN DYSFUNCTION PRESENT: Primary | ICD-10-CM

## 2025-06-03 DIAGNOSIS — J18.9 PNEUMONIA DUE TO INFECTIOUS ORGANISM, UNSPECIFIED LATERALITY, UNSPECIFIED PART OF LUNG: ICD-10-CM

## 2025-06-03 LAB
A-A DO2: 43.1 MMHG (ref 0–300)
ALBUMIN SERPL-MCNC: 2.8 G/DL (ref 3.5–5.2)
ALBUMIN/GLOB SERPL: 0.8 G/DL
ALP SERPL-CCNC: 83 U/L (ref 39–117)
ALT SERPL W P-5'-P-CCNC: 9 U/L (ref 1–41)
ANION GAP SERPL CALCULATED.3IONS-SCNC: 11.5 MMOL/L (ref 5–15)
ARTERIAL PATENCY WRIST A: ABNORMAL
AST SERPL-CCNC: 18 U/L (ref 1–40)
ATMOSPHERIC PRESS: 730 MMHG
B PARAPERT DNA SPEC QL NAA+PROBE: NOT DETECTED
B PERT DNA SPEC QL NAA+PROBE: NOT DETECTED
BASE EXCESS BLDA CALC-SCNC: -3.6 MMOL/L (ref 0–2)
BASOPHILS # BLD AUTO: 0.06 10*3/MM3 (ref 0–0.2)
BASOPHILS NFR BLD AUTO: 0.3 % (ref 0–1.5)
BDY SITE: ABNORMAL
BILIRUB SERPL-MCNC: 0.6 MG/DL (ref 0–1.2)
BUN SERPL-MCNC: 39.3 MG/DL (ref 8–23)
BUN/CREAT SERPL: 12.4 (ref 7–25)
C PNEUM DNA NPH QL NAA+NON-PROBE: NOT DETECTED
CALCIUM SPEC-SCNC: 8.6 MG/DL (ref 8.6–10.5)
CHLORIDE SERPL-SCNC: 108 MMOL/L (ref 98–107)
CHOLEST SERPL-MCNC: 71 MG/DL (ref 0–200)
CK SERPL-CCNC: 43 U/L (ref 20–200)
CO2 BLDA-SCNC: 21 MMOL/L (ref 22–33)
CO2 SERPL-SCNC: 18.5 MMOL/L (ref 22–29)
COHGB MFR BLD: 1.7 % (ref 0–5)
CREAT SERPL-MCNC: 3.18 MG/DL (ref 0.76–1.27)
CRP SERPL-MCNC: 5.46 MG/DL (ref 0–0.5)
DEPRECATED RDW RBC AUTO: 48.5 FL (ref 37–54)
EGFRCR SERPLBLD CKD-EPI 2021: 19.1 ML/MIN/1.73
EOSINOPHIL # BLD AUTO: 0.15 10*3/MM3 (ref 0–0.4)
EOSINOPHIL NFR BLD AUTO: 0.8 % (ref 0.3–6.2)
ERYTHROCYTE [DISTWIDTH] IN BLOOD BY AUTOMATED COUNT: 18.5 % (ref 12.3–15.4)
FLUAV SUBTYP SPEC NAA+PROBE: NOT DETECTED
FLUBV RNA ISLT QL NAA+PROBE: NOT DETECTED
GEN 5 1HR TROPONIN T REFLEX: 75 NG/L
GLOBULIN UR ELPH-MCNC: 3.4 GM/DL
GLUCOSE SERPL-MCNC: 161 MG/DL (ref 65–99)
HADV DNA SPEC NAA+PROBE: NOT DETECTED
HBA1C MFR BLD: 7.8 % (ref 4.8–5.6)
HCO3 BLDA-SCNC: 20.1 MMOL/L (ref 20–26)
HCOV 229E RNA SPEC QL NAA+PROBE: NOT DETECTED
HCOV HKU1 RNA SPEC QL NAA+PROBE: NOT DETECTED
HCOV NL63 RNA SPEC QL NAA+PROBE: NOT DETECTED
HCOV OC43 RNA SPEC QL NAA+PROBE: NOT DETECTED
HCT VFR BLD AUTO: 30.4 % (ref 37.5–51)
HCT VFR BLD CALC: 28.1 % (ref 38–51)
HDLC SERPL-MCNC: 33 MG/DL (ref 40–60)
HGB BLD-MCNC: 9.2 G/DL (ref 13–17.7)
HGB BLDA-MCNC: 9.2 G/DL (ref 14–18)
HMPV RNA NPH QL NAA+NON-PROBE: NOT DETECTED
HOLD SPECIMEN: NORMAL
HOLD SPECIMEN: NORMAL
HPIV1 RNA ISLT QL NAA+PROBE: NOT DETECTED
HPIV2 RNA SPEC QL NAA+PROBE: NOT DETECTED
HPIV3 RNA NPH QL NAA+PROBE: NOT DETECTED
HPIV4 P GENE NPH QL NAA+PROBE: NOT DETECTED
IMM GRANULOCYTES # BLD AUTO: 0.13 10*3/MM3 (ref 0–0.05)
IMM GRANULOCYTES NFR BLD AUTO: 0.7 % (ref 0–0.5)
INHALED O2 CONCENTRATION: 21 %
INR PPP: 1.85 (ref 0.9–1.1)
LDLC SERPL CALC-MCNC: 17 MG/DL (ref 0–100)
LDLC/HDLC SERPL: 0.45 {RATIO}
LYMPHOCYTES # BLD AUTO: 0.97 10*3/MM3 (ref 0.7–3.1)
LYMPHOCYTES NFR BLD AUTO: 5.2 % (ref 19.6–45.3)
Lab: ABNORMAL
M PNEUMO IGG SER IA-ACNC: NOT DETECTED
MCH RBC QN AUTO: 22.1 PG (ref 26.6–33)
MCHC RBC AUTO-ENTMCNC: 30.3 G/DL (ref 31.5–35.7)
MCV RBC AUTO: 72.9 FL (ref 79–97)
METHGB BLD QL: 0.4 % (ref 0–3)
MODALITY: ABNORMAL
MONOCYTES # BLD AUTO: 1.32 10*3/MM3 (ref 0.1–0.9)
MONOCYTES NFR BLD AUTO: 7 % (ref 5–12)
NEUTROPHILS NFR BLD AUTO: 16.17 10*3/MM3 (ref 1.7–7)
NEUTROPHILS NFR BLD AUTO: 86 % (ref 42.7–76)
NRBC BLD AUTO-RTO: 0 /100 WBC (ref 0–0.2)
NT-PROBNP SERPL-MCNC: 4176 PG/ML (ref 0–1800)
OXYHGB MFR BLDV: 91.4 % (ref 94–99)
PCO2 BLDA: 30.5 MM HG (ref 35–45)
PCO2 TEMP ADJ BLD: ABNORMAL MM[HG]
PH BLDA: 7.43 PH UNITS (ref 7.35–7.45)
PH, TEMP CORRECTED: ABNORMAL
PLATELET # BLD AUTO: 234 10*3/MM3 (ref 140–450)
PMV BLD AUTO: 9.7 FL (ref 6–12)
PO2 BLDA: 65.9 MM HG (ref 83–108)
PO2 TEMP ADJ BLD: ABNORMAL MM[HG]
POTASSIUM SERPL-SCNC: 4.5 MMOL/L (ref 3.5–5.2)
PROCALCITONIN SERPL-MCNC: 0.39 NG/ML (ref 0–0.25)
PROT SERPL-MCNC: 6.2 G/DL (ref 6–8.5)
PROTHROMBIN TIME: 22.6 SECONDS (ref 12.5–15.2)
RBC # BLD AUTO: 4.17 10*6/MM3 (ref 4.14–5.8)
RHINOVIRUS RNA SPEC NAA+PROBE: NOT DETECTED
RSV RNA NPH QL NAA+NON-PROBE: NOT DETECTED
SAO2 % BLDCOA: 93.4 % (ref 94–99)
SARS-COV-2 RNA RESP QL NAA+PROBE: NOT DETECTED
SODIUM SERPL-SCNC: 138 MMOL/L (ref 136–145)
TRIGL SERPL-MCNC: 116 MG/DL (ref 0–150)
TROPONIN T % DELTA: 0
TROPONIN T NUMERIC DELTA: 0 NG/L
TROPONIN T SERPL HS-MCNC: 75 NG/L
TSH SERPL DL<=0.05 MIU/L-ACNC: 3.2 UIU/ML (ref 0.27–4.2)
VENTILATOR MODE: ABNORMAL
VLDLC SERPL-MCNC: 21 MG/DL (ref 5–40)
WBC NRBC COR # BLD AUTO: 18.8 10*3/MM3 (ref 3.4–10.8)
WHOLE BLOOD HOLD COAG: NORMAL
WHOLE BLOOD HOLD SPECIMEN: NORMAL

## 2025-06-03 PROCEDURE — 84145 PROCALCITONIN (PCT): CPT | Performed by: STUDENT IN AN ORGANIZED HEALTH CARE EDUCATION/TRAINING PROGRAM

## 2025-06-03 PROCEDURE — 87040 BLOOD CULTURE FOR BACTERIA: CPT | Performed by: STUDENT IN AN ORGANIZED HEALTH CARE EDUCATION/TRAINING PROGRAM

## 2025-06-03 PROCEDURE — 71250 CT THORAX DX C-: CPT

## 2025-06-03 PROCEDURE — 25010000002 METHYLPREDNISOLONE PER 125 MG: Performed by: STUDENT IN AN ORGANIZED HEALTH CARE EDUCATION/TRAINING PROGRAM

## 2025-06-03 PROCEDURE — 86140 C-REACTIVE PROTEIN: CPT | Performed by: STUDENT IN AN ORGANIZED HEALTH CARE EDUCATION/TRAINING PROGRAM

## 2025-06-03 PROCEDURE — 71045 X-RAY EXAM CHEST 1 VIEW: CPT | Performed by: RADIOLOGY

## 2025-06-03 PROCEDURE — 74176 CT ABD & PELVIS W/O CONTRAST: CPT | Performed by: RADIOLOGY

## 2025-06-03 PROCEDURE — 83880 ASSAY OF NATRIURETIC PEPTIDE: CPT | Performed by: STUDENT IN AN ORGANIZED HEALTH CARE EDUCATION/TRAINING PROGRAM

## 2025-06-03 PROCEDURE — 93010 ELECTROCARDIOGRAM REPORT: CPT | Performed by: INTERNAL MEDICINE

## 2025-06-03 PROCEDURE — 83050 HGB METHEMOGLOBIN QUAN: CPT

## 2025-06-03 PROCEDURE — 93005 ELECTROCARDIOGRAM TRACING: CPT | Performed by: STUDENT IN AN ORGANIZED HEALTH CARE EDUCATION/TRAINING PROGRAM

## 2025-06-03 PROCEDURE — 70450 CT HEAD/BRAIN W/O DYE: CPT | Performed by: RADIOLOGY

## 2025-06-03 PROCEDURE — 82550 ASSAY OF CK (CPK): CPT | Performed by: STUDENT IN AN ORGANIZED HEALTH CARE EDUCATION/TRAINING PROGRAM

## 2025-06-03 PROCEDURE — 94640 AIRWAY INHALATION TREATMENT: CPT

## 2025-06-03 PROCEDURE — 36600 WITHDRAWAL OF ARTERIAL BLOOD: CPT

## 2025-06-03 PROCEDURE — 25810000003 SODIUM CHLORIDE 0.9 % SOLUTION: Performed by: STUDENT IN AN ORGANIZED HEALTH CARE EDUCATION/TRAINING PROGRAM

## 2025-06-03 PROCEDURE — 84484 ASSAY OF TROPONIN QUANT: CPT | Performed by: STUDENT IN AN ORGANIZED HEALTH CARE EDUCATION/TRAINING PROGRAM

## 2025-06-03 PROCEDURE — 25010000002 CEFTRIAXONE PER 250 MG: Performed by: STUDENT IN AN ORGANIZED HEALTH CARE EDUCATION/TRAINING PROGRAM

## 2025-06-03 PROCEDURE — 85610 PROTHROMBIN TIME: CPT | Performed by: STUDENT IN AN ORGANIZED HEALTH CARE EDUCATION/TRAINING PROGRAM

## 2025-06-03 PROCEDURE — 94799 UNLISTED PULMONARY SVC/PX: CPT

## 2025-06-03 PROCEDURE — 82805 BLOOD GASES W/O2 SATURATION: CPT

## 2025-06-03 PROCEDURE — 96375 TX/PRO/DX INJ NEW DRUG ADDON: CPT

## 2025-06-03 PROCEDURE — 80050 GENERAL HEALTH PANEL: CPT | Performed by: STUDENT IN AN ORGANIZED HEALTH CARE EDUCATION/TRAINING PROGRAM

## 2025-06-03 PROCEDURE — 71250 CT THORAX DX C-: CPT | Performed by: RADIOLOGY

## 2025-06-03 PROCEDURE — 36415 COLL VENOUS BLD VENIPUNCTURE: CPT

## 2025-06-03 PROCEDURE — 0202U NFCT DS 22 TRGT SARS-COV-2: CPT | Performed by: STUDENT IN AN ORGANIZED HEALTH CARE EDUCATION/TRAINING PROGRAM

## 2025-06-03 PROCEDURE — 71045 X-RAY EXAM CHEST 1 VIEW: CPT

## 2025-06-03 PROCEDURE — 25010000002 ONDANSETRON PER 1 MG: Performed by: STUDENT IN AN ORGANIZED HEALTH CARE EDUCATION/TRAINING PROGRAM

## 2025-06-03 PROCEDURE — 74176 CT ABD & PELVIS W/O CONTRAST: CPT

## 2025-06-03 PROCEDURE — 82375 ASSAY CARBOXYHB QUANT: CPT

## 2025-06-03 PROCEDURE — 87186 SC STD MICRODIL/AGAR DIL: CPT | Performed by: STUDENT IN AN ORGANIZED HEALTH CARE EDUCATION/TRAINING PROGRAM

## 2025-06-03 PROCEDURE — 80061 LIPID PANEL: CPT | Performed by: STUDENT IN AN ORGANIZED HEALTH CARE EDUCATION/TRAINING PROGRAM

## 2025-06-03 PROCEDURE — 87077 CULTURE AEROBIC IDENTIFY: CPT | Performed by: STUDENT IN AN ORGANIZED HEALTH CARE EDUCATION/TRAINING PROGRAM

## 2025-06-03 PROCEDURE — 96365 THER/PROPH/DIAG IV INF INIT: CPT

## 2025-06-03 PROCEDURE — 99284 EMERGENCY DEPT VISIT MOD MDM: CPT

## 2025-06-03 PROCEDURE — 83036 HEMOGLOBIN GLYCOSYLATED A1C: CPT | Performed by: STUDENT IN AN ORGANIZED HEALTH CARE EDUCATION/TRAINING PROGRAM

## 2025-06-03 PROCEDURE — 87154 CUL TYP ID BLD PTHGN 6+ TRGT: CPT | Performed by: STUDENT IN AN ORGANIZED HEALTH CARE EDUCATION/TRAINING PROGRAM

## 2025-06-03 PROCEDURE — 70450 CT HEAD/BRAIN W/O DYE: CPT

## 2025-06-03 RX ORDER — PREDNISONE 20 MG/1
40 TABLET ORAL DAILY
Qty: 10 TABLET | Refills: 0 | Status: SHIPPED | OUTPATIENT
Start: 2025-06-03 | End: 2025-06-08

## 2025-06-03 RX ORDER — DOXYCYCLINE 100 MG/1
100 CAPSULE ORAL ONCE
Status: COMPLETED | OUTPATIENT
Start: 2025-06-03 | End: 2025-06-03

## 2025-06-03 RX ORDER — ONDANSETRON 2 MG/ML
4 INJECTION INTRAMUSCULAR; INTRAVENOUS ONCE
Status: COMPLETED | OUTPATIENT
Start: 2025-06-03 | End: 2025-06-03

## 2025-06-03 RX ORDER — SODIUM CHLORIDE 0.9 % (FLUSH) 0.9 %
10 SYRINGE (ML) INJECTION AS NEEDED
Status: DISCONTINUED | OUTPATIENT
Start: 2025-06-03 | End: 2025-06-03 | Stop reason: HOSPADM

## 2025-06-03 RX ORDER — IPRATROPIUM BROMIDE AND ALBUTEROL SULFATE 2.5; .5 MG/3ML; MG/3ML
3 SOLUTION RESPIRATORY (INHALATION) EVERY 4 HOURS PRN
Qty: 360 ML | Refills: 0 | Status: ON HOLD | OUTPATIENT
Start: 2025-06-03

## 2025-06-03 RX ORDER — IPRATROPIUM BROMIDE AND ALBUTEROL SULFATE 2.5; .5 MG/3ML; MG/3ML
3 SOLUTION RESPIRATORY (INHALATION) ONCE
Status: COMPLETED | OUTPATIENT
Start: 2025-06-03 | End: 2025-06-03

## 2025-06-03 RX ORDER — CEFDINIR 300 MG/1
300 CAPSULE ORAL DAILY
Qty: 7 CAPSULE | Refills: 0 | Status: SHIPPED | OUTPATIENT
Start: 2025-06-03 | End: 2025-06-10

## 2025-06-03 RX ORDER — GUAIFENESIN 600 MG/1
1200 TABLET, EXTENDED RELEASE ORAL 2 TIMES DAILY
Qty: 40 TABLET | Refills: 0 | Status: ON HOLD | OUTPATIENT
Start: 2025-06-03 | End: 2025-06-13

## 2025-06-03 RX ORDER — DOXYCYCLINE 100 MG/1
100 CAPSULE ORAL 2 TIMES DAILY
Qty: 14 CAPSULE | Refills: 0 | Status: SHIPPED | OUTPATIENT
Start: 2025-06-03 | End: 2025-06-10

## 2025-06-03 RX ADMIN — ONDANSETRON 4 MG: 2 INJECTION INTRAMUSCULAR; INTRAVENOUS at 09:35

## 2025-06-03 RX ADMIN — DOXYCYCLINE 100 MG: 100 CAPSULE ORAL at 09:35

## 2025-06-03 RX ADMIN — IPRATROPIUM BROMIDE AND ALBUTEROL SULFATE 3 ML: 2.5; .5 SOLUTION RESPIRATORY (INHALATION) at 09:26

## 2025-06-03 RX ADMIN — SODIUM CHLORIDE 1000 ML: 9 INJECTION, SOLUTION INTRAVENOUS at 09:34

## 2025-06-03 RX ADMIN — WATER 125 MG: 1 INJECTION INTRAMUSCULAR; INTRAVENOUS; SUBCUTANEOUS at 09:35

## 2025-06-03 RX ADMIN — SODIUM CHLORIDE 2000 MG: 9 INJECTION, SOLUTION INTRAVENOUS at 09:39

## 2025-06-03 NOTE — ED NOTES
Per EDT, pt fell in restroom when attempting to stand up. Reports pt was trying to  his pants and slid down in the floor. Wife present. Pt ambulatory to wheelchair and assisted back into bed. Pt A&OX4. Pt denies any complaints. Fall risk band applied. Dr Pineda made aware.

## 2025-06-03 NOTE — ED PROVIDER NOTES
Subjective   History of Present Illness  Patient is a 80-year-old male with history significant for CAD, aortic stenosis status post TAVR and CKD stage IIIb comes to the ER with reports of fever, shortness of breath and malaise.  Patient does not wear O2 at baseline.  His symptoms have been worsening since Saturday/Sunday.  Nonproductive cough but Tmax of 101.8 at home.  He has not been on antibiotics.  He does not smoke.  He denies productive sputum.  Occasionally has had nausea but no current nausea.  He has had some chest pressure but no active chest pain.  He denies any other complaints.  No known sick contacts.      Review of Systems   Constitutional:  Positive for chills, fatigue and fever.   HENT:  Negative for congestion, sinus pain and sore throat.    Respiratory:  Positive for cough and shortness of breath. Negative for chest tightness and wheezing.    Cardiovascular:  Negative for chest pain, palpitations and leg swelling.   Gastrointestinal:  Negative for abdominal pain, constipation, diarrhea, nausea and vomiting.   Genitourinary:  Negative for dysuria, frequency, hematuria and urgency.   Musculoskeletal:  Negative for arthralgias and myalgias.   Neurological:  Positive for weakness. Negative for dizziness, numbness and headaches.   Psychiatric/Behavioral:  Negative for confusion.        Past Medical History:   Diagnosis Date    Anemia     Aortic stenosis     Arthritis     Back pain     CancerTesticular/Colon     test -1982  Colon -2005    CHF (congestive heart failure)     Coronary artery disease     COVID-19 2022    DDD (degenerative disc disease), lumbosacral     Diabetes mellitus     Dyspnea     Elevated cholesterol     Erectile dysfunction     GERD (gastroesophageal reflux disease)     History of transfusion     Hyperlipidemia     Hypertension     TIA (transient ischemic attack)        Allergies   Allergen Reactions    Ct Contrast Anaphylaxis    Iodinated Contrast Media Shortness Of Breath        Past Surgical History:   Procedure Laterality Date    AORTIC VALVE REPAIR/REPLACEMENT N/A 5/8/2023    Procedure: TRANSCATHETER AORTIC VALVE REPLACEMENT + LAURIE, LEFT FEMORAL ENDARTERECTOMY WITH PATCH;  Surgeon: Arben Lopez MD;  Location: [facility];  Service: Cardiothoracic;  Laterality: N/A;    AORTIC VALVE REPAIR/REPLACEMENT N/A 5/8/2023    Procedure: Transfemoral Transcatheter Aortic Valve Replacement;  Surgeon: Rachael Mendoza MD;  Location: [facility];  Service: Cardiovascular;  Laterality: N/A;    CARDIAC CATHETERIZATION N/A 08/15/2019    Procedure: Left Heart Cath;  Surgeon: Paul Villatoro MD;  Location:  [facility] CATH INVASIVE [facility];  Service: Cardiology    CARDIAC CATHETERIZATION      04/10/2023 PER DR. MENDOZA    CARDIAC ELECTROPHYSIOLOGY PROCEDURE N/A 5/15/2023    Procedure: Pacemaker DC - [employer];  Surgeon: Rachael Mendoza MD;  Location: [facility] CATH INVASIVE LOCATION;  Service: Cardiology;  Laterality: N/A;    CATARACT EXTRACTION, BILATERAL      COLON SURGERY      colon resection for cancer - sigmoid removed - no chemo no radiation    COLONOSCOPY      COLONOSCOPY N/A 01/27/2021    Procedure: COLONOSCOPY;  Surgeon: Greg Barraza MD;  Location: [facility] OR;  Service: General;  Laterality: N/A;    CORONARY STENT PLACEMENT      1 STENT    ENDOSCOPY  1989    EYE SURGERY Right     retinal detachment    MO RT/LT HEART CATHETERS N/A 08/15/2019    Procedure: Percutaneous Coronary Intervention;  Surgeon: Paul Villatoro MD;  Location:  [facility] CATH INVASIVE [facility];  Service: Cardiology    SCALP LESION REMOVAL W/ FLAP AND SKIN GRAFT  12/16/2022    basal cell removal    SHOULDER ARTHROSCOPY Right     SKIN LESION EXCISION N/A 01/27/2021    Procedure: SKIN LESIONS EXCISION FROM LEFT TEMPLE AREA AND ABDOMEN.;  Surgeon: Greg Barraza MD;  Location:  [facility] OR;  Service: General;  Laterality: N/A;    VASECTOMY Right      testicle removed d/t cancer - with radiation       Family History   Problem Relation Age of Onset    Stroke Mother     Hypertension Mother     Alcohol abuse Mother     COPD Mother     COPD Father     Alcohol abuse Father     Hypertension Father     Alzheimer's disease Father     Heart disease Brother     Hypertension Brother     Diabetes Brother     Stroke Maternal Grandfather     Cancer Paternal Grandmother        Social History     Socioeconomic History    Marital status:      Spouse name: lita    Number of children: 3    Years of education: 16   Tobacco Use    Smoking status: Never     Passive exposure: Never    Smokeless tobacco: Never   Vaping Use    Vaping status: Never Used   Substance and Sexual Activity    Alcohol use: Yes     Comment: vary rarely    Drug use: No    Sexual activity: Defer     Partners: Female           Objective   Physical Exam  Vitals and nursing note reviewed. Exam conducted with a chaperone present.   Constitutional:       General: He is not in acute distress.     Appearance: He is well-developed and normal weight. He is ill-appearing.   HENT:      Head: Normocephalic and atraumatic.      Mouth/Throat:      Mouth: Mucous membranes are dry.      Pharynx: Oropharynx is clear.   Eyes:      Extraocular Movements: Extraocular movements intact.      Pupils: Pupils are equal, round, and reactive to light.   Neck:      Vascular: No JVD.   Cardiovascular:      Rate and Rhythm: Normal rate and regular rhythm.      Heart sounds: No murmur heard.     No friction rub. No gallop.   Pulmonary:      Effort: Pulmonary effort is normal. No tachypnea.      Breath sounds: Normal breath sounds. No decreased breath sounds, wheezing, rhonchi or rales.   Abdominal:      General: Bowel sounds are normal.      Palpations: Abdomen is soft.   Musculoskeletal:         General: Normal range of motion.      Cervical back: Normal range of motion and neck supple.      Right lower leg: No edema.      Left  lower leg: No edema.   Skin:     General: Skin is warm and dry.      Capillary Refill: Capillary refill takes less than 2 seconds.   Neurological:      General: No focal deficit present.      Mental Status: He is alert and oriented to person, place, and time.   Psychiatric:         Mood and Affect: Mood normal.         Behavior: Behavior normal.         Procedures           ED Course  ED Course as of 06/03/25 1551   Tue Jun 03, 2025   0954 Normal sinus rhythm, rate 70, QTc 473, no acute ST or T wave changes, first-degree block, right bundle branch block [CW]      ED Course User Index  [CW] Ezra Pineda, DO                                                       Medical Decision Making  --On arrival, patient appears to feel unwell but hemodynamically stable, SpO2 90% on room air  --ABG PaO2 65  --Labs WBC 18K with left shift  --CT head negative  --Chest CT with pneumonia  --IV fluids x 2 L, Rocephin/Doxy, steroids, DuoNeb  --Patient was rough when he initially came in but look significantly better after fluids and treatment per above.  I did offer inpatient admission but he said he felt significantly better and thought he would do better at home.  --DC with antibiotics, steroids, nebs with nebulizer, follow-up outpatient, instructed him to return if he felt worse    Problems Addressed:  Pneumonia due to infectious organism, unspecified laterality, unspecified part of lung: complicated acute illness or injury  Sepsis, due to unspecified organism, unspecified whether acute organ dysfunction present: complicated acute illness or injury    Amount and/or Complexity of Data Reviewed  Labs: ordered.  Radiology: ordered.  ECG/medicine tests: ordered.    Risk  OTC drugs.  Prescription drug management.        Final diagnoses:   Sepsis, due to unspecified organism, unspecified whether acute organ dysfunction present   Pneumonia due to infectious organism, unspecified laterality, unspecified part of lung       ED  Disposition  ED Disposition       ED Disposition   Discharge    Condition   Stable    Comment   --               Waldemar Trejo MD  [address] [facility] [address] [phone]    In 1 week           Medication List        New Prescriptions      cefdinir 300 MG capsule  Commonly known as: OMNICEF  Take 1 capsule by mouth Daily for 7 days.     doxycycline 100 MG capsule  Commonly known as: VIBRAMYCIN  Take 1 capsule by mouth 2 (Two) Times a Day for 7 days.     ipratropium-albuterol 0.5-2.5 mg/3 ml nebulizer  Commonly known as: DUO-NEB  Take 3 mL by nebulization Every 4 (Four) Hours As Needed for Wheezing or Shortness of Air.     Mucus Relief 600 MG 12 hr tablet  Generic drug: guaiFENesin  Take 2 tablets by mouth 2 (Two) Times a Day for 10 days.     predniSONE 20 MG tablet  Commonly known as: DELTASONE  Take 2 tablets by mouth Daily for 5 days.            Changed      Xultophy 100-3.6 UNIT-MG/ML solution pen-injector subcutaneous pen  Generic drug: Insulin Degludec-Liraglutide  Inject 50 Units under the skin into the appropriate area as directed Daily.  What changed: how much to take               Where to Get Your Medications        These medications were sent to [facility] [address] [address]      Hours: Monday to Friday 7 AM to 6 PM Phone: [phone]   cefdinir 300 MG capsule  doxycycline 100 MG capsule  ipratropium-albuterol 0.5-2.5 mg/3 ml nebulizer  Mucus Relief 600 MG 12 hr tablet  predniSONE 20 MG tablet            Ezra Pineda,   06/03/25 0491

## 2025-06-04 DIAGNOSIS — N52.01 ERECTILE DYSFUNCTION DUE TO ARTERIAL INSUFFICIENCY: ICD-10-CM

## 2025-06-04 LAB
BACTERIA BLD CULT: ABNORMAL
BOTTLE TYPE: ABNORMAL
QT INTERVAL: 438 MS
QTC INTERVAL: 473 MS

## 2025-06-05 RX ORDER — SILDENAFIL 100 MG/1
TABLET, FILM COATED ORAL
Qty: 30 TABLET | Refills: 0 | Status: SHIPPED | OUTPATIENT
Start: 2025-06-05

## 2025-06-07 LAB
BACTERIA SPEC AEROBE CULT: ABNORMAL
BACTERIA SPEC AEROBE CULT: ABNORMAL
GRAM STN SPEC: ABNORMAL
ISOLATED FROM: ABNORMAL
ISOLATED FROM: ABNORMAL

## 2025-06-12 ENCOUNTER — OFFICE VISIT (OUTPATIENT)
Dept: FAMILY MEDICINE CLINIC | Facility: CLINIC | Age: 80
End: 2025-06-12
Payer: COMMERCIAL

## 2025-06-12 VITALS
TEMPERATURE: 97.6 F | DIASTOLIC BLOOD PRESSURE: 58 MMHG | RESPIRATION RATE: 18 BRPM | BODY MASS INDEX: 33.03 KG/M2 | OXYGEN SATURATION: 100 % | HEIGHT: 69 IN | SYSTOLIC BLOOD PRESSURE: 122 MMHG | WEIGHT: 223 LBS | HEART RATE: 88 BPM

## 2025-06-12 DIAGNOSIS — Z85.828 HISTORY OF NONMELANOMA SKIN CANCER: ICD-10-CM

## 2025-06-12 DIAGNOSIS — N40.1 BENIGN PROSTATIC HYPERPLASIA WITH NOCTURIA: ICD-10-CM

## 2025-06-12 DIAGNOSIS — Z79.4 TYPE 2 DIABETES MELLITUS WITH COMPLICATION, WITH LONG-TERM CURRENT USE OF INSULIN: Chronic | ICD-10-CM

## 2025-06-12 DIAGNOSIS — M51.360 DEGENERATION OF INTERVERTEBRAL DISC OF LUMBAR REGION WITH DISCOGENIC BACK PAIN: ICD-10-CM

## 2025-06-12 DIAGNOSIS — E78.2 MIXED HYPERLIPIDEMIA: Chronic | ICD-10-CM

## 2025-06-12 DIAGNOSIS — I10 ESSENTIAL HYPERTENSION: Chronic | ICD-10-CM

## 2025-06-12 DIAGNOSIS — E66.812 CLASS 2 SEVERE OBESITY WITH SERIOUS COMORBIDITY AND BODY MASS INDEX (BMI) OF 35.0 TO 35.9 IN ADULT, UNSPECIFIED OBESITY TYPE: ICD-10-CM

## 2025-06-12 DIAGNOSIS — I73.9 PERIPHERAL VASCULAR DISEASE: ICD-10-CM

## 2025-06-12 DIAGNOSIS — N18.4 CHRONIC RENAL FAILURE, STAGE 4 (SEVERE): ICD-10-CM

## 2025-06-12 DIAGNOSIS — K21.9 GASTROESOPHAGEAL REFLUX DISEASE WITHOUT ESOPHAGITIS: Chronic | ICD-10-CM

## 2025-06-12 DIAGNOSIS — Z00.00 HEALTHCARE MAINTENANCE: ICD-10-CM

## 2025-06-12 DIAGNOSIS — I48.0 PAROXYSMAL ATRIAL FIBRILLATION: Chronic | ICD-10-CM

## 2025-06-12 DIAGNOSIS — E11.8 TYPE 2 DIABETES MELLITUS WITH COMPLICATION, WITH LONG-TERM CURRENT USE OF INSULIN: Chronic | ICD-10-CM

## 2025-06-12 DIAGNOSIS — Z95.2 S/P TAVR (TRANSCATHETER AORTIC VALVE REPLACEMENT): Primary | Chronic | ICD-10-CM

## 2025-06-12 DIAGNOSIS — R35.1 BENIGN PROSTATIC HYPERPLASIA WITH NOCTURIA: ICD-10-CM

## 2025-06-12 DIAGNOSIS — Z85.038 HISTORY OF COLON CANCER: ICD-10-CM

## 2025-06-12 DIAGNOSIS — Z85.47 H/O TESTICULAR CANCER: ICD-10-CM

## 2025-06-12 DIAGNOSIS — R50.9 FEBRILE ILLNESS: ICD-10-CM

## 2025-06-12 DIAGNOSIS — E66.01 CLASS 2 SEVERE OBESITY WITH SERIOUS COMORBIDITY AND BODY MASS INDEX (BMI) OF 35.0 TO 35.9 IN ADULT, UNSPECIFIED OBESITY TYPE: ICD-10-CM

## 2025-06-12 DIAGNOSIS — I25.10 CORONARY ARTERY DISEASE INVOLVING NATIVE CORONARY ARTERY OF NATIVE HEART WITHOUT ANGINA PECTORIS: Chronic | ICD-10-CM

## 2025-06-12 NOTE — PROGRESS NOTES
Subjective   Nicholas Yin is a 80 y.o. male.     Chief Complaint  ER follow-up    History of Present Illness     ER Follow-Up  He was seen at [facility] ER on 6/3/2025 with a 3 to 4-day history of increasing shortness of breath, dry cough, malaise, weakness, and fever. Non-contrast CT of the chest revealed peripheral and basilar predominant pulmonary fibrosis, trace pleural effusions, cardiomegaly with severe coronary artery calcifications, and degenerative changes of the spine.  Noncontrast CT of the abdomen and pelvis revealed dense atherosclerotic changes of the aortoiliac vasculature, a fat-containing anterior abdominal wall hernia at the level of the umbilicus, and degenerative changes of the spine.  He was given a dose of ceftriaxone and ultimately discharged on a course of cefdinir, doxycycline, and prednisone.  He has noted a gradual improvement in his symptoms.  His primary complaint today is fatigue.  He denies any chest pain or hemoptysis, he has been eating well with no nausea, vomiting, or diarrhea.  He denies any dysuria or rash.  1 blood culture drawn ultimately returned positive for Streptococcus sanguinous.  Lab Results   Component Value Date    WBC 18.80 (H) 06/03/2025    HGB 9.2 (L) 06/03/2025    HCT 30.4 (L) 06/03/2025    MCV 72.9 (L) 06/03/2025     06/03/2025     Previous TAVR  He underwent a TAVR and left femoral endarterectomy on 5/8/2023.      Paroxysmal Atrial Fibrillation  He required readmission to hospital on 5/14/2023 following a presyncopal episode. He was found to be in atrial fibrillation with a rapid ventricular response.  He was started on IV diltiazem and progressed to a wide-complex tachycardia with loss of consciousness.  CPR was administered with a prompt improvement and he regained consciousness.  He was then started on IV amiodarone and transferred to [facility].  On arrival there he was found to be in a third-degree block and underwent placement of a pacemaker by   Reji.  He remains on dronedarone and continues to deny any significant palpitations.  He continues to deny any chest pain or lightheadedness, and there is no history of any calf pain, swelling of the ankles, fever, or chills. He remains on rivaroxaban.  He is scheduled to undergo an electrophysiology reassessment with Dr. Howe on 5/8/2026  Lab Results   Component Value Date    TSH 3.200 06/03/2025     Coronary Artery Disease  He underwent a coronary angiogram by Dr. Villatoro on 8/15/2019 with stenting of the mid LAD.  He remains on low-dose ASA.  He has experienced increased shortness of breath for several months.  He continues to deny any orthopnea or PND, and there is no history of any chest pain, calf pain, or swelling the ankles.  He admits to a recent cough.  He is scheduled to undergo a cardiology reassessment with CHIDI Ku on 9/19/2026    Transient Visual Loss  Several years ago he experienced a sudden progressive decrease in the vision in his right eye while driving. He stated that the vision in the eye darkened gradually over several minutes to the point where the only things he could make out were very bright lights and the sun. This lasted for about 30 minutes then resolved over several minutes. He was hospitalized at [facility] over 10 years ago following a similar episodes that was ultimately felt to be vascular in origin. MRI of the brain performed on 12/8/17 was reported as showing mild cerebral atrophy with chronic small vessel ischemic changes. MRA of the carotid arteries performed the same day was unremarkable.  Bilateral carotid ultrasound performed on 2/6/2023 was unremarkable.  He undergoes regular ophthalmology assessments    Type 2 Diabetes Mellitus  He remains on a SGLT2 -empagliflozin, GLP agonist-liraglutide and basal insulin - tresiba (together as xultophy).    Lab Results   Component Value Date    HGBA1C 7.80 (H) 06/03/2025     Hyperlipidemia  He remains on  rosuvastatin  Lab Results   Component Value Date    CHOL 71 06/03/2025    CHLPL 125 09/28/2015    TRIG 116 06/03/2025    HDL 33 (L) 06/03/2025    LDL 17 06/03/2025     Lab Results   Component Value Date    CKTOTAL 43 06/03/2025    TROPONINT 75 (C) 06/03/2025     Essential Hypertension  Home blood pressure readings: have been a bit low at times.  He appears to be taking both metoprolol and losartan  Lab Results   Component Value Date    GLUCOSE 161 (H) 06/03/2025    BUN 39.3 (H) 06/03/2025    CREATININE 3.18 (H) 06/03/2025     06/03/2025    K 4.5 06/03/2025     (H) 06/03/2025    CALCIUM 8.6 06/03/2025    PROTEINTOT 6.2 06/03/2025    ALBUMIN 2.8 (L) 06/03/2025    ALT 9 06/03/2025    AST 18 06/03/2025    ALKPHOS 83 06/03/2025    BILITOT 0.6 06/03/2025    GLOB 3.4 06/03/2025    AGRATIO 0.8 06/03/2025    BCR 12.4 06/03/2025    ANIONGAP 11.5 06/03/2025    EGFR 19.1 (L) 06/03/2025     Chronic Renal Failure  This has been contributed to longstanding type 2 diabetes mellitus and hypertension.  He is aware to avoid any NSAIDs oral or prescription.     Chronic Back Pain  He gives a long history of increasing mid and low back pain. This is described as a sharp ache. The pain does not radiate and has been unassociated with any other symptoms. The pain is worse with prolonged weight bearing and limits his activities more then anything else at present.  MRI performed on 1/7/2021 was reported as showing degenerative disc disease and osteoarthritis with mild to moderate central canal and bilateral foraminal narrowing at L2-3 and L3-4.    Gastroesophageal Reflux Disease  He remains heartburn free on omeprazole    The following portions of the patient's history were reviewed and updated as appropriate: allergies, current medications, past medical history, past social history, and problem list.    Review of Systems   Constitutional:  Positive for fatigue. Negative for chills and fever.   HENT:  Negative for congestion,  dental problem, ear pain, rhinorrhea, sneezing and sore throat.    Respiratory:  Positive for cough and shortness of breath. Negative for wheezing.    Cardiovascular:  Negative for chest pain, palpitations and leg swelling.   Gastrointestinal:  Negative for abdominal pain, blood in stool, constipation, diarrhea, nausea and vomiting.   Genitourinary:  Positive for difficulty urinating (sense of incomplete voding), nocturia (x 2-3) and erectile dysfunction. Negative for dysuria and hematuria.   Musculoskeletal:  Positive for back pain. Negative for arthralgias, gait problem, joint swelling, myalgias, neck pain and neck stiffness.   Skin:  Negative for rash and skin lesions.   Neurological:  Positive for weakness (generalized). Negative for numbness and headache.   Psychiatric/Behavioral:  Negative for behavioral problems, sleep disturbance and depressed mood. The patient is not nervous/anxious.      Objective   Physical Exam  Constitutional:       General: He is not in acute distress.     Appearance: Normal appearance. He is well-developed. He is not ill-appearing or diaphoretic.      Comments: Looked tired but oriented and in fair spirits.  Sits and stands with an exaggerated thoracic kyphosis.  Short of breath on walking more than 30 feet. No apparent distress.  No pallor, cyanosis, diaphoresis, or jaundice   HENT:      Head: Atraumatic.   Eyes:      Conjunctiva/sclera: Conjunctivae normal.   Neck:      Thyroid: No thyroid mass or thyromegaly.      Vascular: No carotid bruit or JVD.      Trachea: Trachea normal. No tracheal deviation.   Cardiovascular:      Rate and Rhythm: Normal rate and regular rhythm.   Pulmonary:      Effort: Pulmonary effort is normal.      Breath sounds: Normal breath sounds.   Abdominal:      General: Bowel sounds are normal. There is no distension.   Musculoskeletal:      Right lower leg: No edema.      Left lower leg: No edema.   Lymphadenopathy:      Head:      Right side of head: No  submental, submandibular, tonsillar, preauricular, posterior auricular or occipital adenopathy.      Left side of head: No submental, submandibular, tonsillar, preauricular, posterior auricular or occipital adenopathy.      Cervical: No cervical adenopathy.      Upper Body:      Right upper body: No supraclavicular adenopathy.      Left upper body: No supraclavicular adenopathy.   Skin:     General: Skin is warm and dry.      Coloration: Skin is not cyanotic, jaundiced or pale.      Findings: No lesion or rash.      Nails: There is no clubbing.   Neurological:      Mental Status: He is alert and oriented to person, place, and time.      Cranial Nerves: No cranial nerve deficit.      Motor: No tremor.      Coordination: Coordination normal.      Gait: Gait normal.   Psychiatric:         Attention and Perception: Attention normal.         Mood and Affect: Mood normal.         Speech: Speech normal.         Behavior: Behavior normal.         Thought Content: Thought content normal.       Assessment & Plan   Problems Addressed this Visit          Cardiac and Vasculature    CAD s/p stent  (Chronic)  Continue low-dose ASA.  Follow up with cardiology     Essential hypertension (Chronic)   Hypertension: at goal.   Encouraged to continue to work on diet and exercise plan.   Continue current medication    Mixed hyperlipidemia (Chronic)  As above.   Continue current medication.    Paroxysmal atrial fibrillation (Chronic)   Rate control is appropriate.. Patient is anticoagulated.   Continue current medication.  Follow up with electrophysiology.    S/P TAVR (transcatheter aortic valve replacement)    Peripheral vascular disease  As above.   Continue current medication.       Endocrine and Metabolic    T2DM  (Chronic)  Diabetes mellitus Type II, under good control.   Encouraged to continue to pursue ADA diet  Encouraged aerobic exercise.  Continue current medication    Class 2 severe obesity with serious comorbidity and body mass  index (BMI) of 35.0 to 35.9 in adult       Gastrointestinal Abdominal     GERD (Chronic)       Genitourinary and Reproductive     Benign prostatic hyperplasia with nocturia    Chronic renal failure, stage 4 (severe)  Reminded to avoid any NSAIDs prescription or OTC  Continue current medication  Will continue to monitor       Health Encounters    Healthcare maintenance  We will discuss RSV and an updated Tdap again at his return       Hematology and Neoplasia    H/O testicular cancer    History of colon cancer    History of nonmelanoma skin cancer       Neuro    DDD (degenerative disc disease), lumbar       Symptoms and Signs    Febrile illness  Seen at [facility] ED with a 3 to 4-day historyof increasing shortness of breath, dry cough, malaise, weakness, and fever.  WBC be elevated with left shift.  No clear source of infection identified.  Improving with antibiotics, however, blood culture x 1 positive for Streptococcus sanguinous  Will plan on updating a CBC and CMP in 1 to 2 weeks  Will reach out to infectious diseases as to their recommendation on proceeding forward     Diagnoses         Codes Comments      S/P TAVR (transcatheter aortic valve replacement)    -  Primary ICD-10-CM: Z95.2  ICD-9-CM: V43.3       Peripheral vascular disease     ICD-10-CM: I73.9  ICD-9-CM: 443.9       Paroxysmal atrial fibrillation     ICD-10-CM: I48.0  ICD-9-CM: 427.31       Mixed hyperlipidemia     ICD-10-CM: E78.2  ICD-9-CM: 272.2       Essential hypertension     ICD-10-CM: I10  ICD-9-CM: 401.9       Coronary artery disease involving native coronary artery of native heart without angina pectoris     ICD-10-CM: I25.10  ICD-9-CM: 414.01       T2DM      ICD-10-CM: E11.8, Z79.4  ICD-9-CM: 250.90, V58.67       GERD     ICD-10-CM: K21.9  ICD-9-CM: 530.81       Chronic renal failure, stage 4 (severe)     ICD-10-CM: N18.4  ICD-9-CM: 585.4       Benign prostatic hyperplasia with nocturia     ICD-10-CM: N40.1, R35.1  ICD-9-CM: 600.01, 788.43        "Healthcare maintenance     ICD-10-CM: Z00.00  ICD-9-CM: V70.0       History of nonmelanoma skin cancer     ICD-10-CM: Z85.828  ICD-9-CM: V10.83       History of colon cancer     ICD-10-CM: Z85.038  ICD-9-CM: V10.05       H/O testicular cancer     ICD-10-CM: Z85.47  ICD-9-CM: V10.47       Degeneration of intervertebral disc of lumbar region with discogenic back pain     ICD-10-CM: M51.360  ICD-9-CM: 722.52       Febrile illness     ICD-10-CM: R50.9  ICD-9-CM: 780.60       Class 2 severe obesity with serious comorbidity and body mass index (BMI) of 35.0 to 35.9 in adult, unspecified obesity type     ICD-10-CM: E66.812, E66.01, Z68.35  ICD-9-CM: 278.01, V85.35           I spent 50 minutes caring for Nicholas \"Jairo\" Humphrey on this date of service. This time includes time spent by me in the following activities:reviewing tests, performing a medically appropriate examination and/or evaluation , counseling and educating the patient/family/caregiver, ordering medications, tests, or procedures and documenting information in the medical record              "

## 2025-06-13 ENCOUNTER — HOSPITAL ENCOUNTER (INPATIENT)
Facility: HOSPITAL | Age: 80
LOS: 5 days | Discharge: HOME HEALTH | End: 2025-06-18
Attending: EMERGENCY MEDICINE | Admitting: STUDENT IN AN ORGANIZED HEALTH CARE EDUCATION/TRAINING PROGRAM
Payer: COMMERCIAL

## 2025-06-13 DIAGNOSIS — Z85.47 H/O TESTICULAR CANCER: ICD-10-CM

## 2025-06-13 DIAGNOSIS — I25.10 CORONARY ARTERY DISEASE INVOLVING NATIVE CORONARY ARTERY OF NATIVE HEART WITHOUT ANGINA PECTORIS: Chronic | ICD-10-CM

## 2025-06-13 DIAGNOSIS — K21.9 GASTROESOPHAGEAL REFLUX DISEASE WITHOUT ESOPHAGITIS: Chronic | ICD-10-CM

## 2025-06-13 DIAGNOSIS — E55.9 VITAMIN D DEFICIENCY: ICD-10-CM

## 2025-06-13 DIAGNOSIS — Z79.4 TYPE 2 DIABETES MELLITUS WITH COMPLICATION, WITH LONG-TERM CURRENT USE OF INSULIN: Chronic | ICD-10-CM

## 2025-06-13 DIAGNOSIS — N18.4 CHRONIC RENAL FAILURE, STAGE 4 (SEVERE): ICD-10-CM

## 2025-06-13 DIAGNOSIS — I97.89 POSTOPERATIVE COMPLETE HEART BLOCK: ICD-10-CM

## 2025-06-13 DIAGNOSIS — G45.3 AMAUROSIS FUGAX OF RIGHT EYE: ICD-10-CM

## 2025-06-13 DIAGNOSIS — Z95.0 PACEMAKER: Chronic | ICD-10-CM

## 2025-06-13 DIAGNOSIS — L57.0 ACTINIC KERATOSIS: ICD-10-CM

## 2025-06-13 DIAGNOSIS — Z85.038 HISTORY OF COLON CANCER: ICD-10-CM

## 2025-06-13 DIAGNOSIS — R78.81 BACTEREMIA DUE TO STREPTOCOCCUS: Primary | ICD-10-CM

## 2025-06-13 DIAGNOSIS — Z86.79 HISTORY OF HYPERTENSION: ICD-10-CM

## 2025-06-13 DIAGNOSIS — I48.0 PAROXYSMAL ATRIAL FIBRILLATION: Chronic | ICD-10-CM

## 2025-06-13 DIAGNOSIS — E66.812 CLASS 2 SEVERE OBESITY WITH SERIOUS COMORBIDITY AND BODY MASS INDEX (BMI) OF 35.0 TO 35.9 IN ADULT, UNSPECIFIED OBESITY TYPE: ICD-10-CM

## 2025-06-13 DIAGNOSIS — R35.1 BENIGN PROSTATIC HYPERPLASIA WITH NOCTURIA: ICD-10-CM

## 2025-06-13 DIAGNOSIS — B95.5 BACTEREMIA DUE TO STREPTOCOCCUS: Primary | ICD-10-CM

## 2025-06-13 DIAGNOSIS — Z95.2 S/P TAVR (TRANSCATHETER AORTIC VALVE REPLACEMENT): Chronic | ICD-10-CM

## 2025-06-13 DIAGNOSIS — R06.02 SOBOE (SHORTNESS OF BREATH ON EXERTION): ICD-10-CM

## 2025-06-13 DIAGNOSIS — R79.89 ELEVATED TSH: ICD-10-CM

## 2025-06-13 DIAGNOSIS — Z86.19 HISTORY OF HEPATITIS C: ICD-10-CM

## 2025-06-13 DIAGNOSIS — N52.01 ERECTILE DYSFUNCTION DUE TO ARTERIAL INSUFFICIENCY: ICD-10-CM

## 2025-06-13 DIAGNOSIS — I10 ESSENTIAL HYPERTENSION: Chronic | ICD-10-CM

## 2025-06-13 DIAGNOSIS — Z00.00 HEALTHCARE MAINTENANCE: ICD-10-CM

## 2025-06-13 DIAGNOSIS — M85.89 OSTEOPENIA OF MULTIPLE SITES: ICD-10-CM

## 2025-06-13 DIAGNOSIS — Z23 ENCOUNTER FOR IMMUNIZATION: ICD-10-CM

## 2025-06-13 DIAGNOSIS — E78.2 MIXED HYPERLIPIDEMIA: Chronic | ICD-10-CM

## 2025-06-13 DIAGNOSIS — R78.81 BACTEREMIA: ICD-10-CM

## 2025-06-13 DIAGNOSIS — N40.1 BENIGN PROSTATIC HYPERPLASIA WITH NOCTURIA: ICD-10-CM

## 2025-06-13 DIAGNOSIS — M51.360 DEGENERATION OF INTERVERTEBRAL DISC OF LUMBAR REGION WITH DISCOGENIC BACK PAIN: ICD-10-CM

## 2025-06-13 DIAGNOSIS — Z95.2 HISTORY OF TRANSCATHETER AORTIC VALVE REPLACEMENT (TAVR): ICD-10-CM

## 2025-06-13 DIAGNOSIS — R50.9 FEBRILE ILLNESS: ICD-10-CM

## 2025-06-13 DIAGNOSIS — E11.8 TYPE 2 DIABETES MELLITUS WITH COMPLICATION, WITH LONG-TERM CURRENT USE OF INSULIN: Chronic | ICD-10-CM

## 2025-06-13 DIAGNOSIS — Z86.39 HISTORY OF HYPERLIPIDEMIA: ICD-10-CM

## 2025-06-13 DIAGNOSIS — Z86.39 HISTORY OF DIABETES MELLITUS: ICD-10-CM

## 2025-06-13 DIAGNOSIS — E66.01 CLASS 2 SEVERE OBESITY WITH SERIOUS COMORBIDITY AND BODY MASS INDEX (BMI) OF 35.0 TO 35.9 IN ADULT, UNSPECIFIED OBESITY TYPE: ICD-10-CM

## 2025-06-13 DIAGNOSIS — Z85.828 HISTORY OF NONMELANOMA SKIN CANCER: ICD-10-CM

## 2025-06-13 DIAGNOSIS — Z95.2 S/P TAVR (TRANSCATHETER AORTIC VALVE REPLACEMENT): Primary | Chronic | ICD-10-CM

## 2025-06-13 DIAGNOSIS — Z86.79 HISTORY OF CORONARY ARTERY DISEASE: ICD-10-CM

## 2025-06-13 DIAGNOSIS — I73.9 PERIPHERAL VASCULAR DISEASE: ICD-10-CM

## 2025-06-13 DIAGNOSIS — R78.81 POSITIVE BLOOD CULTURE: ICD-10-CM

## 2025-06-13 DIAGNOSIS — R50.9 FEBRILE ILLNESS: Primary | ICD-10-CM

## 2025-06-13 DIAGNOSIS — I44.2 POSTOPERATIVE COMPLETE HEART BLOCK: ICD-10-CM

## 2025-06-13 LAB
ALBUMIN SERPL-MCNC: 3.1 G/DL (ref 3.5–5.2)
ALBUMIN/GLOB SERPL: 1 G/DL
ALP SERPL-CCNC: 85 U/L (ref 39–117)
ALT SERPL W P-5'-P-CCNC: 14 U/L (ref 1–41)
ANION GAP SERPL CALCULATED.3IONS-SCNC: 9 MMOL/L (ref 5–15)
AST SERPL-CCNC: 19 U/L (ref 1–40)
BACTERIA UR QL AUTO: NORMAL /HPF
BASOPHILS # BLD AUTO: 0.03 10*3/MM3 (ref 0–0.2)
BASOPHILS NFR BLD AUTO: 0.3 % (ref 0–1.5)
BILIRUB SERPL-MCNC: 0.3 MG/DL (ref 0–1.2)
BILIRUB UR QL STRIP: NEGATIVE
BUN SERPL-MCNC: 38.6 MG/DL (ref 8–23)
BUN/CREAT SERPL: 14.9 (ref 7–25)
CALCIUM SPEC-SCNC: 8.9 MG/DL (ref 8.6–10.5)
CHLORIDE SERPL-SCNC: 111 MMOL/L (ref 98–107)
CLARITY UR: CLEAR
CO2 SERPL-SCNC: 19 MMOL/L (ref 22–29)
COLOR UR: YELLOW
CREAT SERPL-MCNC: 2.59 MG/DL (ref 0.76–1.27)
D-LACTATE SERPL-SCNC: 1.3 MMOL/L (ref 0.5–2)
DEPRECATED RDW RBC AUTO: 50.4 FL (ref 37–54)
EGFRCR SERPLBLD CKD-EPI 2021: 24.4 ML/MIN/1.73
EOSINOPHIL # BLD AUTO: 0.41 10*3/MM3 (ref 0–0.4)
EOSINOPHIL NFR BLD AUTO: 3.4 % (ref 0.3–6.2)
ERYTHROCYTE [DISTWIDTH] IN BLOOD BY AUTOMATED COUNT: 20.1 % (ref 12.3–15.4)
GLOBULIN UR ELPH-MCNC: 3.2 GM/DL
GLUCOSE SERPL-MCNC: 221 MG/DL (ref 65–99)
GLUCOSE UR STRIP-MCNC: ABNORMAL MG/DL
HCT VFR BLD AUTO: 32 % (ref 37.5–51)
HGB BLD-MCNC: 9.6 G/DL (ref 13–17.7)
HGB UR QL STRIP.AUTO: NEGATIVE
HYALINE CASTS UR QL AUTO: NORMAL /LPF
IMM GRANULOCYTES # BLD AUTO: 0.06 10*3/MM3 (ref 0–0.05)
IMM GRANULOCYTES NFR BLD AUTO: 0.5 % (ref 0–0.5)
KETONES UR QL STRIP: NEGATIVE
LEUKOCYTE ESTERASE UR QL STRIP.AUTO: NEGATIVE
LYMPHOCYTES # BLD AUTO: 1.47 10*3/MM3 (ref 0.7–3.1)
LYMPHOCYTES NFR BLD AUTO: 12.3 % (ref 19.6–45.3)
MCH RBC QN AUTO: 21.9 PG (ref 26.6–33)
MCHC RBC AUTO-ENTMCNC: 30 G/DL (ref 31.5–35.7)
MCV RBC AUTO: 73.1 FL (ref 79–97)
MONOCYTES # BLD AUTO: 0.96 10*3/MM3 (ref 0.1–0.9)
MONOCYTES NFR BLD AUTO: 8 % (ref 5–12)
NEUTROPHILS NFR BLD AUTO: 75.5 % (ref 42.7–76)
NEUTROPHILS NFR BLD AUTO: 9 10*3/MM3 (ref 1.7–7)
NITRITE UR QL STRIP: NEGATIVE
NRBC BLD AUTO-RTO: 0 /100 WBC (ref 0–0.2)
PH UR STRIP.AUTO: <=5 [PH] (ref 5–8)
PLATELET # BLD AUTO: 297 10*3/MM3 (ref 140–450)
PMV BLD AUTO: 9.8 FL (ref 6–12)
POTASSIUM SERPL-SCNC: 4.8 MMOL/L (ref 3.5–5.2)
PROT SERPL-MCNC: 6.3 G/DL (ref 6–8.5)
PROT UR QL STRIP: ABNORMAL
RBC # BLD AUTO: 4.38 10*6/MM3 (ref 4.14–5.8)
RBC # UR STRIP: NORMAL /HPF
REF LAB TEST METHOD: NORMAL
SODIUM SERPL-SCNC: 139 MMOL/L (ref 136–145)
SP GR UR STRIP: 1.03 (ref 1–1.03)
SQUAMOUS #/AREA URNS HPF: NORMAL /HPF
UROBILINOGEN UR QL STRIP: ABNORMAL
WBC # UR STRIP: NORMAL /HPF
WBC NRBC COR # BLD AUTO: 11.93 10*3/MM3 (ref 3.4–10.8)

## 2025-06-13 PROCEDURE — 99285 EMERGENCY DEPT VISIT HI MDM: CPT

## 2025-06-13 PROCEDURE — 80053 COMPREHEN METABOLIC PANEL: CPT | Performed by: PHYSICIAN ASSISTANT

## 2025-06-13 PROCEDURE — 25010000002 CEFTRIAXONE PER 250 MG: Performed by: PHYSICIAN ASSISTANT

## 2025-06-13 PROCEDURE — 87040 BLOOD CULTURE FOR BACTERIA: CPT | Performed by: PHYSICIAN ASSISTANT

## 2025-06-13 PROCEDURE — 81001 URINALYSIS AUTO W/SCOPE: CPT | Performed by: PHYSICIAN ASSISTANT

## 2025-06-13 PROCEDURE — 85025 COMPLETE CBC W/AUTO DIFF WBC: CPT | Performed by: PHYSICIAN ASSISTANT

## 2025-06-13 PROCEDURE — 36415 COLL VENOUS BLD VENIPUNCTURE: CPT

## 2025-06-13 PROCEDURE — 83605 ASSAY OF LACTIC ACID: CPT | Performed by: PHYSICIAN ASSISTANT

## 2025-06-13 PROCEDURE — 25010000002 GENTAMICIN PER 80 MG: Performed by: PHYSICIAN ASSISTANT

## 2025-06-13 RX ADMIN — GENTAMICIN SULFATE 240 MG: 40 INJECTION, SOLUTION INTRAMUSCULAR; INTRAVENOUS at 22:29

## 2025-06-13 RX ADMIN — CEFTRIAXONE 2000 MG: 2 INJECTION, POWDER, FOR SOLUTION INTRAMUSCULAR; INTRAVENOUS at 21:56

## 2025-06-14 LAB
ANION GAP SERPL CALCULATED.3IONS-SCNC: 7 MMOL/L (ref 5–15)
ANISOCYTOSIS BLD QL: NORMAL
BASOPHILS # BLD AUTO: 0.04 10*3/MM3 (ref 0–0.2)
BASOPHILS NFR BLD AUTO: 0.4 % (ref 0–1.5)
BUN SERPL-MCNC: 33.6 MG/DL (ref 8–23)
BUN/CREAT SERPL: 15.5 (ref 7–25)
CALCIUM SPEC-SCNC: 8.7 MG/DL (ref 8.6–10.5)
CHLORIDE SERPL-SCNC: 115 MMOL/L (ref 98–107)
CO2 SERPL-SCNC: 20 MMOL/L (ref 22–29)
CREAT SERPL-MCNC: 2.17 MG/DL (ref 0.76–1.27)
DACRYOCYTES BLD QL SMEAR: NORMAL
DEPRECATED RDW RBC AUTO: 50.5 FL (ref 37–54)
EGFRCR SERPLBLD CKD-EPI 2021: 30.2 ML/MIN/1.73
EOSINOPHIL # BLD AUTO: 0.44 10*3/MM3 (ref 0–0.4)
EOSINOPHIL NFR BLD AUTO: 4.1 % (ref 0.3–6.2)
ERYTHROCYTE [DISTWIDTH] IN BLOOD BY AUTOMATED COUNT: 20.3 % (ref 12.3–15.4)
GLUCOSE BLDC GLUCOMTR-MCNC: 168 MG/DL (ref 70–130)
GLUCOSE BLDC GLUCOMTR-MCNC: 220 MG/DL (ref 70–130)
GLUCOSE BLDC GLUCOMTR-MCNC: 77 MG/DL (ref 70–130)
GLUCOSE BLDC GLUCOMTR-MCNC: 90 MG/DL (ref 70–130)
GLUCOSE BLDC GLUCOMTR-MCNC: 98 MG/DL (ref 70–130)
GLUCOSE SERPL-MCNC: 119 MG/DL (ref 65–99)
HCT VFR BLD AUTO: 30.3 % (ref 37.5–51)
HGB BLD-MCNC: 9.2 G/DL (ref 13–17.7)
HYPOCHROMIA BLD QL: NORMAL
IMM GRANULOCYTES # BLD AUTO: 0.06 10*3/MM3 (ref 0–0.05)
IMM GRANULOCYTES NFR BLD AUTO: 0.6 % (ref 0–0.5)
LYMPHOCYTES # BLD AUTO: 1.24 10*3/MM3 (ref 0.7–3.1)
LYMPHOCYTES NFR BLD AUTO: 11.5 % (ref 19.6–45.3)
MAGNESIUM SERPL-MCNC: 2.1 MG/DL (ref 1.6–2.4)
MCH RBC QN AUTO: 22 PG (ref 26.6–33)
MCHC RBC AUTO-ENTMCNC: 30.4 G/DL (ref 31.5–35.7)
MCV RBC AUTO: 72.3 FL (ref 79–97)
MICROCYTES BLD QL: NORMAL
MONOCYTES # BLD AUTO: 0.94 10*3/MM3 (ref 0.1–0.9)
MONOCYTES NFR BLD AUTO: 8.7 % (ref 5–12)
NEUTROPHILS NFR BLD AUTO: 74.7 % (ref 42.7–76)
NEUTROPHILS NFR BLD AUTO: 8.07 10*3/MM3 (ref 1.7–7)
NRBC BLD AUTO-RTO: 0 /100 WBC (ref 0–0.2)
OVALOCYTES BLD QL SMEAR: NORMAL
PHOSPHATE SERPL-MCNC: 3.6 MG/DL (ref 2.5–4.5)
PLAT MORPH BLD: NORMAL
PLATELET # BLD AUTO: 266 10*3/MM3 (ref 140–450)
PMV BLD AUTO: 10 FL (ref 6–12)
POTASSIUM SERPL-SCNC: 4.6 MMOL/L (ref 3.5–5.2)
RBC # BLD AUTO: 4.19 10*6/MM3 (ref 4.14–5.8)
SODIUM SERPL-SCNC: 142 MMOL/L (ref 136–145)
WBC MORPH BLD: NORMAL
WBC NRBC COR # BLD AUTO: 10.79 10*3/MM3 (ref 3.4–10.8)

## 2025-06-14 PROCEDURE — 25010000002 CEFTRIAXONE PER 250 MG

## 2025-06-14 PROCEDURE — 84100 ASSAY OF PHOSPHORUS: CPT | Performed by: FAMILY MEDICINE

## 2025-06-14 PROCEDURE — 99222 1ST HOSP IP/OBS MODERATE 55: CPT | Performed by: FAMILY MEDICINE

## 2025-06-14 PROCEDURE — 80048 BASIC METABOLIC PNL TOTAL CA: CPT | Performed by: FAMILY MEDICINE

## 2025-06-14 PROCEDURE — 85025 COMPLETE CBC W/AUTO DIFF WBC: CPT | Performed by: FAMILY MEDICINE

## 2025-06-14 PROCEDURE — 85007 BL SMEAR W/DIFF WBC COUNT: CPT | Performed by: FAMILY MEDICINE

## 2025-06-14 PROCEDURE — 63710000001 INSULIN LISPRO (HUMAN) PER 5 UNITS: Performed by: FAMILY MEDICINE

## 2025-06-14 PROCEDURE — 82948 REAGENT STRIP/BLOOD GLUCOSE: CPT

## 2025-06-14 PROCEDURE — 63710000001 INSULIN GLARGINE PER 5 UNITS: Performed by: FAMILY MEDICINE

## 2025-06-14 PROCEDURE — 83735 ASSAY OF MAGNESIUM: CPT | Performed by: FAMILY MEDICINE

## 2025-06-14 RX ORDER — POLYETHYLENE GLYCOL 3350 17 G/17G
17 POWDER, FOR SOLUTION ORAL DAILY PRN
Status: DISCONTINUED | OUTPATIENT
Start: 2025-06-14 | End: 2025-06-18 | Stop reason: HOSPADM

## 2025-06-14 RX ORDER — METOPROLOL TARTRATE 25 MG/1
25 TABLET, FILM COATED ORAL 2 TIMES DAILY
Status: DISCONTINUED | OUTPATIENT
Start: 2025-06-14 | End: 2025-06-18 | Stop reason: HOSPADM

## 2025-06-14 RX ORDER — AMOXICILLIN 250 MG
2 CAPSULE ORAL 2 TIMES DAILY PRN
Status: DISCONTINUED | OUTPATIENT
Start: 2025-06-14 | End: 2025-06-18 | Stop reason: HOSPADM

## 2025-06-14 RX ORDER — NITROGLYCERIN 0.4 MG/1
0.4 TABLET SUBLINGUAL
Status: DISCONTINUED | OUTPATIENT
Start: 2025-06-14 | End: 2025-06-18 | Stop reason: HOSPADM

## 2025-06-14 RX ORDER — SODIUM CHLORIDE 9 MG/ML
40 INJECTION, SOLUTION INTRAVENOUS AS NEEDED
Status: DISCONTINUED | OUTPATIENT
Start: 2025-06-14 | End: 2025-06-18 | Stop reason: HOSPADM

## 2025-06-14 RX ORDER — ASPIRIN 81 MG/1
81 TABLET ORAL DAILY
Status: DISCONTINUED | OUTPATIENT
Start: 2025-06-15 | End: 2025-06-18 | Stop reason: HOSPADM

## 2025-06-14 RX ORDER — IBUPROFEN 600 MG/1
1 TABLET ORAL
Status: DISCONTINUED | OUTPATIENT
Start: 2025-06-14 | End: 2025-06-18 | Stop reason: HOSPADM

## 2025-06-14 RX ORDER — DEXTROSE MONOHYDRATE 25 G/50ML
25 INJECTION, SOLUTION INTRAVENOUS
Status: DISCONTINUED | OUTPATIENT
Start: 2025-06-14 | End: 2025-06-18 | Stop reason: HOSPADM

## 2025-06-14 RX ORDER — INSULIN LISPRO 100 [IU]/ML
8 INJECTION, SOLUTION INTRAVENOUS; SUBCUTANEOUS
Status: DISCONTINUED | OUTPATIENT
Start: 2025-06-14 | End: 2025-06-18 | Stop reason: HOSPADM

## 2025-06-14 RX ORDER — SODIUM CHLORIDE 0.9 % (FLUSH) 0.9 %
10 SYRINGE (ML) INJECTION EVERY 12 HOURS SCHEDULED
Status: DISCONTINUED | OUTPATIENT
Start: 2025-06-14 | End: 2025-06-18 | Stop reason: HOSPADM

## 2025-06-14 RX ORDER — BISACODYL 5 MG/1
5 TABLET, DELAYED RELEASE ORAL DAILY PRN
Status: DISCONTINUED | OUTPATIENT
Start: 2025-06-14 | End: 2025-06-18 | Stop reason: HOSPADM

## 2025-06-14 RX ORDER — ROSUVASTATIN CALCIUM 20 MG/1
20 TABLET, COATED ORAL DAILY
Status: DISCONTINUED | OUTPATIENT
Start: 2025-06-15 | End: 2025-06-18 | Stop reason: HOSPADM

## 2025-06-14 RX ORDER — ONDANSETRON 2 MG/ML
4 INJECTION INTRAMUSCULAR; INTRAVENOUS EVERY 6 HOURS PRN
Status: DISCONTINUED | OUTPATIENT
Start: 2025-06-14 | End: 2025-06-18 | Stop reason: HOSPADM

## 2025-06-14 RX ORDER — LOSARTAN POTASSIUM 25 MG/1
25 TABLET ORAL 2 TIMES DAILY
Status: DISCONTINUED | OUTPATIENT
Start: 2025-06-14 | End: 2025-06-15

## 2025-06-14 RX ORDER — NICOTINE POLACRILEX 4 MG
15 LOZENGE BUCCAL
Status: DISCONTINUED | OUTPATIENT
Start: 2025-06-14 | End: 2025-06-18 | Stop reason: HOSPADM

## 2025-06-14 RX ORDER — SODIUM CHLORIDE 0.9 % (FLUSH) 0.9 %
10 SYRINGE (ML) INJECTION AS NEEDED
Status: DISCONTINUED | OUTPATIENT
Start: 2025-06-14 | End: 2025-06-18 | Stop reason: HOSPADM

## 2025-06-14 RX ORDER — BISACODYL 10 MG
10 SUPPOSITORY, RECTAL RECTAL DAILY PRN
Status: DISCONTINUED | OUTPATIENT
Start: 2025-06-14 | End: 2025-06-18 | Stop reason: HOSPADM

## 2025-06-14 RX ADMIN — LOSARTAN POTASSIUM 25 MG: 25 TABLET, FILM COATED ORAL at 22:32

## 2025-06-14 RX ADMIN — CEFTRIAXONE 2000 MG: 2 INJECTION, POWDER, FOR SOLUTION INTRAMUSCULAR; INTRAVENOUS at 22:33

## 2025-06-14 RX ADMIN — INSULIN GLARGINE 5 UNITS: 100 INJECTION, SOLUTION SUBCUTANEOUS at 10:16

## 2025-06-14 RX ADMIN — Medication 10 ML: at 22:32

## 2025-06-14 RX ADMIN — INSULIN LISPRO 8 UNITS: 100 INJECTION, SOLUTION INTRAVENOUS; SUBCUTANEOUS at 11:48

## 2025-06-14 RX ADMIN — INSULIN LISPRO 4 UNITS: 100 INJECTION, SOLUTION INTRAVENOUS; SUBCUTANEOUS at 17:47

## 2025-06-14 RX ADMIN — DRONEDARONE 400 MG: 400 TABLET, FILM COATED ORAL at 18:09

## 2025-06-14 RX ADMIN — Medication 10 ML: at 10:16

## 2025-06-14 RX ADMIN — METOPROLOL TARTRATE 25 MG: 25 TABLET, FILM COATED ORAL at 22:31

## 2025-06-14 RX ADMIN — Medication 10 ML: at 02:09

## 2025-06-14 RX ADMIN — DRONEDARONE 400 MG: 400 TABLET, FILM COATED ORAL at 23:11

## 2025-06-14 RX ADMIN — SERTRALINE HYDROCHLORIDE 25 MG: 50 TABLET ORAL at 16:03

## 2025-06-14 NOTE — ED PROVIDER NOTES
EMERGENCY DEPARTMENT ENCOUNTER    Pt Name: Nicholas Yin  MRN: [id number]  Pt :   1945  Room Number:    Date of encounter:  2025  PCP: Waldemar Trejo MD  ED Provider: CHANO Friedman    Historian: Patient    HPI:  Chief Complaint: Positive Blood Cultures     Context: Nicholas Yin is a 80 y.o. male who presents to the ED c/o positive blood cultures. The patient presented to the emergency room last Tuesday in [place] with pneumonia. A blood culture showed strep. The patient underwent a TAVR and left femoral endarterectomy on 2023 by Dr. Evelio Lopez (now retired). Current symptoms include weakness, tiredness, and shortness of breath. The patient took Cefdinir 300 twice daily and doxycycline 100 for 7 days. There is concern about strep in his heart valve. The patient had an initial fever with pneumonia, which has now resolved. Blood oxygen levels have been 98-99%. No current nausea, vomiting, diarrhea, or constipation. Dr. Lopez suggested intravenous antibiotics and a scan to rule out valve complications.   HPI     REVIEW OF SYSTEMS  A chief complaint appropriate review of systems was completed and is negative except as noted in the HPI.     PAST MEDICAL HISTORY  Past Medical History:   Diagnosis Date    Anemia     Aortic stenosis     Arthritis     Back pain     CancerTesticular/Colon     test -  Colon -    CHF (congestive heart failure)     Coronary artery disease     COVID-2022    DDD (degenerative disc disease), lumbosacral     Diabetes mellitus     Dyspnea     Elevated cholesterol     Erectile dysfunction     GERD (gastroesophageal reflux disease)     History of transfusion     Hyperlipidemia     Hypertension     TIA (transient ischemic attack)        PAST SURGICAL HISTORY  Past Surgical History:   Procedure Laterality Date    AORTIC VALVE REPAIR/REPLACEMENT N/A 2023    Procedure: TRANSCATHETER AORTIC VALVE REPLACEMENT + LAURIE, LEFT FEMORAL  ENDARTERECTOMY WITH PATCH;  Surgeon: Arben Lopez MD;  Location: [facility];  Service: Cardiothoracic;  Laterality: N/A;    AORTIC VALVE REPAIR/REPLACEMENT N/A 5/8/2023    Procedure: Transfemoral Transcatheter Aortic Valve Replacement;  Surgeon: Rachael Mendoza MD;  Location: [facility];  Service: Cardiovascular;  Laterality: N/A;    CARDIAC CATHETERIZATION N/A 08/15/2019    Procedure: Left Heart Cath;  Surgeon: Paul Villatoro MD;  Location: [facility] CATH INVASIVE LOCATION;  Service: Cardiology    CARDIAC CATHETERIZATION      04/10/2023 PER DR. MENDOZA    CARDIAC ELECTROPHYSIOLOGY PROCEDURE N/A 5/15/2023    Procedure: Pacemaker DC - [employer];  Surgeon: Rachael Mendoza MD;  Location: [facility] CATH INVASIVE LOCATION;  Service: Cardiology;  Laterality: N/A;    CATARACT EXTRACTION, BILATERAL      COLON SURGERY      colon resection for cancer - sigmoid removed - no chemo no radiation    COLONOSCOPY      COLONOSCOPY N/A 01/27/2021    Procedure: COLONOSCOPY;  Surgeon: Greg Barraza MD;  Location: [facility];  Service: General;  Laterality: N/A;    CORONARY STENT PLACEMENT      1 STENT    ENDOSCOPY  1989    EYE SURGERY Right     retinal detachment    SD RT/LT HEART CATHETERS N/A 08/15/2019    Procedure: Percutaneous Coronary Intervention;  Surgeon: Paul Villatoro MD;  Location: [facility] CATH INVASIVE LOCATION;  Service: Cardiology    SCALP LESION REMOVAL W/ FLAP AND SKIN GRAFT  12/16/2022    basal cell removal    SHOULDER ARTHROSCOPY Right     SKIN LESION EXCISION N/A 01/27/2021    Procedure: SKIN LESIONS EXCISION FROM LEFT TEMPLE AREA AND ABDOMEN.;  Surgeon: Greg Barraza MD;  Location: [facility];  Service: General;  Laterality: N/A;    VASECTOMY Right     testicle removed d/t cancer - with radiation       FAMILY HISTORY  Family History   Problem Relation Age of Onset    Stroke Mother     Hypertension Mother     Alcohol abuse Mother      COPD Mother     COPD Father     Alcohol abuse Father     Hypertension Father     Alzheimer's disease Father     Heart disease Brother     Hypertension Brother     Diabetes Brother     Stroke Maternal Grandfather     Cancer Paternal Grandmother        SOCIAL HISTORY  Social History     Socioeconomic History    Marital status:      Spouse name: lita    Number of children: 3    Years of education: 16   Tobacco Use    Smoking status: Never     Passive exposure: Never    Smokeless tobacco: Never   Vaping Use    Vaping status: Never Used   Substance and Sexual Activity    Alcohol use: Yes     Comment: vary rarely    Drug use: No    Sexual activity: Defer     Partners: Female       ALLERGIES  Ct contrast and Iodinated contrast media    PHYSICAL EXAM  Physical Exam  GENERAL:   Appears in no acute distress. Non-toxic in appearance  HENT: Nares patent.  EYES: No periorbital edema, erythema, or lesions. Conjunctiva clear, sclera white. Extraocular movements intact.   CV: Regular rhythm, regular rate.  RESPIRATORY: Normal effort.  No audible wheezes, rales or rhonchi.  ABDOMEN: Soft, non-tender; Non-acute abdomen. No rebound. No guarding. No peritoneal signs.  MUSCULOSKELETAL: No deformities.   NEURO: Alert, moves all extremities, follows commands.  SKIN: Warm, dry, no rash visualized.  I have reviewed the triage vital signs and nursing notes.     LAB RESULTS  Results for orders placed or performed during the hospital encounter of 06/13/25   Comprehensive Metabolic Panel    Collection Time: 06/13/25  8:19 PM    Specimen: Blood   Result Value Ref Range    Glucose 221 (H) 65 - 99 mg/dL    BUN 38.6 (H) 8.0 - 23.0 mg/dL    Creatinine 2.59 (H) 0.76 - 1.27 mg/dL    Sodium 139 136 - 145 mmol/L    Potassium 4.8 3.5 - 5.2 mmol/L    Chloride 111 (H) 98 - 107 mmol/L    CO2 19.0 (L) 22.0 - 29.0 mmol/L    Calcium 8.9 8.6 - 10.5 mg/dL    Total Protein 6.3 6.0 - 8.5 g/dL    Albumin 3.1 (L) 3.5 - 5.2 g/dL    ALT (SGPT) 14 1 - 41  U/L    AST (SGOT) 19 1 - 40 U/L    Alkaline Phosphatase 85 39 - 117 U/L    Total Bilirubin 0.3 0.0 - 1.2 mg/dL    Globulin 3.2 gm/dL    A/G Ratio 1.0 g/dL    BUN/Creatinine Ratio 14.9 7.0 - 25.0    Anion Gap 9.0 5.0 - 15.0 mmol/L    eGFR 24.4 (L) >60.0 mL/min/1.73   Lactic Acid, Plasma    Collection Time: 06/13/25  8:19 PM    Specimen: Blood   Result Value Ref Range    Lactate 1.3 0.5 - 2.0 mmol/L   CBC Auto Differential    Collection Time: 06/13/25  8:19 PM    Specimen: Blood   Result Value Ref Range    WBC 11.93 (H) 3.40 - 10.80 10*3/mm3    RBC 4.38 4.14 - 5.80 10*6/mm3    Hemoglobin 9.6 (L) 13.0 - 17.7 g/dL    Hematocrit 32.0 (L) 37.5 - 51.0 %    MCV 73.1 (L) 79.0 - 97.0 fL    MCH 21.9 (L) 26.6 - 33.0 pg    MCHC 30.0 (L) 31.5 - 35.7 g/dL    RDW 20.1 (H) 12.3 - 15.4 %    RDW-SD 50.4 37.0 - 54.0 fl    MPV 9.8 6.0 - 12.0 fL    Platelets 297 140 - 450 10*3/mm3    Neutrophil % 75.5 42.7 - 76.0 %    Lymphocyte % 12.3 (L) 19.6 - 45.3 %    Monocyte % 8.0 5.0 - 12.0 %    Eosinophil % 3.4 0.3 - 6.2 %    Basophil % 0.3 0.0 - 1.5 %    Immature Grans % 0.5 0.0 - 0.5 %    Neutrophils, Absolute 9.00 (H) 1.70 - 7.00 10*3/mm3    Lymphocytes, Absolute 1.47 0.70 - 3.10 10*3/mm3    Monocytes, Absolute 0.96 (H) 0.10 - 0.90 10*3/mm3    Eosinophils, Absolute 0.41 (H) 0.00 - 0.40 10*3/mm3    Basophils, Absolute 0.03 0.00 - 0.20 10*3/mm3    Immature Grans, Absolute 0.06 (H) 0.00 - 0.05 10*3/mm3    nRBC 0.0 0.0 - 0.2 /100 WBC   Urinalysis With Microscopic If Indicated (No Culture) - Urine, Clean Catch    Collection Time: 06/13/25  9:58 PM    Specimen: Urine, Clean Catch   Result Value Ref Range    Color, UA Yellow Yellow, Straw    Appearance, UA Clear Clear    pH, UA <=5.0 5.0 - 8.0    Specific Gravity, UA 1.026 1.001 - 1.030    Glucose, UA >=1000 mg/dL (3+) (A) Negative    Ketones, UA Negative Negative    Bilirubin, UA Negative Negative    Blood, UA Negative Negative    Protein,  mg/dL (2+) (A) Negative    Leuk Esterase, UA  Negative Negative    Nitrite, UA Negative Negative    Urobilinogen, UA 0.2 E.U./dL 0.2 - 1.0 E.U./dL   Urinalysis, Microscopic Only - Urine, Clean Catch    Collection Time: 06/13/25  9:58 PM    Specimen: Urine, Clean Catch   Result Value Ref Range    RBC, UA 0-2 None Seen, 0-2 /HPF    WBC, UA 0-2 None Seen, 0-2 /HPF    Bacteria, UA None Seen None Seen, Trace /HPF    Squamous Epithelial Cells, UA 0-2 None Seen, 0-2 /HPF    Hyaline Casts, UA 0-6 0 - 6 /LPF    Methodology Automated Microscopy      *Note: Due to a large number of results and/or encounters for the requested time period, some results have not been displayed. A complete set of results can be found in Results Review.       If labs were ordered, I independently reviewed the results and considered them in treating the patient.    RADIOLOGY  No orders to display     [] Radiologist's Report Reviewed:  I ordered and independently interpreted the above noted radiographic studies.  See radiologist's dictation for official interpretation.      PROCEDURES    Procedures    No orders to display       MEDICATIONS GIVEN IN ER    Medications   cefTRIAXone (ROCEPHIN) 2,000 mg in sodium chloride 0.9 % 100 mL MBP (0 mg Intravenous Stopped 6/13/25 2229)   gentamicin (GARAMYCIN) 240 mg in sodium chloride 0.9 % IVPB (0 mg Intravenous Stopped 6/13/25 2309)       MEDICAL DECISION MAKING, PROGRESS, and CONSULTS   Medical Decision Making  80-year-old male with a history of coronary artery disease, aortic stenosis status post TAVR, CKD stage IIIb, diabetes, hypertension, hyperlipidemia, GERD, and paroxysmal atrial fibrillation presented to the emergency department for evaluation following positive blood cultures. The patient was recently treated for pneumonia, and blood cultures obtained on Tiki 3, 2025, revealed Streptococcus in both aerobic and anaerobic bottles, with isolates susceptible to vancomycin and ceftriaxone. The patient's cardiologist, informed by primary care,  recommended ED evaluation and admission for bacteremia and to rule out possible endocarditis given his TAVR history. The patient reported only fatigue and generalized weakness since his pneumonia treatment. Today's blood work showed a slightly elevated white blood cell count at 11.93, with other lab values consistent with prior results. Additional blood cultures were drawn, and the patient was initiated on vancomycin and ceftriaxone in the ED. Patient admitted to hospital medicine for further evaluation and management.     Problems Addressed:  Bacteremia due to Streptococcus: complicated acute illness or injury  History of coronary artery disease: chronic illness or injury  History of diabetes mellitus: chronic illness or injury  History of hyperlipidemia: chronic illness or injury  History of hypertension: chronic illness or injury  History of transcatheter aortic valve replacement (TAVR): chronic illness or injury    Amount and/or Complexity of Data Reviewed  Independent Historian: spouse  External Data Reviewed: notes.  Labs: ordered. Decision-making details documented in ED Course.    Risk  Decision regarding hospitalization.    Discussion below represents my analysis of pertinent findings related to patient's condition, differential diagnosis, treatment plan and final disposition.    Assessment includes with Differential diagnosis including but is not limited to:   The patient presents with a history of pneumonia and strep bacteremia, raising concern for possible endocarditis given the history of TAVR and left femoral endarterectomy on 5/8/2023.     **Differential Diagnosis**  1. Pneumonia with secondary bacteremia  2. Infective endocarditis  3. Sepsis    Additional sources  Discussed/ obtained information from independent historians:   [x] Spouse  [] Parent  [] Family member  [] Friend  [] EMS   [] Other:  External (non-ED) record review:   [] Inpatient record:   [] Office record:   [] Outpatient record:   []  Prior Outpatient labs:   [] Prior Outpatient radiology:   [] Primary Care record:   [x] Outside ED record: Reviewed ED presentation to [facility] on 6/3/2025 where patient was treated for pneumonia and discharged.    [] Other:   Patient's care impacted by:   [x] Diabetes  [x] Hypertension  [x] Hyperlipidemia  [] Hypothyroidism   [x] Coronary Artery Disease  [] Congestive Heart Failure   [] COPD   [] Cancer   [] Obesity  [x] GERD   [] Tobacco Abuse   [] Substance Abuse    [] Anxiety   [] Depression   [x] Other: Paroxysmal atrial fibrillation  Care significantly affected by Social Determinants of Health (housing and economic circumstances, unemployment)    [] Yes     [x] No   If yes, Patient's care significantly limited by  Social Determinants of Health including:   [] Inadequate housing   [] Low income   [] Alcoholism and drug addiction in family   [] Problems related to primary support group   [] Unemployment   [] Problems related to employment   [] Other Social Determinants of Health:     Orders placed during this visit:  Orders Placed This Encounter   Procedures    Blood Culture - Blood,    Blood Culture - Blood,    Comprehensive Metabolic Panel    Urinalysis With Microscopic If Indicated (No Culture) - Urine, Clean Catch    Lactic Acid, Plasma    CBC Auto Differential    Urinalysis, Microscopic Only - Urine, Clean Catch    Inpatient Admission    CBC & Differential     ED Course:    ED Course as of 06/13/25 2337 Fri Jun 13, 2025 2008 Vitals and Telemetry tracing was reviewed and directly interpreted by myself demonstrating blood pressure 152/60, temperature 98 °F, heart rate 61, respirations 16 breaths/min and oxygen saturation 98% on room air [JG]   2010 BP: 152/60 [JG]   2010 Temp: 98 °F (36.7 °C) [JG]   2010 Heart Rate: 61 [JG]   2010 Resp: 16 [JG]   2010 SpO2: 98 % [JG]   2300 Hospital medicine consulted for admission: Dr. Smart [JG]   2332 Hospital medicine agreeable to accept patient for  admission [JG]   2332 Based on the patient's presentation, history and diffuse work-up in the emergency department, the patient is deemed appropriate for admission to the hospital for further evaluation and treatment.  This was discussed with the patient at bedside.  They are in agreement with the current medical management.    Admitting physician: Dr. Barrera    Discussion was had with admitting physician regarding the laboratory and imaging findings.  We did discuss current therapeutics in the emergency department and progression of the patient.  Working diagnosis was conveyed to the admitting physician, as well as current status and prognosis for the patient.  They are in agreement with these findings and have accepted admission.    Shared decision making:   After full review of the patient's clinical presentation, review of any work-up including but not limited to laboratory studies and radiology obtained, I had a discussion with the patient.  Treatment options were discussed as well as the risks, benefits and consequences.  I discussed all findings with the patient and family members if available.  During the discussion, treatment goals were understood by all as well as any misconceptions which were addressed with the patient.  Ample time was given for any questions they may have had.  They are in agreement with the treatment plan as well as final disposition  [JG]      ED Course User Index  [JG] Parish Clayton, PA            DIAGNOSIS  Final diagnoses:   Bacteremia due to Streptococcus   History of diabetes mellitus   History of hypertension   History of hyperlipidemia   History of coronary artery disease   History of transcatheter aortic valve replacement (TAVR)       DISPOSITION    ED Disposition       ED Disposition   Decision to Admit    Condition   --    Comment   Level of Care: Telemetry [5]   Diagnosis: Bacteremia [790.7.ICD-9-CM]   Admitting Physician: STEFFEN BARRERA [[id number]]   Attending Physician:  STEFFEN BARRERA [[id number]]   Certification: I Certify That Inpatient Hospital Services Are Medically Necessary For Greater Than 2 Midnights                  Parish Clayton, PA  06/13/25 4439

## 2025-06-14 NOTE — ED NOTES
Nicholas Yin    Nursing Report ED to Floor:  Mental status: A&Ox4  Ambulatory status: IND  Oxygen Therapy:  RA  Cardiac Rhythm: NS  Admitted from: Home  Safety Concerns:  n/a  Precautions: n/a  Social Issues: n/a  ED Room #:  23    ED Nurse Phone Extension - [phone] or may call [phone].      HPI:   Chief Complaint   Patient presents with    Abnormal Lab       Past Medical History:  Past Medical History:   Diagnosis Date    Anemia     Aortic stenosis     Arthritis     Back pain     CancerTesticular/Colon     test -1982  Colon -2005    CHF (congestive heart failure)     Coronary artery disease     COVID-19 2022    DDD (degenerative disc disease), lumbosacral     Diabetes mellitus     Dyspnea     Elevated cholesterol     Erectile dysfunction     GERD (gastroesophageal reflux disease)     History of transfusion     Hyperlipidemia     Hypertension     TIA (transient ischemic attack)         Past Surgical History:  Past Surgical History:   Procedure Laterality Date    AORTIC VALVE REPAIR/REPLACEMENT N/A 5/8/2023    Procedure: TRANSCATHETER AORTIC VALVE REPLACEMENT + LAURIE, LEFT FEMORAL ENDARTERECTOMY WITH PATCH;  Surgeon: Arben Lopez MD;  Location: [facility];  Service: Cardiothoracic;  Laterality: N/A;    AORTIC VALVE REPAIR/REPLACEMENT N/A 5/8/2023    Procedure: Transfemoral Transcatheter Aortic Valve Replacement;  Surgeon: Rachael Mendoza MD;  Location: [facility];  Service: Cardiovascular;  Laterality: N/A;    CARDIAC CATHETERIZATION N/A 08/15/2019    Procedure: Left Heart Cath;  Surgeon: Paul Villatoro MD;  [facility] CATH [facility]VASIVE [facility];  Service: Cardiology    CARDIAC CATHETERIZATION      04/10/2023 PER DR. MENDOZA    CARDIAC ELECTROPHYSIOLOGY PROCEDURE N/A 5/15/2023    Procedure: Pacemaker DC - [employer];  Surgeon: Rachael Mendoza MD;  Location: [facility] CATH INVASIVE LOCATION;  Service: Cardiology;  Laterality: N/A;    CATARACT EXTRACTION,  BILATERAL      COLON SURGERY      colon resection for cancer - sigmoid removed - no chemo no radiation    COLONOSCOPY      COLONOSCOPY N/A 01/27/2021    Procedure: COLONOSCOPY;  Surgeon: Greg Barraza MD;  Location: [facility] OR;  Service: General;  Laterality: N/A;    CORONARY STENT PLACEMENT      1 STENT    ENDOSCOPY  1989    EYE SURGERY Right     retinal detachment    [place] RT/LT HEART CATHETERS N/A 08/15/2019    Procedure: Percutaneous Coronary Intervention;  Surgeon: Paul Villatoro MD;  Location: [facility] CATH INVASIVE LOCATION;  Service: Cardiology    SCALP LESION REMOVAL W/ FLAP AND SKIN GRAFT  12/16/2022    basal cell removal    SHOULDER ARTHROSCOPY Right     SKIN LESION EXCISION N/A 01/27/2021    Procedure: SKIN LESIONS EXCISION FROM LEFT TEMPLE AREA AND ABDOMEN.;  Surgeon: Greg Barraza MD;  Location: [facility] OR;  Service: General;  Laterality: N/A;    VASECTOMY Right     testicle removed d/t cancer - with radiation        Admitting Doctor:   Leigh Smart MD    Consulting Provider(s):  Consults       No orders found from 5/15/2025 to 6/14/2025.             Admitting Diagnosis:   The encounter diagnosis was Bacteremia due to Streptococcus.    Most Recent Vitals:   Vitals:    06/13/25 2214 06/13/25 2219 06/13/25 2230 06/13/25 2300   BP:       Pulse: 70 69 73 70   Resp:       Temp:       SpO2: 96% 96% 98% 99%   Weight:       Height:           Active LDAs/IV Access:   Lines, Drains & Airways       Active LDAs       Name Placement date Placement time Site Days    Peripheral IV 20 G Anterior;Distal;Right;Upper Arm --  --  Arm  --                    Labs (abnormal labs have a star):   Labs Reviewed   COMPREHENSIVE METABOLIC PANEL - Abnormal; Notable for the following components:       Result Value    Glucose 221 (*)     BUN 38.6 (*)     Creatinine 2.59 (*)     Chloride 111 (*)     CO2 19.0 (*)     Albumin 3.1 (*)     eGFR 24.4 (*)     All other components within normal limits     Narrative:     GFR Categories in Chronic Kidney Disease (CKD)              GFR Category          GFR (mL/min/1.73)    Interpretation  G1                    90 or greater        Normal or high (1)  G2                    60-89                Mild decrease (1)  G3a                   45-59                Mild to moderate decrease  G3b                   30-44                Moderate to severe decrease  G4                    15-29                Severe decrease  G5                    14 or less           Kidney failure    (1)In the absence of evidence of kidney disease, neither GFR category G1 or G2 fulfill the criteria for CKD.    eGFR calculation 2021 CKD-EPI creatinine equation, which does not include race as a factor   URINALYSIS W/ MICROSCOPIC IF INDICATED (NO CULTURE) - Abnormal; Notable for the following components:    Glucose, UA >=1000 mg/dL (3+) (*)     Protein,  mg/dL (2+) (*)     All other components within normal limits   CBC WITH AUTO DIFFERENTIAL - Abnormal; Notable for the following components:    WBC 11.93 (*)     Hemoglobin 9.6 (*)     Hematocrit 32.0 (*)     MCV 73.1 (*)     MCH 21.9 (*)     MCHC 30.0 (*)     RDW 20.1 (*)     Lymphocyte % 12.3 (*)     Neutrophils, Absolute 9.00 (*)     Monocytes, Absolute 0.96 (*)     Eosinophils, Absolute 0.41 (*)     Immature Grans, Absolute 0.06 (*)     All other components within normal limits   LACTIC ACID, PLASMA - Normal   BLOOD CULTURE   BLOOD CULTURE   URINALYSIS, MICROSCOPIC ONLY   CBC AND DIFFERENTIAL    Narrative:     The following orders were created for panel order CBC & Differential.  Procedure                               Abnormality         Status                     ---------                               -----------         ------                     CBC Auto Differential[[id number]]        Abnormal            Final result                 Please view results for these tests on the individual orders.       Meds Given in ED:   Medications    cefTRIAXone (ROCEPHIN) 2,000 mg in sodium chloride 0.9 % 100 mL MBP (0 mg Intravenous Stopped 6/13/25 2229)   gentamicin (GARAMYCIN) 240 mg in sodium chloride 0.9 % IVPB (0 mg Intravenous Stopped 6/13/25 2309)     No current facility-administered medications for this encounter.       Last NIH score:                                                          Dysphagia screening results:        Naveed Coma Scale:  No data recorded     CIWA:        Restraint Type:            Isolation Status:  No active isolations

## 2025-06-14 NOTE — H&P
"    [facility] Medicine Services  HISTORY AND PHYSICAL    Patient Name: Nicholas Yin  : 1945  MRN: [id number]  Primary Care Physician: Waldemar Trejo MD  Date of admission: 2025      Subjective   Subjective     Chief Complaint:  Normal labs    HPI:  Nicholas Yin is a 80 y.o. male brought to the emergency department for evaluation of abnormal labs.  Patient had positive blood cultures drawn on Tiki 3, 2025, that grown out 2/2 showing Streptococcus sanguinous, with susceptibility to Rocephin and vancomycin.  On Tiki 3, patient presented to the emergency department at [facility], was treated for pneumonia at that time.  He was prescribed Cefdinir 300 twice daily and doxycycline 100 for 7 days.  Patient states that he had taken all antibiotics as prescribed, completed both antibiotic regimens.  Upon receipt of the positive blood cultures, patient was attempted to be contacted.  His primary care provider had relayed the positive blood cultures on to his cardiologist, who recommended him come to the emergency department for evaluation due to \"concern about strep in his heart valve.\" The patient underwent a TAVR and left femoral endarterectomy on 2023 by Dr. Evelio Lopez (now retired).  Patient otherwise states that he has no acute complaints at this time.  Workup in the ED was unremarkable except for WBC of 12.  Hospitalist services consulted for admission and workup of bacteremia.    Personal History     Past Medical History:   Diagnosis Date    Anemia     Aortic stenosis     Arthritis     Back pain     CancerTesticular/Colon     test -  Colon -    CHF (congestive heart failure)     Coronary artery disease     COVID-19     DDD (degenerative disc disease), lumbosacral     Diabetes mellitus     Dyspnea     Elevated cholesterol     Erectile dysfunction     GERD (gastroesophageal reflux disease)     History of transfusion     " Hyperlipidemia     Hypertension     TIA (transient ischemic attack)      Past Surgical History:   Procedure Laterality Date    AORTIC VALVE REPAIR/REPLACEMENT N/A 5/8/2023    Procedure: TRANSCATHETER AORTIC VALVE REPLACEMENT + LAURIE, LEFT FEMORAL ENDARTERECTOMY WITH PATCH;  Surgeon: Arben Lopez MD;  Location: [facility];  Service: Cardiothoracic;  Laterality: N/A;    AORTIC VALVE REPAIR/REPLACEMENT N/A 5/8/2023    Procedure: Transfemoral Transcatheter Aortic Valve Replacement;  Surgeon: Rachael Mendoza MD;  Location: [facility];  Service: Cardiovascular;  Laterality: N/A;    CARDIAC CATHETERIZATION N/A 08/15/2019    Procedure: Left Heart Cath;  Surgeon: Paul Villatoro MD;  Location:  [facility] CATH INVASIVE LOCATION;  Service: Cardiology    CARDIAC CATHETERIZATION      04/10/2023 PER DR. MENDOZA    CARDIAC ELECTROPHYSIOLOGY PROCEDURE N/A 5/15/2023    Procedure: Pacemaker DC - [employer];  Surgeon: Rachael Mendoza MD;  Location: [facility] CATH INVASIVE LOCATION;  Service: Cardiology;  Laterality: N/A;    CATARACT EXTRACTION, BILATERAL      COLON SURGERY      colon resection for cancer - sigmoid removed - no chemo no radiation    COLONOSCOPY      COLONOSCOPY N/A 01/27/2021    Procedure: COLONOSCOPY;  Surgeon: Greg Barraza MD;  Location: [facility];  Service: General;  Laterality: N/A;    CORONARY STENT PLACEMENT      1 STENT    ENDOSCOPY  1989    EYE SURGERY Right     retinal detachment    N[place] RT/LT HEART CATHETERS N/A 08/15/2019    Procedure: Percutaneous Coronary Intervention;  Surgeon: Paul Villatoro MD;  Location: [facility] CATH INVASIVE LOCATION;  Service: Cardiology    SCALP LESION REMOVAL W/ FLAP AND SKIN GRAFT  12/16/2022    basal cell removal    SHOULDER ARTHROSCOPY Right     SKIN LESION EXCISION N/A 01/27/2021    Procedure: SKIN LESIONS EXCISION FROM LEFT TEMPLE AREA AND ABDOMEN.;  Surgeon: Greg Barraza MD;  Location: [facility]  OR;  Service: General;  Laterality: N/A;    VASECTOMY Right     testicle removed d/t cancer - with radiation     Family History: family history includes Alcohol abuse in his father and mother; Alzheimer's disease in his father; COPD in his father and mother; Cancer in his paternal grandmother; Diabetes in his brother; Heart disease in his brother; Hypertension in his brother, father, and mother; Stroke in his maternal grandfather and mother.     Social History:  reports that he has never smoked. He has never been exposed to tobacco smoke. He has never used smokeless tobacco. He reports current alcohol use. He reports that he does not use drugs.  Social History     Social History Narrative    Patient lives in [place], [place]      Medications:  Available home medication information reviewed.  Insulin Degludec-Liraglutide, aspirin, dapagliflozin Propanediol, dronedarone, ipratropium-albuterol, losartan, metoprolol tartrate, omeprazole, rivaroxaban, rosuvastatin, sildenafil, and vitamin D    Allergies   Allergen Reactions    Ct Contrast Anaphylaxis    Iodinated Contrast Media Shortness Of Breath       Objective   Objective     Vital Signs:   Temp:  [97.4 °F (36.3 °C)-98 °F (36.7 °C)] 97.4 °F (36.3 °C)  Heart Rate:  [61-76] 70  Resp:  [16] 16  BP: (152-168)/(60-71) 168/71       Physical Exam   Constitutional: Awake, alert.  Wife at bedside.  Chronically ill-appearing.  Obese.  Eyes: PERRLA, sclerae anicteric, no conjunctival injection  HENT: NCAT, mucous membranes moist  Neck: Supple, no thyromegaly, no lymphadenopathy, trachea midline  Respiratory: Clear to auscultation bilaterally, nonlabored respirations   Cardiovascular: RRR, no murmurs, rubs, or gallops, palpable pedal pulses bilaterally  Gastrointestinal: Positive bowel sounds, soft, nontender, nondistended  Musculoskeletal: No bilateral ankle edema, no clubbing or cyanosis to extremities  Psychiatric: Appropriate affect, cooperative  Neurologic: Oriented x 3, strength  symmetric in all extremities, Cranial Nerves grossly intact to confrontation, speech clear  Skin: No rashes.  Less than 2-second capillary refill all extremities.    Result Review:  I have personally reviewed the results from the time of this admission to 6/14/2025 01:51 EDT and agree with these findings:  [x]  Laboratory list / accordion  [x]  Microbiology  [x]  Radiology  []  EKG/Telemetry   []  Cardiology/Vascular   []  Pathology  [x]  Old records  []  Other:  Most notable findings include: Summarized above    LAB RESULTS:      Lab 06/13/25  2019   WBC 11.93*   HEMOGLOBIN 9.6*   HEMATOCRIT 32.0*   PLATELETS 297   NEUTROS ABS 9.00*   IMMATURE GRANS (ABS) 0.06*   LYMPHS ABS 1.47   MONOS ABS 0.96*   EOS ABS 0.41*   MCV 73.1*   LACTATE 1.3         Lab 06/13/25 2019   SODIUM 139   POTASSIUM 4.8   CHLORIDE 111*   CO2 19.0*   ANION GAP 9.0   BUN 38.6*   CREATININE 2.59*   EGFR 24.4*   GLUCOSE 221*   CALCIUM 8.9         Lab 06/13/25 2019   TOTAL PROTEIN 6.3   ALBUMIN 3.1*   GLOBULIN 3.2   ALT (SGPT) 14   AST (SGOT) 19   BILIRUBIN 0.3   ALK PHOS 85     UA          8/24/2024    09:24 6/13/2025    21:58   Urinalysis   Squamous Epithelial Cells, UA 0-2  0-2    Specific Gravity, UA 1.022  1.026    Ketones, UA Negative  Negative    Blood, UA Negative  Negative    Leukocytes, UA Negative  Negative    Nitrite, UA Negative  Negative    RBC, UA 0-2  0-2    WBC, UA 0-2  0-2    Bacteria, UA None Seen  None Seen      Results for orders placed during the hospital encounter of 04/25/25    Adult Transthoracic Echo Complete W/ Cont if Necessary Per Protocol    Interpretation Summary    Left ventricular systolic function is normal. Left ventricular ejection fraction appears to be 56 - 60%.    Right ventricle was poorly visualized.  From images available RV appears to have normal systolic function.    There is a TAVR valve present.  No aortic valve stenosis.  No aortic valve central regurgitation, no paravalvular leak.  Peak gradient  across aortic valve is 12 mmHg, mean gradient 8 mmHg.    Mitral annular calcification present.  No significant mitral valve stenosis is noted (however has limited sensitivity).  Trace mitral valve regurgitation.      Assessment & Plan   Assessment & Plan     Streptococcal bacteremia  Aortic stenosis s/p TAVR  Patient is not septic at this time.  Patient started on Rocephin and gentamicin in the ED, these to be continued at this time.  Consult infectious disease for selection and duration of antibiotics.  Appreciate their recommendations in the care of this patient.  No embolic phenomenon suggestive of infective endocarditis at this time.  Blood cx: Tiki 3, 2025, Streptococcus sanguinous, susceptibility to Rocephin and vancomycin.  Blood cultures reobtained.  Update 2D echo.  No heart murmur appreciated on auscultation, reassuring.  Uncontrolled type 2 diabetes mellitus with hyperglycemia  Insulin dosing.  Jardiance will be continued due to history of CHF.  Congestive heart failure  CKD, Stage 3.    At baseline creatinine  Hypertension  Hyperlipidemia  GERD  Patient is euvolemic at this time.  Resume home medications as appropriate    Total time spent: 65 minutes  Time spent includes time reviewing chart, face-to-face time, counseling patient/family/caregiver, ordering medications/tests/procedures, communicating with other health care professionals, documenting clinical information in the electronic health record, and coordination of care.     VTE Prophylaxis: Xarelto    CODE STATUS:    Code Status and Medical Interventions: CPR (Attempt to Resuscitate); Full Support   Ordered at: 06/14/25 0151     Code Status (Patient has no pulse and is not breathing):    CPR (Attempt to Resuscitate)     Medical Interventions (Patient has pulse or is breathing):    Full Support     Level Of Support Discussed With:    Patient     Expected Discharge  2-3 days pending culture results and ID recommendations on selection/duration of  abx.    Bartolo Elise,   06/14/25

## 2025-06-14 NOTE — CONSULTS
[facility]    INFECTIOUS DISEASE CONSULT/INITIAL HOSPITAL VISIT    Nicholas Yin  1945  [id number]    Date of consult: 6/14/2025    Admit date: 6/13/2025    Requesting Provider: No ref. provider found  Evaluating physician: Bulmaro Jin MD  Reason for Consultation: Streptococcus sanguinous high-grade bacteremia 4-4 blood cultures from 6/3/2025 partially treated, concern for endocarditis  Chief Complaint: Above      Subjective   History of present illness:  Patient is a  80 y.o.  Yr old male with a history of aortic stenosis status post TAVR by Dr. Lopez on 5/8/2023, CAD, congestive heart failure, diabetes mellitus type 2, hyperlipidemia, essential hypertension, TIA, who was evaluated at [facility] on 6/3/2025 for pneumonia and treated eventually with cefdinir and doxycycline until 6/10.  Blood cultures at that encounter were positive in 4-4 bottles for Streptococcus sanguinous.  Patient was followed up with cardiology who recommended admission for evaluation of possible endocarditis.  Patient has no other localizing signs or symptoms of infection.  The patient was admitted to [facility] on 6/13/2025.  I was consulted on 6/14/2025.  The patient denies ill contacts, TB, HIV, or zoonotic exposures.    Past Medical History:   Diagnosis Date    Anemia     Aortic stenosis     Arthritis     Back pain     CancerTesticular/Colon     test -1982  Colon -2005    CHF (congestive heart failure)     Coronary artery disease     COVID-19 2022    DDD (degenerative disc disease), lumbosacral     Diabetes mellitus     Dyspnea     Elevated cholesterol     Erectile dysfunction     GERD (gastroesophageal reflux disease)     History of transfusion     Hyperlipidemia     Hypertension     TIA (transient ischemic attack)        Past Surgical History:   Procedure Laterality Date    AORTIC VALVE REPAIR/REPLACEMENT N/A 5/8/2023    Procedure: TRANSCATHETER AORTIC VALVE  REPLACEMENT + LAURIE, LEFT FEMORAL ENDARTERECTOMY WITH PATCH;  Surgeon: Arben Lopez MD;  Location: [facility];  Service: Cardiothoracic;  Laterality: N/A;    AORTIC VALVE REPAIR/REPLACEMENT N/A 5/8/2023    Procedure: Transfemoral Transcatheter Aortic Valve Replacement;  Surgeon: Rachael Mendoza MD;  Location: [facility];  Service: Cardiovascular;  Laterality: N/A;    CARDIAC CATHETERIZATION N/A 08/15/2019    Procedure: Left Heart Cath;  Surgeon: Paul Villatoro MD;  Location: [facility] INVASIVE LOCATION;  Service: Cardiology    CARDIAC CATHETERIZATION      04/10/2023 PER DR. MENDOZA    CARDIAC ELECTROPHYSIOLOGY PROCEDURE N/A 5/15/2023    Procedure: Pacemaker DC - [employer];  Surgeon: Rachael Mendoza MD;  [facility];  Service: Cardiology;  Laterality: N/A;    CATARACT EXTRACTION, BILATERAL      COLON SURGERY      colon resection for cancer - sigmoid removed - no chemo no radiation    COLONOSCOPY      COLONOSCOPY N/A 01/27/2021    Procedure: COLONOSCOPY;  Surgeon: Greg Barraza MD;  Location: [facility];  Service: General;  Laterality: N/A;    CORONARY STENT PLACEMENT      1 STENT    ENDOSCOPY  1989    EYE SURGERY Right     retinal detachment    IA RT/LT HEART CATHETERS N/A 08/15/2019    Procedure: Percutaneous Coronary Intervention;  Surgeon: Paul Villatoro MD;  Location: [facility] INVASIVE LOCATION;  Service: Cardiology    SCALP LESION REMOVAL W/ FLAP AND SKIN GRAFT  12/16/2022    basal cell removal    SHOULDER ARTHROSCOPY Right     SKIN LESION EXCISION N/A 01/27/2021    Procedure: SKIN LESIONS EXCISION FROM LEFT TEMPLE AREA AND ABDOMEN.;  Surgeon: Greg Barraza MD;  Location: [facility];  Service: General;  Laterality: N/A;    VASECTOMY Right     testicle removed d/t cancer - with radiation       Pediatric History   Patient Parents    Not on file     Other Topics Concern    Not on file   Social  History Narrative    Patient lives in [place], [place]    Positive for alcohol, no drug use or tobacco,  to wife who is primary care NP for [employer], he worked in [employer]    family history includes Alcohol abuse in his father and mother; Alzheimer's disease in his father; COPD in his father and mother; Cancer in his paternal grandmother; Diabetes in his brother; Heart disease in his brother; Hypertension in his brother, father, and mother; Stroke in his maternal grandfather and mother.    Allergies   Allergen Reactions    Ct Contrast Anaphylaxis    Iodinated Contrast Media Shortness Of Breath       Immunization History   Administered Date(s) Administered    COVID-19 (PFIZER) Purple Cap Monovalent 01/11/2021, 02/01/2021, 09/24/2021    Fluad Quad 65+ 10/08/2020    Fluzone High-Dose 65+YRS 10/27/2017, 10/04/2018, 10/25/2024    Fluzone High-Dose 65+yrs 10/13/2021, 10/13/2022, 10/27/2023    Fluzone Quad >6mos (Multi-dose) 11/11/2016    Hep A / Hep B 07/17/2018    Hepatitis A 01/04/2019    IPV 06/05/1997    Pneumococcal Conjugate 13-Valent (PCV13) 06/08/2015    Pneumococcal Polysaccharide (PPSV23) 09/02/2011    Td (TDVAX) 06/05/1997    Tdap 12/09/2011    flucelvax quad pfs =>4 YRS 09/05/2019       Medication:  @Scheduled Meds:!Gentamicin Level Draw Needed, , Not Applicable, Once  [START ON 6/15/2025] aspirin, 81 mg, Oral, Daily  cefTRIAXone, 2,000 mg, Intravenous, Q24H  dronedarone, 400 mg, Oral, BID With Meals  [START ON 6/15/2025] empagliflozin, 25 mg, Oral, Daily  insulin glargine, 5 Units, Subcutaneous, Q12H  Insulin Lispro, 8 Units, Subcutaneous, TID With Meals  losartan, 25 mg, Oral, BID  metoprolol tartrate, 25 mg, Oral, BID  [START ON 6/15/2025] rivaroxaban, 15 mg, Oral, Daily  [START ON 6/15/2025] rosuvastatin, 20 mg, Oral, Daily  sodium chloride, 10 mL, Intravenous, Q12H      Continuous Infusions:   PRN Meds:.  senna-docusate sodium **AND** polyethylene glycol **AND** bisacodyl **AND** bisacodyl    Calcium  "Replacement - Follow Nurse / BPA Driven Protocol    dextrose    dextrose    glucagon (human recombinant)    Magnesium Cardiology Dose Replacement - Follow Nurse / BPA Driven Protocol    nitroglycerin    ondansetron    Phosphorus Replacement - Follow Nurse / BPA Driven Protocol    Potassium Replacement - Follow Nurse / BPA Driven Protocol    sodium chloride    sodium chloride     Please refer to the medical record for a full medication list    Review of Systems:    Constitutional-- No Fever, chills or sweats.  Appetite good, and no malaise. No fatigue.  HEENT-- No new vision, hearing or throat complaints.  No epistaxis or oral sores.  Denies odynophagia or dysphagia.  No odynophagia or dysphagia. No headache, photophobia or neck stiffness.  CV-- No chest pain, palpitation or syncope  Resp-- No SOB/cough/Hemoptysis  GI- No nausea, vomiting, or diarrhea.  No hematochezia, melena, or hematemesis. Denies jaundice or chronic liver disease.  -- No dysuria, hematuria, or flank pain.  Denies hesitancy, urgency.  Lymph- no swollen lymph nodes in neck/axilla or groin.   Heme- No active bruising or bleeding; no Hx of DVT or PE.  MS-- no swelling or pain in the bones or joints of arms/legs.  No new back pain.  Neuro-- No acute focal weakness or numbness in the arms or legs.  No seizures.  Skin--No rashes or lesions    Physical Exam:   Vital Signs   Temp:  [97.4 °F (36.3 °C)-98 °F (36.7 °C)] 97.6 °F (36.4 °C)  Heart Rate:  [61-76] 71  Resp:  [16] 16  BP: (145-168)/(58-71) 145/58    Blood pressure 145/58, pulse 71, temperature 97.6 °F (36.4 °C), temperature source Axillary, resp. rate 16, height 175.3 cm (69\"), weight 99.7 kg (219 lb 14.4 oz), SpO2 93%.  GENERAL: Awake and alert, in minimal distress. Appears older than stated age.  Resting in bed.  HEENT:  Normocephalic, atraumatic.  Oropharynx without thrush. Dentition in good repair. No cervical adenopathy. No neck masses.  Ears externally normal, Nose externally " normal.  EYES: PERRLA. No conjunctival injection. No icterus. EOM full.  LYMPHATICS: No lymphadenopathy of the neck or axillary or inguinal regions.   HEART: No murmur, gallop, or pericardial friction rub. Reg rate rhythm, No JVD at 45 degrees.  LUNGS: Clear to auscultation and percussion. No respiratory distress, no use of accessory muscles.  ABDOMEN: Soft, nontender, nondistended. No appreciable HSM.  Bowel sounds normal.  GENITAL: No external lesions, breasts without masses, back straight, no CVAT, rectal external without lesions.   SKIN: Warm and dry without cutaneous eruptions.  No nodules.    PSYCHIATRIC: Mental status lucid. No confusion.  EXT:  No cellulitic change.  NEURO: Oriented to name, CN 2 to 12 intact, DTR 1 + and symmetric, sensory intact to LT upper and lower extremity, motor 5/5 upper and lower extremity, cerebellar and gait not tested.      Results Review:   I reviewed the patient's new clinical results.  I reviewed the patient's new imaging results and agree with the interpretation.  I reviewed the patient's other test results and agree with the interpretation    Results from last 7 days   Lab Units 06/13/25 2019   WBC 10*3/mm3 11.93*   HEMOGLOBIN g/dL 9.6*   HEMATOCRIT % 32.0*   PLATELETS 10*3/mm3 297     Results from last 7 days   Lab Units 06/13/25 2019   SODIUM mmol/L 139   POTASSIUM mmol/L 4.8   CHLORIDE mmol/L 111*   CO2 mmol/L 19.0*   BUN mg/dL 38.6*   CREATININE mg/dL 2.59*   GLUCOSE mg/dL 221*   CALCIUM mg/dL 8.9     Results from last 7 days   Lab Units 06/13/25 2019   ALK PHOS U/L 85   BILIRUBIN mg/dL 0.3   ALT (SGPT) U/L 14   AST (SGOT) U/L 19                 Results from last 7 days   Lab Units 06/13/25 2019   LACTATE mmol/L 1.3     Estimated Creatinine Clearance: 26.9 mL/min (A) (by C-G formula based on SCr of 2.59 mg/dL (H)).  CPK          6/3/2025    09:18   Common Labs   Creatine Kinase 43       Procalitonin Results:       Brief Urine Lab Results  (Last result in the past 365  "days)        Color   Clarity   Blood   Leuk Est   Nitrite   Protein   CREAT   Urine HCG        06/13/25 2158 Yellow   Clear   Negative   Negative   Negative   100 mg/dL (2+)                  No results found for: \"SITE\", \"ALLENTEST\", \"PHART\", \"HXU8CLV\", \"PO2ART\", \"NBD7KFX\", \"BASEEXCESS\", \"U3MRLFDL\", \"HGBBG\", \"HCTABG\", \"OXYHEMOGLOBI\", \"METHHGBN\", \"CARBOXYHGB\", \"CO2CT\", \"BAROMETRIC\", \"MODALITY\", \"FIO2\"     Microbiology:      Results for orders placed or performed during the hospital encounter of 06/03/25   Blood Culture - Blood, Arm, Right    Specimen: Arm, Right; Blood   Result Value Ref Range    Blood Culture Streptococcus sanguinis (C)     Isolated from Aerobic and Anaerobic Bottles     Gram Stain Aerobic Bottle Gram positive cocci in chains (C)     Gram Stain Anaerobic Bottle Gram positive cocci in chains (C)         Susceptibility     Streptococcus sanguinis     MAYANK     Ampicillin 0.5 Intermediate     Ceftriaxone 0.25 Susceptible     Penicillin G 0.25 Intermediate     Vancomycin 1 Susceptible                  Blood cultures 6/3/2025 4-4 bottles positive          Brief Urine Lab Results  (Last result in the past 365 days)        Color   Clarity   Blood   Leuk Est   Nitrite   Protein   CREAT   Urine HCG        06/13/25 2158 Yellow   Clear   Negative   Negative   Negative   100 mg/dL (2+)                   Radiology:  Imaging Results (Last 72 Hours)       ** No results found for the last 72 hours. **        CT scan abdomen and pelvis 6/3/2025 negative for acute findings, CT scan of the chest consistent with CHF and edema with cardiomegaly and peripheral pulmonary fibrosis and left basilar atelectasis.  No consolidation.            IMPRESSION:     High grade Streptococcus sanguinous infection with 4 out of 4 blood cultures positive from 6/3/2025 with previous treatment for pneumonia, although in reviewing films no obvious consolidation on his CT scan of the chest or chest x-ray.  Concerns would include intravascular " infection which could be related to an endocarditis or a pacemaker infection.  CT scan of the abdomen and pelvis with no other obvious focus.  Urinalysis benign.  This was partially treated with oral cephalosporin.  Repeat blood cultures on 6/13 negative, but likely related to his recent treatment with cephalosporin.  Intermediately sensitive to penicillin.  Sensitive to ceftriaxone and vancomycin.  Acute on chronic renal failure.  Creatinine was 3.18 on 6/3, currently 2.59.  Leukocytosis, neutrophilic, improved from his 6/3 level of 18.  Anemia, chronic disease related to above issues.  Cardiac regularly, TTE from 4/25 with ejection fraction 56%, TAVR present without stenosis or regurgitation or paravalvular leak.  Diabetes mellitus type 2 with increased risk for infection, hemoglobin A1c 7.80 on 6/3.  Previous aortic stenosis status post TAVR by Dr. Lopez on 5/8/2023.    RECOMMENDATIONS:    Diagnostically, continue to follow patient's physical exam, CBC, CMP, CRP, blood cultures which were obtained on 6/13, and echocardiogram including TTE pending, and LAURIE if the TTE is negative.  Therapeutically, consider 6 weeks of IV ceftriaxone even if repeat blood cultures are negative given his high risk and TAVR, and pacemaker.  May need to consider pacemaker removal as well, with the possibility that this prosthetic device is infected and may not clinically resolve infection without removal.  Supportive care.  Patient will likely need a Groshong catheter for long-term IV antibiotics given his renal failure.    I discussed the patient's findings and my recommendations with patient and nursing staff    Thank you for asking me to see Nicholas Yin.  Our group would be pleased to follow this patient over the course of their hospitalization and assist with outpatient antimicrobial therapy, as indicated.  Further recommendations depend on the results of the cultures and clinical course.  Side effects of medications  discussed.  The patient is at increased risk for adverse drug reactions, complications of IV access, and readmission, CVA, death.    This visit included the following complex service elements:  Complex medical decision-making associated with antimicrobial prescribing.  In-depth chart review with high level synthesis for complex diagnoses.  Managed infection treatment protocol associated with transitions of care for this complex patient.  Counseled patients, family members and/or caregivers regarding infection prevention.  Managed infection control protocol.     Bulmaor Jin MD  6/14/2025

## 2025-06-14 NOTE — PROGRESS NOTES
2nd visit today. Admitted by partner earlier this morning. See H&P for details    Nicholas Yin is a 79 yo m w/ hx cad, afib, previous TAVR & left lower extremity angioplasty (2023 by Dr Lopez), ckd 4 (baseline cr 2.4-2.9), DM2  who was recently seen at [facility] for pneumonia on 6/3/25 was treated with cefdinir and doxycycline x 7 days for pneumonia. Blood cultures grew streptococcus sanguinis and pcp referred patient to [facility] ED due to previous history of TAVR and workup/treatment of bacteremia.     Strep bacteremia  Recent Pneumonia (6/3/25 [facility])  Hx TAVR 2023  CAD (abisai to lad 2019)  Chronic HFpEF, compensated  Hx heart block/parox afib (pacemaker 5/2023 Dr. Mendoza)  HTN  HL  -echo 5/2024: LV EF ~53%, diastolic dysfunction, tavr ok  Uncontrolled DM2  Ckd 4 (baseline cr ~2.4-2.9)  Gerd  Depression  -initiate sertraline 25mg daily    Plan:  -continue empiric rocephin (I have stopped the gentamicin); ID consult pending  -Transthoracic echo pending (if negative ? LAURIE)  -continue asa, xarelto, metoprolol, multaq, jardiance, crestor (currently sinus), follows w/ dr. Howe  -continue insulins, titrate prn  -start sertraline 25mg daily    Daily labs ordered

## 2025-06-14 NOTE — PROGRESS NOTES
Pharmacokinetic Consult - Gentamicin Dosing  Nicholas Yin is a 80 y.o. male who has been consulted to dose gentamicin for streptococcal bacteremia.    Mr. Yin will receive a continuation of conventional gentamicin dosing initiated in the ED. Inappropriate for high-dose extended interval dosing based on renal function.       Current Antimicrobial Therapy    Anti-Infectives (From admission, onward)      Ordered     Dose/Rate Route Frequency Start Stop    06/14/25 0216  gentamicin (GARAMYCIN) 240 mg in sodium chloride 0.9 % 100 mL IVPB        Note to Pharmacy: Separate Administration Time With Ampicillin and Other Penicillins (Ampicillin / Penicillins deactivate gentamicin when infused together).   Ordering Provider: Jose Evans, PharmD    3 mg/kg × 81.4 kg (Adjusted)  over 30 Minutes Intravenous Every 24 Hours 06/14/25 2300 06/27/25 2259 06/14/25 0216  ! please do not give 6/14 PM gentamicin dose until trough level is drawn        Ordering Provider: Jose Evans, PharmD     Not Applicable Once 06/14/25 2300 06/14/25 0216  cefTRIAXone (ROCEPHIN) 2,000 mg in sodium chloride 0.9 % 100 mL MBP        Ordering Provider: Jose Evans, PharmD    2,000 mg  200 mL/hr over 30 Minutes Intravenous Every 24 Hours 06/14/25 2200 06/27/25 2159 06/13/25 2144  vancomycin 2250 mg/500 mL 0.9% NS IVPB ([facility])  Status:  Discontinued        Ordering Provider: Parish Clayton PA    22 mg/kg × 97.5 kg  over 135 Minutes Intravenous Once 06/13/25 2300 06/13/25 2155 06/13/25 2155  gentamicin (GARAMYCIN) 240 mg in sodium chloride 0.9 % IVPB        Note to Pharmacy: Separate Administration Time With Ampicillin and Other Penicillins (Ampicillin / Penicillins deactivate gentamicin when infused together).   Ordering Provider: Parish Clayton PA    3 mg/kg × 81.4 kg (Adjusted)  over 30 Minutes Intravenous Once 06/13/25 2300 06/13/25 2309 06/13/25 2144  cefTRIAXone (ROCEPHIN) 2,000 mg in sodium chloride 0.9 % 100 mL  MBP        Ordering Provider: Parish Clayton PA    2,000 mg  200 mL/hr over 30 Minutes Intravenous Once 06/13/25 2200 06/13/25 2229            Allergies  Ct contrast and Iodinated contrast media    Relevant clinical data and objective history reviewed:  Creatinine   Date Value Ref Range Status   06/13/2025 2.59 (H) 0.76 - 1.27 mg/dL Final   04/26/2023 2.50 (H) 0.60 - 1.30 mg/dL Final     Comment:     Serial Number: [id number]:  [id number]     Estimated Creatinine Clearance: 26.6 mL/min (A) (by C-G formula based on SCr of 2.59 mg/dL (H)).  No intake/output data recorded.  Patient weight: 97.5 kg (215 lb)    Asessment/Plan  1. Continue gentamicin 3mg/kg (adjusted body weight) every 24 hours (gentamicin 240mg daily)  2. Ordered gentamicin trough and peak to collect around the second dose (6/14 PM)  4. Pharmacy will update gentamicin doses based on previously mentioned gentamicin levels and/or fluctuations in renal status.    Jose Evans, PharmD  6/14/2025  02:36 EDT

## 2025-06-14 NOTE — PLAN OF CARE
Problem: Adult Inpatient Plan of Care  Goal: Plan of Care Review  Outcome: Progressing  Flowsheets (Taken 6/14/2025 0604)  Progress: no change  Plan of Care Reviewed With: patient  Goal: Patient-Specific Goal (Individualized)  Outcome: Progressing  Goal: Absence of Hospital-Acquired Illness or Injury  Outcome: Progressing  Intervention: Prevent Skin Injury  Recent Flowsheet Documentation  Taken 6/14/2025 0055 by Yunior Del Rosario RN  Body Position: position changed independently  Skin Protection: incontinence pads utilized  Goal: Optimal Comfort and Wellbeing  Outcome: Progressing  Goal: Readiness for Transition of Care  Outcome: Progressing  Intervention: Mutually Develop Transition Plan  Recent Flowsheet Documentation  Taken 6/14/2025 0109 by Yunior Del Rosario RN  Transportation Anticipated: family or friend will provide  Patient/Family Anticipated Services at Transition: none  Patient/Family Anticipates Transition to: home with family  Taken 6/14/2025 0107 by Yunior Del Rosario, RN  Equipment Currently Used at Home: walker, standard     Problem: Comorbidity Management  Goal: Blood Pressure in Desired Range  Outcome: Progressing   Goal Outcome Evaluation:  Plan of Care Reviewed With: patient        Progress: no change

## 2025-06-15 ENCOUNTER — APPOINTMENT (OUTPATIENT)
Dept: CARDIOLOGY | Facility: HOSPITAL | Age: 80
End: 2025-06-15
Payer: COMMERCIAL

## 2025-06-15 LAB
ALBUMIN SERPL-MCNC: 3 G/DL (ref 3.5–5.2)
ALBUMIN/GLOB SERPL: 1.1 G/DL
ALP SERPL-CCNC: 76 U/L (ref 39–117)
ALT SERPL W P-5'-P-CCNC: 11 U/L (ref 1–41)
ANION GAP SERPL CALCULATED.3IONS-SCNC: 6 MMOL/L (ref 5–15)
AORTIC DIMENSIONLESS INDEX: 0.52 (DI)
ASCENDING AORTA: 3.6 CM
AST SERPL-CCNC: 20 U/L (ref 1–40)
AV MEAN PRESS GRAD SYS DOP V1V2: 7.5 MMHG
AV VMAX SYS DOP: 179.5 CM/SEC
BASOPHILS # BLD AUTO: 0.06 10*3/MM3 (ref 0–0.2)
BASOPHILS NFR BLD AUTO: 0.6 % (ref 0–1.5)
BH CV ECHO MEAS - AO MAX PG: 12.9 MMHG
BH CV ECHO MEAS - AO ROOT DIAM: 3.4 CM
BH CV ECHO MEAS - AO V2 VTI: 37.7 CM
BH CV ECHO MEAS - AVA(I,D): 2.17 CM2
BH CV ECHO MEAS - EDV(CUBED): 74.1 ML
BH CV ECHO MEAS - EDV(MOD-SP2): 47.8 ML
BH CV ECHO MEAS - EDV(MOD-SP4): 94 ML
BH CV ECHO MEAS - EF(MOD-SP2): 61.9 %
BH CV ECHO MEAS - EF(MOD-SP4): 67.7 %
BH CV ECHO MEAS - ESV(CUBED): 12.2 ML
BH CV ECHO MEAS - ESV(MOD-SP2): 18.2 ML
BH CV ECHO MEAS - ESV(MOD-SP4): 30.4 ML
BH CV ECHO MEAS - FS: 45.2 %
BH CV ECHO MEAS - IVS/LVPW: 1 CM
BH CV ECHO MEAS - IVSD: 1.3 CM
BH CV ECHO MEAS - LA DIMENSION: 5.3 CM
BH CV ECHO MEAS - LAT PEAK E' VEL: 9.5 CM/SEC
BH CV ECHO MEAS - LV DIASTOLIC VOL/BSA (35-75): 44 CM2
BH CV ECHO MEAS - LV MASS(C)D: 200.6 GRAMS
BH CV ECHO MEAS - LV MAX PG: 3.3 MMHG
BH CV ECHO MEAS - LV MEAN PG: 1.5 MMHG
BH CV ECHO MEAS - LV SYSTOLIC VOL/BSA (12-30): 14.2 CM2
BH CV ECHO MEAS - LV V1 MAX: 89.7 CM/SEC
BH CV ECHO MEAS - LV V1 VTI: 19.7 CM
BH CV ECHO MEAS - LVIDD: 4.2 CM
BH CV ECHO MEAS - LVIDS: 2.3 CM
BH CV ECHO MEAS - LVOT AREA: 4.2 CM2
BH CV ECHO MEAS - LVOT DIAM: 2.3 CM
BH CV ECHO MEAS - LVPWD: 1.3 CM
BH CV ECHO MEAS - MED PEAK E' VEL: 5.8 CM/SEC
BH CV ECHO MEAS - MV A MAX VEL: 139 CM/SEC
BH CV ECHO MEAS - MV DEC SLOPE: 242.5 CM/SEC2
BH CV ECHO MEAS - MV DEC TIME: 0.35 SEC
BH CV ECHO MEAS - MV E MAX VEL: 80.4 CM/SEC
BH CV ECHO MEAS - MV E/A: 0.58
BH CV ECHO MEAS - MV MAX PG: 10.5 MMHG
BH CV ECHO MEAS - MV MEAN PG: 4 MMHG
BH CV ECHO MEAS - MV P1/2T: 129.2 MSEC
BH CV ECHO MEAS - MV V2 VTI: 46.4 CM
BH CV ECHO MEAS - MVA(P1/2T): 1.7 CM2
BH CV ECHO MEAS - MVA(VTI): 1.76 CM2
BH CV ECHO MEAS - PA ACC TIME: 0.11 SEC
BH CV ECHO MEAS - PA V2 MAX: 90.5 CM/SEC
BH CV ECHO MEAS - RAP SYSTOLE: 8 MMHG
BH CV ECHO MEAS - RVSP: 36 MMHG
BH CV ECHO MEAS - SV(LVOT): 81.6 ML
BH CV ECHO MEAS - SV(MOD-SP2): 29.6 ML
BH CV ECHO MEAS - SV(MOD-SP4): 63.6 ML
BH CV ECHO MEAS - SVI(LVOT): 38.2 ML/M2
BH CV ECHO MEAS - SVI(MOD-SP2): 13.8 ML/M2
BH CV ECHO MEAS - SVI(MOD-SP4): 29.7 ML/M2
BH CV ECHO MEAS - TAPSE (>1.6): 2.5 CM
BH CV ECHO MEAS - TR MAX PG: 27.7 MMHG
BH CV ECHO MEAS - TR MAX VEL: 263 CM/SEC
BH CV ECHO MEASUREMENTS AVERAGE E/E' RATIO: 10.51
BH CV VAS BP RIGHT ARM: NORMAL MMHG
BH CV XLRA - RV BASE: 2.6 CM
BH CV XLRA - RV LENGTH: 6.1 CM
BH CV XLRA - RV MID: 1.7 CM
BH CV XLRA - TDI S': 8.8 CM/SEC
BILIRUB SERPL-MCNC: 0.3 MG/DL (ref 0–1.2)
BUN SERPL-MCNC: 31.4 MG/DL (ref 8–23)
BUN/CREAT SERPL: 14.2 (ref 7–25)
CALCIUM SPEC-SCNC: 8.7 MG/DL (ref 8.6–10.5)
CHLORIDE SERPL-SCNC: 112 MMOL/L (ref 98–107)
CK SERPL-CCNC: 31 U/L (ref 20–200)
CO2 SERPL-SCNC: 21 MMOL/L (ref 22–29)
CREAT SERPL-MCNC: 2.21 MG/DL (ref 0.76–1.27)
CRP SERPL-MCNC: <0.3 MG/DL (ref 0–0.5)
D-LACTATE SERPL-SCNC: 1 MMOL/L (ref 0.5–2)
DEPRECATED RDW RBC AUTO: 50.4 FL (ref 37–54)
EGFRCR SERPLBLD CKD-EPI 2021: 29.6 ML/MIN/1.73
EOSINOPHIL # BLD AUTO: 0.45 10*3/MM3 (ref 0–0.4)
EOSINOPHIL NFR BLD AUTO: 4.3 % (ref 0.3–6.2)
ERYTHROCYTE [DISTWIDTH] IN BLOOD BY AUTOMATED COUNT: 20.5 % (ref 12.3–15.4)
GLOBULIN UR ELPH-MCNC: 2.8 GM/DL
GLUCOSE BLDC GLUCOMTR-MCNC: 150 MG/DL (ref 70–130)
GLUCOSE BLDC GLUCOMTR-MCNC: 173 MG/DL (ref 70–130)
GLUCOSE BLDC GLUCOMTR-MCNC: 188 MG/DL (ref 70–130)
GLUCOSE BLDC GLUCOMTR-MCNC: 96 MG/DL (ref 70–130)
GLUCOSE SERPL-MCNC: 157 MG/DL (ref 65–99)
HCT VFR BLD AUTO: 30 % (ref 37.5–51)
HGB BLD-MCNC: 9.2 G/DL (ref 13–17.7)
IMM GRANULOCYTES # BLD AUTO: 0.05 10*3/MM3 (ref 0–0.05)
IMM GRANULOCYTES NFR BLD AUTO: 0.5 % (ref 0–0.5)
LEFT ATRIUM VOLUME INDEX: 27.7 ML/M2
LV EF BIPLANE MOD: 68.1 %
LYMPHOCYTES # BLD AUTO: 1.13 10*3/MM3 (ref 0.7–3.1)
LYMPHOCYTES NFR BLD AUTO: 10.9 % (ref 19.6–45.3)
MAGNESIUM SERPL-MCNC: 2 MG/DL (ref 1.6–2.4)
MCH RBC QN AUTO: 22 PG (ref 26.6–33)
MCHC RBC AUTO-ENTMCNC: 30.7 G/DL (ref 31.5–35.7)
MCV RBC AUTO: 71.8 FL (ref 79–97)
MONOCYTES # BLD AUTO: 0.93 10*3/MM3 (ref 0.1–0.9)
MONOCYTES NFR BLD AUTO: 9 % (ref 5–12)
NEUTROPHILS NFR BLD AUTO: 7.77 10*3/MM3 (ref 1.7–7)
NEUTROPHILS NFR BLD AUTO: 74.7 % (ref 42.7–76)
NRBC BLD AUTO-RTO: 0 /100 WBC (ref 0–0.2)
PHOSPHATE SERPL-MCNC: 3.2 MG/DL (ref 2.5–4.5)
PLATELET # BLD AUTO: 263 10*3/MM3 (ref 140–450)
PMV BLD AUTO: 10.6 FL (ref 6–12)
POTASSIUM SERPL-SCNC: 5.3 MMOL/L (ref 3.5–5.2)
PROT SERPL-MCNC: 5.8 G/DL (ref 6–8.5)
RBC # BLD AUTO: 4.18 10*6/MM3 (ref 4.14–5.8)
SODIUM SERPL-SCNC: 139 MMOL/L (ref 136–145)
WBC NRBC COR # BLD AUTO: 10.39 10*3/MM3 (ref 3.4–10.8)

## 2025-06-15 PROCEDURE — 99232 SBSQ HOSP IP/OBS MODERATE 35: CPT | Performed by: INTERNAL MEDICINE

## 2025-06-15 PROCEDURE — 84100 ASSAY OF PHOSPHORUS: CPT | Performed by: FAMILY MEDICINE

## 2025-06-15 PROCEDURE — 63710000001 INSULIN GLARGINE PER 5 UNITS: Performed by: FAMILY MEDICINE

## 2025-06-15 PROCEDURE — 93306 TTE W/DOPPLER COMPLETE: CPT

## 2025-06-15 PROCEDURE — 80053 COMPREHEN METABOLIC PANEL: CPT | Performed by: INTERNAL MEDICINE

## 2025-06-15 PROCEDURE — 82550 ASSAY OF CK (CPK): CPT | Performed by: INTERNAL MEDICINE

## 2025-06-15 PROCEDURE — 83735 ASSAY OF MAGNESIUM: CPT | Performed by: FAMILY MEDICINE

## 2025-06-15 PROCEDURE — 93306 TTE W/DOPPLER COMPLETE: CPT | Performed by: INTERNAL MEDICINE

## 2025-06-15 PROCEDURE — 83605 ASSAY OF LACTIC ACID: CPT | Performed by: INTERNAL MEDICINE

## 2025-06-15 PROCEDURE — 86140 C-REACTIVE PROTEIN: CPT | Performed by: INTERNAL MEDICINE

## 2025-06-15 PROCEDURE — 25010000002 CEFTRIAXONE PER 250 MG

## 2025-06-15 PROCEDURE — 85025 COMPLETE CBC W/AUTO DIFF WBC: CPT | Performed by: FAMILY MEDICINE

## 2025-06-15 PROCEDURE — 82948 REAGENT STRIP/BLOOD GLUCOSE: CPT

## 2025-06-15 PROCEDURE — 63710000001 INSULIN LISPRO (HUMAN) PER 5 UNITS: Performed by: FAMILY MEDICINE

## 2025-06-15 PROCEDURE — 25010000002 SULFUR HEXAFLUORIDE MICROSPH 60.7-25 MG RECONSTITUTED SUSPENSION: Performed by: FAMILY MEDICINE

## 2025-06-15 RX ORDER — LOSARTAN POTASSIUM 25 MG/1
25 TABLET ORAL
Status: DISCONTINUED | OUTPATIENT
Start: 2025-06-16 | End: 2025-06-18 | Stop reason: HOSPADM

## 2025-06-15 RX ORDER — PANTOPRAZOLE SODIUM 40 MG/1
40 TABLET, DELAYED RELEASE ORAL
Status: DISCONTINUED | OUTPATIENT
Start: 2025-06-15 | End: 2025-06-18 | Stop reason: HOSPADM

## 2025-06-15 RX ADMIN — INSULIN LISPRO 4 UNITS: 100 INJECTION, SOLUTION INTRAVENOUS; SUBCUTANEOUS at 17:25

## 2025-06-15 RX ADMIN — ROSUVASTATIN CALCIUM 20 MG: 20 TABLET, FILM COATED ORAL at 09:01

## 2025-06-15 RX ADMIN — EMPAGLIFLOZIN 25 MG: 25 TABLET, FILM COATED ORAL at 09:02

## 2025-06-15 RX ADMIN — CEFTRIAXONE 2000 MG: 2 INJECTION, POWDER, FOR SOLUTION INTRAMUSCULAR; INTRAVENOUS at 22:27

## 2025-06-15 RX ADMIN — DRONEDARONE 400 MG: 400 TABLET, FILM COATED ORAL at 09:03

## 2025-06-15 RX ADMIN — PANTOPRAZOLE SODIUM 40 MG: 40 TABLET, DELAYED RELEASE ORAL at 15:16

## 2025-06-15 RX ADMIN — INSULIN LISPRO 8 UNITS: 100 INJECTION, SOLUTION INTRAVENOUS; SUBCUTANEOUS at 09:02

## 2025-06-15 RX ADMIN — DRONEDARONE 400 MG: 400 TABLET, FILM COATED ORAL at 17:26

## 2025-06-15 RX ADMIN — SERTRALINE HYDROCHLORIDE 25 MG: 50 TABLET ORAL at 09:02

## 2025-06-15 RX ADMIN — SODIUM ZIRCONIUM CYCLOSILICATE 10 G: 10 POWDER, FOR SUSPENSION ORAL at 22:27

## 2025-06-15 RX ADMIN — SODIUM ZIRCONIUM CYCLOSILICATE 10 G: 10 POWDER, FOR SUSPENSION ORAL at 14:41

## 2025-06-15 RX ADMIN — INSULIN GLARGINE 5 UNITS: 100 INJECTION, SOLUTION SUBCUTANEOUS at 09:03

## 2025-06-15 RX ADMIN — Medication 10 ML: at 20:30

## 2025-06-15 RX ADMIN — ASPIRIN 81 MG: 81 TABLET, COATED ORAL at 09:01

## 2025-06-15 RX ADMIN — LOSARTAN POTASSIUM 25 MG: 25 TABLET, FILM COATED ORAL at 09:02

## 2025-06-15 RX ADMIN — METOPROLOL TARTRATE 25 MG: 25 TABLET, FILM COATED ORAL at 20:30

## 2025-06-15 RX ADMIN — METOPROLOL TARTRATE 25 MG: 25 TABLET, FILM COATED ORAL at 09:02

## 2025-06-15 RX ADMIN — INSULIN GLARGINE 5 UNITS: 100 INJECTION, SOLUTION SUBCUTANEOUS at 20:29

## 2025-06-15 RX ADMIN — RIVAROXABAN 15 MG: 15 TABLET, FILM COATED ORAL at 09:03

## 2025-06-15 RX ADMIN — INSULIN LISPRO 8 UNITS: 100 INJECTION, SOLUTION INTRAVENOUS; SUBCUTANEOUS at 12:13

## 2025-06-15 RX ADMIN — SULFUR HEXAFLUORIDE 2 ML: KIT at 15:14

## 2025-06-15 RX ADMIN — Medication 10 ML: at 09:04

## 2025-06-15 NOTE — CASE MANAGEMENT/SOCIAL WORK
Discharge Planning Assessment  [facility]     Patient Name: Nicholas Yin  MRN: [id number]  Today's Date: 6/15/2025    Admit Date: 6/13/2025    Plan: home   Discharge Needs Assessment       Row Name 06/15/25 1311       Living Environment    People in Home spouse    Current Living Arrangements home    Potentially Unsafe Housing Conditions none    In the past 12 months has the electric, gas, oil, or water company threatened to shut off services in your home? No    Primary Care Provided by self    Provides Primary Care For no one    Family Caregiver if Needed spouse    Quality of Family Relationships helpful;involved;supportive    Able to Return to Prior Arrangements yes       Resource/Environmental Concerns    Resource/Environmental Concerns none    Transportation Concerns none       Transportation Needs    In the past 12 months, has lack of transportation kept you from medical appointments or from getting medications? no    In the past 12 months, has lack of transportation kept you from meetings, work, or from getting things needed for daily living? No       Food Insecurity    Within the past 12 months, you worried that your food would run out before you got the money to buy more. Never true    Within the past 12 months, the food you bought just didn't last and you didn't have money to get more. Never true       Transition Planning    Patient/Family Anticipates Transition to home with family    Patient/Family Anticipated Services at Transition none    Transportation Anticipated family or friend will provide       Discharge Needs Assessment    Readmission Within the Last 30 Days no previous admission in last 30 days    Equipment Currently Used at Home rollator;bp cuff    Concerns to be Addressed discharge planning    Do you want help finding or keeping work or a job? I do not need or want help    Do you want help with school or training? For example, starting or completing job training or getting a high  school diploma, GED or equivalent No    Anticipated Changes Related to Illness none    Equipment Needed After Discharge none    Outpatient/Agency/Support Group Needs infusion therapy, outpatient;infusion therapy, home    Provided Post Acute Provider List? N/A                   Discharge Plan       Row Name 06/15/25 6582       Plan    Plan home    Patient/Family in Agreement with Plan yes    Plan Comments Met with Mr. Yin and his wife at the bedside to initiate discharge planning. They share a home in [place]. His wife is an APRN at [employer]. Mr. Yin is independent with activities of daily living and occasionally uses a rollator. He also has a bp cuff at home but no other DME. He is not involved with home health or outpatient services. His discharge plan is home with his wife. Mr. Yin is anticipating IV antibiotics at discharge. Wife reported that he will likely go to [facility] for outpatient infusions.  will continue to follow plan of care and assist with discharge planning needs as indicated.    Final Discharge Disposition Code 01 - home or self-care                       Demographic Summary       Row Name 06/15/25 1257       General Information    Admission Type inpatient    Arrived From emergency department    Referral Source admission list    Reason for Consult discharge planning    Preferred Language English       Contact Information    Permission Granted to Share Info With family/designee    Contact Information Obtained for     Contact Information Comments Jeremías Yin Spouse [phone]                   Functional Status       Row Name 06/15/25 1257       Functional Status    Usual Activity Tolerance good    Current Activity Tolerance moderate       Physical Activity    On average, how many days per week do you engage in moderate to strenuous exercise (like a brisk walk)? 0 days    On average, how many minutes do you engage in exercise at this level?  0 min    Number of minutes of exercise per week 0       Assessment of Health Literacy    How often do you have someone help you read hospital materials? Never    How often do you have problems learning about your medical condition because of difficulty understanding written information? Never    How often do you have a problem understanding what is told to you about your medical condition? Never    How confident are you filling out medical forms by yourself? Extremely    Health Literacy Good       Functional Status, IADL    Medications independent    Meal Preparation independent    Housekeeping independent    Laundry independent    Shopping independent    If for any reason you need help with day-to-day activities such as bathing, preparing meals, shopping, managing finances, etc., do you get the help you need? I don't need any help       Mental Status    General Appearance WDL WDL       Mental Status Summary    Recent Changes in Mental Status/Cognitive Functioning no changes       Employment/    Employment Status retired                   Psychosocial    No documentation.                  Abuse/Neglect    No documentation.                  Legal    No documentation.                  Substance Abuse    No documentation.                  Patient Forms    No documentation.                     Rae aSlinas RN

## 2025-06-15 NOTE — PLAN OF CARE
Problem: Adult Inpatient Plan of Care  Goal: Plan of Care Review  Outcome: Progressing  Flowsheets (Taken 6/15/2025 0543)  Progress: no change  Plan of Care Reviewed With: patient  Goal: Patient-Specific Goal (Individualized)  Outcome: Progressing  Goal: Absence of Hospital-Acquired Illness or Injury  Outcome: Progressing  Intervention: Identify and Manage Fall Risk  Recent Flowsheet Documentation  Taken 6/15/2025 0400 by Yunior Del Rosario RN  Safety Promotion/Fall Prevention:   safety round/check completed   activity supervised  Taken 6/15/2025 0200 by Yunior Del Rosario RN  Safety Promotion/Fall Prevention:   safety round/check completed   activity supervised  Taken 6/15/2025 0000 by Yunior Del Rosario RN  Safety Promotion/Fall Prevention:   safety round/check completed   activity supervised  Intervention: Prevent Skin Injury  Recent Flowsheet Documentation  Taken 6/15/2025 0400 by Yunior Del Rosario RN  Body Position: position maintained  Skin Protection: incontinence pads utilized  Taken 6/15/2025 0200 by Yunior Del Rosario RN  Body Position: position maintained  Skin Protection: incontinence pads utilized  Taken 6/15/2025 0000 by Yunior Del Rosario RN  Body Position: position maintained  Skin Protection: incontinence pads utilized  Taken 6/14/2025 2200 by Yunior Del Rosario RN  Body Position: position changed independently  Skin Protection: incontinence pads utilized  Goal: Optimal Comfort and Wellbeing  Outcome: Progressing  Goal: Readiness for Transition of Care  Outcome: Progressing     Problem: Comorbidity Management  Goal: Blood Pressure in Desired Range  Outcome: Progressing     Problem: Fall Injury Risk  Goal: Absence of Fall and Fall-Related Injury  Outcome: Progressing  Intervention: Promote Injury-Free Environment  Recent Flowsheet Documentation  Taken 6/15/2025 0400 by Yunior Del Rosario RN  Safety Promotion/Fall Prevention:   safety round/check completed   activity supervised  Taken 6/15/2025 0200 by Miguel Ángel  Yunior RN  Safety Promotion/Fall Prevention:   safety round/check completed   activity supervised  Taken 6/15/2025 0000 by Yunior Del Rosario RN  Safety Promotion/Fall Prevention:   safety round/check completed   activity supervised   Goal Outcome Evaluation:  Plan of Care Reviewed With: patient        Progress: no change

## 2025-06-15 NOTE — PROGRESS NOTES
[facility]    INFECTIOUS DISEASE PROGRESS NOTE    Nicholas Yin  1945  [id number]    Date of consult: 6/14/2025    Admit date: 6/13/2025    Requesting Provider: No ref. provider found  Evaluating physician: Bulmaro Jin MD  Reason for Consultation: Streptococcus sanguinous high-grade bacteremia 4-4 blood cultures from 6/3/2025 partially treated, concern for endocarditis  Chief Complaint: Above      Subjective   History of present illness:  Patient is a  80 y.o.  Yr old male with a history of aortic stenosis status post TAVR by Dr. Lopez on 5/8/2023, CAD, congestive heart failure, diabetes mellitus type 2, hyperlipidemia, essential hypertension, TIA, who was evaluated at [facility] on 6/3/2025 for pneumonia and treated eventually with cefdinir and doxycycline until 6/10.  Blood cultures at that encounter were positive in 4-4 bottles for Streptococcus sanguinous.  Patient was followed up with cardiology who recommended admission for evaluation of possible endocarditis.  Patient has no other localizing signs or symptoms of infection.  The patient was admitted to [facility] on 6/13/2025.  I was consulted on 6/14/2025.  The patient denies ill contacts, TB, HIV, or zoonotic exposures.    6/15/2025 history reviewed.  No high fevers or chills.  Tolerating antibiotics for streptococcal sepsis.  On ceftriaxone anticipating continuing until 8/1/2025 and reassess for 6 weeks.  TTE pending results.  Blood cultures on 6/13 negative.    Past Medical History:   Diagnosis Date    Anemia     Aortic stenosis     Arthritis     Back pain     CancerTesticular/Colon     test -1982  Colon -2005    CHF (congestive heart failure)     Coronary artery disease     COVID-19 2022    DDD (degenerative disc disease), lumbosacral     Diabetes mellitus     Dyspnea     Elevated cholesterol     Erectile dysfunction     GERD (gastroesophageal reflux disease)     History of  transfusion     Hyperlipidemia     Hypertension     TIA (transient ischemic attack)        Past Surgical History:   Procedure Laterality Date    AORTIC VALVE REPAIR/REPLACEMENT N/A 5/8/2023    Procedure: TRANSCATHETER AORTIC VALVE REPLACEMENT + LAURIE, LEFT FEMORAL ENDARTERECTOMY WITH PATCH;  Surgeon: Arben Lopez MD;  Location: [facility];  Service: Cardiothoracic;  Laterality: N/A;    AORTIC VALVE REPAIR/REPLACEMENT N/A 5/8/2023    Procedure: Transfemoral Transcatheter Aortic Valve Replacement;  Surgeon: Rachael Mendoza MD;  Location: [facility];  Service: Cardiovascular;  Laterality: N/A;    CARDIAC CATHETERIZATION N/A 08/15/2019    Procedure: Left Heart Cath;  Surgeon: Paul Villatoro MD;  [facility]  [facility];  Service: Cardiology    CARDIAC CATHETERIZATION      04/10/2023 PER DR. MENDOZA    CARDIAC ELECTROPHYSIOLOGY PROCEDURE N/A 5/15/2023    Procedure: Pacemaker DC - [employer];  Surgeon: Rachael Mendoza MD;  Location: [facility] CATH INVASIVE LOCATION;  Service: Cardiology;  Laterality: N/A;    CATARACT EXTRACTION, BILATERAL      COLON SURGERY      colon resection for cancer - sigmoid removed - no chemo no radiation    COLONOSCOPY      COLONOSCOPY N/A 01/27/2021    Procedure: COLONOSCOPY;  Surgeon: Greg Barraza MD;  Location: [facility];  Service: General;  Laterality: N/A;    CORONARY STENT PLACEMENT      1 STENT    ENDOSCOPY  1989    EYE SURGERY Right     retinal detachment    HI RT/LT HEART CATHETERS N/A 08/15/2019    Procedure: Percutaneous Coronary Intervention;  Surgeon: Paul Villatoro MD;  Location:  [facility];  Service: Cardiology    SCALP LESION REMOVAL W/ FLAP AND SKIN GRAFT  12/16/2022    basal cell removal    SHOULDER ARTHROSCOPY Right     SKIN LESION EXCISION N/A 01/27/2021    Procedure: SKIN LESIONS EXCISION FROM LEFT TEMPLE AREA AND ABDOMEN.;  Surgeon: Greg Barraza MD;   Location: [facility];  Service: General;  Laterality: N/A;    VASECTOMY Right     testicle removed d/t cancer - with radiation       Pediatric History   Patient Parents    Not on file     Other Topics Concern    Not on file   Social History Narrative    Patient lives in [place], [place]    Positive for alcohol, no drug use or tobacco,  to wife who is primary care NP for [employer], he worked in [employer]    family history includes Alcohol abuse in his father and mother; Alzheimer's disease in his father; COPD in his father and mother; Cancer in his paternal grandmother; Diabetes in his brother; Heart disease in his brother; Hypertension in his brother, father, and mother; Stroke in his maternal grandfather and mother.    Allergies   Allergen Reactions    Ct Contrast Anaphylaxis    Iodinated Contrast Media Shortness Of Breath       Immunization History   Administered Date(s) Administered    COVID-19 (PFIZER) Purple Cap Monovalent 01/11/2021, 02/01/2021, 09/24/2021    Fluad Quad 65+ 10/08/2020    Fluzone High-Dose 65+YRS 10/27/2017, 10/04/2018, 10/25/2024    Fluzone High-Dose 65+yrs 10/13/2021, 10/13/2022, 10/27/2023    Fluzone Quad >6mos (Multi-dose) 11/11/2016    Hep A / Hep B 07/17/2018    Hepatitis A 01/04/2019    IPV 06/05/1997    Pneumococcal Conjugate 13-Valent (PCV13) 06/08/2015    Pneumococcal Polysaccharide (PPSV23) 09/02/2011    Td (TDVAX) 06/05/1997    Tdap 12/09/2011    flucelvax quad pfs =>4 YRS 09/05/2019       Medication:  @Scheduled Meds:aspirin, 81 mg, Oral, Daily  cefTRIAXone, 2,000 mg, Intravenous, Q24H  dronedarone, 400 mg, Oral, BID With Meals  empagliflozin, 25 mg, Oral, Daily  insulin glargine, 5 Units, Subcutaneous, Q12H  Insulin Lispro, 8 Units, Subcutaneous, TID With Meals  losartan, 25 mg, Oral, BID  metoprolol tartrate, 25 mg, Oral, BID  rivaroxaban, 15 mg, Oral, Daily  rosuvastatin, 20 mg, Oral, Daily  sertraline, 25 mg, Oral, Daily  sodium chloride, 10 mL, Intravenous, Q12H      Continuous  "Infusions:   PRN Meds:.  senna-docusate sodium **AND** polyethylene glycol **AND** bisacodyl **AND** bisacodyl    Calcium Replacement - Follow Nurse / BPA Driven Protocol    dextrose    dextrose    glucagon (human recombinant)    Magnesium Cardiology Dose Replacement - Follow Nurse / BPA Driven Protocol    nitroglycerin    ondansetron    Phosphorus Replacement - Follow Nurse / BPA Driven Protocol    Potassium Replacement - Follow Nurse / BPA Driven Protocol    sodium chloride    sodium chloride     Please refer to the medical record for a full medication list    Review of Systems:    Constitutional-- No Fever, chills or sweats.  Appetite good, and no malaise. No fatigue.  HEENT-- No new vision, hearing or throat complaints.  No epistaxis or oral sores.  Denies odynophagia or dysphagia.  No odynophagia or dysphagia. No headache, photophobia or neck stiffness.  CV-- No chest pain, palpitation or syncope  Resp-- No SOB/cough/Hemoptysis  GI- No nausea, vomiting, or diarrhea.  No hematochezia, melena, or hematemesis. Denies jaundice or chronic liver disease.  -- No dysuria, hematuria, or flank pain.  Denies hesitancy, urgency.  Lymph- no swollen lymph nodes in neck/axilla or groin.   Heme- No active bruising or bleeding; no Hx of DVT or PE.  MS-- no swelling or pain in the bones or joints of arms/legs.  No new back pain.  Neuro-- No acute focal weakness or numbness in the arms or legs.  No seizures.  Skin--No rashes or lesions, no nodules    Physical Exam:   Vital Signs   Temp:  [97.6 °F (36.4 °C)-99.6 °F (37.6 °C)] 97.6 °F (36.4 °C)  Heart Rate:  [71-80] 71  Resp:  [16-18] 18  BP: (131-152)/(54-76) 143/76    Blood pressure 143/76, pulse 71, temperature 97.6 °F (36.4 °C), temperature source Oral, resp. rate 18, height 175.3 cm (69\"), weight 98.4 kg (217 lb), SpO2 96%.  GENERAL: Awake and alert, in no acute distress. Appears older than stated age.  Resting in bed.  Visiting with wife.  HEENT:  Normocephalic, atraumatic. "  Oropharynx without thrush. Dentition in good repair. No cervical adenopathy. No neck masses.  Ears externally normal, Nose externally normal.  EYES: No conjunctival injection. No icterus. EOM full.  LYMPHATICS: No lymphadenopathy of the neck or axillary or inguinal regions.   HEART: No murmur, gallop, or pericardial friction rub. Reg rate rhythm, No JVD at 45 degrees.  LUNGS: Clear to auscultation and percussion. No respiratory distress, no use of accessory muscles.  ABDOMEN: Soft, nontender, nondistended. No appreciable HSM.  Bowel sounds normal.  SKIN: Warm and dry without cutaneous eruptions.  No nodules.  No Janeway lesions, Osler's nodes.  PSYCHIATRIC: Mental status lucid. No confusion.  EXT:  No cellulitic change.  NEURO: Oriented to name, nonfocal.      Results Review:   I reviewed the patient's new clinical results.  I reviewed the patient's new imaging results and agree with the interpretation.  I reviewed the patient's other test results and agree with the interpretation    Results from last 7 days   Lab Units 06/15/25  0745 06/14/25 0922 06/13/25 2019   WBC 10*3/mm3 10.39 10.79 11.93*   HEMOGLOBIN g/dL 9.2* 9.2* 9.6*   HEMATOCRIT % 30.0* 30.3* 32.0*   PLATELETS 10*3/mm3 263 266 297     Results from last 7 days   Lab Units 06/15/25  0745   SODIUM mmol/L 139   POTASSIUM mmol/L 5.3*   CHLORIDE mmol/L 112*   CO2 mmol/L 21.0*   BUN mg/dL 31.4*   CREATININE mg/dL 2.21*   GLUCOSE mg/dL 157*   CALCIUM mg/dL 8.7     Results from last 7 days   Lab Units 06/15/25  0745   ALK PHOS U/L 76   BILIRUBIN mg/dL 0.3   ALT (SGPT) U/L 11   AST (SGOT) U/L 20         Results from last 7 days   Lab Units 06/15/25  0745   CRP mg/dL <0.30         Results from last 7 days   Lab Units 06/15/25  0745   LACTATE mmol/L 1.0     Estimated Creatinine Clearance: 31.4 mL/min (A) (by C-G formula based on SCr of 2.21 mg/dL (H)).  CPK          6/15/2025    07:45   Common Labs   Creatine Kinase 31       Procalitonin Results:       Brief  "Urine Lab Results  (Last result in the past 365 days)        Color   Clarity   Blood   Leuk Est   Nitrite   Protein   CREAT   Urine HCG        06/13/25 2158 Yellow   Clear   Negative   Negative   Negative   100 mg/dL (2+)                  No results found for: \"SITE\", \"ALLENTEST\", \"PHART\", \"VRS0EZT\", \"PO2ART\", \"JIS2CYD\", \"BASEEXCESS\", \"Z5MDTEXD\", \"HGBBG\", \"HCTABG\", \"OXYHEMOGLOBI\", \"METHHGBN\", \"CARBOXYHGB\", \"CO2CT\", \"BAROMETRIC\", \"MODALITY\", \"FIO2\"     Microbiology:      Results for orders placed or performed during the hospital encounter of 06/13/25   Blood Culture - Blood, Arm, Left    Specimen: Arm, Left; Blood   Result Value Ref Range    Blood Culture No growth at 24 hours         Susceptibility     Streptococcus sanguinis     MAYANK     Ampicillin 0.5 Intermediate     Ceftriaxone 0.25 Susceptible     Penicillin G 0.25 Intermediate     Vancomycin 1 Susceptible                  Blood cultures 6/3/2025 4-4 bottles positive          Brief Urine Lab Results  (Last result in the past 365 days)        Color   Clarity   Blood   Leuk Est   Nitrite   Protein   CREAT   Urine HCG        06/13/25 2158 Yellow   Clear   Negative   Negative   Negative   100 mg/dL (2+)                   Radiology:  Imaging Results (Last 72 Hours)       ** No results found for the last 72 hours. **        CT scan abdomen and pelvis 6/3/2025 negative for acute findings, CT scan of the chest consistent with CHF and edema with cardiomegaly and peripheral pulmonary fibrosis and left basilar atelectasis.  No consolidation.            IMPRESSION:     High grade Streptococcus sanguinous infection with 4 out of 4 blood cultures positive from 6/3/2025 with previous treatment for pneumonia, although in reviewing films no obvious consolidation on his CT scan of the chest or chest x-ray.  Concerns would include intravascular infection which could be related to an endocarditis or a pacemaker infection.  CT scan of the abdomen and pelvis with no other obvious " focus.  Urinalysis benign.  This was partially treated with oral cephalosporin.  Repeat blood cultures on 6/13 negative, but likely related to his recent treatment with cephalosporin.  Intermediately sensitive to penicillin.  Sensitive to ceftriaxone and vancomycin.  Acute on chronic renal failure.  Creatinine was 3.18 on 6/3, currently 2.59.  2.21 on 6/15.  Leukocytosis, neutrophilic, improved from his 6/3 level of 18.  Resolved.  Anemia, chronic disease related to above issues.  Cardiac regularly, TTE from 4/25 with ejection fraction 56%, TAVR present without stenosis or regurgitation or paravalvular leak.  Diabetes mellitus type 2 with increased risk for infection, hemoglobin A1c 7.80 on 6/3.  Previous aortic stenosis status post TAVR by Dr. Lopez on 5/8/2023.  Hyperkalemia 5.3, new.    PLAN:    Diagnostically, continue to follow patient's physical exam, CBC, CMP, CRP, blood cultures which were obtained on 6/13, and echocardiogram including TTE pending, and LAURIE if the TTE is negative.  Wife and patient are agreeable.  Therapeutically, consider 6 weeks of IV ceftriaxone even if repeat blood cultures until 8/1 are negative given his high risk and TAVR, and pacemaker.  May need to consider pacemaker removal as well, with the possibility that this prosthetic device is infected and may not clinically resolve infection without removal.  Then reassess.  Supportive care.  Patient will likely need a Groshong catheter for long-term IV antibiotics given his renal failure.    I discussed the patient's findings and my recommendations with patient and nursing staff, discussed with Dr. Wright, and the patient's wife who is an APRN.    Case management orders: Please arrange for home antibiotics with ceftriaxone 2 g IV daily to continue until 8/1/2025 and reassess for streptococcal intravascular infection.  Check CBC, CMP, CRP weekly while on IV antibiotics.  Fax orders to [phone], call [phone] with final arrangements.  Arrange  for follow-up with me in 1 week postdischarge.  Weekly PICC dressing changes.    Thank you for asking me to see Nicholas Yin.  Our group would be pleased to follow this patient over the course of their hospitalization and assist with outpatient antimicrobial therapy, as indicated.  Further recommendations depend on the results of the cultures and clinical course.  Side effects of medications discussed.  The patient is at increased risk for adverse drug reactions, complications of IV access, and readmission, CVA, death.  Discussed with Dr. Wright.    This visit included the following complex service elements:  Complex medical decision-making associated with antimicrobial prescribing.  In-depth chart review with high level synthesis for complex diagnoses.  Managed infection treatment protocol associated with transitions of care for this complex patient.  Counseled patients, family members and/or caregivers regarding infection prevention.  Managed infection control protocol.     Bulmaro Jin MD  6/15/2025

## 2025-06-15 NOTE — PROGRESS NOTES
[facility] Medicine Services  PROGRESS NOTE    Patient Name: Nicholas Yin  : 1945  MRN: [id number]    Date of Admission: 2025  Primary Care Physician: Waldemar Trejo MD    Subjective   Subjective     CC:  bacteremia    HPI:  No fever or chills, no dyspnea, no chest pain. No n/v. Tired of being in hospital      Objective   Objective     Vital Signs:   Temp:  [97.6 °F (36.4 °C)-99.6 °F (37.6 °C)] 97.6 °F (36.4 °C)  Heart Rate:  [71-80] 71  Resp:  [16-18] 18  BP: (131-152)/(54-76) 143/76     Physical Exam:  Constitutional:Alert, oriented x 3, nontoxic appearing  Psych:Normal/appropriate affect  HEENT:NCAT, oropharynx clear  Neck: neck supple, full range of motion  Neuro: Face symmetric, speech clear, equal , moves all extremities  Cardiac: RRR, murmur noted; No pretibial pitting edema  Resp: CTAB, normal effort  GI: abd soft, nontender, obese  Skin: No extremity rash  Musculoskeletal/extremities: no cyanosis of extremities; no significant ankle edema            Results Reviewed:  LAB RESULTS:      Lab 06/15/25  0745 25   WBC 10.39 10.79 11.93*   HEMOGLOBIN 9.2* 9.2* 9.6*   HEMATOCRIT 30.0* 30.3* 32.0*   PLATELETS 263 266 297   NEUTROS ABS 7.77* 8.07* 9.00*   IMMATURE GRANS (ABS) 0.05 0.06* 0.06*   LYMPHS ABS 1.13 1.24 1.47   MONOS ABS 0.93* 0.94* 0.96*   EOS ABS 0.45* 0.44* 0.41*   MCV 71.8* 72.3* 73.1*   CRP <0.30  --   --    LACTATE 1.0  --  1.3         Lab 06/15/25  0745 25   SODIUM 139 142 139   POTASSIUM 5.3* 4.6 4.8   CHLORIDE 112* 115* 111*   CO2 21.0* 20.0* 19.0*   ANION GAP 6.0 7.0 9.0   BUN 31.4* 33.6* 38.6*   CREATININE 2.21* 2.17* 2.59*   EGFR 29.6* 30.2* 24.4*   GLUCOSE 157* 119* 221*   CALCIUM 8.7 8.7 8.9   MAGNESIUM 2.0 2.1  --    PHOSPHORUS 3.2 3.6  --          Lab 06/15/25  0745 25   TOTAL PROTEIN 5.8* 6.3   ALBUMIN 3.0* 3.1*   GLOBULIN 2.8 3.2   ALT (SGPT) 11 14   AST (SGOT)  20 19   BILIRUBIN 0.3 0.3   ALK PHOS 76 85                     Brief Urine Lab Results  (Last result in the past 365 days)        Color   Clarity   Blood   Leuk Est   Nitrite   Protein   CREAT   Urine HCG        06/13/25 2158 Yellow   Clear   Negative   Negative   Negative   100 mg/dL (2+)                   Microbiology Results Abnormal       None            No radiology results from the last 24 hrs    Results for orders placed during the hospital encounter of 04/25/25    Adult Transthoracic Echo Complete W/ Cont if Necessary Per Protocol    Interpretation Summary    Left ventricular systolic function is normal. Left ventricular ejection fraction appears to be 56 - 60%.    Right ventricle was poorly visualized.  From images available RV appears to have normal systolic function.    There is a TAVR valve present.  No aortic valve stenosis.  No aortic valve central regurgitation, no paravalvular leak.  Peak gradient across aortic valve is 12 mmHg, mean gradient 8 mmHg.    Mitral annular calcification present.  No significant mitral valve stenosis is noted (however has limited sensitivity).  Trace mitral valve regurgitation.      Current medications:  Scheduled Meds:aspirin, 81 mg, Oral, Daily  cefTRIAXone, 2,000 mg, Intravenous, Q24H  dronedarone, 400 mg, Oral, BID With Meals  empagliflozin, 25 mg, Oral, Daily  insulin glargine, 5 Units, Subcutaneous, Q12H  Insulin Lispro, 8 Units, Subcutaneous, TID With Meals  [START ON 6/16/2025] losartan, 25 mg, Oral, Q24H  metoprolol tartrate, 25 mg, Oral, BID  pantoprazole, 40 mg, Oral, Q AM  rivaroxaban, 15 mg, Oral, Daily  rosuvastatin, 20 mg, Oral, Daily  sertraline, 25 mg, Oral, Daily  sodium chloride, 10 mL, Intravenous, Q12H  sodium zirconium cyclosilicate, 10 g, Oral, Q8H      Continuous Infusions:   PRN Meds:.  senna-docusate sodium **AND** polyethylene glycol **AND** bisacodyl **AND** bisacodyl    Calcium Replacement - Follow Nurse / BPA Driven Protocol    dextrose     dextrose    glucagon (human recombinant)    Magnesium Cardiology Dose Replacement - Follow Nurse / BPA Driven Protocol    nitroglycerin    ondansetron    Phosphorus Replacement - Follow Nurse / BPA Driven Protocol    Potassium Replacement - Follow Nurse / BPA Driven Protocol    sodium chloride    sodium chloride    Assessment & Plan   Assessment & Plan     Active Hospital Problems    Diagnosis  POA    **Bacteremia [R78.81]  Yes      Resolved Hospital Problems   No resolved problems to display.        Brief Hospital Course to date:  Nicholas Yin is a 80 y.o. male   w/ hx cad, afib, previous TAVR & left lower extremity angioplasty (2023 by Dr Lopez), ckd 4 (baseline cr 2.4-2.9), DM2 who was recently seen at [facility] for pneumonia on 6/3/25 was treated with cefdinir and doxycycline x 7 days for pneumonia. Blood cultures grew streptococcus sanguinis and pcp referred patient to [facility] ED due to previous history of TAVR and workup/treatment of bacteremia. Initiated on Ceftriaxone, ID consulted. TTE ordered    Strep bacteremia (high grade)  Recent Pneumonia (6/3/25 [facility])  Hx TAVR 2023  CAD (abisai to lad 2019)  Chronic HFpEF, compensated  Hx heart block/parox afib (pacemaker 5/2023 Dr. Mendoza)  HTN  HL  -previous echo 5/2024: LV EF ~53%, diastolic dysfunction, tavr ok  --TTE no overt abnormality noted, no definite mass, cannot rule out mitral valve mass TTE pending.  -ID following: continue rocephin 2g iv daily; concerned about intravascular infection (such as tavr or pacemaker infection). TTE no definite valvular abnormality. Will need LAURIE. Will need at least 6 weeks of IV rocephin. Will also need to consider pacemaker removal as well (ID to determine whether this is necessary  -LAURIE ordered for 6/16/25, npo after midnight  -of note, patient and wife state he would NOT want dialysis in the future if/when his renal function worsens; consequently I will order PICC line (patient and wife prefer  PICC line in this scenario rather than groshong since patient does not want dialysis in the future)  -continue asa, xarelto, multaq, metoprolol, losartan (decreased to once daily as below), crestor  Uncontrolled DM2  -jardiance  -basal-bolus insulin, ssi; titrate prn  Ckd 4 (baseline cr ~2.4-2.9)  Hyperkalemia  -renal fxn stable, but potassium 5.3 today. Will decrease losartan to once daily, give lokelma x 2 doses; repeat a.m. labs  of note, patient and wife state he would NOT want dialysis in the future if/when his renal function worsens; consequently  Gerd  -protonix  Depression  -initiate sertraline 25mg daily      Am labs: cbc,bmp (potassium, creatinine),mag (follow up repeat blood cultures); f/u TTE official read (if negative go ahead w/ LAURIE which has been ordered)      Expected Discharge Location and Transportation: home  Expected Discharge  after TTE +/- LAURIE & picc line placed  Expected Discharge Date: 6/16/2025; Expected Discharge Time:      VTE Prophylaxis:  Pharmacologic & mechanical VTE prophylaxis orders are present.         AM-PAC 6 Clicks Score (PT): 18 (06/14/25 0103)    CODE STATUS:   Code Status and Medical Interventions: CPR (Attempt to Resuscitate); Full Support   Ordered at: 06/14/25 0151     Code Status (Patient has no pulse and is not breathing):    CPR (Attempt to Resuscitate)     Medical Interventions (Patient has pulse or is breathing):    Full Support     Level Of Support Discussed With:    Patient       Diego Wright MD  06/15/25

## 2025-06-16 ENCOUNTER — APPOINTMENT (OUTPATIENT)
Dept: CARDIOLOGY | Facility: HOSPITAL | Age: 80
End: 2025-06-16
Payer: COMMERCIAL

## 2025-06-16 LAB
ANION GAP SERPL CALCULATED.3IONS-SCNC: 8 MMOL/L (ref 5–15)
AORTIC DIMENSIONLESS INDEX: 0.26 (DI)
AV MEAN PRESS GRAD SYS DOP V1V2: 13 MMHG
AV VMAX SYS DOP: 254 CM/SEC
BASOPHILS # BLD AUTO: 0.04 10*3/MM3 (ref 0–0.2)
BASOPHILS NFR BLD AUTO: 0.4 % (ref 0–1.5)
BH CV ECHO MEAS - AO MAX PG: 25.8 MMHG
BH CV ECHO MEAS - AO V2 VTI: 50.2 CM
BH CV ECHO MEAS - AVA(I,D): 0.98 CM2
BH CV ECHO MEAS - LV MAX PG: 2.4 MMHG
BH CV ECHO MEAS - LV MEAN PG: 1 MMHG
BH CV ECHO MEAS - LV V1 MAX: 77.4 CM/SEC
BH CV ECHO MEAS - LV V1 VTI: 13 CM
BH CV ECHO MEAS - LVOT AREA: 3.8 CM2
BH CV ECHO MEAS - LVOT DIAM: 2.2 CM
BH CV ECHO MEAS - MV MAX PG: 6.5 MMHG
BH CV ECHO MEAS - MV MEAN PG: 3 MMHG
BH CV ECHO MEAS - MV V2 VTI: 38.4 CM
BH CV ECHO MEAS - MVA(VTI): 1.29 CM2
BH CV ECHO MEAS - SV(LVOT): 49.4 ML
BH CV ECHO MEAS - SVI(LVOT): 23.2 ML/M2
BH CV VAS BP RIGHT ARM: NORMAL MMHG
BUN SERPL-MCNC: 33.5 MG/DL (ref 8–23)
BUN/CREAT SERPL: 14.3 (ref 7–25)
CALCIUM SPEC-SCNC: 8.7 MG/DL (ref 8.6–10.5)
CHLORIDE SERPL-SCNC: 111 MMOL/L (ref 98–107)
CO2 SERPL-SCNC: 21 MMOL/L (ref 22–29)
CREAT SERPL-MCNC: 2.35 MG/DL (ref 0.76–1.27)
DEPRECATED RDW RBC AUTO: 52.5 FL (ref 37–54)
EGFRCR SERPLBLD CKD-EPI 2021: 27.5 ML/MIN/1.73
EOSINOPHIL # BLD AUTO: 0.37 10*3/MM3 (ref 0–0.4)
EOSINOPHIL NFR BLD AUTO: 3.7 % (ref 0.3–6.2)
ERYTHROCYTE [DISTWIDTH] IN BLOOD BY AUTOMATED COUNT: 20.5 % (ref 12.3–15.4)
GLUCOSE BLDC GLUCOMTR-MCNC: 126 MG/DL (ref 70–130)
GLUCOSE BLDC GLUCOMTR-MCNC: 146 MG/DL (ref 70–130)
GLUCOSE BLDC GLUCOMTR-MCNC: 162 MG/DL (ref 70–130)
GLUCOSE BLDC GLUCOMTR-MCNC: 178 MG/DL (ref 70–130)
GLUCOSE SERPL-MCNC: 167 MG/DL (ref 65–99)
HCT VFR BLD AUTO: 30.6 % (ref 37.5–51)
HGB BLD-MCNC: 9.1 G/DL (ref 13–17.7)
IMM GRANULOCYTES # BLD AUTO: 0.03 10*3/MM3 (ref 0–0.05)
IMM GRANULOCYTES NFR BLD AUTO: 0.3 % (ref 0–0.5)
LV EF 2D ECHO EST: 60 %
LYMPHOCYTES # BLD AUTO: 1.05 10*3/MM3 (ref 0.7–3.1)
LYMPHOCYTES NFR BLD AUTO: 10.5 % (ref 19.6–45.3)
MAGNESIUM SERPL-MCNC: 2.2 MG/DL (ref 1.6–2.4)
MCH RBC QN AUTO: 22.1 PG (ref 26.6–33)
MCHC RBC AUTO-ENTMCNC: 29.7 G/DL (ref 31.5–35.7)
MCV RBC AUTO: 74.3 FL (ref 79–97)
MONOCYTES # BLD AUTO: 0.92 10*3/MM3 (ref 0.1–0.9)
MONOCYTES NFR BLD AUTO: 9.2 % (ref 5–12)
NEUTROPHILS NFR BLD AUTO: 7.56 10*3/MM3 (ref 1.7–7)
NEUTROPHILS NFR BLD AUTO: 75.9 % (ref 42.7–76)
NRBC BLD AUTO-RTO: 0 /100 WBC (ref 0–0.2)
PHOSPHATE SERPL-MCNC: 3.6 MG/DL (ref 2.5–4.5)
PLATELET # BLD AUTO: 225 10*3/MM3 (ref 140–450)
PMV BLD AUTO: 10.2 FL (ref 6–12)
POTASSIUM SERPL-SCNC: 5 MMOL/L (ref 3.5–5.2)
RBC # BLD AUTO: 4.12 10*6/MM3 (ref 4.14–5.8)
SODIUM SERPL-SCNC: 140 MMOL/L (ref 136–145)
WBC NRBC COR # BLD AUTO: 9.97 10*3/MM3 (ref 3.4–10.8)

## 2025-06-16 PROCEDURE — 25010000002 MIDAZOLAM PER 1 MG: Performed by: INTERNAL MEDICINE

## 2025-06-16 PROCEDURE — 93325 DOPPLER ECHO COLOR FLOW MAPG: CPT

## 2025-06-16 PROCEDURE — 82948 REAGENT STRIP/BLOOD GLUCOSE: CPT

## 2025-06-16 PROCEDURE — 63710000001 INSULIN GLARGINE PER 5 UNITS: Performed by: FAMILY MEDICINE

## 2025-06-16 PROCEDURE — 83735 ASSAY OF MAGNESIUM: CPT | Performed by: FAMILY MEDICINE

## 2025-06-16 PROCEDURE — 93325 DOPPLER ECHO COLOR FLOW MAPG: CPT | Performed by: INTERNAL MEDICINE

## 2025-06-16 PROCEDURE — 99153 MOD SED SAME PHYS/QHP EA: CPT

## 2025-06-16 PROCEDURE — 93312 ECHO TRANSESOPHAGEAL: CPT

## 2025-06-16 PROCEDURE — 84100 ASSAY OF PHOSPHORUS: CPT | Performed by: FAMILY MEDICINE

## 2025-06-16 PROCEDURE — 93320 DOPPLER ECHO COMPLETE: CPT | Performed by: INTERNAL MEDICINE

## 2025-06-16 PROCEDURE — 99232 SBSQ HOSP IP/OBS MODERATE 35: CPT

## 2025-06-16 PROCEDURE — 25010000002 CEFTRIAXONE PER 250 MG

## 2025-06-16 PROCEDURE — 99152 MOD SED SAME PHYS/QHP 5/>YRS: CPT

## 2025-06-16 PROCEDURE — 25010000002 FENTANYL CITRATE (PF) 50 MCG/ML SOLUTION: Performed by: INTERNAL MEDICINE

## 2025-06-16 PROCEDURE — 93320 DOPPLER ECHO COMPLETE: CPT

## 2025-06-16 PROCEDURE — 93312 ECHO TRANSESOPHAGEAL: CPT | Performed by: INTERNAL MEDICINE

## 2025-06-16 PROCEDURE — 63710000001 INSULIN LISPRO (HUMAN) PER 5 UNITS: Performed by: FAMILY MEDICINE

## 2025-06-16 PROCEDURE — 85025 COMPLETE CBC W/AUTO DIFF WBC: CPT | Performed by: FAMILY MEDICINE

## 2025-06-16 PROCEDURE — 80048 BASIC METABOLIC PNL TOTAL CA: CPT | Performed by: FAMILY MEDICINE

## 2025-06-16 RX ORDER — FENTANYL CITRATE 50 UG/ML
INJECTION, SOLUTION INTRAMUSCULAR; INTRAVENOUS
Status: COMPLETED | OUTPATIENT
Start: 2025-06-16 | End: 2025-06-16

## 2025-06-16 RX ORDER — ETOMIDATE 2 MG/ML
INJECTION INTRAVENOUS
Status: COMPLETED | OUTPATIENT
Start: 2025-06-16 | End: 2025-06-16

## 2025-06-16 RX ORDER — MIDAZOLAM HYDROCHLORIDE 1 MG/ML
INJECTION, SOLUTION INTRAMUSCULAR; INTRAVENOUS
Status: COMPLETED | OUTPATIENT
Start: 2025-06-16 | End: 2025-06-16

## 2025-06-16 RX ADMIN — FENTANYL CITRATE 50 MCG: 50 INJECTION, SOLUTION INTRAMUSCULAR; INTRAVENOUS at 13:43

## 2025-06-16 RX ADMIN — Medication 10 ML: at 08:55

## 2025-06-16 RX ADMIN — DRONEDARONE 400 MG: 400 TABLET, FILM COATED ORAL at 17:24

## 2025-06-16 RX ADMIN — Medication 10 ML: at 22:48

## 2025-06-16 RX ADMIN — ASPIRIN 81 MG: 81 TABLET, COATED ORAL at 08:54

## 2025-06-16 RX ADMIN — INSULIN GLARGINE 5 UNITS: 100 INJECTION, SOLUTION SUBCUTANEOUS at 08:53

## 2025-06-16 RX ADMIN — ETOMIDATE 9.79 MG: 20 INJECTION, SOLUTION INTRAVENOUS at 13:46

## 2025-06-16 RX ADMIN — RIVAROXABAN 15 MG: 15 TABLET, FILM COATED ORAL at 08:54

## 2025-06-16 RX ADMIN — LOSARTAN POTASSIUM 25 MG: 25 TABLET, FILM COATED ORAL at 08:54

## 2025-06-16 RX ADMIN — CEFTRIAXONE 2000 MG: 2 INJECTION, POWDER, FOR SOLUTION INTRAMUSCULAR; INTRAVENOUS at 22:47

## 2025-06-16 RX ADMIN — SERTRALINE HYDROCHLORIDE 25 MG: 50 TABLET ORAL at 08:54

## 2025-06-16 RX ADMIN — METOPROLOL TARTRATE 25 MG: 25 TABLET, FILM COATED ORAL at 08:54

## 2025-06-16 RX ADMIN — MIDAZOLAM HYDROCHLORIDE 2 MG: 1 INJECTION, SOLUTION INTRAMUSCULAR; INTRAVENOUS at 13:40

## 2025-06-16 RX ADMIN — ROSUVASTATIN CALCIUM 20 MG: 20 TABLET, FILM COATED ORAL at 08:54

## 2025-06-16 RX ADMIN — EMPAGLIFLOZIN 25 MG: 25 TABLET, FILM COATED ORAL at 08:54

## 2025-06-16 RX ADMIN — DRONEDARONE 400 MG: 400 TABLET, FILM COATED ORAL at 08:54

## 2025-06-16 RX ADMIN — MIDAZOLAM HYDROCHLORIDE 2 MG: 1 INJECTION, SOLUTION INTRAMUSCULAR; INTRAVENOUS at 13:42

## 2025-06-16 RX ADMIN — INSULIN GLARGINE 5 UNITS: 100 INJECTION, SOLUTION SUBCUTANEOUS at 22:48

## 2025-06-16 RX ADMIN — INSULIN LISPRO 8 UNITS: 100 INJECTION, SOLUTION INTRAVENOUS; SUBCUTANEOUS at 17:24

## 2025-06-16 RX ADMIN — FENTANYL CITRATE 50 MCG: 50 INJECTION, SOLUTION INTRAMUSCULAR; INTRAVENOUS at 13:40

## 2025-06-16 NOTE — PROGRESS NOTES
"          Clinical Nutrition Assessment     Patient Name: Nicholas Yin  YOB: 1945  MRN: [id number]  Date of Encounter: 06/16/25 17:11 EDT  Admission date: 6/13/2025  Reason for Visit: Identified at risk by screening criteria, MST score 2+, Unintentional weight loss, Reduced oral intake    Assessment   Nutrition Assessment   Admission Diagnosis:  Bacteremia [R78.81]    Problem List:    Bacteremia      PMH:   He  has a past medical history of Anemia, Aortic stenosis, Arthritis, Back pain, CancerTesticular/Colon, CHF (congestive heart failure), Coronary artery disease, COVID-19 (2022), DDD (degenerative disc disease), lumbosacral, Diabetes mellitus, Dyspnea, Elevated cholesterol, Erectile dysfunction, GERD (gastroesophageal reflux disease), History of transfusion, Hyperlipidemia, Hypertension, and TIA (transient ischemic attack).    PSH:  He  has a past surgical history that includes Colon surgery; Vasectomy (Right); Eye surgery (Right); Shoulder arthroscopy (Right); Colonoscopy; pr rt/lt heart catheters (N/A, 08/15/2019); Cardiac catheterization (N/A, 08/15/2019); Esophagogastroduodenoscopy (1989); Colonoscopy (N/A, 01/27/2021); Skin lesion excision (N/A, 01/27/2021); Scalp lesion removal w/ flap and skin graft (12/16/2022); Cardiac catheterization; Cataract extraction, bilateral; Coronary stent placement; Aortic valve replacement (N/A, 5/8/2023); Aortic valve replacement (N/A, 5/8/2023); and Cardiac electrophysiology procedure (N/A, 5/15/2023).    Applicable Nutrition History:       Anthropometrics     Height: Height: 175.3 cm (69.02\")  Last Filed Weight: Weight: 97.9 kg (215 lb 12.8 oz) (06/16/25 0547)  Method: Weight Method: Bed scale  BMI: BMI (Calculated): 31.9    UBW:  224 lbs x 3 mo ago  Weight change: No significant changes     Weight       Weight (kg) Weight (lbs) Weight Method Visit Report   2/22/2024 103.42 kg  228 lb   --    6/4/2024 105.235 kg  232 lb   --    8/22/2024 104.781 kg  " 231 lb   --    12/20/2024 102.513 kg  226 lb   --    1/17/2025 104.327 kg  230 lb   --    3/19/2025 103.874 kg  229 lb   --    4/11/2025 105.688 kg  233 lb   --    6/3/2025 101.152 kg  223 lb  Stated     6/12/2025 101.152 kg  223 lb   --    6/13/2025 97.523 kg  215 lb      6/14/2025 99.746 kg  219 lb 14.4 oz      6/15/2025 98.431 kg  217 lb       98.4 kg  216 lb 14.9 oz      6/16/2025 97.886 kg  215 lb 12.8 oz  Bed scale     6/17/2025 98.748 kg  217 lb 11.2 oz  Bed scale       Nutrition Focused Physical Exam    Date:  6/16       Unable to perform due to Pt unable to participate at time of visit     Subjective   Reported/Observed/Food/Nutrition Related History:     Patient screened per nutrition protocol for MST2 or greater. Presented for abnormal blood culture, noted Streptococcus sanguinous growing. Noted to have recent admission with pneumonia and treated with antibiotics. Patient out of room at time of visit for TTE, spouse at bedside able to contribute to nutrition assessment. Spoke with wife who reported patient has not been eating as well the past month. She states that patient is particular and only like certain food items. Patient reported weight loss, however, no significant weight loss noted in EMR. Declined any chewing or swallowing difficulties. Declined all ONS options at this time. NKFA. No specific food preferences noted at this time.    Current Nutrition Prescription   PO: Diet: Cardiac, Diabetic, Renal; Healthy Heart (2-3 Na+); Consistent Carbohydrate; Low Potassium, Low Phosphorus, Low Sodium (2-3g); Fluid Consistency: Thin (IDDSI 0)  Oral Nutrition Supplement: N/A  Intake: Insufficient data    Assessment & Plan   Nutrition Diagnosis   Date:  6/16            Updated:    Problem Predicted inadequate nutrient intake    Etiology Decreased ability to consume sufficient energy 2/2 recent pneumonia    Signs/Symptoms Reported per family   Status: New      Goal:   Nutrition to support treatment and  Establish PO, Increase intake      Nutrition Intervention      Follow treatment progress, Care plan reviewed, Advise alternate selection, Advised available snacks, Interview for preferences, Encourage intake, Supplement offered/refused    Encourage PO intakes when diet advanced  Declined ONS at this time    Monitoring/Evaluation:   Per protocol, PO intake, Pertinent labs, Weight, Symptoms, POC/GOC    Isha Baltazar RD  Time Spent: 30m

## 2025-06-16 NOTE — DISCHARGE PLACEMENT REQUEST
"Nicholas Schuster \"BILL\" (80 y.o. Male)   To Admissions  From [facility] [phone]    Home health orders      Date of Birth   1945    Social Security Number       Address   [address] [address]    Home Phone   [phone]    MRN   [id number]       Holiness   Tenriism    Marital Status                               Admission Date   6/13/2025    Admission Type   Emergency    Admitting Provider   Diego Wright MD    Attending Provider   Diego Wright MD    Department, Room/Bed   [facility] 4G, S464/1       Discharge Date       Discharge Disposition       Discharge Destination                                 Attending Provider: Diego Wright MD    Allergies: Ct Contrast, Iodinated Contrast Media    Isolation: None   Infection: None   Code Status: CPR    Ht: 175.3 cm (69.02\")   Wt: 97.9 kg (215 lb 12.8 oz)    Admission Cmt: None   Principal Problem: Bacteremia [R78.81]                   Active Insurance as of 6/13/2025       Primary Coverage       Payor Plan Insurance Group Employer/Plan Group    [employer] [id number]       Payor Plan Address Payor Plan Phone Number Payor Plan Fax Number Effective Dates    [address] [phone]  1/1/2018 - None Entered    [facility] [address]         Subscriber Name Subscriber Birth Date Member ID       MARAL SCHUSTER 5/6/1948 [id number]               Secondary Coverage       Payor Plan Insurance Group Employer/Plan Group    MEDICARE MEDICARE A ONLY        Payor Plan Address Payor Plan Phone Number Payor Plan Fax Number Effective Dates    [address] [phone]  2/1/2011 - None Entered    [facility] [address]         Subscriber Name Subscriber Birth Date Member ID       NICHOLAS SCHUSTER 1945 [id number]                     Emergency Contacts        (Rel.) Home Phone Work Phone Mobile Phone    Maral Schuster (Spouse) [phone] -- " [phone]             [address]  [address] [facility] [address]  Phone:  [phone]  Fax:  [phone] Date: 2025      Ambulatory Referral to Home Health     Patient:  Nicholas Yin MRN:  [id number]   [address] [facility] [address] :  1945  SSN:    Phone: [phone] Sex:  M      INSURANCE PAYOR PLAN GROUP # SUBSCRIBER ID   Primary:  Secondary:    [facility]  MEDICARE [id number]  [id number]    [id number]  [id number]      Referring Provider Information:  NURYS MAGANA Phone: [phone] Fax: [phone]       Referral Information:   # Visits:  999 Referral Type: Home Health [42]   Urgency:  Routine Referral Reason: Specialty Services Required   Start Date: 2025 End Date:  To be determined by Insurer   Diagnosis: Bacteremia due to Streptococcus (R78.81,B95.5)  History of diabetes mellitus (Z86.39)  History of hypertension (Z86.79)  History of hyperlipidemia (Z86.39)  History of coronary artery disease (Z86.79)  History of transcatheter aortic valve replacement (TAVR) (Z95.2)  Bacteremia (R78.81)  Febrile illness (R50.9)  Actinic keratosis (L57.0)  Postoperative complete heart block (I97.89,I44.2)  Pacemaker (Z95.0)  Paroxysmal atrial fibrillation (I48.0)  S/P TAVR (transcatheter aortic valve replacement) (Z95.2)  Class 2 severe obesity with serious comorbidity and body mass index (BMI) of 35.0 to 35.9 in adult, unspecified obesity type (E66.812,E66.01,Z68.35)  History of colon cancer (Z85.038)  History of nonmelanoma skin cancer (Z85.828)  Osteopenia of multiple sites (M85.89)  Coronary artery disease involving native coronary artery of native heart without angina pectoris (I25.10)  SOBOE (shortness of breath on exertion) (R06.02)  Peripheral vascular disease (I73.9)  Chronic renal failure, stage 4 (severe) (N18.4)  Elevated TSH (R79.89)  Amaurosis fugax of right eye (G45.3)  Benign prostatic hyperplasia with  nocturia (N40.1,R35.1)  Erectile dysfunction due to arterial insufficiency (N52.01)  History of hepatitis C (Z86.19)  Healthcare maintenance (Z00.00)  Encounter for immunization (Z23)  Vitamin D deficiency (E55.9)  Type 2 diabetes mellitus with complication, with long-term current use of insulin (E11.8,Z79.4)  Essential hypertension (I10)  Mixed hyperlipidemia (E78.2)  H/O testicular cancer (Z85.47)  Gastroesophageal reflux disease without esophagitis (K21.9)  Degeneration of intervertebral disc of lumbar region with discogenic back pain (M51.360)      Refer to Dept:   Refer to Provider:   Refer to Provider Phone:   Refer to Facility:       Face to Face Visit Date: 6/16/2025  Follow-up provider for Plan of Care? I treated the patient in an acute care facility and will not continue treatment after discharge.  Follow-up provider: ERICA MEDEIROS [[id number]]  Reason/Clinical Findings: post acute hospitalization  Describe mobility limitations that make leaving home difficult: weakness  Nursing/Therapeutic Services Requested: Skilled Nursing  Nursing/Therapeutic Services Requested: Physical Therapy  Nursing/Therapeutic Services Requested: Occupational Therapy  Skilled nursing orders: Medication education (Check CBC, CMP, CRP weekly while on IV antibiotics; PICC line dressing changes weekly)  Skilled nursing orders: Infusion therapy  Skilled nursing orders: Cardiopulmonary assessments  Skilled nursing orders: Neurovascular assessments  PT orders: Therapeutic exercise  PT orders: Strengthening  PT orders: Home safety assessment  Occupational orders: Activities of daily living  Occupational orders: Strengthening  Occupational orders: Home safety assessment  Frequency: 1 Week 1     This document serves as a request of services and does not constitute Insurance authorization or approval of services.  To determine eligibility, please contact the members Insurance carrier to verify and review coverage.     If you have  medical questions regarding this request for services. Please contact [address] at [phone] during normal business hours.        Authorizing Provider:Diego Wright MD  Authorizing Provider's NPI: [id number]  Order Entered By: Sherrell Stark RN 6/16/2025  1:10 PM     Electronically signed by: Diego Wright MD 6/16/2025  1:10 PM

## 2025-06-16 NOTE — PROGRESS NOTES
[facility] Medicine Services  PROGRESS NOTE    Patient Name: Nicholas Yin  : 1945  MRN: [id number]    Date of Admission: 2025  Primary Care Physician: Waldemar Trejo MD    Subjective   Subjective     CC:  Follow up bacteremia    HPI:  Patient seen and examined this morning. Wife at bedside. Patient awaiting LAURIE. Plans for home IV ABX infusion discussed, CM aware. No acute complaints at time of exam.      Objective   Objective     Vital Signs:   Temp:  [96.9 °F (36.1 °C)-98 °F (36.7 °C)] 98 °F (36.7 °C)  Heart Rate:  [60-77] 61  Resp:  [17-18] 18  BP: (128-150)/(58-76) 128/58  Flow (L/min) (Oxygen Therapy):  [2] 2     Physical Exam:  Constitutional: Older appearing male sitting up in bedside chair in NAD  Respiratory: Clear to auscultation bilaterally, nonlabored respirations on room air  Cardiovascular: RRR, no murmurs  Gastrointestinal: Soft, nontender, nondistended  Musculoskeletal: No bilateral ankle edema  Psychiatric: Appropriate affect, cooperative  Neurologic: Alert, oriented, DIA spontaneously, appropriate in conversation, speech clear  Skin: No rashes on exposed skin      Results Reviewed:  LAB RESULTS:      Lab 06/16/25  0435 06/15/25  0745 25   WBC 9.97 10.39 10.79 11.93*   HEMOGLOBIN 9.1* 9.2* 9.2* 9.6*   HEMATOCRIT 30.6* 30.0* 30.3* 32.0*   PLATELETS 225 263 266 297   NEUTROS ABS 7.56* 7.77* 8.07* 9.00*   IMMATURE GRANS (ABS) 0.03 0.05 0.06* 0.06*   LYMPHS ABS 1.05 1.13 1.24 1.47   MONOS ABS 0.92* 0.93* 0.94* 0.96*   EOS ABS 0.37 0.45* 0.44* 0.41*   MCV 74.3* 71.8* 72.3* 73.1*   CRP  --  <0.30  --   --    LACTATE  --  1.0  --  1.3         Lab 06/16/25  0435 06/15/25  0745 25  0922 25   SODIUM 140 139 142 139   POTASSIUM 5.0 5.3* 4.6 4.8   CHLORIDE 111* 112* 115* 111*   CO2 21.0* 21.0* 20.0* 19.0*   ANION GAP 8.0 6.0 7.0 9.0   BUN 33.5* 31.4* 33.6* 38.6*   CREATININE 2.35* 2.21* 2.17* 2.59*   EGFR 27.5*  29.6* 30.2* 24.4*   GLUCOSE 167* 157* 119* 221*   CALCIUM 8.7 8.7 8.7 8.9   MAGNESIUM 2.2 2.0 2.1  --    PHOSPHORUS 3.6 3.2 3.6  --          Lab 06/15/25  0745 06/13/25 2019   TOTAL PROTEIN 5.8* 6.3   ALBUMIN 3.0* 3.1*   GLOBULIN 2.8 3.2   ALT (SGPT) 11 14   AST (SGOT) 20 19   BILIRUBIN 0.3 0.3   ALK PHOS 76 85                     Brief Urine Lab Results  (Last result in the past 365 days)        Color   Clarity   Blood   Leuk Est   Nitrite   Protein   CREAT   Urine HCG        06/13/25 2158 Yellow   Clear   Negative   Negative   Negative   100 mg/dL (2+)                   Microbiology Results Abnormal       None            No radiology results from the last 24 hrs    Results for orders placed during the hospital encounter of 06/13/25    Adult Transthoracic Echo Complete w/ Color, Spectral and Contrast if necessary per protocol    Interpretation Summary    Left ventricular systolic function is normal. Calculated left ventricular EF = 68.1% Normal left ventricular cavity size noted. Left ventricular wall thickness is consistent with mild concentric hypertrophy. All left ventricular wall segments contract normally. Left ventricular diastolic function is consistent with (grade I) impaired relaxation.    No aortic valve regurgitation or stenosis is present. The aortic valve peak and mean gradients are within defined limits. The prosthetic aortic valve is grossly normal. TAVR 26 mm Lujan Leatha 3    Mild mitral annular calcification is present. There is calcification of the mitral valve. The presence of a mitral valve mass cannot be excluded. No mitral valve regurgitation is present. Mild mitral valve stenosis is present.    Mild tricuspid valve regurgitation is present. Estimated right ventricular systolic pressure from tricuspid regurgitation is mildly elevated (35-45 mmHg).    Mild dilation of the ascending aorta is present. Ascending aorta = 3.6 cm      Current medications:  Scheduled Meds:aspirin, 81 mg, Oral,  Daily  cefTRIAXone, 2,000 mg, Intravenous, Q24H  dronedarone, 400 mg, Oral, BID With Meals  empagliflozin, 25 mg, Oral, Daily  insulin glargine, 5 Units, Subcutaneous, Q12H  Insulin Lispro, 8 Units, Subcutaneous, TID With Meals  losartan, 25 mg, Oral, Q24H  metoprolol tartrate, 25 mg, Oral, BID  pantoprazole, 40 mg, Oral, Q AM  rivaroxaban, 15 mg, Oral, Daily  rosuvastatin, 20 mg, Oral, Daily  sertraline, 25 mg, Oral, Daily  sodium chloride, 10 mL, Intravenous, Q12H      Continuous Infusions:   PRN Meds:.  senna-docusate sodium **AND** polyethylene glycol **AND** bisacodyl **AND** bisacodyl    Calcium Replacement - Follow Nurse / BPA Driven Protocol    dextrose    dextrose    glucagon (human recombinant)    Magnesium Cardiology Dose Replacement - Follow Nurse / BPA Driven Protocol    nitroglycerin    ondansetron    Phosphorus Replacement - Follow Nurse / BPA Driven Protocol    Potassium Replacement - Follow Nurse / BPA Driven Protocol    sodium chloride    sodium chloride    Assessment & Plan   Assessment & Plan     Active Hospital Problems    Diagnosis  POA    **Bacteremia [R78.81]  Yes      Resolved Hospital Problems   No resolved problems to display.        Brief Hospital Course to date:  Nicholas Yin is a 80 y.o. male   w/ hx cad, afib, previous TAVR & left lower extremity angioplasty (2023 by Dr Lopez), ckd 4 (baseline cr 2.4-2.9), DM2 who was recently seen at [facility] for pneumonia on 6/3/25 was treated with cefdinir and doxycycline x 7 days for pneumonia. Blood cultures grew streptococcus sanguinis and pcp referred patient to [facility] ED due to previous history of TAVR and workup/treatment of bacteremia. Initiated on Ceftriaxone, ID consulted. TTE ordered.    This patient's problems and plans were partially entered by my partner and updated as appropriate by me 06/16/25.     Strep bacteremia (high grade)  Recent Pneumonia (6/3/25 [facility])  Hx TAVR 2023  CAD (abisai to lad 2019)  Chronic  HFpEF, compensated  Hx heart block/parox afib (pacemaker 5/2023 Dr. Mendoza)  HTN  HL  - Previous echo 5/2024: LV EF ~53%, diastolic dysfunction, TAVR ok  - TTE no overt abnormality noted, no definite mass, cannot rule out mitral valve mass TTE pending  - ID following: Continue Rocephin 2g IV daily; concerned about intravascular infection (such as TAVR or pacemaker infection). TTE with no definite valvular abnormality. LAURIE pending. Will need at least 6 weeks of IV Rocephin. Will also need to consider pacemaker removal as well (ID to determine whether this is necessary)  - LAURIE performed 6/16, results pending  **Addendum 1808: LAURIE resulted with 1.7 cm mobile vegetation on mitral valve. Discussed result with patient at bedside. Agreeable to CT Surgery consult.  - Of note, patient and wife state he would NOT want dialysis in the future if/when his renal function worsens; consequently PICC line ordered by practice partner (patient and wife prefer PICC line in this scenario rather than [place] since patient does not want dialysis in the future)  - Patient and wife would like to   - Continue ASA, Xarelto, multaq, metoprolol, losartan (decreased to once daily as below), Crestor    Uncontrolled DM2  - Continue Jardiance  - Basal-bolus insulin, SSI; titrate prn    CKD 4 (baseline cr ~2.4-2.9)  Hyperkalemia  - Renal fxn stable, potassium improved to 5.0 AM of 6/16  - Of note, patient and wife state he would NOT want dialysis in the future if/when his renal function worsens; consequently    GERD - Protonix  Depression - Sertraline 25mg daily initiated by practice partner    Expected Discharge Location and Transportation: Home with   Expected Discharge  after TTE +/- LAURIE & PICC line placed  Expected Discharge Date: 6/16/2025; Expected Discharge Time:      VTE Prophylaxis:  Pharmacologic & mechanical VTE prophylaxis orders are present.         AM-PAC 6 Clicks Score (PT): 18 (06/14/25 0103)    CODE STATUS:   Code Status  and Medical Interventions: CPR (Attempt to Resuscitate); Full Support   Ordered at: 06/14/25 0151     Code Status (Patient has no pulse and is not breathing):    CPR (Attempt to Resuscitate)     Medical Interventions (Patient has pulse or is breathing):    Full Support     Level Of Support Discussed With:    Patient       Agnieszka Raimundo Ramos PA-C  06/16/25

## 2025-06-16 NOTE — DISCHARGE PLACEMENT REQUEST
"Nicholas Yin \"BILL\" (80 y.o. Male)   To Admissions  From [facility] [phone]  Please call Wendy CUEVAS  Patient in need of home health nursing for PICC line dressing change, labs, monitor while on IV antibiotics  And therapy services.      Date of Birth   1945    Social Security Number       Address   [address] [place] [address]    Home Phone   [phone]    MRN   [id number]       Druze   Mormon    Marital Status                               Admission Date   6/13/2025    Admission Type   Emergency    Admitting Provider   Diego Wright MD    Attending Provider   Diego Wright MD    Department, Room/Bed   [facility] 4G, S464/1       Discharge Date       Discharge Disposition       Discharge Destination                                 Attending Provider: Diego Wright MD    Allergies: Ct Contrast, Iodinated Contrast Media    Isolation: None   Infection: None   Code Status: CPR    Ht: 175.3 cm (69.02\")   Wt: 97.9 kg (215 lb 12.8 oz)    Admission Cmt: None   Principal Problem: Bacteremia [R78.81]                   Active Insurance as of 6/13/2025       Primary Coverage       Payor Plan Insurance Group Employer/Plan Group    [employer] [facility] EMPLOYEE [id number]       Payor Plan Address Payor Plan Phone Number Payor Plan Fax Number Effective Dates    [address] [phone]  1/1/2018 - None Entered    [facility] [id number]         Subscriber Name Subscriber Birth Date Member ID       MARAL YIN 5/6/1948 [id number]               Secondary Coverage       Payor Plan Insurance Group Employer/Plan Group    MEDICARE MEDICARE A ONLY        Payor Plan Address Payor Plan Phone Number Payor Plan Fax Number Effective Dates    [address] [phone]  2/1/2011 - None Entered    [facility] [id number]         Subscriber Name Subscriber Birth Date Member ID       LANDENNICHOLAS FREIRE 1945 [id number]               "       Emergency Contacts        (Rel.) Home Phone Work Phone Mobile Phone    Jeremías Yin (Spouse) [phone] -- [phone]             Bulmaro Jin MD   Physician  Infectious Disease     Progress Notes     Signed     Date of Service: 06/15/25 1128  Creation Time: 06/15/25 1128     Signed       Expand All Collapse All[]Expand All by Default         [facility]     INFECTIOUS DISEASE PROGRESS NOTE     Nicholas Yin  1945  [id number]     Date of consult: 6/14/2025     Admit date: 6/13/2025     Requesting Provider: No ref. provider found  Evaluating physician: Bulmaro Jin MD  Reason for Consultation: Streptococcus sanguinous high-grade bacteremia 4-4 blood cultures from 6/3/2025 partially treated, concern for endocarditis  Chief Complaint: Above        Subjective  History of present illness:  Patient is a  80 y.o.  Yr old male with a history of aortic stenosis status post TAVR by Dr. Lopez on 5/8/2023, CAD, congestive heart failure, diabetes mellitus type 2, hyperlipidemia, essential hypertension, TIA, who was evaluated at [facility] on 6/3/2025 for pneumonia and treated eventually with cefdinir and doxycycline until 6/10.  Blood cultures at that encounter were positive in 4-4 bottles for Streptococcus sanguinous.  Patient was followed up with cardiology who recommended admission for evaluation of possible endocarditis.  Patient has no other localizing signs or symptoms of infection.  The patient was admitted to [facility] on 6/13/2025.  I was consulted on 6/14/2025.  The patient denies ill contacts, TB, HIV, or zoonotic exposures.     6/15/2025 history reviewed.  No high fevers or chills.  Tolerating antibiotics for streptococcal sepsis.  On ceftriaxone anticipating continuing until 8/1/2025 and reassess for 6 weeks.  TTE pending results.  Blood cultures on 6/13 negative.     Medical History        Past Medical  History:   Diagnosis Date    Anemia      Aortic stenosis      Arthritis      Back pain      CancerTesticular/Colon       test -1982  Colon -2005    CHF (congestive heart failure)      Coronary artery disease      COVID-19 2022    DDD (degenerative disc disease), lumbosacral      Diabetes mellitus      Dyspnea      Elevated cholesterol      Erectile dysfunction      GERD (gastroesophageal reflux disease)      History of transfusion      Hyperlipidemia      Hypertension      TIA (transient ischemic attack)              Surgical History         Past Surgical History:   Procedure Laterality Date    AORTIC VALVE REPAIR/REPLACEMENT N/A 5/8/2023     Procedure: TRANSCATHETER AORTIC VALVE REPLACEMENT + LAURIE, LEFT FEMORAL ENDARTERECTOMY WITH PATCH;  Surgeon: Arben Lopez MD;  Location: [facility];  Service: Cardiothoracic;  Laterality: N/A;    AORTIC VALVE REPAIR/REPLACEMENT N/A 5/8/2023     Procedure: Transfemoral Transcatheter Aortic Valve Replacement;  Surgeon: Rachael Mendoza MD;  Location: [facility];  Service: Cardiovascular;  Laterality: N/A;    CARDIAC CATHETERIZATION N/A 08/15/2019     Procedure: Left Heart Cath;  Surgeon: Paul Villatoro MD;  Location: [facility] INVASIVE LOCATION;  Service: Cardiology    CARDIAC CATHETERIZATION         04/10/2023 PER DR. MENDOZA    CARDIAC ELECTROPHYSIOLOGY PROCEDURE N/A 5/15/2023     Procedure: Pacemaker DC - [employer];  Surgeon: Rachael Mendoza MD;  [facility];  Service: Cardiology;  Laterality: N/A;    CATARACT EXTRACTION, BILATERAL        COLON SURGERY         colon resection for cancer - sigmoid removed - no chemo no radiation    COLONOSCOPY        COLONOSCOPY N/A 01/27/2021     Procedure: COLONOSCOPY;  Surgeon: Greg Barraza MD;  Location: [facility];  Service: General;  Laterality: N/A;    CORONARY STENT PLACEMENT         1 STENT    ENDOSCOPY   1989    EYE SURGERY Right        retinal detachment    [place] RT/LT HEART CATHETERS N/A 08/15/2019     Procedure: Percutaneous Coronary Intervention;  Surgeon: Paul Villatoro MD;  Location: [facility] INVASIVE LOCATION;  Service: Cardiology    SCALP LESION REMOVAL W/ FLAP AND SKIN GRAFT   12/16/2022     basal cell removal    SHOULDER ARTHROSCOPY Right      SKIN LESION EXCISION N/A 01/27/2021     Procedure: SKIN LESIONS EXCISION FROM LEFT TEMPLE AREA AND ABDOMEN.;  Surgeon: Greg Barraza MD;  Location: [facility] OR;  Service: General;  Laterality: N/A;    VASECTOMY Right       testicle removed d/t cancer - with radiation                Pediatric History   Patient Parents    Not on file           Other Topics Concern    Not on file   Social History Narrative     Patient lives in [place], [place]    Positive for alcohol, no drug use or tobacco,  to wife who is primary care NP for [employer], he worked in [employer]     family history includes Alcohol abuse in his father and mother; Alzheimer's disease in his father; COPD in his father and mother; Cancer in his paternal grandmother; Diabetes in his brother; Heart disease in his brother; Hypertension in his brother, father, and mother; Stroke in his maternal grandfather and mother.     Allergies        Allergies   Allergen Reactions    Ct Contrast Anaphylaxis    Iodinated Contrast Media Shortness Of Breath                 Immunization History   Administered Date(s) Administered    COVID-19 (PFIZER) Purple Cap Monovalent 01/11/2021, 02/01/2021, 09/24/2021    Fluad Quad 65+ 10/08/2020    Fluzone High-Dose 65+YRS 10/27/2017, 10/04/2018, 10/25/2024    Fluzone High-Dose 65+yrs 10/13/2021, 10/13/2022, 10/27/2023    Fluzone Quad >6mos (Multi-dose) 11/11/2016    Hep A / Hep B 07/17/2018    Hepatitis A 01/04/2019    IPV 06/05/1997    Pneumococcal Conjugate 13-Valent (PCV13) 06/08/2015    Pneumococcal Polysaccharide (PPSV23) 09/02/2011    Td (TDVAX) 06/05/1997    Tdap 12/09/2011    flucelvax quad pfs  =>4 YRS 09/05/2019         Medication:  @Scheduled Meds:  Scheduled Medication   aspirin, 81 mg, Oral, Daily  cefTRIAXone, 2,000 mg, Intravenous, Q24H  dronedarone, 400 mg, Oral, BID With Meals  empagliflozin, 25 mg, Oral, Daily  insulin glargine, 5 Units, Subcutaneous, Q12H  Insulin Lispro, 8 Units, Subcutaneous, TID With Meals  losartan, 25 mg, Oral, BID  metoprolol tartrate, 25 mg, Oral, BID  rivaroxaban, 15 mg, Oral, Daily  rosuvastatin, 20 mg, Oral, Daily  sertraline, 25 mg, Oral, Daily  sodium chloride, 10 mL, Intravenous, Q12H         Continuous Infusions:  Infusion Medications         PRN Meds:.  PRN Medication     senna-docusate sodium **AND** polyethylene glycol **AND** bisacodyl **AND** bisacodyl    Calcium Replacement - Follow Nurse / BPA Driven Protocol    dextrose    dextrose    glucagon (human recombinant)    Magnesium Cardiology Dose Replacement - Follow Nurse / BPA Driven Protocol    nitroglycerin    ondansetron    Phosphorus Replacement - Follow Nurse / BPA Driven Protocol    Potassium Replacement - Follow Nurse / BPA Driven Protocol    sodium chloride    sodium chloride         Please refer to the medical record for a full medication list     Review of Systems:     Constitutional-- No Fever, chills or sweats.  Appetite good, and no malaise. No fatigue.  HEENT-- No new vision, hearing or throat complaints.  No epistaxis or oral sores.  Denies odynophagia or dysphagia.  No odynophagia or dysphagia. No headache, photophobia or neck stiffness.  CV-- No chest pain, palpitation or syncope  Resp-- No SOB/cough/Hemoptysis  GI- No nausea, vomiting, or diarrhea.  No hematochezia, melena, or hematemesis. Denies jaundice or chronic liver disease.  -- No dysuria, hematuria, or flank pain.  Denies hesitancy, urgency.  Lymph- no swollen lymph nodes in neck/axilla or groin.   Heme- No active bruising or bleeding; no Hx of DVT or PE.  MS-- no swelling or pain in the bones or joints of arms/legs.  No new back  "pain.  Neuro-- No acute focal weakness or numbness in the arms or legs.  No seizures.  Skin--No rashes or lesions, no nodules     Physical Exam:   Vital Signs   Temp:  [97.6 °F (36.4 °C)-99.6 °F (37.6 °C)] 97.6 °F (36.4 °C)  Heart Rate:  [71-80] 71  Resp:  [16-18] 18  BP: (131-152)/(54-76) 143/76     Blood pressure 143/76, pulse 71, temperature 97.6 °F (36.4 °C), temperature source Oral, resp. rate 18, height 175.3 cm (69\"), weight 98.4 kg (217 lb), SpO2 96%.  GENERAL: Awake and alert, in no acute distress. Appears older than stated age.  Resting in bed.  Visiting with wife.  HEENT:  Normocephalic, atraumatic.  Oropharynx without thrush. Dentition in good repair. No cervical adenopathy. No neck masses.  Ears externally normal, Nose externally normal.  EYES: No conjunctival injection. No icterus. EOM full.  LYMPHATICS: No lymphadenopathy of the neck or axillary or inguinal regions.   HEART: No murmur, gallop, or pericardial friction rub. Reg rate rhythm, No JVD at 45 degrees.  LUNGS: Clear to auscultation and percussion. No respiratory distress, no use of accessory muscles.  ABDOMEN: Soft, nontender, nondistended. No appreciable HSM.  Bowel sounds normal.  SKIN: Warm and dry without cutaneous eruptions.  No nodules.  No Janeway lesions, Osler's nodes.  PSYCHIATRIC: Mental status lucid. No confusion.  EXT:  No cellulitic change.  NEURO: Oriented to name, nonfocal.        Results Review:              I reviewed the patient's new clinical results.  I reviewed the patient's new imaging results and agree with the interpretation.  I reviewed the patient's other test results and agree with the interpretation            Results from last 7 days   Lab Units 06/15/25  0745 06/14/25  0922 06/13/25 2019   WBC 10*3/mm3 10.39 10.79 11.93*   HEMOGLOBIN g/dL 9.2* 9.2* 9.6*   HEMATOCRIT % 30.0* 30.3* 32.0*   PLATELETS 10*3/mm3 263 266 297           Results from last 7 days   Lab Units 06/15/25  0745   SODIUM mmol/L 139   POTASSIUM " "mmol/L 5.3*   CHLORIDE mmol/L 112*   CO2 mmol/L 21.0*   BUN mg/dL 31.4*   CREATININE mg/dL 2.21*   GLUCOSE mg/dL 157*   CALCIUM mg/dL 8.7           Results from last 7 days   Lab Units 06/15/25  0745   ALK PHOS U/L 76   BILIRUBIN mg/dL 0.3   ALT (SGPT) U/L 11   AST (SGOT) U/L 20               Results from last 7 days   Lab Units 06/15/25  0745   CRP mg/dL <0.30               Results from last 7 days   Lab Units 06/15/25  0745   LACTATE mmol/L 1.0      Estimated Creatinine Clearance: 31.4 mL/min (A) (by C-G formula based on SCr of 2.21 mg/dL (H)).  Common Labs:   CPK            6/15/2025    07:45   Common Labs   Creatine Kinase 31          Procalitonin Results:       Brief Urine Lab Results  (Last result in the past 365 days)          Color   Clarity   Blood   Leuk Est   Nitrite   Protein   CREAT   Urine HCG         06/13/25 2158 Yellow    Clear    Negative    Negative    Negative    100 mg/dL (2+)                          No results found for: \"SITE\", \"ALLENTEST\", \"PHART\", \"JCC1CND\", \"PO2ART\", \"XWN2ZGG\", \"BASEEXCESS\", \"D6BRFVSV\", \"HGBBG\", \"HCTABG\", \"OXYHEMOGLOBI\", \"METHHGBN\", \"CARBOXYHGB\", \"CO2CT\", \"BAROMETRIC\", \"MODALITY\", \"FIO2\"      Microbiology:              Results for orders placed or performed during the hospital encounter of 06/13/25   Blood Culture - Blood, Arm, Left     Specimen: Arm, Left; Blood   Result Value Ref Range     Blood Culture No growth at 24 hours           Susceptibility              Streptococcus sanguinis       MAYANK       Ampicillin 0.5 Intermediate       Ceftriaxone 0.25 Susceptible       Penicillin G 0.25 Intermediate       Vancomycin 1 Susceptible                        Blood cultures 6/3/2025 4-4 bottles positive  Urine Culture Results:              Brief Urine Lab Results  (Last result in the past 365 days)          Color   Clarity   Blood   Leuk Est   Nitrite   Protein   CREAT   Urine HCG         06/13/25 2158 Yellow    Clear    Negative    Negative    Negative    100 mg/dL (2+)        "                      Radiology:  Imaging Results (Last 72 Hours)         ** No results found for the last 72 hours. **          CT scan abdomen and pelvis 6/3/2025 negative for acute findings, CT scan of the chest consistent with CHF and edema with cardiomegaly and peripheral pulmonary fibrosis and left basilar atelectasis.  No consolidation.               IMPRESSION:      High grade Streptococcus sanguinous infection with 4 out of 4 blood cultures positive from 6/3/2025 with previous treatment for pneumonia, although in reviewing films no obvious consolidation on his CT scan of the chest or chest x-ray.  Concerns would include intravascular infection which could be related to an endocarditis or a pacemaker infection.  CT scan of the abdomen and pelvis with no other obvious focus.  Urinalysis benign.  This was partially treated with oral cephalosporin.  Repeat blood cultures on 6/13 negative, but likely related to his recent treatment with cephalosporin.  Intermediately sensitive to penicillin.  Sensitive to ceftriaxone and vancomycin.  Acute on chronic renal failure.  Creatinine was 3.18 on 6/3, currently 2.59.  2.21 on 6/15.  Leukocytosis, neutrophilic, improved from his 6/3 level of 18.  Resolved.  Anemia, chronic disease related to above issues.  Cardiac regularly, TTE from 4/25 with ejection fraction 56%, TAVR present without stenosis or regurgitation or paravalvular leak.  Diabetes mellitus type 2 with increased risk for infection, hemoglobin A1c 7.80 on 6/3.  Previous aortic stenosis status post TAVR by Dr. Lopez on 5/8/2023.  Hyperkalemia 5.3, new.     PLAN:     Diagnostically, continue to follow patient's physical exam, CBC, CMP, CRP, blood cultures which were obtained on 6/13, and echocardiogram including TTE pending, and LAURIE if the TTE is negative.  Wife and patient are agreeable.  Therapeutically, consider 6 weeks of IV ceftriaxone even if repeat blood cultures until 8/1 are negative given his high risk  and TAVR, and pacemaker.  May need to consider pacemaker removal as well, with the possibility that this prosthetic device is infected and may not clinically resolve infection without removal.  Then reassess.  Supportive care.  Patient will likely need a Groshong catheter for long-term IV antibiotics given his renal failure.     I discussed the patient's findings and my recommendations with patient and nursing staff, discussed with Dr. Wright, and the patient's wife who is an APRN.     Case management orders: Please arrange for home antibiotics with ceftriaxone 2 g IV daily to continue until 8/1/2025 and reassess for streptococcal intravascular infection.  Check CBC, CMP, CRP weekly while on IV antibiotics.  Fax orders to [phone], call [phone] with final arrangements.  Arrange for follow-up with me in 1 week postdischarge.  Weekly PICC dressing changes.     Thank you for asking me to see Nicholas Yin.  Our group would be pleased to follow this patient over the course of their hospitalization and assist with outpatient antimicrobial therapy, as indicated.  Further recommendations depend on the results of the cultures and clinical course.  Side effects of medications discussed.  The patient is at increased risk for adverse drug reactions, complications of IV access, and readmission, CVA, death.  Discussed with Dr. Wright.     This visit included the following complex service elements:  Complex medical decision-making associated with antimicrobial prescribing.  In-depth chart review with high level synthesis for complex diagnoses.  Managed infection treatment protocol associated with transitions of care for this complex patient.  Counseled patients, family members and/or caregivers regarding infection prevention.  Managed infection control protocol.      Bulmaro Jin MD  6/15/2025                       History & Physical        Bartolo Elise DO at 06/14/25 0151              [facility]  "Hospital Medicine Services  HISTORY AND PHYSICAL    Patient Name: Nicholas Yin  : 1945  MRN: [id number]  Primary Care Physician: Waldemar Trejo MD  Date of admission: 2025      Subjective   Subjective     Chief Complaint:  Normal labs    HPI:  Nicholas Yin is a 80 y.o. male brought to the emergency department for evaluation of abnormal labs.  Patient had positive blood cultures drawn on Tiki 3, 2025, that grown out 2/2 showing Streptococcus sanguinous, with susceptibility to Rocephin and vancomycin.  On Tiki 3, patient presented to the emergency department at [facility], was treated for pneumonia at that time.  He was prescribed Cefdinir 300 twice daily and doxycycline 100 for 7 days.  Patient states that he had taken all antibiotics as prescribed, completed both antibiotic regimens.  Upon receipt of the positive blood cultures, patient was attempted to be contacted.  His primary care provider had relayed the positive blood cultures on to his cardiologist, who recommended him come to the emergency department for evaluation due to \"concern about strep in his heart valve.\" The patient underwent a TAVR and left femoral endarterectomy on 2023 by Dr. Evelio Lopez (now retired).  Patient otherwise states that he has no acute complaints at this time.  Workup in the ED was unremarkable except for WBC of 12.  Hospitalist services consulted for admission and workup of bacteremia.    Personal History     Past Medical History:   Diagnosis Date    Anemia     Aortic stenosis     Arthritis     Back pain     CancerTesticular/Colon     test -  Colon -    CHF (congestive heart failure)     Coronary artery disease     COVID-19     DDD (degenerative disc disease), lumbosacral     Diabetes mellitus     Dyspnea     Elevated cholesterol     Erectile dysfunction     GERD (gastroesophageal reflux disease)     History of transfusion     Hyperlipidemia     Hypertension     TIA " (transient ischemic attack)      Past Surgical History:   Procedure Laterality Date    AORTIC VALVE REPAIR/REPLACEMENT N/A 5/8/2023    Procedure: TRANSCATHETER AORTIC VALVE REPLACEMENT + LAURIE, LEFT FEMORAL ENDARTERECTOMY WITH PATCH;  Surgeon: Arben Lopez MD;  Location: [facility];  Service: Cardiothoracic;  Laterality: N/A;    AORTIC VALVE REPAIR/REPLACEMENT N/A 5/8/2023    Procedure: Transfemoral Transcatheter Aortic Valve Replacement;  Surgeon: Rachael Mendoza MD;  Location: [facility];  Service: Cardiovascular;  Laterality: N/A;    CARDIAC CATHETERIZATION N/A 08/15/2019    Procedure: Left Heart Cath;  Surgeon: Paul Villatoro MD;  [facility] C[facility];  Service: Cardiology    CARDIAC CATHETERIZATION      04/10/2023 PER DR. MENDOZA    CARDIAC ELECTROPHYSIOLOGY PROCEDURE N/A 5/15/2023    Procedure: Pacemaker DC - [employer];  Surgeon: Rachael Mendoza MD;  Location: [facility] CATH INVASIVE LOCATION;  Service: Cardiology;  Laterality: N/A;    CATARACT EXTRACTION, BILATERAL      COLON SURGERY      colon resection for cancer - sigmoid removed - no chemo no radiation    COLONOSCOPY      COLONOSCOPY N/A 01/27/2021    Procedure: COLONOSCOPY;  Surgeon: Greg Barraza MD;  Location: [facility] OR;  Service: General;  Laterality: N/A;    CORONARY STENT PLACEMENT      1 STENT    ENDOSCOPY  1989    EYE SURGERY Right     retinal detachment    [place] RT/LT HEART CATHETERS N/A 08/15/2019    Procedure: Percutaneous Coronary Intervention;  Surgeon: Paul Villatoro MD;  [facility] CATH INV[facility];  Service: Cardiology    SCALP LESION REMOVAL W/ FLAP AND SKIN GRAFT  12/16/2022    basal cell removal    SHOULDER ARTHROSCOPY Right     SKIN LESION EXCISION N/A 01/27/2021    Procedure: SKIN LESIONS EXCISION FROM LEFT TEMPLE AREA AND ABDOMEN.;  Surgeon: Greg Barraza MD;  Location[facility] OR;  Service: General;  Laterality: N/A;     VASECTOMY Right     testicle removed d/t cancer - with radiation     Family History: family history includes Alcohol abuse in his father and mother; Alzheimer's disease in his father; COPD in his father and mother; Cancer in his paternal grandmother; Diabetes in his brother; Heart disease in his brother; Hypertension in his brother, father, and mother; Stroke in his maternal grandfather and mother.     Social History:  reports that he has never smoked. He has never been exposed to tobacco smoke. He has never used smokeless tobacco. He reports current alcohol use. He reports that he does not use drugs.  Social History     Social History Narrative    Patient lives in [place], [place]      Medications:  Available home medication information reviewed.  Insulin Degludec-Liraglutide, aspirin, dapagliflozin Propanediol, dronedarone, ipratropium-albuterol, losartan, metoprolol tartrate, omeprazole, rivaroxaban, rosuvastatin, sildenafil, and vitamin D    Allergies   Allergen Reactions    Ct Contrast Anaphylaxis    Iodinated Contrast Media Shortness Of Breath       Objective   Objective     Vital Signs:   Temp:  [97.4 °F (36.3 °C)-98 °F (36.7 °C)] 97.4 °F (36.3 °C)  Heart Rate:  [61-76] 70  Resp:  [16] 16  BP: (152-168)/(60-71) 168/71       Physical Exam   Constitutional: Awake, alert.  Wife at bedside.  Chronically ill-appearing.  Obese.  Eyes: PERRLA, sclerae anicteric, no conjunctival injection  HENT: NCAT, mucous membranes moist  Neck: Supple, no thyromegaly, no lymphadenopathy, trachea midline  Respiratory: Clear to auscultation bilaterally, nonlabored respirations   Cardiovascular: RRR, no murmurs, rubs, or gallops, palpable pedal pulses bilaterally  Gastrointestinal: Positive bowel sounds, soft, nontender, nondistended  Musculoskeletal: No bilateral ankle edema, no clubbing or cyanosis to extremities  Psychiatric: Appropriate affect, cooperative  Neurologic: Oriented x 3, strength symmetric in all extremities, Cranial  Nerves grossly intact to confrontation, speech clear  Skin: No rashes.  Less than 2-second capillary refill all extremities.    Result Review:  I have personally reviewed the results from the time of this admission to 6/14/2025 01:51 EDT and agree with these findings:  [x]  Laboratory list / accordion  [x]  Microbiology  [x]  Radiology  []  EKG/Telemetry   []  Cardiology/Vascular   []  Pathology  [x]  Old records  []  Other:  Most notable findings include: Summarized above    LAB RESULTS:      Lab 06/13/25 2019   WBC 11.93*   HEMOGLOBIN 9.6*   HEMATOCRIT 32.0*   PLATELETS 297   NEUTROS ABS 9.00*   IMMATURE GRANS (ABS) 0.06*   LYMPHS ABS 1.47   MONOS ABS 0.96*   EOS ABS 0.41*   MCV 73.1*   LACTATE 1.3         Lab 06/13/25 2019   SODIUM 139   POTASSIUM 4.8   CHLORIDE 111*   CO2 19.0*   ANION GAP 9.0   BUN 38.6*   CREATININE 2.59*   EGFR 24.4*   GLUCOSE 221*   CALCIUM 8.9         Lab 06/13/25 2019   TOTAL PROTEIN 6.3   ALBUMIN 3.1*   GLOBULIN 3.2   ALT (SGPT) 14   AST (SGOT) 19   BILIRUBIN 0.3   ALK PHOS 85     UA          8/24/2024    09:24 6/13/2025    21:58   Urinalysis   Squamous Epithelial Cells, UA 0-2  0-2    Specific Gravity, UA 1.022  1.026    Ketones, UA Negative  Negative    Blood, UA Negative  Negative    Leukocytes, UA Negative  Negative    Nitrite, UA Negative  Negative    RBC, UA 0-2  0-2    WBC, UA 0-2  0-2    Bacteria, UA None Seen  None Seen      Results for orders placed during the hospital encounter of 04/25/25    Adult Transthoracic Echo Complete W/ Cont if Necessary Per Protocol    Interpretation Summary    Left ventricular systolic function is normal. Left ventricular ejection fraction appears to be 56 - 60%.    Right ventricle was poorly visualized.  From images available RV appears to have normal systolic function.    There is a TAVR valve present.  No aortic valve stenosis.  No aortic valve central regurgitation, no paravalvular leak.  Peak gradient across aortic valve is 12 mmHg, mean  gradient 8 mmHg.    Mitral annular calcification present.  No significant mitral valve stenosis is noted (however has limited sensitivity).  Trace mitral valve regurgitation.      Assessment & Plan   Assessment & Plan     Streptococcal bacteremia  Aortic stenosis s/p TAVR  Patient is not septic at this time.  Patient started on Rocephin and gentamicin in the ED, these to be continued at this time.  Consult infectious disease for selection and duration of antibiotics.  Appreciate their recommendations in the care of this patient.  No embolic phenomenon suggestive of infective endocarditis at this time.  Blood cx: Tiki 3, 2025, Streptococcus sanguinous, susceptibility to Rocephin and vancomycin.  Blood cultures reobtained.  Update 2D echo.  No heart murmur appreciated on auscultation, reassuring.  Uncontrolled type 2 diabetes mellitus with hyperglycemia  Insulin dosing.  Jardiance will be continued due to history of CHF.  Congestive heart failure  CKD, Stage 3.    At baseline creatinine  Hypertension  Hyperlipidemia  GERD  Patient is euvolemic at this time.  Resume home medications as appropriate    Total time spent: 65 minutes  Time spent includes time reviewing chart, face-to-face time, counseling patient/family/caregiver, ordering medications/tests/procedures, communicating with other health care professionals, documenting clinical information in the electronic health record, and coordination of care.     VTE Prophylaxis: Xarelto    CODE STATUS:    Code Status and Medical Interventions: CPR (Attempt to Resuscitate); Full Support   Ordered at: 06/14/25 0151     Code Status (Patient has no pulse and is not breathing):    CPR (Attempt to Resuscitate)     Medical Interventions (Patient has pulse or is breathing):    Full Support     Level Of Support Discussed With:    Patient     Expected Discharge  2-3 days pending culture results and ID recommendations on selection/duration of abx.    Bartolo Elise,   06/14/25      Electronically signed by Bartolo Elise DO at 25 [phone]          Physician Progress Notes (last 24 hours)        Diego Wright MD at 06/15/25 7287              [facility] Medicine Services  PROGRESS NOTE    Patient Name: Nicholas Yin  : 1945  MRN: [id number]    Date of Admission: 2025  Primary Care Physician: Waldemar Trejo MD    Subjective   Subjective     CC:  bacteremia    HPI:  No fever or chills, no dyspnea, no chest pain. No n/v. Tired of being in hospital      Objective   Objective     Vital Signs:   Temp:  [97.6 °F (36.4 °C)-99.6 °F (37.6 °C)] 97.6 °F (36.4 °C)  Heart Rate:  [71-80] 71  Resp:  [16-18] 18  BP: (131-152)/(54-76) 143/76     Physical Exam:  Constitutional:Alert, oriented x 3, nontoxic appearing  Psych:Normal/appropriate affect  HEENT:NCAT, oropharynx clear  Neck: neck supple, full range of motion  Neuro: Face symmetric, speech clear, equal , moves all extremities  Cardiac: RRR, murmur noted; No pretibial pitting edema  Resp: CTAB, normal effort  GI: abd soft, nontender, obese  Skin: No extremity rash  Musculoskeletal/extremities: no cyanosis of extremities; no significant ankle edema            Results Reviewed:  LAB RESULTS:      Lab 06/15/25  0745 25   WBC 10.39 10.79 11.93*   HEMOGLOBIN 9.2* 9.2* 9.6*   HEMATOCRIT 30.0* 30.3* 32.0*   PLATELETS 263 266 297   NEUTROS ABS 7.77* 8.07* 9.00*   IMMATURE GRANS (ABS) 0.05 0.06* 0.06*   LYMPHS ABS 1.13 1.24 1.47   MONOS ABS 0.93* 0.94* 0.96*   EOS ABS 0.45* 0.44* 0.41*   MCV 71.8* 72.3* 73.1*   CRP <0.30  --   --    LACTATE 1.0  --  1.3         Lab 06/15/25  0745 25   SODIUM 139 142 139   POTASSIUM 5.3* 4.6 4.8   CHLORIDE 112* 115* 111*   CO2 21.0* 20.0* 19.0*   ANION GAP 6.0 7.0 9.0   BUN 31.4* 33.6* 38.6*   CREATININE 2.21* 2.17* 2.59*   EGFR 29.6* 30.2* 24.4*   GLUCOSE 157* 119* 221*   CALCIUM 8.7 8.7 8.9   MAGNESIUM 2.0  2.1  --    PHOSPHORUS 3.2 3.6  --          Lab 06/15/25  0745 06/13/25 2019   TOTAL PROTEIN 5.8* 6.3   ALBUMIN 3.0* 3.1*   GLOBULIN 2.8 3.2   ALT (SGPT) 11 14   AST (SGOT) 20 19   BILIRUBIN 0.3 0.3   ALK PHOS 76 85                     Brief Urine Lab Results  (Last result in the past 365 days)        Color   Clarity   Blood   Leuk Est   Nitrite   Protein   CREAT   Urine HCG        06/13/25 2158 Yellow   Clear   Negative   Negative   Negative   100 mg/dL (2+)                   Microbiology Results Abnormal       None            No radiology results from the last 24 hrs    Results for orders placed during the hospital encounter of 04/25/25    Adult Transthoracic Echo Complete W/ Cont if Necessary Per Protocol    Interpretation Summary    Left ventricular systolic function is normal. Left ventricular ejection fraction appears to be 56 - 60%.    Right ventricle was poorly visualized.  From images available RV appears to have normal systolic function.    There is a TAVR valve present.  No aortic valve stenosis.  No aortic valve central regurgitation, no paravalvular leak.  Peak gradient across aortic valve is 12 mmHg, mean gradient 8 mmHg.    Mitral annular calcification present.  No significant mitral valve stenosis is noted (however has limited sensitivity).  Trace mitral valve regurgitation.      Current medications:  Scheduled Meds:aspirin, 81 mg, Oral, Daily  cefTRIAXone, 2,000 mg, Intravenous, Q24H  dronedarone, 400 mg, Oral, BID With Meals  empagliflozin, 25 mg, Oral, Daily  insulin glargine, 5 Units, Subcutaneous, Q12H  Insulin Lispro, 8 Units, Subcutaneous, TID With Meals  [START ON 6/16/2025] losartan, 25 mg, Oral, Q24H  metoprolol tartrate, 25 mg, Oral, BID  pantoprazole, 40 mg, Oral, Q AM  rivaroxaban, 15 mg, Oral, Daily  rosuvastatin, 20 mg, Oral, Daily  sertraline, 25 mg, Oral, Daily  sodium chloride, 10 mL, Intravenous, Q12H  sodium zirconium cyclosilicate, 10 g, Oral, Q8H      Continuous Infusions:   PRN  Meds:.  senna-docusate sodium **AND** polyethylene glycol **AND** bisacodyl **AND** bisacodyl    Calcium Replacement - Follow Nurse / BPA Driven Protocol    dextrose    dextrose    glucagon (human recombinant)    Magnesium Cardiology Dose Replacement - Follow Nurse / BPA Driven Protocol    nitroglycerin    ondansetron    Phosphorus Replacement - Follow Nurse / BPA Driven Protocol    Potassium Replacement - Follow Nurse / BPA Driven Protocol    sodium chloride    sodium chloride    Assessment & Plan   Assessment & Plan     Active Hospital Problems    Diagnosis  POA    **Bacteremia [R78.81]  Yes      Resolved Hospital Problems   No resolved problems to display.        Brief Hospital Course to date:  Nicholas Yin is a 80 y.o. male   w/ hx cad, afib, previous TAVR & left lower extremity angioplasty (2023 by Dr Lopez), ckd 4 (baseline cr 2.4-2.9), DM2 who was recently seen at [facility] for pneumonia on 6/3/25 was treated with cefdinir and doxycycline x 7 days for pneumonia. Blood cultures grew streptococcus sanguinis and pcp referred patient to [facility] ED due to previous history of TAVR and workup/treatment of bacteremia. Initiated on Ceftriaxone, ID consulted. 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 Mendoza)  HTN  HL  -previous echo 5/2024: LV EF ~53%, diastolic dysfunction, tavr ok  --TTE no overt abnormality noted, no definite mass, cannot rule out mitral valve mass TTE pending.  -ID following: continue rocephin 2g iv daily; concerned about intravascular infection (such as tavr or pacemaker infection). TTE no definite valvular abnormality. Will need LAURIE. Will need at least 6 weeks of IV rocephin. Will also need to consider pacemaker removal as well (ID to determine whether this is necessary  -LAURIE ordered for 6/16/25, npo after  midnight  -of note, patient and wife state he would NOT want dialysis in the future if/when his renal function worsens; consequently I will order PICC line (patient and wife prefer PICC line in this scenario rather than groshong since patient does not want dialysis in the future)  -continue asa, xarelto, multaq, metoprolol, losartan (decreased to once daily as below), crestor  Uncontrolled DM2  -jardiance  -basal-bolus insulin, ssi; titrate prn  Ckd 4 (baseline cr ~2.4-2.9)  Hyperkalemia  -renal fxn stable, but potassium 5.3 today. Will decrease losartan to once daily, give lokelma x 2 doses; repeat a.m. labs  of note, patient and wife state he would NOT want dialysis in the future if/when his renal function worsens; consequently  Gerd  -protonix  Depression  -initiate sertraline 25mg daily      Am labs: cbc,bmp (potassium, creatinine),mag (follow up repeat blood cultures); f/u TTE official read (if negative go ahead w/ LAURIE which has been ordered)      Expected Discharge Location and Transportation: home  Expected Discharge  after TTE +/- LAURIE & picc line placed  Expected Discharge Date: 6/16/2025; Expected Discharge Time:      VTE Prophylaxis:  Pharmacologic & mechanical VTE prophylaxis orders are present.         AM-PAC 6 Clicks Score (PT): 18 (06/14/25 0103)    CODE STATUS:   Code Status and Medical Interventions: CPR (Attempt to Resuscitate); Full Support   Ordered at: 06/14/25 0151     Code Status (Patient has no pulse and is not breathing):    CPR (Attempt to Resuscitate)     Medical Interventions (Patient has pulse or is breathing):    Full Support     Level Of Support Discussed With:    Patient       Diego Wright MD  06/15/25        Electronically signed by Diego Wright MD at 06/15/25 0016

## 2025-06-16 NOTE — PROGRESS NOTES
[facility]    INFECTIOUS DISEASE PROGRESS NOTE    Nicholas Yin  1945  [id number]    Date of consult: 6/14/2025    Admit date: 6/13/2025    Requesting Provider: No ref. provider found  Evaluating physician: Bulmaro Jin MD  Reason for Consultation: Streptococcus sanguinous high-grade bacteremia 4-4 blood cultures from 6/3/2025 partially treated, concern for endocarditis  Chief Complaint: Above      Subjective   History of present illness:  Patient is a  80 y.o.  Yr old male with a history of aortic stenosis status post TAVR by Dr. Lopez on 5/8/2023, CAD, congestive heart failure, diabetes mellitus type 2, hyperlipidemia, essential hypertension, TIA, who was evaluated at [facility] on 6/3/2025 for pneumonia and treated eventually with cefdinir and doxycycline until 6/10.  Blood cultures at that encounter were positive in 4-4 bottles for Streptococcus sanguinous.  Patient was followed up with cardiology who recommended admission for evaluation of possible endocarditis.  Patient has no other localizing signs or symptoms of infection.  The patient was admitted to [facility] on 6/13/2025.  I was consulted on 6/14/2025.  The patient denies ill contacts, TB, HIV, or zoonotic exposures.    6/15/2025 history reviewed.  No high fevers or chills.  Tolerating antibiotics for streptococcal sepsis.  On ceftriaxone anticipating continuing until 8/1/2025 and reassess for 6 weeks.  TTE pending results.  Blood cultures on 6/13 negative.    6/16/2025 history reviewed.  No high fever or chills.  Continues on ceftriaxone until 8/1/2025 and reassess for intravascular or pacemaker infection.  TTE on 6/15 without obvious endocarditis or pacemaker involvement.  LAURIE ordered.    Past Medical History:   Diagnosis Date    Anemia     Aortic stenosis     Arthritis     Back pain     CancerTesticular/Colon     test -1982  Colon -2005    CHF (congestive heart failure)      Coronary artery disease     COVID-19 2022    DDD (degenerative disc disease), lumbosacral     Diabetes mellitus     Dyspnea     Elevated cholesterol     Erectile dysfunction     GERD (gastroesophageal reflux disease)     History of transfusion     Hyperlipidemia     Hypertension     TIA (transient ischemic attack)        Past Surgical History:   Procedure Laterality Date    AORTIC VALVE REPAIR/REPLACEMENT N/A 5/8/2023    Procedure: TRANSCATHETER AORTIC VALVE REPLACEMENT + LAURIE, LEFT FEMORAL ENDARTERECTOMY WITH PATCH;  Surgeon: Arben Lopez MD;  Location: [facility];  Service: Cardiothoracic;  Laterality: N/A;    AORTIC VALVE REPAIR/REPLACEMENT N/A 5/8/2023    Procedure: Transfemoral Transcatheter Aortic Valve Replacement;  Surgeon: Rachael Mendoza MD;  Location: [facility];  Service: Cardiovascular;  Laterality: N/A;    CARDIAC CATHETERIZATION N/A 08/15/2019    Procedure: Left Heart Cath;  Surgeon: Paul Villatoro MD;  [facility]  [facility];  Service: Cardiology    CARDIAC CATHETERIZATION      04/10/2023 PER DR. MENDOZA    CARDIAC ELECTROPHYSIOLOGY PROCEDURE N/A 5/15/2023    Procedure: Pacemaker DC - [employer];  Surgeon: Rachael Mendoza MD;  Location:  [facility];  Service: Cardiology;  Laterality: N/A;    CATARACT EXTRACTION, BILATERAL      COLON SURGERY      colon resection for cancer - sigmoid removed - no chemo no radiation    COLONOSCOPY      COLONOSCOPY N/A 01/27/2021    Procedure: COLONOSCOPY;  Surgeon: Greg Barraza MD;  Location: [facility] OR;  Service: General;  Laterality: N/A;    CORONARY STENT PLACEMENT      1 STENT    ENDOSCOPY  1989    EYE SURGERY Right     retinal detachment    AR RT/LT HEART CATHETERS N/A 08/15/2019    Procedure: Percutaneous Coronary Intervention;  Surgeon: Paul Villatoro MD;  [facility]:  [facility];  Service: Cardiology    SCALP LESION REMOVAL W/  FLAP AND SKIN GRAFT  12/16/2022    basal cell removal    SHOULDER ARTHROSCOPY Right     SKIN LESION EXCISION N/A 01/27/2021    Procedure: SKIN LESIONS EXCISION FROM LEFT TEMPLE AREA AND ABDOMEN.;  Surgeon: Greg Barraza MD;  Location: [facility];  Service: General;  Laterality: N/A;    VASECTOMY Right     testicle removed d/t cancer - with radiation       Pediatric History   Patient Parents    Not on file     Other Topics Concern    Not on file   Social History Narrative    Patient lives in [place], [place]    Positive for alcohol, no drug use or tobacco,  to wife who is primary care NP for [employer], he worked in [employer]    family history includes Alcohol abuse in his father and mother; Alzheimer's disease in his father; COPD in his father and mother; Cancer in his paternal grandmother; Diabetes in his brother; Heart disease in his brother; Hypertension in his brother, father, and mother; Stroke in his maternal grandfather and mother.    Allergies   Allergen Reactions    Ct Contrast Anaphylaxis    Iodinated Contrast Media Shortness Of Breath       Immunization History   Administered Date(s) Administered    COVID-19 (PFIZER) Purple Cap Monovalent 01/11/2021, 02/01/2021, 09/24/2021    Fluad Quad 65+ 10/08/2020    Fluzone High-Dose 65+YRS 10/27/2017, 10/04/2018, 10/25/2024    Fluzone High-Dose 65+yrs 10/13/2021, 10/13/2022, 10/27/2023    Fluzone Quad >6mos (Multi-dose) 11/11/2016    Hep A / Hep B 07/17/2018    Hepatitis A 01/04/2019    IPV 06/05/1997    Pneumococcal Conjugate 13-Valent (PCV13) 06/08/2015    Pneumococcal Polysaccharide (PPSV23) 09/02/2011    Td (TDVAX) 06/05/1997    Tdap 12/09/2011    flucelvax quad pfs =>4 YRS 09/05/2019       Medication:  @Scheduled Meds:aspirin, 81 mg, Oral, Daily  cefTRIAXone, 2,000 mg, Intravenous, Q24H  dronedarone, 400 mg, Oral, BID With Meals  empagliflozin, 25 mg, Oral, Daily  insulin glargine, 5 Units, Subcutaneous, Q12H  Insulin Lispro, 8 Units, Subcutaneous, TID With  Meals  losartan, 25 mg, Oral, Q24H  metoprolol tartrate, 25 mg, Oral, BID  pantoprazole, 40 mg, Oral, Q AM  rivaroxaban, 15 mg, Oral, Daily  rosuvastatin, 20 mg, Oral, Daily  sertraline, 25 mg, Oral, Daily  sodium chloride, 10 mL, Intravenous, Q12H      Continuous Infusions:   PRN Meds:.  senna-docusate sodium **AND** polyethylene glycol **AND** bisacodyl **AND** bisacodyl    Calcium Replacement - Follow Nurse / BPA Driven Protocol    dextrose    dextrose    glucagon (human recombinant)    Magnesium Cardiology Dose Replacement - Follow Nurse / BPA Driven Protocol    nitroglycerin    ondansetron    Phosphorus Replacement - Follow Nurse / BPA Driven Protocol    Potassium Replacement - Follow Nurse / BPA Driven Protocol    sodium chloride    sodium chloride     Please refer to the medical record for a full medication list    Review of Systems:    Constitutional-- No Fever, chills or sweats.  Appetite good, and no malaise. No fatigue.  HEENT-- No new vision, hearing or throat complaints.  No epistaxis or oral sores.  Denies odynophagia or dysphagia.  No odynophagia or dysphagia. No headache, photophobia or neck stiffness.  CV-- No chest pain, palpitation or syncope  Resp-- No SOB/cough/Hemoptysis  GI- No nausea, vomiting, or diarrhea.  No hematochezia, melena, or hematemesis. Denies jaundice or chronic liver disease.  -- No dysuria, hematuria, or flank pain.  Denies hesitancy, urgency.  Lymph- no swollen lymph nodes in neck/axilla or groin.   Heme- No active bruising or bleeding; no Hx of DVT or PE.  MS-- no swelling or pain in the bones or joints of arms/legs.  No new back pain.  Neuro-- No acute focal weakness or numbness in the arms or legs.  No seizures.  Skin--No rashes or lesions    Physical Exam:   Vital Signs   Temp:  [96.9 °F (36.1 °C)-98 °F (36.7 °C)] 98 °F (36.7 °C)  Heart Rate:  [60-77] 64  Resp:  [17-18] 18  BP: (128-150)/(58-76) 128/58    Blood pressure 128/58, pulse 64, temperature 98 °F (36.7 °C),  "temperature source Oral, resp. rate 18, height 175.3 cm (69.02\"), weight 97.9 kg (215 lb 12.8 oz), SpO2 98%.  GENERAL: Awake and alert, in minimal distress. Appears older than stated age.  Resting in bed.  Visiting with wife.  HEENT:  Normocephalic, atraumatic.  Oropharynx without thrush. Dentition in good repair. No cervical adenopathy. No neck masses.  Ears externally normal, Nose externally normal.  EYES: No conjunctival injection. No icterus. EOM full.  LYMPHATICS: No lymphadenopathy of the neck or axillary or inguinal regions.   HEART: No murmur, gallop, or pericardial friction rub. Reg rate rhythm, No JVD at 45 degrees.  LUNGS: Clear to auscultation and percussion. No respiratory distress, no use of accessory muscles.  No wheezes.  ABDOMEN: Soft, nontender, nondistended. No appreciable HSM.  Bowel sounds normal.  SKIN: Warm and dry without cutaneous eruptions.  No nodules.  No Janeway lesions, Osler's nodes.  PSYCHIATRIC: Mental status lucid. No confusion.  EXT:  No cellulitic change.  NEURO: Oriented to name, nonfocal.      Results Review:   I reviewed the patient's new clinical results.  I reviewed the patient's new imaging results and agree with the interpretation.  I reviewed the patient's other test results and agree with the interpretation    Results from last 7 days   Lab Units 06/16/25  0435 06/15/25  0745 06/14/25  0922   WBC 10*3/mm3 9.97 10.39 10.79   HEMOGLOBIN g/dL 9.1* 9.2* 9.2*   HEMATOCRIT % 30.6* 30.0* 30.3*   PLATELETS 10*3/mm3 225 263 266     Results from last 7 days   Lab Units 06/16/25  0435   SODIUM mmol/L 140   POTASSIUM mmol/L 5.0   CHLORIDE mmol/L 111*   CO2 mmol/L 21.0*   BUN mg/dL 33.5*   CREATININE mg/dL 2.35*   GLUCOSE mg/dL 167*   CALCIUM mg/dL 8.7     Results from last 7 days   Lab Units 06/15/25  0745   ALK PHOS U/L 76   BILIRUBIN mg/dL 0.3   ALT (SGPT) U/L 11   AST (SGOT) U/L 20         Results from last 7 days   Lab Units 06/15/25  0745   CRP mg/dL <0.30         Results from " "last 7 days   Lab Units 06/15/25  0745   LACTATE mmol/L 1.0     Estimated Creatinine Clearance: 29.4 mL/min (A) (by C-G formula based on SCr of 2.35 mg/dL (H)).  CPK          6/15/2025    07:45   Common Labs   Creatine Kinase 31       Procalitonin Results:       Brief Urine Lab Results  (Last result in the past 365 days)        Color   Clarity   Blood   Leuk Est   Nitrite   Protein   CREAT   Urine HCG        06/13/25 2158 Yellow   Clear   Negative   Negative   Negative   100 mg/dL (2+)                  No results found for: \"SITE\", \"ALLENTEST\", \"PHART\", \"TZK0AMK\", \"PO2ART\", \"GYZ5JTW\", \"BASEEXCESS\", \"O3CCVFJY\", \"HGBBG\", \"HCTABG\", \"OXYHEMOGLOBI\", \"METHHGBN\", \"CARBOXYHGB\", \"CO2CT\", \"BAROMETRIC\", \"MODALITY\", \"FIO2\"     Microbiology:      Results for orders placed or performed during the hospital encounter of 06/13/25   Blood Culture - Blood, Arm, Left    Specimen: Arm, Left; Blood   Result Value Ref Range    Blood Culture No growth at 2 days         Susceptibility     Streptococcus sanguinis     MAYANK     Ampicillin 0.5 Intermediate     Ceftriaxone 0.25 Susceptible     Penicillin G 0.25 Intermediate     Vancomycin 1 Susceptible                  Blood cultures 6/3/2025 4-4 bottles positive          Brief Urine Lab Results  (Last result in the past 365 days)        Color   Clarity   Blood   Leuk Est   Nitrite   Protein   CREAT   Urine HCG        06/13/25 2158 Yellow   Clear   Negative   Negative   Negative   100 mg/dL (2+)                   Radiology:  Imaging Results (Last 72 Hours)       ** No results found for the last 72 hours. **        CT scan abdomen and pelvis 6/3/2025 negative for acute findings, CT scan of the chest consistent with CHF and edema with cardiomegaly and peripheral pulmonary fibrosis and left basilar atelectasis.  No consolidation.            IMPRESSION:     High grade Streptococcus sanguinous infection with 4 out of 4 blood cultures positive from 6/3/2025 with previous treatment for pneumonia, " although in reviewing films no obvious consolidation on his CT scan of the chest or chest x-ray.  Concerns would include intravascular infection which could be related to an endocarditis or a pacemaker infection.  CT scan of the abdomen and pelvis with no other obvious focus.  Urinalysis benign.  This was partially treated with oral cephalosporin.  Repeat blood cultures on 6/13 negative, but likely related to his recent treatment with cephalosporin.  Intermediately sensitive to penicillin.  Sensitive to ceftriaxone and vancomycin.  Acute on chronic renal failure.  Creatinine was 3.18 on 6/3, currently 2.59.  2.21 on 6/15.  2.35 on 6/16, worse.  Patient reports he is not going to be on dialysis.  Leukocytosis, neutrophilic, improved from his 6/3 level of 18.  Resolved.  Anemia, chronic disease related to above issues.  Slightly worse.  Cardiac regularly, TTE from 4/25 with ejection fraction 56%, TAVR present without stenosis or regurgitation or paravalvular leak.  Diabetes mellitus type 2 with increased risk for infection, hemoglobin A1c 7.80 on 6/3.  Previous aortic stenosis status post TAVR by Dr. Lopez on 5/8/2023.    PLAN:    Diagnostically, continue to follow patient's physical exam, CBC, CMP, CRP, blood cultures which were obtained on 6/13, and LAURIE for further evaluation of his aortic valve and pacemaker.  Could potentially consider a PET CT scan as an outpatient but difficult to get approved.  Therapeutically, consider 6 weeks of IV ceftriaxone even if repeat blood cultures until 8/1 are negative given his high risk and TAVR, and pacemaker.  May need to consider pacemaker removal as well, with the possibility that this prosthetic device is infected and may not clinically resolve infection without removal.  If LAURIE is negative, will give 6 weeks of antibiotics until 8/1, then possibly oral suppression with penicillin for 3 months then stop and evaluate.  Supportive care.  [facility]-term IV antibiotics given his renal failure.    I discussed the patient's findings and my recommendations with patient and nursing staff, discussed with Dr. Wright, and the patient's wife who is an APRN.    Case management orders: Please arrange for home antibiotics with ceftriaxone 2 g IV daily to continue until 8/1/2025 and reassess for streptococcal intravascular infection.  Check CBC, CMP, CRP weekly while on IV antibiotics.  Fax orders to [phone], call [phone] with final arrangements.  Arrange for follow-up with me in 1 week postdischarge.  Weekly PICC dressing changes.    Thank you for asking me to see Nicholas Yin.  Our group would be pleased to follow this patient over the course of their hospitalization and assist with outpatient antimicrobial therapy, as indicated.  Further recommendations depend on the results of the cultures and clinical course.  Side effects of medications discussed.  The patient is at increased risk for adverse drug reactions, complications of IV access, and readmission, CVA, death.  Discussed with Dr. Wright.    This visit included the following complex service elements:  Complex medical decision-making associated with antimicrobial prescribing.  In-depth chart review with high level synthesis for complex diagnoses.  Managed infection treatment protocol associated with transitions of care for this complex patient.  Counseled patients, family members and/or caregivers regarding infection prevention.  Managed infection control protocol.     Bulmaro Jin MD  6/16/2025

## 2025-06-16 NOTE — CASE MANAGEMENT/SOCIAL WORK
Chronic, improved  Bilateral cerumen present bilaterally, Lt>Rt  Debrox drops as directed. Irrigate gently. Follow up with PCP if hearing loss does not improve, may need in office earwax removal.   Instructed not to insert cotton swabs, and gently wash out ear while in shower.    Continued Stay Note  Psychiatric     Patient Name: Nicholas Yin  MRN: [id number]  Today's Date: 6/16/2025    Admit Date: 6/13/2025    Plan: Home with home health and IV antibiotics through [facility]   Discharge Plan       Row Name 06/16/25 1311       Plan    Plan Home with home health and IV antibiotics through [facility]    Patient/Family in Agreement with Plan yes    Plan Comments I met with the patient's wife at the bedside. Patient gone for LAURIE today. Plan is now home with IV antibiotics through [facility] pending cost. Wife would also like home health. She stated that [facility] might be the only ones in their area. Referral made to [facility] as they confirmed they do take [employer] [employer] insurance and service the patient's home location. Orders for SN, PT, and OT sent with instructions for weekly labs and PICC line care weekly. If accepted they will need a discharge summary faxed them as well. Awaiting final IV antibiotic plan at this time but did consult with [facility] at [facility] to get the process started based on Dr. Juan's last note. CM following    Final Discharge Disposition Code 06 - home with home health care                   Discharge Codes    No documentation.                 Expected Discharge Date and Time       Expected Discharge Date Expected Discharge Time    Jun 16, 2025               Sherrell Stark RN

## 2025-06-16 NOTE — PAYOR COMM NOTE
"Nicholas Schuster \"BILL\" (80 y.o. Male)       Date of Birth   1945    Social Security Number       Address   [address] [address]    Home Phone   [phone]    MRN   [id number]       Hoahaoism   Cheondoism    Marital Status                               Admission Date   6/13/2025    Admission Type   Emergency    Admitting Provider   Diego Wright MD    Attending Provider   Diego Wright MD    Department, Room/Bed   [address], S464/1       Discharge Date       Discharge Disposition       Discharge Destination                                 Attending Provider: Diego Wright MD    Allergies: Ct Contrast, Iodinated Contrast Media    Isolation: None   Infection: None   Code Status: CPR    Ht: 175.3 cm (69.02\")   Wt: 97.9 kg (215 lb 12.8 oz)    Admission Cmt: None   Principal Problem: Bacteremia [R78.81]                   Active Insurance as of 6/13/2025       Primary Coverage       Payor Plan Insurance Group Employer/Plan Group    [employer] EMPL[employer]EE [id number]       Payor Plan Address Payor Plan Phone Number Payor Plan Fax Number Effective Dates    [address] [phone]  1/1/2018 - None Entered    [facility] [id number]         Subscriber Name Subscriber Birth Date Member ID       MARAL SCHUSTER 5/6/1948 [id number]               Secondary Coverage       Payor Plan Insurance Group Employer/Plan Group    MEDICARE MEDICARE A ONLY        Payor Plan Address Payor Plan Phone Number Payor Plan Fax Number Effective Dates    [address] [phone]  2/1/2011 - None Entered    [facility] [id number]         Subscriber Name Subscriber Birth Date Member ID       NICHOLAS SCHUSTER 1945 [id number]                     Emergency Contacts        (Rel.) Home Phone Work Phone Mobile Phone    Maral Schuster (Spouse) [phone] -- [phone]              [place]: NPI [id number] Tax ID [id number]  Insurance " "Information                  [facility] EMPLOYEE Phone: [phone]    Subscriber: Jeremías Yin Subscriber#: [id number]    Group#: [id number] Precert#: --    Authorization#: [id number] Effective Date: --        MEDICARE/MEDICARE A ONLY Phone: [phone]    Subscriber: Nicholas iYn Subscriber#: [id number]    Group#: -- Precert#: --    Authorization#: -- Effective Date: --             History & Physical        ArikBartolo DO at 25 0151              [facility] Medicine Services  HISTORY AND PHYSICAL    Patient Name: Nicholas Yin  : 1945  MRN: [id number]  Primary Care Physician: Waldemar Trejo MD  Date of admission: 2025      Subjective   Subjective     Chief Complaint:  Normal labs    HPI:  Nicholas Yin is a 80 y.o. male brought to the emergency department for evaluation of abnormal labs.  Patient had positive blood cultures drawn on Tiki 3, 2025, that grown out 2/2 showing Streptococcus sanguinous, with susceptibility to Rocephin and vancomycin.  On Tiki 3, patient presented to the emergency department at [facility], was treated for pneumonia at that time.  He was prescribed Cefdinir 300 twice daily and doxycycline 100 for 7 days.  Patient states that he had taken all antibiotics as prescribed, completed both antibiotic regimens.  Upon receipt of the positive blood cultures, patient was attempted to be contacted.  His primary care provider had relayed the positive blood cultures on to his cardiologist, who recommended him come to the emergency department for evaluation due to \"concern about strep in his heart valve.\" The patient underwent a TAVR and left femoral endarterectomy on 2023 by Dr. Evelio Lopez (now retired).  Patient otherwise states that he has no acute complaints at this time.  Workup in the ED was unremarkable except for WBC of 12.  Hospitalist services consulted for admission " and workup of bacteremia.    Personal History     Past Medical History:   Diagnosis Date    Anemia     Aortic stenosis     Arthritis     Back pain     CancerTesticular/Colon     test -1982  Colon -2005    CHF (congestive heart failure)     Coronary artery disease     COVID-19 2022    DDD (degenerative disc disease), lumbosacral     Diabetes mellitus     Dyspnea     Elevated cholesterol     Erectile dysfunction     GERD (gastroesophageal reflux disease)     History of transfusion     Hyperlipidemia     Hypertension     TIA (transient ischemic attack)      Past Surgical History:   Procedure Laterality Date    AORTIC VALVE REPAIR/REPLACEMENT N/A 5/8/2023    Procedure: TRANSCATHETER AORTIC VALVE REPLACEMENT + LAURIE, LEFT FEMORAL ENDARTERECTOMY WITH PATCH;  Surgeon: Arben Lopez MD;  Location: [facility];  Service: Cardiothoracic;  Laterality: N/A;    AORTIC VALVE REPAIR/REPLACEMENT N/A 5/8/2023    Procedure: Transfemoral Transcatheter Aortic Valve Replacement;  Surgeon: Rachael Mendoza MD;  Location: [facility];  Service: Cardiovascular;  Laterality: N/A;    CARDIAC CATHETERIZATION N/A 08/15/2019    Procedure: Left Heart Cath;  Surgeon: Paul Villatoro MD;  Location: [facility] CATH INVASIVE LOCATION;  Service: Cardiology    CARDIAC CATHETERIZATION      04/10/2023 PER DR. MENDOZA    CARDIAC ELECTROPHYSIOLOGY PROCEDURE N/A 5/15/2023    Procedure: Pacemaker DC - [employer];  Surgeon: Rachael Mendoza MD;  Location: [facility] CATH INVASIVE LOCATION;  Service: Cardiology;  Laterality: N/A;    CATARACT EXTRACTION, BILATERAL      COLON SURGERY      colon resection for cancer - sigmoid removed - no chemo no radiation    COLONOSCOPY      COLONOSCOPY N/A 01/27/2021    Procedure: COLONOSCOPY;  Surgeon: Greg Barraza MD;  Location: [facility];  Service: General;  Laterality: N/A;    CORONARY STENT PLACEMENT      1 STENT    ENDOSCOPY  1989    EYE SURGERY Right     retinal  detachment    IL RT/LT HEART CATHETERS N/A 08/15/2019    Procedure: Percutaneous Coronary Intervention;  Surgeon: Paul Villatoro MD;  Location: [facility] INVASIVE LOCATION;  Service: Cardiology    SCALP LESION REMOVAL W/ FLAP AND SKIN GRAFT  12/16/2022    basal cell removal    SHOULDER ARTHROSCOPY Right     SKIN LESION EXCISION N/A 01/27/2021    Procedure: SKIN LESIONS EXCISION FROM LEFT TEMPLE AREA AND ABDOMEN.;  Surgeon: Greg Barraza MD;  Location: [facility] OR;  Service: General;  Laterality: N/A;    VASECTOMY Right     testicle removed d/t cancer - with radiation     Family History: family history includes Alcohol abuse in his father and mother; Alzheimer's disease in his father; COPD in his father and mother; Cancer in his paternal grandmother; Diabetes in his brother; Heart disease in his brother; Hypertension in his brother, father, and mother; Stroke in his maternal grandfather and mother.     Social History:  reports that he has never smoked. He has never been exposed to tobacco smoke. He has never used smokeless tobacco. He reports current alcohol use. He reports that he does not use drugs.  Social History     Social History Narrative    Patient lives in [place], [place]      Medications:  Available home medication information reviewed.  Insulin Degludec-Liraglutide, aspirin, dapagliflozin Propanediol, dronedarone, ipratropium-albuterol, losartan, metoprolol tartrate, omeprazole, rivaroxaban, rosuvastatin, sildenafil, and vitamin D    Allergies   Allergen Reactions    Ct Contrast Anaphylaxis    Iodinated Contrast Media Shortness Of Breath       Objective   Objective     Vital Signs:   Temp:  [97.4 °F (36.3 °C)-98 °F (36.7 °C)] 97.4 °F (36.3 °C)  Heart Rate:  [61-76] 70  Resp:  [16] 16  BP: (152-168)/(60-71) 168/71       Physical Exam   Constitutional: Awake, alert.  Wife at bedside.  Chronically ill-appearing.  Obese.  Eyes: PERRLA, sclerae anicteric, no conjunctival injection  HENT: NCAT,  mucous membranes moist  Neck: Supple, no thyromegaly, no lymphadenopathy, trachea midline  Respiratory: Clear to auscultation bilaterally, nonlabored respirations   Cardiovascular: RRR, no murmurs, rubs, or gallops, palpable pedal pulses bilaterally  Gastrointestinal: Positive bowel sounds, soft, nontender, nondistended  Musculoskeletal: No bilateral ankle edema, no clubbing or cyanosis to extremities  Psychiatric: Appropriate affect, cooperative  Neurologic: Oriented x 3, strength symmetric in all extremities, Cranial Nerves grossly intact to confrontation, speech clear  Skin: No rashes.  Less than 2-second capillary refill all extremities.    Result Review:  I have personally reviewed the results from the time of this admission to 6/14/2025 01:51 EDT and agree with these findings:  [x]  Laboratory list / accordion  [x]  Microbiology  [x]  Radiology  []  EKG/Telemetry   []  Cardiology/Vascular   []  Pathology  [x]  Old records  []  Other:  Most notable findings include: Summarized above    LAB RESULTS:      Lab 06/13/25 2019   WBC 11.93*   HEMOGLOBIN 9.6*   HEMATOCRIT 32.0*   PLATELETS 297   NEUTROS ABS 9.00*   IMMATURE GRANS (ABS) 0.06*   LYMPHS ABS 1.47   MONOS ABS 0.96*   EOS ABS 0.41*   MCV 73.1*   LACTATE 1.3         Lab 06/13/25 2019   SODIUM 139   POTASSIUM 4.8   CHLORIDE 111*   CO2 19.0*   ANION GAP 9.0   BUN 38.6*   CREATININE 2.59*   EGFR 24.4*   GLUCOSE 221*   CALCIUM 8.9         Lab 06/13/25 2019   TOTAL PROTEIN 6.3   ALBUMIN 3.1*   GLOBULIN 3.2   ALT (SGPT) 14   AST (SGOT) 19   BILIRUBIN 0.3   ALK PHOS 85     UA          8/24/2024    09:24 6/13/2025    21:58   Urinalysis   Squamous Epithelial Cells, UA 0-2  0-2    Specific Gravity, UA 1.022  1.026    Ketones, UA Negative  Negative    Blood, UA Negative  Negative    Leukocytes, UA Negative  Negative    Nitrite, UA Negative  Negative    RBC, UA 0-2  0-2    WBC, UA 0-2  0-2    Bacteria, UA None Seen  None Seen      Results for orders placed during the  hospital encounter of 04/25/25    Adult Transthoracic Echo Complete W/ Cont if Necessary Per Protocol    Interpretation Summary    Left ventricular systolic function is normal. Left ventricular ejection fraction appears to be 56 - 60%.    Right ventricle was poorly visualized.  From images available RV appears to have normal systolic function.    There is a TAVR valve present.  No aortic valve stenosis.  No aortic valve central regurgitation, no paravalvular leak.  Peak gradient across aortic valve is 12 mmHg, mean gradient 8 mmHg.    Mitral annular calcification present.  No significant mitral valve stenosis is noted (however has limited sensitivity).  Trace mitral valve regurgitation.      Assessment & Plan   Assessment & Plan     Streptococcal bacteremia  Aortic stenosis s/p TAVR  Patient is not septic at this time.  Patient started on Rocephin and gentamicin in the ED, these to be continued at this time.  Consult infectious disease for selection and duration of antibiotics.  Appreciate their recommendations in the care of this patient.  No embolic phenomenon suggestive of infective endocarditis at this time.  Blood cx: Tiki 3, 2025, Streptococcus sanguinous, susceptibility to Rocephin and vancomycin.  Blood cultures reobtained.  Update 2D echo.  No heart murmur appreciated on auscultation, reassuring.  Uncontrolled type 2 diabetes mellitus with hyperglycemia  Insulin dosing.  Jardiance will be continued due to history of CHF.  Congestive heart failure  CKD, Stage 3.    At baseline creatinine  Hypertension  Hyperlipidemia  GERD  Patient is euvolemic at this time.  Resume home medications as appropriate    Total time spent: 65 minutes  Time spent includes time reviewing chart, face-to-face time, counseling patient/family/caregiver, ordering medications/tests/procedures, communicating with other health care professionals, documenting clinical information in the electronic health record, and coordination of care.      VTE Prophylaxis: Xarelto    CODE STATUS:    Code Status and Medical Interventions: CPR (Attempt to Resuscitate); Full Support   Ordered at: 06/14/25 0151     Code Status (Patient has no pulse and is not breathing):    CPR (Attempt to Resuscitate)     Medical Interventions (Patient has pulse or is breathing):    Full Support     Level Of Support Discussed With:    Patient     Expected Discharge  2-3 days pending culture results and ID recommendations on selection/duration of abx.    Bartolo Elise DO  06/14/25     Electronically signed by Bartolo Elise DO at 06/14/25 0626       Current Facility-Administered Medications   Medication Dose Route Frequency Provider Last Rate Last Admin    aspirin EC tablet 81 mg  81 mg Oral Daily Bartolo Elise DO   81 mg at 06/16/25 0854    sennosides-docusate (PERICOLACE) 8.6-50 MG per tablet 2 tablet  2 tablet Oral BID PRN Bartolo Elise DO        And    polyethylene glycol (MIRALAX) packet 17 g  17 g Oral Daily PRN Bartolo Elise DO        And    bisacodyl (DULCOLAX) EC tablet 5 mg  5 mg Oral Daily PRN Bartolo Elise DO        And    bisacodyl (DULCOLAX) suppository 10 mg  10 mg Rectal Daily PRN Bartolo Elise DO        Calcium Replacement - Follow Nurse / BPA Driven Protocol   Not Applicable PRN Bartolo Elise DO        cefTRIAXone (ROCEPHIN) 2,000 mg in sodium chloride 0.9 % 100 mL MBP  2,000 mg Intravenous Q24H Jose Evans PharmD 200 mL/hr at 06/15/25 2227 2,000 mg at 06/15/25 2227    dextrose (D50W) (25 g/50 mL) IV injection 25 g  25 g Intravenous Q15 Min PRN Bartolo Elise DO        dextrose (GLUTOSE) oral gel 15 g  15 g Oral Q15 Min PRN Bartolo Elise DO        dronedarone (MULTAQ) tablet 400 mg  400 mg Oral BID With Meals Bartolo Elise DO   400 mg at 06/16/25 0854    empagliflozin (JARDIANCE) tablet 25 mg  25 mg Oral Daily Bartolo Elise DO   25 mg at 06/16/25 0854    glucagon (GLUCAGEN) injection 1 mg  1 mg Intramuscular Q15 Min PRN Bartolo Elise DO        insulin  glargine (LANTUS, SEMGLEE) injection 5 Units  5 Units Subcutaneous Q12H Bartolo Elise DO   5 Units at 06/16/25 0853    Insulin Lispro (humaLOG) injection 8 Units  8 Units Subcutaneous TID With Meals Bartolo Elise DO   4 Units at 06/15/25 1725    losartan (COZAAR) tablet 25 mg  25 mg Oral Q24H Diego Wright MD   25 mg at 06/16/25 0854    Magnesium Cardiology Dose Replacement - Follow Nurse / BPA Driven Protocol   Not Applicable PRN Bartolo Elise DO        metoprolol tartrate (LOPRESSOR) tablet 25 mg  25 mg Oral BID Bartolo Elise DO   25 mg at 06/16/25 0854    nitroglycerin (NITROSTAT) SL tablet 0.4 mg  0.4 mg Sublingual Q5 Min PRN Bartolo Elise DO        ondansetron (ZOFRAN) injection 4 mg  4 mg Intravenous Q6H PRN Bartolo Elise DO        pantoprazole (PROTONIX) EC tablet 40 mg  40 mg Oral Q AM Diego Wright MD   40 mg at 06/15/25 1516    Phosphorus Replacement - Follow Nurse / BPA Driven Protocol   Not Applicable PRN Bartolo Elise DO        Potassium Replacement - Follow Nurse / BPA Driven Protocol   Not Applicable PRBartolo Burroughs DO        rivaroxaban (XARELTO) tablet 15 mg  15 mg Oral Daily Bartolo Elise DO   15 mg at 06/16/25 0854    rosuvastatin (CRESTOR) tablet 20 mg  20 mg Oral Daily Bartolo Elise DO   20 mg at 06/16/25 0854    sertraline (ZOLOFT) tablet 25 mg  25 mg Oral Daily Diego Wright MD   25 mg at 06/16/25 0854    sodium chloride 0.9 % flush 10 mL  10 mL Intravenous Q12H Bartolo Elise DO   10 mL at 06/16/25 0855    sodium chloride 0.9 % flush 10 mL  10 mL Intravenous PRN Bartolo Elise DO        sodium chloride 0.9 % infusion 40 mL  40 mL Intravenous PRN Bartolo Elise DO         Lab Results (last 24 hours)       Procedure Component Value Units Date/Time    POC Glucose Once [[id number]]  (Abnormal) Collected: 06/16/25 1105    Specimen: Blood Updated: 06/16/25 1108     Glucose 162 mg/dL     POC Glucose Once [[id number]]  (Abnormal) Collected: 06/16/25 0700    Specimen:  Blood Updated: 06/16/25 0704     Glucose 146 mg/dL     Basic Metabolic Panel [[id number]]  (Abnormal) Collected: 06/16/25 0435    Specimen: Blood Updated: 06/16/25 0515     Glucose 167 mg/dL      BUN 33.5 mg/dL      Creatinine 2.35 mg/dL      Sodium 140 mmol/L      Potassium 5.0 mmol/L      Chloride 111 mmol/L      CO2 21.0 mmol/L      Calcium 8.7 mg/dL      BUN/Creatinine Ratio 14.3     Anion Gap 8.0 mmol/L      eGFR 27.5 mL/min/1.73     Narrative:      GFR Categories in Chronic Kidney Disease (CKD)              GFR Category          GFR (mL/min/1.73)    Interpretation  G1                    90 or greater        Normal or high (1)  G2                    60-89                Mild decrease (1)  G3a                   45-59                Mild to moderate decrease  G3b                   30-44                Moderate to severe decrease  G4                    15-29                Severe decrease  G5                    14 or less           Kidney failure    (1)In the absence of evidence of kidney disease, neither GFR category G1 or G2 fulfill the criteria for CKD.    eGFR calculation 2021 CKD-EPI creatinine equation, which does not include race as a factor    Magnesium [[id number]]  (Normal) Collected: 06/16/25 0435    Specimen: Blood Updated: 06/16/25 0515     Magnesium 2.2 mg/dL     Phosphorus [[id number]]  (Normal) Collected: 06/16/25 0435    Specimen: Blood Updated: 06/16/25 0515     Phosphorus 3.6 mg/dL     CBC & Differential [[id number]]  (Abnormal) Collected: 06/16/25 0435    Specimen: Blood Updated: 06/16/25 0512    Narrative:      The following orders were created for panel order CBC & Differential.  Procedure                               Abnormality         Status                     ---------                               -----------         ------                     CBC Auto Differential[[id number]]        Abnormal            Final result               Scan Slide[[id number]]                                                                     Please view results for these tests on the individual orders.    CBC Auto Differential [[id number]]  (Abnormal) Collected: 06/16/25 0435    Specimen: Blood Updated: 06/16/25 0512     WBC 9.97 10*3/mm3      RBC 4.12 10*6/mm3      Hemoglobin 9.1 g/dL      Hematocrit 30.6 %      MCV 74.3 fL      MCH 22.1 pg      MCHC 29.7 g/dL      RDW 20.5 %      RDW-SD 52.5 fl      MPV 10.2 fL      Platelets 225 10*3/mm3      Neutrophil % 75.9 %      Lymphocyte % 10.5 %      Monocyte % 9.2 %      Eosinophil % 3.7 %      Basophil % 0.4 %      Immature Grans % 0.3 %      Neutrophils, Absolute 7.56 10*3/mm3      Lymphocytes, Absolute 1.05 10*3/mm3      Monocytes, Absolute 0.92 10*3/mm3      Eosinophils, Absolute 0.37 10*3/mm3      Basophils, Absolute 0.04 10*3/mm3      Immature Grans, Absolute 0.03 10*3/mm3      nRBC 0.0 /100 WBC     Blood Culture - Blood, Arm, Right [[id number]]  (Normal) Collected: 06/13/25 2020    Specimen: Blood from Arm, Right Updated: 06/15/25 2231     Blood Culture No growth at 2 days    Blood Culture - Blood, Arm, Left [[id number]]  (Normal) Collected: 06/13/25 2020    Specimen: Blood from Arm, Left Updated: 06/15/25 2231     Blood Culture No growth at 2 days    POC Glucose Once [[id number]]  (Abnormal) Collected: 06/15/25 2023    Specimen: Blood Updated: 06/15/25 2024     Glucose 188 mg/dL     POC Glucose Once [[id number]]  (Normal) Collected: 06/15/25 1557    Specimen: Blood Updated: 06/15/25 1604     Glucose 96 mg/dL           Imaging Results (Last 24 Hours)       ** No results found for the last 24 hours. **          Orders (last 24 hrs)        Start     Ordered    06/17/25 0600  Basic Metabolic Panel  Morning Draw         06/16/25 1125    06/16/25 1109  POC Glucose Once  PROCEDURE ONCE        Comments: Complete no more than 45 minutes prior to patient eating      06/16/25 1105    06/16/25 0900  losartan (COZAAR) tablet 25 mg  Every 24 Hours Scheduled         06/15/25 1407    06/16/25  0705  POC Glucose Once  PROCEDURE ONCE        Comments: Complete no more than 45 minutes prior to patient eating      06/16/25 0700    06/16/25 0600  Basic Metabolic Panel  Morning Draw,   Status:  Canceled         06/15/25 1132    06/16/25 0600  CBC (No Diff)  Morning Draw,   Status:  Canceled         06/15/25 1506    06/16/25 0600  CBC Auto Differential  PROCEDURE ONCE         06/15/25 2201    06/16/25 0511  Scan Slide  Once,   Status:  Canceled         06/16/25 0510    06/16/25 0001  NPO Diet NPO Type: Strict NPO, Sips with Meds  Diet Effective Midnight         06/15/25 1512    06/16/25 0000  Adult Transesophageal Echo (LAURIE) W/ Cont if Necessary Per Protocol  Once        Comments: High grade bacteremia, previous TAVR & pacemaker (2023), ruling out endocarditis or pacemaker infection    06/15/25 1512    06/15/25 2025  POC Glucose Once  PROCEDURE ONCE        Comments: Complete no more than 45 minutes prior to patient eating      06/15/25 2023    06/15/25 1605  POC Glucose Once  PROCEDURE ONCE        Comments: Complete no more than 45 minutes prior to patient eating      06/15/25 1557    06/15/25 1600  pantoprazole (PROTONIX) EC tablet 40 mg  Every Early Morning         06/15/25 1507    06/15/25 1600  Sulfur Hexafluoride Microsph (LUMASON) 60.7-25 MG IV reconstituted suspension reconstituted suspension 2 mL  Once in Imaging         06/15/25 1514    06/15/25 1515  PICC Consult  Once        Comments: Needs long term antibiotics. Patient has CKD. Patient and his wife have stated that patient would NOT want future dialysis if & when his renal function worsens.   Provider:  (Not yet assigned)    06/15/25 1516    06/15/25 1500  sodium zirconium cyclosilicate (LOKELMA) packet 10 g  Every 8 Hours         06/15/25 1407    06/15/25 1409  Diet: Cardiac, Diabetic, Renal; Healthy Heart (2-3 Na+); Consistent Carbohydrate; Low Potassium; Fluid Consistency: Thin (IDDSI 0)  Diet Effective Now,   Status:  Canceled         06/15/25  1408    06/15/25 0900  aspirin EC tablet 81 mg  Daily         06/14/25 0205    06/15/25 0900  empagliflozin (JARDIANCE) tablet 25 mg  Daily         06/14/25 0205    06/15/25 0900  rivaroxaban (XARELTO) tablet 15 mg  Daily         06/14/25 0205    06/15/25 0900  rosuvastatin (CRESTOR) tablet 20 mg  Daily         06/14/25 0205    06/14/25 2200  cefTRIAXone (ROCEPHIN) 2,000 mg in sodium chloride 0.9 % 100 mL MBP  Every 24 Hours         06/14/25 0216    06/14/25 2100  losartan (COZAAR) tablet 25 mg  2 Times Daily,   Status:  Discontinued         06/14/25 0205 06/14/25 2100  metoprolol tartrate (LOPRESSOR) tablet 25 mg  2 Times Daily         06/14/25 0205 06/14/25 1800  dronedarone (MULTAQ) tablet 400 mg  2 Times Daily With Meals         06/14/25 0205 06/14/25 1430  sertraline (ZOLOFT) tablet 25 mg  Daily         06/14/25 1333    06/14/25 0900  insulin glargine (LANTUS, SEMGLEE) injection 5 Units  Every 12 Hours Scheduled         06/14/25 0623    06/14/25 0800  Oral Care  2 Times Daily       06/14/25 0151 06/14/25 0800  Insulin Lispro (humaLOG) injection 8 Units  3 Times Daily With Meals         06/14/25 0623    06/14/25 0700  POC Glucose 4x Daily Before Meals & at Bedtime  4 Times Daily Before Meals & at Bedtime      Comments: Complete no more than 45 minutes prior to patient eating      06/14/25 0623 06/14/25 0622  dextrose (GLUTOSE) oral gel 15 g  Every 15 Minutes PRN         06/14/25 0623    06/14/25 0622  dextrose (D50W) (25 g/50 mL) IV injection 25 g  Every 15 Minutes PRN         06/14/25 0623    06/14/25 0622  glucagon (GLUCAGEN) injection 1 mg  Every 15 Minutes PRN         06/14/25 0623    06/14/25 0600  Incentive Spirometry  Every 4 Hours While Awake       06/14/25 0151 06/14/25 0600  CBC & Differential  Daily       06/14/25 0151 06/14/25 0600  Basic Metabolic Panel  Daily       06/14/25 0151 06/14/25 0600  Magnesium  Daily       06/14/25 0151 06/14/25 0600  Phosphorus  Daily        "06/14/25 0151    06/14/25 0400  Vital Signs  Every 4 Hours       06/14/25 0151    06/14/25 0245  sodium chloride 0.9 % flush 10 mL  Every 12 Hours Scheduled         06/14/25 0151    06/14/25 0200  Strict Intake & Output  Every Hour       06/14/25 0151    06/14/25 0152  Daily Weights  Daily       06/14/25 0151    06/14/25 0150  sennosides-docusate (PERICOLACE) 8.6-50 MG per tablet 2 tablet  2 Times Daily PRN        Placed in \"And\" Linked Group    06/14/25 0151    06/14/25 0150  polyethylene glycol (MIRALAX) packet 17 g  Daily PRN        Placed in \"And\" Linked Group    06/14/25 0151 06/14/25 0150  bisacodyl (DULCOLAX) EC tablet 5 mg  Daily PRN        Placed in \"And\" Linked Group    06/14/25 0151 06/14/25 0150  bisacodyl (DULCOLAX) suppository 10 mg  Daily PRN        Placed in \"And\" Linked Group    06/14/25 0151    06/14/25 0150  Potassium Replacement - Follow Nurse / BPA Driven Protocol  As Needed         06/14/25 0151 06/14/25 0150  Magnesium Cardiology Dose Replacement - Follow Nurse / BPA Driven Protocol  As Needed         06/14/25 0151 06/14/25 0150  Phosphorus Replacement - Follow Nurse / BPA Driven Protocol  As Needed         06/14/25 0151 06/14/25 0150  Calcium Replacement - Follow Nurse / BPA Driven Protocol  As Needed         06/14/25 0151    06/14/25 0150  Intake & Output  Every Shift       06/14/25 0151    06/14/25 0149  sodium chloride 0.9 % flush 10 mL  As Needed         06/14/25 0151    06/14/25 0149  sodium chloride 0.9 % infusion 40 mL  As Needed         06/14/25 0151 06/14/25 0149  ondansetron (ZOFRAN) injection 4 mg  Every 6 Hours PRN         06/14/25 0151    06/14/25 0149  nitroglycerin (NITROSTAT) SL tablet 0.4 mg  Every 5 Minutes PRN         06/14/25 0151    Unscheduled  Up With Assistance  As Needed       06/14/25 0151    Unscheduled  Follow Hypoglycemia Standing Orders For Blood Glucose <70 & Notify Provider of Treatment  As Needed      Comments: Follow Hypoglycemia Orders As " Outlined in Process Instructions (Open Order Report to View Full Instructions)  Notify Provider Any Time Hypoglycemia Treatment is Administered    25 0623                     Physician Progress Notes (last 24 hours)        Diego Wright MD at 06/15/25 1507              [facility] Medicine Services  PROGRESS NOTE    Patient Name: Nicholas Yin  : 1945  MRN: [id number]    Date of Admission: 2025  Primary Care Physician: Waldemar Trejo MD    Subjective   Subjective     CC:  bacteremia    HPI:  No fever or chills, no dyspnea, no chest pain. No n/v. Tired of being in hospital      Objective   Objective     Vital Signs:   Temp:  [97.6 °F (36.4 °C)-99.6 °F (37.6 °C)] 97.6 °F (36.4 °C)  Heart Rate:  [71-80] 71  Resp:  [16-18] 18  BP: (131-152)/(54-76) 143/76     Physical Exam:  Constitutional:Alert, oriented x 3, nontoxic appearing  Psych:Normal/appropriate affect  HEENT:NCAT, oropharynx clear  Neck: neck supple, full range of motion  Neuro: Face symmetric, speech clear, equal , moves all extremities  Cardiac: RRR, murmur noted; No pretibial pitting edema  Resp: CTAB, normal effort  GI: abd soft, nontender, obese  Skin: No extremity rash  Musculoskeletal/extremities: no cyanosis of extremities; no significant ankle edema            Results Reviewed:  LAB RESULTS:      Lab 06/15/25  0745 25   WBC 10.39 10.79 11.93*   HEMOGLOBIN 9.2* 9.2* 9.6*   HEMATOCRIT 30.0* 30.3* 32.0*   PLATELETS 263 266 297   NEUTROS ABS 7.77* 8.07* 9.00*   IMMATURE GRANS (ABS) 0.05 0.06* 0.06*   LYMPHS ABS 1.13 1.24 1.47   MONOS ABS 0.93* 0.94* 0.96*   EOS ABS 0.45* 0.44* 0.41*   MCV 71.8* 72.3* 73.1*   CRP <0.30  --   --    LACTATE 1.0  --  1.3         Lab 06/15/25  0745 25  0922 25   SODIUM 139 142 139   POTASSIUM 5.3* 4.6 4.8   CHLORIDE 112* 115* 111*   CO2 21.0* 20.0* 19.0*   ANION GAP 6.0 7.0 9.0   BUN 31.4* 33.6* 38.6*   CREATININE  2.21* 2.17* 2.59*   EGFR 29.6* 30.2* 24.4*   GLUCOSE 157* 119* 221*   CALCIUM 8.7 8.7 8.9   MAGNESIUM 2.0 2.1  --    PHOSPHORUS 3.2 3.6  --          Lab 06/15/25  0745 06/13/25 2019   TOTAL PROTEIN 5.8* 6.3   ALBUMIN 3.0* 3.1*   GLOBULIN 2.8 3.2   ALT (SGPT) 11 14   AST (SGOT) 20 19   BILIRUBIN 0.3 0.3   ALK PHOS 76 85                     Brief Urine Lab Results  (Last result in the past 365 days)        Color   Clarity   Blood   Leuk Est   Nitrite   Protein   CREAT   Urine HCG        06/13/25 2158 Yellow   Clear   Negative   Negative   Negative   100 mg/dL (2+)                   Microbiology Results Abnormal       None            No radiology results from the last 24 hrs    Results for orders placed during the hospital encounter of 04/25/25    Adult Transthoracic Echo Complete W/ Cont if Necessary Per Protocol    Interpretation Summary    Left ventricular systolic function is normal. Left ventricular ejection fraction appears to be 56 - 60%.    Right ventricle was poorly visualized.  From images available RV appears to have normal systolic function.    There is a TAVR valve present.  No aortic valve stenosis.  No aortic valve central regurgitation, no paravalvular leak.  Peak gradient across aortic valve is 12 mmHg, mean gradient 8 mmHg.    Mitral annular calcification present.  No significant mitral valve stenosis is noted (however has limited sensitivity).  Trace mitral valve regurgitation.      Current medications:  Scheduled Meds:aspirin, 81 mg, Oral, Daily  cefTRIAXone, 2,000 mg, Intravenous, Q24H  dronedarone, 400 mg, Oral, BID With Meals  empagliflozin, 25 mg, Oral, Daily  insulin glargine, 5 Units, Subcutaneous, Q12H  Insulin Lispro, 8 Units, Subcutaneous, TID With Meals  [START ON 6/16/2025] losartan, 25 mg, Oral, Q24H  metoprolol tartrate, 25 mg, Oral, BID  pantoprazole, 40 mg, Oral, Q AM  rivaroxaban, 15 mg, Oral, Daily  rosuvastatin, 20 mg, Oral, Daily  sertraline, 25 mg, Oral, Daily  sodium chloride,  10 mL, Intravenous, Q12H  sodium zirconium cyclosilicate, 10 g, Oral, Q8H      Continuous Infusions:   PRN Meds:.  senna-docusate sodium **AND** polyethylene glycol **AND** bisacodyl **AND** bisacodyl    Calcium Replacement - Follow Nurse / BPA Driven Protocol    dextrose    dextrose    glucagon (human recombinant)    Magnesium Cardiology Dose Replacement - Follow Nurse / BPA Driven Protocol    nitroglycerin    ondansetron    Phosphorus Replacement - Follow Nurse / BPA Driven Protocol    Potassium Replacement - Follow Nurse / BPA Driven Protocol    sodium chloride    sodium chloride    Assessment & Plan   Assessment & Plan     Active Hospital Problems    Diagnosis  POA    **Bacteremia [R78.81]  Yes      Resolved Hospital Problems   No resolved problems to display.        Brief Hospital Course to date:  Nicholas Yin is a 80 y.o. male   w/ hx cad, afib, previous TAVR & left lower extremity angioplasty (2023 by Dr Lopez), ckd 4 (baseline cr 2.4-2.9), DM2 who was recently seen at [facility] for pneumonia on 6/3/25 was treated with cefdinir and doxycycline x 7 days for pneumonia. Blood cultures grew streptococcus sanguinis and pcp referred patient to [facility] ED due to previous history of TAVR and workup/treatment of bacteremia. Initiated on Ceftriaxone, ID consulted. TTE ordered    Strep bacteremia (high grade)  Recent Pneumonia (6/3/25 [facility])  Hx TAVR 2023  CAD (abisai to lad 2019)  Chronic HFpEF, compensated  Hx heart block/parox afib (pacemaker 5/2023 Dr. Mendoza)  HTN  HL  -previous echo 5/2024: LV EF ~53%, diastolic dysfunction, tavr ok  --TTE no overt abnormality noted, no definite mass, cannot rule out mitral valve mass TTE pending.  -ID following: continue rocephin 2g iv daily; concerned about intravascular infection (such as tavr or pacemaker infection). TTE no definite valvular abnormality. Will need LAURIE. Will need at least 6 weeks of IV rocephin. Will also need to consider pacemaker  removal as well (ID to determine whether this is necessary  -LAURIE ordered for 6/16/25, npo after midnight  -of note, patient and wife state he would NOT want dialysis in the future if/when his renal function worsens; consequently I will order PICC line (patient and wife prefer PICC line in this scenario rather than groshong since patient does not want dialysis in the future)  -continue asa, xarelto, multaq, metoprolol, losartan (decreased to once daily as below), crestor  Uncontrolled DM2  -jardiance  -basal-bolus insulin, ssi; titrate prn  Ckd 4 (baseline cr ~2.4-2.9)  Hyperkalemia  -renal fxn stable, but potassium 5.3 today. Will decrease losartan to once daily, give lokelma x 2 doses; repeat a.m. labs  of note, patient and wife state he would NOT want dialysis in the future if/when his renal function worsens; consequently  Gerd  -protonix  Depression  -initiate sertraline 25mg daily      Am labs: cbc,bmp (potassium, creatinine),mag (follow up repeat blood cultures); f/u TTE official read (if negative go ahead w/ LAURIE which has been ordered)      Expected Discharge Location and Transportation: home  Expected Discharge  after TTE +/- LAURIE & picc line placed  Expected Discharge Date: 6/16/2025; Expected Discharge Time:      VTE Prophylaxis:  Pharmacologic & mechanical VTE prophylaxis orders are present.         AM-PAC 6 Clicks Score (PT): 18 (06/14/25 0103)    CODE STATUS:   Code Status and Medical Interventions: CPR (Attempt to Resuscitate); Full Support   Ordered at: 06/14/25 0151     Code Status (Patient has no pulse and is not breathing):    CPR (Attempt to Resuscitate)     Medical Interventions (Patient has pulse or is breathing):    Full Support     Level Of Support Discussed With:    Patient       Diego Wright MD  06/15/25        Electronically signed by Diego Wright MD at 06/15/25 1912       Consult Notes (last 24 hours)  Notes from 06/15/25 1242 through 06/16/25 1242   No notes of this type  exist for this encounter.

## 2025-06-17 LAB
ANION GAP SERPL CALCULATED.3IONS-SCNC: 8 MMOL/L (ref 5–15)
BUN SERPL-MCNC: 33.3 MG/DL (ref 8–23)
BUN/CREAT SERPL: 15.5 (ref 7–25)
CALCIUM SPEC-SCNC: 8.6 MG/DL (ref 8.6–10.5)
CHLORIDE SERPL-SCNC: 111 MMOL/L (ref 98–107)
CO2 SERPL-SCNC: 20 MMOL/L (ref 22–29)
CREAT SERPL-MCNC: 2.15 MG/DL (ref 0.76–1.27)
EGFRCR SERPLBLD CKD-EPI 2021: 30.6 ML/MIN/1.73
FERRITIN SERPL-MCNC: 42.34 NG/ML (ref 30–400)
GLUCOSE BLDC GLUCOMTR-MCNC: 159 MG/DL (ref 70–130)
GLUCOSE BLDC GLUCOMTR-MCNC: 205 MG/DL (ref 70–130)
GLUCOSE BLDC GLUCOMTR-MCNC: 219 MG/DL (ref 70–130)
GLUCOSE BLDC GLUCOMTR-MCNC: 221 MG/DL (ref 70–130)
GLUCOSE SERPL-MCNC: 150 MG/DL (ref 65–99)
IRON 24H UR-MRATE: 19 MCG/DL (ref 59–158)
IRON SATN MFR SERPL: 7 % (ref 20–50)
POTASSIUM SERPL-SCNC: 4.9 MMOL/L (ref 3.5–5.2)
SODIUM SERPL-SCNC: 139 MMOL/L (ref 136–145)
TIBC SERPL-MCNC: 292 MCG/DL (ref 298–536)
TRANSFERRIN SERPL-MCNC: 196 MG/DL (ref 200–360)

## 2025-06-17 PROCEDURE — 99232 SBSQ HOSP IP/OBS MODERATE 35: CPT | Performed by: STUDENT IN AN ORGANIZED HEALTH CARE EDUCATION/TRAINING PROGRAM

## 2025-06-17 PROCEDURE — 80048 BASIC METABOLIC PNL TOTAL CA: CPT | Performed by: INTERNAL MEDICINE

## 2025-06-17 PROCEDURE — 82948 REAGENT STRIP/BLOOD GLUCOSE: CPT

## 2025-06-17 PROCEDURE — 82728 ASSAY OF FERRITIN: CPT | Performed by: STUDENT IN AN ORGANIZED HEALTH CARE EDUCATION/TRAINING PROGRAM

## 2025-06-17 PROCEDURE — C1894 INTRO/SHEATH, NON-LASER: HCPCS

## 2025-06-17 PROCEDURE — 63710000001 INSULIN GLARGINE PER 5 UNITS: Performed by: FAMILY MEDICINE

## 2025-06-17 PROCEDURE — C1751 CATH, INF, PER/CENT/MIDLINE: HCPCS

## 2025-06-17 PROCEDURE — 63710000001 INSULIN LISPRO (HUMAN) PER 5 UNITS: Performed by: FAMILY MEDICINE

## 2025-06-17 PROCEDURE — 84466 ASSAY OF TRANSFERRIN: CPT | Performed by: STUDENT IN AN ORGANIZED HEALTH CARE EDUCATION/TRAINING PROGRAM

## 2025-06-17 PROCEDURE — 02HV33Z INSERTION OF INFUSION DEVICE INTO SUPERIOR VENA CAVA, PERCUTANEOUS APPROACH: ICD-10-PCS | Performed by: INTERNAL MEDICINE

## 2025-06-17 PROCEDURE — 99221 1ST HOSP IP/OBS SF/LOW 40: CPT

## 2025-06-17 PROCEDURE — 25010000002 CEFTRIAXONE PER 250 MG

## 2025-06-17 PROCEDURE — 83540 ASSAY OF IRON: CPT | Performed by: STUDENT IN AN ORGANIZED HEALTH CARE EDUCATION/TRAINING PROGRAM

## 2025-06-17 RX ORDER — SODIUM CHLORIDE 9 MG/ML
40 INJECTION, SOLUTION INTRAVENOUS AS NEEDED
Status: DISCONTINUED | OUTPATIENT
Start: 2025-06-17 | End: 2025-06-18 | Stop reason: HOSPADM

## 2025-06-17 RX ORDER — SODIUM CHLORIDE 0.9 % (FLUSH) 0.9 %
10 SYRINGE (ML) INJECTION EVERY 12 HOURS SCHEDULED
Status: DISCONTINUED | OUTPATIENT
Start: 2025-06-17 | End: 2025-06-18 | Stop reason: HOSPADM

## 2025-06-17 RX ORDER — SODIUM CHLORIDE 0.9 % (FLUSH) 0.9 %
20 SYRINGE (ML) INJECTION AS NEEDED
Status: DISCONTINUED | OUTPATIENT
Start: 2025-06-17 | End: 2025-06-18 | Stop reason: HOSPADM

## 2025-06-17 RX ORDER — SODIUM CHLORIDE 0.9 % (FLUSH) 0.9 %
10 SYRINGE (ML) INJECTION AS NEEDED
Status: DISCONTINUED | OUTPATIENT
Start: 2025-06-17 | End: 2025-06-18 | Stop reason: HOSPADM

## 2025-06-17 RX ADMIN — INSULIN LISPRO 8 UNITS: 100 INJECTION, SOLUTION INTRAVENOUS; SUBCUTANEOUS at 16:55

## 2025-06-17 RX ADMIN — METOPROLOL TARTRATE 25 MG: 25 TABLET, FILM COATED ORAL at 09:03

## 2025-06-17 RX ADMIN — INSULIN GLARGINE 5 UNITS: 100 INJECTION, SOLUTION SUBCUTANEOUS at 21:04

## 2025-06-17 RX ADMIN — INSULIN LISPRO 8 UNITS: 100 INJECTION, SOLUTION INTRAVENOUS; SUBCUTANEOUS at 09:04

## 2025-06-17 RX ADMIN — EMPAGLIFLOZIN 25 MG: 25 TABLET, FILM COATED ORAL at 10:47

## 2025-06-17 RX ADMIN — Medication 10 ML: at 21:17

## 2025-06-17 RX ADMIN — RIVAROXABAN 15 MG: 15 TABLET, FILM COATED ORAL at 09:03

## 2025-06-17 RX ADMIN — Medication 10 ML: at 15:05

## 2025-06-17 RX ADMIN — INSULIN LISPRO 8 UNITS: 100 INJECTION, SOLUTION INTRAVENOUS; SUBCUTANEOUS at 12:06

## 2025-06-17 RX ADMIN — ASPIRIN 81 MG: 81 TABLET, COATED ORAL at 09:03

## 2025-06-17 RX ADMIN — METOPROLOL TARTRATE 25 MG: 25 TABLET, FILM COATED ORAL at 21:03

## 2025-06-17 RX ADMIN — DRONEDARONE 400 MG: 400 TABLET, FILM COATED ORAL at 16:55

## 2025-06-17 RX ADMIN — CEFTRIAXONE 2000 MG: 2 INJECTION, POWDER, FOR SOLUTION INTRAMUSCULAR; INTRAVENOUS at 21:03

## 2025-06-17 RX ADMIN — ROSUVASTATIN CALCIUM 20 MG: 20 TABLET, FILM COATED ORAL at 09:02

## 2025-06-17 RX ADMIN — SERTRALINE HYDROCHLORIDE 25 MG: 50 TABLET ORAL at 09:03

## 2025-06-17 RX ADMIN — PANTOPRAZOLE SODIUM 40 MG: 40 TABLET, DELAYED RELEASE ORAL at 09:03

## 2025-06-17 RX ADMIN — LOSARTAN POTASSIUM 25 MG: 25 TABLET, FILM COATED ORAL at 09:03

## 2025-06-17 RX ADMIN — DRONEDARONE 400 MG: 400 TABLET, FILM COATED ORAL at 09:02

## 2025-06-17 RX ADMIN — INSULIN GLARGINE 5 UNITS: 100 INJECTION, SOLUTION SUBCUTANEOUS at 09:04

## 2025-06-17 RX ADMIN — Medication 10 ML: at 10:47

## 2025-06-17 NOTE — CONSULTS
[facility] Cardiothoracic Surgery In-Patient Consult    Name:  Nicholas Yin  MRN Number:  [id number]  Date of Admission:  6/13/2025  Date of Consultation: 6/17/2025    Consulting Provider:    PCP: Waldemar Trejo MD  IP Care Team:  Patient Care Team:  Waldemar Trejo MD as PCP - General  Waldemar Trejo MD as PCP - Family Medicine  Carlie Gill RN as Ambulatory  ([facility])    Reason for Consultation: Mitral valve endocarditis    History of Present Illness:    Nicholas Yin is a 80 y.o. male with a history of severe aortic stenosis s/p TAVR (29 mm Leatha 3 valve) with Dr. Lopez on 5/8/2023, CAD s/p stent, CHF, type 2 diabetes, hypertension, hyperlipidemia, CKD stage III, GERD, and never smoker.   He was recently seen in [facility] ER on 6/3 for fever as high as 101.8 and was diagnosed with pneumonia. He was given cefdinir, doxycycline, and prednisone. He presented to our ER on 6/13 for bacteremia, blood cultures were positive for Streptococcus sanguinis.  ID has been following. He underwent LAURIE yesterday which showed LVEF 60%, mobile mitral valve mass seen on posterior leaflet measuring 1.7 cm consistent with valvular vegetation. He denies chest pain or lower extremity edema. He does report exertional dyspnea over the past month.  His vital signs are stable and he is currently on 2L saturations are 96%.    Review of Systems:  Review of Systems   Constitutional:  Positive for fatigue and fever.   Respiratory:  Positive for shortness of breath.    Hematological:  Bruises/bleeds easily.   All other systems reviewed and are negative.      Hospital Problems:   Bacteremia       Past Medical History:    Past Medical History:   Diagnosis Date    Anemia     Aortic stenosis     Arthritis     Back pain     CancerTesticular/Colon     test -1982  Colon -2005    CHF (congestive heart failure)     Coronary artery disease     COVID-19 2022    DDD  (degenerative disc disease), lumbosacral     Diabetes mellitus     Dyspnea     Elevated cholesterol     Erectile dysfunction     GERD (gastroesophageal reflux disease)     History of transfusion     Hyperlipidemia     Hypertension     TIA (transient ischemic attack)        Past Surgical History:    Past Surgical History:   Procedure Laterality Date    AORTIC VALVE REPAIR/REPLACEMENT N/A 5/8/2023    Procedure: TRANSCATHETER AORTIC VALVE REPLACEMENT + LAURIE, LEFT FEMORAL ENDARTERECTOMY WITH PATCH;  Surgeon: Arben Lopez MD;  Location: [facility];  Service: Cardiothoracic;  Laterality: N/A;    AORTIC VALVE REPAIR/REPLACEMENT N/A 5/8/2023    Procedure: Transfemoral Transcatheter Aortic Valve Replacement;  Surgeon: Rachael Mendoza MD;  Location: [facility];  Service: Cardiovascular;  Laterality: N/A;    CARDIAC CATHETERIZATION N/A 08/15/2019    Procedure: Left Heart Cath;  Surgeon: Paul Villatoro MD;  Location: [facility] CATH INVASIVE LOCATION;  Service: Cardiology    CARDIAC CATHETERIZATION      04/10/2023 PER DR. MENDOZA    CARDIAC ELECTROPHYSIOLOGY PROCEDURE N/A 5/15/2023    Procedure: Pacemaker DC - [employer];  Surgeon: Rachael Mendoza MD;  Location: [facility] CATH INVASIVE LOCATION;  Service: Cardiology;  Laterality: N/A;    CATARACT EXTRACTION, BILATERAL      COLON SURGERY      colon resection for cancer - sigmoid removed - no chemo no radiation    COLONOSCOPY      COLONOSCOPY N/A 01/27/2021    Procedure: COLONOSCOPY;  Surgeon: Greg Barraza MD;  Location: [facility];  Service: General;  Laterality: N/A;    CORONARY STENT PLACEMENT      1 STENT    ENDOSCOPY  1989    EYE SURGERY Right     retinal detachment    HI RT/LT HEART CATHETERS N/A 08/15/2019    Procedure: Percutaneous Coronary Intervention;  Surgeon: Paul Villatoro MD;  [facility] CATH INVASIVE LOCATION;  Service: Cardiology    SCALP LESION REMOVAL W/ FLAP AND SKIN GRAFT   12/16/2022    basal cell removal    SHOULDER ARTHROSCOPY Right     SKIN LESION EXCISION N/A 01/27/2021    Procedure: SKIN LESIONS EXCISION FROM LEFT TEMPLE AREA AND ABDOMEN.;  Surgeon: Greg Barraza MD;  Location: [facility] OR;  Service: General;  Laterality: N/A;    VASECTOMY Right     testicle removed d/t cancer - with radiation       Family History:    Family History   Problem Relation Age of Onset    Stroke Mother     Hypertension Mother     Alcohol abuse Mother     COPD Mother     COPD Father     Alcohol abuse Father     Hypertension Father     Alzheimer's disease Father     Heart disease Brother     Hypertension Brother     Diabetes Brother     Stroke Maternal Grandfather     Cancer Paternal Grandmother        Social History:    Social History     Socioeconomic History    Marital status:      Spouse name: lita    Number of children: 3    Years of education: 16   Tobacco Use    Smoking status: Never     Passive exposure: Never    Smokeless tobacco: Never   Vaping Use    Vaping status: Never Used   Substance and Sexual Activity    Alcohol use: Yes     Comment: vary rarely    Drug use: No    Sexual activity: Defer     Partners: Female       Allergies:  Allergies   Allergen Reactions    Ct Contrast Anaphylaxis    Iodinated Contrast Media Shortness Of Breath         Physical Exam:  Vital Signs:    Temp:  [97.3 °F (36.3 °C)-98.8 °F (37.1 °C)] 98.8 °F (37.1 °C)  Heart Rate:  [60-64] 61  Resp:  [8-18] 18  BP: (100-172)/(48-76) 142/66  Body mass index is 32.13 kg/m².     Physical Exam  Vitals reviewed.   Constitutional:       Appearance: He is obese.   HENT:      Head: Normocephalic.      Mouth/Throat:      Pharynx: Oropharynx is clear.   Eyes:      Pupils: Pupils are equal, round, and reactive to light.   Cardiovascular:      Rate and Rhythm: Normal rate.      Heart sounds: Murmur heard.   Pulmonary:      Effort: Pulmonary effort is normal.   Abdominal:      General: Bowel sounds are normal.    Musculoskeletal:         General: Normal range of motion.      Cervical back: Neck supple.   Skin:     General: Skin is warm.      Coloration: Skin is pale.   Neurological:      General: No focal deficit present.      Mental Status: He is alert and oriented to person, place, and time.   Psychiatric:         Mood and Affect: Mood normal.         Behavior: Behavior normal.         Labs/Imaging/Procedures:   Results from last 7 days   Lab Units 06/16/25  0435 06/15/25  0745   SODIUM mmol/L 140 139   POTASSIUM mmol/L 5.0 5.3*   CHLORIDE mmol/L 111* 112*   CO2 mmol/L 21.0* 21.0*   BUN mg/dL 33.5* 31.4*   CREATININE mg/dL 2.35* 2.21*   CALCIUM mg/dL 8.7 8.7   BILIRUBIN mg/dL  --  0.3   ALK PHOS U/L  --  76   ALT (SGPT) U/L  --  11   AST (SGOT) U/L  --  20   GLUCOSE mg/dL 167* 157*     Results from last 7 days   Lab Units 06/15/25  0745   CK TOTAL U/L 31     Results from last 7 days   Lab Units 06/16/25  0435 06/15/25  0745 06/14/25  0922   WBC 10*3/mm3 9.97 10.39 10.79   HEMOGLOBIN g/dL 9.1* 9.2* 9.2*   HEMATOCRIT % 30.6* 30.0* 30.3*   PLATELETS 10*3/mm3 225 263 266         Results from last 7 days   Lab Units 06/16/25  0435   MAGNESIUM mg/dL 2.2         CT Head Without Contrast  Result Date: 6/3/2025  Stable exam. No CT evidence of acute intracranial abnormality.  This report was finalized on 6/3/2025 11:34 AM by Dr. Luther Verdugo MD.      CT Abdomen Pelvis Without Contrast  Result Date: 6/3/2025  1.  No acute findings identified within abdomen or pelvis. 2.  CHF/edema with trace effusions. 3.  Fat only containing anterior abdominal wall hernia. 4.  Stable renal cysts left kidney. 5.  No hydronephrosis or obstructing ureteral stones. 6.  Other incidental/nonacute findings above stable from previous.  This report was finalized on 6/3/2025 11:32 AM by Dr. Luther Verdugo MD.      CT Chest Without Contrast Diagnostic  Result Date: 6/3/2025  1.  CHF/edema with interstitial thickening and trace pleural effusions. Marked  cardiomegaly and aortic valvuloplasty changes noted. 2.  Basilar and peripheral predominant pulmonary fibrosis. Left basilar atelectasis. 3.  No consolidative airspace disease. No pneumothorax. 4.  Scattered mediastinal lymph nodes stable from previous exam and favor reactive etiology probably in the setting of congestion. 5.  Other incidental/nonacute findings above.  This report was finalized on 6/3/2025 11:29 AM by Dr. Luther Verdugo MD.      XR Chest 1 View  Result Date: 6/3/2025  1.  Trace effusions are noted. 2.  Mild pulmonary vascular congestion. 3.  Cardiomegaly and valvuloplasty changes again noted.  This report was finalized on 6/3/2025 10:33 AM by Dr. Luther Verdugo MD.       LHC: Results for orders placed during the hospital encounter of 05/08/23    Cardiac Catheterization/Vascular Study    Conclusion  Please see operative report.     Echo: Results for orders placed during the hospital encounter of 06/13/25    Adult Transesophageal Echo (LAURIE) W/ Cont if Necessary Per Protocol    Interpretation Summary    Left ventricular systolic function is normal. Estimated left ventricular EF = 60%    Left ventricular wall thickness is consistent with mild concentric hypertrophy.    A 29 mm TONY 3 pericardial prosthesis is present in the aortic position.  The leaflets appear thickened but no obvious valvular vegetation is seen.  The angle of interrogation is suboptimal for valve gradient and valve area.  No abscess formation is seen.    A mobile mitral valve mass is seen on the posterior leaflet measuring 1.7 cm.  This appears consistent with a valvular vegetation.  No abscess formation is seen.    No mass or vegetation is seen on the tricuspid valve.    No masses are seen in association with the pacing leads.    Anesthesia: Cath lab/Echo lab moderate sedation    I was present with the patient for the duration of moderate sedation and supervised staff who had no other duties and monitored the patient for the entire  procedure.    Name of independent trained observer: Zara Contreras RN  Intra-service start time: 1340  Intra-service end time: 1407       Assessment:    Bacteremia  Mitral valve endocarditis    Plan:  Continue antibiotics per ID.  I had a long discussion with patient as well as patient's wife who is a nurse practitioner.  Patient stated that he would like to go home and continue his antibiotic therapy and repeat echocardiogram in 6 weeks and we can follow-up in office after echo is complete.  He is not interested in pursuing surgery and told me multiple times he has lived a full life and wants to go home to see his najera retriever. Ok to discharge home once antibiotic regimen is established and patient has home supplies.         HCIDI Kapadia  08:11 EDT  06/17/25     Thank you for allowing us to participate in the care of your patient. Please do not hesitate to contact us with additional questions or concerns.       I have personally evaluated and examined the patient.  The above documentation has been reviewed and I agree.      MATILDA Kohli MD  Cardiothoracic Surgery

## 2025-06-17 NOTE — PROGRESS NOTES
[facility]    INFECTIOUS DISEASE PROGRESS NOTE    Nicholas Yin  1945  [id number]    Date of consult: 6/14/2025    Admit date: 6/13/2025    Requesting Provider: No Known Provider  Evaluating physician: Bulmaro Jin MD  Reason for Consultation: Streptococcus sanguinous high-grade bacteremia 4-4 blood cultures from 6/3/2025 partially treated, concern for endocarditis  Chief Complaint: Above      Subjective   History of present illness:  Patient is a  80 y.o.  Yr old male with a history of aortic stenosis status post TAVR by Dr. Lopez on 5/8/2023, CAD, congestive heart failure, diabetes mellitus type 2, hyperlipidemia, essential hypertension, TIA, who was evaluated at [facility] on 6/3/2025 for pneumonia and treated eventually with cefdinir and doxycycline until 6/10.  Blood cultures at that encounter were positive in 4-4 bottles for Streptococcus sanguinous.  Patient was followed up with cardiology who recommended admission for evaluation of possible endocarditis.  Patient has no other localizing signs or symptoms of infection.  The patient was admitted to [facility] on 6/13/2025.  I was consulted on 6/14/2025.  The patient denies ill contacts, TB, HIV, or zoonotic exposures.    6/15/2025 history reviewed.  No high fevers or chills.  Tolerating antibiotics for streptococcal sepsis.  On ceftriaxone anticipating continuing until 8/1/2025 and reassess for 6 weeks.  TTE pending results.  Blood cultures on 6/13 negative.    6/16/2025 history reviewed.  No high fever or chills.  Continues on ceftriaxone until 8/1/2025 and reassess for intravascular or pacemaker infection.  TTE on 6/15 without obvious endocarditis or pacemaker involvement.  LAURIE ordered.    6/17/25 hx rev.  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  CT surg to see.  NO fever.    Past Medical History:   Diagnosis Date    Anemia     Aortic stenosis     Arthritis     Back pain      CancerTesticular/Colon     test -1982  Colon -2005    CHF (congestive heart failure)     Coronary artery disease     COVID-19 2022    DDD (degenerative disc disease), lumbosacral     Diabetes mellitus     Dyspnea     Elevated cholesterol     Erectile dysfunction     GERD (gastroesophageal reflux disease)     History of transfusion     Hyperlipidemia     Hypertension     TIA (transient ischemic attack)        Past Surgical History:   Procedure Laterality Date    AORTIC VALVE REPAIR/REPLACEMENT N/A 5/8/2023    Procedure: TRANSCATHETER AORTIC VALVE REPLACEMENT + LAURIE, LEFT FEMORAL ENDARTERECTOMY WITH PATCH;  Surgeon: Arben Lopez MD;  Location: [facility];  Service: Cardiothoracic;  Laterality: N/A;    AORTIC VALVE REPAIR/REPLACEMENT N/A 5/8/2023    Procedure: Transfemoral Transcatheter Aortic Valve Replacement;  Surgeon: Rachael Mendoza MD;  Location: [facility];  Service: Cardiovascular;  Laterality: N/A;    CARDIAC CATHETERIZATION N/A 08/15/2019    Procedure: Left Heart Cath;  Surgeon: Paul Villatoro MD;  Location: [facility] CATH INVASIVE LOCATION;  Service: Cardiology    CARDIAC CATHETERIZATION      04/10/2023 PER DR. MENDOZA    CARDIAC ELECTROPHYSIOLOGY PROCEDURE N/A 5/15/2023    Procedure: Pacemaker DC - [employer];  Surgeon: Rachael Mendoza MD;  Location: [facility] CATH INVASIVE LOCATION;  Service: Cardiology;  Laterality: N/A;    CATARACT EXTRACTION, BILATERAL      COLON SURGERY      colon resection for cancer - sigmoid removed - no chemo no radiation    COLONOSCOPY      COLONOSCOPY N/A 01/27/2021    Procedure: COLONOSCOPY;  Surgeon: Greg Barraza MD;  Location: [facility];  Service: General;  Laterality: N/A;    CORONARY STENT PLACEMENT      1 STENT    ENDOSCOPY  1989    EYE SURGERY Right     retinal detachment    NC RT/LT HEART CATHETERS N/A 08/15/2019    Procedure: Percutaneous Coronary Intervention;  Surgeon: Paul Villatoro MD;   Location: [facility] INVASIVE LOCATION;  Service: Cardiology    SCALP LESION REMOVAL W/ FLAP AND SKIN GRAFT  12/16/2022    basal cell removal    SHOULDER ARTHROSCOPY Right     SKIN LESION EXCISION N/A 01/27/2021    Procedure: SKIN LESIONS EXCISION FROM LEFT TEMPLE AREA AND ABDOMEN.;  Surgeon: Greg Barraza MD;  Location: [facility] OR;  Service: General;  Laterality: N/A;    VASECTOMY Right     testicle removed d/t cancer - with radiation       Pediatric History   Patient Parents    Not on file     Other Topics Concern    Not on file   Social History Narrative    Patient lives in [place], [place]    Positive for alcohol, no drug use or tobacco,  to wife who is primary care NP for [employer], he worked in [employer]    family history includes Alcohol abuse in his father and mother; Alzheimer's disease in his father; COPD in his father and mother; Cancer in his paternal grandmother; Diabetes in his brother; Heart disease in his brother; Hypertension in his brother, father, and mother; Stroke in his maternal grandfather and mother.    Allergies   Allergen Reactions    Ct Contrast Anaphylaxis    Iodinated Contrast Media Shortness Of Breath       Immunization History   Administered Date(s) Administered    COVID-19 (PFIZER) Purple Cap Monovalent 01/11/2021, 02/01/2021, 09/24/2021    Fluad Quad 65+ 10/08/2020    Fluzone High-Dose 65+YRS 10/27/2017, 10/04/2018, 10/25/2024    Fluzone High-Dose 65+yrs 10/13/2021, 10/13/2022, 10/27/2023    Fluzone Quad >6mos (Multi-dose) 11/11/2016    Hep A / Hep B 07/17/2018    Hepatitis A 01/04/2019    IPV 06/05/1997    Pneumococcal Conjugate 13-Valent (PCV13) 06/08/2015    Pneumococcal Polysaccharide (PPSV23) 09/02/2011    Td (TDVAX) 06/05/1997    Tdap 12/09/2011    flucelvax quad pfs =>4 YRS 09/05/2019       Medication:  @Scheduled Meds:aspirin, 81 mg, Oral, Daily  cefTRIAXone, 2,000 mg, Intravenous, Q24H  dronedarone, 400 mg, Oral, BID With Meals  empagliflozin, 25 mg, Oral,  Daily  insulin glargine, 5 Units, Subcutaneous, Q12H  Insulin Lispro, 8 Units, Subcutaneous, TID With Meals  losartan, 25 mg, Oral, Q24H  metoprolol tartrate, 25 mg, Oral, BID  pantoprazole, 40 mg, Oral, Q AM  rivaroxaban, 15 mg, Oral, Daily  rosuvastatin, 20 mg, Oral, Daily  sertraline, 25 mg, Oral, Daily  sodium chloride, 10 mL, Intravenous, Q12H      Continuous Infusions:   PRN Meds:.  senna-docusate sodium **AND** polyethylene glycol **AND** bisacodyl **AND** bisacodyl    Calcium Replacement - Follow Nurse / BPA Driven Protocol    dextrose    dextrose    glucagon (human recombinant)    Magnesium Cardiology Dose Replacement - Follow Nurse / BPA Driven Protocol    nitroglycerin    ondansetron    Phosphorus Replacement - Follow Nurse / BPA Driven Protocol    Potassium Replacement - Follow Nurse / BPA Driven Protocol    sodium chloride    sodium chloride     Please refer to the medical record for a full medication list    Review of Systems:    Constitutional-- No Fever, chills or sweats.  Appetite good, and no malaise. No fatigue.  HEENT-- No new vision, hearing or throat complaints.  No epistaxis or oral sores.  Denies odynophagia or dysphagia.  No odynophagia or dysphagia. No headache, photophobia or neck stiffness.  CV-- No chest pain, palpitation or syncope  Resp-- No SOB/cough/Hemoptysis  GI- No nausea, vomiting, or diarrhea.  No hematochezia, melena, or hematemesis. Denies jaundice or chronic liver disease.  -- No dysuria, hematuria, or flank pain.  Denies hesitancy, urgency.  Lymph- no swollen lymph nodes in neck/axilla or groin.   Heme- No active bruising or bleeding; no Hx of DVT or PE.  MS-- no swelling or pain in the bones or joints of arms/legs.  No new back pain.  Neuro-- No acute focal weakness or numbness in the arms or legs.  No seizures.  Skin--No rashes or lesions, no nodules    Physical Exam:   Vital Signs   Temp:  [97.3 °F (36.3 °C)-98.8 °F (37.1 °C)] 98.8 °F (37.1 °C)  Heart Rate:  [60-64]  "61  Resp:  [8-18] 18  BP: (100-172)/(48-76) 142/66    Blood pressure 142/66, pulse 61, temperature 98.8 °F (37.1 °C), temperature source Oral, resp. rate 18, height 175.3 cm (69.02\"), weight 98.7 kg (217 lb 11.2 oz), SpO2 96%.  GENERAL: Awake and alert, in minimal distress. Appears older than stated age.  Resting in bed.  Visiting with wife.  HEENT:  Normocephalic, atraumatic.  Oropharynx without thrush. Dentition in good repair. No cervical adenopathy. No neck masses.  Ears externally normal, Nose externally normal.  EYES: No conjunctival injection. No icterus. EOM full.  LYMPHATICS: No lymphadenopathy of the neck or axillary or inguinal regions.   HEART: No murmur, gallop, or pericardial friction rub. Reg rate rhythm, No JVD at 45 degrees.  LUNGS: Clear to auscultation and percussion. No respiratory distress, no use of accessory muscles.  No wheezes.  ABDOMEN: Soft, nontender, nondistended. No appreciable HSM.  Bowel sounds normal.  SKIN: Warm and dry without cutaneous eruptions.  No nodules.  No Janeway lesions, Osler's nodes.  PSYCHIATRIC: Mental status lucid. No confusion.  EXT:  No cellulitic change.  Nl ROM.  NEURO: Oriented to name, nonfocal.      Results Review:   I reviewed the patient's new clinical results.  I reviewed the patient's new imaging results and agree with the interpretation.  I reviewed the patient's other test results and agree with the interpretation    Results from last 7 days   Lab Units 06/16/25  0435 06/15/25  0745 06/14/25  0922   WBC 10*3/mm3 9.97 10.39 10.79   HEMOGLOBIN g/dL 9.1* 9.2* 9.2*   HEMATOCRIT % 30.6* 30.0* 30.3*   PLATELETS 10*3/mm3 225 263 266     Results from last 7 days   Lab Units 06/16/25  0435   SODIUM mmol/L 140   POTASSIUM mmol/L 5.0   CHLORIDE mmol/L 111*   CO2 mmol/L 21.0*   BUN mg/dL 33.5*   CREATININE mg/dL 2.35*   GLUCOSE mg/dL 167*   CALCIUM mg/dL 8.7     Results from last 7 days   Lab Units 06/15/25  0745   ALK PHOS U/L 76   BILIRUBIN mg/dL 0.3   ALT (SGPT) " "U/L 11   AST (SGOT) U/L 20         Results from last 7 days   Lab Units 06/15/25  0745   CRP mg/dL <0.30         Results from last 7 days   Lab Units 06/15/25  0745   LACTATE mmol/L 1.0     Estimated Creatinine Clearance: 29.5 mL/min (A) (by C-G formula based on SCr of 2.35 mg/dL (H)).  CPK          6/15/2025    07:45   Common Labs   Creatine Kinase 31       Procalitonin Results:       Brief Urine Lab Results  (Last result in the past 365 days)        Color   Clarity   Blood   Leuk Est   Nitrite   Protein   CREAT   Urine HCG        06/13/25 2158 Yellow   Clear   Negative   Negative   Negative   100 mg/dL (2+)                  No results found for: \"SITE\", \"ALLENTEST\", \"PHART\", \"OWH0UYD\", \"PO2ART\", \"TFQ3PZS\", \"BASEEXCESS\", \"W2JTNPQB\", \"HGBBG\", \"HCTABG\", \"OXYHEMOGLOBI\", \"METHHGBN\", \"CARBOXYHGB\", \"CO2CT\", \"BAROMETRIC\", \"MODALITY\", \"FIO2\"     Microbiology:      Results for orders placed or performed during the hospital encounter of 06/13/25   Blood Culture - Blood, Arm, Left    Specimen: Arm, Left; Blood   Result Value Ref Range    Blood Culture No growth at 3 days         Susceptibility     Streptococcus sanguinis     MAYANK     Ampicillin 0.5 Intermediate     Ceftriaxone 0.25 Susceptible     Penicillin G 0.25 Intermediate     Vancomycin 1 Susceptible                  Blood cultures 6/3/2025 4-4 bottles positive          Brief Urine Lab Results  (Last result in the past 365 days)        Color   Clarity   Blood   Leuk Est   Nitrite   Protein   CREAT   Urine HCG        06/13/25 2158 Yellow   Clear   Negative   Negative   Negative   100 mg/dL (2+)                   Radiology:  Imaging Results (Last 72 Hours)       ** No results found for the last 72 hours. **        CT scan abdomen and pelvis 6/3/2025 negative for acute findings, CT scan of the chest consistent with CHF and edema with cardiomegaly and peripheral pulmonary fibrosis and left basilar atelectasis.  No consolidation.            LAURIE report: 6/16/2025:      Left " ventricular systolic function is normal. Estimated left ventricular EF = 60%    Left ventricular wall thickness is consistent with mild concentric hypertrophy.    A 29 mm TONY 3 pericardial prosthesis is present in the aortic position.  The leaflets appear thickened but no obvious valvular vegetation is seen.  The angle of interrogation is suboptimal for valve gradient and valve area.  No abscess formation is seen.    A mobile mitral valve mass is seen on the posterior leaflet measuring 1.7 cm.  This appears consistent with a valvular vegetation.  No abscess formation is seen.    No mass or vegetation is seen on the tricuspid valve.    No masses are seen in association with the pacing leads.    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  High grade Streptococcus sanguinous infection with 4 out of 4 blood cultures positive from 6/3/2025 with previous treatment for pneumonia, although in reviewing films no obvious consolidation on his CT scan of the chest or chest x-ray.  CT scan of the abdomen and pelvis with no other obvious focus.  Urinalysis benign.  This was partially treated with oral cephalosporin.  Repeat blood cultures on 6/13 negative, but likely related to his recent treatment with cephalosporin.  Intermediately sensitive to penicillin.  Sensitive to ceftriaxone and vancomycin.  Acute on chronic renal failure.  Creatinine was 3.18 on 6/3, currently 2.59.  2.21 on 6/15.  2.35 on 6/16, worse.  Patient reports he is not going to be on dialysis.  Leukocytosis, neutrophilic, improved from his 6/3 level of 18.  Resolved.  Anemia, chronic disease related to above issues.  Slightly worse.  Cardiac regularly, TTE from 4/25 with ejection fraction 56%, TAVR present without stenosis or regurgitation or paravalvular leak.  Diabetes mellitus type 2 with increased risk for infection, hemoglobin A1c 7.80 on 6/3.  Previous aortic stenosis status post TAVR by Dr. Lopez on  5/8/2023.    PLAN:    Diagnostically, continue to follow patient's physical exam, CBC, CMP, CRP, blood cultures which were obtained on 6/13.  Therapeutically, consider 6 weeks of IV ceftriaxone even if repeat blood cultures until 8/1 are negative given his high risk and TAVR, and pacemaker.  May need to consider pacemaker removal as well, with the possibility that this prosthetic device is infected and may not clinically resolve infection without removal.  If LAURIE is negative, will give 6 weeks of antibiotics until 8/1, then possibly oral suppression with penicillin for 3 months then stop and evaluate.  Supportive care.  PICC line as patient not considering future dialysis long term.  CT surg eval.    I discussed the patient's findings and my recommendations with patient and nursing staff, discussed with Dr. Wright, and the patient's wife who is an APRN.    Case management orders: Please arrange for home antibiotics with ceftriaxone 2 g IV daily to continue until 8/1/2025 and reassess for streptococcal intravascular infection.  Check CBC, CMP, CRP weekly while on IV antibiotics.  Fax orders to [phone], call [phone] with final arrangements.  Arrange for follow-up with me in 1 week postdischarge.  Weekly PICC dressing changes.    Thank you for asking me to see Nicholas Yin.  Our group would be pleased to follow this patient over the course of their hospitalization and assist with outpatient antimicrobial therapy, as indicated.  Further recommendations depend on the results of the cultures and clinical course.  Side effects of medications discussed.  The patient is at increased risk for adverse drug reactions, complications of IV access, and readmission, CVA, death.  Discussed with Dr. Wright.  Time > 50 min.    This visit included the following complex service elements:  Complex medical decision-making associated with antimicrobial prescribing.  In-depth chart review with high level synthesis for complex  diagnoses.  Managed infection treatment protocol associated with transitions of care for this complex patient.  Counseled patients, family members and/or caregivers regarding infection prevention.  Managed infection control protocol.     Bulmaro Jin MD  6/17/2025

## 2025-06-18 ENCOUNTER — READMISSION MANAGEMENT (OUTPATIENT)
Dept: CALL CENTER | Facility: HOSPITAL | Age: 80
End: 2025-06-18
Payer: COMMERCIAL

## 2025-06-18 VITALS
DIASTOLIC BLOOD PRESSURE: 63 MMHG | RESPIRATION RATE: 18 BRPM | OXYGEN SATURATION: 93 % | BODY MASS INDEX: 32.22 KG/M2 | HEIGHT: 69 IN | WEIGHT: 217.5 LBS | HEART RATE: 60 BPM | SYSTOLIC BLOOD PRESSURE: 131 MMHG | TEMPERATURE: 97.6 F

## 2025-06-18 LAB
BACTERIA SPEC AEROBE CULT: NORMAL
BACTERIA SPEC AEROBE CULT: NORMAL
GLUCOSE BLDC GLUCOMTR-MCNC: 168 MG/DL (ref 70–130)
GLUCOSE BLDC GLUCOMTR-MCNC: 179 MG/DL (ref 70–130)

## 2025-06-18 PROCEDURE — 63710000001 INSULIN GLARGINE PER 5 UNITS: Performed by: FAMILY MEDICINE

## 2025-06-18 PROCEDURE — 99232 SBSQ HOSP IP/OBS MODERATE 35: CPT

## 2025-06-18 PROCEDURE — 82948 REAGENT STRIP/BLOOD GLUCOSE: CPT

## 2025-06-18 PROCEDURE — 63710000001 INSULIN LISPRO (HUMAN) PER 5 UNITS: Performed by: FAMILY MEDICINE

## 2025-06-18 PROCEDURE — 99239 HOSP IP/OBS DSCHRG MGMT >30: CPT | Performed by: STUDENT IN AN ORGANIZED HEALTH CARE EDUCATION/TRAINING PROGRAM

## 2025-06-18 RX ORDER — FERROUS SULFATE 325(65) MG
325 TABLET ORAL 3 TIMES WEEKLY
Qty: 30 TABLET | Refills: 3 | Status: SHIPPED | OUTPATIENT
Start: 2025-06-18 | End: 2026-06-18

## 2025-06-18 RX ORDER — SERTRALINE HYDROCHLORIDE 25 MG/1
25 TABLET, FILM COATED ORAL DAILY
Qty: 30 TABLET | Refills: 3 | Status: SHIPPED | OUTPATIENT
Start: 2025-06-19 | End: 2025-06-27

## 2025-06-18 RX ADMIN — SERTRALINE HYDROCHLORIDE 25 MG: 50 TABLET ORAL at 09:53

## 2025-06-18 RX ADMIN — RIVAROXABAN 15 MG: 15 TABLET, FILM COATED ORAL at 09:53

## 2025-06-18 RX ADMIN — PANTOPRAZOLE SODIUM 40 MG: 40 TABLET, DELAYED RELEASE ORAL at 05:27

## 2025-06-18 RX ADMIN — LOSARTAN POTASSIUM 25 MG: 25 TABLET, FILM COATED ORAL at 09:53

## 2025-06-18 RX ADMIN — INSULIN GLARGINE 5 UNITS: 100 INJECTION, SOLUTION SUBCUTANEOUS at 09:52

## 2025-06-18 RX ADMIN — INSULIN LISPRO 8 UNITS: 100 INJECTION, SOLUTION INTRAVENOUS; SUBCUTANEOUS at 13:22

## 2025-06-18 RX ADMIN — ASPIRIN 81 MG: 81 TABLET, COATED ORAL at 09:53

## 2025-06-18 RX ADMIN — DRONEDARONE 400 MG: 400 TABLET, FILM COATED ORAL at 09:57

## 2025-06-18 RX ADMIN — Medication 10 ML: at 09:57

## 2025-06-18 RX ADMIN — ROSUVASTATIN CALCIUM 20 MG: 20 TABLET, FILM COATED ORAL at 09:54

## 2025-06-18 RX ADMIN — EMPAGLIFLOZIN 25 MG: 25 TABLET, FILM COATED ORAL at 09:53

## 2025-06-18 RX ADMIN — METOPROLOL TARTRATE 25 MG: 25 TABLET, FILM COATED ORAL at 09:53

## 2025-06-18 RX ADMIN — INSULIN LISPRO 8 UNITS: 100 INJECTION, SOLUTION INTRAVENOUS; SUBCUTANEOUS at 09:52

## 2025-06-18 NOTE — PROGRESS NOTES
"          Clinical Nutrition Assessment     Patient Name: Nicholas Yin  YOB: 1945  MRN: [id number]  Date of Encounter: 06/18/25 14:38 EDT  Admission date: 6/13/2025  Reason for Visit: Follow-up protocol    Assessment   Nutrition Assessment   Admission Diagnosis:  Bacteremia [R78.81]    Problem List:    Bacteremia      PMH:   He  has a past medical history of Anemia, Aortic stenosis, Arthritis, Back pain, CancerTesticular/Colon, CHF (congestive heart failure), Coronary artery disease, COVID-19 (2022), DDD (degenerative disc disease), lumbosacral, Diabetes mellitus, Dyspnea, Elevated cholesterol, Erectile dysfunction, GERD (gastroesophageal reflux disease), History of transfusion, Hyperlipidemia, Hypertension, and TIA (transient ischemic attack).    PSH:  He  has a past surgical history that includes Colon surgery; Vasectomy (Right); Eye surgery (Right); Shoulder arthroscopy (Right); Colonoscopy; pr rt/lt heart catheters (N/A, 08/15/2019); Cardiac catheterization (N/A, 08/15/2019); Esophagogastroduodenoscopy (1989); Colonoscopy (N/A, 01/27/2021); Skin lesion excision (N/A, 01/27/2021); Scalp lesion removal w/ flap and skin graft (12/16/2022); Cardiac catheterization; Cataract extraction, bilateral; Coronary stent placement; Aortic valve replacement (N/A, 5/8/2023); Aortic valve replacement (N/A, 5/8/2023); and Cardiac electrophysiology procedure (N/A, 5/15/2023).    Applicable Nutrition History:       Anthropometrics     Height: Height: 175.3 cm (69.02\")  Last Filed Weight: Weight: 98.7 kg (217 lb 8 oz) (06/18/25 0530)  Method: Weight Method: Bed scale  BMI: BMI (Calculated): 32.1    UBW:  224 lbs x 3 mo ago  Weight change: No significant changes     Weight       Weight (kg) Weight (lbs) Weight Method Visit Report   2/22/2024 103.42 kg  228 lb   --    6/4/2024 105.235 kg  232 lb   --    8/22/2024 104.781 kg  231 lb   --    12/20/2024 102.513 kg  226 lb   --    1/17/2025 104.327 kg  230 lb   --  "   3/19/2025 103.874 kg  229 lb   --    4/11/2025 105.688 kg  233 lb   --    6/3/2025 101.152 kg  223 lb  Stated     6/12/2025 101.152 kg  223 lb   --    6/13/2025 97.523 kg  215 lb      6/14/2025 99.746 kg  219 lb 14.4 oz      6/15/2025 98.431 kg  217 lb       98.4 kg  216 lb 14.9 oz      6/16/2025 97.886 kg  215 lb 12.8 oz  Bed scale     6/17/2025 98.748 kg  217 lb 11.2 oz  Bed scale       Nutrition Focused Physical Exam    Date:  6/16       Unable to perform due to Pt unable to participate at time of visit     Subjective   Reported/Observed/Food/Nutrition Related History:     6/18  Patient to be going home today, will continue IV antibiotics at home. Pt doing well with po intakes.    6/16  Patient screened per nutrition protocol for MST2 or greater. Presented for abnormal blood culture, noted Streptococcus sanguinous growing. Noted to have recent admission with pneumonia and treated with antibiotics. Patient out of room at time of visit for TTE, spouse at bedside able to contribute to nutrition assessment. Spoke with wife who reported patient has not been eating as well the past month. She states that patient is particular and only like certain food items. Patient reported weight loss, however, no significant weight loss noted in EMR. Declined any chewing or swallowing difficulties. Declined all ONS options at this time. NKFA. No specific food preferences noted at this time.    Current Nutrition Prescription   PO: Diet: Cardiac, Diabetic, Renal; Healthy Heart (2-3 Na+); Consistent Carbohydrate; Low Potassium, Low Phosphorus, Low Sodium (2-3g); Fluid Consistency: Thin (IDDSI 0)  Oral Nutrition Supplement: N/A  Intake: 2 Days: 75% x 6 meals    Assessment & Plan   Nutrition Diagnosis   Date:  6/16            Updated:  6/18  Problem Predicted inadequate nutrient intake    Etiology Decreased ability to consume sufficient energy 2/2 recent pneumonia    Signs/Symptoms Reported per family   Status: Discontinued; adequate  intakes per documentation       Goal:   Nutrition to support treatment and Maintain intake      Nutrition Intervention      Follow treatment progress, Care plan reviewed, Encourage intake    Encourage continued adequate intakes   No ONS at this time    Monitoring/Evaluation:   Per protocol, PO intake, Pertinent labs, Weight, Symptoms, POC/GOC    Isha Baltazar RD  Time Spent: 25m

## 2025-06-18 NOTE — DISCHARGE SUMMARY
[facility] Medicine Services  DISCHARGE SUMMARY    Patient Name: Nicholas Yin  : 1945  MRN: [id number]    Date of Admission: 2025  8:02 PM  Date of Discharge:  2025  Primary Care Physician: Waldemar Trejo MD    Consults       Date and Time Order Name Status Description    2025  6:01 PM Inpatient Cardiothoracic Surgery Consult Completed     2025  2:04 AM Inpatient Infectious Diseases Consult Completed             Hospital Course     Presenting Problem: Follow up bacteremia     Active Hospital Problems    Diagnosis  POA    **Bacteremia [R78.81]  Yes      Resolved Hospital Problems   No resolved problems to display.          Hospital Course:  Nicholas Yin is a 80 y.o. male w/ hx cad, afib, previous TAVR & left lower extremity angioplasty ( by Dr Lopez), ckd 4 (baseline cr 2.4-2.9), DM2 who was recently seen at [facility] for pneumonia on 6/3/25 was treated with cefdinir and doxycycline x 7 days for pneumonia. Blood cultures grew streptococcus sanguinis and pcp referred patient to [facility] ED due to previous history of TAVR and workup/treatment of bacteremia. Initiated on Ceftriaxone, ID consulted and managed antibiotics. TTE positive for vegetation on mitral valve as of .     Strep bacteremia (high grade)  Recent Pneumonia (6/3/25 [facility])  Hx TAVR   CAD (abisai to lad )  Chronic HFpEF, compensated  Hx heart block/parox afib (pacemaker 2023 Dr. Mendoza)  HTN  Hyperlipidemia  - Previous echo 2024: LV EF ~53%, diastolic dysfunction, TAVR ok  - TTE no overt abnormality noted, no definite mass, cannot rule out mitral valve mass, no valvular abnormality seen.  - ID following: Continue Rocephin 2g IV daily; concerned about intravascular infection (such as TAVR or pacemaker infection).   LAURIE   with 1.7 cm mobile vegetation on mitral valve, IV rocephin till  per ID. Patient also likely to need PM removal.    - PICC line placed rather than Groshong since patient does not want dialysis in the future  - Continue ASA, Xarelto, multaq, metoprolol, losartan, Crestor     Uncontrolled DM2  - Continue Jardiance  - Restart home regimen     CKD 4 (baseline cr ~2.4-2.9)  - Renal fxn stable     GERD - Protonix  Depression - Sertraline 25mg daily       Discharge Follow Up Recommendations for outpatient labs/diagnostics:  - continue ceftriaxone 2 g IV daily to continue until 8/1/2025   - Check CBC, CMP, CRP weekly while on IV antibiotics  - follow-up with pcp within a week  - follow-up with ID in 1 week    Day of Discharge     HPI:   Seen and examined at bedside with wife present.  No acute events overnight    Review of Systems   All other systems reviewed and are negative.        Vital Signs:   Temp:  [97.5 °F (36.4 °C)-99.5 °F (37.5 °C)] 97.6 °F (36.4 °C)  Heart Rate:  [60-67] 60  Resp:  [17-18] 18  BP: (131-157)/(58-64) 131/63  Flow (L/min) (Oxygen Therapy):  [2] 2      Physical Exam:  Constitutional: Older appearing male sitting up in bedside chair in NAD  Respiratory: Clear to auscultation bilaterally, nonlabored respirations on room air  Cardiovascular: RRR, no murmurs  Gastrointestinal: Soft, nontender, nondistended  Musculoskeletal: No bilateral ankle edema  Psychiatric: Appropriate affect, cooperative  Neurologic: Alert, oriented, DIA spontaneously, appropriate in conversation, speech clear  Skin: No rashes on exposed skin       Pertinent  and/or Most Recent Results     LAB RESULTS:      Lab 06/16/25  0435 06/15/25  0745 06/14/25  0922 06/13/25 2019   WBC 9.97 10.39 10.79 11.93*   HEMOGLOBIN 9.1* 9.2* 9.2* 9.6*   HEMATOCRIT 30.6* 30.0* 30.3* 32.0*   PLATELETS 225 263 266 297   NEUTROS ABS 7.56* 7.77* 8.07* 9.00*   IMMATURE GRANS (ABS) 0.03 0.05 0.06* 0.06*   LYMPHS ABS 1.05 1.13 1.24 1.47   MONOS ABS 0.92* 0.93* 0.94* 0.96*   EOS ABS 0.37 0.45* 0.44* 0.41*   MCV 74.3* 71.8* 72.3* 73.1*   CRP  --  <0.30  --   --    LACTATE   --  1.0  --  1.3         Lab 06/17/25  0709 06/16/25  0435 06/15/25  0745 06/14/25  0922 06/13/25 2019   SODIUM 139 140 139 142 139   POTASSIUM 4.9 5.0 5.3* 4.6 4.8   CHLORIDE 111* 111* 112* 115* 111*   CO2 20.0* 21.0* 21.0* 20.0* 19.0*   ANION GAP 8.0 8.0 6.0 7.0 9.0   BUN 33.3* 33.5* 31.4* 33.6* 38.6*   CREATININE 2.15* 2.35* 2.21* 2.17* 2.59*   EGFR 30.6* 27.5* 29.6* 30.2* 24.4*   GLUCOSE 150* 167* 157* 119* 221*   CALCIUM 8.6 8.7 8.7 8.7 8.9   MAGNESIUM  --  2.2 2.0 2.1  --    PHOSPHORUS  --  3.6 3.2 3.6  --          Lab 06/15/25  0745 06/13/25 2019   TOTAL PROTEIN 5.8* 6.3   ALBUMIN 3.0* 3.1*   GLOBULIN 2.8 3.2   ALT (SGPT) 11 14   AST (SGOT) 20 19   BILIRUBIN 0.3 0.3   ALK PHOS 76 85                 Lab 06/17/25  0709   IRON 19*   IRON SATURATION (TSAT) 7*   TIBC 292*   TRANSFERRIN 196*   FERRITIN 42.34         Brief Urine Lab Results  (Last result in the past 365 days)        Color   Clarity   Blood   Leuk Est   Nitrite   Protein   CREAT   Urine HCG        06/13/25 2158 Yellow   Clear   Negative   Negative   Negative   100 mg/dL (2+)                 Microbiology Results (last 10 days)       Procedure Component Value - Date/Time    Blood Culture - Blood, Arm, Right [[id number]]  (Normal) Collected: 06/13/25 2020    Lab Status: Preliminary result Specimen: Blood from Arm, Right Updated: 06/17/25 2232     Blood Culture No growth at 4 days    Blood Culture - Blood, Arm, Left [[id number]]  (Normal) Collected: 06/13/25 2020    Lab Status: Preliminary result Specimen: Blood from Arm, Left Updated: 06/17/25 2232     Blood Culture No growth at 4 days            No radiology results for the last 10 days            Results for orders placed during the hospital encounter of 06/13/25    Adult Transesophageal Echo (LAUIRE) W/ Cont if Necessary Per Protocol    Interpretation Summary    Left ventricular systolic function is normal. Estimated left ventricular EF = 60%    Left ventricular wall thickness is consistent with mild  concentric hypertrophy.    A 29 mm TONY 3 pericardial prosthesis is present in the aortic position.  The leaflets appear thickened but no obvious valvular vegetation is seen.  The angle of interrogation is suboptimal for valve gradient and valve area.  No abscess formation is seen.    A mobile mitral valve mass is seen on the posterior leaflet measuring 1.7 cm.  This appears consistent with a valvular vegetation.  No abscess formation is seen.    No mass or vegetation is seen on the tricuspid valve.    No masses are seen in association with the pacing leads.    Anesthesia: Cath lab/Echo lab moderate sedation    I was present with the patient for the duration of moderate sedation and supervised staff who had no other duties and monitored the patient for the entire procedure.    Name of independent trained observer: Zara Contreras RN  Intra-service start time: 1340  Intra-service end time: 1407      Plan for Follow-up of Pending Labs/Results: none  Pending Labs       Order Current Status    Blood Culture - Blood, Arm, Left Preliminary result    Blood Culture - Blood, Arm, Right Preliminary result          Discharge Details        Discharge Medications        New Medications        Instructions Start Date   cefTRIAXone 2,000 mg in sodium chloride 0.9 % 100 mL IVPB   2,000 mg, Intravenous, Every 24 Hours      ferrous sulfate 325 (65 FE) MG tablet   325 mg, Oral, 3 Times Weekly      sertraline 25 MG tablet  Commonly known as: ZOLOFT   25 mg, Oral, Daily   Start Date: June 19, 2025            Changes to Medications        Instructions Start Date   rivaroxaban 15 MG tablet  Commonly known as: XARELTO  What changed: Another medication with the same name was removed. Continue taking this medication, and follow the directions you see here.   15 mg, Oral, Daily      Xultophy 100-3.6 UNIT-MG/ML solution pen-injector subcutaneous pen  Generic drug: Insulin Degludec-Liraglutide  What changed: how much to take   50 Units,  Subcutaneous, Daily             Continue These Medications        Instructions Start Date   aspirin 81 MG EC tablet   81 mg, Daily      Farxiga 10 MG tablet  Generic drug: dapagliflozin Propanediol   Take 1 tablet by mouth Daily.      ipratropium-albuterol 0.5-2.5 mg/3 ml nebulizer  Commonly known as: DUO-NEB   3 mL, Nebulization, Every 4 Hours PRN      losartan 25 MG tablet  Commonly known as: Cozaar   25 mg, Oral, 2 Times Daily      metoprolol tartrate 25 MG tablet  Commonly known as: LOPRESSOR   Take 1 tablet by mouth 2 (Two) Times a Day.      Multaq 400 MG tablet  Generic drug: dronedarone   400 mg, Oral, 2 Times Daily With Meals      omeprazole 40 MG capsule  Commonly known as: priLOSEC   40 mg, Daily      rosuvastatin 20 MG tablet  Commonly known as: CRESTOR   20 mg, Oral, Daily      sildenafil 100 MG tablet  Commonly known as: VIAGRA   TAKE 1/2 TO 1 TABLET BY MOUTH DAILY AS NEEDED      vitamin D 1.25 MG (23990 UT) capsule capsule  Commonly known as: ERGOCALCIFEROL   50,000 Units, Oral, Every 7 Days             Stop These Medications      Mucus Relief 600 MG 12 hr tablet  Generic drug: guaiFENesin              Allergies   Allergen Reactions    Ct Contrast Anaphylaxis    Iodinated Contrast Media Shortness Of Breath         Discharge Disposition:  Home-Health Care Svc    Diet:  Hospital:  Diet Order   Procedures    Diet: Cardiac, Diabetic, Renal; Healthy Heart (2-3 Na+); Consistent Carbohydrate; Low Potassium, Low Phosphorus, Low Sodium (2-3g); Fluid Consistency: Thin (IDDSI 0)       Diet Instructions       Diet: Diabetic Diets; Consistent Carbohydrate; Thin (IDDSI 0)      Discharge Diet: Diabetic Diets    Diabetic Diet: Consistent Carbohydrate    Fluid Consistency: Thin (IDDSI 0)             Activity:  Activity Instructions       Activity as Tolerated              Restrictions or Other Recommendations:  none       CODE STATUS:    Code Status and Medical Interventions: CPR (Attempt to Resuscitate); Full Support    Ordered at: 06/14/25 0151     Code Status (Patient has no pulse and is not breathing):    CPR (Attempt to Resuscitate)     Medical Interventions (Patient has pulse or is breathing):    Full Support     Level Of Support Discussed With:    Patient       Future Appointments   Date Time Provider Department Center   6/27/2025 11:00 AM Waldemar Medeiros MD [facility]   7/7/2025  9:00 AM [facility] PULM LAB ROOM [facility]   7/24/2025  3:30 PM Waldemar Medeiros MD [facility]   9/19/2025  8:30 AM Carlie Adrian APRN [facility]   5/8/2026 10:15 AM Sulaiman Howe MD [facility]       Additional Instructions for the Follow-ups that You Need to Schedule       Ambulatory Referral to Home Health   As directed      Face to Face Visit Date: 6/16/2025   Follow-up provider for Plan of Care?: I treated the patient in an acute care facility and will not continue treatment after discharge.   Follow-up provider: WALDEMAR MEDEIROS [[id number]]   Reason/Clinical Findings: post acute hospitalization   Describe mobility limitations that make leaving home difficult: weakness   Nursing/Therapeutic Services Requested: Skilled Nursing Physical Therapy Occupational Therapy   Skilled nursing orders: Medication education (Check CBC, CMP, CRP weekly while on IV antibiotics; PICC line dressing changes weekly) Infusion therapy Cardiopulmonary assessments Neurovascular assessments   PT orders: Therapeutic exercise Strengthening Home safety assessment   Occupational orders: Activities of daily living Strengthening Home safety assessment   Frequency: 1 Week 1        Discharge Follow-up with PCP   As directed       Currently Documented PCP:    Waldemar Medeiros MD    PCP Phone Number:    [phone]     Follow Up Details: within a week        Discharge Follow-up with Specialty: Infectious disease; 1 Week   As directed      Specialty: Infectious disease   Follow Up: 1 Week                      Maria G  MD Lachelle  06/18/25      Time Spent on Discharge:  I spent  38  minutes on this discharge activity which included: face-to-face encounter with the patient, reviewing the data in the system, coordination of the care with the nursing staff as well as consultants, documentation, and entering orders.

## 2025-06-18 NOTE — PLAN OF CARE
Goal Outcome Evaluation:         VSS. Room air. Discharge teaching completed. Meds were delivered to the room. Patient discharged home with spouse to transport.

## 2025-06-18 NOTE — PROGRESS NOTES
[facility] Cardiothoracic Surgery In-Patient Progress Note     LOS: 5 days     Chief Complaint: Bacteremia/mitral endocarditis    Subjective  No complaints.  No acute events.  Wants to go home.      Objective  Vital Signs  Temp:  [97.4 °F (36.3 °C)-99.5 °F (37.5 °C)] 98.2 °F (36.8 °C)  Heart Rate:  [60-73] 61  Resp:  [17-18] 18  BP: (137-157)/(58-72) 146/59        Physical Exam:   General Appearance: alert, appears stated age and cooperative   Lungs: clear to auscultation, respirations regular, respirations even, and respirations unlabored   Heart: regular rhythm & normal rate, normal S1, S2, no murmur, no gallop, no rub, and no click     Results     Results from last 7 days   Lab Units 06/16/25  0435   WBC 10*3/mm3 9.97   HEMOGLOBIN g/dL 9.1*   HEMATOCRIT % 30.6*   PLATELETS 10*3/mm3 225     Results from last 7 days   Lab Units 06/17/25  0709   SODIUM mmol/L 139   POTASSIUM mmol/L 4.9   CHLORIDE mmol/L 111*   CO2 mmol/L 20.0*   BUN mg/dL 33.3*   CREATININE mg/dL 2.15*   GLUCOSE mg/dL 150*   CALCIUM mg/dL 8.6     Assessment    Bacteremia  Mitral valve endocarditis    Plan   Ok to discharge from our standpoint. Patient does not want surgery. We will sign off and be available as needed      Nneka Araujo, CHIDI  06/18/25  07:31 EDT    I have personally evaluated and examined the patient.  The above documentation has been reviewed and I agree.      MATILDA Kohli MD  Cardiothoracic Surgery

## 2025-06-18 NOTE — CASE MANAGEMENT/SOCIAL WORK
Case Management Discharge Note      Final Note: Spoke to Rylee at [facility] [phone] who advises they have accepted patient to service. Per MDR, patient to discharge home today. CM notified [facility] who will have antibiotics delivered and teaching completed. Called [facility] who advises appointment set for 6/25/25 at 1500. Patient has discharge orders placed. Spoke with patient and spouse at bedside who have already received patients IV antibiotics and teaching, deny any additional questions. CM advised that [facility] has accepted him and will call to set up a time to see. CM notified patient of his follow up appointment day and time. No other discharge needs verbalized. Patient plan is to discharge home with [facility] today via car with family to transport.    Provided Post Acute Provider List?: N/A    Selected Continued Care - Admitted Since 6/13/2025       Destination    No services have been selected for the patient.                Durable Medical Equipment    No services have been selected for the patient.                Dialysis/Infusion    No services have been selected for the patient.                Home Medical Care Coordination complete.      Service Provider Services Address Phone Fax Patient Preferred    [facility], Home Rehabilitation, Home Health Services [address] [place] [id number] [phone] --              Therapy    No services have been selected for the patient.                Community Resources    No services have been selected for the patient.                Community & [facility]    No services have been selected for the patient.                    Selected Continued Care - Episodes Includes continued care and service providers with selected services from the active episodes listed below               Final Discharge Disposition Code: 06 - home with home health care

## 2025-06-18 NOTE — PROGRESS NOTES
[facility] Medicine Services  PROGRESS NOTE    Patient Name: Nicholas Yin  : 1945  MRN: [id number]    Date of Admission: 2025  Primary Care Physician: Waldemar Trejo MD    Subjective   Subjective     CC:  Follow up bacteremia    HPI:  Patient seen and examined this morning. Wife at bedside.  Positive TTE . Patient asking to be discharged.   No acute events overnight.      Objective   Objective     Vital Signs:   Temp:  [97.4 °F (36.3 °C)-99.5 °F (37.5 °C)] 99.5 °F (37.5 °C)  Heart Rate:  [60-73] 64  Resp:  [17-18] 18  BP: (137-157)/(51-72) 157/58  Flow (L/min) (Oxygen Therapy):  [2] 2     Physical Exam:  Constitutional: Older appearing male sitting up in bedside chair in NAD  Respiratory: Clear to auscultation bilaterally, nonlabored respirations on room air  Cardiovascular: RRR, no murmurs  Gastrointestinal: Soft, nontender, nondistended  Musculoskeletal: No bilateral ankle edema  Psychiatric: Appropriate affect, cooperative  Neurologic: Alert, oriented, DAI spontaneously, appropriate in conversation, speech clear  Skin: No rashes on exposed skin      Results Reviewed:  LAB RESULTS:      Lab 25  0435 06/15/25  0745 25  0922 25   WBC 9.97 10.39 10.79 11.93*   HEMOGLOBIN 9.1* 9.2* 9.2* 9.6*   HEMATOCRIT 30.6* 30.0* 30.3* 32.0*   PLATELETS 225 263 266 297   NEUTROS ABS 7.56* 7.77* 8.07* 9.00*   IMMATURE GRANS (ABS) 0.03 0.05 0.06* 0.06*   LYMPHS ABS 1.05 1.13 1.24 1.47   MONOS ABS 0.92* 0.93* 0.94* 0.96*   EOS ABS 0.37 0.45* 0.44* 0.41*   MCV 74.3* 71.8* 72.3* 73.1*   CRP  --  <0.30  --   --    LACTATE  --  1.0  --  1.3         Lab 25  0709 25  0435 06/15/25  0745 25  0922 25   SODIUM 139 140 139 142 139   POTASSIUM 4.9 5.0 5.3* 4.6 4.8   CHLORIDE 111* 111* 112* 115* 111*   CO2 20.0* 21.0* 21.0* 20.0* 19.0*   ANION GAP 8.0 8.0 6.0 7.0 9.0   BUN 33.3* 33.5* 31.4* 33.6* 38.6*   CREATININE 2.15* 2.35* 2.21*  2.17* 2.59*   EGFR 30.6* 27.5* 29.6* 30.2* 24.4*   GLUCOSE 150* 167* 157* 119* 221*   CALCIUM 8.6 8.7 8.7 8.7 8.9   MAGNESIUM  --  2.2 2.0 2.1  --    PHOSPHORUS  --  3.6 3.2 3.6  --          Lab 06/15/25  0745 06/13/25 2019   TOTAL PROTEIN 5.8* 6.3   ALBUMIN 3.0* 3.1*   GLOBULIN 2.8 3.2   ALT (SGPT) 11 14   AST (SGOT) 20 19   BILIRUBIN 0.3 0.3   ALK PHOS 76 85                 Lab 06/17/25  0709   IRON 19*   IRON SATURATION (TSAT) 7*   TIBC 292*   TRANSFERRIN 196*   FERRITIN 42.34         Brief Urine Lab Results  (Last result in the past 365 days)        Color   Clarity   Blood   Leuk Est   Nitrite   Protein   CREAT   Urine HCG        06/13/25 2158 Yellow   Clear   Negative   Negative   Negative   100 mg/dL (2+)                   Microbiology Results Abnormal       None            No radiology results from the last 24 hrs    Results for orders placed during the hospital encounter of 06/13/25    Adult Transesophageal Echo (LAURIE) W/ Cont if Necessary Per Protocol    Interpretation Summary    Left ventricular systolic function is normal. Estimated left ventricular EF = 60%    Left ventricular wall thickness is consistent with mild concentric hypertrophy.    A 29 mm TONY 3 pericardial prosthesis is present in the aortic position.  The leaflets appear thickened but no obvious valvular vegetation is seen.  The angle of interrogation is suboptimal for valve gradient and valve area.  No abscess formation is seen.    A mobile mitral valve mass is seen on the posterior leaflet measuring 1.7 cm.  This appears consistent with a valvular vegetation.  No abscess formation is seen.    No mass or vegetation is seen on the tricuspid valve.    No masses are seen in association with the pacing leads.    Anesthesia: Cath lab/Echo lab moderate sedation    I was present with the patient for the duration of moderate sedation and supervised staff who had no other duties and monitored the patient for the entire procedure.    Name of  independent trained observer: Zara Contreras RN  Intra-service start time: 1340  Intra-service end time: 1407      Current medications:  Scheduled Meds:aspirin, 81 mg, Oral, Daily  cefTRIAXone, 2,000 mg, Intravenous, Q24H  dronedarone, 400 mg, Oral, BID With Meals  empagliflozin, 25 mg, Oral, Daily  insulin glargine, 5 Units, Subcutaneous, Q12H  Insulin Lispro, 8 Units, Subcutaneous, TID With Meals  losartan, 25 mg, Oral, Q24H  metoprolol tartrate, 25 mg, Oral, BID  pantoprazole, 40 mg, Oral, Q AM  rivaroxaban, 15 mg, Oral, Daily  rosuvastatin, 20 mg, Oral, Daily  sertraline, 25 mg, Oral, Daily  sodium chloride, 10 mL, Intravenous, Q12H  sodium chloride, 10 mL, Intravenous, Q12H      Continuous Infusions:   PRN Meds:.  senna-docusate sodium **AND** polyethylene glycol **AND** bisacodyl **AND** bisacodyl    Calcium Replacement - Follow Nurse / BPA Driven Protocol    dextrose    dextrose    glucagon (human recombinant)    Magnesium Cardiology Dose Replacement - Follow Nurse / BPA Driven Protocol    nitroglycerin    ondansetron    Phosphorus Replacement - Follow Nurse / BPA Driven Protocol    Potassium Replacement - Follow Nurse / BPA Driven Protocol    sodium chloride    sodium chloride    sodium chloride    sodium chloride    sodium chloride    Assessment & Plan   Assessment & Plan     Active Hospital Problems    Diagnosis  POA    **Bacteremia [R78.81]  Yes      Resolved Hospital Problems   No resolved problems to display.        Brief Hospital Course to date:  Nicholas Yin is a 80 y.o. male   w/ hx cad, afib, previous TAVR & left lower extremity angioplasty (2023 by Dr Lopez), ckd 4 (baseline cr 2.4-2.9), DM2 who was recently seen at [facility] for pneumonia on 6/3/25 was treated with cefdinir and doxycycline x 7 days for pneumonia. Blood cultures grew streptococcus sanguinis and pcp referred patient to [facility] ED due to previous history of TAVR and workup/treatment of bacteremia. Initiated on  Ceftriaxone, ID consulted. TTE positive for vegetation on mitral valve as of 6/16.    This patient's problems and plans were partially entered by my partner and updated as appropriate by me 06/17/25.     Strep bacteremia (high grade)  Recent Pneumonia (6/3/25 [facility])  Hx TAVR 2023  CAD (abisai to lad 2019)  Chronic HFpEF, compensated  Hx heart block/parox afib (pacemaker 5/2023 Dr. Mendoza)  HTN  HL  - Previous echo 5/2024: LV EF ~53%, diastolic dysfunction, TAVR ok  - TTE no overt abnormality noted, no definite mass, cannot rule out mitral valve mass TTE pending  - ID following: Continue Rocephin 2g IV daily; concerned about intravascular infection (such as TAVR or pacemaker infection). TTE no valvular abnormality. LAURIE 6/16  with 1.7 cm mobile vegetation on mitral valve, Iv rocephin till 8/1 per ID. Patient also likely to need PM removal.   - PICC line ordered rather than Groshong since patient does not want dialysis in the future  - Continue ASA, Xarelto, multaq, metoprolol, losartan, Crestor    Uncontrolled DM2  - Continue Jardiance  - Basal-bolus insulin, SSI; titrate prn    CKD 4 (baseline cr ~2.4-2.9)  - Renal fxn stable    GERD - Protonix  Depression - Sertraline 25mg daily     Anemia  - iron deficiency anemia picture: iron 19, Tsat 7, transferrin 196  - consider IV iron    Expected Discharge Location and Transportation: Home with   Expected Discharge  likely 6/18/25    VTE Prophylaxis:  Pharmacologic & mechanical VTE prophylaxis orders are present.         AM-PAC 6 Clicks Score (PT): 18 (06/16/25 2200)    CODE STATUS:   Code Status and Medical Interventions: CPR (Attempt to Resuscitate); Full Support   Ordered at: 06/14/25 0151     Code Status (Patient has no pulse and is not breathing):    CPR (Attempt to Resuscitate)     Medical Interventions (Patient has pulse or is breathing):    Full Support     Level Of Support Discussed With:    Patient       Maria G Larose MD  06/17/25

## 2025-06-18 NOTE — PROGRESS NOTES
[facility]    INFECTIOUS DISEASE PROGRESS NOTE    Nicholas Yin  1945  [id number]    Date of consult: 6/14/2025    Admit date: 6/13/2025    Requesting Provider: No Known Provider  Evaluating physician: Bulmaro Jin MD  Reason for Consultation: Streptococcus sanguinous high-grade bacteremia 4-4 blood cultures from 6/3/2025 partially treated, concern for endocarditis  Chief Complaint: Above      Subjective   History of present illness:  Patient is a  80 y.o.  Yr old male with a history of aortic stenosis status post TAVR by Dr. Lopez on 5/8/2023, CAD, congestive heart failure, diabetes mellitus type 2, hyperlipidemia, essential hypertension, TIA, who was evaluated at [facility] on 6/3/2025 for pneumonia and treated eventually with cefdinir and doxycycline until 6/10.  Blood cultures at that encounter were positive in 4-4 bottles for Streptococcus sanguinous.  Patient was followed up with cardiology who recommended admission for evaluation of possible endocarditis.  Patient has no other localizing signs or symptoms of infection.  The patient was admitted to [facility] on 6/13/2025.  I was consulted on 6/14/2025.  The patient denies ill contacts, TB, HIV, or zoonotic exposures.    6/15/2025 history reviewed.  No high fevers or chills.  Tolerating antibiotics for streptococcal sepsis.  On ceftriaxone anticipating continuing until 8/1/2025 and reassess for 6 weeks.  TTE pending results.  Blood cultures on 6/13 negative.    6/16/2025 history reviewed.  No high fever or chills.  Continues on ceftriaxone until 8/1/2025 and reassess for intravascular or pacemaker infection.  TTE on 6/15 without obvious endocarditis or pacemaker involvement.  LAURIE ordered.    6/17/25 hx rev.  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  CT surg to see.  NO fever.    6/18/25 hx rev.  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  NO fever.    Past Medical History:    Diagnosis Date    Anemia     Aortic stenosis     Arthritis     Back pain     CancerTesticular/Colon     test -1982  Colon -2005    CHF (congestive heart failure)     Coronary artery disease     COVID-19 2022    DDD (degenerative disc disease), lumbosacral     Diabetes mellitus     Dyspnea     Elevated cholesterol     Erectile dysfunction     GERD (gastroesophageal reflux disease)     History of transfusion     Hyperlipidemia     Hypertension     TIA (transient ischemic attack)        Past Surgical History:   Procedure Laterality Date    AORTIC VALVE REPAIR/REPLACEMENT N/A 5/8/2023    Procedure: TRANSCATHETER AORTIC VALVE REPLACEMENT + LAURIE, LEFT FEMORAL ENDARTERECTOMY WITH PATCH;  Surgeon: Arben Lopez MD;  Location: [facility];  Service: Cardiothoracic;  Laterality: N/A;    AORTIC VALVE REPAIR/REPLACEMENT N/A 5/8/2023    Procedure: Transfemoral Transcatheter Aortic Valve Replacement;  Surgeon: Rachael Mendoza MD;  Location: [facility];  Service: Cardiovascular;  Laterality: N/A;    CARDIAC CATHETERIZATION N/A 08/15/2019    Procedure: Left Heart Cath;  Surgeon: Paul Villatoro MD;  Location: [facility] CATH INVASIVE LOCATION;  Service: Cardiology    CARDIAC CATHETERIZATION      04/10/2023 PER DR. MENDOZA    CARDIAC ELECTROPHYSIOLOGY PROCEDURE N/A 5/15/2023    Procedure: Pacemaker DC - [employer];  Surgeon: Rachael Mendoza MD;  [facility]: [facility] CATH INVASIVE LOCATION;  Service: Cardiology;  Laterality: N/A;    CATARACT EXTRACTION, BILATERAL      COLON SURGERY      colon resection for cancer - sigmoid removed - no chemo no radiation    COLONOSCOPY      COLONOSCOPY N/A 01/27/2021    Procedure: COLONOSCOPY;  Surgeon: Greg Barraza MD;  Location: [facility];  Service: General;  Laterality: N/A;    CORONARY STENT PLACEMENT      1 STENT    ENDOSCOPY  1989    EYE SURGERY Right     retinal detachment    AR RT/LT HEART CATHETERS N/A 08/15/2019    Procedure:  Percutaneous Coronary Intervention;  Surgeon: Paul Villatoro MD;  Location: [facility] CATH INVASIVE LOCATION;  Service: Cardiology    SCALP LESION REMOVAL W/ FLAP AND SKIN GRAFT  12/16/2022    basal cell removal    SHOULDER ARTHROSCOPY Right     SKIN LESION EXCISION N/A 01/27/2021    Procedure: SKIN LESIONS EXCISION FROM LEFT TEMPLE AREA AND ABDOMEN.;  Surgeon: Greg Barraza MD;  Location: [facility] OR;  Service: General;  Laterality: N/A;    VASECTOMY Right     testicle removed d/t cancer - with radiation       Pediatric History   Patient Parents    Not on file     Other Topics Concern    Not on file   Social History Narrative    Patient lives in [place], [place]    Positive for alcohol, no drug use or tobacco,  to wife who is primary care NP for [employer], he worked in [employer]    family history includes Alcohol abuse in his father and mother; Alzheimer's disease in his father; COPD in his father and mother; Cancer in his paternal grandmother; Diabetes in his brother; Heart disease in his brother; Hypertension in his brother, father, and mother; Stroke in his maternal grandfather and mother.    Allergies   Allergen Reactions    Ct Contrast Anaphylaxis    Iodinated Contrast Media Shortness Of Breath       Immunization History   Administered Date(s) Administered    COVID-19 (PFIZER) Purple Cap Monovalent 01/11/2021, 02/01/2021, 09/24/2021    Fluad Quad 65+ 10/08/2020    Fluzone High-Dose 65+YRS 10/27/2017, 10/04/2018, 10/25/2024    Fluzone High-Dose 65+yrs 10/13/2021, 10/13/2022, 10/27/2023    Fluzone Quad >6mos (Multi-dose) 11/11/2016    Hep A / Hep B 07/17/2018    Hepatitis A 01/04/2019    IPV 06/05/1997    Pneumococcal Conjugate 13-Valent (PCV13) 06/08/2015    Pneumococcal Polysaccharide (PPSV23) 09/02/2011    Td (TDVAX) 06/05/1997    Tdap 12/09/2011    flucelvax quad pfs =>4 YRS 09/05/2019       Medication:  @Scheduled Meds:aspirin, 81 mg, Oral, Daily  cefTRIAXone, 2,000 mg, Intravenous,  Q24H  dronedarone, 400 mg, Oral, BID With Meals  empagliflozin, 25 mg, Oral, Daily  insulin glargine, 5 Units, Subcutaneous, Q12H  Insulin Lispro, 8 Units, Subcutaneous, TID With Meals  losartan, 25 mg, Oral, Q24H  metoprolol tartrate, 25 mg, Oral, BID  pantoprazole, 40 mg, Oral, Q AM  rivaroxaban, 15 mg, Oral, Daily  rosuvastatin, 20 mg, Oral, Daily  sertraline, 25 mg, Oral, Daily  sodium chloride, 10 mL, Intravenous, Q12H  sodium chloride, 10 mL, Intravenous, Q12H      Continuous Infusions:   PRN Meds:.  senna-docusate sodium **AND** polyethylene glycol **AND** bisacodyl **AND** bisacodyl    Calcium Replacement - Follow Nurse / BPA Driven Protocol    dextrose    dextrose    glucagon (human recombinant)    Magnesium Cardiology Dose Replacement - Follow Nurse / BPA Driven Protocol    nitroglycerin    ondansetron    Phosphorus Replacement - Follow Nurse / BPA Driven Protocol    Potassium Replacement - Follow Nurse / BPA Driven Protocol    sodium chloride    sodium chloride    sodium chloride    sodium chloride    sodium chloride     Please refer to the medical record for a full medication list    Review of Systems:    Constitutional-- No Fever, chills or sweats.  Appetite good, and no malaise. No fatigue.  HEENT-- No new vision, hearing or throat complaints.  No epistaxis or oral sores.  Denies odynophagia or dysphagia.  No odynophagia or dysphagia. No headache, photophobia or neck stiffness.  CV-- No chest pain, palpitation or syncope  Resp-- No SOB/cough/Hemoptysis  GI- No nausea, vomiting, or diarrhea.  No hematochezia, melena, or hematemesis. Denies jaundice or chronic liver disease.  -- No dysuria, hematuria, or flank pain.  Denies hesitancy, urgency.  Lymph- no swollen lymph nodes in neck/axilla or groin.   Heme- No active bruising or bleeding; no Hx of DVT or PE.  MS-- no swelling or pain in the bones or joints of arms/legs.  No new back pain.  Neuro-- No acute focal weakness or numbness in the arms or legs.  " No seizures.  Skin--No rashes or lesions    Physical Exam:   Vital Signs   Temp:  [97.4 °F (36.3 °C)-99.5 °F (37.5 °C)] 98.2 °F (36.8 °C)  Heart Rate:  [60-73] 61  Resp:  [17-18] 18  BP: (137-157)/(58-72) 146/59    Blood pressure 146/59, pulse 61, temperature 98.2 °F (36.8 °C), temperature source Oral, resp. rate 18, height 175.3 cm (69.02\"), weight 98.7 kg (217 lb 8 oz), SpO2 95%.  GENERAL: Awake and alert, in minimal distress. Appears older than stated age.  Resting in bed.  Visiting with wife.  HEENT:  Normocephalic, atraumatic.  Oropharynx without thrush. Dentition in good repair. No cervical adenopathy. No neck masses.  Ears externally normal, Nose externally normal.  EYES: No conjunctival injection. No icterus. EOM full.  LYMPHATICS: No lymphadenopathy of the neck or axillary or inguinal regions.   HEART: No murmur, gallop, or pericardial friction rub. Reg rate rhythm, No JVD at 45 degrees.  LUNGS: Clear to auscultation and percussion. No respiratory distress, no use of accessory muscles.  No wheezes.  ABDOMEN: Soft, nontender, nondistended. No appreciable HSM.  Bowel sounds normal.  No mass.  SKIN: Warm and dry without cutaneous eruptions.  No nodules.  No Janeway lesions, Osler's nodes.  PSYCHIATRIC: Mental status lucid. No confusion.  EXT:  No cellulitic change.  Nl ROM.  NEURO: Oriented to name, nonfocal.      Results Review:   I reviewed the patient's new clinical results.  I reviewed the patient's new imaging results and agree with the interpretation.  I reviewed the patient's other test results and agree with the interpretation    Results from last 7 days   Lab Units 06/16/25  0435 06/15/25  0745 06/14/25  0922   WBC 10*3/mm3 9.97 10.39 10.79   HEMOGLOBIN g/dL 9.1* 9.2* 9.2*   HEMATOCRIT % 30.6* 30.0* 30.3*   PLATELETS 10*3/mm3 225 263 266     Results from last 7 days   Lab Units 06/17/25  0709   SODIUM mmol/L 139   POTASSIUM mmol/L 4.9   CHLORIDE mmol/L 111*   CO2 mmol/L 20.0*   BUN mg/dL 33.3* " "  CREATININE mg/dL 2.15*   GLUCOSE mg/dL 150*   CALCIUM mg/dL 8.6     Results from last 7 days   Lab Units 06/15/25  0745   ALK PHOS U/L 76   BILIRUBIN mg/dL 0.3   ALT (SGPT) U/L 11   AST (SGOT) U/L 20         Results from last 7 days   Lab Units 06/15/25  0745   CRP mg/dL <0.30         Results from last 7 days   Lab Units 06/15/25  0745   LACTATE mmol/L 1.0     Estimated Creatinine Clearance: 32.3 mL/min (A) (by C-G formula based on SCr of 2.15 mg/dL (H)).  CPK          6/15/2025    07:45   Common Labs   Creatine Kinase 31       Procalitonin Results:       Brief Urine Lab Results  (Last result in the past 365 days)        Color   Clarity   Blood   Leuk Est   Nitrite   Protein   CREAT   Urine HCG        06/13/25 2158 Yellow   Clear   Negative   Negative   Negative   100 mg/dL (2+)                  No results found for: \"SITE\", \"ALLENTEST\", \"PHART\", \"HXT7ZUG\", \"PO2ART\", \"ZGC6SYC\", \"BASEEXCESS\", \"A6GCEPUN\", \"HGBBG\", \"HCTABG\", \"OXYHEMOGLOBI\", \"METHHGBN\", \"CARBOXYHGB\", \"CO2CT\", \"BAROMETRIC\", \"MODALITY\", \"FIO2\"     Microbiology:      Results for orders placed or performed during the hospital encounter of 06/13/25   Blood Culture - Blood, Arm, Left    Specimen: Arm, Left; Blood   Result Value Ref Range    Blood Culture No growth at 4 days         Susceptibility     Streptococcus sanguinis     MAYANK     Ampicillin 0.5 Intermediate     Ceftriaxone 0.25 Susceptible     Penicillin G 0.25 Intermediate     Vancomycin 1 Susceptible                  Blood cultures 6/3/2025 4-4 bottles positive          Brief Urine Lab Results  (Last result in the past 365 days)        Color   Clarity   Blood   Leuk Est   Nitrite   Protein   CREAT   Urine HCG        06/13/25 2158 Yellow   Clear   Negative   Negative   Negative   100 mg/dL (2+)                   Radiology:  Imaging Results (Last 72 Hours)       ** No results found for the last 72 hours. **        CT scan abdomen and pelvis 6/3/2025 negative for acute findings, CT scan of the chest " consistent with CHF and edema with cardiomegaly and peripheral pulmonary fibrosis and left basilar atelectasis.  No consolidation.            LAURIE report: 6/16/2025:      Left ventricular systolic function is normal. Estimated left ventricular EF = 60%    Left ventricular wall thickness is consistent with mild concentric hypertrophy.    A 29 mm TONY 3 pericardial prosthesis is present in the aortic position.  The leaflets appear thickened but no obvious valvular vegetation is seen.  The angle of interrogation is suboptimal for valve gradient and valve area.  No abscess formation is seen.    A mobile mitral valve mass is seen on the posterior leaflet measuring 1.7 cm.  This appears consistent with a valvular vegetation.  No abscess formation is seen.    No mass or vegetation is seen on the tricuspid valve.    No masses are seen in association with the pacing leads.    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  High grade Streptococcus sanguinous infection with 4 out of 4 blood cultures positive from 6/3/2025 with previous treatment for pneumonia, although in reviewing films no obvious consolidation on his CT scan of the chest or chest x-ray.  CT scan of the abdomen and pelvis with no other obvious focus.  Urinalysis benign.  This was partially treated with oral cephalosporin.  Repeat blood cultures on 6/13 negative, but likely related to his recent treatment with cephalosporin.  Intermediately sensitive to penicillin.  Sensitive to ceftriaxone and vancomycin.  Acute on chronic renal failure.  Creatinine was 3.18 on 6/3, currently 2.59.  2.21 on 6/15.  2.35 on 6/16, 2.15 on 6/17, ongoing.  Patient reports he is not going to be on dialysis.  Leukocytosis, neutrophilic, improved from his 6/3 level of 18.  Resolved.  Anemia, chronic disease related to above issues.  Slightly worse.  Cardiac regularly, TTE from 4/25 with ejection fraction 56%, TAVR present without stenosis or regurgitation or  paravalvular leak.  Diabetes mellitus type 2 with increased risk for infection, hemoglobin A1c 7.80 on 6/3.  Previous aortic stenosis status post TAVR by Dr. Lopez on 5/8/2023.    PLAN:    Diagnostically, continue to follow patient's physical exam, CBC, CMP, CRP, blood cultures which were obtained on 6/13.  Radiographs.  Therapeutically, 6 weeks of IV ceftriaxone even if repeat blood cultures neg until 8/1 given his high risk and TAVR, and pacemaker.  Then possibly oral suppression with penicillin for 3 months to life.  Supportive care.  PICC line as patient not considering future dialysis long term.  CT surg eval.    I discussed the patient's findings and my recommendations with patient and nursing staff, discussed with Dr. Wright, and the patient's wife who is an APRN.    Case management orders: Please arrange for home antibiotics with ceftriaxone 2 g IV daily to continue until 8/1/2025 and reassess for streptococcal intravascular infection.  Check CBC, CMP, CRP weekly while on IV antibiotics.  Fax orders to [phone], call [phone] with final arrangements.  Arrange for follow-up with me in 1 week postdischarge.  Weekly PICC dressing changes.    Thank you for asking me to see Nicholas Yin.  Our group would be pleased to follow this patient over the course of their hospitalization and assist with outpatient antimicrobial therapy, as indicated.  Further recommendations depend on the results of the cultures and clinical course.  Side effects of medications discussed.  The patient is at increased risk for adverse drug reactions, complications of IV access, and readmission, CVA, death.  Discussed with Dr. Wright.  Time > 50 min.    This visit included the following complex service elements:  Complex medical decision-making associated with antimicrobial prescribing.  In-depth chart review with high level synthesis for complex diagnoses.  Managed infection treatment protocol associated with transitions of care for  this complex patient.  Counseled patients, family members and/or caregivers regarding infection prevention.  Managed infection control protocol.     Bulmaro Jin MD  6/18/2025

## 2025-06-18 NOTE — OUTREACH NOTE
Prep Survey      Flowsheet Row Responses   [facility] facility patient discharged from? [place]   Is LACE score < 7 ? No   Eligibility [facility]   Date of Admission 06/13/25   Date of Discharge 06/18/25   Discharge Disposition Home-Health Care Sv   Discharge diagnosis Bacteremia   Does the patient have one of the following disease processes/diagnoses(primary or secondary)? Other   Does the patient have Home health ordered? Yes   What is the Home health agency?  [facility] , [facility] for IV abx, pending at discharge   Is there a DME ordered? No   Medication alerts for this patient IV abx   Prep survey completed? Yes            PALOMA LAMAS - Registered Nurse

## 2025-06-19 ENCOUNTER — TRANSITIONAL CARE MANAGEMENT TELEPHONE ENCOUNTER (OUTPATIENT)
Dept: CALL CENTER | Facility: HOSPITAL | Age: 80
End: 2025-06-19
Payer: COMMERCIAL

## 2025-06-19 NOTE — OUTREACH NOTE
Call Center TCM Note      Flowsheet Row Responses   [facility] patient discharged from? [place]   Does the patient have one of the following disease processes/diagnoses(primary or secondary)? Other   TCM attempt successful? Yes  [verbal release for wife]   Call start time 1407   Call end time 1413   Discharge diagnosis Bacteremia   Medication alerts for this patient IV abx--no issues at this time   Meds reviewed with patient/caregiver? Yes   Is the patient having any side effects they believe may be caused by any medication additions or changes? No   Does the patient have all medications ordered at discharge? Yes   Is the patient taking all medications as directed (includes completed medication regime)? Yes   Comments Hospital f/u 6/27/25@1100am (appt in place at time of call) [facility] 6/25/25   Does the patient have an appointment with their PCP within 7-14 days of discharge? Yes   Nursing Interventions Confirmed date/time of appointment   What is the Home health agency?  Nabila Best , [facility] for IV abx   Home health comments wife will call [facility] to confirm plans for first PICC line dressing change at appt on 6/25/25   Psychosocial issues? No   Did the patient receive a copy of their discharge instructions? Yes   Nursing interventions Reviewed instructions with patient   What is the patient's perception of their health status since discharge? Same  [Wife feels pt is doing well, had some concerns with mobility but feels  he is much safer and stable, ambulating short distances.  Has walker for use as needed. Wife is APRN and aware of return precautions. Will f/u with [facility] and PCP.]   Is the patient/caregiver able to teach back signs and symptoms related to disease process for when to call PCP? Yes   TCM call completed? Yes   Call end time 1413            CARMELO GARCIA - Registered Nurse    6/19/2025, 14:21 EDT

## 2025-06-24 DIAGNOSIS — Z95.828 STATUS POST PICC CENTRAL LINE PLACEMENT: Primary | ICD-10-CM

## 2025-06-24 RX ORDER — SODIUM CHLORIDE 9 MG/ML
40 INJECTION, SOLUTION INTRAVENOUS AS NEEDED
Status: SHIPPED | OUTPATIENT
Start: 2025-06-24

## 2025-06-24 RX ORDER — SODIUM CHLORIDE 0.9 % (FLUSH) 0.9 %
20 SYRINGE (ML) INJECTION AS NEEDED
Status: SHIPPED | OUTPATIENT
Start: 2025-06-24

## 2025-06-24 RX ORDER — SODIUM CHLORIDE 0.9 % (FLUSH) 0.9 %
10 SYRINGE (ML) INJECTION EVERY 12 HOURS SCHEDULED
Status: SHIPPED | OUTPATIENT
Start: 2025-06-24

## 2025-06-24 RX ORDER — SODIUM CHLORIDE 0.9 % (FLUSH) 0.9 %
10 SYRINGE (ML) INJECTION AS NEEDED
Status: SHIPPED | OUTPATIENT
Start: 2025-06-24

## 2025-06-25 ENCOUNTER — HOSPITAL ENCOUNTER (OUTPATIENT)
Dept: INFUSION THERAPY | Facility: HOSPITAL | Age: 80
Discharge: HOME | End: 2025-06-25
Payer: COMMERCIAL

## 2025-06-26 ENCOUNTER — TRANSCRIBE ORDERS (OUTPATIENT)
Dept: ADMINISTRATIVE | Facility: HOSPITAL | Age: 80
End: 2025-06-26
Payer: COMMERCIAL

## 2025-06-26 ENCOUNTER — LAB (OUTPATIENT)
Dept: LAB | Facility: HOSPITAL | Age: 80
End: 2025-06-26
Payer: COMMERCIAL

## 2025-06-26 ENCOUNTER — TELEPHONE (OUTPATIENT)
Dept: FAMILY MEDICINE CLINIC | Facility: CLINIC | Age: 80
End: 2025-06-26
Payer: COMMERCIAL

## 2025-06-26 DIAGNOSIS — Z79.4 TYPE 2 DIABETES MELLITUS WITH COMPLICATION, WITH LONG-TERM CURRENT USE OF INSULIN: Chronic | ICD-10-CM

## 2025-06-26 DIAGNOSIS — R50.9 FEVER, UNSPECIFIED FEVER CAUSE: ICD-10-CM

## 2025-06-26 DIAGNOSIS — I33.0 ACUTE AND SUBACUTE BACTERIAL ENDOCARDITIS: ICD-10-CM

## 2025-06-26 DIAGNOSIS — E11.8 TYPE 2 DIABETES MELLITUS WITH COMPLICATION, WITH LONG-TERM CURRENT USE OF INSULIN: Chronic | ICD-10-CM

## 2025-06-26 DIAGNOSIS — N18.6 TYPE 2 DIABETES MELLITUS WITH ESRD (END-STAGE RENAL DISEASE): ICD-10-CM

## 2025-06-26 DIAGNOSIS — I33.0 ACUTE AND SUBACUTE BACTERIAL ENDOCARDITIS: Primary | ICD-10-CM

## 2025-06-26 DIAGNOSIS — R78.81 POSITIVE BLOOD CULTURE: ICD-10-CM

## 2025-06-26 DIAGNOSIS — E78.2 MIXED HYPERLIPIDEMIA: Chronic | ICD-10-CM

## 2025-06-26 DIAGNOSIS — N18.4 CHRONIC RENAL FAILURE, STAGE 4 (SEVERE): ICD-10-CM

## 2025-06-26 DIAGNOSIS — A40.8 SEPTICEMIA DUE TO ANAEROBIC STREPTOCOCCI: ICD-10-CM

## 2025-06-26 DIAGNOSIS — Z95.2 S/P TAVR (TRANSCATHETER AORTIC VALVE REPLACEMENT): Chronic | ICD-10-CM

## 2025-06-26 DIAGNOSIS — R50.9 FEBRILE ILLNESS: ICD-10-CM

## 2025-06-26 DIAGNOSIS — Z95.0 PACEMAKER: Chronic | ICD-10-CM

## 2025-06-26 DIAGNOSIS — E11.22 TYPE 2 DIABETES MELLITUS WITH ESRD (END-STAGE RENAL DISEASE): ICD-10-CM

## 2025-06-26 DIAGNOSIS — R06.02 SOBOE (SHORTNESS OF BREATH ON EXERTION): ICD-10-CM

## 2025-06-26 LAB
ALBUMIN SERPL-MCNC: 3.1 G/DL (ref 3.5–5.2)
ALBUMIN/GLOB SERPL: 1 G/DL
ALP SERPL-CCNC: 80 U/L (ref 39–117)
ALT SERPL W P-5'-P-CCNC: 13 U/L (ref 1–41)
ANION GAP SERPL CALCULATED.3IONS-SCNC: 10.2 MMOL/L (ref 5–15)
AST SERPL-CCNC: 22 U/L (ref 1–40)
BASOPHILS # BLD AUTO: 0.05 10*3/MM3 (ref 0–0.2)
BASOPHILS NFR BLD AUTO: 0.5 % (ref 0–1.5)
BILIRUB SERPL-MCNC: 0.2 MG/DL (ref 0–1.2)
BUN SERPL-MCNC: 27.1 MG/DL (ref 8–23)
BUN/CREAT SERPL: 10.7 (ref 7–25)
CALCIUM SPEC-SCNC: 8.4 MG/DL (ref 8.6–10.5)
CHLORIDE SERPL-SCNC: 107 MMOL/L (ref 98–107)
CHOLEST SERPL-MCNC: 94 MG/DL (ref 0–200)
CO2 SERPL-SCNC: 22.8 MMOL/L (ref 22–29)
CREAT SERPL-MCNC: 2.54 MG/DL (ref 0.76–1.27)
CRP SERPL-MCNC: 0.34 MG/DL (ref 0–0.5)
DEPRECATED RDW RBC AUTO: 58.9 FL (ref 37–54)
EGFRCR SERPLBLD CKD-EPI 2021: 25 ML/MIN/1.73
EOSINOPHIL # BLD AUTO: 0.9 10*3/MM3 (ref 0–0.4)
EOSINOPHIL NFR BLD AUTO: 9.4 % (ref 0.3–6.2)
ERYTHROCYTE [DISTWIDTH] IN BLOOD BY AUTOMATED COUNT: 21.6 % (ref 12.3–15.4)
GLOBULIN UR ELPH-MCNC: 3 GM/DL
GLUCOSE SERPL-MCNC: 189 MG/DL (ref 65–99)
HBA1C MFR BLD: 8.19 % (ref 4.8–5.6)
HCT VFR BLD AUTO: 31.6 % (ref 37.5–51)
HDLC SERPL-MCNC: 38 MG/DL (ref 40–60)
HGB BLD-MCNC: 9.4 G/DL (ref 13–17.7)
IMM GRANULOCYTES # BLD AUTO: 0.03 10*3/MM3 (ref 0–0.05)
IMM GRANULOCYTES NFR BLD AUTO: 0.3 % (ref 0–0.5)
LDLC SERPL CALC-MCNC: 36 MG/DL (ref 0–100)
LDLC/HDLC SERPL: 0.92 {RATIO}
LYMPHOCYTES # BLD AUTO: 0.93 10*3/MM3 (ref 0.7–3.1)
LYMPHOCYTES NFR BLD AUTO: 9.7 % (ref 19.6–45.3)
MCH RBC QN AUTO: 22.8 PG (ref 26.6–33)
MCHC RBC AUTO-ENTMCNC: 29.7 G/DL (ref 31.5–35.7)
MCV RBC AUTO: 76.5 FL (ref 79–97)
MONOCYTES # BLD AUTO: 0.83 10*3/MM3 (ref 0.1–0.9)
MONOCYTES NFR BLD AUTO: 8.6 % (ref 5–12)
NEUTROPHILS NFR BLD AUTO: 6.87 10*3/MM3 (ref 1.7–7)
NEUTROPHILS NFR BLD AUTO: 71.5 % (ref 42.7–76)
NRBC BLD AUTO-RTO: 0 /100 WBC (ref 0–0.2)
PLATELET # BLD AUTO: 203 10*3/MM3 (ref 140–450)
PMV BLD AUTO: 10.6 FL (ref 6–12)
POTASSIUM SERPL-SCNC: 5.6 MMOL/L (ref 3.5–5.2)
PROT SERPL-MCNC: 6.1 G/DL (ref 6–8.5)
RBC # BLD AUTO: 4.13 10*6/MM3 (ref 4.14–5.8)
SODIUM SERPL-SCNC: 140 MMOL/L (ref 136–145)
TRIGL SERPL-MCNC: 106 MG/DL (ref 0–150)
VLDLC SERPL-MCNC: 20 MG/DL (ref 5–40)
WBC NRBC COR # BLD AUTO: 9.61 10*3/MM3 (ref 3.4–10.8)

## 2025-06-26 PROCEDURE — 83036 HEMOGLOBIN GLYCOSYLATED A1C: CPT

## 2025-06-26 PROCEDURE — 80053 COMPREHEN METABOLIC PANEL: CPT

## 2025-06-26 PROCEDURE — 85025 COMPLETE CBC W/AUTO DIFF WBC: CPT

## 2025-06-26 PROCEDURE — 86140 C-REACTIVE PROTEIN: CPT

## 2025-06-26 PROCEDURE — 36415 COLL VENOUS BLD VENIPUNCTURE: CPT

## 2025-06-26 PROCEDURE — 80061 LIPID PANEL: CPT

## 2025-06-26 NOTE — TELEPHONE ENCOUNTER
Labs faxed tm    ----- Message from Waldemar Trejo sent at 6/26/2025  1:05 PM EDT -----  Please forward copies of labs from this week to Dr Bulmaro Jin at [facility]

## 2025-06-27 ENCOUNTER — TELEMEDICINE (OUTPATIENT)
Dept: FAMILY MEDICINE CLINIC | Facility: CLINIC | Age: 80
End: 2025-06-27
Payer: COMMERCIAL

## 2025-06-27 ENCOUNTER — LAB (OUTPATIENT)
Dept: LAB | Facility: HOSPITAL | Age: 80
End: 2025-06-27
Payer: COMMERCIAL

## 2025-06-27 DIAGNOSIS — N18.4 CHRONIC RENAL FAILURE, STAGE 4 (SEVERE): ICD-10-CM

## 2025-06-27 DIAGNOSIS — R50.9 FEBRILE ILLNESS: ICD-10-CM

## 2025-06-27 DIAGNOSIS — I44.2 POSTOPERATIVE COMPLETE HEART BLOCK: ICD-10-CM

## 2025-06-27 DIAGNOSIS — I10 ESSENTIAL HYPERTENSION: Chronic | ICD-10-CM

## 2025-06-27 DIAGNOSIS — G45.3 AMAUROSIS FUGAX OF RIGHT EYE: ICD-10-CM

## 2025-06-27 DIAGNOSIS — I73.9 PERIPHERAL VASCULAR DISEASE: ICD-10-CM

## 2025-06-27 DIAGNOSIS — I25.10 CORONARY ARTERY DISEASE INVOLVING NATIVE CORONARY ARTERY OF NATIVE HEART WITHOUT ANGINA PECTORIS: Chronic | ICD-10-CM

## 2025-06-27 DIAGNOSIS — Z95.2 S/P TAVR (TRANSCATHETER AORTIC VALVE REPLACEMENT): Primary | Chronic | ICD-10-CM

## 2025-06-27 DIAGNOSIS — I33.0 SUBACUTE BACTERIAL ENDOCARDITIS: ICD-10-CM

## 2025-06-27 DIAGNOSIS — Z79.4 TYPE 2 DIABETES MELLITUS WITH COMPLICATION, WITH LONG-TERM CURRENT USE OF INSULIN: Chronic | ICD-10-CM

## 2025-06-27 DIAGNOSIS — I97.89 POSTOPERATIVE COMPLETE HEART BLOCK: ICD-10-CM

## 2025-06-27 DIAGNOSIS — E11.8 TYPE 2 DIABETES MELLITUS WITH COMPLICATION, WITH LONG-TERM CURRENT USE OF INSULIN: Chronic | ICD-10-CM

## 2025-06-27 DIAGNOSIS — Z95.2 S/P TAVR (TRANSCATHETER AORTIC VALVE REPLACEMENT): Chronic | ICD-10-CM

## 2025-06-27 DIAGNOSIS — R78.81 POSITIVE BLOOD CULTURE: ICD-10-CM

## 2025-06-27 DIAGNOSIS — I48.0 PAROXYSMAL ATRIAL FIBRILLATION: Chronic | ICD-10-CM

## 2025-06-27 DIAGNOSIS — Z95.0 PACEMAKER: Chronic | ICD-10-CM

## 2025-06-27 DIAGNOSIS — Z00.00 HEALTHCARE MAINTENANCE: ICD-10-CM

## 2025-06-27 DIAGNOSIS — E78.2 MIXED HYPERLIPIDEMIA: Chronic | ICD-10-CM

## 2025-06-27 DIAGNOSIS — D50.9 MICROCYTIC ANEMIA: ICD-10-CM

## 2025-06-27 PROBLEM — R06.02 SOBOE (SHORTNESS OF BREATH ON EXERTION): Status: RESOLVED | Noted: 2019-07-02 | Resolved: 2025-06-27

## 2025-06-27 PROCEDURE — 87040 BLOOD CULTURE FOR BACTERIA: CPT

## 2025-06-27 RX ORDER — ONDANSETRON 4 MG/1
4 TABLET, ORALLY DISINTEGRATING ORAL EVERY 8 HOURS PRN
Qty: 30 TABLET | Refills: 0 | Status: SHIPPED | OUTPATIENT
Start: 2025-06-27

## 2025-06-27 NOTE — PROGRESS NOTES
Subjective   Nicholas Yin is a 80 y.o. male.     You have chosen to receive care through a telehealth visit.  Do you consent to use a video/audio connection for your medical care today? Yes    Patient was at home and I was in the office.    Chief Complaint  Hospital follow-up    History of Present Illness     Hospital Follow-Up  He was discharged from [facility] on 6/18/2025 following a 5-day admission for Streptococcus sanguinous bacteremia.  He was found to have a 1.7 cm mobile vegetation on the mitral valve on LAURIE.  He was recommended a 6-week course of ceftriaxone 2000 mg IV Q24H.  He was also discharged on ferrous sulfate 325 daily and sertraline 25 daily.  He has not continued the latter.  He remains on his preadmission medications.  He has been scheduled weekly visits with Dr Jin from Infectious Diseases.  He denies any further fever, and has noted some improvement in his cough, shortness of breath, and weakness.  He denies any chest pain, palpitations, orthopnea, PND, or swelling of the ankles.  He admits to occasional nausea, but is eating fairly well with no vomiting or diarrhea.  There is no history of any rash, chills, or night sweats.  Lab Results   Component Value Date    WBC 9.61 06/26/2025    HGB 9.4 (L) 06/26/2025    HCT 31.6 (L) 06/26/2025    MCV 76.5 (L) 06/26/2025     06/26/2025     Lab Results   Component Value Date    CRP 0.34 06/26/2025     Previous TAVR  He underwent a TAVR and left femoral endarterectomy on 5/8/2023.  LAURIE performed on 6/16/2025 also revealed slight thickening of his prosthetic valve leaflets along with mild concentric LVH, but normal systolic function was seen with an estimated EF of 60%.  He is scheduled to undergo a cardiology reassessment with CHIDI Ku on 9/19/2025.    Paroxysmal Atrial Fibrillation  He required readmission to hospital on 5/14/2023 following a presyncopal episode. He was found to be in atrial fibrillation with a rapid ventricular  response.  He was started on IV diltiazem and progressed to a wide-complex tachycardia with loss of consciousness.  CPR was administered with a prompt improvement and he regained consciousness.  He was then started on IV amiodarone and transferred to [facility].  On arrival there he was found to be in a third-degree block and underwent placement of a pacemaker by Dr. Mendoza.  He remains on dronedarone and continues to deny any significant palpitations.  Noncontrast CT of the chest performed on 6/3/2025 revealed peripheral and basilar predominant pulmonary fibrosis.  He is scheduled to undergo a PFT on 7/7/2025.  He is scheduled to undergo an electrophysiology reassessment with Dr. Howe on 5/8/2026    Coronary Artery Disease  He underwent a coronary angiogram by Dr. Villatoro on 8/15/2019 with stenting of the mid LAD.  He remains on low-dose ASA.     Transient Visual Loss  Several years ago he experienced a sudden progressive decrease in the vision in his right eye while driving. He stated that the vision in the eye darkened gradually over several minutes to the point where the only things he could make out were very bright lights and the sun. This lasted for about 30 minutes then resolved over several minutes. He was hospitalized at [facility] over 10 years ago following a similar episodes that was ultimately felt to be vascular in origin. MRI of the brain performed on 12/8/17 was reported as showing mild cerebral atrophy with chronic small vessel ischemic changes. MRA of the carotid arteries performed the same day was unremarkable.  Bilateral carotid ultrasound performed on 2/6/2023 was unremarkable.  He undergoes regular ophthalmology assessments    Type 2 Diabetes Mellitus  He remains on a SGLT2 -empagliflozin, GLP agonist-liraglutide and basal insulin - tresiba (together as xultophy).    Lab Results   Component Value Date    HGBA1C 8.19 (H) 06/26/2025     Hyperlipidemia  He remains on rosuvastatin  Lab Results    Component Value Date    CHOL 94 06/26/2025    CHLPL 125 09/28/2015    TRIG 106 06/26/2025    HDL 38 (L) 06/26/2025    LDL 36 06/26/2025     Essential Hypertension  He remains on metoprolol and losartan  Lab Results   Component Value Date    GLUCOSE 189 (H) 06/26/2025    BUN 27.1 (H) 06/26/2025    CREATININE 2.54 (H) 06/26/2025     06/26/2025    K 5.6 (H) 06/26/2025     06/26/2025    CALCIUM 8.4 (L) 06/26/2025    PROTEINTOT 6.1 06/26/2025    ALBUMIN 3.1 (L) 06/26/2025    ALT 13 06/26/2025    AST 22 06/26/2025    ALKPHOS 80 06/26/2025    BILITOT 0.2 06/26/2025    GLOB 3.0 06/26/2025    AGRATIO 1.0 06/26/2025    BCR 10.7 06/26/2025    ANIONGAP 10.2 06/26/2025    EGFR 25.0 (L) 06/26/2025     Chronic Renal Failure  This has been contributed to longstanding type 2 diabetes mellitus and hypertension.  He is aware to avoid any NSAIDs oral or prescription.     Chronic Back Pain  He gives a long history of increasing mid and low back pain. This is described as a sharp ache. The pain does not radiate and has been unassociated with any other symptoms. The pain is worse with prolonged weight bearing and limits his activities more then anything else at present.  MRI performed on 1/7/2021 was reported as showing degenerative disc disease and osteoarthritis with mild to moderate central canal and bilateral foraminal narrowing at L2-3 and L3-4.    Gastroesophageal Reflux Disease  He remains heartburn free on omeprazole    The following portions of the patient's history were reviewed and updated as appropriate: allergies, current medications, past medical history, past social history, and problem list.    Review of Systems   Constitutional:  Positive for fatigue. Negative for chills and fever.   HENT:  Negative for congestion, dental problem, ear pain, rhinorrhea, sneezing and sore throat.    Respiratory:  Positive for cough and shortness of breath. Negative for wheezing.    Cardiovascular:  Negative for chest pain,  palpitations and leg swelling.   Gastrointestinal:  Positive for nausea. Negative for abdominal pain, blood in stool, constipation, diarrhea and vomiting.   Genitourinary:  Positive for difficulty urinating (sense of incomplete voding), nocturia (x 2-3) and erectile dysfunction. Negative for dysuria and hematuria.   Musculoskeletal:  Positive for back pain. Negative for arthralgias, gait problem, joint swelling, myalgias, neck pain and neck stiffness.   Skin:  Negative for rash and skin lesions.   Neurological:  Positive for weakness (generalized). Negative for numbness and headache.   Psychiatric/Behavioral:  Negative for behavioral problems, sleep disturbance and depressed mood. The patient is not nervous/anxious.      Objective   Physical Exam  Constitutional:       Comments: Bright and in good spirits. No apparent distress. No pallor, jaundice, diaphoresis, or cyanosis.   Pulmonary:      Comments: Normal respiratory effort. No apparent shortness of breath and no audible wheezing  Skin:     Coloration: Skin is not cyanotic, jaundiced or pale.   Neurological:      Mental Status: He is alert and oriented to person, place, and time.      Cranial Nerves: No dysarthria or facial asymmetry.   Psychiatric:         Attention and Perception: Attention normal.         Mood and Affect: Mood normal.         Speech: Speech normal.         Behavior: Behavior normal.         Thought Content: Thought content normal.       Assessment & Plan   Problems Addressed this Visit          Cardiac and Vasculature    CAD s/p stent  (Chronic)  Reminded regarding the importance of risk factor modification.  Continue low-dose ASA.  Follow up with cardiology     Essential hypertension (Chronic)  Encouraged to continue to work on his diet and exercise plan.  Continue current medication    Mixed hyperlipidemia (Chronic)  As above.   Continue current medication.    Pacemaker (Chronic)    Paroxysmal atrial fibrillation (Chronic)   Patient is  anticoagulated.   Continue current medication.  Follow up with cardiology     S/P TAVR (transcatheter aortic valve replacement)    Peripheral vascular disease    Postoperative complete heart block  S/P PPP  Follow up with electrophysiology    Subacute bacterial endocarditis  Mitral valve vegetation  Continue current medication  Follow up with Infectious Diseases    Relevant Medications    ondansetron ODT (ZOFRAN-ODT) 4 MG disintegrating tablet    Other Relevant Orders    CBC & Differential       Endocrine and Metabolic    T2DM  (Chronic)  Diabetes mellitus Type II, under fair control.   Encouraged to continue to pursue ADA diet  Encouraged aerobic exercise.  Continue current medication       Genitourinary and Reproductive     Chronic renal failure, stage 4 (severe)  Continue current medication  Will continue to monitor    Relevant Orders    CBC & Differential    Comprehensive Metabolic Panel       Health Encounters    Healthcare maintenance  We will discuss RSV, Shingrix, and an updated Tdap again at his return       Hematology and Neoplasia    Microcytic anemia  Scheduled for updated labs in 2 weeks    Relevant Orders    CBC & Differential    Iron    Transferrin    Ferritin    Folate RBC    Peripheral Blood Smear    Reticulocytes    Vitamin B12       Neuro    Amaurosis fugax of right eye     Diagnoses         Codes Comments      S/P TAVR (transcatheter aortic valve replacement)    -  Primary ICD-10-CM: Z95.2  ICD-9-CM: V43.3       Postoperative complete heart block     ICD-10-CM: I97.89, I44.2  ICD-9-CM: 997.1, 426.0       Peripheral vascular disease     ICD-10-CM: I73.9  ICD-9-CM: 443.9       Paroxysmal atrial fibrillation     ICD-10-CM: I48.0  ICD-9-CM: 427.31       Pacemaker     ICD-10-CM: Z95.0  ICD-9-CM: V45.01       Mixed hyperlipidemia     ICD-10-CM: E78.2  ICD-9-CM: 272.2       Essential hypertension     ICD-10-CM: I10  ICD-9-CM: 401.9       Coronary artery disease involving native coronary artery of native  heart without angina pectoris     ICD-10-CM: I25.10  ICD-9-CM: 414.01       T2DM      ICD-10-CM: E11.8, Z79.4  ICD-9-CM: 250.90, V58.67       Chronic renal failure, stage 4 (severe)     ICD-10-CM: N18.4  ICD-9-CM: 585.4       Healthcare maintenance     ICD-10-CM: Z00.00  ICD-9-CM: V70.0       Subacute bacterial endocarditis     ICD-10-CM: I33.0  ICD-9-CM: 421.0       Amaurosis fugax of right eye     ICD-10-CM: G45.3  ICD-9-CM: 362.34       Microcytic anemia     ICD-10-CM: D50.9  ICD-9-CM: 280.9

## 2025-06-28 PROBLEM — D50.9 MICROCYTIC ANEMIA: Status: ACTIVE | Noted: 2025-06-28

## 2025-06-30 ENCOUNTER — TRANSCRIBE ORDERS (OUTPATIENT)
Dept: ADMINISTRATIVE | Facility: HOSPITAL | Age: 80
End: 2025-06-30
Payer: COMMERCIAL

## 2025-06-30 DIAGNOSIS — A40.8 OTHER STREPTOCOCCAL SEPSIS: Primary | ICD-10-CM

## 2025-07-01 ENCOUNTER — READMISSION MANAGEMENT (OUTPATIENT)
Dept: CALL CENTER | Facility: HOSPITAL | Age: 80
End: 2025-07-01
Payer: COMMERCIAL

## 2025-07-01 ENCOUNTER — TELEPHONE (OUTPATIENT)
Dept: FAMILY MEDICINE CLINIC | Facility: CLINIC | Age: 80
End: 2025-07-01
Payer: COMMERCIAL

## 2025-07-01 ENCOUNTER — LAB (OUTPATIENT)
Dept: LAB | Facility: HOSPITAL | Age: 80
End: 2025-07-01
Payer: COMMERCIAL

## 2025-07-01 DIAGNOSIS — D50.9 MICROCYTIC ANEMIA: ICD-10-CM

## 2025-07-01 DIAGNOSIS — N18.4 CHRONIC RENAL FAILURE, STAGE 4 (SEVERE): Primary | ICD-10-CM

## 2025-07-01 DIAGNOSIS — N18.4 CHRONIC RENAL FAILURE, STAGE 4 (SEVERE): ICD-10-CM

## 2025-07-01 DIAGNOSIS — A40.8 OTHER STREPTOCOCCAL SEPSIS: ICD-10-CM

## 2025-07-01 DIAGNOSIS — I33.0 SUBACUTE BACTERIAL ENDOCARDITIS: ICD-10-CM

## 2025-07-01 LAB
ALBUMIN SERPL-MCNC: 3.4 G/DL (ref 3.5–5.2)
ALBUMIN/GLOB SERPL: 1 G/DL
ALP SERPL-CCNC: 90 U/L (ref 39–117)
ALT SERPL W P-5'-P-CCNC: 13 U/L (ref 1–41)
ANION GAP SERPL CALCULATED.3IONS-SCNC: 11 MMOL/L (ref 5–15)
AST SERPL-CCNC: 23 U/L (ref 1–40)
BASOPHILS # BLD AUTO: 0.07 10*3/MM3 (ref 0–0.2)
BASOPHILS NFR BLD AUTO: 0.7 % (ref 0–1.5)
BILIRUB SERPL-MCNC: 0.3 MG/DL (ref 0–1.2)
BUN SERPL-MCNC: 28.2 MG/DL (ref 8–23)
BUN/CREAT SERPL: 9.2 (ref 7–25)
CALCIUM SPEC-SCNC: 8.8 MG/DL (ref 8.6–10.5)
CHLORIDE SERPL-SCNC: 108 MMOL/L (ref 98–107)
CO2 SERPL-SCNC: 20 MMOL/L (ref 22–29)
CREAT SERPL-MCNC: 3.05 MG/DL (ref 0.76–1.27)
CRP SERPL-MCNC: <0.3 MG/DL (ref 0–0.5)
DEPRECATED RDW RBC AUTO: 57.9 FL (ref 37–54)
EGFRCR SERPLBLD CKD-EPI 2021: 20.1 ML/MIN/1.73
EOSINOPHIL # BLD AUTO: 0.81 10*3/MM3 (ref 0–0.4)
EOSINOPHIL NFR BLD AUTO: 8.3 % (ref 0.3–6.2)
ERYTHROCYTE [DISTWIDTH] IN BLOOD BY AUTOMATED COUNT: 22 % (ref 12.3–15.4)
ERYTHROCYTE [SEDIMENTATION RATE] IN BLOOD: 64 MM/HR (ref 0–20)
FERRITIN SERPL-MCNC: 42.5 NG/ML (ref 30–400)
GLOBULIN UR ELPH-MCNC: 3.3 GM/DL
GLUCOSE SERPL-MCNC: 134 MG/DL (ref 65–99)
HCT VFR BLD AUTO: 32.1 % (ref 37.5–51)
HGB BLD-MCNC: 9.5 G/DL (ref 13–17.7)
IMM GRANULOCYTES # BLD AUTO: 0.03 10*3/MM3 (ref 0–0.05)
IMM GRANULOCYTES NFR BLD AUTO: 0.3 % (ref 0–0.5)
IRON 24H UR-MRATE: 130 MCG/DL (ref 59–158)
LYMPHOCYTES # BLD AUTO: 1.03 10*3/MM3 (ref 0.7–3.1)
LYMPHOCYTES NFR BLD AUTO: 10.5 % (ref 19.6–45.3)
MCH RBC QN AUTO: 22.2 PG (ref 26.6–33)
MCHC RBC AUTO-ENTMCNC: 29.6 G/DL (ref 31.5–35.7)
MCV RBC AUTO: 75.2 FL (ref 79–97)
MONOCYTES # BLD AUTO: 0.87 10*3/MM3 (ref 0.1–0.9)
MONOCYTES NFR BLD AUTO: 8.9 % (ref 5–12)
NEUTROPHILS NFR BLD AUTO: 6.97 10*3/MM3 (ref 1.7–7)
NEUTROPHILS NFR BLD AUTO: 71.3 % (ref 42.7–76)
NRBC BLD AUTO-RTO: 0 /100 WBC (ref 0–0.2)
PLATELET # BLD AUTO: 281 10*3/MM3 (ref 140–450)
PMV BLD AUTO: 9.4 FL (ref 6–12)
POTASSIUM SERPL-SCNC: 6.4 MMOL/L (ref 3.5–5.2)
PROT SERPL-MCNC: 6.7 G/DL (ref 6–8.5)
RBC # BLD AUTO: 4.27 10*6/MM3 (ref 4.14–5.8)
RETICS # AUTO: 0.08 10*6/MM3 (ref 0.02–0.13)
RETICS/RBC NFR AUTO: 1.89 % (ref 0.7–1.9)
SODIUM SERPL-SCNC: 139 MMOL/L (ref 136–145)
TRANSFERRIN SERPL-MCNC: 227 MG/DL (ref 200–360)
VIT B12 BLD-MCNC: 429 PG/ML (ref 211–946)
WBC NRBC COR # BLD AUTO: 9.78 10*3/MM3 (ref 3.4–10.8)

## 2025-07-01 PROCEDURE — 82607 VITAMIN B-12: CPT

## 2025-07-01 PROCEDURE — 85025 COMPLETE CBC W/AUTO DIFF WBC: CPT

## 2025-07-01 PROCEDURE — 85652 RBC SED RATE AUTOMATED: CPT

## 2025-07-01 PROCEDURE — 82747 ASSAY OF FOLIC ACID RBC: CPT

## 2025-07-01 PROCEDURE — 80053 COMPREHEN METABOLIC PANEL: CPT

## 2025-07-01 PROCEDURE — 85014 HEMATOCRIT: CPT

## 2025-07-01 PROCEDURE — 86140 C-REACTIVE PROTEIN: CPT

## 2025-07-01 PROCEDURE — 87040 BLOOD CULTURE FOR BACTERIA: CPT

## 2025-07-01 PROCEDURE — 84466 ASSAY OF TRANSFERRIN: CPT

## 2025-07-01 PROCEDURE — 85045 AUTOMATED RETICULOCYTE COUNT: CPT

## 2025-07-01 PROCEDURE — 82728 ASSAY OF FERRITIN: CPT

## 2025-07-01 PROCEDURE — 83540 ASSAY OF IRON: CPT

## 2025-07-01 PROCEDURE — 36415 COLL VENOUS BLD VENIPUNCTURE: CPT

## 2025-07-01 NOTE — OUTREACH NOTE
Medical Week 2 Survey      Flowsheet Row Responses   [facility] patient discharged from? [place]   Does the patient have one of the following disease processes/diagnoses(primary or secondary)? Other   Week 2 attempt successful? Yes   Call start time 1645   Discharge diagnosis Bacteremia   Call end time 1647   Meds reviewed with patient/caregiver? Yes   Is the patient having any side effects they believe may be caused by any medication additions or changes? No   Does the patient have all medications ordered at discharge? Yes   Is the patient taking all medications as directed (includes completed medication regime)? Yes   Does the patient have a primary care provider?  Yes   Does the patient have an appointment with their PCP within 7 days of discharge? Yes   Has the patient kept scheduled appointments due by today? Yes   What is the Home health agency?  [facility]abil[facility] Best , [facility] for IV abx   Has home health visited the patient within 72 hours of discharge? Yes   Psychosocial issues? No   Did the patient receive a copy of their discharge instructions? Yes   Nursing interventions Reviewed instructions with patient   What is the patient's perception of their health status since discharge? Improving   Is the patient/caregiver able to teach back signs and symptoms related to disease process for when to call PCP? Yes   Is the patient/caregiver able to teach back signs and symptoms related to disease process for when to call 911? Yes   Is the patient/caregiver able to teach back the hierarchy of who to call/visit for symptoms/problems? PCP, Specialist, Home health nurse, Urgent Care, ED, 911 Yes   Week 2 Call Completed? Yes   Call end time 1647            Shamika BINGHAM - Registered Nurse

## 2025-07-01 NOTE — TELEPHONE ENCOUNTER
Labs faxed tm     ----- Message from Waldemar Trejo sent at 7/1/2025  1:10 PM EDT -----  Please fax all of his labs from the last week to Dr Jin at  [facility]

## 2025-07-02 LAB
BACTERIA SPEC AEROBE CULT: NORMAL
REF LAB TEST METHOD: NORMAL

## 2025-07-03 LAB
FOLATE BLD-MCNC: >620 NG/ML
FOLATE RBC-MCNC: >1845 NG/ML
HCT VFR BLD AUTO: 33.6 % (ref 37.5–51)

## 2025-07-04 ENCOUNTER — LAB (OUTPATIENT)
Dept: LAB | Facility: HOSPITAL | Age: 80
End: 2025-07-04
Payer: COMMERCIAL

## 2025-07-04 DIAGNOSIS — N18.4 CHRONIC RENAL FAILURE, STAGE 4 (SEVERE): ICD-10-CM

## 2025-07-04 LAB
ANION GAP SERPL CALCULATED.3IONS-SCNC: 11.9 MMOL/L (ref 5–15)
BUN SERPL-MCNC: 30 MG/DL (ref 8–23)
BUN/CREAT SERPL: 9.5 (ref 7–25)
CALCIUM SPEC-SCNC: 8.9 MG/DL (ref 8.6–10.5)
CHLORIDE SERPL-SCNC: 108 MMOL/L (ref 98–107)
CO2 SERPL-SCNC: 18.1 MMOL/L (ref 22–29)
CREAT SERPL-MCNC: 3.15 MG/DL (ref 0.76–1.27)
EGFRCR SERPLBLD CKD-EPI 2021: 19.3 ML/MIN/1.73
GLUCOSE SERPL-MCNC: 97 MG/DL (ref 65–99)
POTASSIUM SERPL-SCNC: 5.7 MMOL/L (ref 3.5–5.2)
SODIUM SERPL-SCNC: 138 MMOL/L (ref 136–145)

## 2025-07-04 PROCEDURE — 36415 COLL VENOUS BLD VENIPUNCTURE: CPT

## 2025-07-04 PROCEDURE — 80048 BASIC METABOLIC PNL TOTAL CA: CPT

## 2025-07-06 LAB — BACTERIA SPEC AEROBE CULT: NORMAL

## 2025-07-08 ENCOUNTER — TELEPHONE (OUTPATIENT)
Dept: INFECTIOUS DISEASES | Facility: CLINIC | Age: 80
End: 2025-07-08

## 2025-07-09 ENCOUNTER — LAB (OUTPATIENT)
Dept: LAB | Facility: HOSPITAL | Age: 80
End: 2025-07-09
Payer: COMMERCIAL

## 2025-07-09 ENCOUNTER — TRANSCRIBE ORDERS (OUTPATIENT)
Dept: LAB | Facility: HOSPITAL | Age: 80
End: 2025-07-09
Payer: COMMERCIAL

## 2025-07-09 DIAGNOSIS — A40.8 SEPTICEMIA DUE TO ANAEROBIC STREPTOCOCCI: ICD-10-CM

## 2025-07-09 DIAGNOSIS — I33.0 ACUTE AND SUBACUTE BACTERIAL ENDOCARDITIS: ICD-10-CM

## 2025-07-09 DIAGNOSIS — N18.6 TYPE 2 DIABETES MELLITUS WITH ESRD (END-STAGE RENAL DISEASE): ICD-10-CM

## 2025-07-09 DIAGNOSIS — I33.0 ACUTE AND SUBACUTE BACTERIAL ENDOCARDITIS: Primary | ICD-10-CM

## 2025-07-09 DIAGNOSIS — E11.22 TYPE 2 DIABETES MELLITUS WITH ESRD (END-STAGE RENAL DISEASE): ICD-10-CM

## 2025-07-09 LAB
ALBUMIN SERPL-MCNC: 3.4 G/DL (ref 3.5–5.2)
ALBUMIN/GLOB SERPL: 1.1 G/DL
ALP SERPL-CCNC: 78 U/L (ref 39–117)
ALT SERPL W P-5'-P-CCNC: 10 U/L (ref 1–41)
ANION GAP SERPL CALCULATED.3IONS-SCNC: 10 MMOL/L (ref 5–15)
AST SERPL-CCNC: 20 U/L (ref 1–40)
BASOPHILS # BLD AUTO: 0.12 10*3/MM3 (ref 0–0.2)
BASOPHILS NFR BLD AUTO: 1.2 % (ref 0–1.5)
BILIRUB SERPL-MCNC: 0.2 MG/DL (ref 0–1.2)
BUN SERPL-MCNC: 31.2 MG/DL (ref 8–23)
BUN/CREAT SERPL: 13.1 (ref 7–25)
CALCIUM SPEC-SCNC: 8.6 MG/DL (ref 8.6–10.5)
CHLORIDE SERPL-SCNC: 110 MMOL/L (ref 98–107)
CO2 SERPL-SCNC: 20 MMOL/L (ref 22–29)
CREAT SERPL-MCNC: 2.39 MG/DL (ref 0.76–1.27)
CRP SERPL-MCNC: 0.33 MG/DL (ref 0–0.5)
DEPRECATED RDW RBC AUTO: 60 FL (ref 37–54)
EGFRCR SERPLBLD CKD-EPI 2021: 26.9 ML/MIN/1.73
EOSINOPHIL # BLD AUTO: 0.46 10*3/MM3 (ref 0–0.4)
EOSINOPHIL NFR BLD AUTO: 4.6 % (ref 0.3–6.2)
ERYTHROCYTE [DISTWIDTH] IN BLOOD BY AUTOMATED COUNT: 22.6 % (ref 12.3–15.4)
GLOBULIN UR ELPH-MCNC: 3 GM/DL
GLUCOSE SERPL-MCNC: 88 MG/DL (ref 65–99)
HCT VFR BLD AUTO: 30.1 % (ref 37.5–51)
HGB BLD-MCNC: 8.9 G/DL (ref 13–17.7)
IMM GRANULOCYTES # BLD AUTO: 0.04 10*3/MM3 (ref 0–0.05)
IMM GRANULOCYTES NFR BLD AUTO: 0.4 % (ref 0–0.5)
LYMPHOCYTES # BLD AUTO: 1.38 10*3/MM3 (ref 0.7–3.1)
LYMPHOCYTES NFR BLD AUTO: 13.9 % (ref 19.6–45.3)
MCH RBC QN AUTO: 22.4 PG (ref 26.6–33)
MCHC RBC AUTO-ENTMCNC: 29.6 G/DL (ref 31.5–35.7)
MCV RBC AUTO: 75.6 FL (ref 79–97)
MC_CV_MDC_IDC_RATE_1: 160
MC_CV_MDC_IDC_ZONE_ID: 1
MDC_IDC_MSMT_BATTERY_REMAINING_LONGEVITY: 84 MO
MDC_IDC_MSMT_BATTERY_REMAINING_PERCENTAGE: 100 %
MDC_IDC_MSMT_BATTERY_STATUS: NORMAL
MDC_IDC_MSMT_LEADCHNL_RA_DTM: NORMAL
MDC_IDC_MSMT_LEADCHNL_RA_IMPEDANCE_VALUE: 631
MDC_IDC_MSMT_LEADCHNL_RA_PACING_THRESHOLD_AMPLITUDE: 0.9
MDC_IDC_MSMT_LEADCHNL_RA_PACING_THRESHOLD_POLARITY: NORMAL
MDC_IDC_MSMT_LEADCHNL_RA_PACING_THRESHOLD_PULSEWIDTH: 0.4
MDC_IDC_MSMT_LEADCHNL_RA_SENSING_INTR_AMPL: 7.6
MDC_IDC_MSMT_LEADCHNL_RV_DTM: NORMAL
MDC_IDC_MSMT_LEADCHNL_RV_IMPEDANCE_VALUE: 641
MDC_IDC_MSMT_LEADCHNL_RV_PACING_THRESHOLD_AMPLITUDE: 0.7
MDC_IDC_MSMT_LEADCHNL_RV_PACING_THRESHOLD_POLARITY: NORMAL
MDC_IDC_MSMT_LEADCHNL_RV_PACING_THRESHOLD_PULSEWIDTH: 0.4
MDC_IDC_MSMT_LEADCHNL_RV_SENSING_INTR_AMPL: 2.6
MDC_IDC_PG_IMPLANT_DTM: NORMAL
MDC_IDC_PG_MFG: NORMAL
MDC_IDC_PG_MODEL: NORMAL
MDC_IDC_PG_SERIAL: NORMAL
MDC_IDC_PG_TYPE: NORMAL
MDC_IDC_SESS_DTM: NORMAL
MDC_IDC_SESS_TYPE: NORMAL
MDC_IDC_SET_BRADY_AT_MODE_SWITCH_RATE: 170
MDC_IDC_SET_BRADY_LOWRATE: 60
MDC_IDC_SET_BRADY_MAX_SENSOR_RATE: 120
MDC_IDC_SET_BRADY_MAX_TRACKING_RATE: 120
MDC_IDC_SET_BRADY_MODE: NORMAL
MDC_IDC_SET_BRADY_PAV_DELAY: 220
MDC_IDC_SET_BRADY_SAV_DELAY: 220
MDC_IDC_SET_LEADCHNL_RA_PACING_AMPLITUDE: 2.5
MDC_IDC_SET_LEADCHNL_RA_PACING_POLARITY: NORMAL
MDC_IDC_SET_LEADCHNL_RA_PACING_PULSEWIDTH: 0.4
MDC_IDC_SET_LEADCHNL_RA_SENSING_POLARITY: NORMAL
MDC_IDC_SET_LEADCHNL_RA_SENSING_SENSITIVITY: 0.25
MDC_IDC_SET_LEADCHNL_RV_PACING_AMPLITUDE: 2
MDC_IDC_SET_LEADCHNL_RV_PACING_POLARITY: NORMAL
MDC_IDC_SET_LEADCHNL_RV_PACING_PULSEWIDTH: 0.4
MDC_IDC_SET_LEADCHNL_RV_SENSING_POLARITY: NORMAL
MDC_IDC_SET_LEADCHNL_RV_SENSING_SENSITIVITY: 0.6
MDC_IDC_SET_ZONE_STATUS: NORMAL
MDC_IDC_SET_ZONE_TYPE: NORMAL
MDC_IDC_STAT_AT_BURDEN_PERCENT: 2
MDC_IDC_STAT_BRADY_RA_PERCENT_PACED: 38
MDC_IDC_STAT_BRADY_RV_PERCENT_PACED: 7
MONOCYTES # BLD AUTO: 1.01 10*3/MM3 (ref 0.1–0.9)
MONOCYTES NFR BLD AUTO: 10.1 % (ref 5–12)
NEUTROPHILS NFR BLD AUTO: 6.95 10*3/MM3 (ref 1.7–7)
NEUTROPHILS NFR BLD AUTO: 69.8 % (ref 42.7–76)
NRBC BLD AUTO-RTO: 0 /100 WBC (ref 0–0.2)
PLATELET # BLD AUTO: 265 10*3/MM3 (ref 140–450)
PMV BLD AUTO: 9.5 FL (ref 6–12)
POTASSIUM SERPL-SCNC: 5.3 MMOL/L (ref 3.5–5.2)
PROT SERPL-MCNC: 6.4 G/DL (ref 6–8.5)
RBC # BLD AUTO: 3.98 10*6/MM3 (ref 4.14–5.8)
SODIUM SERPL-SCNC: 140 MMOL/L (ref 136–145)
WBC NRBC COR # BLD AUTO: 9.96 10*3/MM3 (ref 3.4–10.8)

## 2025-07-09 PROCEDURE — 80053 COMPREHEN METABOLIC PANEL: CPT

## 2025-07-09 PROCEDURE — 85025 COMPLETE CBC W/AUTO DIFF WBC: CPT

## 2025-07-09 PROCEDURE — 86140 C-REACTIVE PROTEIN: CPT

## 2025-07-09 PROCEDURE — 36415 COLL VENOUS BLD VENIPUNCTURE: CPT

## 2025-07-12 ENCOUNTER — READMISSION MANAGEMENT (OUTPATIENT)
Dept: CALL CENTER | Facility: HOSPITAL | Age: 80
End: 2025-07-12
Payer: COMMERCIAL

## 2025-07-12 NOTE — OUTREACH NOTE
Medical Week 3 Survey      Flowsheet Row Responses   [facility] patient discharged from? [place]   Does the patient have one of the following disease processes/diagnoses(primary or secondary)? Other   Week 3 attempt successful? No   Unsuccessful attempts Attempt 1   Revoke Chapincito CHAPMAN - Registered Nurse

## 2025-07-15 ENCOUNTER — LAB (OUTPATIENT)
Dept: LAB | Facility: HOSPITAL | Age: 80
End: 2025-07-15
Payer: COMMERCIAL

## 2025-07-15 DIAGNOSIS — A40.8 OTHER STREPTOCOCCAL SEPSIS: ICD-10-CM

## 2025-07-15 LAB
ALBUMIN SERPL-MCNC: 2.9 G/DL (ref 3.5–5.2)
ALBUMIN/GLOB SERPL: 0.9 G/DL
ALP SERPL-CCNC: 70 U/L (ref 39–117)
ALT SERPL W P-5'-P-CCNC: 11 U/L (ref 1–41)
ANION GAP SERPL CALCULATED.3IONS-SCNC: 10.7 MMOL/L (ref 5–15)
AST SERPL-CCNC: 22 U/L (ref 1–40)
BASOPHILS # BLD AUTO: 0.1 10*3/MM3 (ref 0–0.2)
BASOPHILS NFR BLD AUTO: 1.3 % (ref 0–1.5)
BILIRUB SERPL-MCNC: 0.2 MG/DL (ref 0–1.2)
BUN SERPL-MCNC: 27.3 MG/DL (ref 8–23)
BUN/CREAT SERPL: 12.2 (ref 7–25)
CALCIUM SPEC-SCNC: 8.3 MG/DL (ref 8.6–10.5)
CHLORIDE SERPL-SCNC: 111 MMOL/L (ref 98–107)
CO2 SERPL-SCNC: 17.3 MMOL/L (ref 22–29)
CREAT SERPL-MCNC: 2.23 MG/DL (ref 0.76–1.27)
CRP SERPL-MCNC: <0.3 MG/DL (ref 0–0.5)
DEPRECATED RDW RBC AUTO: 62.8 FL (ref 37–54)
EGFRCR SERPLBLD CKD-EPI 2021: 29.2 ML/MIN/1.73
EOSINOPHIL # BLD AUTO: 0.46 10*3/MM3 (ref 0–0.4)
EOSINOPHIL NFR BLD AUTO: 5.8 % (ref 0.3–6.2)
ERYTHROCYTE [DISTWIDTH] IN BLOOD BY AUTOMATED COUNT: 22.9 % (ref 12.3–15.4)
ERYTHROCYTE [SEDIMENTATION RATE] IN BLOOD: 45 MM/HR (ref 0–20)
GLOBULIN UR ELPH-MCNC: 3.2 GM/DL
GLUCOSE SERPL-MCNC: 95 MG/DL (ref 65–99)
HCT VFR BLD AUTO: 28.6 % (ref 37.5–51)
HGB BLD-MCNC: 8.5 G/DL (ref 13–17.7)
IMM GRANULOCYTES # BLD AUTO: 0.03 10*3/MM3 (ref 0–0.05)
IMM GRANULOCYTES NFR BLD AUTO: 0.4 % (ref 0–0.5)
LYMPHOCYTES # BLD AUTO: 1.12 10*3/MM3 (ref 0.7–3.1)
LYMPHOCYTES NFR BLD AUTO: 14.1 % (ref 19.6–45.3)
MCH RBC QN AUTO: 22.5 PG (ref 26.6–33)
MCHC RBC AUTO-ENTMCNC: 29.7 G/DL (ref 31.5–35.7)
MCV RBC AUTO: 75.7 FL (ref 79–97)
MONOCYTES # BLD AUTO: 0.96 10*3/MM3 (ref 0.1–0.9)
MONOCYTES NFR BLD AUTO: 12.1 % (ref 5–12)
NEUTROPHILS NFR BLD AUTO: 5.28 10*3/MM3 (ref 1.7–7)
NEUTROPHILS NFR BLD AUTO: 66.3 % (ref 42.7–76)
NRBC BLD AUTO-RTO: 0 /100 WBC (ref 0–0.2)
PLATELET # BLD AUTO: 236 10*3/MM3 (ref 140–450)
PMV BLD AUTO: 9.6 FL (ref 6–12)
POTASSIUM SERPL-SCNC: 4.8 MMOL/L (ref 3.5–5.2)
PROT SERPL-MCNC: 6.1 G/DL (ref 6–8.5)
RBC # BLD AUTO: 3.78 10*6/MM3 (ref 4.14–5.8)
SODIUM SERPL-SCNC: 139 MMOL/L (ref 136–145)
WBC NRBC COR # BLD AUTO: 7.95 10*3/MM3 (ref 3.4–10.8)

## 2025-07-15 PROCEDURE — 80053 COMPREHEN METABOLIC PANEL: CPT

## 2025-07-15 PROCEDURE — 86140 C-REACTIVE PROTEIN: CPT

## 2025-07-15 PROCEDURE — 36415 COLL VENOUS BLD VENIPUNCTURE: CPT

## 2025-07-15 PROCEDURE — 85652 RBC SED RATE AUTOMATED: CPT

## 2025-07-15 PROCEDURE — 85025 COMPLETE CBC W/AUTO DIFF WBC: CPT

## 2025-07-22 ENCOUNTER — LAB (OUTPATIENT)
Dept: LAB | Facility: HOSPITAL | Age: 80
End: 2025-07-22
Payer: COMMERCIAL

## 2025-07-22 DIAGNOSIS — A40.8 OTHER STREPTOCOCCAL SEPSIS: ICD-10-CM

## 2025-07-22 LAB
ALBUMIN SERPL-MCNC: 3.1 G/DL (ref 3.5–5.2)
ALBUMIN/GLOB SERPL: 1.1 G/DL
ALP SERPL-CCNC: 72 U/L (ref 39–117)
ALT SERPL W P-5'-P-CCNC: 12 U/L (ref 1–41)
ANION GAP SERPL CALCULATED.3IONS-SCNC: 11.6 MMOL/L (ref 5–15)
AST SERPL-CCNC: 22 U/L (ref 1–40)
BASOPHILS # BLD AUTO: 0.07 10*3/MM3 (ref 0–0.2)
BASOPHILS NFR BLD AUTO: 0.9 % (ref 0–1.5)
BILIRUB SERPL-MCNC: 0.2 MG/DL (ref 0–1.2)
BUN SERPL-MCNC: 27.7 MG/DL (ref 8–23)
BUN/CREAT SERPL: 10.8 (ref 7–25)
CALCIUM SPEC-SCNC: 8.3 MG/DL (ref 8.6–10.5)
CHLORIDE SERPL-SCNC: 108 MMOL/L (ref 98–107)
CO2 SERPL-SCNC: 18.4 MMOL/L (ref 22–29)
CREAT SERPL-MCNC: 2.56 MG/DL (ref 0.76–1.27)
CRP SERPL-MCNC: <0.3 MG/DL (ref 0–0.5)
DEPRECATED RDW RBC AUTO: 63.6 FL (ref 37–54)
EGFRCR SERPLBLD CKD-EPI 2021: 24.8 ML/MIN/1.73
EOSINOPHIL # BLD AUTO: 0.43 10*3/MM3 (ref 0–0.4)
EOSINOPHIL NFR BLD AUTO: 5.6 % (ref 0.3–6.2)
ERYTHROCYTE [DISTWIDTH] IN BLOOD BY AUTOMATED COUNT: 23.4 % (ref 12.3–15.4)
ERYTHROCYTE [SEDIMENTATION RATE] IN BLOOD: 25 MM/HR (ref 0–20)
GLOBULIN UR ELPH-MCNC: 2.9 GM/DL
GLUCOSE SERPL-MCNC: 135 MG/DL (ref 65–99)
HCT VFR BLD AUTO: 28.9 % (ref 37.5–51)
HGB BLD-MCNC: 8.5 G/DL (ref 13–17.7)
IMM GRANULOCYTES # BLD AUTO: 0.03 10*3/MM3 (ref 0–0.05)
IMM GRANULOCYTES NFR BLD AUTO: 0.4 % (ref 0–0.5)
LYMPHOCYTES # BLD AUTO: 1.07 10*3/MM3 (ref 0.7–3.1)
LYMPHOCYTES NFR BLD AUTO: 14 % (ref 19.6–45.3)
MCH RBC QN AUTO: 22.6 PG (ref 26.6–33)
MCHC RBC AUTO-ENTMCNC: 29.4 G/DL (ref 31.5–35.7)
MCV RBC AUTO: 76.9 FL (ref 79–97)
MONOCYTES # BLD AUTO: 0.87 10*3/MM3 (ref 0.1–0.9)
MONOCYTES NFR BLD AUTO: 11.4 % (ref 5–12)
NEUTROPHILS NFR BLD AUTO: 5.19 10*3/MM3 (ref 1.7–7)
NEUTROPHILS NFR BLD AUTO: 67.7 % (ref 42.7–76)
NRBC BLD AUTO-RTO: 0 /100 WBC (ref 0–0.2)
PLATELET # BLD AUTO: 252 10*3/MM3 (ref 140–450)
PMV BLD AUTO: 9.9 FL (ref 6–12)
POTASSIUM SERPL-SCNC: 5.5 MMOL/L (ref 3.5–5.2)
PROT SERPL-MCNC: 6 G/DL (ref 6–8.5)
RBC # BLD AUTO: 3.76 10*6/MM3 (ref 4.14–5.8)
SODIUM SERPL-SCNC: 138 MMOL/L (ref 136–145)
WBC NRBC COR # BLD AUTO: 7.66 10*3/MM3 (ref 3.4–10.8)

## 2025-07-22 PROCEDURE — 85652 RBC SED RATE AUTOMATED: CPT

## 2025-07-22 PROCEDURE — 80053 COMPREHEN METABOLIC PANEL: CPT

## 2025-07-22 PROCEDURE — 85025 COMPLETE CBC W/AUTO DIFF WBC: CPT

## 2025-07-22 PROCEDURE — 86140 C-REACTIVE PROTEIN: CPT

## 2025-07-22 PROCEDURE — 36415 COLL VENOUS BLD VENIPUNCTURE: CPT

## 2025-07-23 LAB
MC_CV_MDC_IDC_RATE_1: 160
MC_CV_MDC_IDC_ZONE_ID: 1
MDC_IDC_MSMT_BATTERY_REMAINING_LONGEVITY: 84 MO
MDC_IDC_MSMT_BATTERY_REMAINING_PERCENTAGE: 100 %
MDC_IDC_MSMT_BATTERY_STATUS: NORMAL
MDC_IDC_MSMT_LEADCHNL_RA_DTM: NORMAL
MDC_IDC_MSMT_LEADCHNL_RA_IMPEDANCE_VALUE: 631
MDC_IDC_MSMT_LEADCHNL_RA_PACING_THRESHOLD_AMPLITUDE: 0.9
MDC_IDC_MSMT_LEADCHNL_RA_PACING_THRESHOLD_POLARITY: NORMAL
MDC_IDC_MSMT_LEADCHNL_RA_PACING_THRESHOLD_PULSEWIDTH: 0.4
MDC_IDC_MSMT_LEADCHNL_RA_SENSING_INTR_AMPL: 7.6
MDC_IDC_MSMT_LEADCHNL_RV_DTM: NORMAL
MDC_IDC_MSMT_LEADCHNL_RV_IMPEDANCE_VALUE: 641
MDC_IDC_MSMT_LEADCHNL_RV_PACING_THRESHOLD_AMPLITUDE: 0.7
MDC_IDC_MSMT_LEADCHNL_RV_PACING_THRESHOLD_POLARITY: NORMAL
MDC_IDC_MSMT_LEADCHNL_RV_PACING_THRESHOLD_PULSEWIDTH: 0.4
MDC_IDC_MSMT_LEADCHNL_RV_SENSING_INTR_AMPL: 2.6
MDC_IDC_PG_IMPLANT_DTM: NORMAL
MDC_IDC_PG_MFG: NORMAL
MDC_IDC_PG_MODEL: NORMAL
MDC_IDC_PG_SERIAL: NORMAL
MDC_IDC_PG_TYPE: NORMAL
MDC_IDC_SESS_DTM: NORMAL
MDC_IDC_SESS_TYPE: NORMAL
MDC_IDC_SET_BRADY_AT_MODE_SWITCH_RATE: 170
MDC_IDC_SET_BRADY_LOWRATE: 60
MDC_IDC_SET_BRADY_MAX_SENSOR_RATE: 120
MDC_IDC_SET_BRADY_MAX_TRACKING_RATE: 120
MDC_IDC_SET_BRADY_MODE: NORMAL
MDC_IDC_SET_BRADY_PAV_DELAY: 220
MDC_IDC_SET_BRADY_SAV_DELAY: 220
MDC_IDC_SET_LEADCHNL_RA_PACING_AMPLITUDE: 2.5
MDC_IDC_SET_LEADCHNL_RA_PACING_POLARITY: NORMAL
MDC_IDC_SET_LEADCHNL_RA_PACING_PULSEWIDTH: 0.4
MDC_IDC_SET_LEADCHNL_RA_SENSING_POLARITY: NORMAL
MDC_IDC_SET_LEADCHNL_RA_SENSING_SENSITIVITY: 0.25
MDC_IDC_SET_LEADCHNL_RV_PACING_AMPLITUDE: 2
MDC_IDC_SET_LEADCHNL_RV_PACING_POLARITY: NORMAL
MDC_IDC_SET_LEADCHNL_RV_PACING_PULSEWIDTH: 0.4
MDC_IDC_SET_LEADCHNL_RV_SENSING_POLARITY: NORMAL
MDC_IDC_SET_LEADCHNL_RV_SENSING_SENSITIVITY: 0.6
MDC_IDC_SET_ZONE_STATUS: NORMAL
MDC_IDC_SET_ZONE_TYPE: NORMAL
MDC_IDC_STAT_AT_BURDEN_PERCENT: 2
MDC_IDC_STAT_BRADY_RA_PERCENT_PACED: 38
MDC_IDC_STAT_BRADY_RV_PERCENT_PACED: 7

## 2025-07-24 DIAGNOSIS — E11.8 TYPE 2 DIABETES MELLITUS WITH COMPLICATION, WITH LONG-TERM CURRENT USE OF INSULIN: Chronic | ICD-10-CM

## 2025-07-24 DIAGNOSIS — I25.110 CORONARY ARTERY DISEASE INVOLVING NATIVE CORONARY ARTERY OF NATIVE HEART WITH UNSTABLE ANGINA PECTORIS: Chronic | ICD-10-CM

## 2025-07-24 DIAGNOSIS — Z79.4 TYPE 2 DIABETES MELLITUS WITH COMPLICATION, WITH LONG-TERM CURRENT USE OF INSULIN: Chronic | ICD-10-CM

## 2025-07-24 DIAGNOSIS — N18.32 CHRONIC RENAL FAILURE, STAGE 3B: Chronic | ICD-10-CM

## 2025-07-25 RX ORDER — METOPROLOL TARTRATE 25 MG/1
25 TABLET, FILM COATED ORAL 2 TIMES DAILY
Qty: 180 TABLET | Refills: 2 | Status: SHIPPED | OUTPATIENT
Start: 2025-07-25 | End: 2026-04-21

## 2025-07-25 RX ORDER — DAPAGLIFLOZIN 10 MG/1
10 TABLET, FILM COATED ORAL DAILY
Qty: 90 TABLET | Refills: 0 | Status: SHIPPED | OUTPATIENT
Start: 2025-07-25

## 2025-07-29 ENCOUNTER — LAB (OUTPATIENT)
Dept: FAMILY MEDICINE CLINIC | Facility: CLINIC | Age: 80
End: 2025-07-29
Payer: COMMERCIAL

## 2025-07-29 DIAGNOSIS — A40.8 OTHER STREPTOCOCCAL SEPSIS: ICD-10-CM

## 2025-07-29 LAB
ALBUMIN SERPL-MCNC: 3.3 G/DL (ref 3.5–5.2)
ALBUMIN/GLOB SERPL: 1 G/DL
ALP SERPL-CCNC: 74 U/L (ref 39–117)
ALT SERPL W P-5'-P-CCNC: 12 U/L (ref 1–41)
ANION GAP SERPL CALCULATED.3IONS-SCNC: 11.7 MMOL/L (ref 5–15)
AST SERPL-CCNC: 21 U/L (ref 1–40)
BASOPHILS # BLD AUTO: 0.07 10*3/MM3 (ref 0–0.2)
BASOPHILS NFR BLD AUTO: 0.9 % (ref 0–1.5)
BILIRUB SERPL-MCNC: 0.3 MG/DL (ref 0–1.2)
BUN SERPL-MCNC: 33 MG/DL (ref 8–23)
BUN/CREAT SERPL: 14.4 (ref 7–25)
CALCIUM SPEC-SCNC: 9.2 MG/DL (ref 8.6–10.5)
CHLORIDE SERPL-SCNC: 105 MMOL/L (ref 98–107)
CO2 SERPL-SCNC: 19.3 MMOL/L (ref 22–29)
CREAT SERPL-MCNC: 2.29 MG/DL (ref 0.76–1.27)
CRP SERPL-MCNC: <0.3 MG/DL (ref 0–0.5)
DEPRECATED RDW RBC AUTO: 56.4 FL (ref 37–54)
EGFRCR SERPLBLD CKD-EPI 2021: 28.3 ML/MIN/1.73
EOSINOPHIL # BLD AUTO: 0.44 10*3/MM3 (ref 0–0.4)
EOSINOPHIL NFR BLD AUTO: 5.5 % (ref 0.3–6.2)
ERYTHROCYTE [DISTWIDTH] IN BLOOD BY AUTOMATED COUNT: 20.4 % (ref 12.3–15.4)
ERYTHROCYTE [SEDIMENTATION RATE] IN BLOOD: 30 MM/HR (ref 0–20)
GLOBULIN UR ELPH-MCNC: 3.4 GM/DL
GLUCOSE SERPL-MCNC: 169 MG/DL (ref 65–99)
HCT VFR BLD AUTO: 32.3 % (ref 37.5–51)
HGB BLD-MCNC: 9.3 G/DL (ref 13–17.7)
IMM GRANULOCYTES # BLD AUTO: 0.02 10*3/MM3 (ref 0–0.05)
IMM GRANULOCYTES NFR BLD AUTO: 0.3 % (ref 0–0.5)
LYMPHOCYTES # BLD AUTO: 1 10*3/MM3 (ref 0.7–3.1)
LYMPHOCYTES NFR BLD AUTO: 12.5 % (ref 19.6–45.3)
MCH RBC QN AUTO: 22.5 PG (ref 26.6–33)
MCHC RBC AUTO-ENTMCNC: 28.8 G/DL (ref 31.5–35.7)
MCV RBC AUTO: 78 FL (ref 79–97)
MONOCYTES # BLD AUTO: 1.04 10*3/MM3 (ref 0.1–0.9)
MONOCYTES NFR BLD AUTO: 13 % (ref 5–12)
NEUTROPHILS NFR BLD AUTO: 5.42 10*3/MM3 (ref 1.7–7)
NEUTROPHILS NFR BLD AUTO: 67.8 % (ref 42.7–76)
NRBC BLD AUTO-RTO: 0 /100 WBC (ref 0–0.2)
PLATELET # BLD AUTO: 273 10*3/MM3 (ref 140–450)
PMV BLD AUTO: 10.7 FL (ref 6–12)
POTASSIUM SERPL-SCNC: 5.3 MMOL/L (ref 3.5–5.2)
PROT SERPL-MCNC: 6.7 G/DL (ref 6–8.5)
RBC # BLD AUTO: 4.14 10*6/MM3 (ref 4.14–5.8)
SODIUM SERPL-SCNC: 136 MMOL/L (ref 136–145)
WBC NRBC COR # BLD AUTO: 7.99 10*3/MM3 (ref 3.4–10.8)

## 2025-07-29 PROCEDURE — 85025 COMPLETE CBC W/AUTO DIFF WBC: CPT | Performed by: INTERNAL MEDICINE

## 2025-07-29 PROCEDURE — 85652 RBC SED RATE AUTOMATED: CPT | Performed by: INTERNAL MEDICINE

## 2025-07-29 PROCEDURE — 80053 COMPREHEN METABOLIC PANEL: CPT | Performed by: INTERNAL MEDICINE

## 2025-07-29 PROCEDURE — 86140 C-REACTIVE PROTEIN: CPT | Performed by: INTERNAL MEDICINE

## 2025-08-06 DIAGNOSIS — R47.81 SLURRED SPEECH: Primary | ICD-10-CM

## (undated) DEVICE — ADULT DISPOSABLE SINGLE-PATIENT USE PULSE OXIMETER SENSOR: Brand: NONIN

## (undated) DEVICE — SUT MONOCRYL PLS ANTIB UND 3/0  PS1 27IN

## (undated) DEVICE — GLV SURG PREMIERPRO MIC LTX PF SZ7.5 BRN

## (undated) DEVICE — LEX CATH LAB MINOR: Brand: MEDLINE INDUSTRIES, INC.

## (undated) DEVICE — SYR LUERLOK 50ML

## (undated) DEVICE — ST INF PRI SMRTSTE 20DRP 2VLV 24ML 117

## (undated) DEVICE — SUCTION CANISTER 2500CC: Brand: DEROYAL

## (undated) DEVICE — SUT MNCRYL 4/0 PS2 18 IN

## (undated) DEVICE — ANGIOGRAPHIC CATHETER: Brand: EXPO™

## (undated) DEVICE — CABL PACE ATRIAL PT BLU

## (undated) DEVICE — HOLDER: Brand: DEROYAL

## (undated) DEVICE — ENDOGATOR TUBING FOR ENDOGATOR EGP-100 IRRIGATION PUMP,OLYMPUS OFP PUMP, OLYMPUS AFU-100 PUMP AND ERBE EIP2 PUMP: Brand: ENDOGATOR

## (undated) DEVICE — PK ATS CUST W CARDIOTOMY RESEVOIR

## (undated) DEVICE — BOOT SUT XRAY DETECT STD YEL/BLU CA/50

## (undated) DEVICE — Device

## (undated) DEVICE — 6F .070 XB 3.5 100CM: Brand: VISTA BRITE TIP

## (undated) DEVICE — BLANKT WARM UNDER/BDY A/ 100X195CM DISP LF

## (undated) DEVICE — 3M™ IOBAN™ 2 ANTIMICROBIAL INCISE DRAPE 6650EZ: Brand: IOBAN™ 2

## (undated) DEVICE — SUT SILK 3/0 TIES 18IN A184H

## (undated) DEVICE — PROVIDES A STERILE INTERFACE BETWEEN THE OPERATING ROOM SURGICAL LAMPS (NON-STERILE) AND THE SURGEON OR NURSE (STERILE).: Brand: STERION®CLAMP COVER FABRIC

## (undated) DEVICE — GLV SURG PREMIERPRO MIC LTX PF SZ8 BRN

## (undated) DEVICE — CATH GUIDE BERN 5F 65CM

## (undated) DEVICE — PLASMABLADE PS210-030S 3.0S LOCK: Brand: PLASMABLADE™

## (undated) DEVICE — SUT PROLN 4/0 BB D/A 36IN 8581H

## (undated) DEVICE — MINI TREK CORONARY DILATATION CATHETER 2.0 MM X 15 MM / RAPID-EXCHANGE: Brand: MINI TREK

## (undated) DEVICE — 8 FOOT DISPOSABLE BI-VENTRICULAR PACING CABLE WITH SAFE CONNECT / ALLIGATOR CLIP

## (undated) DEVICE — TBG PENCL TELESCP MEGADYNE SMOKE EVAC 10FT

## (undated) DEVICE — PCH INST SURG INVISISHIELD 2PCKT

## (undated) DEVICE — DRSNG SURESITE WNDW 4X4.5

## (undated) DEVICE — SUT SILK 2/0 TIES 18IN A185H

## (undated) DEVICE — GLIDEX™ COATED HYDROPHILIC GUIDEWIRE: Brand: MAGIC TORQUE™

## (undated) DEVICE — PK PERFUS CUST W/CARDIOPLEGIA

## (undated) DEVICE — GW INQW FIX/CORE PTFE J/3MM .035 260CM

## (undated) DEVICE — CATH PACE PACEL BIPOL 5F110CM

## (undated) DEVICE — 3M™ STERI-DRAPE™ FLUOROSCOPE DRAPE, 10 PER CARTON / 4 CARTONS PER CASE, 1012: Brand: STERI-DRAPE™

## (undated) DEVICE — RUNWAY RADL W/TOP PAD

## (undated) DEVICE — A2000 MULTI-USE SYRINGE KIT, P/N 701277-003KIT CONTENTS: 100ML CONTRAST RESERVOIR AND TUBING WITH CONTRAST SPIKE AND CLAMP: Brand: A2000 MULTI-USE SYRINGE KIT

## (undated) DEVICE — ENDOGATOR AUXILIARY WATER JET CONNECTOR: Brand: ENDOGATOR

## (undated) DEVICE — PINNACLE INTRODUCER SHEATH: Brand: PINNACLE

## (undated) DEVICE — BOWL UTIL STRL 32OZ

## (undated) DEVICE — SHEET, DRAPE, SPLIT, STERILE: Brand: MEDLINE

## (undated) DEVICE — HDRST POSTIN FM CRDL TRACH SLOT 9X8X4IN

## (undated) DEVICE — 3M™ STERI-DRAPE™ INSTRUMENT POUCH 1018: Brand: STERI-DRAPE™

## (undated) DEVICE — COVER,MAYO STAND,XL,STERILE: Brand: MEDLINE

## (undated) DEVICE — CLTH CLENS READYCLEANSE PERI CARE PK/5

## (undated) DEVICE — TRAP FLD MINIVAC MEGADYNE 100ML

## (undated) DEVICE — ELECTRD RETRN/GRND MEGADYNE SGL/PLT W/CORD 9FT DISP

## (undated) DEVICE — DRAPE, RADIAL, STERILE: Brand: MEDLINE

## (undated) DEVICE — DECANTER BAG 9": Brand: MEDLINE INDUSTRIES, INC.

## (undated) DEVICE — ANTIBACTERIAL UNDYED BRAIDED (POLYGLACTIN 910), SYNTHETIC ABSORBABLE SUTURE: Brand: COATED VICRYL

## (undated) DEVICE — SYR LUERLOK 30CC

## (undated) DEVICE — GLIDESHEATH SLENDER STAINLESS STEEL KIT: Brand: GLIDESHEATH SLENDER

## (undated) DEVICE — INTRO TEAR AWAY/LVD W/SD PRT 6F 13CM

## (undated) DEVICE — PATIENT RETURN ELECTRODE, SINGLE-USE, CONTACT QUALITY MONITORING, ADULT, WITH 9FT CORD, FOR PATIENTS WEIGING OVER 33LBS. (15KG): Brand: MEGADYNE

## (undated) DEVICE — PENCL ROCKRSWCH MEGADYNE W/HOLSTR 10FT SS

## (undated) DEVICE — COVER,TABLE,HVY DUTY,60"X90",STRL: Brand: MEDLINE

## (undated) DEVICE — DRAPE,TOP,102X53,STERILE: Brand: MEDLINE

## (undated) DEVICE — ST EXT IV SMARTSITE 2VLV SP M LL 5ML IV1

## (undated) DEVICE — NDL PERC 1PRT THNWALL W/BASEPLT 18G 7CM

## (undated) DEVICE — ADULT, RADIOTRANSPARENT ELEMENT, COMPATIBLE W/ ZOLL: Brand: DEFIBRILLATION ELECTRODES

## (undated) DEVICE — PK CATH CARD 70

## (undated) DEVICE — ARM SLING II: Brand: DEROYAL

## (undated) DEVICE — GW SAFARI2 PRESH XSM CRV .035IN 3.2X2.9X275CM

## (undated) DEVICE — RADIFOCUS OPTITORQUE ANGIOGRAPHIC CATHETER: Brand: OPTITORQUE

## (undated) DEVICE — AMD ANTIMICROBIAL NON-ADHERENT ISLAND DRESSING,0.2% POLYHEXAMETHYLENE BIGUANIDE HCI (PHMB): Brand: TELFA

## (undated) DEVICE — PAD ARMBRD SURG CONVOL 7.5X20X2IN

## (undated) DEVICE — PK CATH CARD 10

## (undated) DEVICE — PERCLOSE™ PROSTYLE™ SUTURE-MEDIATED CLOSURE AND REPAIR SYSTEM: Brand: PERCLOSE™ PROSTYLE™

## (undated) DEVICE — PK MINOR SPLT 10

## (undated) DEVICE — MODEL AT P65, P/N 701554-001KIT CONTENTS: HAND CONTROLLER, 3-WAY HIGH-PRESSURE STOPCOCK WITH ROTATING END AND PREMIUM HIGH-PRESSURE TUBING: Brand: ANGIOTOUCH® KIT

## (undated) DEVICE — CANNULA,OXY,ADULT,SUPER SOFT,W/14'TUB,UC: Brand: MEDLINE INDUSTRIES, INC.

## (undated) DEVICE — 3M™ IOBAN™ 2 ANTIMICROBIAL INCISE DRAPE 6651EZ: Brand: IOBAN™ 2

## (undated) DEVICE — ADHS SKIN PREMIERPRO EXOFIN TOPICAL HI/VISC .5ML

## (undated) DEVICE — DRAPE,UTILTY,TAPE,15X26, 4EA/PK: Brand: MEDLINE

## (undated) DEVICE — GW PERIPH VASC ADX J/TP SS .035 150CM 3MM

## (undated) DEVICE — INFLATION DEVICE: Brand: ENCORE™ 26

## (undated) DEVICE — SINGLE PORT MANIFOLD: Brand: NEPTUNE 2

## (undated) DEVICE — GW CERBRL FC PTFE STD/STR .035 260CM

## (undated) DEVICE — ELECTRD BLD EZ CLN STD 2.5IN

## (undated) DEVICE — SUT PROLN 6/0 C1 D/A 30IN 8706H

## (undated) DEVICE — RADIFOCUS GLIDEWIRE: Brand: GLIDEWIRE

## (undated) DEVICE — ELECTRD DEFIB M/FUNC PROPADZ STRL 2PK

## (undated) DEVICE — ATLAS® GOLD PTA DILATATION CATHETER 20 MM X 40 MM, 120 CM CATHETER: Brand: ATLAS® GOLD

## (undated) DEVICE — CATH DIAG EXPO .056 AL1 6F 100CM

## (undated) DEVICE — CONN Y IRR DISP 1P/U

## (undated) DEVICE — PK HD AND NK 70

## (undated) DEVICE — SUT SILK 0 SH 30IN K834H

## (undated) DEVICE — SENSR CERBRL O2 PK/2

## (undated) DEVICE — INTENDED FOR TISSUE SEPARATION, AND OTHER PROCEDURES THAT REQUIRE A SHARP SURGICAL BLADE TO PUNCTURE OR CUT.: Brand: BARD-PARKER ® STAINLESS STEEL BLADES

## (undated) DEVICE — RUNTHROUGH NS EXTRA FLOPPY PTCA GUIDEWIRE: Brand: RUNTHROUGH

## (undated) DEVICE — NC TREK CORONARY DILATATION CATHETER 3.25 MM X 15 MM / RAPID-EXCHANGE: Brand: NC TREK

## (undated) DEVICE — SI AVANTI+ 7F STD W/GW  NO OBT: Brand: AVANTI

## (undated) DEVICE — SLV REPOSTNG CATH STRL 60CM

## (undated) DEVICE — APPL CHLORAPREP TINTED 26ML TEAL

## (undated) DEVICE — KT MANIFOLD CATHLAB CUST

## (undated) DEVICE — DEV INFL MONARCH 25W

## (undated) DEVICE — GLV SURG PREMIERPRO MIC LTX PF SZ6.5 BRN

## (undated) DEVICE — LIMB HOLDER, WRIST/ANKLE: Brand: DEROYAL

## (undated) DEVICE — SUT SILK 4/0 TIES 18IN A183H

## (undated) DEVICE — TR BAND RADIAL ARTERY COMPRESSION DEVICE: Brand: TR BAND

## (undated) DEVICE — GW AMPLTZ SUPERSTIFF STR .035IN 180CM

## (undated) DEVICE — DRSNG SURG AQUACEL AG/ADVNTGE 9X15CM 3.5X6IN

## (undated) DEVICE — TRY SKINPREP PVP SCRB W PAINT